# Patient Record
Sex: FEMALE | Race: WHITE | Employment: PART TIME | URBAN - NONMETROPOLITAN AREA
[De-identification: names, ages, dates, MRNs, and addresses within clinical notes are randomized per-mention and may not be internally consistent; named-entity substitution may affect disease eponyms.]

---

## 2017-04-18 ENCOUNTER — HOSPITAL ENCOUNTER (EMERGENCY)
Facility: HOSPITAL | Age: 47
Discharge: HOME | End: 2017-04-18
Attending: NURSE PRACTITIONER | Admitting: NURSE PRACTITIONER
Payer: MEDICARE

## 2017-04-18 VITALS
TEMPERATURE: 97 F | DIASTOLIC BLOOD PRESSURE: 100 MMHG | SYSTOLIC BLOOD PRESSURE: 149 MMHG | RESPIRATION RATE: 16 BRPM | OXYGEN SATURATION: 98 %

## 2017-04-18 DIAGNOSIS — H92.03 OTALGIA OF BOTH EARS: ICD-10-CM

## 2017-04-18 DIAGNOSIS — J06.9 VIRAL URI: ICD-10-CM

## 2017-04-18 PROCEDURE — 99213 OFFICE O/P EST LOW 20 MIN: CPT | Performed by: NURSE PRACTITIONER

## 2017-04-18 PROCEDURE — 99212 OFFICE O/P EST SF 10 MIN: CPT

## 2017-04-18 ASSESSMENT — ENCOUNTER SYMPTOMS
TROUBLE SWALLOWING: 0
NAUSEA: 0
RHINORRHEA: 1
DIARRHEA: 0
SHORTNESS OF BREATH: 0
APPETITE CHANGE: 0
COUGH: 0
ACTIVITY CHANGE: 0
CARDIOVASCULAR NEGATIVE: 1
FEVER: 0
ADENOPATHY: 0
EYES NEGATIVE: 1
CHILLS: 0
MYALGIAS: 0
HEADACHES: 0
VOMITING: 0

## 2017-04-18 NOTE — ED PROVIDER NOTES
History     Chief Complaint   Patient presents with     Otalgia     c/o bilateral ear pain x 2 weeks     The history is provided by the patient. No  was used.     Constantino Warren is a 46 year old female who presents with otalgia, rhinorrhea, nasal congestion and low grade fever initially 14 days ago.     I have reviewed the Medications, Allergies, Past Medical and Surgical History, and Social History in the Epic system.    Review of Systems   Constitutional: Negative for activity change, appetite change, chills and fever.   HENT: Positive for congestion, ear pain, postnasal drip and rhinorrhea. Negative for trouble swallowing.    Eyes: Negative.    Respiratory: Negative for cough and shortness of breath.    Cardiovascular: Negative.    Gastrointestinal: Negative for diarrhea, nausea and vomiting.   Genitourinary: Negative.    Musculoskeletal: Negative for myalgias.   Neurological: Negative for headaches.   Hematological: Negative for adenopathy.       Physical Exam   BP: 149/100  Heart Rate: 85  Temp: 97  F (36.1  C)  Resp: 16  SpO2: 98 %  Physical Exam   Constitutional: She is oriented to person, place, and time. She appears well-developed and well-nourished. No distress.   HENT:   Head: Normocephalic and atraumatic.   Eyes: Conjunctivae and EOM are normal. Pupils are equal, round, and reactive to light. Right eye exhibits no discharge. Left eye exhibits no discharge. No scleral icterus.   Neck: Normal range of motion. Neck supple.   Cardiovascular: Normal rate, regular rhythm and normal heart sounds.  Exam reveals no gallop and no friction rub.    No murmur heard.  Pulmonary/Chest: Effort normal and breath sounds normal. She has no wheezes. She has no rales.   Abdominal: Soft. She exhibits no mass. There is no tenderness. There is no rebound and no guarding.   Musculoskeletal: Normal range of motion.   Lymphadenopathy:     She has no cervical adenopathy.   Neurological: She is alert and  oriented to person, place, and time.   Skin: Skin is warm and dry. No rash noted. She is not diaphoretic.   Nursing note and vitals reviewed.      ED Course     ED Course     Procedures             Labs Ordered and Resulted from Time of ED Arrival Up to the Time of Departure from the ED - No data to display    Assessments & Plan (with Medical Decision Making)     I have reviewed the nursing notes.    I have reviewed the findings, diagnosis, plan and need for follow up with the patient.  Afrin nasal spray 3 days on and then 3 days off  Nasal saline spray as desired    Pathophysiology, possible etiology and treatment with potential outcomes, risks, benefits, and alternatives discussed to the best of my ability      Pt verbalizes understanding and agreement with plan.  Follow up for worsening symptoms      New Prescriptions    No medications on file       Final diagnoses:   Viral URI   Otalgia of both ears       4/18/2017   [facility]     Marie Flores, MAGNO  04/18/17 1114       Marie Flores, MAGNO  04/18/17 1111

## 2017-04-18 NOTE — DISCHARGE INSTRUCTIONS

## 2017-04-18 NOTE — ED NOTES
"Patient here alone. She presents with R ear pain that radiates down neck. States that she has also had drainage from left ear draining down her throat. Scratchy throat. Equilibrium is \"off\". States she has had ear pain for 14 days. Pt rates pain at 9/10 in R ear. L ear pain 3/10.  "

## 2017-04-18 NOTE — ED AVS SNAPSHOT
[facility]    [address] [address]    Phone:  [phone]                                       Constantino Warren   MRN: [id number]    Department:  [facility]   Date of Visit:  4/18/2017           Patient Information     Date Of Birth          1970        Your diagnoses for this visit were:     Viral URI     Otalgia of both ears        You were seen by Marie Flores NP.      Follow-up Information     Follow up with Ayden Alba MD.    Specialty:  Family Practice    Why:  As needed, If symptoms worsen    Contact information:    [facility]  [address] [facility] [address] [phone]          Follow up with [facility].    Specialty:  EMERGENCY MEDICINE    Why:  As needed, If symptoms worsen    Contact information:    [address] [address] [phone]    Additional information:    From [facility]: Take [address]. Turn left at [address]. Turn left at the first stoplight on [address]. At the first stop sign, take a right onto [address]. Take a left into the parking lot and continue through until you reach the North enterance of the building.       From [place]: Take [address]. Take the [address] ramp towards [place]. Turn left onto [address]. Take a slight right onto [address]. Turn left at the first stoplight on [address]. At the first stop sign, take a right onto [address]. Take a left into the parking lot and continue through until you reach the North enterance of the building.       From [place]: Take [address]. Take a right at [address]. At the first stop sign, take a right onto [address]. Take a left into the parking lot and continue through until you reach the North enterance of the building.         Discharge Instructions         Viral Upper Respiratory Illness (Adult)  You have a viral upper respiratory  illness (URI), which is another term for the common cold. This illness is contagious during the first few days. It is spread through the air by coughing and sneezing. It may also be spread by direct contact (touching the sick person and then touching your own eyes, nose, or mouth). Frequent handwashing will decrease risk of spread. Most viral illnesses go away within 7 to 10 days with rest and simple home remedies. Sometimes the illness may last for several weeks. Antibiotics will not kill a virus, and they are generally not prescribed for this condition.    Home care    If symptoms are severe, rest at home for the first 2 to 3 days. When you resume activity, don't let yourself get too tired.    Avoid being exposed to cigarette smoke (yours or others ).    You may use acetaminophen or ibuprofen to control pain and fever, unless another medicine was prescribed. (Note: If you have chronic liver or kidney disease, have ever had a stomach ulcer or gastrointestinal bleeding, or are taking blood-thinning medicines, talk with your healthcare provider before using these medicines.) Aspirin should never be given to anyone under 18 years of age who is ill with a viral infection or fever. It may cause severe liver or brain damage.    Your appetite may be poor, so a light diet is fine. Avoid dehydration by drinking 6 to 8 glasses of fluids per day (water, soft drinks, juices, tea, or soup). Extra fluids will help loosen secretions in the nose and lungs.    Over-the-counter cold medicines will not shorten the length of time you re sick, but they may be helpful for the following symptoms: cough, sore throat, and nasal and sinus congestion. (Note: Do not use decongestants if you have high blood pressure.)  Follow-up care  Follow up with your healthcare provider, or as advised.  When to seek medical advice  Call your healthcare provider right away if any of these occur:    Cough with lots of colored sputum (mucus)    Severe  headache; face, neck, or ear pain    Difficulty swallowing due to throat pain    Fever of 100.4 F (38 C)  Call 911, or get immediate medical care  Call emergency services right away if any of these occur:    Chest pain, shortness of breath, wheezing, or difficulty breathing    Coughing up blood    Inability to swallow due to throat pain    5299-5355 [employer]. [address] [place] [address]. All rights reserved. This information is not intended as a substitute for professional medical care. Always follow your healthcare professional's instructions.             Review of your medicines      Our records show that you are taking the medicines listed below. If these are incorrect, please call your family doctor or clinic.        Dose / Directions Last dose taken    cyanocobalamin 1000 MCG/ML injection   Commonly known as:  VITAMIN B12   Dose:  1 mL        Inject 1 mL into the muscle every 30 days   Refills:  0        ELAVIL PO   Dose:  50 mg   Indication:  Migraine Headache        Take 50 mg by mouth At Bedtime   Refills:  0        famotidine 20 MG tablet   Commonly known as:  PEPCID   Dose:  20 mg   Quantity:  30 tablet        Take 1 tablet (20 mg) by mouth 2 times daily   Refills:  0        ferrous sulfate 325 (65 FE) MG tablet   Commonly known as:  IRON   Dose:  325 mg   Quantity:  90 tablet        Take 1 tablet (325 mg) by mouth 3 times daily (with meals)   Refills:  2        HYDROcodone-acetaminophen 5-325 MG per tablet   Commonly known as:  NORCO   Dose:  1 tablet        Take 1 tablet by mouth every 6 hours as needed for moderate to severe pain   Refills:  0        MELATONIN PO   Dose:  7.5 mg        Take 7.5 mg by mouth nightly as needed   Refills:  0        Multi-vitamin Tabs tablet   Dose:  1 tablet        Take 1 tablet by mouth daily   Refills:  0        SYNTHROID PO   Dose:  250 mcg        Take 250 mcg by mouth daily.   Refills:  0        TRAZODONE HCL PO   Dose:  200 mg        Take  "200 mg by mouth nightly as needed for sleep   Refills:  0                Orders Needing Specimen Collection     None      Pending Results     No orders found from 2017 to 2017.            Pending Culture Results     No orders found from 2017 to 2017.            Thank you for choosing [facility]       Thank you for choosing [facility] for your care. Our goal is always to provide you with excellent care. Hearing back from our patients is one way we can continue to improve our services. Please take a few minutes to complete the written survey that you may receive in the mail after you visit with us. Thank you!        [email or web address] Information     [employer] lets you send messages to your doctor, view your test results, renew your prescriptions, schedule appointments and more. To sign up, go to [email or web address] . Click on \"Log in\" on the left side of the screen, which will take you to the Welcome page. Then click on \"Sign up Now\" on the right side of the page.     You will be asked to enter the access code listed below, as well as some personal information. Please follow the directions to create your username and password.     Your access code is: [email or web address]  Expires: 2017 11:15 AM     Your access code will  in 90 days. If you need help or a new code, please call your [facility] clinic or [phone].        Care EveryWhere ID     This is your Care EveryWhere ID. This could be used by other organizations to access your [facility] medical records  [id number]        After Visit Summary       This is your record. Keep this with you and show to your community pharmacist(s) and doctor(s) at your next visit.                  "

## 2017-04-18 NOTE — ED AVS SNAPSHOT
[facility]    [address] [place] [address]    Phone:  [phone]                                       Constantino Warren   MRN: [id number]    Department:  [facility]   Date of Visit:  4/18/2017           After Visit Summary Signature Page     I have received my discharge instructions, and my questions have been answered. I have discussed any challenges I see with this plan with the nurse or doctor.    ..........................................................................................................................................  Patient/Patient Representative Signature      ..........................................................................................................................................  Patient Representative Print Name and Relationship to Patient    ..................................................               ................................................  Date                                            Time    ..........................................................................................................................................  Reviewed by Signature/Title    ...................................................              ..............................................  Date                                                            Time

## 2017-07-17 ENCOUNTER — HOSPITAL ENCOUNTER (OUTPATIENT)
Dept: PHYSICAL THERAPY | Facility: HOSPITAL | Age: 47
Setting detail: THERAPIES SERIES
End: 2017-07-17
Attending: PHYSICAL MEDICINE & REHABILITATION
Payer: OTHER MISCELLANEOUS

## 2017-07-17 PROCEDURE — 97163 PT EVAL HIGH COMPLEX 45 MIN: CPT | Mod: GP

## 2017-07-17 PROCEDURE — 97112 NEUROMUSCULAR REEDUCATION: CPT | Mod: GP

## 2017-07-17 PROCEDURE — 40000719 ZZHC STATISTIC PT DEPARTMENT NEURO VISIT

## 2017-07-17 NOTE — PROGRESS NOTES
07/17/17 1400   General Information   Type of Visit Initial OP Ortho PT Evaluation   Start of Care Date 07/17/17   Referring Physician KASANDRA Nieves   Patient/Family Goals Statement Wants to work on balance and stairs, less headaches, less neck pain, improved LE strength   Orders Evaluate and Treat   Orders Comment Mild TBI   Date of Order 06/14/17   Insurance Type (Workers compensation)   Medical Diagnosis TBI, concussion   Surgical/Medical history reviewed Yes   Precautions/Limitations no known precautions/limitations   Special Instructions PMH includes lumbar discectomy, failed back syndrome, s/p lumbar fusion, ganglion block, opioid dependence c treatment agreement, hypothyroidism, diabetic neuropathy, gastric bypass, post-concussion syndrome   Body Part(s)   Body Part(s) Cervical Spine;Knee   Presentation and Etiology   Pertinent history of current problem (include personal factors and/or comorbidities that impact the POC) Reports this is her second work related concussion. Her first one was about a year and a half ago. She had chronic headaches since that concussion and has been seeing a neurologist.  Reports since this more recent concussion she has been having issues with memory, buzzing in ears and sensitivity to light and sound. Reports history of spinal fusion and new onset of increased neck pain and L shoulder pain. She has gotten lost when trying to get home in [facility]. Reports chewing is very difficult. 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  Sees Dr. Arreguin in [place] (neurologist). She sees Dr. Mckeon on the 31st and Rodríguez on August 10th. Also reports new onset on LLe weakness. Reports feeling unsteady on stairs. Reports one fall on stairs since concussion due to loss of balance. Also reports history of chronic back pain   Impairments F. Decreased strength and endurance;G. Impaired balance;N. Headaches;R. Other  (Vision  when going upstairs)   Functional Limitations perform activities of daily living;perform required work activities   Symptom Location Headaches   Onset date of current episode/exacerbation 06/05/17   Chronicity New   Pain rating (0-10 point scale) Best (/10);Worst (/10)   Best (/10) 6   Worst (/10) 10   Pain quality A. Sharp  (Pressure)   Frequency of pain/symptoms A. Constant   Pain/symptoms are: The same all the time   Pain/symptoms exacerbated by B. Walking;C. Lifting;D. Carrying;I. Bending;K. Home tasks;L. Work tasks   Pain/symptoms eased by C. Rest   Progression of symptoms since onset: Improved   Current Level of Function   Patient role/employment history A. Employed   Employment Comments [employer]   [facility]   Current equipment-Gait/Locomotion None   Current equipment-ADL None   Fall Risk Screen   Fall screen completed by PT   Functional Scales   Functional Scales Other   Other Scales  Gusman balance: 43/56, DGI: 18/24   Knee Objective Findings   Side (if bilateral, select both right and left) Right;Left   Balance/Proprioception (Single Leg Stance) Unable  bilaterally, loses balance   Knee/Hip Strength Comments Dorsiflexion 4-/5 on L   Right Knee Extension AROM WFL   Right Knee Extension PROM WFL   Right Knee Flexion AROM WFL   Right Knee Flexion PROM WFL   Right Knee Flexion Strength 5/5   Right Knee Extension Strength 5/5   Right Hip Abduction Strength 5/5   Left Knee Extension AROM WFL   Left Knee Extension PROM WFL   Left Knee Flexion AROM WFL   Left Knee Flexion PROM WFL   Left Knee Flexion Strength 4/5   Left Knee Extension Strength 4-/5   Left Hip Abduction Strength 4-/5   Cervical Spine   Observation No issues   Posture Forward head, slumped posture   Cervical Extension ROM Fuoll   Cervical Right Side Bending ROM Full   Cervical Left Side Bending ROM Full   Cervical Right Rotation ROM Full   Cervical Left Rotation ROM Full   Thoracic Flexion ROM Full   Thoracic Extension  ROM Full   Thoracic Right Side Bending ROM Full   Thoracic Left Sidebending ROM  Full   Thoracic Right Rotation Full   Thoracic Left Rotation Full   Shoulder Shrug (C2-C4) Strength No issues   Shoulder/Wrist/Hand Strength Comments  strength: 55lbs on R, 40lbs on L   Upper Trapezius Flexibility Normal   Levator Scapula Flexibility Normal   Scalene Flexibility Normal   Pectoralis Minor Flexibility Tight bilaterally   Cervical Distraction Test Improved c/o neck pain c manual traction   Hendrix Impingement Test Positive for pain on L   Clinical Impression   Criteria for Skilled Therapeutic Interventions Met yes, treatment indicated   PT Diagnosis Weakness, impaired balance, pain limiting function   Influenced by the following impairments Weakness, impaired balance, pain limiting function   Functional limitations due to impairments Decreased safety c functional mobility, pain limiting function   Clinical Presentation Evolving/Changing   Clinical Presentation Rationale Concussion history, multiple body part issues, chronic headaches and pain   Clinical Decision Making (Complexity) High complexity   Therapy Frequency 2 times/Week   Predicted Duration of Therapy Intervention (days/wks) up to 12 wks   Risk & Benefits of therapy have been explained Yes   Patient, Family & other staff in agreement with plan of care Yes   ORTHO GOALS   PT Ortho Eval Goals 1;3;2;4   Ortho Goal 1   Goal Identifier STG 1   Goal Description Pt will be compliance c HEP and demonstrate knowledge/understanding of program   Target Date 07/31/17   Ortho Goal 2   Goal Identifier LTG 1   Goal Description Pt will report neck and headache pain less than 4/10 at worst    Target Date 10/09/17   Ortho Goal 3   Goal Identifier LTG 2   Goal Description Pt will demonstrate improved fall risk and decreased risk of falls as evidenced by improved on DGI and Gusman balance assessments   Target Date 10/09/17   Ortho Goal 4   Goal Identifier LTG 3   Goal Description  Pt will perform functional mobility tasks including ascending/descending stairs and ambulating without being limited by feeling unsteady or weakness   Target Date 10/09/17   Total Evaluation Time   Total Evaluation Time 30

## 2017-07-28 ENCOUNTER — HOSPITAL ENCOUNTER (OUTPATIENT)
Dept: PHYSICAL THERAPY | Facility: HOSPITAL | Age: 47
Setting detail: THERAPIES SERIES
End: 2017-07-28
Attending: PHYSICAL MEDICINE & REHABILITATION
Payer: OTHER MISCELLANEOUS

## 2017-07-28 PROCEDURE — 40000719 ZZHC STATISTIC PT DEPARTMENT NEURO VISIT

## 2017-07-28 PROCEDURE — 97530 THERAPEUTIC ACTIVITIES: CPT | Mod: GP

## 2017-07-28 PROCEDURE — 97112 NEUROMUSCULAR REEDUCATION: CPT | Mod: GP

## 2017-07-28 NOTE — PROGRESS NOTES
07/28/17 0959   Signing Clinician's Name / Credentials   Signing clinician's name / credentials Rosita Cruz-Marlin   Session Number   Session Number 2   Subjective Report   Subjective Report states she got lost in [place] the other day and is having multiple memory lapses. She needed to use her GPS to get home from [place] is a [place] town 5 miles from her residence. Also could not remeber her mothers name.    Objective Measure 1   Objective Measure BPPV testing   Details arias pik and roll negative   Objective Measure 2   Objective Measure DVA   Details 3line difference, line 8 to 5 from 15 feet   Therapeutic Activity   Minutes 30   Skilled Intervention ther act   Patient Response good    Treatment Detail Client seen for education regarding concussion and its affects with relation to HR increases and headaches as well as balance . Wwe retetsted for a crystal in ear to r/o that for dizziness and it is negative. Education for formulation HEP with walking and only increase HR 10 beats for now until we can get her general system stronger to tolerate activity without causing her concussive symptoms to increase.     Neuromuscular Re-education   Minutes 30   Skilled Intervention neuro alejandro   Patient Response good   Treatment Detail Client seen for pencil push ups with blurry vision at 12inches, robin string trial 2 beads with going back adn forth x pattern, nu step HR 83 rest to 93 level 3 moderate speed to 93 HR.   Assessments Completed   Assessments Completed Client is having memory and cognitive issues that we need to have addressed through SPeech and or neuropsycology in order to fully address her concussive rehab. I feel if this is left out she may not be able to cognitively remeber key parts to her job that in the long run will make her safer to limit another head injury. She is on board and requested I look into this.   Education   Learner Patient   Readiness Eager   Method Booklet/handout   Response  Verbalizes Understanding   Education Comments HEP   Communication with other professionals   Communication with other professionals fax this to Doctors Mike and Anjum.   Plan   Homework get robin string, start walking, pencil push ups, posture and neck stretches   Plan for next session I will take her over and do vestibular post concussive rehab   Comments   Comments 900-1000   Total Session Time   Total Session Time 60

## 2017-08-03 ENCOUNTER — HOSPITAL ENCOUNTER (OUTPATIENT)
Dept: PHYSICAL THERAPY | Facility: HOSPITAL | Age: 47
Setting detail: THERAPIES SERIES
End: 2017-08-03
Attending: FAMILY MEDICINE
Payer: OTHER MISCELLANEOUS

## 2017-08-03 PROCEDURE — 97112 NEUROMUSCULAR REEDUCATION: CPT | Mod: GP

## 2017-08-03 PROCEDURE — 40000718 ZZHC STATISTIC PT DEPARTMENT ORTHO VISIT

## 2017-08-07 ENCOUNTER — HOSPITAL ENCOUNTER (OUTPATIENT)
Dept: PHYSICAL THERAPY | Facility: HOSPITAL | Age: 47
Setting detail: THERAPIES SERIES
End: 2017-08-07
Attending: FAMILY MEDICINE
Payer: OTHER MISCELLANEOUS

## 2017-08-07 PROCEDURE — 40000719 ZZHC STATISTIC PT DEPARTMENT NEURO VISIT

## 2017-08-07 PROCEDURE — 97110 THERAPEUTIC EXERCISES: CPT | Mod: GP

## 2017-08-07 PROCEDURE — 97112 NEUROMUSCULAR REEDUCATION: CPT | Mod: GP

## 2017-08-09 ENCOUNTER — HOSPITAL ENCOUNTER (OUTPATIENT)
Dept: PHYSICAL THERAPY | Facility: HOSPITAL | Age: 47
Setting detail: THERAPIES SERIES
End: 2017-08-09
Attending: FAMILY MEDICINE
Payer: OTHER MISCELLANEOUS

## 2017-08-09 PROCEDURE — 97112 NEUROMUSCULAR REEDUCATION: CPT | Mod: GP

## 2017-08-09 PROCEDURE — 40000719 ZZHC STATISTIC PT DEPARTMENT NEURO VISIT

## 2017-08-09 NOTE — PROGRESS NOTES
Outpatient Physical Therapy Progress Note     Patient: Constantino Warren  : 1970    Beginning/End Dates of Reporting Period:  2017 to 2017    Referring Provider: DR. Whitlock    Therapy Diagnosis: decreased balance and functional motion     Client Self Report:  States getting very frustrated with not sleeping as well as anxiety with family stressors. She has gone a day or two without sleeping. We have discussed trying to get her to sleep as well as the mechanism of injury and how symptoms can increase during day with her stress and HR etc.    Objective Measurements:           Balance is improving with at a moderate level challenge, but by end of day headaches and symptoms increase and balance is becoming off  Headaches and vision: working on robin string  And pencil push ups with dynamic visual acuity improving in last three sessions  Gait: trying to walk 20 minutes a day with symptoms controlled, issue is she gets ramped up with stressors and fatigue and it is not working in evening.      I HAVE RECOMMENDED AT THE LEAST SPEECH AND OR NEUROPSYCH GIVEN HER COMPLAINTS OF MEMORY LOSS, GETTING LOST WITH DRIVING, WORD FINDING, AND APRAXIA WITH  TYING SHOES. PLEASE ADVISE.  ADVISED AS WELL. WE ARE GETTING RESISTANCE FROM INSURANCE AND HAVE NOT HAD APPROVAL YET. I PUT IN MY NOTE AS OF MY FIRST VISIT AND SINCE I AM NOT QUALIFIED TO DO THIS AT LEAST AN EVAL WOULD TELL US WHERE TO GO FROM HERE. SHE ALSO EXPRESSES DEPRESSION AND FRUSTRATION MOUNTING WITH THIS THE ENTIRE SITUATION.                                Outcome Measures (most recent score):      Goals:  Goal Identifier     Goal Description     Target Date     Date Met      Progress:     Goal Identifier     Goal Description     Target Date     Date Met      Progress:     Goal Identifier     Goal Description     Target Date     Date Met      Progress:     Goal Identifier     Goal Description     Target Date     Date Met      Progress:     Goal  Identifier     Goal Description     Target Date     Date Met      Progress:     Goal Identifier     Goal Description     Target Date     Date Met      Progress:     Goal Identifier     Goal Description     Target Date     Date Met      Progress:     Goal Identifier     Goal Description     Target Date     Date Met      Progress:     Progress Toward Goals:   Progress this reporting period: Vision is better and so is balance but functionally she cannot sleep then other function is starting to get limited.       Plan:  Continue therapy per current plan of care.    Discharge:    Reason for Discharge:    Equipment Issued:     Discharge Plan: Patient to continue home program.

## 2017-08-14 ENCOUNTER — HOSPITAL ENCOUNTER (OUTPATIENT)
Dept: PHYSICAL THERAPY | Facility: HOSPITAL | Age: 47
Setting detail: THERAPIES SERIES
End: 2017-08-14
Attending: PHYSICAL MEDICINE & REHABILITATION
Payer: OTHER MISCELLANEOUS

## 2017-08-14 PROCEDURE — 97112 NEUROMUSCULAR REEDUCATION: CPT | Mod: GP,CI

## 2017-08-14 PROCEDURE — 40000719 ZZHC STATISTIC PT DEPARTMENT NEURO VISIT

## 2017-08-16 ENCOUNTER — HOSPITAL ENCOUNTER (OUTPATIENT)
Dept: PHYSICAL THERAPY | Facility: HOSPITAL | Age: 47
Setting detail: THERAPIES SERIES
End: 2017-08-16
Attending: FAMILY MEDICINE
Payer: OTHER MISCELLANEOUS

## 2017-08-16 PROCEDURE — 97112 NEUROMUSCULAR REEDUCATION: CPT | Mod: GP

## 2017-08-16 PROCEDURE — 40000719 ZZHC STATISTIC PT DEPARTMENT NEURO VISIT

## 2017-08-17 ENCOUNTER — HOSPITAL ENCOUNTER (EMERGENCY)
Facility: HOSPITAL | Age: 47
Discharge: HOME | End: 2017-08-17
Attending: PHYSICIAN ASSISTANT | Admitting: PHYSICIAN ASSISTANT
Payer: OTHER MISCELLANEOUS

## 2017-08-17 VITALS
SYSTOLIC BLOOD PRESSURE: 144 MMHG | OXYGEN SATURATION: 95 % | DIASTOLIC BLOOD PRESSURE: 98 MMHG | TEMPERATURE: 96.2 F | HEART RATE: 86 BPM | RESPIRATION RATE: 18 BRPM

## 2017-08-17 DIAGNOSIS — S43.422A SPRAIN OF LEFT ROTATOR CUFF CAPSULE, INITIAL ENCOUNTER: ICD-10-CM

## 2017-08-17 PROCEDURE — 73030 X-RAY EXAM OF SHOULDER: CPT | Mod: TC,LT

## 2017-08-17 PROCEDURE — 99213 OFFICE O/P EST LOW 20 MIN: CPT

## 2017-08-17 PROCEDURE — 99202 OFFICE O/P NEW SF 15 MIN: CPT | Performed by: PHYSICIAN ASSISTANT

## 2017-08-17 RX ORDER — TIZANIDINE HYDROCHLORIDE 2 MG/1
CAPSULE, GELATIN COATED ORAL
Status: ON HOLD | COMMUNITY
End: 2018-07-24

## 2017-08-17 RX ORDER — PREDNISONE 20 MG/1
TABLET ORAL
Qty: 10 TABLET | Refills: 0 | Status: SHIPPED | OUTPATIENT
Start: 2017-08-17 | End: 2018-07-20

## 2017-08-17 ASSESSMENT — ENCOUNTER SYMPTOMS
MYALGIAS: 1
NEUROLOGICAL NEGATIVE: 1
NECK PAIN: 0
NECK STIFFNESS: 0
CONSTITUTIONAL NEGATIVE: 1
CARDIOVASCULAR NEGATIVE: 1
PSYCHIATRIC NEGATIVE: 1

## 2017-08-17 NOTE — ED AVS SNAPSHOT
[facility]    [address] [place] [address]    Phone:  [phone]                                       Constantino Warren   MRN: [id number]    Department:  [facility]   Date of Visit:  8/17/2017           After Visit Summary Signature Page     I have received my discharge instructions, and my questions have been answered. I have discussed any challenges I see with this plan with the nurse or doctor.    ..........................................................................................................................................  Patient/Patient Representative Signature      ..........................................................................................................................................  Patient Representative Print Name and Relationship to Patient    ..................................................               ................................................  Date                                            Time    ..........................................................................................................................................  Reviewed by Signature/Title    ...................................................              ..............................................  Date                                                            Time

## 2017-08-17 NOTE — ED NOTES
Pt presents today alone for c/o left shoulder pain secondary to a fall she sustained a week and a half ago from the head injury she originally sustained to cause her to be on work comp.

## 2017-08-17 NOTE — ED PROVIDER NOTES
History     Chief Complaint   Patient presents with     Shoulder Pain     head injury in june, and left shoulder pain. b/c of head injury pt fell last week and now is having left shoulder pain.     The history is provided by the patient. No  was used.     Constantino Warren is a 46 year old female who has just over one week of developing left shoulder pain. Pt originally had a head injury while at work on 05 Jun 2017.  Due to cognitive and balance issues she was started on multiple therapies and is being followed by Neurology.  However, due to her change in balance, she fell just over one week ago, landing on and hurting her left shoulder. Now she has great difficulty with any movement which requires her to move her left elbow away from her trunk.     I have reviewed the Medications, Allergies, Past Medical and Surgical History, and Social History in the Epic system.    Allergies:   Allergies   Allergen Reactions     Bee Venom Anaphylaxis     Food Anaphylaxis     Peppers-anaphylaxis if eaten, if touched hands swell and reddened     Aspartame      Ibuprofen Sodium Nausea and Vomiting and Itching     Prozac [Fluoxetine]          No current facility-administered medications on file prior to encounter.   Current Outpatient Prescriptions on File Prior to Encounter:  MELATONIN PO Take 7.5 mg by mouth nightly as needed   HYDROcodone-acetaminophen (NORCO) 5-325 MG per tablet Take 1 tablet by mouth every 6 hours as needed for moderate to severe pain   multivitamin, therapeutic with minerals (MULTI-VITAMIN) TABS Take 1 tablet by mouth daily   Levothyroxine Sodium (SYNTHROID PO) Take 250 mcg by mouth daily.   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    Weight as of 9/26/15: 85.2 kg (187 lb 13.3 oz).      Review of Systems   Constitutional: Negative.    HENT: Negative.    Cardiovascular: Negative.    Musculoskeletal: Positive for myalgias. Negative for neck pain and neck stiffness.   Neurological: Negative.    Psychiatric/Behavioral: Negative.        Physical Exam   BP: 144/98  Pulse: 86  Temp: 96.2  F (35.7  C)  Resp: 18  SpO2: 95 %  Physical Exam   Constitutional: She is oriented to person, place, and time. She appears well-developed and well-nourished. No distress.   Cardiovascular: Normal rate.    Pulmonary/Chest: Effort normal.   Musculoskeletal:   Left shoulder: no e/e/e/e. Decrease in all abduction due to pain in the anterior side of the shoulder.  Moderate TTP along anterior. Unable to do any further RTC testing due to the pain. M/n/v grossly intact.      5/5 strength   Neurological: She is alert and oriented to person, place, and time.   Skin: She is not diaphoretic.   Psychiatric: She has a normal mood and affect.   Nursing note and vitals reviewed.      ED Course     ED Course     Procedures        Left shoulder xray:    No acute bony process noted    Sling applied. Pt tolerated well    Assessments & Plan (with Medical Decision " Making)     I have reviewed the nursing notes.    I have reviewed the findings, diagnosis, plan and need for follow up with the patient.      Discharge Medication List as of 8/17/2017  3:53 PM      START taking these medications    Details   predniSONE (DELTASONE) 20 MG tablet Take two tablets (= 40mg) each day for 5 (five) days, Disp-10 tablet, R-0, E-Prescribe             Final diagnoses:   Sprain of left rotator cuff capsule, initial encounter - Wear sling at all times until further medical evaluation has been completed         PT consult ordered- f/u with PT with first available appt  Continue current therapy with Rosita as already appointed.  Wear sling at all times until reevaluated by a medical provider    Patient verbally educated and given appropriate education sheets for the diagnoses and has no questions.  Take medications as directed.   Follow up with your Primary Care provider if symptoms increase or if concerns develop, return to the ER  Chikis Zamarripa Certified  Physician Assistant  8/17/2017  5:12 PM  URGENT CARE CLINIC      8/17/2017   [facility]     Chikis Zamarripa PA  08/17/17 3530

## 2017-08-17 NOTE — ED AVS SNAPSHOT
[facility]    [address] [address]    Phone:  [phone]                                       Constantino Warren   MRN: [id number]    Department:  [facility]   Date of Visit:  8/17/2017           Patient Information     Date Of Birth          1970        Your diagnoses for this visit were:     Sprain of left rotator cuff capsule, initial encounter Wear sling at all times until further medical evaluation has been completed       You were seen by Chikis Zamarripa PA.      Follow-up Information     Follow up with Ayden Alba MD.    Specialty:  Family Practice    Why:  If symptoms worsen, prior to your New Physical Therapy for your Left shoulder.    Contact information:    [facility]  [address] [id number]  [phone]          Follow up with [facility].    Specialty:  EMERGENCY MEDICINE    Why:  If further concerns develop    Contact information:    [address] [place] [id number] [phone]    Additional information:    From [place]: Take [address]. Turn left at [address] [facility]. Turn left at the first stoplight on [address]. At the first stop sign, take a right onto [address]. Take a left into the parking lot and continue through until you reach the North enterance of the building.       From [place]: Take [address]. Take the [address] ramp towards [place]. Turn left onto [address]. Take a slight right onto [address] [facility]. Turn left at the first stoplight on [address]. At the first stop sign, take a right onto [address]. Take a left into the parking lot and continue through until you reach the North enterance of the building.       From [place]: Take [address]. Take a right at [address]. At the first stop sign, take a right onto [address]. Take a left into the parking lot and continue through until you reach the North  "enterance of the building.         Please follow up.    Why:  With Rosita as already appointed      Discharge References/Attachments     ROTATOR CUFF TENDONITIS, UNDERSTANDING (ENGLISH)    SHOULDER SPRAIN  (ENGLISH)    SLING (ENGLISH)      Future Appointments        Provider Department Dept Phone Center    8/21/2017 8:00 AM Rosita Colvin, PT [facility] [phone] [facility]    8/23/2017 8:00 AM Rosita Colvin PT [facility] [phone] [facility]    8/23/2017 8:30 AM Radha Grayson, SLP Speech Therapy [phone] [facility]      ED Discharge Orders     PHYSICAL THERAPY REFERRAL       *This therapy referral will be filtered to a centralized scheduling office at [facility] and the patient will receive a call to schedule an appointment at a [facility] location most convenient for them. *     [facility] provides Physical Therapy evaluation and treatment and many specialty services across the [facility] system.  If requesting a specialty program, please choose from the list below.    If you have not heard from the scheduling office within 2 business days, please call [phone] for all locations, with the exception of [place], please call [phone].  Treatment: Evaluation & Treatment  Special Instructions/Modalities: Use all modalities/therapies as required for flexibility/strengthening/function of left RTC  Special Programs: None    Please be aware that coverage of these services is subject to the terms and limitations of your health insurance plan.  Call member services at your health plan with any benefit or coverage questions.      **Note to Provider:  If you are referring outside of [facility] for the therapy appointment, please list the name of the location in the \"special instructions\" above, print the referral and give to the patient to schedule the appointment.                     Review of your medicines      START taking        " Dose / Directions Last dose taken    predniSONE 20 MG tablet   Commonly known as:  DELTASONE   Quantity:  10 tablet        Take two tablets (= 40mg) each day for 5 (five) days   Refills:  0          Our records show that you are taking the medicines listed below. If these are incorrect, please call your family doctor or clinic.        Dose / Directions Last dose taken    cyanocobalamin 1000 MCG/ML injection   Commonly known as:  VITAMIN B12   Dose:  1 mL        Inject 1 mL into the muscle every 30 days   Refills:  0        * GABAPENTIN PO   Dose:  300 mg        Take 300 mg by mouth 2 times daily   Refills:  0        * GABAPENTIN PO   Dose:  600 mg        Take 600 mg by mouth At Bedtime   Refills:  0        HYDROcodone-acetaminophen 5-325 MG per tablet   Commonly known as:  NORCO   Dose:  1 tablet        Take 1 tablet by mouth every 6 hours as needed for moderate to severe pain   Refills:  0        MELATONIN PO   Dose:  7.5 mg        Take 7.5 mg by mouth nightly as needed   Refills:  0        Multi-vitamin Tabs tablet   Dose:  1 tablet        Take 1 tablet by mouth daily   Refills:  0        SYNTHROID PO   Dose:  250 mcg        Take 250 mcg by mouth daily.   Refills:  0        TOPAMAX PO   Dose:  25 mg        Take 25 mg by mouth   Refills:  0        ZANAFLEX 2 MG Caps   Generic drug:  TiZANidine HCl        Refills:  0        * Notice:  This list has 2 medication(s) that are the same as other medications prescribed for you. Read the directions carefully, and ask your doctor or other care provider to review them with you.            Prescriptions were sent or printed at these locations (1 Prescription)                   [id number] [address] [place] - [address] [facility] [phone] [facility] [address] [address]    Telephone:  [phone]   Fax:  [phone]   Hours:                  E-Prescribed (1 of 1)         predniSONE (DELTASONE) 20 MG tablet                Procedures and tests performed during your visit     Shoulder  "XR, G/E 3 views, left    Sling      Orders Needing Specimen Collection     None      Pending Results     Date and Time Order Name Status Description    2017 1511 Shoulder XR, G/E 3 views, left In process             Pending Culture Results     No orders found from 8/15/2017 to 2017.            Thank you for choosing [facility]       Thank you for choosing [facility] for your care. Our goal is always to provide you with excellent care. Hearing back from our patients is one way we can continue to improve our services. Please take a few minutes to complete the written survey that you may receive in the mail after you visit with us. Thank you!        [employer] Information     [employer] lets you send messages to your doctor, view your test results, renew your prescriptions, schedule appointments and more. To sign up, go to [email or web address] . Click on \"Log in\" on the left side of the screen, which will take you to the Welcome page. Then click on \"Sign up Now\" on the right side of the page.     You will be asked to enter the access code listed below, as well as some personal information. Please follow the directions to create your username and password.     Your access code is: [email or web address]  Expires: 11/15/2017  3:52 PM     Your access code will  in 90 days. If you need help or a new code, please call your [facility] clinic or [phone].        Care EveryWhere ID     This is your Care EveryWhere ID. This could be used by other organizations to access your [facility] medical records  [id number]        Equal Access to Services     NANY DUQUE : Hadii aad ku hadasho Soomaali, waaxda luqadaha, qaybta kaalmada adeegyada, casi camacho . So [facility] [phone].    ATENCIÓN: Si habla español, tiene a gordon disposición servicios gratuitos de asistencia lingüística. Llame al [phone].    We comply with applicable federal civil rights laws and [place] laws. We do not discriminate on the " basis of race, color, national origin, age, disability sex, sexual orientation or gender identity.            After Visit Summary       This is your record. Keep this with you and show to your community pharmacist(s) and doctor(s) at your next visit.

## 2017-08-21 ENCOUNTER — HOSPITAL ENCOUNTER (OUTPATIENT)
Dept: PHYSICAL THERAPY | Facility: HOSPITAL | Age: 47
Setting detail: THERAPIES SERIES
End: 2017-08-21
Attending: FAMILY MEDICINE
Payer: OTHER MISCELLANEOUS

## 2017-08-21 PROCEDURE — 40000719 ZZHC STATISTIC PT DEPARTMENT NEURO VISIT

## 2017-08-21 PROCEDURE — 97110 THERAPEUTIC EXERCISES: CPT | Mod: GP

## 2017-08-21 PROCEDURE — 97112 NEUROMUSCULAR REEDUCATION: CPT | Mod: GP

## 2017-08-23 ENCOUNTER — HOSPITAL ENCOUNTER (OUTPATIENT)
Dept: SPEECH THERAPY | Facility: HOSPITAL | Age: 47
Setting detail: THERAPIES SERIES
End: 2017-08-23
Attending: PSYCHIATRY & NEUROLOGY
Payer: OTHER MISCELLANEOUS

## 2017-08-23 ENCOUNTER — HOSPITAL ENCOUNTER (OUTPATIENT)
Dept: PHYSICAL THERAPY | Facility: HOSPITAL | Age: 47
Setting detail: THERAPIES SERIES
End: 2017-08-23
Attending: FAMILY MEDICINE
Payer: OTHER MISCELLANEOUS

## 2017-08-23 PROCEDURE — 40000719 ZZHC STATISTIC PT DEPARTMENT NEURO VISIT

## 2017-08-23 PROCEDURE — 40000211 ZZHC STATISTIC SLP  DEPARTMENT VISIT

## 2017-08-23 PROCEDURE — 92523 SPEECH SOUND LANG COMPREHEN: CPT | Mod: GN

## 2017-08-23 PROCEDURE — 97110 THERAPEUTIC EXERCISES: CPT | Mod: GP

## 2017-08-23 PROCEDURE — 97112 NEUROMUSCULAR REEDUCATION: CPT | Mod: GP

## 2017-08-24 NOTE — PROGRESS NOTES
08/23/17 0800   General Information   Type of Evaluation Cognitive-Linguistic   Type Of Visit Initial   Start Of Care Date 08/23/17   Referring Physician Dr. 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   FALL RISK SCREEN   Have you fallen 2 or more times in the last year? Yes   Have you fallen and had an injury in the past year? Yes   Is the patient a fall risk? Yes   Fall screen completed by SLP   Comments Patient is currently in PT for balance   Oral Motor Sensory Function   Comments No concerns identified   Cognitive Status Examination   Attention impaired   Behavioral Observations WFL   Orientation WNL   Visual Impairments Include visual scanning   Short Term Memory intact   Long Term Memory intact   Reasoning intact   Organization Patient has difficulty completing ADL's in a organized manner, and needs prolonged time to complete tasks   Executive Function Deficits Noted organization;planning;other (see comments)  (attention)   Additional cognitive-linguistic evaluation indicated  yes;recommend   Standardized cognitive-linguistic  assessment completed to be completed during future session   Cognitive Status Exam Comments Patient has completed school for Master's in counseling/psychology; and had planned on taking her final exam for licensure; however she had her accident and is unable to recall any information she has read; pt has re-read sections since her accident and is unable to recall information newly read. Patient had significant prolonged processing time when completing attention and organizational tasks. Further evaluation of cognitive skills in attention are recommended to determine specific treatment goals.   Education Assessment   Barriers to Learning Cognitive   Preferred Learning Style Other  (Usually it's reading; however not since the accident)   General Therapy Interventions   Planned Therapy Interventions Cognitive Treatment   Cognitive treatment External memory strategy training;Internal memory strategy training;Progressive attention training   Clinical Impression, SLP Eval   Criteria for Skilled Therapeutic Interventions Met yes;treatment indicated   SLP Diagnosis Cognitive Deficit   Influenced by the following factors/impairments Mild traumatic brain injury   Functional limitations due to impairments Patient is unable to perform her work duties and ADL's   Therapy Frequency 2 times;per week   Predicted Duration of Therapy Intervention (days/wks) 3 months   Risks and Benefits of Treatment have been explained. Yes   Patient, Family & other staff in agreement with plan of care Yes   Cognitive/Communication Goals   Cognitive/Communication Goals 1;2;3   Cognitive/Communication Goal 1   Goal Identifier LTG 1   Goal Description Patient will increase cognitive abilities comparable to before accident   Target Date 11/24/17   Cognitive/Communication Goal 2   Goal Identifier STG 1   Goal Description Patient will generate 5 new external memory strategies to help aid in everyday daily living activities and use these with 80% success    Target Date 10/24/17   Cognitive/Communication Goal 3   Goal Identifier STG 2   Goal Description Patient will complete standardized cognitive testing   Target Date 09/07/17   Total Session Time   Total Evaluation Time 60   Therapy Certification   Certification date from 08/23/17   Certification date to 11/24/17   Medical Diagnosis Cognitive Impairment   Certification I certify the need for these services furnished under this plan of treatment and while under my care.  (Physician co-signature of this document indicates review and certification of the therapy plan).           I certify the need for these services furnished under this plan of treatment and while under my care. (Physician co-signature of this document indicates review and certification of the therapy plan).      _____________________________     __________________________    ____________  Physician's Signature                 Date               Time

## 2017-08-31 ENCOUNTER — HOSPITAL ENCOUNTER (OUTPATIENT)
Dept: SPEECH THERAPY | Facility: HOSPITAL | Age: 47
Setting detail: THERAPIES SERIES
End: 2017-08-31
Attending: PSYCHIATRY & NEUROLOGY
Payer: OTHER MISCELLANEOUS

## 2017-08-31 PROCEDURE — 40000211 ZZHC STATISTIC SLP  DEPARTMENT VISIT

## 2017-08-31 PROCEDURE — 92507 TX SP LANG VOICE COMM INDIV: CPT | Mod: GN

## 2017-09-01 ENCOUNTER — HOSPITAL ENCOUNTER (OUTPATIENT)
Dept: OCCUPATIONAL THERAPY | Facility: HOSPITAL | Age: 47
Setting detail: THERAPIES SERIES
End: 2017-09-01
Attending: FAMILY MEDICINE
Payer: OTHER MISCELLANEOUS

## 2017-09-01 PROCEDURE — 97166 OT EVAL MOD COMPLEX 45 MIN: CPT | Mod: GO

## 2017-09-01 PROCEDURE — 40000118 ZZH STATISTIC OT DEPT VISIT

## 2017-09-05 ENCOUNTER — HOSPITAL ENCOUNTER (OUTPATIENT)
Dept: SPEECH THERAPY | Facility: HOSPITAL | Age: 47
Setting detail: THERAPIES SERIES
End: 2017-09-05
Attending: PSYCHIATRY & NEUROLOGY
Payer: OTHER MISCELLANEOUS

## 2017-09-05 ENCOUNTER — HOSPITAL ENCOUNTER (OUTPATIENT)
Dept: OCCUPATIONAL THERAPY | Facility: HOSPITAL | Age: 47
Setting detail: THERAPIES SERIES
End: 2017-09-05
Attending: PSYCHIATRY & NEUROLOGY
Payer: OTHER MISCELLANEOUS

## 2017-09-05 PROCEDURE — 97535 SELF CARE MNGMENT TRAINING: CPT | Mod: GO

## 2017-09-05 PROCEDURE — 97532 ZZHC OT COGNITIVE SKILLS EA 15 MIN: CPT | Mod: GO

## 2017-09-05 PROCEDURE — 96125 COGNITIVE TEST BY HC PRO: CPT | Mod: GN

## 2017-09-05 PROCEDURE — 40000211 ZZHC STATISTIC SLP  DEPARTMENT VISIT

## 2017-09-05 PROCEDURE — 40000118 ZZH STATISTIC OT DEPT VISIT

## 2017-09-06 NOTE — PROGRESS NOTES
Test of Everyday Attention (TEA)  Constantino was administered the Test of Everyday Attention on 9/5/2017. The Test of Everyday Attention (TEA) provides norm-referenced scores on tests that are sensitive to selective attention, sustained attention and attentional switching respectively. There is also a divided attention test in the battery. There are three parallel versions of this test, allowing testing on three successive occasions with parallel material. Each subtest raw score can be converted into a scaled score, with a mean performance of 10, a standard deviation of +/-3, and a range of 1-19. Each subtest can be interpreted broadly in the light of the four factors, visual selective attention/speed, attentional switching, sustained attention, and auditory-verbal working memory.  There are eight subtests of the TEA:  Subtests:  1. Map Search (selective attention) Deficit        First minute     Scaled Score Percentile   0 Less than .4         Two Minutes  Scaled Score Percentile   2 0.2-0.6     2. Elevator Counting (sustained attention) Possible Deficit                Correctly counted Severity   6 Possibly abnormal     3. Elevator Counting with Distraction (selective attention, auditory-verbal working memory) Deficit                Scaled Score Percentile   5 3.3-6.7     4. Visual Elevator (attentional switching and cognitive flexibility)      Raw score accuracy WNL  Scaled Score Percentile   14 87.8-93.3       Timing score         Low Average   Scaled Score Percentile   7 12.2-20.2   5. Elevator Counting with Reversal (auditory-verbal working memory) WNL                Scaled Score Percentile   13 79.8-87.8     6. Telephone Search (selective attention) WNL                Scaled Score Percentile   8 20.2-30.9     7. Telephone Search While Counting (divided attention, sustained attention) WNL    Scaled Score Percentile   10 43.4-56.6   8. Lottery (sustained attention) Deficits                Scaled Score Percentile   4  1.5-3.3        Results:  Patient completed the testing of TEA during two separate testing periods due to time constraints. When compared to same aged peers, patient had significant deficits in activities when selective attention and auditory-verbal working memory are needed, pt did well with selective attention when visual materials were presented in an organized fashion (a phone directory); however when needing to identify items organized in an abstract manner with visual distractions (map), patient had more difficulty identifying stimuli and needed prolonged time. Patient also demonstrated deficits in sustained attention, specifically for longer durations of time and presented with information again organized in an abstract order; however when patient completed sustained attention task when provided with information presented in an organized numerical order, patient did significantly better with sustained attention. Patient did poorly with sustained attention when tasks were prolonged.   Results of the TEA determine patient benefits when information is presented in an organized cohesive manner in short sessions of time. Patient needs extra time to process information and complete tasks. Patient demonstrated appropriate skills in attentional switching and cognitive flexibility, as well as divided attention. Patient also had difficulty with tasks containing auditory-verbal information. Hearing testing is recommended to rule out hearing loss. Speech-language therapy is recommended to address these areas.  Patient s deficits may impact her ability to do the following everyday tasks: filling out forms, looking up transport timetables/schedules, looking for a specific item against a variety of contrasting items (food on shelf); difficulty concentrating while reading, watching tv, or conversation;     Time spent in standardized testin  Reference:  (1) Tammy AWAN., Samuel ALMODOVAR, Td FERRER, Remberto SÁNCHEZ (1994) Test  of Everyday Attention. [place]. [facility]

## 2017-09-07 NOTE — PROGRESS NOTES
Cognitive Performance Test  (CPT)                                                CPT Subtest Results    MEDBOX: 6/6 SHOP/GLOVES: 6/6 PHONE: 6/6   WASH:  5/5 TRAVEL: 5/6 TOAST: 5/5   DRESS: 5/5   TOTAL CPT SCORE:  38/39     Average CPT Score  5,42  ___________________

## 2017-09-07 NOTE — PROGRESS NOTES
Outpatient Occupational Therapy Discharge Note     Patient: Constantino Warren  : 1970  Insurance:   Payor/Plan Subscriber Name Rel Member # Group #   WORK COMP - WC [employer]* [facility]  [id number]        [id number]       Beginning/End Dates of Reporting Period:  2017 to 2017    Referring Provider: KASANDRA Nieves    Therapy Diagnosis: mild TBI    Client Self Report: .  She actively participated in completion of evaluation.     Objective Measurements:          Average CPT Score 5.42/5.6            Outcome Measures (most recent score):      Goals:     Complete CPT: met  Plan:  Discharge from therapy.    Discharge:    Reason for Discharge: Patient has met all goals.    Equipment Issued: na    Discharge Plan: Pt to cont compensatory strategies and speech
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
## 2017-09-13 ENCOUNTER — HOSPITAL ENCOUNTER (OUTPATIENT)
Dept: SPEECH THERAPY | Facility: HOSPITAL | Age: 47
Setting detail: THERAPIES SERIES
End: 2017-09-13
Attending: PSYCHIATRY & NEUROLOGY
Payer: OTHER MISCELLANEOUS

## 2017-09-13 ENCOUNTER — HOSPITAL ENCOUNTER (OUTPATIENT)
Dept: PHYSICAL THERAPY | Facility: HOSPITAL | Age: 47
Setting detail: THERAPIES SERIES
End: 2017-09-13
Attending: PHYSICAL MEDICINE & REHABILITATION
Payer: OTHER MISCELLANEOUS

## 2017-09-13 PROCEDURE — 97014 ELECTRIC STIMULATION THERAPY: CPT | Mod: GP

## 2017-09-13 PROCEDURE — 40000211 ZZHC STATISTIC SLP  DEPARTMENT VISIT

## 2017-09-13 PROCEDURE — 97110 THERAPEUTIC EXERCISES: CPT | Mod: GP

## 2017-09-13 PROCEDURE — 97532 ZZHC SP COGNITIVE SKILLS EA 15 MIN: CPT | Mod: GN

## 2017-09-13 PROCEDURE — 40000718 ZZHC STATISTIC PT DEPARTMENT ORTHO VISIT

## 2017-09-15 ENCOUNTER — HOSPITAL ENCOUNTER (OUTPATIENT)
Dept: PHYSICAL THERAPY | Facility: HOSPITAL | Age: 47
Setting detail: THERAPIES SERIES
End: 2017-09-15
Attending: PHYSICAL MEDICINE & REHABILITATION
Payer: OTHER MISCELLANEOUS

## 2017-09-15 PROCEDURE — 97014 ELECTRIC STIMULATION THERAPY: CPT | Mod: GP

## 2017-09-15 PROCEDURE — 97112 NEUROMUSCULAR REEDUCATION: CPT | Mod: GP

## 2017-09-15 PROCEDURE — 40000719 ZZHC STATISTIC PT DEPARTMENT NEURO VISIT

## 2017-09-15 PROCEDURE — 97110 THERAPEUTIC EXERCISES: CPT | Mod: GP

## 2017-09-18 ENCOUNTER — HOSPITAL ENCOUNTER (OUTPATIENT)
Dept: SPEECH THERAPY | Facility: HOSPITAL | Age: 47
Setting detail: THERAPIES SERIES
End: 2017-09-18
Attending: PSYCHIATRY & NEUROLOGY
Payer: OTHER MISCELLANEOUS

## 2017-09-18 ENCOUNTER — HOSPITAL ENCOUNTER (OUTPATIENT)
Dept: PHYSICAL THERAPY | Facility: HOSPITAL | Age: 47
Setting detail: THERAPIES SERIES
End: 2017-09-18
Attending: PHYSICAL MEDICINE & REHABILITATION
Payer: OTHER MISCELLANEOUS

## 2017-09-18 PROCEDURE — 97110 THERAPEUTIC EXERCISES: CPT | Mod: GP

## 2017-09-18 PROCEDURE — 97532 ZZHC SP COGNITIVE SKILLS EA 15 MIN: CPT | Mod: GN

## 2017-09-18 PROCEDURE — 97014 ELECTRIC STIMULATION THERAPY: CPT | Mod: GP

## 2017-09-18 PROCEDURE — 97112 NEUROMUSCULAR REEDUCATION: CPT | Mod: GP

## 2017-09-18 PROCEDURE — 40000719 ZZHC STATISTIC PT DEPARTMENT NEURO VISIT

## 2017-09-18 PROCEDURE — 40000211 ZZHC STATISTIC SLP  DEPARTMENT VISIT
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
## 2017-09-21 ENCOUNTER — HOSPITAL ENCOUNTER (OUTPATIENT)
Dept: SPEECH THERAPY | Facility: HOSPITAL | Age: 47
Setting detail: THERAPIES SERIES
End: 2017-09-21
Attending: PSYCHIATRY & NEUROLOGY
Payer: OTHER MISCELLANEOUS

## 2017-09-21 PROCEDURE — 92507 TX SP LANG VOICE COMM INDIV: CPT | Mod: GN

## 2017-09-21 PROCEDURE — 40000211 ZZHC STATISTIC SLP  DEPARTMENT VISIT

## 2017-09-25 ENCOUNTER — HOSPITAL ENCOUNTER (OUTPATIENT)
Dept: SPEECH THERAPY | Facility: HOSPITAL | Age: 47
Setting detail: THERAPIES SERIES
End: 2017-09-25
Attending: PSYCHIATRY & NEUROLOGY
Payer: OTHER MISCELLANEOUS

## 2017-09-25 PROCEDURE — 97532 ZZHC SP COGNITIVE SKILLS EA 15 MIN: CPT | Mod: GN

## 2017-09-25 PROCEDURE — 40000211 ZZHC STATISTIC SLP  DEPARTMENT VISIT

## 2017-09-27 ENCOUNTER — HOSPITAL ENCOUNTER (OUTPATIENT)
Dept: PHYSICAL THERAPY | Facility: HOSPITAL | Age: 47
Setting detail: THERAPIES SERIES
End: 2017-09-27
Attending: PHYSICAL MEDICINE & REHABILITATION
Payer: OTHER MISCELLANEOUS

## 2017-09-27 ENCOUNTER — HOSPITAL ENCOUNTER (OUTPATIENT)
Dept: SPEECH THERAPY | Facility: HOSPITAL | Age: 47
Setting detail: THERAPIES SERIES
End: 2017-09-27
Attending: PSYCHIATRY & NEUROLOGY
Payer: OTHER MISCELLANEOUS

## 2017-09-27 PROCEDURE — 97014 ELECTRIC STIMULATION THERAPY: CPT | Mod: GP

## 2017-09-27 PROCEDURE — 40000718 ZZHC STATISTIC PT DEPARTMENT ORTHO VISIT

## 2017-09-27 PROCEDURE — 40000211 ZZHC STATISTIC SLP  DEPARTMENT VISIT

## 2017-09-27 PROCEDURE — 97532 ZZHC SP COGNITIVE SKILLS EA 15 MIN: CPT | Mod: GN

## 2017-09-27 PROCEDURE — 97110 THERAPEUTIC EXERCISES: CPT | Mod: GP

## 2017-10-03 ENCOUNTER — HOSPITAL ENCOUNTER (OUTPATIENT)
Dept: PHYSICAL THERAPY | Facility: HOSPITAL | Age: 47
Setting detail: THERAPIES SERIES
End: 2017-10-03
Attending: PHYSICAL MEDICINE & REHABILITATION
Payer: OTHER MISCELLANEOUS

## 2017-10-03 ENCOUNTER — HOSPITAL ENCOUNTER (OUTPATIENT)
Dept: SPEECH THERAPY | Facility: HOSPITAL | Age: 47
Setting detail: THERAPIES SERIES
End: 2017-10-03
Attending: PSYCHIATRY & NEUROLOGY
Payer: OTHER MISCELLANEOUS

## 2017-10-03 PROCEDURE — 97532 ZZHC SP COGNITIVE SKILLS EA 15 MIN: CPT | Mod: GN

## 2017-10-03 PROCEDURE — 40000211 ZZHC STATISTIC SLP  DEPARTMENT VISIT

## 2017-10-03 PROCEDURE — 97110 THERAPEUTIC EXERCISES: CPT | Mod: GP

## 2017-10-03 PROCEDURE — 40000718 ZZHC STATISTIC PT DEPARTMENT ORTHO VISIT

## 2017-10-10 ENCOUNTER — HOSPITAL ENCOUNTER (OUTPATIENT)
Dept: PHYSICAL THERAPY | Facility: HOSPITAL | Age: 47
Setting detail: THERAPIES SERIES
End: 2017-10-10
Attending: PHYSICAL MEDICINE & REHABILITATION
Payer: OTHER MISCELLANEOUS

## 2017-10-10 ENCOUNTER — HOSPITAL ENCOUNTER (OUTPATIENT)
Dept: SPEECH THERAPY | Facility: HOSPITAL | Age: 47
Setting detail: THERAPIES SERIES
End: 2017-10-10
Attending: PSYCHIATRY & NEUROLOGY
Payer: OTHER MISCELLANEOUS

## 2017-10-10 PROCEDURE — 97110 THERAPEUTIC EXERCISES: CPT | Mod: GP

## 2017-10-10 PROCEDURE — 40000718 ZZHC STATISTIC PT DEPARTMENT ORTHO VISIT

## 2017-10-10 PROCEDURE — 40000211 ZZHC STATISTIC SLP  DEPARTMENT VISIT

## 2017-10-10 PROCEDURE — 97532 ZZHC SP COGNITIVE SKILLS EA 15 MIN: CPT | Mod: GN

## 2017-10-12 ENCOUNTER — HOSPITAL ENCOUNTER (OUTPATIENT)
Dept: PHYSICAL THERAPY | Facility: HOSPITAL | Age: 47
Setting detail: THERAPIES SERIES
End: 2017-10-12
Attending: PHYSICAL MEDICINE & REHABILITATION
Payer: OTHER MISCELLANEOUS

## 2017-10-12 ENCOUNTER — HOSPITAL ENCOUNTER (OUTPATIENT)
Dept: SPEECH THERAPY | Facility: HOSPITAL | Age: 47
Setting detail: THERAPIES SERIES
End: 2017-10-12
Attending: PSYCHIATRY & NEUROLOGY
Payer: OTHER MISCELLANEOUS

## 2017-10-12 PROCEDURE — 40000211 ZZHC STATISTIC SLP  DEPARTMENT VISIT

## 2017-10-12 PROCEDURE — 97110 THERAPEUTIC EXERCISES: CPT | Mod: GP

## 2017-10-12 PROCEDURE — 40000718 ZZHC STATISTIC PT DEPARTMENT ORTHO VISIT

## 2017-10-12 PROCEDURE — 97532 ZZHC SP COGNITIVE SKILLS EA 15 MIN: CPT | Mod: GN

## 2017-10-17 ENCOUNTER — HOSPITAL ENCOUNTER (OUTPATIENT)
Dept: PHYSICAL THERAPY | Facility: HOSPITAL | Age: 47
Setting detail: THERAPIES SERIES
End: 2017-10-17
Attending: PHYSICAL MEDICINE & REHABILITATION
Payer: OTHER MISCELLANEOUS

## 2017-10-17 ENCOUNTER — HOSPITAL ENCOUNTER (OUTPATIENT)
Dept: SPEECH THERAPY | Facility: HOSPITAL | Age: 47
Setting detail: THERAPIES SERIES
End: 2017-10-17
Attending: PSYCHIATRY & NEUROLOGY
Payer: OTHER MISCELLANEOUS

## 2017-10-17 PROCEDURE — 40000211 ZZHC STATISTIC SLP  DEPARTMENT VISIT

## 2017-10-17 PROCEDURE — 97532 ZZHC SP COGNITIVE SKILLS EA 15 MIN: CPT | Mod: GN

## 2017-10-17 PROCEDURE — 97110 THERAPEUTIC EXERCISES: CPT | Mod: GP

## 2017-10-17 PROCEDURE — 40000718 ZZHC STATISTIC PT DEPARTMENT ORTHO VISIT

## 2017-10-19 ENCOUNTER — HOSPITAL ENCOUNTER (OUTPATIENT)
Dept: SPEECH THERAPY | Facility: HOSPITAL | Age: 47
Setting detail: THERAPIES SERIES
End: 2017-10-19
Attending: PSYCHIATRY & NEUROLOGY
Payer: OTHER MISCELLANEOUS

## 2017-10-19 ENCOUNTER — HOSPITAL ENCOUNTER (OUTPATIENT)
Dept: PHYSICAL THERAPY | Facility: HOSPITAL | Age: 47
Setting detail: THERAPIES SERIES
End: 2017-10-19
Attending: PHYSICAL MEDICINE & REHABILITATION
Payer: OTHER MISCELLANEOUS

## 2017-10-19 PROCEDURE — 97110 THERAPEUTIC EXERCISES: CPT | Mod: GP

## 2017-10-19 PROCEDURE — 40000211 ZZHC STATISTIC SLP  DEPARTMENT VISIT

## 2017-10-19 PROCEDURE — 40000718 ZZHC STATISTIC PT DEPARTMENT ORTHO VISIT

## 2017-10-19 PROCEDURE — 97532 ZZHC SP COGNITIVE SKILLS EA 15 MIN: CPT | Mod: GN

## 2017-10-26 ENCOUNTER — HOSPITAL ENCOUNTER (OUTPATIENT)
Dept: SPEECH THERAPY | Facility: HOSPITAL | Age: 47
Setting detail: THERAPIES SERIES
End: 2017-10-26
Attending: PHYSICAL MEDICINE & REHABILITATION
Payer: OTHER MISCELLANEOUS

## 2017-10-26 ENCOUNTER — HOSPITAL ENCOUNTER (OUTPATIENT)
Dept: PHYSICAL THERAPY | Facility: HOSPITAL | Age: 47
Setting detail: THERAPIES SERIES
End: 2017-10-26
Attending: PHYSICAL MEDICINE & REHABILITATION
Payer: OTHER MISCELLANEOUS

## 2017-10-26 PROCEDURE — 97110 THERAPEUTIC EXERCISES: CPT | Mod: GP

## 2017-10-26 PROCEDURE — 40000211 ZZHC STATISTIC SLP  DEPARTMENT VISIT

## 2017-10-26 PROCEDURE — 97532 ZZHC SP COGNITIVE SKILLS EA 15 MIN: CPT | Mod: GN

## 2017-10-26 PROCEDURE — 40000718 ZZHC STATISTIC PT DEPARTMENT ORTHO VISIT

## 2017-10-31 ENCOUNTER — HOSPITAL ENCOUNTER (OUTPATIENT)
Dept: PHYSICAL THERAPY | Facility: HOSPITAL | Age: 47
Setting detail: THERAPIES SERIES
End: 2017-10-31
Attending: PHYSICAL MEDICINE & REHABILITATION
Payer: OTHER MISCELLANEOUS

## 2017-10-31 ENCOUNTER — HOSPITAL ENCOUNTER (OUTPATIENT)
Dept: SPEECH THERAPY | Facility: HOSPITAL | Age: 47
Setting detail: THERAPIES SERIES
End: 2017-10-31
Attending: PHYSICAL MEDICINE & REHABILITATION
Payer: OTHER MISCELLANEOUS

## 2017-10-31 PROCEDURE — 40000211 ZZHC STATISTIC SLP  DEPARTMENT VISIT

## 2017-10-31 PROCEDURE — 40000718 ZZHC STATISTIC PT DEPARTMENT ORTHO VISIT

## 2017-10-31 PROCEDURE — 97110 THERAPEUTIC EXERCISES: CPT | Mod: GP

## 2017-10-31 PROCEDURE — 92507 TX SP LANG VOICE COMM INDIV: CPT | Mod: GN

## 2017-11-02 NOTE — PROGRESS NOTES
Outpatient Speech Language Pathology Discharge Note     Patient: Constantino Warren  : 1970    Beginning/End Dates of Reporting Period:  2017 to 2017    Referring Provider: Suki Michael D.O.    Therapy Diagnosis: Mild Concussion without loss of consciousness; cognitive linguistic impairments    Client Self Report: Patient was seen for skilled speech language pathology services today from [id number].  This session involved a discussion regarding her current goals, compensations, and recent neuropsychological testing. The patient reported that the testing determined normal intelligence but impaired retrieval and processing.  The patient chooses to discontinue services at this time and will continue with home programming to address cognitive-linguistic functioning.       Objective Measurements: Patient has been working on improving internal/external memory strategies and improving her attention skills.      Objective Measure: Comprehension of external compensations  Details: 100% successful in demonstrating comprehension for external memory cues and continued home programming involving internal memory strategies.                      Goals:  Goal Identifier LTG 1   Goal Description Patient will increase cognitive abilities comparable to before accident   Target Date 17   Date Met      Progress: Patient continues to demonstrate impaired processing and retrieval; however has developed strategies to help complete everyday activities; pt continues to avoid situations with increased stimuli and conversations with multiple people or with distractions due to impairments. Patient will continue to improve these skills at home.     Goal Identifier STG 1   Goal Description Patient will generate 5 new external memory strategies to help aid in everyday daily living activities and use these with 80% success   Target Date 10/24/17   Date Met  17   Progress: Patient independently identified  strategies to help with everyday activities with her family and self.     Goal Identifier STG 2   Goal Description Patient will complete writing activities with appropraite timing and accuracy from an average of 3.5 seconds to write 1 letter to a more efficient time   Target Date 11/27/17   Date Met  10/10/17   Progress: Patient continues to practice at home; with competing distractions has prolonged processing during writing; however has improved significantly.     Goal Identifier STG3   Goal Description Patient will complete progressive attention task with appropriate timing and 90% success independently using strategies.   Target Date 10/12/17   Date Met      Progress: Patient had been progressing and completed moderate tasks with competing distractions with high level of accuracy. Patient to continue to work on increasing attention and using strategies at home.     Goal Identifier STG 4   Goal Description Patient will use internal memory strategies independently with 90% success   Target Date 10/12/17   Date Met   10/12/17   Progress: Patient is using these skills at home per report independently.       Progress Toward Goals:   Progress this reporting period: Patient has met her goals at this time; pt will continue to complete home program to improve cognitive-linguistic skills.      Plan:  Discharge from therapy.    Discharge:    Reason for Discharge: Patient has met all goals.    Equipment Issued: none    Discharge Plan: Patient to continue home program.

## 2018-01-10 ENCOUNTER — TRANSFERRED RECORDS (OUTPATIENT)
Dept: HEALTH INFORMATION MANAGEMENT | Facility: HOSPITAL | Age: 48
End: 2018-01-10

## 2018-02-05 ENCOUNTER — HOSPITAL ENCOUNTER (EMERGENCY)
Facility: HOSPITAL | Age: 48
Discharge: HOME | End: 2018-02-05
Attending: NURSE PRACTITIONER | Admitting: NURSE PRACTITIONER
Payer: MEDICARE

## 2018-02-05 ENCOUNTER — APPOINTMENT (OUTPATIENT)
Dept: GENERAL RADIOLOGY | Facility: HOSPITAL | Age: 48
End: 2018-02-05
Attending: NURSE PRACTITIONER
Payer: MEDICARE

## 2018-02-05 VITALS
DIASTOLIC BLOOD PRESSURE: 110 MMHG | RESPIRATION RATE: 16 BRPM | SYSTOLIC BLOOD PRESSURE: 142 MMHG | HEART RATE: 91 BPM | TEMPERATURE: 97.3 F | OXYGEN SATURATION: 100 %

## 2018-02-05 DIAGNOSIS — S92.315A CLOSED NONDISPLACED FRACTURE OF FIRST METATARSAL BONE OF LEFT FOOT, INITIAL ENCOUNTER: ICD-10-CM

## 2018-02-05 PROCEDURE — 73630 X-RAY EXAM OF FOOT: CPT | Mod: TC,LT

## 2018-02-05 PROCEDURE — 99213 OFFICE O/P EST LOW 20 MIN: CPT | Performed by: NURSE PRACTITIONER

## 2018-02-05 PROCEDURE — G0463 HOSPITAL OUTPT CLINIC VISIT: HCPCS

## 2018-02-05 NOTE — ED AVS SNAPSHOT
[facility]    [address] [place] [id number]    Phone:  [phone]                                       Constantino Warren   MRN: [id number]    Department:  [facility]   Date of Visit:  2/5/2018           After Visit Summary Signature Page     I have received my discharge instructions, and my questions have been answered. I have discussed any challenges I see with this plan with the nurse or doctor.    ..........................................................................................................................................  Patient/Patient Representative Signature      ..........................................................................................................................................  Patient Representative Print Name and Relationship to Patient    ..................................................               ................................................  Date                                            Time    ..........................................................................................................................................  Reviewed by Signature/Title    ...................................................              ..............................................  Date                                                            Time

## 2018-02-05 NOTE — ED AVS SNAPSHOT
[facility]    [address] [address]    Phone:  [phone]                                       Constantino Warren   MRN: [id number]    Department:  [facility]   Date of Visit:  2/5/2018           Patient Information     Date Of Birth          1970        Your diagnoses for this visit were:     Closed nondisplaced fracture of first metatarsal bone of left foot, initial encounter        You were seen by Mae Delaney NP.      Follow-up Information     Follow up with [facility].    Specialty:  EMERGENCY MEDICINE    Why:  As needed, If symptoms worsen, or concerns develop    Contact information:    [address] [address] [phone]    Additional information:    From [facility]: Take [address]. Turn left at [address]. Turn left at the first stoplight on [address]. At the first stop sign, take a right onto [address]. Take a left into the parking lot and continue through until you reach the North enterance of the building.       From [place]: Take [address]. Take the [address] ramp towards [place]. Turn left onto [address]. Take a slight right onto [address]. Turn left at the first stoplight on [address]. At the first stop sign, take a right onto [address]. Take a left into the parking lot and continue through until you reach the North enterance of the building.       From [place]: Take [address]. Take a right at [address]. At the first stop sign, take a right onto [address]. Take a left into the parking lot and continue through until you reach the North enterance of the building.         Follow up with Ayden Alba MD. Call on 2/6/2018.    Specialty:  Family Practice    Why:  to schedule follow up appointment    Contact information:    [facility]  [address] [facility] [address]  [phone]          Discharge  Instructions         Foot Fracture  You have a broken bone (fracture) in your foot. This will cause pain, swelling, and often bruising. It will usually take about 4 to 8 weeks to heal. A foot fracture may be treated with a special shoe, splint, cast, or boot.  Home care  Follow these guidelines when caring for yourself at home:    You may be given a splint, cast, shoe, or boot to keep the injured area from moving. Unless you were told otherwise, use crutches or a walker. Don t put weight on the injured foot until your health care provider says you can do so. (You can rent crutches and a walker at many pharmacies and surgical or orthopedic supply stores.) Don t put weight on a splint, or it will break.    Keep your leg elevated to reduce pain and swelling. When sleeping, put a pillow under the injured leg. When sitting, support the injured leg so it is above your waist. This is very important during the first 2 days (48 hours).    Put an ice pack on the injured area. Do this for 20 minutes every 1 to 2 hours the first day for pain relief. You can make an ice pack by wrapping a plastic bag of ice cubes in a thin towel. As the ice melts, be careful that the splint, cast, boot, or shoe doesn t get wet. You can place the ice pack directly over the splint or cast. Unless told otherwise, you can open the boot or shoe to apply the ice pack. Continue using the ice pack 3 to 4 times a day for the next 2 days. Then use the ice pack as needed to ease pain and swelling.    Keep the splint, cast, boot, or shoe dry. When bathing, protect it with a large plastic bag, rubber-banded at the top end. If a fiberglass splint or cast or boot gets wet, you can dry it with a hair dryer. Unless told otherwise, you can take off the boot or shoe to bathe.    You may use acetaminophen or ibuprofen to control pain, unless another pain medicine was prescribed. If you have chronic liver or kidney disease, talk with your healthcare provider before  using these medicines. Also talk with your provider if you ve had a stomach ulcer or gastrointestinal bleeding.    Don t put creams or objects under the cast if you have itching.  Follow-up care  Follow up with your healthcare provider, or as advised. This is to make sure the bone is healing the way it should. If you were given a splint, it may be changed to a cast or boot at your follow-up visit.  X-rays may be taken. You will be told of any new findings that may affect your care.  When to seek medical advice  Call your healthcare provider right away if any of these occur:    The cast or splint cracks    The plaster cast or splint becomes wet or soft    The fiberglass cast or splint stays wet for more than 24 hours    Bad odor from the cast or wound fluid stains the cast    Tightness or pain under the cast or splint gets worse    Toes become swollen, cold, blue, numb, or tingly    You can t move your toes    Skin around cast or splint becomes red    Fever of 100.4 F (38 C) or higher, or as directed by your healthcare provider  Date Last Reviewed: 2/1/2017 2000-2017 [employer]. [address] [place] [address]. All rights reserved. This information is not intended as a substitute for professional medical care. Always follow your healthcare professional's instructions.             Review of your medicines      Our records show that you are taking the medicines listed below. If these are incorrect, please call your family doctor or clinic.        Dose / Directions Last dose taken    cyanocobalamin 1000 MCG/ML injection   Commonly known as:  VITAMIN B12   Dose:  1 mL        Inject 1 mL into the muscle every 30 days   Refills:  0        * GABAPENTIN PO   Dose:  300 mg        Take 300 mg by mouth 2 times daily   Refills:  0        * GABAPENTIN PO   Dose:  600 mg        Take 600 mg by mouth At Bedtime   Refills:  0        HYDROcodone-acetaminophen 5-325 MG per tablet   Commonly known as:  NORCO  "  Dose:  1 tablet        Take 1 tablet by mouth every 6 hours as needed for moderate to severe pain   Refills:  0        MELATONIN PO   Dose:  7.5 mg        Take 7.5 mg by mouth nightly as needed   Refills:  0        Multi-vitamin Tabs tablet   Dose:  1 tablet        Take 1 tablet by mouth daily   Refills:  0        predniSONE 20 MG tablet   Commonly known as:  DELTASONE   Quantity:  10 tablet        Take two tablets (= 40mg) each day for 5 (five) days   Refills:  0        SYNTHROID PO   Dose:  250 mcg        Take 250 mcg by mouth daily.   Refills:  0        TOPAMAX PO   Dose:  25 mg        Take 25 mg by mouth   Refills:  0        ZANAFLEX 2 MG Caps   Generic drug:  TiZANidine HCl        Refills:  0        * Notice:  This list has 2 medication(s) that are the same as other medications prescribed for you. Read the directions carefully, and ask your doctor or other care provider to review them with you.            Procedures and tests performed during your visit     Foot XR, G/E 3 views, left      Orders Needing Specimen Collection     None      Pending Results     Date and Time Order Name Status Description    2/5/2018 2045 Foot XR, G/E 3 views, left In process             Pending Culture Results     No orders found from 2/3/2018 to 2/6/2018.            Thank you for choosing [facility]       Thank you for choosing [facility] for your care. Our goal is always to provide you with excellent care. Hearing back from our patients is one way we can continue to improve our services. Please take a few minutes to complete the written survey that you may receive in the mail after you visit with us. Thank you!        [facility] Information     [facility] lets you send messages to your doctor, view your test results, renew your prescriptions, schedule appointments and more. To sign up, go to [email or web address] . Click on \"Log in\" on the left side of the screen, which will take you to the Welcome page. Then click on \"Sign up Now\" on " the right side of the page.     You will be asked to enter the access code listed below, as well as some personal information. Please follow the directions to create your username and password.     Your access code is: [email or web address]  Expires: 2018  9:15 PM     Your access code will  in 90 days. If you need help or a new code, please call your [facility] clinic or [phone].        Care EveryWhere ID     This is your Care EveryWhere ID. This could be used by other organizations to access your [facility] medical records  [id number]        Equal Access to Services     [facility] : Tae Alarcon, matthew ruth, catalino iglesias, casi camacho . So [facility] [phone].    ATENCIÓN: Si habla español, tiene a gordon disposición servicios gratuitos de asistencia lingüística. Llame al [phone].    We comply with applicable federal civil rights laws and [place] laws. We do not discriminate on the basis of race, color, national origin, age, disability, sex, sexual orientation, or gender identity.            After Visit Summary       This is your record. Keep this with you and show to your community pharmacist(s) and doctor(s) at your next visit.

## 2018-02-06 NOTE — DISCHARGE INSTRUCTIONS
Foot Fracture  You have a broken bone (fracture) in your foot. This will cause pain, swelling, and often bruising. It will usually take about 4 to 8 weeks to heal. A foot fracture may be treated with a special shoe, splint, cast, or boot.  Home care  Follow these guidelines when caring for yourself at home:    You may be given a splint, cast, shoe, or boot to keep the injured area from moving. Unless you were told otherwise, use crutches or a walker. Don t put weight on the injured foot until your health care provider says you can do so. (You can rent crutches and a walker at many pharmacies and surgical or orthopedic supply stores.) Don t put weight on a splint, or it will break.    Keep your leg elevated to reduce pain and swelling. When sleeping, put a pillow under the injured leg. When sitting, support the injured leg so it is above your waist. This is very important during the first 2 days (48 hours).    Put an ice pack on the injured area. Do this for 20 minutes every 1 to 2 hours the first day for pain relief. You can make an ice pack by wrapping a plastic bag of ice cubes in a thin towel. As the ice melts, be careful that the splint, cast, boot, or shoe doesn t get wet. You can place the ice pack directly over the splint or cast. Unless told otherwise, you can open the boot or shoe to apply the ice pack. Continue using the ice pack 3 to 4 times a day for the next 2 days. Then use the ice pack as needed to ease pain and swelling.    Keep the splint, cast, boot, or shoe dry. When bathing, protect it with a large plastic bag, rubber-banded at the top end. If a fiberglass splint or cast or boot gets wet, you can dry it with a hair dryer. Unless told otherwise, you can take off the boot or shoe to bathe.    You may use acetaminophen or ibuprofen to control pain, unless another pain medicine was prescribed. If you have chronic liver or kidney disease, talk with your healthcare provider before using these  medicines. Also talk with your provider if you ve had a stomach ulcer or gastrointestinal bleeding.    Don t put creams or objects under the cast if you have itching.  Follow-up care  Follow up with your healthcare provider, or as advised. This is to make sure the bone is healing the way it should. If you were given a splint, it may be changed to a cast or boot at your follow-up visit.  X-rays may be taken. You will be told of any new findings that may affect your care.  When to seek medical advice  Call your healthcare provider right away if any of these occur:    The cast or splint cracks    The plaster cast or splint becomes wet or soft    The fiberglass cast or splint stays wet for more than 24 hours    Bad odor from the cast or wound fluid stains the cast    Tightness or pain under the cast or splint gets worse    Toes become swollen, cold, blue, numb, or tingly    You can t move your toes    Skin around cast or splint becomes red    Fever of 100.4 F (38 C) or higher, or as directed by your healthcare provider  Date Last Reviewed: 2/1/2017 2000-2017 [employer]. [address] [place] [id number]. All rights reserved. This information is not intended as a substitute for professional medical care. Always follow your healthcare professional's instructions.

## 2018-02-06 NOTE — ED NOTES
Patient presents with Lt foot pain, patient dropped a frozen turkey on it at 0900 and then kicked it.

## 2018-02-07 ASSESSMENT — ENCOUNTER SYMPTOMS
ARTHRALGIAS: 1
CONSTITUTIONAL NEGATIVE: 1

## 2018-02-07 NOTE — ED PROVIDER NOTES
"  History     Chief Complaint   Patient presents with     Foot Pain     dropped frozen turkey on left foot and then \"kicked\" it, pain on top ad left great toe     The history is provided by the patient. No  was used.     Constantino Warren is a 47 year old female who presents today with a CC of left foot pain.  She notes that she dropped a turkey on her left foot and then was upset so she kicked it.  The injury happened this morning.  She has not taken anything for pain.  She denies numbness and tingling.  She denies history of foot injury or surgery.  She was driven here by her friend.      Problem List:    Patient Active Problem List    Diagnosis Date Noted     Syncope and collapse 09/26/2015     Priority: Medium     Hypertensive urgency 09/26/2015     Priority: Medium     Acute chest pain 09/26/2015     Priority: Medium     Concussion syndrome 09/26/2015     Priority: Medium        Past Medical History:    Past Medical History:   Diagnosis Date     Chronic pain      Thyroid disease        Past Surgical History:    Past Surgical History:   Procedure Laterality Date     BACK SURGERY      2008; 2011     HEAD & NECK SURGERY  2008    c4-c5 fusion     LAPAROSCOPIC BYPASS GASTRIC  12/2010       Family History:    No family history on file.    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    Musculoskeletal: Positive for " "arthralgias.       Physical Exam   BP: (!) 142/110  Pulse: 91  Temp: 97.3  F (36.3  C)  Resp: 16  SpO2: 100 %      Physical Exam   Constitutional: She is oriented to person, place, and time. She appears well-developed.   Cardiovascular: Normal rate.    Pulmonary/Chest: Effort normal.   Musculoskeletal:        Left foot: There is bony tenderness (1-3 toes, 1-3 MCP joints, 1-2 Metatarsals proximally).   Neurological: She is alert and oriented to person, place, and time.   Psychiatric: She has a normal mood and affect. Her behavior is normal.   Nursing note and vitals reviewed.      ED Course     ED Course     Procedures    I personally reviewed X-ray(s).  There appears to be a lucency on the first metatarsal proximally seen only on the AP view.  Radiology review  Pending    Patient fitted with walking boot and crutches for partial weight bearing left foot     Assessments & Plan (with Medical Decision Making)     I have reviewed the nursing notes.    I have reviewed the findings, diagnosis, plan and need for follow up with the patient.  ASSESSMENT / PLAN:  (S92.315A) Closed nondisplaced fracture of first metatarsal bone of left foot, initial encounter  Comment: slight lucency first metatarsal proximally with TTP   Plan:  Rest, ice 20 minutes at least 4 times daily   Elevate affected area as much as possible   OTC Motrin and or Tylenol as needed for pain relief   Follow up with PCP in 1 week for re-evaluation    Boot and crutches for partial weight bearing   Return to ED/UC if symptoms increase or concerns develop such as those discussed and listed on the \"When to go the Emergency Room\" portion of your discharge instructions.    Patient and parents verbally educated and given appropriate education sheets for their diagnoses and has all questions answered to the best of my ability.      Discharge Medication List as of 2/5/2018  9:15 PM          Final diagnoses:   Closed nondisplaced fracture of first metatarsal bone of " left foot, initial encounter       2/5/2018   [facility]     Mae Delaney NP  02/07/18 1022

## 2018-02-23 ENCOUNTER — DOCUMENTATION ONLY (OUTPATIENT)
Dept: FAMILY MEDICINE | Facility: OTHER | Age: 48
End: 2018-02-23

## 2018-02-23 RX ORDER — HYDROCODONE BITARTRATE AND ACETAMINOPHEN 5; 325 MG/1; MG/1
1 TABLET ORAL EVERY 4 HOURS PRN
COMMUNITY
End: 2018-07-20

## 2018-02-23 RX ORDER — DOCUSATE SODIUM 100 MG/1
100 CAPSULE, LIQUID FILLED ORAL 2 TIMES DAILY
COMMUNITY
End: 2019-02-18

## 2018-02-23 RX ORDER — TRAZODONE HYDROCHLORIDE 100 MG/1
100 TABLET ORAL
COMMUNITY
Start: 2016-02-29 | End: 2018-07-20

## 2018-02-23 RX ORDER — OMEPRAZOLE 40 MG/1
40 CAPSULE, DELAYED RELEASE ORAL
COMMUNITY
End: 2019-11-20

## 2018-02-23 RX ORDER — SIMVASTATIN 20 MG
20 TABLET ORAL
COMMUNITY
Start: 2015-08-17 | End: 2018-07-20

## 2018-02-23 RX ORDER — LEVOTHYROXINE SODIUM 75 UG/1
75 TABLET ORAL
COMMUNITY
End: 2019-11-20

## 2018-02-23 RX ORDER — LEVOTHYROXINE SODIUM 200 UG/1
200 TABLET ORAL
COMMUNITY
End: 2020-02-19

## 2018-02-23 RX ORDER — CYANOCOBALAMIN 1000 UG/ML
1000 INJECTION, SOLUTION INTRAMUSCULAR; SUBCUTANEOUS
Status: ON HOLD | COMMUNITY
End: 2019-11-21

## 2018-02-23 RX ORDER — ACETAMINOPHEN 500 MG
1000 TABLET ORAL EVERY 6 HOURS PRN
COMMUNITY

## 2018-02-23 RX ORDER — SUMATRIPTAN 100 MG/1
100 TABLET, FILM COATED ORAL
COMMUNITY
Start: 2016-01-05 | End: 2019-11-20

## 2018-02-23 RX ORDER — EPINEPHRINE 0.3 MG/.3ML
0.3 INJECTION SUBCUTANEOUS PRN
COMMUNITY
Start: 2014-11-13 | End: 2020-05-28

## 2018-02-23 RX ORDER — CYCLOBENZAPRINE HCL 10 MG
10 TABLET ORAL 3 TIMES DAILY PRN
COMMUNITY
End: 2018-07-20

## 2018-03-15 NOTE — PROGRESS NOTES
10/31/17 0926   Signing Clinician's Name / Credentials   Signing clinician's name / credentials Rosita Fran   Session Number   Session Number 20   Ortho Goal 1   Goal Identifier STG 1   Goal Description Pt will be compliance c HEP and demonstrate knowledge/understanding of program   Target Date 10/12/17   Ortho Goal 2   Goal Identifier LTG 1   Goal Description Pt will report neck and headache pain less than 4/10 at worst    Ortho Goal 3   Goal Identifier LTG 2   Goal Description Pt will demonstrate improved fall risk and decreased risk of falls as evidenced by improved on DGI and Gusman balance assessments   Target Date 09/18/17   Ortho Goal 4   Goal Identifier LTG 3   Goal Description Pt will perform functional mobility tasks including ascending/descending stairs and ambulating without being limited by feeling unsteady or weakness   Target Date (met intermittently depending headaches)   Subjective Report   Subjective Report states still getting headaches and neuropsych taled about chronic pain clinic. We discussed taking abbreak from PT as I think posturally as well as with the concussion program I have gone as far as I can go if the neuro psych is suggesting pain clinic.   Therapeutic Procedure/exercise   Minutes 25   Skilled Intervention Ther ex   Patient Response Good   Treatment Detail Client seen for ube x 5 minuyes, review HEP to include chin tucks, pectoralis stretches, prone strengthening with de emphaisis neck UT co contraction V,T,W. UT stretches for home as well.   Assessments Completed   Assessments Completed Goals met per above and we are at a standstill for now with shoulder and  her posture and that stability has been educated.   Plan   Plan for next session go on her own and see how chronic pain treatment goes,   Comments   Comments [phone]   Total Session Time   Timed Code Treatment Minutes 25   Total Treatment Time (sum of timed and untimed services) 25

## 2018-07-20 ENCOUNTER — HOSPITAL ENCOUNTER (INPATIENT)
Facility: HOSPITAL | Age: 48
LOS: 3 days | Discharge: HOME | DRG: 382 | End: 2018-07-24
Attending: PHYSICIAN ASSISTANT | Admitting: INTERNAL MEDICINE
Payer: MEDICARE

## 2018-07-20 ENCOUNTER — APPOINTMENT (OUTPATIENT)
Dept: CT IMAGING | Facility: HOSPITAL | Age: 48
DRG: 382 | End: 2018-07-20
Attending: PHYSICIAN ASSISTANT
Payer: MEDICARE

## 2018-07-20 DIAGNOSIS — R19.5 OCCULT BLOOD POSITIVE STOOL: ICD-10-CM

## 2018-07-20 DIAGNOSIS — R10.13 ABDOMINAL PAIN, EPIGASTRIC: ICD-10-CM

## 2018-07-20 DIAGNOSIS — K28.9 ANASTOMOTIC ULCER S/P GASTRIC BYPASS: ICD-10-CM

## 2018-07-20 DIAGNOSIS — F07.81 CONCUSSION SYNDROME: Primary | ICD-10-CM

## 2018-07-20 DIAGNOSIS — D64.9 ANEMIA, UNSPECIFIED TYPE: ICD-10-CM

## 2018-07-20 DIAGNOSIS — D50.9 HYPOCHROMIC MICROCYTIC ANEMIA: ICD-10-CM

## 2018-07-20 PROBLEM — K92.2 GI BLEED: Status: ACTIVE | Noted: 2018-07-20

## 2018-07-20 PROBLEM — R07.9 CHEST PAIN: Status: ACTIVE | Noted: 2018-07-20

## 2018-07-20 LAB
ABO + RH BLD: NORMAL
ABO + RH BLD: NORMAL
ALBUMIN SERPL-MCNC: 3.1 G/DL (ref 3.4–5)
ALBUMIN UR-MCNC: 10 MG/DL
ALP SERPL-CCNC: 84 U/L (ref 40–150)
ALT SERPL W P-5'-P-CCNC: 23 U/L (ref 0–50)
ANION GAP SERPL CALCULATED.3IONS-SCNC: 6 MMOL/L (ref 3–14)
APPEARANCE UR: CLEAR
AST SERPL W P-5'-P-CCNC: 23 U/L (ref 0–45)
BACTERIA #/AREA URNS HPF: ABNORMAL /HPF
BASOPHILS # BLD AUTO: 0 10E9/L (ref 0–0.2)
BASOPHILS NFR BLD AUTO: 0 %
BILIRUB SERPL-MCNC: 0.3 MG/DL (ref 0.2–1.3)
BILIRUB UR QL STRIP: NEGATIVE
BLD GP AB SCN SERPL QL: NORMAL
BLD PROD TYP BPU: NORMAL
BLD UNIT ID BPU: 0
BLD UNIT ID BPU: 0
BLOOD BANK CMNT PATIENT-IMP: NORMAL
BLOOD PRODUCT CODE: NORMAL
BLOOD PRODUCT CODE: NORMAL
BPU ID: NORMAL
BPU ID: NORMAL
BUN SERPL-MCNC: 19 MG/DL (ref 7–30)
CALCIUM SERPL-MCNC: 8.4 MG/DL (ref 8.5–10.1)
CHLORIDE SERPL-SCNC: 108 MMOL/L (ref 94–109)
CO2 SERPL-SCNC: 26 MMOL/L (ref 20–32)
COLOR UR AUTO: YELLOW
CREAT SERPL-MCNC: 0.68 MG/DL (ref 0.52–1.04)
CRP SERPL-MCNC: <2.9 MG/L (ref 0–8)
DIFFERENTIAL METHOD BLD: ABNORMAL
EOSINOPHIL # BLD AUTO: 0 10E9/L (ref 0–0.7)
EOSINOPHIL NFR BLD AUTO: 0 %
ERYTHROCYTE [DISTWIDTH] IN BLOOD BY AUTOMATED COUNT: 16.9 % (ref 10–15)
GFR SERPL CREATININE-BSD FRML MDRD: >90 ML/MIN/1.7M2
GLUCOSE SERPL-MCNC: 89 MG/DL (ref 70–99)
GLUCOSE UR STRIP-MCNC: NEGATIVE MG/DL
HCT VFR BLD AUTO: 24 % (ref 35–47)
HEMOCCULT STL QL IA: POSITIVE
HGB BLD-MCNC: 6.9 G/DL (ref 11.7–15.7)
HGB BLD-MCNC: 8.7 G/DL (ref 11.7–15.7)
HGB UR QL STRIP: NEGATIVE
HYPOCHROMIA BLD QL: PRESENT
IMM GRANULOCYTES # BLD: 0 10E9/L (ref 0–0.4)
IMM GRANULOCYTES NFR BLD: 0.3 %
KETONES UR STRIP-MCNC: NEGATIVE MG/DL
LEUKOCYTE ESTERASE UR QL STRIP: NEGATIVE
LIPASE SERPL-CCNC: 189 U/L (ref 73–393)
LYMPHOCYTES # BLD AUTO: 2.1 10E9/L (ref 0.8–5.3)
LYMPHOCYTES NFR BLD AUTO: 27.6 %
MCH RBC QN AUTO: 18.9 PG (ref 26.5–33)
MCHC RBC AUTO-ENTMCNC: 28.8 G/DL (ref 31.5–36.5)
MCV RBC AUTO: 66 FL (ref 78–100)
MONOCYTES # BLD AUTO: 0.6 10E9/L (ref 0–1.3)
MONOCYTES NFR BLD AUTO: 8.3 %
MUCOUS THREADS #/AREA URNS LPF: PRESENT /LPF
NEUTROPHILS # BLD AUTO: 4.8 10E9/L (ref 1.6–8.3)
NEUTROPHILS NFR BLD AUTO: 63.8 %
NITRATE UR QL: NEGATIVE
NRBC # BLD AUTO: 0 10*3/UL
NRBC BLD AUTO-RTO: 0 /100
NUM BPU REQUESTED: 2
PH UR STRIP: 5.5 PH (ref 4.7–8)
PLATELET # BLD AUTO: 278 10E9/L (ref 150–450)
POIKILOCYTOSIS BLD QL SMEAR: SLIGHT
POTASSIUM SERPL-SCNC: 3.9 MMOL/L (ref 3.4–5.3)
PROT SERPL-MCNC: 6.4 G/DL (ref 6.8–8.8)
RBC # BLD AUTO: 3.65 10E12/L (ref 3.8–5.2)
RBC #/AREA URNS AUTO: 0 /HPF (ref 0–2)
SODIUM SERPL-SCNC: 140 MMOL/L (ref 133–144)
SOURCE: ABNORMAL
SP GR UR STRIP: 1.02 (ref 1–1.03)
SPECIMEN EXP DATE BLD: NORMAL
SQUAMOUS #/AREA URNS AUTO: 1 /HPF (ref 0–1)
TRANSFUSION STATUS PATIENT QL: NORMAL
TROPONIN I SERPL-MCNC: <0.015 UG/L (ref 0–0.04)
TROPONIN I SERPL-MCNC: <0.015 UG/L (ref 0–0.04)
UROBILINOGEN UR STRIP-MCNC: 2 MG/DL (ref 0–2)
WBC # BLD AUTO: 7.5 10E9/L (ref 4–11)
WBC #/AREA URNS AUTO: 1 /HPF (ref 0–5)

## 2018-07-20 PROCEDURE — 86920 COMPATIBILITY TEST SPIN: CPT | Performed by: PHYSICIAN ASSISTANT

## 2018-07-20 PROCEDURE — 25000128 H RX IP 250 OP 636: Performed by: HOSPITALIST

## 2018-07-20 PROCEDURE — 85018 HEMOGLOBIN: CPT | Performed by: HOSPITALIST

## 2018-07-20 PROCEDURE — 99284 EMERGENCY DEPT VISIT MOD MDM: CPT | Mod: Z6 | Performed by: PHYSICIAN ASSISTANT

## 2018-07-20 PROCEDURE — A9270 NON-COVERED ITEM OR SERVICE: HCPCS | Mod: GY | Performed by: HOSPITALIST

## 2018-07-20 PROCEDURE — 25000125 ZZHC RX 250: Performed by: HOSPITALIST

## 2018-07-20 PROCEDURE — 96374 THER/PROPH/DIAG INJ IV PUSH: CPT

## 2018-07-20 PROCEDURE — 96376 TX/PRO/DX INJ SAME DRUG ADON: CPT

## 2018-07-20 PROCEDURE — 86850 RBC ANTIBODY SCREEN: CPT | Performed by: PHYSICIAN ASSISTANT

## 2018-07-20 PROCEDURE — 86900 BLOOD TYPING SEROLOGIC ABO: CPT | Performed by: PHYSICIAN ASSISTANT

## 2018-07-20 PROCEDURE — 74176 CT ABD & PELVIS W/O CONTRAST: CPT | Mod: TC

## 2018-07-20 PROCEDURE — 99223 1ST HOSP IP/OBS HIGH 75: CPT | Mod: AI | Performed by: HOSPITALIST

## 2018-07-20 PROCEDURE — 36415 COLL VENOUS BLD VENIPUNCTURE: CPT | Performed by: HOSPITALIST

## 2018-07-20 PROCEDURE — 93005 ELECTROCARDIOGRAM TRACING: CPT

## 2018-07-20 PROCEDURE — 81001 URINALYSIS AUTO W/SCOPE: CPT | Performed by: PHYSICIAN ASSISTANT

## 2018-07-20 PROCEDURE — 99285 EMERGENCY DEPT VISIT HI MDM: CPT | Mod: 25

## 2018-07-20 PROCEDURE — G0378 HOSPITAL OBSERVATION PER HR: HCPCS

## 2018-07-20 PROCEDURE — 25000132 ZZH RX MED GY IP 250 OP 250 PS 637: Mod: GY | Performed by: HOSPITALIST

## 2018-07-20 PROCEDURE — 85025 COMPLETE CBC W/AUTO DIFF WBC: CPT | Performed by: PHYSICIAN ASSISTANT

## 2018-07-20 PROCEDURE — 93010 ELECTROCARDIOGRAM REPORT: CPT | Performed by: INTERNAL MEDICINE

## 2018-07-20 PROCEDURE — 25000128 H RX IP 250 OP 636: Performed by: PHYSICIAN ASSISTANT

## 2018-07-20 PROCEDURE — 82274 ASSAY TEST FOR BLOOD FECAL: CPT | Performed by: PHYSICIAN ASSISTANT

## 2018-07-20 PROCEDURE — 96361 HYDRATE IV INFUSION ADD-ON: CPT

## 2018-07-20 PROCEDURE — 84484 ASSAY OF TROPONIN QUANT: CPT | Performed by: HOSPITALIST

## 2018-07-20 PROCEDURE — 86140 C-REACTIVE PROTEIN: CPT | Performed by: PHYSICIAN ASSISTANT

## 2018-07-20 PROCEDURE — 25000128 H RX IP 250 OP 636

## 2018-07-20 PROCEDURE — 86901 BLOOD TYPING SEROLOGIC RH(D): CPT | Performed by: PHYSICIAN ASSISTANT

## 2018-07-20 PROCEDURE — 83690 ASSAY OF LIPASE: CPT | Performed by: PHYSICIAN ASSISTANT

## 2018-07-20 PROCEDURE — 36430 TRANSFUSION BLD/BLD COMPNT: CPT

## 2018-07-20 PROCEDURE — 80053 COMPREHEN METABOLIC PANEL: CPT | Performed by: PHYSICIAN ASSISTANT

## 2018-07-20 PROCEDURE — P9016 RBC LEUKOCYTES REDUCED: HCPCS | Performed by: PHYSICIAN ASSISTANT

## 2018-07-20 RX ORDER — LEVOTHYROXINE SODIUM 100 UG/1
200 TABLET ORAL
Status: DISCONTINUED | OUTPATIENT
Start: 2018-07-21 | End: 2018-07-24 | Stop reason: HOSPADM

## 2018-07-20 RX ORDER — SODIUM CHLORIDE 9 MG/ML
INJECTION, SOLUTION INTRAVENOUS CONTINUOUS
Status: DISCONTINUED | OUTPATIENT
Start: 2018-07-20 | End: 2018-07-21

## 2018-07-20 RX ORDER — LEVOTHYROXINE SODIUM 75 UG/1
75 TABLET ORAL
Status: DISCONTINUED | OUTPATIENT
Start: 2018-07-21 | End: 2018-07-24 | Stop reason: HOSPADM

## 2018-07-20 RX ORDER — MORPHINE SULFATE 2 MG/ML
4 INJECTION, SOLUTION INTRAMUSCULAR; INTRAVENOUS ONCE
Status: COMPLETED | OUTPATIENT
Start: 2018-07-20 | End: 2018-07-20

## 2018-07-20 RX ORDER — ACETAMINOPHEN 325 MG/1
650 TABLET ORAL EVERY 4 HOURS PRN
Status: DISCONTINUED | OUTPATIENT
Start: 2018-07-20 | End: 2018-07-24 | Stop reason: HOSPADM

## 2018-07-20 RX ORDER — ONDANSETRON 4 MG/1
4 TABLET, ORALLY DISINTEGRATING ORAL EVERY 6 HOURS PRN
Status: DISCONTINUED | OUTPATIENT
Start: 2018-07-20 | End: 2018-07-24 | Stop reason: HOSPADM

## 2018-07-20 RX ORDER — MORPHINE SULFATE 4 MG/ML
INJECTION, SOLUTION INTRAMUSCULAR; INTRAVENOUS
Status: COMPLETED
Start: 2018-07-20 | End: 2018-07-20

## 2018-07-20 RX ORDER — TOPIRAMATE 25 MG/1
25 TABLET, FILM COATED ORAL DAILY
Status: DISCONTINUED | OUTPATIENT
Start: 2018-07-21 | End: 2018-07-24 | Stop reason: HOSPADM

## 2018-07-20 RX ORDER — MORPHINE SULFATE 2 MG/ML
2 INJECTION, SOLUTION INTRAMUSCULAR; INTRAVENOUS ONCE
Status: COMPLETED | OUTPATIENT
Start: 2018-07-20 | End: 2018-07-20

## 2018-07-20 RX ORDER — NALOXONE HYDROCHLORIDE 0.4 MG/ML
.1-.4 INJECTION, SOLUTION INTRAMUSCULAR; INTRAVENOUS; SUBCUTANEOUS
Status: DISCONTINUED | OUTPATIENT
Start: 2018-07-20 | End: 2018-07-22

## 2018-07-20 RX ORDER — ACETAMINOPHEN 650 MG/1
650 SUPPOSITORY RECTAL EVERY 4 HOURS PRN
Status: DISCONTINUED | OUTPATIENT
Start: 2018-07-20 | End: 2018-07-24 | Stop reason: HOSPADM

## 2018-07-20 RX ORDER — ONDANSETRON 2 MG/ML
4 INJECTION INTRAMUSCULAR; INTRAVENOUS ONCE
Status: COMPLETED | OUTPATIENT
Start: 2018-07-20 | End: 2018-07-20

## 2018-07-20 RX ORDER — LIDOCAINE 40 MG/G
CREAM TOPICAL
Status: DISCONTINUED | OUTPATIENT
Start: 2018-07-20 | End: 2018-07-21

## 2018-07-20 RX ORDER — MULTIPLE VITAMINS W/ MINERALS TAB 9MG-400MCG
1 TAB ORAL DAILY
Status: DISCONTINUED | OUTPATIENT
Start: 2018-07-21 | End: 2018-07-24 | Stop reason: HOSPADM

## 2018-07-20 RX ORDER — HYDROCODONE BITARTRATE AND ACETAMINOPHEN 5; 325 MG/1; MG/1
1 TABLET ORAL EVERY 6 HOURS PRN
Status: DISCONTINUED | OUTPATIENT
Start: 2018-07-20 | End: 2018-07-21

## 2018-07-20 RX ORDER — GABAPENTIN 300 MG/1
300 CAPSULE ORAL 2 TIMES DAILY
Status: DISCONTINUED | OUTPATIENT
Start: 2018-07-21 | End: 2018-07-24 | Stop reason: HOSPADM

## 2018-07-20 RX ORDER — HYDROCODONE BITARTRATE AND ACETAMINOPHEN 5; 325 MG/1; MG/1
1-2 TABLET ORAL EVERY 4 HOURS PRN
Status: DISCONTINUED | OUTPATIENT
Start: 2018-07-20 | End: 2018-07-20

## 2018-07-20 RX ORDER — MORPHINE SULFATE 2 MG/ML
2 INJECTION, SOLUTION INTRAMUSCULAR; INTRAVENOUS ONCE
Status: DISCONTINUED | OUTPATIENT
Start: 2018-07-20 | End: 2018-07-20

## 2018-07-20 RX ORDER — GABAPENTIN 600 MG/1
600 TABLET ORAL AT BEDTIME
Status: DISCONTINUED | OUTPATIENT
Start: 2018-07-20 | End: 2018-07-24 | Stop reason: HOSPADM

## 2018-07-20 RX ORDER — ONDANSETRON 2 MG/ML
4 INJECTION INTRAMUSCULAR; INTRAVENOUS EVERY 6 HOURS PRN
Status: DISCONTINUED | OUTPATIENT
Start: 2018-07-20 | End: 2018-07-24 | Stop reason: HOSPADM

## 2018-07-20 RX ORDER — NALOXONE HYDROCHLORIDE 0.4 MG/ML
.1-.4 INJECTION, SOLUTION INTRAMUSCULAR; INTRAVENOUS; SUBCUTANEOUS
Status: DISCONTINUED | OUTPATIENT
Start: 2018-07-20 | End: 2018-07-24 | Stop reason: HOSPADM

## 2018-07-20 RX ORDER — NITROGLYCERIN 0.4 MG/1
0.4 TABLET SUBLINGUAL EVERY 5 MIN PRN
Status: DISCONTINUED | OUTPATIENT
Start: 2018-07-20 | End: 2018-07-24 | Stop reason: HOSPADM

## 2018-07-20 RX ORDER — ALUMINA, MAGNESIA, AND SIMETHICONE 2400; 2400; 240 MG/30ML; MG/30ML; MG/30ML
30 SUSPENSION ORAL EVERY 4 HOURS PRN
Status: DISCONTINUED | OUTPATIENT
Start: 2018-07-20 | End: 2018-07-24 | Stop reason: HOSPADM

## 2018-07-20 RX ADMIN — SODIUM CHLORIDE: 9 INJECTION, SOLUTION INTRAVENOUS at 19:19

## 2018-07-20 RX ADMIN — SODIUM CHLORIDE 1000 ML: 9 INJECTION, SOLUTION INTRAVENOUS at 13:30

## 2018-07-20 RX ADMIN — MORPHINE SULFATE 4 MG: 2 INJECTION, SOLUTION INTRAMUSCULAR; INTRAVENOUS at 13:35

## 2018-07-20 RX ADMIN — MORPHINE SULFATE 2 MG: 4 INJECTION INTRAVENOUS at 18:43

## 2018-07-20 RX ADMIN — ONDANSETRON 4 MG: 2 INJECTION, SOLUTION INTRAMUSCULAR; INTRAVENOUS at 13:31

## 2018-07-20 RX ADMIN — PANTOPRAZOLE SODIUM 40 MG: 40 INJECTION, POWDER, FOR SOLUTION INTRAVENOUS at 19:33

## 2018-07-20 RX ADMIN — MORPHINE SULFATE 2 MG: 2 INJECTION, SOLUTION INTRAMUSCULAR; INTRAVENOUS at 18:04

## 2018-07-20 RX ADMIN — GABAPENTIN 600 MG: 600 TABLET, FILM COATED ORAL at 23:56

## 2018-07-20 ASSESSMENT — ENCOUNTER SYMPTOMS
DIFFICULTY URINATING: 0
FLANK PAIN: 0
FREQUENCY: 0
DIARRHEA: 1
RESPIRATORY NEGATIVE: 1
ABDOMINAL PAIN: 1
DYSURIA: 0
VOMITING: 1
HEMATURIA: 0
CONSTITUTIONAL NEGATIVE: 1
CARDIOVASCULAR NEGATIVE: 1
NAUSEA: 1
FEVER: 0
CHILLS: 0

## 2018-07-20 NOTE — ED PROVIDER NOTES
History     Chief Complaint   Patient presents with     Abdominal Pain     rt upper abd pain x 4 days, notes pain radiates around to her back, c/o nausea, vomiting and diarrhea     HPI  Constantino Warren is a 47 year old female who presents ambulatory to the ER c/o 4 days upper abdominal pain. Pain is achy, crampy and fairly constant. Pain is associated with N/V/D.  She adds that today vomiting is associated with meals.  She denies fever. No black/bloody stools. No Hx PUD. She does have Hx most significant for renal stones and SBO, prompting visit.     Problem List:    Patient Active Problem List    Diagnosis Date Noted     Syncope and collapse 09/26/2015     Priority: Medium     Hypertensive urgency 09/26/2015     Priority: Medium     Acute chest pain 09/26/2015     Priority: Medium     Concussion syndrome 09/26/2015     Priority: Medium     Hydronephrosis, right 04/20/2015     Priority: Medium     Pyelonephritis 04/20/2015     Priority: Medium     Urolithiasis 04/20/2015     Priority: Medium        Past Medical History:    Past Medical History:   Diagnosis Date     Chronic pain      Thyroid disease        Past Surgical History:    Past Surgical History:   Procedure Laterality Date     BACK SURGERY      2008; 2011     HEAD & NECK SURGERY  2008    c4-c5 fusion     LAPAROSCOPIC BYPASS GASTRIC  12/2010       Family History:    No family history on file.    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        Medications:      docusate sodium (STOOL SOFTENER) 100 MG capsule   GABAPENTIN PO   GABAPENTIN PO   HYDROcodone-acetaminophen (NORCO) 5-325 MG per tablet   levothyroxine (SYNTHROID/LEVOTHROID) 200 MCG tablet   levothyroxine (SYNTHROID/LEVOTHROID) 75 MCG tablet   MELATONIN PO   multivitamin, therapeutic with minerals (MULTI-VITAMIN) TABS   omeprazole  (PRILOSEC) 40 MG capsule   TiZANidine HCl (ZANAFLEX) 2 MG CAPS   Topiramate (TOPAMAX PO)   acetaminophen (TYLENOL) 500 MG tablet   cyanocobalamin (VITAMIN B12) 1000 MCG/ML injection   EPINEPHrine (EPIPEN/ADRENACLICK/OR ANY BX GENERIC EQUIV) 0.3 MG/0.3ML injection 2-pack   SUMAtriptan (IMITREX) 100 MG tablet   [DISCONTINUED] Levothyroxine Sodium (SYNTHROID PO)         Review of Systems   Constitutional: Negative.  Negative for chills and fever.   Respiratory: Negative.    Cardiovascular: Negative.    Gastrointestinal: Positive for abdominal pain, diarrhea, nausea and vomiting.   Genitourinary: Negative for difficulty urinating, dysuria, flank pain, frequency, hematuria and urgency.   Skin: Negative.  Negative for rash.       Physical Exam   BP: 111/73  Heart Rate: 84  Temp: 97.6  F (36.4  C)  Resp: 14  SpO2: 100 %      Physical Exam   Constitutional: She is oriented to person, place, and time. She appears well-developed. No distress.   HENT:   Head: Normocephalic and atraumatic.   Mouth/Throat: No oropharyngeal exudate.   Cardiovascular: Normal rate and normal heart sounds.    Pulmonary/Chest: Effort normal and breath sounds normal. No respiratory distress.   Abdominal: Soft. Bowel sounds are normal. There is tenderness. There is no rebound, no guarding and no CVA tenderness.       Genitourinary: Rectal exam shows guaiac positive stool. Rectal exam shows no external hemorrhoid, no internal hemorrhoid, no mass, no tenderness and anal tone normal.   Neurological: She is alert and oriented to person, place, and time.   Skin: Skin is warm and dry.   Psychiatric: She has a normal mood and affect.   Nursing note and vitals reviewed.      ED Course     ED Course     Procedures      Results for orders placed or performed during the hospital encounter of 07/20/18 (from the past 24 hour(s))   UA reflex to Microscopic   Result Value Ref Range    Color Urine Yellow     Appearance Urine Clear     Glucose Urine Negative  NEG^Negative mg/dL    Bilirubin Urine Negative NEG^Negative    Ketones Urine Negative NEG^Negative mg/dL    Specific Gravity Urine 1.023 1.003 - 1.035    Blood Urine Negative NEG^Negative    pH Urine 5.5 4.7 - 8.0 pH    Protein Albumin Urine 10 (A) NEG^Negative mg/dL    Urobilinogen mg/dL 2.0 0.0 - 2.0 mg/dL    Nitrite Urine Negative NEG^Negative    Leukocyte Esterase Urine Negative NEG^Negative    Source Midstream Urine     RBC Urine 0 0 - 2 /HPF    WBC Urine 1 0 - 5 /HPF    Bacteria Urine None (A) NEG^Negative /HPF    Squamous Epithelial /HPF Urine 1 0 - 1 /HPF    Mucous Urine Present (A) NEG^Negative /LPF   ABO/Rh type and screen   Result Value Ref Range    Units Ordered 2     ABO A     RH(D) Pos     Antibody Screen Neg     Test Valid Only At [facility]        Specimen Expires 07/23/2018     Crossmatch Red Cells    Blood component   Result Value Ref Range    Unit Number [id number]     Blood Component Type Red Blood Cells Leukocyte Reduced     Division Number 00     Status of Unit Released to care unit 07/20/2018 1557     Blood Product Code G2858T92     Unit Status ISS    Blood component   Result Value Ref Range    Unit Number [id number]     Blood Component Type Red Blood Cells Leukocyte Reduced     Division Number 00     Status of Unit Released to care unit 07/20/2018 1736     Blood Product Code N2249D02     Unit Status ISS    CBC with platelets differential   Result Value Ref Range    WBC 7.5 4.0 - 11.0 10e9/L    RBC Count 3.65 (L) 3.8 - 5.2 10e12/L    Hemoglobin 6.9 (LL) 11.7 - 15.7 g/dL    Hematocrit 24.0 (L) 35.0 - 47.0 %    MCV 66 (L) 78 - 100 fl    MCH 18.9 (L) 26.5 - 33.0 pg    MCHC 28.8 (L) 31.5 - 36.5 g/dL    RDW 16.9 (H) 10.0 - 15.0 %    Platelet Count 278 150 - 450 10e9/L    Diff Method Automated Method     % Neutrophils 63.8 %    % Lymphocytes 27.6 %    % Monocytes 8.3 %    % Eosinophils 0.0 %    % Basophils 0.0 %    % Immature Granulocytes 0.3 %    Nucleated RBCs 0 0 /100     Absolute Neutrophil 4.8 1.6 - 8.3 10e9/L    Absolute Lymphocytes 2.1 0.8 - 5.3 10e9/L    Absolute Monocytes 0.6 0.0 - 1.3 10e9/L    Absolute Eosinophils 0.0 0.0 - 0.7 10e9/L    Absolute Basophils 0.0 0.0 - 0.2 10e9/L    Abs Immature Granulocytes 0.0 0 - 0.4 10e9/L    Absolute Nucleated RBC 0.0     Poikilocytosis Slight     Hypochromasia Present    Comprehensive metabolic panel   Result Value Ref Range    Sodium 140 133 - 144 mmol/L    Potassium 3.9 3.4 - 5.3 mmol/L    Chloride 108 94 - 109 mmol/L    Carbon Dioxide 26 20 - 32 mmol/L    Anion Gap 6 3 - 14 mmol/L    Glucose 89 70 - 99 mg/dL    Urea Nitrogen 19 7 - 30 mg/dL    Creatinine 0.68 0.52 - 1.04 mg/dL    GFR Estimate >90 >60 mL/min/1.7m2    GFR Estimate If Black >90 >60 mL/min/1.7m2    Calcium 8.4 (L) 8.5 - 10.1 mg/dL    Bilirubin Total 0.3 0.2 - 1.3 mg/dL    Albumin 3.1 (L) 3.4 - 5.0 g/dL    Protein Total 6.4 (L) 6.8 - 8.8 g/dL    Alkaline Phosphatase 84 40 - 150 U/L    ALT 23 0 - 50 U/L    AST 23 0 - 45 U/L   Lipase   Result Value Ref Range    Lipase 189 73 - 393 U/L   CRP inflammation   Result Value Ref Range    CRP Inflammation <2.9 0.0 - 8.0 mg/L   Abd/pelvis CT - no contrast - Stone Protocol    Narrative    Exam:CT ABDOMEN PELVIS W/O CONTRAST    History:  47 years Female right flank pain and history of renal stones    Comparisons: 4/18/2015    Technique: Axial CT imaging of the abdomen and pelvis was performed  without contrast. Coronal and sagittal reconstructions were obtained      Findings:      Lung bases:The lung bases are clear.        Kidneys:No renal or ureteral calculi are present. There is no  hydronephrosis or hydroureter.       Abdomen: Evaluation is limited due to lack of intravenous contrast.  Unenhanced images of the liver spleen pancreas and adrenal glands are  unremarkable. Patient status post cholecystectomy.  There are surgical changes of gastric bypass. Findings are unchanged  from prior study without acute change.  No abnormally  distended or thickened bowel are seen. The visualized 3  changes are seen at the expected location of the appendix.    Anterior and posterior lumbar spine fusion with interbody grafting and  posterior pedicle screw and shukri fixation from L3 through S1.       Pelvis:There is no mass or lymphadenopathy. No abnormal fluid  collections are present. The bladder isdistended with urine.              Impression    Impression: No acute findings. No renal or ureteral calculi are  present. There is no evidence of a ureteral calculus. There is no  hydronephrosis.    The appendix is not visualized. No inflammatory changes are seen at  the expected location of the appendix.    Patient is status post cholecystectomy and gastric bypass surgery.    LULY KNOWLES MD   Occult blood fecal HGB immuno   Result Value Ref Range    Occult Blood HGB FIT Positive (A) NEG^Negative       Medications   morphine (PF) injection 2 mg (not administered)   0.9% sodium chloride BOLUS (0 mLs Intravenous Stopped 7/20/18 1449)   morphine (PF) injection 4 mg (4 mg Intravenous Given 7/20/18 1335)   ondansetron (ZOFRAN) injection 4 mg (4 mg Intravenous Given 7/20/18 1331)       Assessments & Plan (with Medical Decision Making)     I have reviewed the nursing notes.  I have reviewed the findings, diagnosis, plan and need for follow up with the patient.    New Prescriptions    No medications on file       Final diagnoses:   Anemia, unspecified type   Abdominal pain, epigastric   Occult blood positive stool   Constantino presents c/o epigastric pain. She does have Hx renal stones, chronic anemia, s/p gastric bypass with Hx PUD. Hgb 6.9 and hemoccult positive. Constantino initially received 1 liter NS, morphine with moderate pain control. CT stone negative. We started tranfusion, after 1 unit we discussed hospitalization and Constantino preferred to return home, however she does ultimately consent to admission. I spoke with Dr Jain who graciously accepts care on the floor.      7/20/2018   [facility]     Modesto Childress PA  07/21/18 0930

## 2018-07-20 NOTE — ED NOTES
Report called, pt's  and children visited briefly and updated. Pt transported via cart to 3rd floor. 2nd unit PRBC's still infusing well. Pt rates pain at 4/10. VSS.

## 2018-07-20 NOTE — PROVIDER NOTIFICATION
07/20/18 1835   Vital Signs   Temp 98.5  F (36.9  C)   Temp src Tympanic   Resp 22   Heart Rate 80   Pulse/Heart Rate Source Monitor   /94   Oxygen Therapy   SpO2 100 %   O2 Device None (Room air)   Pain/Comfort   Patient Currently in Pain yes   Preferred Pain Scale number (Numeric Rating Pain Scale)   0-10 Pain Scale 8   Pain Location Chest   Pain Orientation (chest and back)   Pain Descriptors Sharp;Stabbing   Pain Intervention(s) Medication (See eMAR)       C/O 8/10 chest pain radiating to back.  Dr. Jain called.  Troponin, EKG and 2mg Morphine given.  Dr. Jain did come to room to see patient.

## 2018-07-20 NOTE — ED NOTES
1st of 2 units PRBC's infusion complete, VSS, pt remains asymptomatic. Lungs remain clear, heart tones S1S2 with murmur.

## 2018-07-20 NOTE — ED NOTES
SYMONE JHA at bedside, pt gave verbal consent for MATT JHA to obtain health hx information from [facility].

## 2018-07-20 NOTE — ED NOTES
Lungs remain clear, VSS, pt remains asymptomatic, transfusion rate increased to 300 ml/hr as per direction of SYMONE JHA.

## 2018-07-20 NOTE — LETTER
July 26, 2018      Constantino Warren  [address]   [facility] [address]        Dear ,    This letter is sent to go over the results of your recent upper endoscopy done because of anemia, epigastric pain and vomiting. At the time of the scope I found a small ulcer at the junction between the stomach and small bowel. Biopsies confirmed an ulcer. There was also some prominent tissue in the stomach that I biopsied that turned out to be a Fundic Gland Polyp which are common in the stomach and nothing to be concerned about. There was no evidence of cancer or Helicobacter Pylori which is the bacteria that causes trouble.    I would recommend staying on your Carafate and following up with Dr. Alba for your anemia.     Resulted Orders   Surgical pathology exam   Result Value Ref Range    Copath Report       Patient Name: CONSTANTINO WARREN  MR#: [id number]  Specimen #: [id number]  Collected: 7/21/2018  Received: 7/23/2018  Reported: 7/24/2018 12:31  Ordering Phy(s): SIA ROACH  Additional Phy(s): BERNARD ALBA  Copy To: Cancer Registry    For improved result formatting, select 'View Enhanced Report Format' under   Linked Documents section.    SPECIMEN(S):  A: Stomach biopsy, anastomotic ulcer  B: Stomach biopsy, gastric remnant    FINAL DIAGNOSIS:  A: Bowel, anastomosis, ulcer, biopsy  - Small bowel with ulcer    B: Gastric remnant, biopsy  - Fundic gland polyp    I have personally reviewed all specimens and/or slides, including the   listed special stains, and used them  with my medical judgement to determine or confirm the final diagnosis.    Electronically signed out by:    Rory Martines M.D.    CLINICAL HISTORY:  Anemia, vomiting; upper endoscopy with biopsies.    GROSS:  A: There are five pieces of tan soft tissue which vary from 2-3 mm. (5 TE   in 1 block).    B: There are three  pieces of tan soft tissue which are each 3 mm. (3 TE in   1 block). (Dictated by: Rory Martines MD 7/23/2018 03:16  PM)    MICROSCOPIC:  A: There is small bowel with an ulcer.  The bowel has mild reactive   changes.    B: There is gastric mucosa with a cystically dilated fundic gland.    CPT Codes  A: 28536-JN0  B: 77869-JH3    TESTING LAB LOCATION:  [address] [address]  [phone]    COLLECTION SITE:  Client: [facility]  Location: [facility] (B)           I trust this letter finds you well. Please do not hesitate to contact the clinic if there are any questions or concerns.      Sincerely,        Roger Vazquez MD

## 2018-07-20 NOTE — IP AVS SNAPSHOT
[facility]    [address] [place] [address]    [place] [address]    Phone:  [phone]    Fax:  [phone]                                       After Visit Summary   7/20/2018    Constantino Warren    MRN: [id number]           After Visit Summary Signature Page     I have received my discharge instructions, and my questions have been answered. I have discussed any challenges I see with this plan with the nurse or doctor.    ..........................................................................................................................................  Patient/Patient Representative Signature      ..........................................................................................................................................  Patient Representative Print Name and Relationship to Patient    ..................................................               ................................................  Date                                            Time    ..........................................................................................................................................  Reviewed by Signature/Title    ...................................................              ..............................................  Date                                                            Time

## 2018-07-20 NOTE — LETTER
[facility]  [address]  [place] [address]  Phone: [phone]  Fax: [phone]    July 24, 2018        Constantino Warren  [address]  [facility] [address]          To whom it may concern:    RE: Constantino Warren    Patient was hospitalized under my care for acute illness. She is discharged today; 7/24/18 and may return to work without restriction on Monday, July 30 th.          Sincerely,                Emma Gonzalez CNP  Hospitalist Services  [facility]

## 2018-07-20 NOTE — ED NOTES
Lungs clear, heart tones S1S2 with murmur. Pt denies chest pain and SOB. 1st of 2 units PRBC's started at 100 ml/hr. Consent is signed.

## 2018-07-20 NOTE — PROVIDER NOTIFICATION
CRITICAL LAB VALUE    DATE:  7/20/2018     TIME OF RECEIPT FROM LAB:  1203    LAB TEST:  Hemoglobin    LAB VALUE:  6.9    RESULTS GIVEN WITH READ-BACK TO (PROVIDER):  ABHIJEET Payne    TIME LAB VALUE REPORTED TO PROVIDER:   7053

## 2018-07-20 NOTE — H&P
History and Physical     Constantino Warren MRN# [id number]   YOB: 1970 Age: 47 year old      Date of Admission:  7/20/2018    Primary care provider: Ayden Alba          Assessment and Plan:   Anemia: Most likely secondary to GI bleed does not appear to be acute blood loss.  Patient got 2 units of blood in the ER.  We will follow her hemoglobin.  GI bleed: We will get general surgery consulted.  Patient has been kept n.p.o. after midnight surgery has been informed.  Orders written to obtain records from [facility] about 2 years ago for her endoscopy.  Chest pain: We will trend cardiac biomarkers.  EKG was checked which shows normal sinus rhythm.  We will also get echo in the morning.  History of hypothyroidism we will continue her supplements and check TSH  Chronic pain we will continue with her [place].  PTSD she is being managed by her private psychologist Dr. Fransisco Gutiérrez.  DVT prophylaxis: Mechanical  CODE STATUS: Full  Disposition: Probably in 1-2 days.             Attestation:  Amount of time performed on this history and physical: 60 minutes.  Floor time:15 minutes  Face-to-face time: 10 minutes  Total time: 60 minutes              Chief Complaint:   Chest pain.  Abdominal pain.  Anemia most likely GI bleed as Hemoccult was positive.    Source of information: Patient herself and also from old medical records.           History of Present Illness:   This patient is a 47 year old female with a past medical history of gastric bypass in 2010 and revision done about 2 years ago.  She also has a history of diabetes mellitus which was resolved with weight loss, hypothyroidism, nephrolithiasis in 2015, chronic neck and back pain status post C4-C5 and L4 S1 fusion on hydrocodone as needed, migraine headaches, PTSD managed by psych, chronic anemia, chronic vasovagal symptoms with presyncope came to the emergency department with a complaint of abdominal pain.  Workup in the ER showed that she has a hemoglobin  of 6.9 with a Hemoccult being positive according to the ER staff.  Patient did not complain of any chest pains at that time or any shortness of breath or any dizziness.  However when she got admitted to the floor she started complaining of chest pain and the workup was initiated.  She denies any ghulam blood in the stools.  She had an endoscopy done about 2 years ago and according to her they found bleeding at the junction so they did the revision of her gastric banding.  It was done in [place].             Past Medical History:     Past Medical History:   Diagnosis Date     Chronic pain      Thyroid disease              Past Surgical History:   I have reviewed this patient's past surgical history                  Social History:   I have reviewed this patient's social history          Family History:   No family history on file.              Allergies:   Patient has allergies to aspartame, bee venom, bupropion.          Medications:     Current Facility-Administered Medications Ordered in Epic   Medication Dose Route Frequency Last Rate Last Dose     acetaminophen (TYLENOL) Suppository 650 mg  650 mg Rectal Q4H PRN         acetaminophen (TYLENOL) tablet 650 mg  650 mg Oral Q4H PRN         alum & mag hydroxide-simethicone (MYLANTA ES/MAALOX  ES) suspension 30 mL  30 mL Oral Q4H PRN         HYDROcodone-acetaminophen (NORCO) 5-325 MG per tablet 1-2 tablet  1-2 tablet Oral Q4H PRN         lidocaine (LMX4) kit   Topical Q1H PRN         lidocaine 1 % 1 mL  1 mL Other Q1H PRN         melatonin tablet 1 mg  1 mg Oral At Bedtime PRN         naloxone (NARCAN) injection 0.1-0.4 mg  0.1-0.4 mg Intravenous Q2 Min PRN         naloxone (NARCAN) injection 0.1-0.4 mg  0.1-0.4 mg Intravenous Q2 Min PRN         nitroGLYcerin (NITROSTAT) sublingual tablet 0.4 mg  0.4 mg Sublingual Q5 Min PRN         ondansetron (ZOFRAN-ODT) ODT tab 4 mg  4 mg Oral Q6H PRN        Or     ondansetron (ZOFRAN) injection 4 mg  4 mg Intravenous Q6H PRN          pantoprazole (PROTONIX) 40 mg IV push injection  40 mg Intravenous Q12H         sodium chloride (PF) 0.9% PF flush 3 mL  3 mL Intracatheter Q1H PRN         sodium chloride (PF) 0.9% PF flush 3 mL  3 mL Intracatheter Q8H         sodium chloride 0.9% infusion   Intravenous Continuous         Current Outpatient Prescriptions Ordered in Epic   Medication     [DISCONTINUED] Levothyroxine Sodium (SYNTHROID PO)             Review of Systems:   The 10 point Review of Systems is negative other than noted in the HPI              Physical Exam:     Vitals were reviewed  Patient Vitals for the past 8 hrs:   BP Temp Temp src Heart Rate Resp SpO2   07/20/18 1845 158/90 - - 78 - -   07/20/18 1835 154/94 98.5  F (36.9  C) Tympanic 80 22 100 %   07/20/18 1815 140/93 98.9  F (37.2  C) Oral 80 14 100 %   07/20/18 1800 134/82 98.8  F (37.1  C) Oral 70 16 100 %   07/20/18 1745 135/91 - - 74 - 100 %   07/20/18 1730 130/79 - - 75 - 99 %   07/20/18 1729 130/79 98.6  F (37  C) Oral 75 - 98 %   07/20/18 1715 125/92 - - 80 - 98 %   07/20/18 1700 130/82 - - 77 - 98 %   07/20/18 1645 128/82 98.7  F (37.1  C) Oral 74 - 97 %   07/20/18 1630 129/85 98  F (36.7  C) Oral 77 - 98 %   07/20/18 1615 126/85 98.5  F (36.9  C) Oral 76 14 98 %   07/20/18 1607 - 98  F (36.7  C) Oral - - 97 %   07/20/18 1600 130/92 - - 75 18 100 %   07/20/18 1545 125/69 - - 77 - 98 %   07/20/18 1530 (!) 133/115 - - 76 - 99 %   07/20/18 1515 136/91 - - 78 - 99 %   07/20/18 1445 136/94 - - 77 - 100 %   07/20/18 1430 128/89 - - 76 - 100 %   07/20/18 1415 135/96 - - 77 - 100 %   07/20/18 1400 131/87 - - 78 - 100 %   07/20/18 1345 130/87 - - 79 18 99 %   07/20/18 1238 111/73 97.6  F (36.4  C) Tympanic 84 14 100 %     Constitutional: Awake, alert, cooperative, no apparent distress and appears at stated age.  HEENT: Head normocephalic atraumatic with no obvious of modalities.  Respiratory no increased work of breathing good air exchange, clear to auscultation  bilaterally.  Cardiovascular heart S1-S2 audible no S3 or S4 no murmurs are noted.  GI obese, normal bowel sounds soft nondistended nontender no masses palpated no hepatosplenomegaly.  Skin no redness warm or swelling.  Musculoskeletal full range of motion.  Neurological awake alert oriented ×3.  Moving all 4 extremities appears grossly intact.  Neuro psychogenic: Anxious , good eye contact  Lab Results   Component Value Date    WBC 7.5 07/20/2018    HGB 6.9 (LL) 07/20/2018    HCT 24.0 (L) 07/20/2018     07/20/2018     07/20/2018    POTASSIUM 3.9 07/20/2018    CHLORIDE 108 07/20/2018    CO2 26 07/20/2018    BUN 19 07/20/2018    CR 0.68 07/20/2018    GLC 89 07/20/2018    SED 35 (H) 04/18/2015    DIMER <200 09/26/2015    NTBNPI 22 07/18/2015    TROPI  09/27/2015     <0.015  The 99th percentile for upper reference range is 0.045 ug/L.  Troponin values in   the range of 0.045 - 0.120 ug/L may be associated with risks of adverse   clinical events.      AST 23 07/20/2018    ALT 23 07/20/2018    ALKPHOS 84 07/20/2018    BILITOTAL 0.3 07/20/2018     EKG: Done showed normal sinus rhythm no ST elevation or depression noted.

## 2018-07-20 NOTE — IP AVS SNAPSHOT
MRN:[id number]                      After Visit Summary   7/20/2018    Constantino Warren    MRN: [id number]           Thank you!     Thank you for choosing [facility] for your care. Our goal is always to provide you with excellent care. Hearing back from our patients is one way we can continue to improve our services. Please take a few minutes to complete the written survey that you may receive in the mail after you visit with us. Thank you!        Patient Information     Date Of Birth          1970        Designated Caregiver       Most Recent Value    Caregiver    Will someone help with your care after discharge? yes    Name of designated caregiver ivanna    Phone number of caregiver [phone]    Caregiver address [place]      About your hospital stay     You were admitted on:  July 20, 2018 You last received care in the:  [facility]    You were discharged on:  July 24, 2018        Reason for your hospital stay       Stomach ulcer (anastatomatic ulcer)                  Who to Call     For medical emergencies, please call 911.  For non-urgent questions about your medical care, please call your primary care provider or clinic, [phone]  For questions related to your surgery, please call your surgery clinic        Attending Provider     Provider Specialty    Modesto Childress PA Physician Assistant - Medical    Luca Jain MD Internal Medicine    Emma Gonzalez NP Nurse Practitioner       Primary Care Provider Office Phone # Fax #    Ayden Alba [phone]       When to contact your care team       Call your primary doctor or return to the emergency department if you have any of the following: fever,abdominal pain, dizziness, any other new or worsening symptoms.                  After Care Instructions     Activity       Your activity upon discharge: activity as tolerated            Diet       Follow this diet upon discharge: Orders Placed This  "Encounter      Snacks/Supplements Adult: Boost Plus; With Meals      Regular Diet Adult            Discharge Instructions       Avoid foods with high acid content, caffeine, chocolate, alcohol.                  Follow-up Appointments     Follow-up and recommended labs and tests        Follow up with primary care provider, Ayden Alba, within 7 days for hospital follow- up.  The following labs/tests are recommended: cbc.                  Further instructions from your care team       A hospital follow up appointment has been set up for your with Dr. Alba on July 31st at 1:30. If you cannot make this appointment please call the clinic at [phone].    Pending Results     Date and Time Order Name Status Description    7/21/2018 1213 Surgical pathology exam In process             Statement of Approval     Ordered          07/24/18 0908  I have reviewed and agree with all the recommendations and orders detailed in this document.  EFFECTIVE NOW     Approved and electronically signed by:  Emma Gonzalez NP             Admission Information     Date & Time Department Dept. Phone    7/20/2018 [facility] [phone]      Your Vitals Were     Blood Pressure Temperature Respirations Height Weight Pulse Oximetry    129/71 98.2  F (36.8  C) (Temporal) 18 1.676 m (5' 5.98\") 75 kg (165 lb 5.5 oz) 98%    BMI (Body Mass Index)                   26.7 kg/m2           [facility] Information     [facility] lets you send messages to your doctor, view your test results, renew your prescriptions, schedule appointments and more. To sign up, go to [email or web address] . Click on \"Log in\" on the left side of the screen, which will take you to the Welcome page. Then click on \"Sign up Now\" on the right side of the page.     You will be asked to enter the access code listed below, as well as some personal information. Please follow the directions to create your username and password.     Your access code is: " [email or web address]  Expires: 10/22/2018  9:46 AM     Your access code will  in 90 days. If you need help or a new code, please call your [facility] clinic or [phone].        Care EveryWhere ID     This is your Care EveryWhere ID. This could be used by other organizations to access your [facility] medical records  [id number]        Equal Access to Services     [facility] : Hadii aad ku hadasho Soomaali, waaxda luqadaha, qaybta kaalmada adeegyada, waxay cyin haymarvn aderahul galvan laNelydick . So [facility] [phone].    ATENCIÓN: Si habla español, tiene a gordon disposición servicios gratuitos de asistencia lingüística. Camille al [phone].    We comply with applicable federal civil rights laws and [place] laws. We do not discriminate on the basis of race, color, national origin, age, disability, sex, sexual orientation, or gender identity.               Review of your medicines      START taking        Dose / Directions    ferrous sulfate 325 (65 Fe) MG tablet   Commonly known as:  IRON        Dose:  325 mg   Take 1 tablet (325 mg) by mouth 2 times daily   Quantity:  60 tablet   Refills:  2       sucralfate 1 GM tablet   Commonly known as:  CARAFATE        Dose:  1 g   Take 1 tablet (1 g) by mouth 4 times daily   Quantity:  120 tablet   Refills:  0         CONTINUE these medicines which may have CHANGED, or have new prescriptions. If we are uncertain of the size of tablets/capsules you have at home, strength may be listed as something that might have changed.        Dose / Directions    * STOOL SOFTENER 100 MG capsule   This may have changed:  Another medication with the same name was added. Make sure you understand how and when to take each.   Generic drug:  docusate sodium        Dose:  100 mg   Take 100 mg by mouth 2 times daily   Refills:  0       * docusate sodium 100 MG tablet   Commonly known as:  COLACE   This may have changed:  You were already taking a medication with the same name, and this prescription was  added. Make sure you understand how and when to take each.        Dose:  100 mg   Take 100 mg by mouth 2 times daily as needed for constipation (for constipatoni while on irone)   Quantity:  60 tablet   Refills:  1       TiZANidine HCl 2 MG Caps   Commonly known as:  ZANAFLEX   This may have changed:    - how much to take  - when to take this   Used for:  Concussion syndrome        Dose:  2 mg   Take 2 mg by mouth At Bedtime for 15 days   Quantity:  15 capsule   Refills:  0       * Notice:  This list has 2 medication(s) that are the same as other medications prescribed for you. Read the directions carefully, and ask your doctor or other care provider to review them with you.      CONTINUE these medicines which have NOT CHANGED        Dose / Directions    acetaminophen 500 MG tablet   Commonly known as:  TYLENOL        Dose:  1000 mg   Take 1,000 mg by mouth every 6 hours as needed Max acetaminophen dose: 4000 mg in 24 hours   Refills:  0       cyanocobalamin 1000 MCG/ML injection   Commonly known as:  VITAMIN B12        Dose:  1000 mcg   Inject 1,000 mcg into the muscle Every 4 weeks   Refills:  0       EPINEPHrine 0.3 MG/0.3ML injection 2-pack   Commonly known as:  EPIPEN/ADRENACLICK/or ANY BX GENERIC EQUIV        Refills:  0       * GABAPENTIN PO        Dose:  300 mg   Take 300 mg by mouth 2 times daily   Refills:  0       * GABAPENTIN PO        Dose:  600 mg   Take 600 mg by mouth At Bedtime   Refills:  0       HYDROcodone-acetaminophen 5-325 MG per tablet   Commonly known as:  NORCO        Dose:  1 tablet   Take 1 tablet by mouth every 6 hours as needed for moderate to severe pain   Refills:  0       * levothyroxine 200 MCG tablet   Commonly known as:  SYNTHROID/LEVOTHROID        Dose:  200 mcg   Take 200 mcg by mouth every morning (before breakfast) Take in addition to 75 mcg for a total of 275 mcg daily   Refills:  0       * levothyroxine 75 MCG tablet   Commonly known as:  SYNTHROID/LEVOTHROID        Dose:   75 mcg   Take 75 mcg by mouth every morning (before breakfast) Take in addition to 200 mcg for a total of 275 mcg per day   Refills:  0       MELATONIN PO        Dose:  7.5 mg   Take 7.5 mg by mouth nightly as needed   Refills:  0       Multi-vitamin Tabs tablet        Dose:  1 tablet   Take 1 tablet by mouth daily   Refills:  0       omeprazole 40 MG capsule   Commonly known as:  priLOSEC        Dose:  40 mg   Take 40 mg by mouth 2 times daily (before meals)   Refills:  0       sertraline 100 MG tablet   Commonly known as:  ZOLOFT        Dose:  100 mg   Take 100 mg by mouth daily   Refills:  0       SUMAtriptan 100 MG tablet   Commonly known as:  IMITREX        Dose:  100 mg   Take 100 mg by mouth   Refills:  0       TOPAMAX PO        Dose:  25 mg   Take 25 mg by mouth   Refills:  0       * Notice:  This list has 4 medication(s) that are the same as other medications prescribed for you. Read the directions carefully, and ask your doctor or other care provider to review them with you.         Where to get your medicines      These medications were sent to [facility] - [address]  [address] [place] [address]     Phone:  [phone]     docusate sodium 100 MG tablet    ferrous sulfate 325 (65 Fe) MG tablet    sucralfate 1 GM tablet    TiZANidine HCl 2 MG Caps                Protect others around you: Learn how to safely use, store and throw away your medicines at www.disposemymeds.org.             Medication List: This is a list of all your medications and when to take them. Check marks below indicate your daily home schedule. Keep this list as a reference.      Medications           Morning Afternoon Evening Bedtime As Needed    acetaminophen 500 MG tablet   Commonly known as:  TYLENOL   Take 1,000 mg by mouth every 6 hours as needed Max acetaminophen dose: 4000 mg in 24 hours                                cyanocobalamin 1000 MCG/ML injection   Commonly known as:  VITAMIN B12    Inject 1,000 mcg into the muscle Every 4 weeks   Last time this was given:  1,000 mcg on 7/22/2018  9:31 AM                                EPINEPHrine 0.3 MG/0.3ML injection 2-pack   Commonly known as:  EPIPEN/ADRENACLICK/or ANY BX GENERIC EQUIV                                ferrous sulfate 325 (65 Fe) MG tablet   Commonly known as:  IRON   Take 1 tablet (325 mg) by mouth 2 times daily                                * GABAPENTIN PO   Take 300 mg by mouth 2 times daily   Last time this was given:  300 mg on 7/24/2018  9:13 AM                                * GABAPENTIN PO   Take 600 mg by mouth At Bedtime   Last time this was given:  300 mg on 7/24/2018  9:13 AM                                HYDROcodone-acetaminophen 5-325 MG per tablet   Commonly known as:  NORCO   Take 1 tablet by mouth every 6 hours as needed for moderate to severe pain   Last time this was given:  1 tablet on 7/23/2018  4:01 AM                                * levothyroxine 200 MCG tablet   Commonly known as:  SYNTHROID/LEVOTHROID   Take 200 mcg by mouth every morning (before breakfast) Take in addition to 75 mcg for a total of 275 mcg daily   Last time this was given:  275 mcg on 7/24/2018  5:44 AM                                * levothyroxine 75 MCG tablet   Commonly known as:  SYNTHROID/LEVOTHROID   Take 75 mcg by mouth every morning (before breakfast) Take in addition to 200 mcg for a total of 275 mcg per day   Last time this was given:  275 mcg on 7/24/2018  5:44 AM                                MELATONIN PO   Take 7.5 mg by mouth nightly as needed   Last time this was given:  1 mg on 7/21/2018 12:49 AM                                Multi-vitamin Tabs tablet   Take 1 tablet by mouth daily   Last time this was given:  1 tablet on 7/24/2018  9:13 AM                                omeprazole 40 MG capsule   Commonly known as:  priLOSEC   Take 40 mg by mouth 2 times daily (before meals)                                sertraline 100 MG  tablet   Commonly known as:  ZOLOFT   Take 100 mg by mouth daily   Last time this was given:  100 mg on 7/24/2018  9:12 AM                                * STOOL SOFTENER 100 MG capsule   Take 100 mg by mouth 2 times daily   Last time this was given:  50 mg on 7/24/2018  9:13 AM   Generic drug:  docusate sodium                                * docusate sodium 100 MG tablet   Commonly known as:  COLACE   Take 100 mg by mouth 2 times daily as needed for constipation (for constipatoni while on irone)                                sucralfate 1 GM tablet   Commonly known as:  CARAFATE   Take 1 tablet (1 g) by mouth 4 times daily                                SUMAtriptan 100 MG tablet   Commonly known as:  IMITREX   Take 100 mg by mouth                                TiZANidine HCl 2 MG Caps   Commonly known as:  ZANAFLEX   Take 2 mg by mouth At Bedtime for 15 days                                TOPAMAX PO   Take 25 mg by mouth   Last time this was given:  25 mg on 7/24/2018  9:13 AM                                * Notice:  This list has 6 medication(s) that are the same as other medications prescribed for you. Read the directions carefully, and ask your doctor or other care provider to review them with you.              More Information        Iron-Deficiency Anemia (Adult)  Red blood cells carry oxygen to the tissues of your body. Anemia is a condition in which you have too few red blood cells. You need iron to make red cells. Anemia makes you feel tired and run down. When anemia becomes severe, your skin becomes pale. You may feel short of breath after physical activity. Other symptoms include:    Headaches    Dizziness    Leg cramps with physical activity    Drowsiness    Restless legs  Your anemia is caused by not having enough iron in your body. This may be because of:    Loss of blood. This can be caused by heavy menstrual periods. It can also be caused by bleeding from the stomach or intestines.    Poor diet.  You may not be eating enough foods that contain iron.    Inability to absorb iron from the foods you eat    Pregnancy  If your blood count is low enough, your healthcare provider may prescribe an iron supplement. It usually takes about 2 to 3 months of treatment with iron supplements to correct anemia. Severe cases of anemia need a blood transfusion to quickly ease symptoms and deliver more oxygen to the cells.  Home care  Follow these guidelines when caring for yourself at home:    Eat foods high in iron. This will boost the amount of iron stored in your body. It is a natural way to build up the number of blood cells. Good sources of iron include beef, liver, spinach and other dark green leafy vegetables, whole grains, beans, and nuts.    Do not overexert yourself.    Talk with your healthcare provider before traveling by air or traveling to high altitudes.  Follow-up care  Follow up with your healthcare provider in 2 months, or as advised. This is to have another red blood cell count to be sure your anemia has been fixed.  When to seek medical advice  Call your healthcare provider right away if any of these occur:    Shortness of breath or chest pain    Dizziness or fainting    Vomiting blood or passing red or black-colored stool   Date Last Reviewed: 2/25/2016 2000-2017 [employer]. [address] [place] [address]. All rights reserved. This information is not intended as a substitute for professional medical care. Always follow your healthcare professional's instructions.                Bleeding Peptic Ulcer: Treatment     A bleeding peptic ulcer is a serious problem and must be treated right away.   A peptic ulcer is a sore in the lining of your stomach or duodenum (the first part of the small intestine). Your ulcer is bleeding or at high risk of bleeding. This means that you need treatment right away. Treatment can include medicines. It may also include a procedure such as endoscopy,  angiography, or surgery. Your provider will work with you to decide which treatments are best for you. Read on to learn more about each type of treatment.  Treatment with medicines  Medicines will be prescribed as part of your treatment. Combinations of medicines are needed. These can include:    Antibiotics. These medicines kill the H. pylori (Helicobacter pylori) bacteria if it is present. An H. pylori infection is the most common cause of a peptic ulcer. You will have to take several antibiotics at the same time.    Proton pump inhibitors. These block your stomach from making any acid.    H2 blockers. These reduce the amount of acid your stomach makes.    Bismuth subsalicylate. This helps protect the lining of your stomach and duodenum from acid.  When taking medicines, be aware of the following:    Each medicine has risks and side effects. Your healthcare provider will tell you more about these, based on the medicines that are prescribed for you.    Treatment with antibiotics lasts about 7 to 14 days. You may need to take other types of medicines for a longer time. Be sure to take all of the medicines exactly as instructed. Don t stop taking the medicines even if you are feeling better.    During treatment, avoid nonsteroidal anti-inflammatory drugs (NSAIDs), such as aspirin and ibuprofen. Also avoid blood thinners and anticoagulants. Overuse of these drugs is another possible cause of ulcers. Using them can make your symptoms worse. If you take aspirin because of a heart condition, blood clot, or stroke, you must talk with your healthcare provider first before you stop taking aspirin.    Your provider may also advise you to avoid cigarettes, alcohol, and caffeine. These may make your symptoms worse. They may also affect how well your ulcer heals.  Treatment with endoscopy, interventional angiography, or surgery  Along with medicines, your treatment may include endoscopy, interventional angiography, or surgery.  Endoscopy is usually the first treatment used. Here s what to expect with each treatment:  Before the treatment    You may be told to not eat or drink anything for at least 6 hours before the treatment.    Tell your provider about any medicines you are taking. You will have to stop taking some or all of these medicines before treatment. This includes:  ? All prescription medicines  ? Over-the-counter medicines such as aspirin, ibuprofen, and other NSAIDs  ? Street drugs  ? Herbs, vitamins, and other supplements    Follow any other instructions from your provider.  During the treatment  A needle is placed in a vein in your hand or arm. It s attached to an IV (intravenous) line. The IV line gives you fluids. It also gives you medicine to prevent pain. This may include medicine to make you drowsy (sedative) or sleep (anesthesia) during treatment. The provider will then treat your ulcer with one of the following:    Endoscopy. This procedure uses a thin, flexible tube called an endoscope (scope). The scope has a tiny camera on the end. This lets the provider find the ulcer. First, your throat may be numbed with a spray or gargle. Then, the scope is put into your mouth and guided down into your stomach or duodenum. Air is used to expand the digestive tract. Once the ulcer is found, tiny tools are passed through the scope to stop any bleeding. These may include clips or devices that use heat or electricity. In some cases, medicine (epinephrine) is injected directly into the ulcer to help reduce bleeding. When the bleeding is stopped, the air is removed. The scope and any tools used are then also removed.    Interventional angiography. A long catheter is thread into the arteries near the ulcer. A clot is put into the ulcer to stop the bleeding.    Surgery. This can be done in 2 different ways:  ? Open surgery. A cut (incision) is made in your belly (abdomen) to reach the ulcer. This lets the surgeon see and treat the ulcer  directly.  ? Laparoscopy. A few small cuts are made in your belly. A scope with a tiny camera is then inserted through one of the cuts. Pictures of the inside of your belly are sent to a screen. This lets the provider find the ulcer. Tiny tools are then passed through the other cuts to treat the ulcer. Nearby nerves, blood vessels, and parts of the stomach may also be treated. Once the surgery is done, the cuts are closed. Any tools used are removed.  After the treatment  You ll be taken to a recovery room or ICU (intensive care unit). Nurses will watch your condition closely. You ll be moved to a hospital room when you re stable. You will be given medicines to help manage pain and to ease symptoms. Tests may be done to see if your ulcer is bleeding again. You ll stay in the hospital until your provider confirms there are no problems.    Before leaving the hospital, make sure you have all the prescriptions and home care instructions you ll need. Also make sure you have a contact number for your provider or the hospital. This is in case you have problems or questions after treatment.  New types of treatment are also being studied. Ask your provider about any new treatment options.  Risks and possible complications of treatment  For endoscopy:    Bleeding    A hole (perforation) in your upper digestive tract (this includes your esophagus, stomach, and duodenum)    Risks of sedative or anesthesia used    The ulcer coming back  For angiography:    Hole (perforation) in a blood vessel    Risks of sedative or anesthesia used    The ulcer coming back  For surgery:    Bleeding    Infection    Damage to nearby organs and blood vessels    Long-term digestive problems such as irregular bowel movements    Risks of anesthesia    The ulcer coming back    Death  Follow-up  Keep all follow-up appointments with your provider. These are needed to check your health and recovery progress.  When to call your healthcare provider  Call  your healthcare provider right away if you have any of the following:    Trouble breathing or chest pain (call 911)    Fever of 100.4 F (38 C) or higher, or as advised by your healthcare provider    Increased redness, pain, swelling, bleeding, or drainage from any incisions    Trouble swallowing or sore throat that doesn t go away within 2 days    Belly pain that won t go away    Black, tarry, or bloody stools    Upset stomach (nausea) and vomiting   Date Last Reviewed: 7/1/2016 2000-2017 [employer]. [address] [place] [address]. All rights reserved. This information is not intended as a substitute for professional medical care. Always follow your healthcare professional's instructions.                Peptic Ulcer     Peptic ulcers can occur in the stomach or duodenum.   A peptic ulcer is a sore in the lining of your stomach or in the first part of your small intestine (duodenum). It can cause belly (abdominal) pain and other symptoms. In some cases, a peptic ulcer may get worse. This can lead to serious problems such as bleeding or a hole (perforation) in the stomach or duodenum. Treatment is needed to prevent these complications. With treatment, most people fully recover.  Common causes of a peptic ulcer  A peptic ulcer can be caused by:    Infection from H. pylori (Helicobacter pylori) bacteria    Long-term use of pain medicines called non-steroidal anti-inflammatory drugs (NSAIDs), such as aspirin and ibuprofen    Physical stress such as burns, head injuries, needing a breathing tube, or being on blood- thinning medicine    Other rare causes  How a peptic ulcer forms  The lining of your stomach and duodenum is coated with a thick mucus layer. This helps protect the stomach and duodenum from the acids and enzymes they make to help break down the food you eat. When H. pylori is present, it can weaken the mucus layer and irritate the lining underneath. Acid may pass through the  weakened mucus layer and cause an ulcer to form. Overuse of NSAIDs can also damage the mucus layer, leading to ulcers.  Symptoms of a peptic ulcer  Peptic ulcers may or may not cause symptoms. If you do have symptoms, they may come and go. They may also range from mild to severe. Common symptoms include:    Burning, cramping, or hunger-like pain in the stomach area (often 1 to 3 hours after a meal, or in the middle of the night)    Pain that gets better or worse with eating    Loss of appetite    Weight loss    Nausea or vomiting (vomit may be bloody or look like coffee grounds)    Black, tarry, or bloody stools (this means the ulcer is bleeding)  Diagnosing a peptic ulcer  Your healthcare provider will ask about your symptoms and health history. You ll also have a physical exam. In addition, tests will be done to confirm the problem. These tests also help determine if an ulcer is caused by H. pylori. Tests can include:    Blood, stool, or breath tests. These are done to check for H. pylori and other problems. For blood and stool tests, small samples of your blood and stool are taken. The samples are sent to a lab for exam. For a breath test, you'll drink a liquid that contains a harmless compound. H pylori causes the compound to break down and release carbon dioxide gas. By testing the air you breathe out after you drink the liquid, the doctor can tell if the bacteria are present.    Upper endoscopy. This test is done to see inside the stomach and duodenum. This lets your provider check for ulcers. During the test, an endoscope (scope) is used. This is a thin, flexible tube with a tiny camera on the end. The scope is inserted through your mouth. It is then guided down into your stomach or duodenum. If needed, tiny tools may be passed through the scope to take tissue samples (biopsy). Treatment can also be done at the same time if bleeding or other problems are found.    Upper gastrointestinal (GI) series. This test  is done to take X-rays of your upper digestive tract from your mouth to the small intestine. This lets your provider check for ulcers. For this test, you ll drink a milky liquid that has a substance called barium. The barium coats your upper digestive tract so that it will show up clearly on X-rays.  Treating a peptic ulcer  Medicines are the most common treatment for peptic ulcers. They include:    Antibiotics. These kill H. pylori bacteria. In many cases, you ll need to take at least 2 types of antibiotics.    Proton pump inhibitors. These block your stomach from making any acid.    H2 blockers. These reduce the amount of acid your stomach makes.    Bismuth subsalicylate. This helps protect the lining of your stomach and duodenum from acid.  Be sure to take all of the medicines that you re prescribed exactly as instructed. Don t stop taking the medicines, even if you are feeling better. The medicines may cause side effects. Your healthcare provider will tell you more about these based on which medicines you are prescribed. During treatment, avoid taking aspirin and other NSAIDs. You may also be told to avoid cigarettes, alcohol, and caffeine. These may worsen your symptoms or affect how well your ulcer heals. If your stress level is high, reducing stress may help.  In severe cases, other treatments will likely be done. These may include procedures with an endoscope or surgery. Your provider will tell you more about these treatments, if needed.  When to call your healthcare provider  Call your provider right away if you have any of the following:    Fever of 100.4 F (38 C) or higher, or as advised by your provider    Frequent vomiting, blood in your vomit, or coffee ground-like substance in your vomit     Sudden or severe abdominal pain    Dark, tarry, or bloody stools    Weight loss    Pain that doesn't get better, even with treatment   Preventing a peptic ulcer  Take the following steps to help prevent a peptic  ulcer:    Reduce your risk of H. pylori infection. Most experts think that H. pylori can be passed through food or water that is contaminated by an infected person. For your safety, the CDC recommends following basic hygiene. For instance, always wash your hands with warm water and soap after using the bathroom. Also wash your hands before preparing and handling food. And only drink water from safe sources.      Limit the use of aspirin and NSAIDs. Work with your provider to change your medicine or to reduce the amount of NSAIDs you take. If you must take aspirin or NSAIDs, ask your provider about other pain medicines you can take to help protect your stomach and duodenum. Sometimes other pain medicines can be used instead. But if you take aspirin because of a heart condition, blood clot, or stroke, talk with your healthcare provider first before you stop taking aspirin.    Quit smoking. Quit other forms of tobacco too.  Date Last Reviewed: 7/1/2016 2000-2017 [employer]. [address] [place] [address]. All rights reserved. This information is not intended as a substitute for professional medical care. Always follow your healthcare professional's instructions.                Patient Education    Ascorbic Acid (Vitamin C), Ferrous Sulfate Oral tablet, extended-release    Carbonyl Iron Chewable tablet    Carbonyl Iron Oral suspension    Ferrous Fumarate Oral capsule, liquid filled    Ferrous Fumarate Oral tablet    Ferrous Fumarate Oral tablet, extended-release    Ferrous Fumarate, Polysaccharide-Iron Complex Oral capsule    Ferrous Gluconate Oral tablet    Ferrous Sulfate Elixir    Ferrous Sulfate Gastro-resistant tablet    Ferrous Sulfate Oral capsule, extended-release    Ferrous Sulfate Oral drops, solution    Ferrous Sulfate Oral solution    Ferrous Sulfate Oral tablet    Ferrous Sulfate Oral tablet, extended-release    Iron Oral drops, solution    Iron Oral tablet    Iron Oral tablet,  extended-release    Polysaccharide-Iron Complex, Heme Iron Polypeptide Oral tablet  Ferrous Sulfate Oral tablet  What is this medicine?  IRON (AHY ada) replaces iron that is essential to healthy red blood cells. Iron is used to treat iron deficiency anemia. Anemia may cause problems like tiredness, shortness of breath, or slowed growth in children. Only take iron if your doctor has told you to. Do not treat yourself with iron if you are feeling tired. Most healthy people get enough iron in their diets, particularly if they eat cereals, meat, poultry, and fish.  This medicine may be used for other purposes; ask your health care provider or pharmacist if you have questions.  What should I tell my health care provider before I take this medicine?  They need to know if you have any of these conditions:    frequently drink alcohol    bowel disease    hemolytic anemia    iron overload (hemochromatosis, hemosiderosis)    liver disease    problems with swallowing    stomach ulcer or other stomach problems    an unusual or allergic reaction to iron, other medicines, foods, dyes, or preservatives    pregnant or trying to get pregnant    breast-feeding  How should I use this medicine?  Take this medicine by mouth with a glass of water or fruit juice. Follow the directions on the prescription label. Swallow whole. Do not crush or chew. Take this medicine in an upright or sitting position. Try to take any bedtime doses at least 10 minutes before lying down. You may take this medicine with food. Take your medicine at regular intervals. Do not take your medicine more often than directed. Do not stop taking except on your doctor's advice.  Talk to your pediatrician regarding the use of this medicine in children. While this drug may be prescribed for selected conditions, precautions do apply.  Overdosage: If you think you have taken too much of this medicine contact a poison control center or emergency room at once.  NOTE: This  medicine is only for you. Do not share this medicine with others.  What if I miss a dose?  If you miss a dose, take it as soon as you can. If it is almost time for your next dose, take only that dose. Do not take double or extra doses.  What may interact with this medicine?  If you are taking this iron product, you should not take iron in any other medicine or dietary supplement.  This medicine may also interact with the following medications:    alendronate    antacids    cefdinir    chloramphenicol    cholestyramine    deferoxamine    dimercaprol    etidronate    medicines for stomach ulcers or other stomach problems    pancreatic enzymes    quinolone antibiotics (examples: Cipro, Floxin, Levaquin, Tequin and others)    risedronate    tetracycline antibiotics (examples: doxycycline, tetracycline, minocycline, and others)    thyroid hormones  This list may not describe all possible interactions. Give your health care provider a list of all the medicines, herbs, non-prescription drugs, or dietary supplements you use. Also tell them if you smoke, drink alcohol, or use illegal drugs. Some items may interact with your medicine.  What should I watch for while using this medicine?  Use iron supplements only as directed by your health care professional. You will need important blood work while you are taking this medicine. It may take 3 to 6 months of therapy to treat low iron levels. Pregnant women should follow the dose and length of iron treatment as directed by their doctors.  Do not use iron longer than prescribed, and do not take a higher dose than recommended. Long-term use may cause excess iron to build-up in the body.  Do not take iron with antacids. If you need to take an antacid, take it 2 hours after a dose of iron.  What side effects may I notice from receiving this medicine?  Side effects that you should report to your doctor or health care professional as soon as possible:    allergic reactions like skin  rash, itching or hives, swelling of the face, lips, or tongue    blue lips, nails, or palms    dark colored stools (this may be due to the iron, but can indicate a more serious condition)    drowsiness    pain with or difficulty swallowing    pale or clammy skin    seizures    stomach pain    unusually weak or tired    vomiting    weak, fast, or irregular heartbeat  Side effects that usually do not require medical attention (report to your doctor or health care professional if they continue or are bothersome):    constipation    indigestion    nausea or stomach upset  This list may not describe all possible side effects. Call your doctor for medical advice about side effects. You may report side effects to FDA at 3-023-NVL-2738.  Where should I keep my medicine?  Keep out of the reach of children. Even small amounts of iron can be harmful to a child.  Store at room temperature between 15 and 30 degrees C (59 and 86 degrees F). Keep container tightly closed. Throw away any unused medicine after the expiration date.  NOTE:This sheet is a summary. It may not cover all possible information. If you have questions about this medicine, talk to your doctor, pharmacist, or health care provider. Copyright  2016 [employer]                Patient Education    Sucralfate Oral suspension    Sucralfate Oral tablet  Sucralfate Oral tablet  What is this medicine?  SUCRALFATE (ROBBY martha fate) helps to treat ulcers of the intestine.  This medicine may be used for other purposes; ask your health care provider or pharmacist if you have questions.  What should I tell my health care provider before I take this medicine?  They need to know if you have any of these conditions:    kidney disease    an unusual or allergic reaction to sucralfate, other medicines, foods, dyes, or preservatives    pregnant or trying to get pregnant    breast-feeding  How should I use this medicine?  Take this medicine by mouth with a glass of water. Follow the  directions on the prescription label. This medicine works best if you take it on an empty stomach, 1 hour before meals. Take your doses at regular intervals. Do not take your medicine more often than directed. Do not stop taking except on your doctor's advice.  Talk to your pediatrician regarding the use of this medicine in children. Special care may be needed.  Overdosage: If you think you have taken too much of this medicine contact a poison control center or emergency room at once.  NOTE: This medicine is only for you. Do not share this medicine with others.  What if I miss a dose?  If you miss a dose, take it as soon as you can. If it is almost time for your next dose, take only that dose. Do not take double or extra doses.  What may interact with this medicine?    antacid    cimetidine    digoxin    ketoconazole    phenytoin    quinidine    ranitidine    some antibiotics like ciprofloxacin, norfloxacin, and ofloxacin    theophylline    thyroid hormones    warfarin  This list may not describe all possible interactions. Give your health care provider a list of all the medicines, herbs, non-prescription drugs, or dietary supplements you use. Also tell them if you smoke, drink alcohol, or use illegal drugs. Some items may interact with your medicine.  What should I watch for while using this medicine?  Visit your doctor or health care professional for regular check ups. Let your doctor know if your symptoms do not improve or if you feel worse.  Antacids should not be taken within one half hour before or after this medicine.  What side effects may I notice from receiving this medicine?  Side effects that you should report to your doctor or health care professional as soon as possible:    allergic reactions like skin rash, itching or hives, swelling of the face, lips, or tongue    difficulty breathing  Side effects that usually do not require medical attention (report to your doctor or health care professional if  they continue or are bothersome):    back pain    constipation    drowsy, dizzy    dry mouth    headache    stomach upset, gas    trouble sleeping  This list may not describe all possible side effects. Call your doctor for medical advice about side effects. You may report side effects to FDA at 9-646-HGX-5694.  Where should I keep my medicine?  Keep out of the reach of children.  Store at room temperature between 15 and 30 degrees C (59 and 86 degrees F). Keep container tightly closed. Throw away any unused medicine after the expiration date.  NOTE:This sheet is a summary. It may not cover all possible information. If you have questions about this medicine, talk to your doctor, pharmacist, or health care provider. Copyright  2016 [employer] Standard

## 2018-07-21 ENCOUNTER — ANESTHESIA EVENT (OUTPATIENT)
Dept: SURGERY | Facility: HOSPITAL | Age: 48
DRG: 382 | End: 2018-07-21
Payer: MEDICARE

## 2018-07-21 ENCOUNTER — ANESTHESIA (OUTPATIENT)
Dept: SURGERY | Facility: HOSPITAL | Age: 48
DRG: 382 | End: 2018-07-21
Payer: MEDICARE

## 2018-07-21 PROBLEM — D64.9 ANEMIA: Status: ACTIVE | Noted: 2018-07-21

## 2018-07-21 PROBLEM — K28.9 ANASTOMOTIC ULCER S/P GASTRIC BYPASS: Status: ACTIVE | Noted: 2018-07-21

## 2018-07-21 LAB
ALBUMIN SERPL-MCNC: 2.6 G/DL (ref 3.4–5)
ALP SERPL-CCNC: 110 U/L (ref 40–150)
ALT SERPL W P-5'-P-CCNC: 35 U/L (ref 0–50)
ANION GAP SERPL CALCULATED.3IONS-SCNC: 7 MMOL/L (ref 3–14)
AST SERPL W P-5'-P-CCNC: 40 U/L (ref 0–45)
BILIRUB DIRECT SERPL-MCNC: 0.2 MG/DL (ref 0–0.2)
BILIRUB SERPL-MCNC: 0.4 MG/DL (ref 0.2–1.3)
BUN SERPL-MCNC: 15 MG/DL (ref 7–30)
CALCIUM SERPL-MCNC: 8 MG/DL (ref 8.5–10.1)
CHLORIDE SERPL-SCNC: 111 MMOL/L (ref 94–109)
CO2 SERPL-SCNC: 25 MMOL/L (ref 20–32)
CREAT SERPL-MCNC: 0.67 MG/DL (ref 0.52–1.04)
GFR SERPL CREATININE-BSD FRML MDRD: >90 ML/MIN/1.7M2
GLUCOSE SERPL-MCNC: 83 MG/DL (ref 70–99)
HGB BLD-MCNC: 8.4 G/DL (ref 11.7–15.7)
HGB BLD-MCNC: 8.5 G/DL (ref 11.7–15.7)
HGB BLD-MCNC: 8.7 G/DL (ref 11.7–15.7)
POTASSIUM SERPL-SCNC: 4.1 MMOL/L (ref 3.4–5.3)
PROT SERPL-MCNC: 5.6 G/DL (ref 6.8–8.8)
SODIUM SERPL-SCNC: 143 MMOL/L (ref 133–144)

## 2018-07-21 PROCEDURE — 80076 HEPATIC FUNCTION PANEL: CPT | Performed by: HOSPITALIST

## 2018-07-21 PROCEDURE — 36000050 ZZH SURGERY LEVEL 2 1ST 30 MIN: Performed by: SURGERY

## 2018-07-21 PROCEDURE — 25000125 ZZHC RX 250: Performed by: NURSE ANESTHETIST, CERTIFIED REGISTERED

## 2018-07-21 PROCEDURE — 25000125 ZZHC RX 250: Performed by: SURGERY

## 2018-07-21 PROCEDURE — 36415 COLL VENOUS BLD VENIPUNCTURE: CPT | Performed by: HOSPITALIST

## 2018-07-21 PROCEDURE — 12000000 ZZH R&B MED SURG/OB

## 2018-07-21 PROCEDURE — 25000132 ZZH RX MED GY IP 250 OP 250 PS 637: Mod: GY | Performed by: NURSE PRACTITIONER

## 2018-07-21 PROCEDURE — 25000132 ZZH RX MED GY IP 250 OP 250 PS 637: Mod: GY | Performed by: HOSPITALIST

## 2018-07-21 PROCEDURE — 25000128 H RX IP 250 OP 636: Performed by: NURSE ANESTHETIST, CERTIFIED REGISTERED

## 2018-07-21 PROCEDURE — 71000014 ZZH RECOVERY PHASE 1 LEVEL 2 FIRST HR: Performed by: SURGERY

## 2018-07-21 PROCEDURE — 25000128 H RX IP 250 OP 636: Performed by: HOSPITALIST

## 2018-07-21 PROCEDURE — 43239 EGD BIOPSY SINGLE/MULTIPLE: CPT | Performed by: NURSE ANESTHETIST, CERTIFIED REGISTERED

## 2018-07-21 PROCEDURE — G0378 HOSPITAL OBSERVATION PER HR: HCPCS

## 2018-07-21 PROCEDURE — 43239 EGD BIOPSY SINGLE/MULTIPLE: CPT | Performed by: SURGERY

## 2018-07-21 PROCEDURE — 36415 COLL VENOUS BLD VENIPUNCTURE: CPT | Performed by: NURSE PRACTITIONER

## 2018-07-21 PROCEDURE — 0DB68ZX EXCISION OF STOMACH, VIA NATURAL OR ARTIFICIAL OPENING ENDOSCOPIC, DIAGNOSTIC: ICD-10-PCS | Performed by: SURGERY

## 2018-07-21 PROCEDURE — 85018 HEMOGLOBIN: CPT | Performed by: NURSE PRACTITIONER

## 2018-07-21 PROCEDURE — 25000125 ZZHC RX 250: Performed by: HOSPITALIST

## 2018-07-21 PROCEDURE — 85018 HEMOGLOBIN: CPT | Performed by: HOSPITALIST

## 2018-07-21 PROCEDURE — 37000008 ZZH ANESTHESIA TECHNICAL FEE, 1ST 30 MIN: Performed by: SURGERY

## 2018-07-21 PROCEDURE — 25000128 H RX IP 250 OP 636: Performed by: NURSE PRACTITIONER

## 2018-07-21 PROCEDURE — 88305 TISSUE EXAM BY PATHOLOGIST: CPT | Mod: TC | Performed by: SURGERY

## 2018-07-21 PROCEDURE — A9270 NON-COVERED ITEM OR SERVICE: HCPCS | Mod: GY | Performed by: HOSPITALIST

## 2018-07-21 PROCEDURE — 99222 1ST HOSP IP/OBS MODERATE 55: CPT | Mod: 25 | Performed by: SURGERY

## 2018-07-21 PROCEDURE — 25000125 ZZHC RX 250: Performed by: NURSE PRACTITIONER

## 2018-07-21 PROCEDURE — 99232 SBSQ HOSP IP/OBS MODERATE 35: CPT | Performed by: NURSE PRACTITIONER

## 2018-07-21 PROCEDURE — 27210794 ZZH OR GENERAL SUPPLY STERILE: Performed by: SURGERY

## 2018-07-21 PROCEDURE — A9270 NON-COVERED ITEM OR SERVICE: HCPCS | Mod: GY | Performed by: NURSE PRACTITIONER

## 2018-07-21 PROCEDURE — 80048 BASIC METABOLIC PNL TOTAL CA: CPT | Performed by: HOSPITALIST

## 2018-07-21 RX ORDER — PROPOFOL 10 MG/ML
INJECTION, EMULSION INTRAVENOUS PRN
Status: DISCONTINUED | OUTPATIENT
Start: 2018-07-21 | End: 2018-07-21

## 2018-07-21 RX ORDER — HYDROMORPHONE HYDROCHLORIDE 1 MG/ML
0.5 INJECTION, SOLUTION INTRAMUSCULAR; INTRAVENOUS; SUBCUTANEOUS EVERY 4 HOURS PRN
Status: DISCONTINUED | OUTPATIENT
Start: 2018-07-21 | End: 2018-07-24 | Stop reason: HOSPADM

## 2018-07-21 RX ORDER — LIDOCAINE 40 MG/G
CREAM TOPICAL
Status: DISCONTINUED | OUTPATIENT
Start: 2018-07-21 | End: 2018-07-24 | Stop reason: HOSPADM

## 2018-07-21 RX ORDER — TIZANIDINE 2 MG/1
2 TABLET ORAL AT BEDTIME
Status: DISCONTINUED | OUTPATIENT
Start: 2018-07-21 | End: 2018-07-24 | Stop reason: HOSPADM

## 2018-07-21 RX ORDER — LIDOCAINE HYDROCHLORIDE 20 MG/ML
INJECTION, SOLUTION INFILTRATION; PERINEURAL PRN
Status: DISCONTINUED | OUTPATIENT
Start: 2018-07-21 | End: 2018-07-21

## 2018-07-21 RX ORDER — SODIUM CHLORIDE, SODIUM LACTATE, POTASSIUM CHLORIDE, CALCIUM CHLORIDE 600; 310; 30; 20 MG/100ML; MG/100ML; MG/100ML; MG/100ML
INJECTION, SOLUTION INTRAVENOUS CONTINUOUS
Status: DISCONTINUED | OUTPATIENT
Start: 2018-07-21 | End: 2018-07-24 | Stop reason: HOSPADM

## 2018-07-21 RX ORDER — HYDROCODONE BITARTRATE AND ACETAMINOPHEN 5; 325 MG/1; MG/1
1-2 TABLET ORAL EVERY 6 HOURS PRN
Status: DISCONTINUED | OUTPATIENT
Start: 2018-07-21 | End: 2018-07-24 | Stop reason: HOSPADM

## 2018-07-21 RX ORDER — LIDOCAINE 40 MG/G
CREAM TOPICAL
Status: DISCONTINUED | OUTPATIENT
Start: 2018-07-21 | End: 2018-07-21

## 2018-07-21 RX ADMIN — PANTOPRAZOLE SODIUM 40 MG: 40 INJECTION, POWDER, FOR SOLUTION INTRAVENOUS at 21:13

## 2018-07-21 RX ADMIN — GABAPENTIN 300 MG: 300 CAPSULE ORAL at 09:20

## 2018-07-21 RX ADMIN — PROPOFOL 80 MG: 10 INJECTION, EMULSION INTRAVENOUS at 12:02

## 2018-07-21 RX ADMIN — TIZANIDINE 2 MG: 2 TABLET ORAL at 21:13

## 2018-07-21 RX ADMIN — PROPOFOL 30 MG: 10 INJECTION, EMULSION INTRAVENOUS at 12:04

## 2018-07-21 RX ADMIN — LEVOTHYROXINE SODIUM 200 MCG: 100 TABLET ORAL at 14:15

## 2018-07-21 RX ADMIN — HYDROCODONE BITARTRATE AND ACETAMINOPHEN 1 TABLET: 5; 325 TABLET ORAL at 09:20

## 2018-07-21 RX ADMIN — SODIUM CHLORIDE, POTASSIUM CHLORIDE, SODIUM LACTATE AND CALCIUM CHLORIDE: 600; 310; 30; 20 INJECTION, SOLUTION INTRAVENOUS at 14:28

## 2018-07-21 RX ADMIN — Medication 0.5 MG: at 00:49

## 2018-07-21 RX ADMIN — HYDROCODONE BITARTRATE AND ACETAMINOPHEN 1 TABLET: 5; 325 TABLET ORAL at 00:02

## 2018-07-21 RX ADMIN — Medication 0.5 MG: at 14:18

## 2018-07-21 RX ADMIN — GABAPENTIN 300 MG: 300 CAPSULE ORAL at 14:15

## 2018-07-21 RX ADMIN — SODIUM CHLORIDE: 9 INJECTION, SOLUTION INTRAVENOUS at 11:34

## 2018-07-21 RX ADMIN — LIDOCAINE HYDROCHLORIDE 40 MG: 20 INJECTION, SOLUTION INFILTRATION; PERINEURAL at 12:02

## 2018-07-21 RX ADMIN — LEVOTHYROXINE SODIUM 75 MCG: 75 TABLET ORAL at 14:16

## 2018-07-21 RX ADMIN — HYDROCODONE BITARTRATE AND ACETAMINOPHEN 1 TABLET: 5; 325 TABLET ORAL at 23:56

## 2018-07-21 RX ADMIN — PROPOFOL 20 MG: 10 INJECTION, EMULSION INTRAVENOUS at 12:12

## 2018-07-21 RX ADMIN — Medication 0.5 MG: at 05:40

## 2018-07-21 RX ADMIN — PROPOFOL 30 MG: 10 INJECTION, EMULSION INTRAVENOUS at 12:07

## 2018-07-21 RX ADMIN — GABAPENTIN 600 MG: 600 TABLET, FILM COATED ORAL at 21:13

## 2018-07-21 RX ADMIN — PROPOFOL 20 MG: 10 INJECTION, EMULSION INTRAVENOUS at 12:16

## 2018-07-21 RX ADMIN — ONDANSETRON 4 MG: 4 TABLET, ORALLY DISINTEGRATING ORAL at 18:55

## 2018-07-21 RX ADMIN — Medication 1 MG: at 00:49

## 2018-07-21 RX ADMIN — SODIUM CHLORIDE, POTASSIUM CHLORIDE, SODIUM LACTATE AND CALCIUM CHLORIDE: 600; 310; 30; 20 INJECTION, SOLUTION INTRAVENOUS at 23:56

## 2018-07-21 RX ADMIN — PANTOPRAZOLE SODIUM 40 MG: 40 INJECTION, POWDER, FOR SOLUTION INTRAVENOUS at 05:37

## 2018-07-21 ASSESSMENT — ACTIVITIES OF DAILY LIVING (ADL)
ADLS_ACUITY_SCORE: 11
AMBULATION: 0 - INDEPENDENT
EATING: 0 - INDEPENDENT
BATHING: 0 - INDEPENDENT
DRESS: 0 - INDEPENDENT
TRANSFERRING: 0 - INDEPENDENT
ADLS_ACUITY_SCORE: 11
COMMUNICATION: 0 - UNDERSTANDS/COMMUNICATES WITHOUT DIFFICULTY
TOILETING: 0 - INDEPENDENT
SWALLOWING: 0 - SWALLOWS FOODS/LIQUIDS WITHOUT DIFFICULTY

## 2018-07-21 ASSESSMENT — PAIN DESCRIPTION - DESCRIPTORS: DESCRIPTORS: CRAMPING

## 2018-07-21 NOTE — PLAN OF CARE
/96  Temp 97.8  F (36.6  C) (Temporal)  Resp 19  SpO2 98%    Pt alert and oriented upon initial assessment. C/O 5/10 R rib pain. Norco and Dilaudid given this shift. Pt sleeping after administration. Lungs clear. BS hyperactive In RLQ, tender. No BM this shift. HR regular with murmur noted. NPO at midnight. Per ICU tele report SR 60s. Denies any dizziness, lightheadedness. IVF@50. No falls or injuries this shift. Will continue to monitor.     Face to face report given with opportunity to observe patient.    Report given to NICK Wood   7/21/2018  7:23 AM

## 2018-07-21 NOTE — PLAN OF CARE
Face to face report given with opportunity to observe patient.    Report given to Mari Drake RN  7/21/2018  3:30 PM

## 2018-07-21 NOTE — PROGRESS NOTES
Medical record and Dino Score reviewed. No apparent needs noted at this time. Care Transitions will remain available if needs arise.

## 2018-07-21 NOTE — PLAN OF CARE
Pt pleasant and makes needs known throughout evening. When up in room uses call light for SBA. Denies dizziness, gait steady. Tolerated full liquid diet with no N&V so order obtained from Dr. Jain to advance to soft diet. Pt initially tolerated well, then became nauseous and vomited meal. Medicated per MAR with zofran PO. Cool wash cloth to forehead. Pt laying in bed resting, and at this time reporting off to next shift nurse.     Face to face report given with opportunity to observe patient.    Report given to Jael Mahajan   7/21/2018  7:30 PM

## 2018-07-21 NOTE — PROGRESS NOTES
[facility]    Hospitalist Progress Note    Date of Service (when I saw the patient): 07/21/2018    Assessment & Plan   Constantino Warren is a 47 year old female who was admitted on 7/20/2018.       Anastomotic ulcer S/P gastric bypass: S/P EGD with Dr. Vazquez. She does have ulcer at anastomosis, however no active bleeding. Also some friable tissue noted so both areas were biopsied. Will continue every 6 hour hemoglobin this evening and then in AM. IV PPI. Per Dr. Vazquez she will be advanced to clear liquids to see how she tolerates.        Abdominal pain, epigastric: Worse after eating, feels similar to previous anastomotic ulcer 3 years ago. Exacerbated from vomiting certainly. Will continue with antiemetics, pain medications, heat/ice.        Hypochromic microcytic anemia: Chronic since at least 2015, however she has not been as low as 6.7 in the past. She stated she does take iron at home and is also on b12 injections monthly. Likely due to poor absorption s/p gastric bypass and then revision.       Chest pain: Resolved this AM, she states this was more a radiation from upper abdominal pain than true chest pain. Troponins negative x3, no associated symptoms despite anemia: no dyspnea, diaphoresis, jaw pain etc. DC tele.       # Pain Assessment:  Current Pain Score 7/21/2018   Patient currently in pain? -   Pain score (0-10) 5   Pain location -   Pain descriptors -   - Constantino is experiencing pain due to epigastric pain. Pain management was discussed and the plan was created in a collaborative fashion.  Constantino's response to the current recommendations: engaged  - Please see the plan for pain management as documented above    DVT Prophylaxis: Pneumatic Compression Devices  Code Status: Full Code    Disposition: Expected discharge in 1-2 days once establish hemoglobin stable, eating well without vomiting.    Emma Gonzalez, CNP    Interval History   Feels improved overnight. Still having epigastric pain  radiating to right side but less severe, able to take some ice chips overnight without nausea.     -Data reviewed today: I reviewed all new labs and imaging results over the last 24 hours.     Physical Exam   Temp: 97.6  F (36.4  C) Temp src: Temporal BP: 151/93   Heart Rate: 68 Resp: 16 SpO2: 99 % O2 Device: None (Room air) Oxygen Delivery: 2 LPM  Vitals:    07/21/18 1110   Weight: 75 kg (165 lb 5.5 oz)     Vital Signs with Ranges  Temp:  [97.2  F (36.2  C)-98.9  F (37.2  C)] 97.6  F (36.4  C)  Heart Rate:  [60-90] 68  Resp:  [12-22] 16  BP: (121-175)/() 151/93  SpO2:  [93 %-100 %] 99 %  I/O last 3 completed shifts:  In: 1635.33 [I.V.:911]  Out: -     Peripheral IV 07/20/18 Left Upper forearm (Active)   Site Assessment WDL 7/21/2018  2:02 PM   Line Status Infusing 7/21/2018  2:02 PM   Phlebitis Scale 0-->no symptoms 7/21/2018  2:02 PM   Infiltration Scale 0 7/21/2018  2:02 PM   Infiltration Site Treatment Method  None 7/21/2018  2:02 PM   Extravasation? No 7/21/2018  2:02 PM   Dressing Intervention New dressing  7/20/2018  1:30 PM   Number of days:1     Line/device assessment(s) completed for medical necessity    Constitutional: Awake,alert, no acute distress  Respiratory: Clear bilaterally, no wheezes,crackles, rhonchi.  Cardiovascular: HRR, no murmurs, rubs,thrills.   GI: Soft,nontender, bowel sounds positive.   Skin/Integumen: No open areas, rashes or unusual bruising.        Medications     lactated ringers 100 mL/hr at 07/21/18 1428       gabapentin (NEURONTIN) capsule 300 mg  300 mg Oral BID     gabapentin (NEURONTIN) tablet 600 mg  600 mg Oral At Bedtime     levothyroxine  200 mcg Oral QAM AC     levothyroxine  75 mcg Oral QAM AC     multivitamin, therapeutic with minerals  1 tablet Oral Daily     pantoprazole (PROTONIX) IV  40 mg Intravenous BID     sodium chloride (PF)  3 mL Intracatheter Q8H     tiZANidine  2 mg Oral At Bedtime     topiramate (TOPAMAX) tablet 25 mg  25 mg Oral Daily       Data      Recent Labs  Lab 07/21/18  1350 07/21/18  0601 07/20/18  2225 07/20/18  1849 07/20/18  1322   WBC  --   --   --   --  7.5   HGB 8.4* 8.7* 8.7*  --  6.9*   MCV  --   --   --   --  66*   PLT  --   --   --   --  278   NA  --  143  --   --  140   POTASSIUM  --  4.1  --   --  3.9   CHLORIDE  --  111*  --   --  108   CO2  --  25  --   --  26   BUN  --  15  --   --  19   CR  --  0.67  --   --  0.68   ANIONGAP  --  7  --   --  6   LOKESH  --  8.0*  --   --  8.4*   GLC  --  83  --   --  89   ALBUMIN  --  2.6*  --   --  3.1*   PROTTOTAL  --  5.6*  --   --  6.4*   BILITOTAL  --  0.4  --   --  0.3   ALKPHOS  --  110  --   --  84   ALT  --  35  --   --  23   AST  --  40  --   --  23   LIPASE  --   --   --   --  189   TROPI  --   --  <0.015 <0.015  --        Recent Results (from the past 24 hour(s))   Abd/pelvis CT - no contrast - Stone Protocol    Narrative    Exam:CT ABDOMEN PELVIS W/O CONTRAST    History:  47 years Female right flank pain and history of renal stones    Comparisons: 4/18/2015    Technique: Axial CT imaging of the abdomen and pelvis was performed  without contrast. Coronal and sagittal reconstructions were obtained      Findings:      Lung bases:The lung bases are clear.        Kidneys:No renal or ureteral calculi are present. There is no  hydronephrosis or hydroureter.       Abdomen: Evaluation is limited due to lack of intravenous contrast.  Unenhanced images of the liver spleen pancreas and adrenal glands are  unremarkable. Patient status post cholecystectomy.  There are surgical changes of gastric bypass. Findings are unchanged  from prior study without acute change.  No abnormally distended or thickened bowel are seen. The visualized 3  changes are seen at the expected location of the appendix.    Anterior and posterior lumbar spine fusion with interbody grafting and  posterior pedicle screw and shukri fixation from L3 through S1.       Pelvis:There is no mass or lymphadenopathy. No abnormal  fluid  collections are present. The bladder isdistended with urine.              Impression    Impression: No acute findings. No renal or ureteral calculi are  present. There is no evidence of a ureteral calculus. There is no  hydronephrosis.    The appendix is not visualized. No inflammatory changes are seen at  the expected location of the appendix.    Patient is status post cholecystectomy and gastric bypass surgery.    LULY KNOWLES MD

## 2018-07-21 NOTE — ANESTHESIA PREPROCEDURE EVALUATION
Anesthesia Evaluation     . Pt has had prior anesthetic.     No history of anesthetic complications          ROS/MED HX    ENT/Pulmonary:  - neg pulmonary ROS     Neurologic:     (+)migraines, other neuro two concussions in the last three years    Cardiovascular:     (+) --CAD, angina (acute c/o chest pain on med/surg; normal troponins and EKG)--. : . . fainting (syncope) (chronic vasovagal symptoms with presyncope). :. valvular problems/murmurs patient reports she has a murmur, denies valvular pathology:.       METS/Exercise Tolerance:  >4 METS   Hematologic:     (+) Anemia (chronic anemia, hgb 8.7 this morning s/p 2 units PRBC in ED), History of Transfusion (possible transfusion reaction) -      Musculoskeletal:   (+) , , other musculoskeletal- chronic neck and back pain s/p C4-C5 fusion, L4-S1 fusion      GI/Hepatic:     (+) GERD Asymptomatic on medication, Other GI/Hepatic hx gastric bypass and revision, acute abdominal pain, positive hemoccult      Renal/Genitourinary:  - ROS Renal section negative       Endo:     (+) type II DM (history of, resolved with weight loss) thyroid problem hypothyroidism, .      Psychiatric:     (+) psychiatric history other (comment), anxiety and depression (PTSD)      Infectious Disease:   (+) MRSA,       Malignancy:   (+) Malignancy History of Other  Other CA uterine Remission status post Surgery         Other: Comment: Norco 1-2 pills per day   (+) No chance of pregnancy C-spine cleared: N/A, H/O Chronic Pain,H/O chronic opiod use , no other significant disability                    Physical Exam  Normal systems: pulmonary    Airway   Mallampati: II  TM distance: >3 FB  Neck ROM: full    Dental   (+) chipped and missing    Cardiovascular   Rhythm and rate: regular and normal  (+) murmur       Pulmonary    breath sounds clear to auscultation                    Anesthesia Plan      History & Physical Review  History and physical reviewed and following examination; no interval  change.    ASA Status:  3 .    NPO Status:  > 8 hours    Plan for MAC Reason for MAC:  Extreme anxiety (QS), Deep or markedly invasive procedure (G8) and Procedure to face, neck, head or breast  PONV prophylaxis:  Ondansetron (or other 5HT-3)  Risks and benefits of MAC anesthetic discussed including dental damage, aspiration, loss of airway, conversion to general anesthetic, CV complications, MI, stroke, death. Pt wishes to proceed.         Postoperative Care  Postoperative pain management:  IV analgesics.      Consents  Anesthetic plan, risks, benefits and alternatives discussed with:  Patient.  Use of blood products discussed: Yes.   Use of blood products discussed with Patient.  Consented to blood products.  .                          .

## 2018-07-21 NOTE — PLAN OF CARE
[facility] Inpatient Admission Note:    Patient admitted to 3228/3228-1 at approximately 1835 via cart from emergency room . Report received from jimy in SBAR format at 1811 via telephone. Patient transferred to bed via self.. Patient is alert and oriented X 3, reports pain; rates at 4 on 0-10 scale.  Patient oriented to room, unit, hourly rounding, and plan of care. Explained admission packet and patient handbook with patient bill of rights brochure. Will continue to monitor and document as needed.     Inpatient Nursing criteria listed below was met:    Health care directives status obtained and documented: NA    Care Everywhere authorization obtained na    MRSA swab completed for patient 65 years and older: N/A    Patient identifies a surrogate decision maker: No If yes, who:na Contact Information:na    Core Measure diagnosis present:No. If yes, state diagnosis: na     If initial lactic acid >2.0, repeat lactic acid drawn within one hour of arrival to unit: NA. If no, state reason: na    Vaccination assessment and education completed: No   Vaccinations received prior to admission: Pneumovax na  Influenza(seasonal)  N/A   Vaccination(s) ordered: na    Clergy visit ordered if patient requests: N/A    Skin issues/needs documented: Yes    Isolation Patient: yes Education given, correct sign in place and documentation row added to PCS:  Yes    Fall Prevention Yes: Care plan updated, education given and documented, sticker and magnet in place: Yes    Care Plan initiated: Yes    Education Documented (including assessment): Yes    Patient has discharge needs : No If yes, please explain:na

## 2018-07-21 NOTE — PLAN OF CARE
Patient tolerating clear fluids, synthroid given per patient request, as it was held this morning due to NPO status.

## 2018-07-21 NOTE — PLAN OF CARE
Problem: Patient Care Overview  Goal: Plan of Care/Patient Progress Review  - Serial troponins and stress test complete.   - Seen and cleared by consultant if applicable   - Adequate pain control on oral analgesia   - Vital signs normal or at patient baseline   - Safe disposition plan has been identified   - Nurse to notify provider when observation goals have been met and patient is ready for discharge.  Outcome: No Change  Pt makes needs known throughout shift. Cooperative in cares. Troponins noted as well as hemoglobin after transfusion. 2 Units PRBC completed on this shift. Pt states she is feeling better after this - more energy. Mostly concerned with knowing why this is happening, where the bleeding is coming from. Understands she is NPO at 2300. Loves ice chips and has been eating them all shift, she states it calms her stomach since her triple bypass. Had a few saltine crackers and also a sandwhich from [employer] (didn't ask nurse, saw after she had eaten)  and 3 sons present for a short period. Up to bathroom with steady gait, denies dizziness. Voids, no stool. After episode of chest pain pt states it slowly did resolve until it was gone. Pt on tablet with no complaints, HOB elevated.     Face to face report given with opportunity to observe patient.    Report given to Marilee Mahajan   7/20/2018  11:53 PM

## 2018-07-21 NOTE — ANESTHESIA CARE TRANSFER NOTE
Patient: Constantino Warren    Procedure(s):  UPPER ENDOSCOPY WITH BIOPSIES - Wound Class: II-Clean Contaminated    Diagnosis: anemia, vomiting  Diagnosis Additional Information: No value filed.    Anesthesia Type:   MAC     Note:  Airway :Nasal Cannula  Patient transferred to:Medical/Surgical Unit  Comments: VSS. Report to NICK TerryHandoff Report: Identifed the Patient, Identified the Reponsible Provider, Reviewed the pertinent medical history, Discussed the surgical course, Reviewed Intra-OP anesthesia mangement and issues during anesthesia, Set expectations for post-procedure period and Allowed opportunity for questions and acknowledgement of understanding      Vitals: (Last set prior to Anesthesia Care Transfer)    CRNA VITALS  7/21/2018 1152 - 7/21/2018 1229      7/21/2018             Resp Rate (set): 8                Electronically Signed By: JUNIOR Kennedy CRNA  July 21, 2018  12:29 PM

## 2018-07-21 NOTE — OP NOTE
Procedure Date: 07/21/2018      DATE OF PROCEDURE:  07/21/2018      PREOPERATIVE DIAGNOSES:  Epigastric pain, vomiting and anemia.      POSTOPERATIVE DIAGNOSES:     1.  Anastomotic ulcer.   2.  Friable tissue gastric pouch.      PROCEDURE:  Upper endoscopy with biopsies of anastomotic ulcer plus biopsies of gastric pouch.      SURGEON:  Roger Vazquez MD      ANESTHESIA:  MAC.      BLOOD LOSS:  Minimal.      SPECIMENS:  Biopsies of anastomotic ulcer and gastric pouch.      COMPLICATIONS:  Nil.      INDICATIONS:  Mrs. Warren is a 47-year-old female who presented to the emergency room late yesterday afternoon with a 5-6 day history of vomiting.  She has basically been vomiting everything that she has been trying to eat.  She does state that she has had some nausea and epigastric pain for the last month.  She has a history of having a Prasanna-en-Y gastric bypass in 2010.  She had a revision in 2015 because of an anastomotic ulcer.  The patient was also noted to have anemia with a hemoglobin of 6.7, which appears to be a chronic though stools did test positive for occult blood.  It was felt that an upper endoscopy was in order.  She has been given 2 units of packed cells.      DESCRIPTION OF PROCEDURE:  The patient was prepped and brought to the operating room.  Under MAC anesthesia, she was placed in the left lateral position.  An appropriate timeout was carried out.  The flexible end viewing esophagoscope was then introduced through mouth gag in the oropharynx.  This advanced easily down the esophagus.  There did not appear to be any esophagitis.  Scope passed easily into the gastric pouch, which is quite small.  Her gastrojejunostomy is widely patent.  Does have some minimal irritation around it.  Sitting on the posterior aspect of it, she does appear to have a somewhat deep ulcer.  This would be approximately 1 cm in size.  Scope passed easily past this.  I was able to easily advance down the jejunostomy for  basically the full length of the scope and no strictures or further problems were identified.  The scope was slowly withdrawn.  The blind end of the jejunum also appeared normal.  The ulcer was not actively bleeding.  I did take some biopsies from the edge of the ulcer thought it was a bit hard to get good biopsies.  In the gastric pouch, she did have some fairly prominent friable tissue, which also was biopsied and there is some minimal bleeding from this.  The scope was then slowly withdrawn.  There appeared to be no evidence of Walton's changes at this time.  Scope was withdrawn up the esophagus and no further lesions were identified.      She did tolerate the procedure well.  Unfortunately, it does appear that she has again developed a stomal ulcer.  It may be that she is going to have to be reassessed by her bariatric surgeon.  In the meantime, we will continue to follow her hemoglobin and start her back and some clear liquids.  She is currently on a proton pump inhibitor.         SIA ROACH MD             D: 2018   T: 2018   MT: DESTIN      Name:     FELICITY RODRÍGUEZ   MRN:      [id number]        Account:        [id number]   :      1970           Procedure Date: 2018      Document: [id number]

## 2018-07-21 NOTE — CONSULTS
Consult Date:  07/21/2018      HISTORY OF PRESENT ILLNESS:  Dr. Luca Jain asked me to see Ms. Warren who is a pleasant 47-year-old female who presented to the emergency room yesterday complaining of epigastric pain and vomiting.  This woman underwent a Prasanna-en-Y gastric bypass surgery in 2010.  Did lose a significant amount of weight.  She did present approximately 3 years ago with vomiting and abdominal pain.  Noted to be anemic at that time.  She was found to have a stomal ulcer and underwent a revision of her bypass surgery.  She states she was well up until the last 3-4 weeks.  She then started to note some epigastric pain.  Also started to note some nausea.  For the last 5 days, she has been vomiting.  She has found that whenever she has eaten has come back up.  There has been no blood with her vomitus.  She has though been having diarrhea in association with this.  Her pain has worsened.  This tends to sit in the epigastrium.  It does radiate around the right side and into her back.  It does tend to be relieved by vomiting.  There has been no fever or chills.  Symptoms are similar to what she had prior to her last surgery.      When the patient presented to the emergency room yesterday, she was noted to be anemic with a hemoglobin of 6.9.  She is markedly hyperchromic microcytic.  The patient has been given 2 units of blood and her hemoglobin this morning is 8.7.  The patient has been noted to be anemic in the past.  Hemoglobin in 04/2015 was 9.0 and again she was markedly microcytic.  Hemoglobin in 09/2015 was 8.7.  The patient does take an iron tablet at home but has never been told that she has been iron deficient.  She also does take vitamin B12 shots.      She is otherwise healthy.  She denies any heart troubles, though has a family history of heart disease.  Never had a heart attack or irregular heartbeat.  She is not a cigarette smoker.  There is no shortness of breath.  She was diabetic prior to her  original bypass surgery.  Currently, she is not on any diabetic medications.  She has had a kidney stone previously, but no other kidney troubles.  She does not drink caffeine-containing liquids.  She does not take nonsteroidal antiinflammatories due to allergy.  She is not an alcohol drinker.      HOME MEDICATIONS:  Include acetaminophen, vitamin B12, docusate, gabapentin, Norco, levothyroxine, melatonin, multivitamins, Prilosec, Imitrex, Zanaflex and Topamax.      ALLERGIES:  ASPARTAME, BEE VENOM, BUPROPION, IBUPROFEN, FENTANYL, LIQUID ADHESIVE, OXCARBAZEPINE, PROZAC, VALPROIC ACID and ZIPRASIDONE.        In addition to her gastric bypass surgery, she has had an appendectomy, cholecystectomy and a hysterectomy as well as a lumbar and cervical spine fusions.      She does state that generally her weight has been stable, though he has lost a few pounds in weight recently.      PHYSICAL EXAMINATION:     GENERAL:  Today, she did not appear to be in any distress, has been kept n.p.o.   VITAL SIGNS:  Afebrile and her vitals have been stable.   HEAD AND NECK:  Unremarkable.  I could not feel any masses.  There is no thyroid enlargement.  There is no scleral icterus.   ABDOMEN:  She has the scars from the previous surgeries.  The abdomen was soft.  It was nontender.  There is slight tenderness in the epigastrium.  I could not feel any masses.  There is no hepatosplenomegaly.  There is no costovertebral angle tenderness.  I could not feel any groin hernias.  She has strong femoral pulses bilaterally.      LABORATORY DATA:  Her blood work as mentioned reveals a hemoglobin this morning of 8.7, which is up from 6.9.  Electrolytes are normal as are her liver function tests.  Stool for occult blood was positive.  Troponins have been negative.        ASSESSMENT AND PLAN:  Ms. Warren does have 2 problems, one of which is her anemia which appears to be an iron-deficiency anemia.  This does appear somewhat chronic but worse than  previously.  I suspect this may be due to inability to absorb iron with her previous bypass surgery.  Other possibility would be secondary to an ulcer in the stomach.  She has been having vomiting and the epigastric pain and this also would be a possibility.  I think she does warrant an upper GI endoscopy.  I went over the procedure with her this morning.  This included the risk of anesthetic, bleeding and perforation.  She is agreeable to having it done.  A consent was signed.  Arrangements are being made for it to be carried out in the near future.  With her age, it may also be reasonable to consider a colonoscopy in the future to rule out a colonic lesion.      Thank you for asking me to see Ms. Warren.         SIA ROACH MD             D: 2018   T: 2018   MT: AMIE      Name:     FELICITY WARREN   MRN:      [id number]        Account:       [id number]   :      1970           Consult Date:  2018      Document: [id number]       cc: Luca Jain MD

## 2018-07-21 NOTE — ANESTHESIA POSTPROCEDURE EVALUATION
Patient: Constantino Warren    Procedure(s):  UPPER ENDOSCOPY WITH BIOPSIES - Wound Class: II-Clean Contaminated    Diagnosis:anemia, vomiting  Diagnosis Additional Information: No value filed.    Anesthesia Type:  MAC    Note:  Anesthesia Post Evaluation    Patient location during evaluation: Floor  Patient participation: Able to fully participate in evaluation  Level of consciousness: awake and alert  Pain management: adequate  Airway patency: patent  Cardiovascular status: acceptable  Respiratory status: acceptable  Hydration status: acceptable  PONV: none     Anesthetic complications: None          Last vitals:  Vitals:    07/21/18 1245 07/21/18 1250 07/21/18 1255   BP: 125/81 134/55 132/81   Resp: 12 14 16   Temp:      SpO2: 98% 95% 96%         Electronically Signed By: JUNIOR Kennedy CRNA  July 21, 2018  12:59 PM

## 2018-07-21 NOTE — PLAN OF CARE
"Problem: Patient Care Overview  Goal: Plan of Care/Patient Progress Review  - Serial troponins and stress test complete.   - Seen and cleared by consultant if applicable   - Adequate pain control on oral analgesia   - Vital signs normal or at patient baseline   - Safe disposition plan has been identified   - Nurse to notify provider when observation goals have been met and patient is ready for discharge.   Outcome: No Change  Patient has denied bloody stools, but has reported recent bouts with diarrhea, patient stated the stools were \"normal brown in color\" at present patient was reporting sub sternal discomfort radiating to the right rib cage, norco was given with satisfactory pain control, patient has denied nausea, and had been NPO except with a sip of water given with morning medication, MD was aware. Vitals as charted.  Patient is now going down for her EGD, nurse to nurse report given to Moises ROMERO.       "

## 2018-07-22 LAB
ANION GAP SERPL CALCULATED.3IONS-SCNC: 7 MMOL/L (ref 3–14)
BASOPHILS # BLD AUTO: 0 10E9/L (ref 0–0.2)
BASOPHILS NFR BLD AUTO: 0 %
BUN SERPL-MCNC: 13 MG/DL (ref 7–30)
CALCIUM SERPL-MCNC: 8.2 MG/DL (ref 8.5–10.1)
CHLORIDE SERPL-SCNC: 108 MMOL/L (ref 94–109)
CO2 SERPL-SCNC: 28 MMOL/L (ref 20–32)
CREAT SERPL-MCNC: 0.64 MG/DL (ref 0.52–1.04)
DIFFERENTIAL METHOD BLD: ABNORMAL
EOSINOPHIL # BLD AUTO: 0 10E9/L (ref 0–0.7)
EOSINOPHIL NFR BLD AUTO: 0 %
ERYTHROCYTE [DISTWIDTH] IN BLOOD BY AUTOMATED COUNT: 19.9 % (ref 10–15)
GFR SERPL CREATININE-BSD FRML MDRD: >90 ML/MIN/1.7M2
GLUCOSE SERPL-MCNC: 78 MG/DL (ref 70–99)
HCT VFR BLD AUTO: 29.5 % (ref 35–47)
HGB BLD-MCNC: 8.9 G/DL (ref 11.7–15.7)
IMM GRANULOCYTES # BLD: 0 10E9/L (ref 0–0.4)
IMM GRANULOCYTES NFR BLD: 0.3 %
LYMPHOCYTES # BLD AUTO: 1.8 10E9/L (ref 0.8–5.3)
LYMPHOCYTES NFR BLD AUTO: 29.9 %
MCH RBC QN AUTO: 21 PG (ref 26.5–33)
MCHC RBC AUTO-ENTMCNC: 30.2 G/DL (ref 31.5–36.5)
MCV RBC AUTO: 70 FL (ref 78–100)
MONOCYTES # BLD AUTO: 0.5 10E9/L (ref 0–1.3)
MONOCYTES NFR BLD AUTO: 7.6 %
NEUTROPHILS # BLD AUTO: 3.7 10E9/L (ref 1.6–8.3)
NEUTROPHILS NFR BLD AUTO: 62.2 %
NRBC # BLD AUTO: 0 10*3/UL
NRBC BLD AUTO-RTO: 0 /100
PLATELET # BLD AUTO: 263 10E9/L (ref 150–450)
POTASSIUM SERPL-SCNC: 4.3 MMOL/L (ref 3.4–5.3)
RBC # BLD AUTO: 4.24 10E12/L (ref 3.8–5.2)
SODIUM SERPL-SCNC: 143 MMOL/L (ref 133–144)
WBC # BLD AUTO: 6 10E9/L (ref 4–11)

## 2018-07-22 PROCEDURE — 25000125 ZZHC RX 250: Performed by: NURSE PRACTITIONER

## 2018-07-22 PROCEDURE — 25000128 H RX IP 250 OP 636: Performed by: HOSPITALIST

## 2018-07-22 PROCEDURE — 99232 SBSQ HOSP IP/OBS MODERATE 35: CPT | Performed by: NURSE PRACTITIONER

## 2018-07-22 PROCEDURE — A9270 NON-COVERED ITEM OR SERVICE: HCPCS | Mod: GY | Performed by: HOSPITALIST

## 2018-07-22 PROCEDURE — 25000132 ZZH RX MED GY IP 250 OP 250 PS 637: Mod: GY | Performed by: HOSPITALIST

## 2018-07-22 PROCEDURE — 25000128 H RX IP 250 OP 636: Performed by: NURSE PRACTITIONER

## 2018-07-22 PROCEDURE — 25000132 ZZH RX MED GY IP 250 OP 250 PS 637: Mod: GY | Performed by: NURSE PRACTITIONER

## 2018-07-22 PROCEDURE — 85025 COMPLETE CBC W/AUTO DIFF WBC: CPT | Performed by: NURSE PRACTITIONER

## 2018-07-22 PROCEDURE — A9270 NON-COVERED ITEM OR SERVICE: HCPCS | Mod: GY | Performed by: NURSE PRACTITIONER

## 2018-07-22 PROCEDURE — 25000125 ZZHC RX 250: Performed by: HOSPITALIST

## 2018-07-22 PROCEDURE — 80048 BASIC METABOLIC PNL TOTAL CA: CPT | Performed by: NURSE PRACTITIONER

## 2018-07-22 PROCEDURE — 12000000 ZZH R&B MED SURG/OB

## 2018-07-22 PROCEDURE — 36415 COLL VENOUS BLD VENIPUNCTURE: CPT | Performed by: NURSE PRACTITIONER

## 2018-07-22 RX ORDER — SERTRALINE HYDROCHLORIDE 100 MG/1
100 TABLET, FILM COATED ORAL DAILY
Status: DISCONTINUED | OUTPATIENT
Start: 2018-07-22 | End: 2018-07-24 | Stop reason: HOSPADM

## 2018-07-22 RX ORDER — LACTOSE-REDUCED FOOD 0.04G-1/ML
1 LIQUID (ML) ORAL
Status: DISCONTINUED | OUTPATIENT
Start: 2018-07-22 | End: 2018-07-22

## 2018-07-22 RX ORDER — CYANOCOBALAMIN 1000 UG/ML
1000 INJECTION, SOLUTION INTRAMUSCULAR; SUBCUTANEOUS
Status: DISCONTINUED | OUTPATIENT
Start: 2018-07-22 | End: 2018-07-24 | Stop reason: HOSPADM

## 2018-07-22 RX ORDER — SERTRALINE HYDROCHLORIDE 100 MG/1
100 TABLET, FILM COATED ORAL DAILY
COMMUNITY
Start: 2018-01-15 | End: 2021-03-04

## 2018-07-22 RX ADMIN — TOPIRAMATE 25 MG: 25 TABLET, FILM COATED ORAL at 09:33

## 2018-07-22 RX ADMIN — GABAPENTIN 300 MG: 300 CAPSULE ORAL at 07:51

## 2018-07-22 RX ADMIN — LEVOTHYROXINE SODIUM 75 MCG: 75 TABLET ORAL at 05:32

## 2018-07-22 RX ADMIN — Medication 0.5 MG: at 07:49

## 2018-07-22 RX ADMIN — HYDROCODONE BITARTRATE AND ACETAMINOPHEN 1 TABLET: 5; 325 TABLET ORAL at 05:26

## 2018-07-22 RX ADMIN — LEVOTHYROXINE SODIUM 200 MCG: 100 TABLET ORAL at 05:32

## 2018-07-22 RX ADMIN — SODIUM CHLORIDE, POTASSIUM CHLORIDE, SODIUM LACTATE AND CALCIUM CHLORIDE: 600; 310; 30; 20 INJECTION, SOLUTION INTRAVENOUS at 10:38

## 2018-07-22 RX ADMIN — HYDROCODONE BITARTRATE AND ACETAMINOPHEN 1 TABLET: 5; 325 TABLET ORAL at 14:21

## 2018-07-22 RX ADMIN — SODIUM CHLORIDE, POTASSIUM CHLORIDE, SODIUM LACTATE AND CALCIUM CHLORIDE: 600; 310; 30; 20 INJECTION, SOLUTION INTRAVENOUS at 20:43

## 2018-07-22 RX ADMIN — MULTIPLE VITAMINS W/ MINERALS TAB 1 TABLET: TAB at 07:50

## 2018-07-22 RX ADMIN — ONDANSETRON 4 MG: 2 INJECTION INTRAMUSCULAR; INTRAVENOUS at 07:48

## 2018-07-22 RX ADMIN — HYDROCODONE BITARTRATE AND ACETAMINOPHEN 1 TABLET: 5; 325 TABLET ORAL at 20:43

## 2018-07-22 RX ADMIN — ONDANSETRON 4 MG: 4 TABLET, ORALLY DISINTEGRATING ORAL at 16:14

## 2018-07-22 RX ADMIN — GABAPENTIN 300 MG: 300 CAPSULE ORAL at 14:22

## 2018-07-22 RX ADMIN — PANTOPRAZOLE SODIUM 40 MG: 40 INJECTION, POWDER, FOR SOLUTION INTRAVENOUS at 20:45

## 2018-07-22 RX ADMIN — PANTOPRAZOLE SODIUM 40 MG: 40 INJECTION, POWDER, FOR SOLUTION INTRAVENOUS at 07:50

## 2018-07-22 RX ADMIN — GABAPENTIN 600 MG: 600 TABLET, FILM COATED ORAL at 22:06

## 2018-07-22 RX ADMIN — DOCUSATE SODIUM 50 MG: 50 CAPSULE, LIQUID FILLED ORAL at 20:46

## 2018-07-22 RX ADMIN — TIZANIDINE 2 MG: 2 TABLET ORAL at 22:06

## 2018-07-22 RX ADMIN — SERTRALINE HYDROCHLORIDE 100 MG: 100 TABLET, FILM COATED ORAL at 14:43

## 2018-07-22 RX ADMIN — CYANOCOBALAMIN 1000 MCG: 1000 INJECTION, SOLUTION INTRAMUSCULAR at 09:31

## 2018-07-22 RX ADMIN — IRON SUCROSE 200 MG: 20 INJECTION, SOLUTION INTRAVENOUS at 09:28

## 2018-07-22 ASSESSMENT — ACTIVITIES OF DAILY LIVING (ADL)
ADLS_ACUITY_SCORE: 11

## 2018-07-22 ASSESSMENT — PAIN DESCRIPTION - DESCRIPTORS: DESCRIPTORS: CRAMPING

## 2018-07-22 NOTE — PROGRESS NOTES
Assessment completed see flowsheet.      LOC: alert    Others present: Patient    Dx: Anemia     Lives with: Lives With: spouse, child(nish), dependent    Living at: Living Arrangements: house    Equipment used:  None    Support System: Description of Support System: Supportive, Involved    Primary Care Provider: Ayden Alba    POA/Guardian: No     Health Care Directive: NO     Pharmacy: [facility]     :  No    Homecare/County Services:   No    Adequate Resources for needs (housing, utilities, food/med): States difficulty affording prescription medication. States she was told by a county worker at one time that she would qualify for food stamps but she never filled out the application. She states she plans to do that to help with finances.     Meds and appointments management: YES    Work: YES- states she works at [employer]     Transportation: YES - drives, states she will drive herself home when discharged as she drove herself here and her car is here     Falls: No    Able to Return to Prior Living Arrangements: YES    : NO    Goals: To return home     Barriers: None identified     LANDEN: Average     Discharge Plan: Return home, transport by self    PCP is Dr. Alba of [facility], dentist is GAVIN Amado of [place], [place] [place] [facility] [place]. Patient denies sleep concerns and states she takes medications that help her sleep. Patient denies mood concerns, though she states she does have PTSD. Patient has been emotional on assessment, at times smiling and talking, other times states she's frustrated and appears angry, and at other times crying. She denies any suicidal thoughts or intent. She feels she has been stable but feels the stress of this health episode has been hard for her. Patient states she does take an antidepressant that helps her to feel stable mentally. She declines a list of local mental health providers and  states she feels she has good resources for this already. and she states she sees Rob Whitehead, Psych NP of [facility] in [place]. Patient states she also has seen a therapist in [place] who is available to her if she feels she needs to restart therapy at any time. Patient states she pays out of pocket for her medications at this time. She states she was told in the past she'd qualify for medical assistance and food stamps but never completed an application. Advised her that patient financial would plan to come see patient tomorrow, Monday, to discuss applying for secondary insurance for medication coverage, etc. She agreed to this. Patient denies any tobacco use. She denies alcohol use and states she quit drinking 16 years ago and states she was an alcoholic. Patient denies any other drug use.

## 2018-07-22 NOTE — PROGRESS NOTES
Patient feeling frustrated this morning. Pain and vomiting restarted after eating solid food last evening. Back on clear liquids, pain better  Afebrile, /68, HR 67  Abdomen - soft, minimally tender upper abdomen  Lab - Hgb 8.9, WBC 6.0, lytes OK  A - anastomotic ulcer with vomiting  P - try full fluids and Boost        Needs iron infusion for chronic iron deficiency anemia       Continue PPI

## 2018-07-22 NOTE — PLAN OF CARE
Face to face report given with opportunity to observe patient.    Report given to Jael Drake RN  7/22/2018  3:09 PM

## 2018-07-22 NOTE — PLAN OF CARE
Problem: Patient Care Overview  Goal: Plan of Care/Patient Progress Review  - Serial troponins and stress test complete.   - Seen and cleared by consultant if applicable   - Adequate pain control on oral analgesia   - Vital signs normal or at patient baseline   - Safe disposition plan has been identified   - Nurse to notify provider when observation goals have been met and patient is ready for discharge.   Outcome: Improving  Patient has tolerated oral intake with only minimal reports of very slight nausea. Patient is reporting abdominal discomfort and has been medicated accordingly and reports adequate pain control.

## 2018-07-22 NOTE — PLAN OF CARE
"Problem: Gastrointestinal Bleeding (Adult)  Goal: Signs and Symptoms of Listed Potential Problems Will be Absent, Minimized or Managed (Gastrointestinal Bleeding)  Signs and symptoms of listed potential problems will be absent, minimized or managed by discharge/transition of care (reference Gastrointestinal Bleeding (Adult) CPG).  Outcome: No Change  Vitals: VSS. /68  Temp 97.2  F (36.2  C) (Temporal)  Resp 16  Ht 1.676 m (5' 5.98\")  Wt 75 kg (165 lb 5.5 oz)  SpO2 97%  BMI 26.7 kg/m2    Pain: Pain rated 6/10 to ABD. Treated with 1 norco x2. Pt slept well after administration.     Orientation: A&O.    Cardiac: Reg    Lungs: Clear    ABD: Bowels hyperactive. Denied nausea this shift. However, ate low fiber diet on evenings and didn't tolerate well (vomited).    Urinating: WNL    Skin: Dry    IV fluids: LR at 100.    Antibiotics: None.    Mobility: Independent in room, steady on feet.    Safety: Call light in reach. Rounding done.    Comment: Slept well throughout night.     Face to face report given with opportunity to observe patient.    Report given to Nina Bejarano   7/22/2018  8:23 AM        .             "

## 2018-07-22 NOTE — PROGRESS NOTES
[facility]    Hospitalist Progress Note    Date of Service (when I saw the patient): 07/22/2018    Assessment & Plan   Constantino Warren is a 47 year old female who was admitted on 7/20/2018.       Anastomotic ulcer S/P gastric bypass: S/P EGD with Dr. Vazquez. She does have ulcer at anastomosis, however no active bleeding. Also some friable tissue noted so both areas were biopsied. Hemoglobin has been stable. Recheck in AM. Further plan per surgery.       Abdominal pain, epigastric: Worse after eating, feels similar to previous anastomotic ulcer 3 years ago. Exacerbated from vomiting certainly. Will continue with antiemetics, pain medications, heat/ice. Overall improving this morning.         Hypochromic microcytic anemia: Chronic since at least 2015, however she has not been as low as 6.7 in the past. She stated she does take iron at home and is also on b12 injections monthly. Suspect poor compliance. Venofer infusion today as well as b12 injection. May need some outpatient infusions to tank up before attempting oral replacement again.        Chest pain: Resolved early AM of arrival, she states this was more a radiation from upper abdominal pain than true chest pain. Troponins negative x3, no associated symptoms despite anemia: no dyspnea, diaphoresis, jaw pain etc. DC tele.       # Pain Assessment:  Current Pain Score 7/22/2018   Patient currently in pain? yes   Pain score (0-10) 5   Pain location -   Pain descriptors -   - Constantino is experiencing pain due to epigastric pain. Pain management was discussed and the plan was created in a collaborative fashion.  Constantino's response to the current recommendations: engaged  - Please see the plan for pain management as documented above    DVT Prophylaxis: Pneumatic Compression Devices  Code Status: Full Code    Disposition: Expected discharge in 1-2 days once establish hemoglobin stable, eating well without vomiting.    Emma Gonzalez, CNP    Interval History  "  Very upset this morning. Alternately crying and angry about current hospitalization and not being able to eat. She is quite insistent that she be allowed to eat solid foods even though she vomited directly after eating solids yesterday. Angry she had her blood drawn \"every 2 hours all night so didn't get any sleep\". (had blood drawn 8 pm and in AM). Angry that clear liquids \"don't have any taste\" and that full liquid diet also \"does not have any taste\".  Talked over plan of care regarding her diet and reasons why she is currently on clear liquid several times. She remains frustrated on angry but agrees to talk with Dr. Vazquez about plan regarding diet.     -Data reviewed today: I reviewed all new labs and imaging results over the last 24 hours.     Physical Exam   Temp: 98.4  F (36.9  C) Temp src: Tympanic BP: 175/89   Heart Rate: 86 Resp: 16 SpO2: 98 % O2 Device: None (Room air)    Vitals:    07/21/18 1110   Weight: 75 kg (165 lb 5.5 oz)     Vital Signs with Ranges  Temp:  [97.2  F (36.2  C)-98.4  F (36.9  C)] 98.4  F (36.9  C)  Heart Rate:  [60-86] 86  Resp:  [16] 16  BP: ()/(64-89) 175/89  SpO2:  [97 %-98 %] 98 %  I/O last 3 completed shifts:  In: 1789 [P.O.:1080; I.V.:709]  Out: -     Peripheral IV 07/20/18 Left Upper forearm (Active)   Site Assessment WDL 7/22/2018  9:52 AM   Line Status Infusing 7/22/2018  9:52 AM   Phlebitis Scale 0-->no symptoms 7/22/2018  9:52 AM   Infiltration Scale 0 7/22/2018  9:52 AM   Infiltration Site Treatment Method  None 7/22/2018  9:52 AM   Extravasation? No 7/22/2018  9:52 AM   Dressing Intervention New dressing  7/20/2018  1:30 PM   Number of days:2     Line/device assessment(s) completed for medical necessity    Constitutional: Awake,alert, acute emotional distress  Respiratory: Clear bilaterally, no wheezes,crackles, rhonchi.  Cardiovascular: HRR, no murmurs, rubs,thrills.   GI: Soft,nontender, bowel sounds positive.   Skin/Integumen: No open areas, rashes or unusual " bruising.        Medications     lactated ringers 100 mL/hr at 07/22/18 1038       cyanocobalamin  1,000 mcg Subcutaneous Q30 Days     gabapentin (NEURONTIN) capsule 300 mg  300 mg Oral BID     gabapentin (NEURONTIN) tablet 600 mg  600 mg Oral At Bedtime     levothyroxine  200 mcg Oral QAM AC     levothyroxine  75 mcg Oral QAM AC     multivitamin, therapeutic with minerals  1 tablet Oral Daily     pantoprazole (PROTONIX) IV  40 mg Intravenous BID     sertraline  100 mg Oral Daily     sodium chloride (PF)  3 mL Intracatheter Q8H     tiZANidine  2 mg Oral At Bedtime     topiramate (TOPAMAX) tablet 25 mg  25 mg Oral Daily       Data     Recent Labs  Lab 07/22/18  0558 07/21/18 2013 07/21/18  1350 07/21/18  0601 07/20/18  2225 07/20/18  1849 07/20/18  1322   WBC 6.0  --   --   --   --   --  7.5   HGB 8.9* 8.5* 8.4* 8.7* 8.7*  --  6.9*   MCV 70*  --   --   --   --   --  66*     --   --   --   --   --  278     --   --  143  --   --  140   POTASSIUM 4.3  --   --  4.1  --   --  3.9   CHLORIDE 108  --   --  111*  --   --  108   CO2 28  --   --  25  --   --  26   BUN 13  --   --  15  --   --  19   CR 0.64  --   --  0.67  --   --  0.68   ANIONGAP 7  --   --  7  --   --  6   LOKESH 8.2*  --   --  8.0*  --   --  8.4*   GLC 78  --   --  83  --   --  89   ALBUMIN  --   --   --  2.6*  --   --  3.1*   PROTTOTAL  --   --   --  5.6*  --   --  6.4*   BILITOTAL  --   --   --  0.4  --   --  0.3   ALKPHOS  --   --   --  110  --   --  84   ALT  --   --   --  35  --   --  23   AST  --   --   --  40  --   --  23   LIPASE  --   --   --   --   --   --  189   TROPI  --   --   --   --  <0.015 <0.015  --        No results found for this or any previous visit (from the past 24 hour(s)).

## 2018-07-22 NOTE — PLAN OF CARE
Patient has had no reports of nausea, vomiting or dizziness, patient has been up independently in her room, patint is voiding with no difficulty, and has made her needs known.

## 2018-07-22 NOTE — PLAN OF CARE
Pt reports that nausea has subsided somewhat, cool wash rag still in place and requested saltine crackers for stomach upset. IV infusing LR@ 100 ml/hr.   Face to face report given with opportunity to observe patient.    Report given to NICK Chang   7/21/2018  11:35 PM

## 2018-07-23 ENCOUNTER — APPOINTMENT (OUTPATIENT)
Dept: GENERAL RADIOLOGY | Facility: HOSPITAL | Age: 48
DRG: 382 | End: 2018-07-23
Attending: SURGERY
Payer: MEDICARE

## 2018-07-23 ENCOUNTER — APPOINTMENT (OUTPATIENT)
Dept: GENERAL RADIOLOGY | Facility: HOSPITAL | Age: 48
DRG: 382 | End: 2018-07-23
Attending: NURSE PRACTITIONER
Payer: MEDICARE

## 2018-07-23 LAB
ALBUMIN SERPL-MCNC: 2.6 G/DL (ref 3.4–5)
ALP SERPL-CCNC: 108 U/L (ref 40–150)
ALT SERPL W P-5'-P-CCNC: 37 U/L (ref 0–50)
ANION GAP SERPL CALCULATED.3IONS-SCNC: 9 MMOL/L (ref 3–14)
AST SERPL W P-5'-P-CCNC: 46 U/L (ref 0–45)
BASOPHILS # BLD AUTO: 0 10E9/L (ref 0–0.2)
BASOPHILS NFR BLD AUTO: 0 %
BILIRUB SERPL-MCNC: 0.2 MG/DL (ref 0.2–1.3)
BUN SERPL-MCNC: 13 MG/DL (ref 7–30)
CALCIUM SERPL-MCNC: 8.3 MG/DL (ref 8.5–10.1)
CHLORIDE SERPL-SCNC: 108 MMOL/L (ref 94–109)
CO2 SERPL-SCNC: 26 MMOL/L (ref 20–32)
CREAT SERPL-MCNC: 0.81 MG/DL (ref 0.52–1.04)
DIFFERENTIAL METHOD BLD: ABNORMAL
EOSINOPHIL # BLD AUTO: 0 10E9/L (ref 0–0.7)
EOSINOPHIL NFR BLD AUTO: 0 %
ERYTHROCYTE [DISTWIDTH] IN BLOOD BY AUTOMATED COUNT: 20.1 % (ref 10–15)
GFR SERPL CREATININE-BSD FRML MDRD: 75 ML/MIN/1.7M2
GLUCOSE SERPL-MCNC: 83 MG/DL (ref 70–99)
HCT VFR BLD AUTO: 29.9 % (ref 35–47)
HGB BLD-MCNC: 8.9 G/DL (ref 11.7–15.7)
IMM GRANULOCYTES # BLD: 0 10E9/L (ref 0–0.4)
IMM GRANULOCYTES NFR BLD: 0.4 %
LYMPHOCYTES # BLD AUTO: 1.5 10E9/L (ref 0.8–5.3)
LYMPHOCYTES NFR BLD AUTO: 28.2 %
MCH RBC QN AUTO: 20.6 PG (ref 26.5–33)
MCHC RBC AUTO-ENTMCNC: 29.8 G/DL (ref 31.5–36.5)
MCV RBC AUTO: 69 FL (ref 78–100)
MONOCYTES # BLD AUTO: 0.5 10E9/L (ref 0–1.3)
MONOCYTES NFR BLD AUTO: 9.7 %
NEUTROPHILS # BLD AUTO: 3.4 10E9/L (ref 1.6–8.3)
NEUTROPHILS NFR BLD AUTO: 61.7 %
NRBC # BLD AUTO: 0 10*3/UL
NRBC BLD AUTO-RTO: 0 /100
PLATELET # BLD AUTO: 271 10E9/L (ref 150–450)
POTASSIUM SERPL-SCNC: 4.4 MMOL/L (ref 3.4–5.3)
PROT SERPL-MCNC: 5.7 G/DL (ref 6.8–8.8)
RBC # BLD AUTO: 4.32 10E12/L (ref 3.8–5.2)
SODIUM SERPL-SCNC: 143 MMOL/L (ref 133–144)
WBC # BLD AUTO: 5.5 10E9/L (ref 4–11)

## 2018-07-23 PROCEDURE — A9270 NON-COVERED ITEM OR SERVICE: HCPCS | Mod: GY | Performed by: HOSPITALIST

## 2018-07-23 PROCEDURE — 25000132 ZZH RX MED GY IP 250 OP 250 PS 637: Mod: GY | Performed by: SURGERY

## 2018-07-23 PROCEDURE — 80053 COMPREHEN METABOLIC PANEL: CPT | Performed by: NURSE PRACTITIONER

## 2018-07-23 PROCEDURE — 25000125 ZZHC RX 250: Performed by: NURSE PRACTITIONER

## 2018-07-23 PROCEDURE — 25000132 ZZH RX MED GY IP 250 OP 250 PS 637: Mod: GY | Performed by: NURSE PRACTITIONER

## 2018-07-23 PROCEDURE — 12000000 ZZH R&B MED SURG/OB

## 2018-07-23 PROCEDURE — 25000132 ZZH RX MED GY IP 250 OP 250 PS 637: Mod: GY | Performed by: HOSPITALIST

## 2018-07-23 PROCEDURE — 36415 COLL VENOUS BLD VENIPUNCTURE: CPT | Performed by: NURSE PRACTITIONER

## 2018-07-23 PROCEDURE — A9270 NON-COVERED ITEM OR SERVICE: HCPCS | Mod: GY | Performed by: NURSE PRACTITIONER

## 2018-07-23 PROCEDURE — 25000125 ZZHC RX 250: Performed by: HOSPITALIST

## 2018-07-23 PROCEDURE — 99232 SBSQ HOSP IP/OBS MODERATE 35: CPT | Performed by: NURSE PRACTITIONER

## 2018-07-23 PROCEDURE — 85025 COMPLETE CBC W/AUTO DIFF WBC: CPT | Performed by: NURSE PRACTITIONER

## 2018-07-23 PROCEDURE — 74018 RADEX ABDOMEN 1 VIEW: CPT | Mod: TC

## 2018-07-23 PROCEDURE — 25000128 H RX IP 250 OP 636: Performed by: NURSE PRACTITIONER

## 2018-07-23 PROCEDURE — 74283 THER NMA RDCTJ INTUS/OBSTRCJ: CPT | Mod: TC

## 2018-07-23 PROCEDURE — 25500064 ZZH RX 255 OP 636: Performed by: RADIOLOGY

## 2018-07-23 RX ORDER — IOPAMIDOL 510 MG/ML
1350 INJECTION, SOLUTION INTRAVASCULAR ONCE
Status: COMPLETED | OUTPATIENT
Start: 2018-07-23 | End: 2018-07-23

## 2018-07-23 RX ORDER — SUCRALFATE ORAL 1 G/10ML
1 SUSPENSION ORAL
Status: DISCONTINUED | OUTPATIENT
Start: 2018-07-23 | End: 2018-07-24 | Stop reason: HOSPADM

## 2018-07-23 RX ADMIN — GABAPENTIN 300 MG: 300 CAPSULE ORAL at 10:07

## 2018-07-23 RX ADMIN — HYDROCODONE BITARTRATE AND ACETAMINOPHEN 1 TABLET: 5; 325 TABLET ORAL at 04:01

## 2018-07-23 RX ADMIN — SODIUM PHOSPHATE, DIBASIC AND SODIUM PHOSPHATE, MONOBASIC 1 ENEMA: 7; 19 ENEMA RECTAL at 10:59

## 2018-07-23 RX ADMIN — IOPAMIDOL 1350 ML: 510 INJECTION, SOLUTION INTRAVASCULAR at 14:14

## 2018-07-23 RX ADMIN — SODIUM CHLORIDE, POTASSIUM CHLORIDE, SODIUM LACTATE AND CALCIUM CHLORIDE: 600; 310; 30; 20 INJECTION, SOLUTION INTRAVENOUS at 05:49

## 2018-07-23 RX ADMIN — PANTOPRAZOLE SODIUM 40 MG: 40 INJECTION, POWDER, FOR SOLUTION INTRAVENOUS at 10:02

## 2018-07-23 RX ADMIN — SUCRALFATE 1 G: 1 SUSPENSION ORAL at 15:32

## 2018-07-23 RX ADMIN — DIATRIZOATE MEGLUMINE AND DIATRIZOATE SODIUM 240 ML: 660; 100 SOLUTION ORAL; RECTAL at 14:15

## 2018-07-23 RX ADMIN — MULTIPLE VITAMINS W/ MINERALS TAB 1 TABLET: TAB at 10:06

## 2018-07-23 RX ADMIN — LEVOTHYROXINE SODIUM 200 MCG: 100 TABLET ORAL at 05:47

## 2018-07-23 RX ADMIN — PANTOPRAZOLE SODIUM 40 MG: 40 INJECTION, POWDER, FOR SOLUTION INTRAVENOUS at 21:53

## 2018-07-23 RX ADMIN — ONDANSETRON 4 MG: 4 TABLET, ORALLY DISINTEGRATING ORAL at 10:01

## 2018-07-23 RX ADMIN — TOPIRAMATE 25 MG: 25 TABLET, FILM COATED ORAL at 10:06

## 2018-07-23 RX ADMIN — DOCUSATE SODIUM 50 MG: 50 CAPSULE, LIQUID FILLED ORAL at 10:06

## 2018-07-23 RX ADMIN — GABAPENTIN 300 MG: 300 CAPSULE ORAL at 15:08

## 2018-07-23 RX ADMIN — TIZANIDINE 2 MG: 2 TABLET ORAL at 21:56

## 2018-07-23 RX ADMIN — SODIUM CHLORIDE, POTASSIUM CHLORIDE, SODIUM LACTATE AND CALCIUM CHLORIDE: 600; 310; 30; 20 INJECTION, SOLUTION INTRAVENOUS at 16:15

## 2018-07-23 RX ADMIN — SUCRALFATE 1 G: 1 SUSPENSION ORAL at 08:52

## 2018-07-23 RX ADMIN — SUCRALFATE 1 G: 1 SUSPENSION ORAL at 21:56

## 2018-07-23 RX ADMIN — LEVOTHYROXINE SODIUM 75 MCG: 75 TABLET ORAL at 05:47

## 2018-07-23 RX ADMIN — SERTRALINE HYDROCHLORIDE 100 MG: 100 TABLET, FILM COATED ORAL at 10:07

## 2018-07-23 RX ADMIN — GABAPENTIN 600 MG: 600 TABLET, FILM COATED ORAL at 21:56

## 2018-07-23 ASSESSMENT — ACTIVITIES OF DAILY LIVING (ADL)
ADLS_ACUITY_SCORE: 11

## 2018-07-23 ASSESSMENT — PAIN DESCRIPTION - DESCRIPTORS
DESCRIPTORS: CRAMPING
DESCRIPTORS: CRAMPING

## 2018-07-23 NOTE — PLAN OF CARE
Problem: Patient Care Overview  Goal: Plan of Care/Patient Progress Review  - Serial troponins and stress test complete.   - Seen and cleared by consultant if applicable   - Adequate pain control on oral analgesia   - Vital signs normal or at patient baseline   - Safe disposition plan has been identified   - Nurse to notify provider when observation goals have been met and patient is ready for discharge.   Outcome: Improving  Starting to take a few liquids gingerly.  No c/o nausea, but not much appetite.  Given fleets enema prep prior to x-ray with good results.  Ordered some regular food when diet advanced after returning from XR.    Face to face report given with opportunity to observe patient.    Report given to Rodri WINSTON   7/23/2018  3:40 PM

## 2018-07-23 NOTE — PROGRESS NOTES
[facility]    Hospitalist Progress Note    Date of Service (when I saw the patient): 07/23/2018    Assessment & Plan   Constantino Warren is a 47 year old female who was admitted on 7/20/2018.       Anastomotic ulcer S/P gastric bypass: S/P EGD with Dr. Vazquez. She does have ulcer at anastomosis, however no active bleeding. Also some friable tissue noted so both areas were biopsied. Hemoglobin has been stable. Recheck in AM. Continue BID PPI, however will add carafate for symptomatic control before meals to see if that helps. Took clear liquids over the last 24 hors, full liquids caused increased nausea and pain again though no vomiting. Continue supplements, add clear ensure.       Abdominal pain, epigastric: Worse after eating, feels similar to previous anastomotic ulcer 3 years ago. Exacerbated from vomiting certainly. Will continue with antiemetics, pain medications, heat/ice. Overall improving.       Hypochromic microcytic anemia: Chronic since at least 2015, however she has not been as low as 6.7 in the past. She stated she does take iron at home and is also on b12 injections monthly. Suspect poor compliance. Venofer infusion as well as b12 injection given 7/22. May need some outpatient infusions to tank up before attempting oral replacement again.        Chest pain: Resolved early AM of arrival, she states this was more a radiation from upper abdominal pain than true chest pain. Troponins negative x3, no associated symptoms despite anemia: no dyspnea, diaphoresis, jaw pain etc. DC tele.       # Pain Assessment:  Current Pain Score 7/23/2018   Patient currently in pain? sleeping: patient not able to self report   Pain score (0-10) -   Pain location -   Pain descriptors -   - Constantino is experiencing pain due to epigastric pain. Pain management was discussed and the plan was created in a collaborative fashion.  Constantino's response to the current recommendations: engaged  - Please see the plan for pain  management as documented above    DVT Prophylaxis: Pneumatic Compression Devices  Code Status: Full Code    Disposition: Expected discharge in 1-2 days once establish hemoglobin stable, eating well without vomiting.    Emma Gonzalez, CNP    Interval History   Calm, cooperative. States slept better last night. Still having upper epigastric pan and intermittent nausea though overall both a bit improved.     -Data reviewed today: I reviewed all new labs and imaging results over the last 24 hours.     Physical Exam   Temp: 98.7  F (37.1  C) Temp src: Temporal BP: 118/60   Heart Rate: 64 Resp: 16 SpO2: 96 % O2 Device: None (Room air) Oxygen Delivery: 2 LPM  Vitals:    07/21/18 1110   Weight: 75 kg (165 lb 5.5 oz)     Vital Signs with Ranges  Temp:  [97.8  F (36.6  C)-98.7  F (37.1  C)] 98.7  F (37.1  C)  Heart Rate:  [60-86] 64  Resp:  [16] 16  BP: ()/(56-89) 118/60  SpO2:  [95 %-98 %] 96 %  I/O last 3 completed shifts:  In: 3736 [P.O.:1680; I.V.:2056]  Out: -     Peripheral IV 07/23/18 Right Lower forearm (Active)   Site Assessment WDL 7/23/2018  7:15 AM   Line Status Infusing;Checked every 1-2 hour 7/23/2018  7:15 AM   Phlebitis Scale 0-->no symptoms 7/23/2018  7:15 AM   Infiltration Scale 0 7/23/2018  7:15 AM   Number of days:0     Line/device assessment(s) completed for medical necessity    Constitutional: Awake,alert, acute emotional distress  Respiratory: Clear bilaterally, no wheezes,crackles, rhonchi.  Cardiovascular: HRR, no murmurs, rubs,thrills.   GI: Soft,nontender, bowel sounds positive.   Skin/Integumen: No open areas, rashes or unusual bruising.        Medications     lactated ringers 100 mL/hr at 07/23/18 0549       cyanocobalamin  1,000 mcg Subcutaneous Q30 Days     docusate sodium  50 mg Oral BID     gabapentin (NEURONTIN) capsule 300 mg  300 mg Oral BID     gabapentin (NEURONTIN) tablet 600 mg  600 mg Oral At Bedtime     levothyroxine  200 mcg Oral QAM AC     levothyroxine  75 mcg Oral QAM  AC     multivitamin, therapeutic with minerals  1 tablet Oral Daily     pantoprazole (PROTONIX) IV  40 mg Intravenous BID     sertraline  100 mg Oral Daily     sodium chloride (PF)  3 mL Intracatheter Q8H     sucralfate  1 g Oral 4x Daily AC & HS     tiZANidine  2 mg Oral At Bedtime     topiramate (TOPAMAX) tablet 25 mg  25 mg Oral Daily       Data     Recent Labs  Lab 07/23/18  0535 07/22/18  0558 07/21/18 2013 07/21/18  0601 07/20/18  2225 07/20/18  1849 07/20/18  1322   WBC 5.5 6.0  --   --   --   --   --  7.5   HGB 8.9* 8.9* 8.5*  < > 8.7* 8.7*  --  6.9*   MCV 69* 70*  --   --   --   --   --  66*    263  --   --   --   --   --  278    143  --   --  143  --   --  140   POTASSIUM 4.4 4.3  --   --  4.1  --   --  3.9   CHLORIDE 108 108  --   --  111*  --   --  108   CO2 26 28  --   --  25  --   --  26   BUN 13 13  --   --  15  --   --  19   CR 0.81 0.64  --   --  0.67  --   --  0.68   ANIONGAP 9 7  --   --  7  --   --  6   LOKESH 8.3* 8.2*  --   --  8.0*  --   --  8.4*   GLC 83 78  --   --  83  --   --  89   ALBUMIN 2.6*  --   --   --  2.6*  --   --  3.1*   PROTTOTAL 5.7*  --   --   --  5.6*  --   --  6.4*   BILITOTAL 0.2  --   --   --  0.4  --   --  0.3   ALKPHOS 108  --   --   --  110  --   --  84   ALT 37  --   --   --  35  --   --  23   AST 46*  --   --   --  40  --   --  23   LIPASE  --   --   --   --   --   --   --  189   TROPI  --   --   --   --   --  <0.015 <0.015  --    < > = values in this interval not displayed.    No results found for this or any previous visit (from the past 24 hour(s)).

## 2018-07-23 NOTE — PLAN OF CARE
"Problem: Patient Care Overview  Goal: Plan of Care/Patient Progress Review  - Serial troponins and stress test complete.   - Seen and cleared by consultant if applicable   - Adequate pain control on oral analgesia   - Vital signs normal or at patient baseline   - Safe disposition plan has been identified   - Nurse to notify provider when observation goals have been met and patient is ready for discharge.   Outcome: Improving  Vitals: VSS. BP 92/56  Temp 97.8  F (36.6  C) (Temporal)  Resp 16  Ht 1.676 m (5' 5.98\")  Wt 75 kg (165 lb 5.5 oz)  SpO2 95%  BMI 26.7 kg/m2     Pain: Pain rated 3 to 6/10 to ABD. Treated with 1 norco. Pt slept well after administration.     Orientation: A&O.     Cardiac: Reg     Lungs: Clear     ABD: Bowels active. Denied nausea this shift. On full liquid diet, but has been mainly eating clears. Tolerating ok.      Urinating: WNL     Skin: Dry     IV fluids: LR at 100.     Antibiotics: None.     Mobility: Independent in room, steady on feet.     Safety: Call light in reach. Rounding done.     Comment: Slept well throughout night.     Face to face report given with opportunity to observe patient.    Report given to Maria Esther Bejarano   7/23/2018  7:21 AM          "

## 2018-07-23 NOTE — PLAN OF CARE
Problem: Gastrointestinal Bleeding (Adult)  Goal: Signs and Symptoms of Listed Potential Problems Will be Absent, Minimized or Managed (Gastrointestinal Bleeding)  Signs and symptoms of listed potential problems will be absent, minimized or managed by discharge/transition of care (reference Gastrointestinal Bleeding (Adult) CPG).   VSS. Alert, oriented, and ambulating independently in room. States that nausea has resolved for the most part. One norco given for pain. Tolerated clear liq for dinner; bowl of broth and popsicle.   Face to face report given with opportunity to observe patient. Encouraged pt several times to ambulate in hallway with no avail.     Report given to NICK Chang   7/22/2018  11:06 PM

## 2018-07-23 NOTE — PROGRESS NOTES
[facility]    Spiritual Health Progress Note    Date of Service (when I saw the patient): 07/23/2018     Assessment & Plan   Constantino Warren is a 47 year old female who was admitted on 7/20/2018.  Introduced the patient as an initial visit to Spiritual Health Services and to see if I could connect the person into their identified clarence community.  Constantino said that she belongs to [employer] in [place].  She was happy to have prayer.  We had a nice visit and she was anxious to go home and be with her family as soon as she was doing better.    Rev. Ezio Tucker  Volunteer

## 2018-07-23 NOTE — PLAN OF CARE
Face to face report given with opportunity to observe patient.    Report given to Olya Louie   7/23/2018  10:19 AM

## 2018-07-23 NOTE — PROGRESS NOTES
Continues to complain of some pain with oral fluids, able to take clear fluids and some Ensure without vomiting, no BM  Afebrile, Vitals stable  Abdomen - soft, non-tender  Lab - Hgb 8.9, WBC 5.5, Lytes OK, Albumin 2.6, Total Protein 5.7  A - seems a bit better       No further bleeding       Nutrition a bit of a concern with low albumin  P - try Sulcrate        Continue with full fluids        Home when able to tolerate full liquids    1046 Hrs  Flat plate of abdomin is suggestive if floppy cecum, not seen on CT  Will do gastrografin enema to rule out.       1439 Hrs  Gastrografin Enema looks good, no obstruction, reflux into terminal ileum  Tolerating full liquids better, try solid food

## 2018-07-24 VITALS
SYSTOLIC BLOOD PRESSURE: 129 MMHG | WEIGHT: 165.34 LBS | DIASTOLIC BLOOD PRESSURE: 71 MMHG | BODY MASS INDEX: 26.57 KG/M2 | HEIGHT: 66 IN | TEMPERATURE: 98.2 F | OXYGEN SATURATION: 98 % | RESPIRATION RATE: 18 BRPM

## 2018-07-24 LAB
ANION GAP SERPL CALCULATED.3IONS-SCNC: 8 MMOL/L (ref 3–14)
BASOPHILS # BLD AUTO: 0 10E9/L (ref 0–0.2)
BASOPHILS NFR BLD AUTO: 0 %
BUN SERPL-MCNC: 16 MG/DL (ref 7–30)
CALCIUM SERPL-MCNC: 8.5 MG/DL (ref 8.5–10.1)
CHLORIDE SERPL-SCNC: 110 MMOL/L (ref 94–109)
CO2 SERPL-SCNC: 26 MMOL/L (ref 20–32)
COPATH REPORT: NORMAL
CREAT SERPL-MCNC: 0.71 MG/DL (ref 0.52–1.04)
DIFFERENTIAL METHOD BLD: ABNORMAL
EOSINOPHIL # BLD AUTO: 0 10E9/L (ref 0–0.7)
EOSINOPHIL NFR BLD AUTO: 0 %
ERYTHROCYTE [DISTWIDTH] IN BLOOD BY AUTOMATED COUNT: 21 % (ref 10–15)
GFR SERPL CREATININE-BSD FRML MDRD: 88 ML/MIN/1.7M2
GLUCOSE SERPL-MCNC: 80 MG/DL (ref 70–99)
HCT VFR BLD AUTO: 30.6 % (ref 35–47)
HGB BLD-MCNC: 9.1 G/DL (ref 11.7–15.7)
IMM GRANULOCYTES # BLD: 0 10E9/L (ref 0–0.4)
IMM GRANULOCYTES NFR BLD: 0.3 %
LYMPHOCYTES # BLD AUTO: 2.2 10E9/L (ref 0.8–5.3)
LYMPHOCYTES NFR BLD AUTO: 29.4 %
MCH RBC QN AUTO: 20.6 PG (ref 26.5–33)
MCHC RBC AUTO-ENTMCNC: 29.7 G/DL (ref 31.5–36.5)
MCV RBC AUTO: 69 FL (ref 78–100)
MONOCYTES # BLD AUTO: 0.7 10E9/L (ref 0–1.3)
MONOCYTES NFR BLD AUTO: 9.6 %
NEUTROPHILS # BLD AUTO: 4.6 10E9/L (ref 1.6–8.3)
NEUTROPHILS NFR BLD AUTO: 60.7 %
NRBC # BLD AUTO: 0 10*3/UL
NRBC BLD AUTO-RTO: 0 /100
PLATELET # BLD AUTO: 283 10E9/L (ref 150–450)
POTASSIUM SERPL-SCNC: 4.8 MMOL/L (ref 3.4–5.3)
RBC # BLD AUTO: 4.41 10E12/L (ref 3.8–5.2)
SODIUM SERPL-SCNC: 144 MMOL/L (ref 133–144)
WBC # BLD AUTO: 7.5 10E9/L (ref 4–11)

## 2018-07-24 PROCEDURE — 25000132 ZZH RX MED GY IP 250 OP 250 PS 637: Mod: GY | Performed by: HOSPITALIST

## 2018-07-24 PROCEDURE — 36415 COLL VENOUS BLD VENIPUNCTURE: CPT | Performed by: NURSE PRACTITIONER

## 2018-07-24 PROCEDURE — A9270 NON-COVERED ITEM OR SERVICE: HCPCS | Mod: GY | Performed by: NURSE PRACTITIONER

## 2018-07-24 PROCEDURE — 99239 HOSP IP/OBS DSCHRG MGMT >30: CPT | Performed by: NURSE PRACTITIONER

## 2018-07-24 PROCEDURE — 85025 COMPLETE CBC W/AUTO DIFF WBC: CPT | Performed by: NURSE PRACTITIONER

## 2018-07-24 PROCEDURE — 25000128 H RX IP 250 OP 636: Performed by: NURSE PRACTITIONER

## 2018-07-24 PROCEDURE — 25000125 ZZHC RX 250: Performed by: NURSE PRACTITIONER

## 2018-07-24 PROCEDURE — A9270 NON-COVERED ITEM OR SERVICE: HCPCS | Mod: GY | Performed by: HOSPITALIST

## 2018-07-24 PROCEDURE — 25000132 ZZH RX MED GY IP 250 OP 250 PS 637: Mod: GY | Performed by: NURSE PRACTITIONER

## 2018-07-24 PROCEDURE — 80048 BASIC METABOLIC PNL TOTAL CA: CPT | Performed by: NURSE PRACTITIONER

## 2018-07-24 RX ORDER — FERROUS SULFATE 325(65) MG
325 TABLET ORAL 2 TIMES DAILY
Qty: 60 TABLET | Refills: 2 | Status: SHIPPED | OUTPATIENT
Start: 2018-07-24 | End: 2020-05-29 | Stop reason: ALTCHOICE

## 2018-07-24 RX ORDER — ASPIRIN 81 MG
100 TABLET, DELAYED RELEASE (ENTERIC COATED) ORAL 2 TIMES DAILY PRN
Qty: 60 TABLET | Refills: 1 | Status: ON HOLD | OUTPATIENT
Start: 2018-07-24 | End: 2019-11-20

## 2018-07-24 RX ORDER — TIZANIDINE HYDROCHLORIDE 2 MG/1
2 CAPSULE, GELATIN COATED ORAL AT BEDTIME
Qty: 15 CAPSULE | Refills: 0 | Status: ON HOLD | OUTPATIENT
Start: 2018-07-24 | End: 2019-02-02

## 2018-07-24 RX ORDER — SUCRALFATE 1 G/1
1 TABLET ORAL 4 TIMES DAILY
Qty: 120 TABLET | Refills: 0 | Status: SHIPPED | OUTPATIENT
Start: 2018-07-24 | End: 2019-02-01

## 2018-07-24 RX ORDER — SUCRALFATE ORAL 1 G/10ML
1 SUSPENSION ORAL
Qty: 1200 ML | Refills: 0 | Status: SHIPPED | OUTPATIENT
Start: 2018-07-24 | End: 2018-07-24

## 2018-07-24 RX ADMIN — LEVOTHYROXINE SODIUM 200 MCG: 100 TABLET ORAL at 05:44

## 2018-07-24 RX ADMIN — MULTIPLE VITAMINS W/ MINERALS TAB 1 TABLET: TAB at 09:13

## 2018-07-24 RX ADMIN — SERTRALINE HYDROCHLORIDE 100 MG: 100 TABLET, FILM COATED ORAL at 09:12

## 2018-07-24 RX ADMIN — IRON SUCROSE 200 MG: 20 INJECTION, SOLUTION INTRAVENOUS at 09:29

## 2018-07-24 RX ADMIN — PANTOPRAZOLE SODIUM 40 MG: 40 INJECTION, POWDER, FOR SOLUTION INTRAVENOUS at 09:11

## 2018-07-24 RX ADMIN — SODIUM CHLORIDE, POTASSIUM CHLORIDE, SODIUM LACTATE AND CALCIUM CHLORIDE: 600; 310; 30; 20 INJECTION, SOLUTION INTRAVENOUS at 00:21

## 2018-07-24 RX ADMIN — LEVOTHYROXINE SODIUM 75 MCG: 75 TABLET ORAL at 05:44

## 2018-07-24 RX ADMIN — GABAPENTIN 300 MG: 300 CAPSULE ORAL at 09:13

## 2018-07-24 RX ADMIN — DOCUSATE SODIUM 50 MG: 50 CAPSULE, LIQUID FILLED ORAL at 09:13

## 2018-07-24 RX ADMIN — SUCRALFATE 1 G: 1 SUSPENSION ORAL at 09:12

## 2018-07-24 RX ADMIN — TOPIRAMATE 25 MG: 25 TABLET, FILM COATED ORAL at 09:13

## 2018-07-24 ASSESSMENT — ACTIVITIES OF DAILY LIVING (ADL)
ADLS_ACUITY_SCORE: 11

## 2018-07-24 NOTE — PLAN OF CARE
Problem: Patient Care Overview  Goal: Plan of Care/Patient Progress Review  - Serial troponins and stress test complete.   - Seen and cleared by consultant if applicable   - Adequate pain control on oral analgesia   - Vital signs normal or at patient baseline   - Safe disposition plan has been identified   - Nurse to notify provider when observation goals have been met and patient is ready for discharge.   Outcome: Improving  A&O. VSS/AF. Denies pain/nausea. Advanced to regular diet; tolerates with no c/o pain or nausea, no emesis. Continues to have stools r/t gastrografin enema. Up independently in room.     Problem: Gastrointestinal Bleeding (Adult)  Goal: Signs and Symptoms of Listed Potential Problems Will be Absent, Minimized or Managed (Gastrointestinal Bleeding)  Signs and symptoms of listed potential problems will be absent, minimized or managed by discharge/transition of care (reference Gastrointestinal Bleeding (Adult) CPG).   Outcome: Improving   07/23/18 2206   Gastrointestinal Bleeding   Problems Assessed (GI Bleeding) all       Problem: Anemia (Adult)  Goal: Identify Related Risk Factors and Signs and Symptoms  Related risk factors and signs and symptoms are identified upon initiation of Human Response Clinical Practice Guideline (CPG).   Outcome: Improving   07/23/18 2206   Anemia   Related Risk Factors (Anemia) bleeding;chronic illness     Goal: Symptom Improvement  Patient will demonstrate the desired outcomes by discharge/transition of care.   Outcome: Improving   07/23/18 2206   Anemia (Adult)   Symptom Improvement making progress toward outcome       Face to face report given with opportunity to observe patient.    Report given to Charlene Castellanos   7/23/2018  11:12 PM

## 2018-07-24 NOTE — PLAN OF CARE
Patient discharged at 11:20 AM via wheel chair accompanied by staff. Prescriptions sent to patients preferred pharmacy. All belongings sent with patient.     Discharge instructions reviewed with pt. Listed belongings gathered and returned to patient. yes    Patient discharged to home.   Report called to n/a    Core Measures and Vaccines  Core Measures applicable during stay: No. If yes, state diagnosis: n/a  Pneumonia and Influenza given prior to discharge, if indicated: N/A    Surgical Patient   Surgical Procedures during stay: yes EGD  Did patient receive discharge instruction on wound care and recognition of infection symptoms? Yes    MISC  Follow up appointment made:  Yes  Home and hospital aquired medications returned to patient: N/A  Patient reports pain was well managed at discharge: Yes

## 2018-07-24 NOTE — PROGRESS NOTES
Denies pain, able to eat solid food last evening, no nausea  Afebrile,  Abdomen - soft, non-tender  Lab - Hgb 9.1  A - improved  P - discharge home on Carafate        F/U Dr Alba to make sure Hgb comes up        May need long term IV iron        Pathology not back

## 2018-07-24 NOTE — DISCHARGE INSTRUCTIONS
A hospital follow up appointment has been set up for your with Dr. Alba on July 31st at 1:30. If you cannot make this appointment please call the clinic at [phone].

## 2018-07-24 NOTE — PLAN OF CARE
Problem: Patient Care Overview  Goal: Plan of Care/Patient Progress Review  - Serial troponins and stress test complete.   - Seen and cleared by consultant if applicable   - Adequate pain control on oral analgesia   - Vital signs normal or at patient baseline   - Safe disposition plan has been identified   - Nurse to notify provider when observation goals have been met and patient is ready for discharge.   Outcome: Completed Date Met: 07/24/18  VS, afebrile, denies pain. Bowels active x 4 quads-passing flatus. Tolerating regular diet--denies nausea.   Dose Venofer  before discharge.

## 2018-07-24 NOTE — PHARMACY-MEDICATION TEACHING
Pharmacist provided medication teaching for discharge with a focus on new medications/dose changes.  The discharge medication list was reviewed with the patient/family and the following points were discussed, as applicable: description, purpose, dose/strength, common side effects, food/medications to avoid and action to be taken if dose is missed.  (docusate, iron, and sucralfate)    Wendi Tucker, PharmD  July 24, 2018

## 2018-07-24 NOTE — DISCHARGE SUMMARY
[facility]    Discharge Summary  Hospitalist    Date of Admission:  7/20/2018  Date of Discharge:  7/24/2018  Discharging Provider: Emma Gonzalez CNP  Date of Service (when I saw the patient): 07/24/18    Discharge Diagnoses   Principal Problem:    Anastomotic ulcer S/P gastric bypass  Active Problems:    Abdominal pain, epigastric    Hypochromic microcytic anemia    Chest pain    Anemia        History of Present Illness   From admission:    Hospital Course   Constantino Warren was admitted on 7/20/2018.  The following problems were addressed during her hospitalization:    Anastomotic ulcer S/P gastric bypass: S/P EGD with Dr. Vazquez. She does have ulcer at anastomosis, however no active bleeding. Also some friable tissue noted so both areas were biopsied. Hemoglobin has been stable. Recheck in AM. Continue BID PPI, however added carafate for symptomatic control before meals and that has improved both her abdominal pain and resolved her nausea. Eating regular diet without nausea or pain. Follow-up with PCP with hemoglobin recheck.       Hypochromic microcytic anemia: Chronic since at least 2015, however she has not been as low as 6.7 in the past. She stated she does take iron at home and is also on b12 injections monthly. Suspect poor compliance as well as poor oral absorption. Received Venofer infusion as well as b12 injection given 7/22, repeated venofer on 7/24 prior to discharge. May need some outpatient infusions but will have her follow-up with Dr. Alba.          Chest pain: Resolved early AM of arrival, she states this was more a radiation from upper abdominal pain than true chest pain. Troponins negative x3, no associated symptoms despite anemia: no dyspnea, diaphoresis, jaw pain etc. DC tele.       Emma Gonzalez CNP        Pending Results     Unresulted Labs Ordered in the Past 30 Days of this Admission     Date and Time Order Name Status Description    7/21/2018 1213 Surgical  pathology exam In process           Code Status   Full Code       Primary Care Physician   Ayden Alba      # Discharge Pain Plan:   - Patient currently has NO PAIN and is not being prescribed pain medications on discharge.      Discharge Disposition   Discharged to home  Condition at discharge: Stable    Consultations This Hospital Stay   SURGERY GENERAL IP CONSULT    Time Spent on this Encounter   Emma JANE, personally saw the patient today and spent greater than 30 minutes discharging this patient.    Discharge Orders     Reason for your hospital stay   Stomach ulcer (anastatomatic ulcer)     Follow-up and recommended labs and tests    Follow up with primary care provider, Ayden Alba, within 7 days for hospital follow- up.  The following labs/tests are recommended: cbc.     Activity   Your activity upon discharge: activity as tolerated     When to contact your care team   Call your primary doctor or return to the emergency department if you have any of the following: fever,abdominal pain, dizziness, any other new or worsening symptoms.     Discharge Instructions   Avoid foods with high acid content, caffeine, chocolate, alcohol.     Full Code     Diet   Follow this diet upon discharge: Orders Placed This Encounter     Snacks/Supplements Adult: Boost Plus; With Meals     Regular Diet Adult       Discharge Medications   Current Discharge Medication List      START taking these medications    Details   docusate sodium (COLACE) 100 MG tablet Take 100 mg by mouth 2 times daily as needed for constipation (for constipatoni while on irone)  Qty: 60 tablet, Refills: 1    Associated Diagnoses: Anemia, unspecified type      ferrous sulfate (IRON) 325 (65 Fe) MG tablet Take 1 tablet (325 mg) by mouth 2 times daily  Qty: 60 tablet, Refills: 2    Associated Diagnoses: Anemia, unspecified type      sucralfate (CARAFATE) 1 GM/10ML suspension Take 10 mLs (1 g) by mouth 4 times daily (before meals and  nightly)  Qty: 1200 mL, Refills: 0    Associated Diagnoses: Anemia, unspecified type; Anastomotic ulcer S/P gastric bypass; Hypochromic microcytic anemia         CONTINUE these medications which have CHANGED    Details   TiZANidine HCl (ZANAFLEX) 2 MG CAPS Take 2 mg by mouth At Bedtime for 15 days  Qty: 15 capsule, Refills: 0    Associated Diagnoses: Concussion syndrome         CONTINUE these medications which have NOT CHANGED    Details   acetaminophen (TYLENOL) 500 MG tablet Take 1,000 mg by mouth every 6 hours as needed Max acetaminophen dose: 4000 mg in 24 hours      docusate sodium (STOOL SOFTENER) 100 MG capsule Take 100 mg by mouth 2 times daily      !! GABAPENTIN PO Take 300 mg by mouth 2 times daily      !! GABAPENTIN PO Take 600 mg by mouth At Bedtime      HYDROcodone-acetaminophen (NORCO) 5-325 MG per tablet Take 1 tablet by mouth every 6 hours as needed for moderate to severe pain      !! levothyroxine (SYNTHROID/LEVOTHROID) 200 MCG tablet Take 200 mcg by mouth every morning (before breakfast) Take in addition to 75 mcg for a total of 275 mcg daily      !! levothyroxine (SYNTHROID/LEVOTHROID) 75 MCG tablet Take 75 mcg by mouth every morning (before breakfast) Take in addition to 200 mcg for a total of 275 mcg per day      MELATONIN PO Take 7.5 mg by mouth nightly as needed      multivitamin, therapeutic with minerals (MULTI-VITAMIN) TABS Take 1 tablet by mouth daily      omeprazole (PRILOSEC) 40 MG capsule Take 40 mg by mouth 2 times daily (before meals)      sertraline (ZOLOFT) 100 MG tablet Take 100 mg by mouth daily      Topiramate (TOPAMAX PO) Take 25 mg by mouth      cyanocobalamin (VITAMIN B12) 1000 MCG/ML injection Inject 1,000 mcg into the muscle Every 4 weeks      EPINEPHrine (EPIPEN/ADRENACLICK/OR ANY BX GENERIC EQUIV) 0.3 MG/0.3ML injection 2-pack       SUMAtriptan (IMITREX) 100 MG tablet Take 100 mg by mouth       !! - Potential duplicate medications found. Please discuss with provider.         Allergies   Allergies   Allergen Reactions     Aspartame      Other reaction(s): Other - Describe In Comment Field, Seizures  convulsions     Bee Venom Anaphylaxis     Bupropion Anaphylaxis     Throat gets tight and hives. Denies allergic reaction     Food Anaphylaxis     Peppers-anaphylaxis if eaten, if touched hands swell and reddened     Ibuprofen Anaphylaxis     No Clinical Screening - See Comments Anaphylaxis     Peppers, patient reports green peppers close off her throat     Piper Anaphylaxis     Peppers--green, black, red, chili, etc. Derm symptoms, hives;  Strawberries--(cooked) Itching, breathing difficulties, GI symptoms. Mushrooms--GI symptoms, Hives.Spinach (cooked) throat irritation  (Listed on [facility] problem list.)     Fentanyl Swelling     Lips and tongue swelled.  Swelling of lips and tongue     Liquid Adhesive Dermatitis     blisters  Blisters  Silk tape and tegaderm is fine     Oxcarbazepine Other (See Comments)     Throat gets tight and hives. Denies allergic reaction  Throat gets tight and hives-       Prozac [Fluoxetine]      Other reaction(s): *Unknown  uncontrolled muscle spasms     Valproic Acid      Respiratory symptoms ([facility]). Denies allergic reaction     Ziprasidone      Throat gets tight and hives. Denies allergic reaction     Data   Most Recent 3 CBC's:  Recent Labs   Lab Test  07/24/18   0514  07/23/18   0535  07/22/18   0558   WBC  7.5  5.5  6.0   HGB  9.1*  8.9*  8.9*   MCV  69*  69*  70*   PLT  283  271  263      Most Recent 3 BMP's:  Recent Labs   Lab Test  07/24/18   0514  07/23/18   0535  07/22/18   0558   NA  144  143  143   POTASSIUM  4.8  4.4  4.3   CHLORIDE  110*  108  108   CO2  26  26  28   BUN  16  13  13   CR  0.71  0.81  0.64   ANIONGAP  8  9  7   LOKESH  8.5  8.3*  8.2*   GLC  80  83  78     Most Recent 2 LFT's:  Recent Labs   Lab Test  07/23/18   0535  07/21/18   0601   AST  46*  40   ALT  37  35   ALKPHOS  108  110   BILITOTAL   0.2  0.4     Most Recent INR's and Anticoagulation Dosing History:  Anticoagulation Dose History     There is no flowsheet data to display.        Most Recent 3 Troponin's:  Recent Labs   Lab Test  07/20/18   2225  07/20/18   1849  09/27/15   0704   TROPI  <0.015  <0.015  <0.015  The 99th percentile for upper reference range is 0.045 ug/L.  Troponin values in   the range of 0.045 - 0.120 ug/L may be associated with risks of adverse   clinical events.       Most Recent Cholesterol Panel:No lab results found.  Most Recent 6 Bacteria Isolates From Any Culture (See EPIC Reports for Culture Details):No lab results found.  Most Recent TSH, T4 and A1c Labs:  Recent Labs   Lab Test  09/26/15   1805   TSH  6.85*     Results for orders placed or performed during the hospital encounter of 07/20/18   Abd/pelvis CT - no contrast - Stone Protocol    Narrative    Exam:CT ABDOMEN PELVIS W/O CONTRAST    History:  47 years Female right flank pain and history of renal stones    Comparisons: 4/18/2015    Technique: Axial CT imaging of the abdomen and pelvis was performed  without contrast. Coronal and sagittal reconstructions were obtained      Findings:      Lung bases:The lung bases are clear.        Kidneys:No renal or ureteral calculi are present. There is no  hydronephrosis or hydroureter.       Abdomen: Evaluation is limited due to lack of intravenous contrast.  Unenhanced images of the liver spleen pancreas and adrenal glands are  unremarkable. Patient status post cholecystectomy.  There are surgical changes of gastric bypass. Findings are unchanged  from prior study without acute change.  No abnormally distended or thickened bowel are seen. The visualized 3  changes are seen at the expected location of the appendix.    Anterior and posterior lumbar spine fusion with interbody grafting and  posterior pedicle screw and shukri fixation from L3 through S1.       Pelvis:There is no mass or lymphadenopathy. No abnormal  fluid  collections are present. The bladder isdistended with urine.              Impression    Impression: No acute findings. No renal or ureteral calculi are  present. There is no evidence of a ureteral calculus. There is no  hydronephrosis.    The appendix is not visualized. No inflammatory changes are seen at  the expected location of the appendix.    Patient is status post cholecystectomy and gastric bypass surgery.    LULY KNOWLES MD   XR Abdomen 1 View    Narrative    PROCEDURE: XR ABDOMEN 1 VW 7/23/2018 9:12 AM    HISTORY: constipation,;     COMPARISONS: None.    TECHNIQUE: Abdomen    FINDINGS: Gas is seen to the rectum. No unusual amounts of feces are  seen in the colon. The cecum appears to be in the left upper quadrant.  This may be due to a floppy cecum on a mesentery. No bowel obstruction  to suggest cecal volvulus is seen.         Impression    IMPRESSION: Possible floppy cecum on a mesentery with its tip directed  to the left side of the abdomen. No bowel obstruction to suggest cecal  volvulus is seen    ZENA EDWARDS MD   XR Colon Water Soluble    Narrative    PROCEDURE: XR COLON WATER SOLUBLE 7/23/2018 2:07 PM    HISTORY: Rule out cecal vovulus;     COMPARISONS: None.    TECHNIQUE: Water-soluble contrast was refluxing entire colon to the  cecum and terminal ileum.    FINDINGS: The cecum is located in the right lower quadrant. There is  free reflux into the terminal ileum. No volvulus is seen. There is no  bowel obstruction.           Impression    IMPRESSION:   1. No cecal volvulus.  2. No colonic obstruction    ZENA EDWARDS MD

## 2018-07-24 NOTE — PLAN OF CARE
"Problem: Patient Care Overview  Goal: Plan of Care/Patient Progress Review  - Serial troponins and stress test complete.   - Seen and cleared by consultant if applicable   - Adequate pain control on oral analgesia   - Vital signs normal or at patient baseline   - Safe disposition plan has been identified   - Nurse to notify provider when observation goals have been met and patient is ready for discharge.   Outcome: No Change  Vitals: VSS. BP 92/56  Temp 97.8  F (36.6  C) (Temporal)  Resp 16  Ht 1.676 m (5' 5.98\")  Wt 75 kg (165 lb 5.5 oz)  SpO2 95%  BMI 26.7 kg/m2      Pain: Denies      Orientation: A&O.      Cardiac: Reg      Lungs: Clear      ABD: Bowels active. Denied nausea this shift. Eating reg diet, tolerating well.       Urinating: WNL      Skin: Dry      IV fluids: LR at 100.      Antibiotics: None.      Mobility: Independent in room, steady on feet.      Safety: Call light in reach. Rounding done.      Comment: Slept well throughout night.     Face to face report given with opportunity to observe patient.    Report given to Deisi Bejarano   7/24/2018  8:03 AM      "

## 2018-07-25 ENCOUNTER — TELEPHONE (OUTPATIENT)
Dept: CASE MANAGEMENT | Facility: HOSPITAL | Age: 48
End: 2018-07-25

## 2018-07-25 LAB
BACTERIA SPEC CULT: NORMAL
BACTERIA SPEC CULT: NORMAL
E COLI SXT1+2 STL IA: NORMAL
E COLI SXT1+2 STL IA: NORMAL
SPECIMEN SOURCE: NORMAL

## 2018-07-25 NOTE — TELEPHONE ENCOUNTER
Constantino Warren, was discharged to home on 7/24/18   from [facility]. I spoke today with her regarding her discharge.   She indicates she has receive(d) sufficient information upon discharge. Medications were reviewed in full on discharge, including: Medications to be started; medications to be continued from preadmission and any side effects. Prescriptions were received at discharge and were able to be filled. Medications are being taken as prescribed.   She indicates she has  an appointment scheduled for July 31  and has  transportation available. She was  able to confirm her appointment time and has  it written down.  She did not have any questions regarding discharge instructions or condition.  Per their request, the following employee (s) can be recognized for their outstanding services delivered:  Care was excellent.  Suggestions to improve service: Nothing indicated.  She was informed she  may receive a survey in the mail from the hospital. Asked if she  would kindly complete the survey in order for [facility] to know if services fully met patient needs.

## 2019-01-05 ENCOUNTER — APPOINTMENT (OUTPATIENT)
Dept: GENERAL RADIOLOGY | Facility: HOSPITAL | Age: 49
End: 2019-01-05
Payer: MEDICAID

## 2019-01-05 ENCOUNTER — HOSPITAL ENCOUNTER (EMERGENCY)
Facility: HOSPITAL | Age: 49
Discharge: HOME | End: 2019-01-05
Attending: PHYSICIAN ASSISTANT | Admitting: PHYSICIAN ASSISTANT
Payer: MEDICAID

## 2019-01-05 VITALS
WEIGHT: 160 LBS | BODY MASS INDEX: 25.84 KG/M2 | RESPIRATION RATE: 16 BRPM | SYSTOLIC BLOOD PRESSURE: 125 MMHG | DIASTOLIC BLOOD PRESSURE: 81 MMHG | OXYGEN SATURATION: 98 % | TEMPERATURE: 98.2 F

## 2019-01-05 DIAGNOSIS — S13.9XXA NECK SPRAIN, INITIAL ENCOUNTER: ICD-10-CM

## 2019-01-05 DIAGNOSIS — S63.501A WRIST SPRAIN, RIGHT, INITIAL ENCOUNTER: ICD-10-CM
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
## 2019-01-05 PROCEDURE — 99283 EMERGENCY DEPT VISIT LOW MDM: CPT

## 2019-01-05 PROCEDURE — 25000132 ZZH RX MED GY IP 250 OP 250 PS 637: Performed by: PHYSICIAN ASSISTANT

## 2019-01-05 PROCEDURE — 73110 X-RAY EXAM OF WRIST: CPT | Mod: TC,RT

## 2019-01-05 PROCEDURE — A9270 NON-COVERED ITEM OR SERVICE: HCPCS | Mod: GY | Performed by: PHYSICIAN ASSISTANT

## 2019-01-05 PROCEDURE — 99283 EMERGENCY DEPT VISIT LOW MDM: CPT | Mod: Z6 | Performed by: PHYSICIAN ASSISTANT

## 2019-01-05 PROCEDURE — 72040 X-RAY EXAM NECK SPINE 2-3 VW: CPT | Mod: TC

## 2019-01-05 RX ORDER — HYDROCODONE BITARTRATE AND ACETAMINOPHEN 5; 325 MG/1; MG/1
1 TABLET ORAL ONCE
Status: COMPLETED | OUTPATIENT
Start: 2019-01-05 | End: 2019-01-05

## 2019-01-05 RX ADMIN — HYDROCODONE BITARTRATE AND ACETAMINOPHEN 1 TABLET: 5; 325 TABLET ORAL at 20:12

## 2019-01-05 NOTE — ED AVS SNAPSHOT
[facility]  [address] [address]  Phone:  [phone]                                    Constantino Warren   MRN: [id number]    Department:  [facility]   Date of Visit:  1/5/2019           After Visit Summary Signature Page    I have received my discharge instructions, and my questions have been answered. I have discussed any challenges I see with this plan with the nurse or doctor.    ..........................................................................................................................................  Patient/Patient Representative Signature      ..........................................................................................................................................  Patient Representative Print Name and Relationship to Patient    ..................................................               ................................................  Date                                   Time    ..........................................................................................................................................  Reviewed by Signature/Title    ...................................................              ..............................................  Date                                               Time          22EPIC Rev 08/18

## 2019-01-06 NOTE — DISCHARGE INSTRUCTIONS
Take tylenol as directed for pain. Apply ice as needed. Wear the wrist splint for the next few days. Repeat X-ray needed in 1 week if wrist pain persists.
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
## 2019-01-08 ENCOUNTER — HOSPITAL ENCOUNTER (EMERGENCY)
Facility: HOSPITAL | Age: 49
Discharge: HOME | End: 2019-01-08
Attending: NURSE PRACTITIONER | Admitting: NURSE PRACTITIONER
Payer: MEDICAID

## 2019-01-08 ENCOUNTER — APPOINTMENT (OUTPATIENT)
Dept: GENERAL RADIOLOGY | Facility: HOSPITAL | Age: 49
End: 2019-01-08
Payer: MEDICAID

## 2019-01-08 VITALS
TEMPERATURE: 96.9 F | DIASTOLIC BLOOD PRESSURE: 75 MMHG | OXYGEN SATURATION: 100 % | RESPIRATION RATE: 14 BRPM | SYSTOLIC BLOOD PRESSURE: 127 MMHG

## 2019-01-08 DIAGNOSIS — S96.911A STRAIN OF RIGHT FOOT, INITIAL ENCOUNTER: ICD-10-CM

## 2019-01-08 DIAGNOSIS — M79.671 RIGHT FOOT PAIN: ICD-10-CM

## 2019-01-08 DIAGNOSIS — M19.071 OSTEOARTHRITIS OF RIGHT ANKLE OR FOOT: ICD-10-CM

## 2019-01-08 PROCEDURE — 99213 OFFICE O/P EST LOW 20 MIN: CPT | Mod: Z6 | Performed by: NURSE PRACTITIONER

## 2019-01-08 PROCEDURE — 73630 X-RAY EXAM OF FOOT: CPT | Mod: TC,RT

## 2019-01-08 PROCEDURE — G0463 HOSPITAL OUTPT CLINIC VISIT: HCPCS

## 2019-01-08 ASSESSMENT — ENCOUNTER SYMPTOMS
CHILLS: 0
FATIGUE: 0
APPETITE CHANGE: 0
NUMBNESS: 0
ARTHRALGIAS: 1
FEVER: 0

## 2019-01-08 NOTE — ED AVS SNAPSHOT
[facility]  [address] [address]  Phone:  [phone]                                    Constantino Warren   MRN: [id number]    Department:  [facility]   Date of Visit:  1/8/2019           After Visit Summary Signature Page    I have received my discharge instructions, and my questions have been answered. I have discussed any challenges I see with this plan with the nurse or doctor.    ..........................................................................................................................................  Patient/Patient Representative Signature      ..........................................................................................................................................  Patient Representative Print Name and Relationship to Patient    ..................................................               ................................................  Date                                   Time    ..........................................................................................................................................  Reviewed by Signature/Title    ...................................................              ..............................................  Date                                               Time          22EPIC Rev 08/18
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
## 2019-01-25 ENCOUNTER — APPOINTMENT (OUTPATIENT)
Dept: CT IMAGING | Facility: HOSPITAL | Age: 49
End: 2019-01-25
Payer: MEDICAID

## 2019-01-25 ENCOUNTER — HOSPITAL ENCOUNTER (EMERGENCY)
Facility: HOSPITAL | Age: 49
Discharge: HOME | End: 2019-01-25
Attending: PHYSICIAN ASSISTANT | Admitting: PHYSICIAN ASSISTANT
Payer: MEDICAID

## 2019-01-25 VITALS
DIASTOLIC BLOOD PRESSURE: 95 MMHG | SYSTOLIC BLOOD PRESSURE: 140 MMHG | RESPIRATION RATE: 16 BRPM | HEART RATE: 78 BPM | WEIGHT: 165 LBS | OXYGEN SATURATION: 100 % | TEMPERATURE: 98.1 F | BODY MASS INDEX: 26.64 KG/M2
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
## 2019-01-25 DIAGNOSIS — S00.83XA CONTUSION OF FACE, SCALP AND NECK, INITIAL ENCOUNTER: ICD-10-CM

## 2019-01-25 DIAGNOSIS — S06.0X0A CONCUSSION WITHOUT LOSS OF CONSCIOUSNESS, INITIAL ENCOUNTER: ICD-10-CM

## 2019-01-25 DIAGNOSIS — S10.93XA CONTUSION OF FACE, SCALP AND NECK, INITIAL ENCOUNTER: ICD-10-CM

## 2019-01-25 DIAGNOSIS — S00.03XA CONTUSION OF FACE, SCALP AND NECK, INITIAL ENCOUNTER: ICD-10-CM

## 2019-01-25 DIAGNOSIS — S16.1XXA STRAIN OF NECK MUSCLE, INITIAL ENCOUNTER: ICD-10-CM

## 2019-01-25 PROCEDURE — 99284 EMERGENCY DEPT VISIT MOD MDM: CPT | Mod: Z6 | Performed by: PHYSICIAN ASSISTANT

## 2019-01-25 PROCEDURE — 72125 CT NECK SPINE W/O DYE: CPT | Mod: TC

## 2019-01-25 PROCEDURE — A9270 NON-COVERED ITEM OR SERVICE: HCPCS | Performed by: PHYSICIAN ASSISTANT

## 2019-01-25 PROCEDURE — 70450 CT HEAD/BRAIN W/O DYE: CPT | Mod: TC

## 2019-01-25 PROCEDURE — 25000132 ZZH RX MED GY IP 250 OP 250 PS 637: Performed by: PHYSICIAN ASSISTANT

## 2019-01-25 PROCEDURE — 99284 EMERGENCY DEPT VISIT MOD MDM: CPT | Mod: 25

## 2019-01-25 RX ORDER — ACETAMINOPHEN 325 MG/1
975 TABLET ORAL ONCE
Status: COMPLETED | OUTPATIENT
Start: 2019-01-25 | End: 2019-01-25

## 2019-01-25 RX ADMIN — ACETAMINOPHEN 975 MG: 325 TABLET, FILM COATED ORAL at 15:29

## 2019-01-25 ASSESSMENT — ENCOUNTER SYMPTOMS
LIGHT-HEADEDNESS: 1
NECK PAIN: 1
HEADACHES: 1
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
## 2019-01-25 NOTE — ED AVS SNAPSHOT
[facility]  [address] [address]  Phone:  [phone]                                    Constantino Warren   MRN: [id number]    Department:  [facility]   Date of Visit:  1/25/2019           After Visit Summary Signature Page    I have received my discharge instructions, and my questions have been answered. I have discussed any challenges I see with this plan with the nurse or doctor.    ..........................................................................................................................................  Patient/Patient Representative Signature      ..........................................................................................................................................  Patient Representative Print Name and Relationship to Patient    ..................................................               ................................................  Date                                   Time    ..........................................................................................................................................  Reviewed by Signature/Title    ...................................................              ..............................................  Date                                               Time          22EPIC Rev 08/18
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
## 2019-02-01 ENCOUNTER — APPOINTMENT (OUTPATIENT)
Dept: GENERAL RADIOLOGY | Facility: HOSPITAL | Age: 49
End: 2019-02-01
Payer: MEDICAID

## 2019-02-01 ENCOUNTER — HOSPITAL ENCOUNTER (OUTPATIENT)
Facility: HOSPITAL | Age: 49
Setting detail: OBSERVATION
Discharge: HOME | End: 2019-02-02
Attending: FAMILY MEDICINE | Admitting: INTERNAL MEDICINE
Payer: MEDICAID

## 2019-02-01 DIAGNOSIS — D64.9 ANEMIA, UNSPECIFIED TYPE: ICD-10-CM

## 2019-02-01 DIAGNOSIS — D64.9 ANEMIA: ICD-10-CM

## 2019-02-01 DIAGNOSIS — R07.89 NON-CARDIAC CHEST PAIN: ICD-10-CM

## 2019-02-01 DIAGNOSIS — R55 SYNCOPE AND COLLAPSE: ICD-10-CM

## 2019-02-01 LAB
ANION GAP SERPL CALCULATED.3IONS-SCNC: 8 MMOL/L (ref 3–14)
BASOPHILS # BLD AUTO: 0 10E9/L (ref 0–0.2)
BASOPHILS NFR BLD AUTO: 0 %
BUN SERPL-MCNC: 22 MG/DL (ref 7–30)
CALCIUM SERPL-MCNC: 7.9 MG/DL (ref 8.5–10.1)
CHLORIDE SERPL-SCNC: 110 MMOL/L (ref 94–109)
CO2 SERPL-SCNC: 23 MMOL/L (ref 20–32)
CREAT SERPL-MCNC: 0.72 MG/DL (ref 0.52–1.04)
D DIMER PPP DDU-MCNC: <200 NG/ML D-DU (ref 0–300)
DIFFERENTIAL METHOD BLD: ABNORMAL
ELLIPTOCYTES BLD QL SMEAR: SLIGHT
EOSINOPHIL # BLD AUTO: 0 10E9/L (ref 0–0.7)
EOSINOPHIL NFR BLD AUTO: 0 %
ERYTHROCYTE [DISTWIDTH] IN BLOOD BY AUTOMATED COUNT: 17.6 % (ref 10–15)
GFR SERPL CREATININE-BSD FRML MDRD: >90 ML/MIN/{1.73_M2}
GLUCOSE SERPL-MCNC: 105 MG/DL (ref 70–99)
HCT VFR BLD AUTO: 26.9 % (ref 35–47)
HGB BLD-MCNC: 7.5 G/DL (ref 11.7–15.7)
HYPOCHROMIA BLD QL: PRESENT
IMM GRANULOCYTES # BLD: 0 10E9/L (ref 0–0.4)
IMM GRANULOCYTES NFR BLD: 0.5 %
LIPASE SERPL-CCNC: 176 U/L (ref 73–393)
LYMPHOCYTES # BLD AUTO: 1.5 10E9/L (ref 0.8–5.3)
LYMPHOCYTES NFR BLD AUTO: 21.8 %
MCH RBC QN AUTO: 19.3 PG (ref 26.5–33)
MCHC RBC AUTO-ENTMCNC: 27.9 G/DL (ref 31.5–36.5)
MCV RBC AUTO: 69 FL (ref 78–100)
MICROCYTES BLD QL SMEAR: PRESENT
MONOCYTES # BLD AUTO: 0.5 10E9/L (ref 0–1.3)
MONOCYTES NFR BLD AUTO: 7.1 %
NEUTROPHILS # BLD AUTO: 4.7 10E9/L (ref 1.6–8.3)
NEUTROPHILS NFR BLD AUTO: 70.6 %
NRBC # BLD AUTO: 0 10*3/UL
NRBC BLD AUTO-RTO: 0 /100
PLATELET # BLD AUTO: 263 10E9/L (ref 150–450)
POTASSIUM SERPL-SCNC: 4 MMOL/L (ref 3.4–5.3)
RBC # BLD AUTO: 3.88 10E12/L (ref 3.8–5.2)
SODIUM SERPL-SCNC: 141 MMOL/L (ref 133–144)
TROPONIN I SERPL-MCNC: <0.015 UG/L (ref 0–0.04)
WBC # BLD AUTO: 6.6 10E9/L (ref 4–11)

## 2019-02-01 PROCEDURE — 99285 EMERGENCY DEPT VISIT HI MDM: CPT | Mod: 25

## 2019-02-01 PROCEDURE — 84484 ASSAY OF TROPONIN QUANT: CPT | Performed by: FAMILY MEDICINE

## 2019-02-01 PROCEDURE — A9270 NON-COVERED ITEM OR SERVICE: HCPCS | Mod: GY | Performed by: INTERNAL MEDICINE

## 2019-02-01 PROCEDURE — 83690 ASSAY OF LIPASE: CPT | Performed by: FAMILY MEDICINE

## 2019-02-01 PROCEDURE — 93010 ELECTROCARDIOGRAM REPORT: CPT | Performed by: INTERNAL MEDICINE

## 2019-02-01 PROCEDURE — 96374 THER/PROPH/DIAG INJ IV PUSH: CPT

## 2019-02-01 PROCEDURE — 86901 BLOOD TYPING SEROLOGIC RH(D): CPT | Performed by: FAMILY MEDICINE

## 2019-02-01 PROCEDURE — G0378 HOSPITAL OBSERVATION PER HR: HCPCS

## 2019-02-01 PROCEDURE — 99219 ZZC INITIAL OBSERVATION CARE,LEVL II: CPT | Performed by: INTERNAL MEDICINE

## 2019-02-01 PROCEDURE — 25000128 H RX IP 250 OP 636: Performed by: FAMILY MEDICINE

## 2019-02-01 PROCEDURE — 96375 TX/PRO/DX INJ NEW DRUG ADDON: CPT

## 2019-02-01 PROCEDURE — 99285 EMERGENCY DEPT VISIT HI MDM: CPT | Mod: Z6 | Performed by: FAMILY MEDICINE

## 2019-02-01 PROCEDURE — 85025 COMPLETE CBC W/AUTO DIFF WBC: CPT | Performed by: FAMILY MEDICINE

## 2019-02-01 PROCEDURE — 36415 COLL VENOUS BLD VENIPUNCTURE: CPT | Performed by: INTERNAL MEDICINE

## 2019-02-01 PROCEDURE — 86900 BLOOD TYPING SEROLOGIC ABO: CPT | Performed by: FAMILY MEDICINE

## 2019-02-01 PROCEDURE — 86850 RBC ANTIBODY SCREEN: CPT | Performed by: FAMILY MEDICINE

## 2019-02-01 PROCEDURE — 80048 BASIC METABOLIC PNL TOTAL CA: CPT | Performed by: FAMILY MEDICINE

## 2019-02-01 PROCEDURE — 71046 X-RAY EXAM CHEST 2 VIEWS: CPT | Mod: TC

## 2019-02-01 PROCEDURE — 25000132 ZZH RX MED GY IP 250 OP 250 PS 637: Performed by: INTERNAL MEDICINE

## 2019-02-01 PROCEDURE — 93005 ELECTROCARDIOGRAM TRACING: CPT

## 2019-02-01 PROCEDURE — 86920 COMPATIBILITY TEST SPIN: CPT | Performed by: FAMILY MEDICINE

## 2019-02-01 PROCEDURE — 85379 FIBRIN DEGRADATION QUANT: CPT | Performed by: INTERNAL MEDICINE

## 2019-02-01 RX ORDER — ONDANSETRON 2 MG/ML
4 INJECTION INTRAMUSCULAR; INTRAVENOUS ONCE
Status: COMPLETED | OUTPATIENT
Start: 2019-02-01 | End: 2019-02-01

## 2019-02-01 RX ORDER — ACETAMINOPHEN 650 MG/1
650 SUPPOSITORY RECTAL EVERY 4 HOURS PRN
Status: DISCONTINUED | OUTPATIENT
Start: 2019-02-01 | End: 2019-02-02 | Stop reason: HOSPADM

## 2019-02-01 RX ORDER — MORPHINE SULFATE 4 MG/ML
2 INJECTION, SOLUTION INTRAMUSCULAR; INTRAVENOUS ONCE
Status: COMPLETED | OUTPATIENT
Start: 2019-02-01 | End: 2019-02-01

## 2019-02-01 RX ORDER — LEVOTHYROXINE SODIUM 75 UG/1
75 TABLET ORAL
Status: DISCONTINUED | OUTPATIENT
Start: 2019-02-02 | End: 2019-02-02 | Stop reason: HOSPADM

## 2019-02-01 RX ORDER — LANOLIN ALCOHOL/MO/W.PET/CERES
3 CREAM (GRAM) TOPICAL
Status: DISCONTINUED | OUTPATIENT
Start: 2019-02-01 | End: 2019-02-01 | Stop reason: DRUGHIGH

## 2019-02-01 RX ORDER — LEVOTHYROXINE SODIUM 100 UG/1
200 TABLET ORAL
Status: DISCONTINUED | OUTPATIENT
Start: 2019-02-02 | End: 2019-02-02 | Stop reason: HOSPADM

## 2019-02-01 RX ORDER — ONDANSETRON 2 MG/ML
4 INJECTION INTRAMUSCULAR; INTRAVENOUS EVERY 6 HOURS PRN
Status: DISCONTINUED | OUTPATIENT
Start: 2019-02-01 | End: 2019-02-02 | Stop reason: HOSPADM

## 2019-02-01 RX ORDER — ONDANSETRON 4 MG/1
4 TABLET, ORALLY DISINTEGRATING ORAL EVERY 6 HOURS PRN
Status: DISCONTINUED | OUTPATIENT
Start: 2019-02-01 | End: 2019-02-01

## 2019-02-01 RX ORDER — SERTRALINE HYDROCHLORIDE 100 MG/1
100 TABLET, FILM COATED ORAL DAILY
Status: DISCONTINUED | OUTPATIENT
Start: 2019-02-02 | End: 2019-02-02 | Stop reason: HOSPADM

## 2019-02-01 RX ORDER — NALOXONE HYDROCHLORIDE 0.4 MG/ML
.1-.4 INJECTION, SOLUTION INTRAMUSCULAR; INTRAVENOUS; SUBCUTANEOUS
Status: DISCONTINUED | OUTPATIENT
Start: 2019-02-01 | End: 2019-02-02 | Stop reason: HOSPADM

## 2019-02-01 RX ORDER — ACETAMINOPHEN 325 MG/1
650 TABLET ORAL EVERY 4 HOURS PRN
Status: DISCONTINUED | OUTPATIENT
Start: 2019-02-01 | End: 2019-02-02 | Stop reason: HOSPADM

## 2019-02-01 RX ADMIN — MORPHINE SULFATE 2 MG: 4 INJECTION INTRAVENOUS at 10:47

## 2019-02-01 RX ADMIN — OMEPRAZOLE 40 MG: 20 CAPSULE, DELAYED RELEASE ORAL at 17:18

## 2019-02-01 RX ADMIN — ONDANSETRON 4 MG: 2 INJECTION INTRAMUSCULAR; INTRAVENOUS at 10:43

## 2019-02-01 ASSESSMENT — MIFFLIN-ST. JEOR: SCORE: 1390.19

## 2019-02-01 NOTE — ED NOTES
Patient not wanting to having transfusion in the Infusion Center.  Patient wanting to go home.  Explained to patient that Dr. Ten Gonzalez trying to talk to the Hospitalist to try to get patient admitted for her transfusion.

## 2019-02-01 NOTE — H&P
[facility]    History and Physical  Hospitalist       Date of Admission:  2/1/2019  Date of Service (when I saw the patient): 02/01/19    Assessment & Plan   Constantino Warren is a 48 year old female who presents with syncope and anemia.    Syncope: with LOC.  At least three episodes with second episode accompanied by LOC.  EKG negative for arrhythmias, trop neg x 1.  CXR clear, no widened mediastinum.  - ddimer  - serial trops  - telemetry monitoring  - ECHO  - bp and HR wnl and stable    Anemia: patient has h/o CAIN and GI bleeding with anastomotic ulcer in 7/2018.  Patient denies h/o clinical bleeding.  Hgb 7.5, baseline about 9.  Unlikely to be cause of her syncope, but cannot r/o.  She is on iron replacement as outpatient.  - 2 units PRBCs ordered by ED  - will monitor for hgb stability    Hypothyroidism: patient is on 275 mcg synthroid  - TSH level    FEN: oral diet as tolerated.  Electrolytes wnl.    DVT Prophylaxis: Low Risk/Ambulatory with no VTE prophylaxis indicated    Code Status: Full Code    Disposition: Expected discharge in 1 day once testing complete.    Jovon Su MD    Primary Care Physician   Ayden Alba    Chief Complaint   syncope    History is obtained from the patient    History of Present Illness   Constantino Warren is a 48 year old female with a history of gastric bypass surgery in 2010 and subsequent anemia, GI bleeding back in July 2018 from an anastomotic ulcer, multiple orthopedic injuries and her history who presents with syncope and loss of consciousness.  Patient states that when she woke up this morning she had some chest pain and she got up and she felt weak.  She endorses some dizziness and falling over.  She progressed to sit up on the couch for a while hoping that things would settle down.  She then proceeded to go to the bathroom to prepare for the morning when suddenly she again fell over and lost consciousness for approximately 30 seconds.  When she came to she  again went back into the rest of her house and is at the kitchen to prepare a meal when she again passed out.  She states that this is never happened to her before.  She denies any concurrent diaphoresis.  Her chest pain was confined to the first episode and she describes it as mid lower chest with a stabbing sensation that went to the back.  She also describes mid back pain as well as both upper quadrant pain in her abdomen.  Currently she has no chest pain, no abdominal pain, only a slight amount of back pain.  She is awake and alert and is able to provide accurate history.  She states that there is history of heart problems on her mother side, her mother  of congestive heart failure in her late 40s.  She denies any lower extremity pain or swelling.  No history of smoking, she states that she is not a drinker.  She is admitted for workup of syncope.  In the emergency department she was found to have a hemoglobin of 7.9, and she was transfused 2 units by the emergency department.  She had a similar episode    Past Medical History    I have reviewed this patient's medical history and updated it with pertinent information if needed.   Past Medical History:   Diagnosis Date     Chronic pain      Thyroid disease        Past Surgical History   I have reviewed this patient's surgical history and updated it with pertinent information if needed.  Past Surgical History:   Procedure Laterality Date     BACK SURGERY      ;      ESOPHAGOSCOPY, GASTROSCOPY, DUODENOSCOPY (EGD), COMBINED N/A 2018    Procedure: COMBINED ESOPHAGOSCOPY, GASTROSCOPY, DUODENOSCOPY (EGD);  UPPER ENDOSCOPY WITH BIOPSIES;  Surgeon: Roger Vazquez MD;  Location: [facility]     HEAD & NECK SURGERY      c4-c5 fusion     LAPAROSCOPIC BYPASS GASTRIC  2010       Prior to Admission Medications   Prior to Admission Medications   Prescriptions Last Dose Informant Patient Reported? Taking?   EPINEPHrine (EPIPEN/ADRENACLICK/OR ANY BX GENERIC  EQUIV) 0.3 MG/0.3ML injection 2-pack Unknown at Unknown time Self Yes No   Sig: Inject 0.3 mg into the muscle as needed    MELATONIN PO 1/31/2019 at 2000 Self Yes Yes   Sig: Take 7.5 mg by mouth nightly as needed   SUMAtriptan (IMITREX) 100 MG tablet More than a month at Unknown time Self Yes No   Sig: Take 100 mg by mouth at onset of headache    TiZANidine HCl (ZANAFLEX) 2 MG CAPS   No No   Sig: Take 2 mg by mouth At Bedtime for 15 days   acetaminophen (TYLENOL) 500 MG tablet Unknown at prn Self Yes No   Sig: Take 1,000 mg by mouth every 6 hours as needed Max acetaminophen dose: 4000 mg in 24 hours   cyanocobalamin (VITAMIN B12) 1000 MCG/ML injection More than a month at Unknown time Self Yes No   Sig: Inject 1,000 mcg into the muscle Every 4 weeks   docusate sodium (COLACE) 100 MG tablet Past Week at prn Self No Yes   Sig: Take 100 mg by mouth 2 times daily as needed for constipation (for constipatoni while on irone)   Patient taking differently: Take 100 mg by mouth 2 times daily as needed for constipation (for constipation while on iron)    docusate sodium (STOOL SOFTENER) 100 MG capsule Past Month at Unknown time Self Yes Yes   Sig: Take 100 mg by mouth 2 times daily   ferrous sulfate (IRON) 325 (65 Fe) MG tablet 1/31/2019 at 0800 Self No Yes   Sig: Take 1 tablet (325 mg) by mouth 2 times daily   levothyroxine (SYNTHROID/LEVOTHROID) 200 MCG tablet 1/31/2019 at 0800 Self Yes Yes   Sig: Take 200 mcg by mouth every morning (before breakfast) Take in addition to 75 mcg for a total of 275 mcg daily   levothyroxine (SYNTHROID/LEVOTHROID) 75 MCG tablet 1/31/2019 at 0800 Self Yes Yes   Sig: Take 75 mcg by mouth every morning (before breakfast) Take in addition to 200 mcg for a total of 275 mcg per day   multivitamin, therapeutic with minerals (MULTI-VITAMIN) TABS 1/31/2019 at 0800 Self Yes Yes   Sig: Take 1 tablet by mouth daily   omeprazole (PRILOSEC) 40 MG capsule Past Week at Unknown time Self Yes Yes   Sig: Take 40  mg by mouth 2 times daily (before meals)   sertraline (ZOLOFT) 100 MG tablet 1/31/2019 at 0800 Self Yes Yes   Sig: Take 100 mg by mouth daily      Facility-Administered Medications: None     Allergies   Allergies   Allergen Reactions     Aspartame      Other reaction(s): Other - Describe In Comment Field, Seizures  convulsions     Bee Venom Anaphylaxis     Bupropion Anaphylaxis     Throat gets tight and hives. Denies allergic reaction     Food Anaphylaxis     Peppers-anaphylaxis if eaten, if touched hands swell and reddened     Ibuprofen Anaphylaxis     No Clinical Screening - See Comments Anaphylaxis     Peppers, patient reports green peppers close off her throat     Piper Anaphylaxis     Peppers--green, black, red, chili, etc. Derm symptoms, hives;  Strawberries--(cooked) Itching, breathing difficulties, GI symptoms. Mushrooms--GI symptoms, Hives.Spinach (cooked) throat irritation  (Listed on [facility] problem list.)     Fentanyl Swelling     Lips and tongue swelled.  Swelling of lips and tongue     Liquid Adhesive Dermatitis     blisters  Blisters  Silk tape and tegaderm is fine     Oxcarbazepine Other (See Comments)     Throat gets tight and hives. Denies allergic reaction  Throat gets tight and hives-       Prozac [Fluoxetine]      Other reaction(s): *Unknown  uncontrolled muscle spasms     Valproic Acid      Respiratory symptoms ([facility]). Denies allergic reaction     Ziprasidone      Throat gets tight and hives. Denies allergic reaction       Social History   I have reviewed this patient's social history and updated it with pertinent information if needed. Constantino Warren  reports that  has never smoked. she has never used smokeless tobacco. She reports that she does not drink alcohol or use drugs.    Family History   I have reviewed this patient's family history and updated it with pertinent information if needed.   No family history on file.    Review of Systems   The 10 point  Review of Systems is negative other than noted in the HPI or here.    Physical Exam   Temp: 97.5  F (36.4  C) Temp src: Oral BP: 125/79 Pulse: 70 Heart Rate: 72 Resp: 18 SpO2: 97 % O2 Device: None (Room air)    Vital Signs with Ranges  Temp:  [97.5  F (36.4  C)] 97.5  F (36.4  C)  Pulse:  [70] 70  Heart Rate:  [64-79] 72  Resp:  [12-18] 18  BP: ()/(66-92) 125/79  SpO2:  [94 %-99 %] 97 %  165 lbs 0 oz    Constitutional: AA, NAD  Eyes: PERRLA, no injection, no icterus  HEENT: atraumatic, normocephalic  Respiratory: CTA b/l  Cardiovascular: S1 S2 RRR, 1/6 systolic murmur  GI: soft, NT, ND, + bowel sounds  Lymph/Hematologic: no palpable lymphadenopathy  Skin: no rashes, no lesions  Musculoskeletal: No edema, good tone, no deformities  Neurologic: oriented x 3, no focal deficits  Psychiatric: appropriate affect      Data   Data reviewed today:  I personally reviewed EKG, CXR report.  Recent Labs   Lab 02/01/19  0840   WBC 6.6   HGB 7.5*   MCV 69*         POTASSIUM 4.0   CHLORIDE 110*   CO2 23   BUN 22   CR 0.72   ANIONGAP 8   LOKESH 7.9*   *   LIPASE 176   TROPI <0.015     Lactic Acid   Date Value Ref Range Status   07/18/2015 1.4 0.4 - 2.0 mmol/L Final       Recent Results (from the past 24 hour(s))   XR Chest 2 Views    Narrative    XR CHEST 2 VW    HISTORY: 48 years Female chest pain    COMPARISON: 7/18/2015    TECHNIQUE: 2 views of the chest were obtained.    FINDINGS: Two views of the chest were obtained. Heart size and  pulmonary vascularity are within normal limits, lungs are clear on  both views. No consolidating air space opacities are present.          Impression    IMPRESSION: Clear chest.    LULY KNOWLES MD

## 2019-02-01 NOTE — ED TRIAGE NOTES
Pt in for complaints of chest pain that awoke her from sleep a few mins before 0600. Reports since onset of pain she has had 4 syncopal episodes. Reports continued dizziness at this time.
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
## 2019-02-01 NOTE — PROGRESS NOTES
Discussed with patient and Marie from patient financial.  Pt has medicare part A and no other insurance at this time.  Her part A will not pay for blood transfusion.  She has applied for  care but apparently she has to pay for part D and cannot afford it.  Marie is looking into it but patient is going to require a blood transfusion.  I will ask Marie if patient can apply for efrain care or another hardship program.  I will mention this to Constantino.  Will follow    DIANA Ramirez   Care Transitions

## 2019-02-02 VITALS
DIASTOLIC BLOOD PRESSURE: 83 MMHG | BODY MASS INDEX: 26.52 KG/M2 | OXYGEN SATURATION: 98 % | RESPIRATION RATE: 16 BRPM | SYSTOLIC BLOOD PRESSURE: 141 MMHG | HEIGHT: 66 IN | WEIGHT: 165 LBS | HEART RATE: 82 BPM | TEMPERATURE: 98.1 F

## 2019-02-02 LAB
BLD PROD TYP BPU: NORMAL
BLD PROD TYP BPU: NORMAL
BLD UNIT ID BPU: 0
BLD UNIT ID BPU: 0
BLOOD PRODUCT CODE: NORMAL
BLOOD PRODUCT CODE: NORMAL
BPU ID: NORMAL
BPU ID: NORMAL
HGB BLD-MCNC: 10.1 G/DL (ref 11.7–15.7)
HGB BLD-MCNC: 7.2 G/DL (ref 11.7–15.7)
HGB BLD-MCNC: 9.7 G/DL (ref 11.7–15.7)
TRANSFUSION STATUS PATIENT QL: NORMAL
TSH SERPL DL<=0.005 MIU/L-ACNC: 5.49 MU/L (ref 0.4–4)

## 2019-02-02 PROCEDURE — 36415 COLL VENOUS BLD VENIPUNCTURE: CPT | Performed by: INTERNAL MEDICINE

## 2019-02-02 PROCEDURE — P9016 RBC LEUKOCYTES REDUCED: HCPCS | Performed by: FAMILY MEDICINE

## 2019-02-02 PROCEDURE — 25000132 ZZH RX MED GY IP 250 OP 250 PS 637: Mod: GY | Performed by: INTERNAL MEDICINE

## 2019-02-02 PROCEDURE — 85018 HEMOGLOBIN: CPT | Mod: 91 | Performed by: INTERNAL MEDICINE

## 2019-02-02 PROCEDURE — 84443 ASSAY THYROID STIM HORMONE: CPT | Performed by: INTERNAL MEDICINE

## 2019-02-02 PROCEDURE — G0378 HOSPITAL OBSERVATION PER HR: HCPCS

## 2019-02-02 PROCEDURE — A9270 NON-COVERED ITEM OR SERVICE: HCPCS | Mod: GY | Performed by: INTERNAL MEDICINE

## 2019-02-02 PROCEDURE — 93010 ELECTROCARDIOGRAM REPORT: CPT | Performed by: INTERNAL MEDICINE

## 2019-02-02 PROCEDURE — 93005 ELECTROCARDIOGRAM TRACING: CPT

## 2019-02-02 PROCEDURE — 99217 ZZC OBSERVATION CARE DISCHARGE: CPT | Performed by: INTERNAL MEDICINE

## 2019-02-02 PROCEDURE — 36430 TRANSFUSION BLD/BLD COMPNT: CPT

## 2019-02-02 PROCEDURE — A9270 NON-COVERED ITEM OR SERVICE: HCPCS

## 2019-02-02 PROCEDURE — 25000132 ZZH RX MED GY IP 250 OP 250 PS 637

## 2019-02-02 RX ORDER — TIZANIDINE 2 MG/1
2 TABLET ORAL ONCE
Status: COMPLETED | OUTPATIENT
Start: 2019-02-02 | End: 2019-02-02

## 2019-02-02 RX ADMIN — SERTRALINE HYDROCHLORIDE 100 MG: 100 TABLET, FILM COATED ORAL at 08:29

## 2019-02-02 RX ADMIN — OMEPRAZOLE 40 MG: 20 CAPSULE, DELAYED RELEASE ORAL at 06:50

## 2019-02-02 RX ADMIN — LEVOTHYROXINE SODIUM 75 MCG: 75 TABLET ORAL at 08:55

## 2019-02-02 RX ADMIN — ACETAMINOPHEN 650 MG: 325 TABLET, FILM COATED ORAL at 00:19

## 2019-02-02 RX ADMIN — TIZANIDINE 2 MG: 2 TABLET ORAL at 02:13

## 2019-02-02 RX ADMIN — TIZANIDINE 2 MG: 4 TABLET ORAL at 02:13

## 2019-02-02 RX ADMIN — LEVOTHYROXINE SODIUM 200 MCG: 100 TABLET ORAL at 06:52

## 2019-02-02 NOTE — PLAN OF CARE
Alert and oriented. Vitals stable. Lungs clear. Apical rate regular. Tele report shows sinus rhythm in 60s. Audible bowel sounds. No new skin issues noted. CMS intact to right hand. Denies shortness of breath or dizziness. No chest pain reported. No syncopal episodes this shift. Patient repositions self in bed but requires assist to ambulate. Patient was in bed entire shift [er request.Patient did not sleep much this shift. Fall precautions remain in place. IV saline locked.  Call light remains within reach and makes needs known. Received PRN tylenol for  back pain with little relief.     Face to face report given with opportunity to observe patient.    Report given to Nina Larose   2/2/2019  7:25 AM

## 2019-02-02 NOTE — PLAN OF CARE
First unit complete, patient reports chest pain. Vitals stable, will update MD, second unit of blood is started.

## 2019-02-02 NOTE — DISCHARGE INSTRUCTIONS
Appointments:    *Please call [facility] at [phone] on Monday February 4th to schedule a hospital follow- up with  Dr. Alba, within 7 days from discharge from the hospital. The following labs/tests are recommended: hgb.

## 2019-02-02 NOTE — PLAN OF CARE
Face to face report given with opportunity to observe patient.    Report given to Sherrie Smith   2/1/2019  11:30 PM

## 2019-02-02 NOTE — PLAN OF CARE
Patient discharged at 3:40 PM via wheel chair accompanied by spouse and staff. Prescriptions - None ordered for discharge. All belongings sent with patient.     Discharge instructions reviewed with patient. Listed belongings gathered and returned to patient. yes    Patient discharged to home.   Report called to N/A    Core Measures and Vaccines  Core Measures applicable during stay: N/A. If yes, state diagnosis: N/A  Pneumonia and Influenza given prior to discharge, if indicated: N/A    Surgical Patient   Surgical Procedures during stay: N/A  Did patient receive discharge instruction on wound care and recognition of infection symptoms? N/A    MISC  Follow up appointment made:  Patient to set up her own appointment,on Monday  Home and hospital aquired medications returned to patient: N/A  Patient reports pain was well managed at discharge: No

## 2019-02-02 NOTE — DISCHARGE SUMMARY
[facility]    Discharge Summary  Hospitalist    Date of Admission:  2/1/2019  Date of Discharge:  2/2/2019  Discharging Provider: Jovon Su MD  Date of Service (when I saw the patient): 02/02/19    Discharge Diagnoses   Active Problems:    Syncope and collapse (9/26/2015)    CAIN    Hypothyroidism    H/o gastric bypass    H/o GI bleeding from anastomotic ulcer      History of Present Illness   Constantino Warren is an 48 year old female who presented with syncope and collapse.  Please see admission H+P for additional details.    Hospital Course   Constantino Warren was admitted on 2/1/2019 for syncope and collapse.  She has a h/o gastric bypass and previous bleeding from an anastomotic ulcer in July 2018, and her baseline hgb is about ~9.  Her hgb on presentation was 7.5.  She does not give a h/o clinical bleeding, however her symptoms are likely caused by her anemia.  She was transfused 2 units PRBCs with resolution of her symptoms.  EKG and telemetry monitoring were negative.  A ECHO could not be obtained due to it being a weekend.  The patient is clinically stable for discharge home, discharge hgb is 10.1.  She will f/u with her PCP in 2-3 days for repeat hgb check and to arrange for f/u outpatient ECHO is still indicated.  If she continues to be anemic, she may need referral to bariatric surgeon or GI for repeat scope in the setting of complex anatomy in the setting of gastric bypass.    Jovon Su MD      Significant Results and Procedures   See below.    Pending Results   These results will be followed up by Ayden Alba    Unresulted Labs Ordered in the Past 30 Days of this Admission     No orders found for last 61 day(s).          Code Status   Full Code       Primary Care Physician   Ayden Alba    Physical Exam   Temp: 98.1  F (36.7  C) Temp src: Tympanic BP: 141/83 Pulse: 82 Heart Rate: 69 Resp: 16 SpO2: 98 % O2 Device: None (Room air)    Vitals:    02/01/19 0834   Weight: 74.8 kg  (165 lb)     Vital Signs with Ranges  Temp:  [97.3  F (36.3  C)-98.6  F (37  C)] 98.1  F (36.7  C)  Pulse:  [65-83] 82  Heart Rate:  [57-91] 69  Resp:  [16-18] 16  BP: ()/(59-83) 141/83  SpO2:  [95 %-99 %] 98 %  I/O last 3 completed shifts:  In: 420 [P.O.:420]  Out: -     Constitutional: AA, NAD  Eyes: PERRLA, no injection, no icterus  HEENT: atraumatic, normocephalic  Respiratory: CTA b/l  Cardiovascular: S1 S2 RRR  GI: soft, NT, ND, + bowel sounds  Lymph/Hematologic: no palpable lymphadenopathy  Skin: no rashes, no lesions  Musculoskeletal: No edema, good tone, no deformities  Neurologic: oriented x 3, no focal deficits  Psychiatric: appropriate affect      Discharge Disposition   Discharged to home  Condition at discharge: Stable    Consultations This Hospital Stay   None    Time Spent on this Encounter   IJovon MD, personally saw the patient today and spent greater than 30 minutes discharging this patient.    Discharge Orders      Reason for your hospital stay    Anemia, syncope     Follow-up and recommended labs and tests     Follow up with primary care provider, Ayden Alba, within 7 days for hospital follow- up.  The following labs/tests are recommended: hgb.     Activity    Your activity upon discharge: activity as tolerated     Full Code     Diet    Follow this diet upon discharge: Orders Placed This Encounter      Regular Diet Adult     Discharge Medications   Current Discharge Medication List      CONTINUE these medications which have NOT CHANGED    Details   docusate sodium (STOOL SOFTENER) 100 MG capsule Take 100 mg by mouth 2 times daily      ferrous sulfate (IRON) 325 (65 Fe) MG tablet Take 1 tablet (325 mg) by mouth 2 times daily  Qty: 60 tablet, Refills: 2    Associated Diagnoses: Anemia, unspecified type      !! levothyroxine (SYNTHROID/LEVOTHROID) 200 MCG tablet Take 200 mcg by mouth every morning (before breakfast) Take in addition to 75 mcg for a total of 275 mcg daily       !! levothyroxine (SYNTHROID/LEVOTHROID) 75 MCG tablet Take 75 mcg by mouth every morning (before breakfast) Take in addition to 200 mcg for a total of 275 mcg per day      MELATONIN PO Take 7.5 mg by mouth nightly as needed      multivitamin, therapeutic with minerals (MULTI-VITAMIN) TABS Take 1 tablet by mouth daily      omeprazole (PRILOSEC) 40 MG capsule Take 40 mg by mouth 2 times daily (before meals)      sertraline (ZOLOFT) 100 MG tablet Take 100 mg by mouth daily      acetaminophen (TYLENOL) 500 MG tablet Take 1,000 mg by mouth every 6 hours as needed Max acetaminophen dose: 4000 mg in 24 hours      cyanocobalamin (VITAMIN B12) 1000 MCG/ML injection Inject 1,000 mcg into the muscle Every 4 weeks      docusate sodium (COLACE) 100 MG tablet Take 100 mg by mouth 2 times daily as needed for constipation (for constipatoni while on irone)  Qty: 60 tablet, Refills: 1    Associated Diagnoses: Anemia, unspecified type      EPINEPHrine (EPIPEN/ADRENACLICK/OR ANY BX GENERIC EQUIV) 0.3 MG/0.3ML injection 2-pack Inject 0.3 mg into the muscle as needed       SUMAtriptan (IMITREX) 100 MG tablet Take 100 mg by mouth at onset of headache        !! - Potential duplicate medications found. Please discuss with provider.        Allergies   Allergies   Allergen Reactions     Aspartame      Other reaction(s): Other - Describe In Comment Field, Seizures  convulsions     Bee Venom Anaphylaxis     Bupropion Anaphylaxis     Throat gets tight and hives. Denies allergic reaction     Food Anaphylaxis     Peppers-anaphylaxis if eaten, if touched hands swell and reddened     Ibuprofen Anaphylaxis     No Clinical Screening - See Comments Anaphylaxis     Peppers, patient reports green peppers close off her throat     Piper Anaphylaxis     Peppers--green, black, red, chili, etc. Derm symptoms, hives;  Strawberries--(cooked) Itching, breathing difficulties, GI symptoms. Mushrooms--GI symptoms, Hives.Spinach (cooked) throat irritation  (Listed  on [facility] problem list.)     Fentanyl Swelling     Lips and tongue swelled.  Swelling of lips and tongue     Liquid Adhesive Dermatitis     blisters  Blisters  Silk tape and tegaderm is fine     Oxcarbazepine Other (See Comments)     Throat gets tight and hives. Denies allergic reaction  Throat gets tight and hives-       Prozac [Fluoxetine]      Other reaction(s): *Unknown  uncontrolled muscle spasms     Valproic Acid      Respiratory symptoms ([facility]). Denies allergic reaction     Ziprasidone      Throat gets tight and hives. Denies allergic reaction     Data   Most Recent 3 CBC's:  Recent Labs   Lab Test 02/02/19  1323 02/02/19  1210 02/02/19  0515 02/01/19  0840 07/24/18  0514 07/23/18  0535   WBC  --   --   --  6.6 7.5 5.5   HGB 10.1* 9.7* 7.2* 7.5* 9.1* 8.9*   MCV  --   --   --  69* 69* 69*   PLT  --   --   --  263 283 271      Most Recent 3 BMP's:  Recent Labs   Lab Test 02/01/19  0840 07/24/18  0514 07/23/18  0535    144 143   POTASSIUM 4.0 4.8 4.4   CHLORIDE 110* 110* 108   CO2 23 26 26   BUN 22 16 13   CR 0.72 0.71 0.81   ANIONGAP 8 8 9   LOKESH 7.9* 8.5 8.3*   * 80 83     Most Recent 2 LFT's:  Recent Labs   Lab Test 07/23/18  0535 07/21/18  0601   AST 46* 40   ALT 37 35   ALKPHOS 108 110   BILITOTAL 0.2 0.4     Most Recent INR's and Anticoagulation Dosing History:  Anticoagulation Dose History     There is no flowsheet data to display.        Most Recent 3 Troponin's:  Recent Labs   Lab Test 02/01/19  0840 07/20/18  2225 07/20/18  1849   TROPI <0.015 <0.015 <0.015     Most Recent Cholesterol Panel:No lab results found.  Most Recent 6 Bacteria Isolates From Any Culture (See EPIC Reports for Culture Details):  Recent Labs   Lab Test 07/20/18  0619   CULT Canceled, Test credited  Patient discharged - specimen not obtained     Most Recent TSH, T4 and A1c Labs:  Recent Labs   Lab Test 02/02/19  0515   TSH 5.49*     Results for orders placed or performed during the  hospital encounter of 02/01/19   XR Chest 2 Views    Narrative    XR CHEST 2 VW    HISTORY: 48 years Female chest pain    COMPARISON: 7/18/2015    TECHNIQUE: 2 views of the chest were obtained.    FINDINGS: Two views of the chest were obtained. Heart size and  pulmonary vascularity are within normal limits, lungs are clear on  both views. No consolidating air space opacities are present.          Impression    IMPRESSION: Clear chest.    LULY KNOWLES MD

## 2019-02-02 NOTE — PLAN OF CARE
Patient has concerns with treatment plan and would like to speak to MD. Attempted to answer as many questions that the patient has at this time. MD paged.     MD had discussion with patient and new orders placed.

## 2019-02-02 NOTE — PLAN OF CARE
Temp: 97.3  F (36.3  C) Temp src: Tympanic BP: 122/69 Pulse: 70 Heart Rate: 70 Resp: 17 SpO2: 99 % O2 Device: None (Room air)   stated that she has pain in her mid back area, but denied pain meds.  Lung sounds clear, bowel sounds active.  Pt is on tele, SR 80's this shift. Cast to R lower arm. Hbg was 7.5, did not have blood replacement ordered and asymptomatic..  SBA in bedroom, saline locked, has voided this shift. Call lt within reach, makes her needs known

## 2019-02-02 NOTE — PLAN OF CARE
Second unit of blood is infused, hemoglobin lab draw id scheduled for [id number] Mason will be updated.
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
## 2019-02-02 NOTE — PLAN OF CARE
[facility] Inpatient Admission Note:    Patient admitted to 3116 /3116-1 at approximately 1500 via cart accompanied by transport tech from emergency room . Report received from Thad ROMERO  in SBAR format at 1500 via telephone. Patient transferred to bed via self.. Patient is alert and oriented X 3, denies pain; rates at 0 on 0-10 scale.  Patient oriented to room, unit, hourly rounding, and plan of care. Explained admission packet and patient handbook with patient bill of rights brochure. Will continue to monitor and document as needed.     Inpatient Nursing criteria listed below was met:    Health care directives status obtained and documented: No    Care Everywhere authorization obtained No    MRSA swab completed for patient 65 years and older: No    Patient identifies a surrogate decision maker: Self    Core Measure diagnosis present:No.      If initial lactic acid >2.0, repeat lactic acid drawn within one hour of arrival to unit: NA.    Vaccination assessment and education completed: Yes   Vaccinations received prior to admission: Pneumovax yes  Influenza(seasonal)  YES   Vaccination(s) ordered: patient declines    Clergy visit ordered if patient requests: No    Skin issues/needs documented: No    Isolation Patient: no Education given, correct sign in place and documentation row added to PCS:  NA    Fall Prevention Yes: Care plan updated, education given and documented, sticker and magnet in place: Yes    Care Plan initiated: Yes    Education Documented (including assessment): Yes    Patient has discharge needs : No

## 2019-02-02 NOTE — PLAN OF CARE
75 mcg of synthroid was discussed with both pharmacy and MD, TSH was 5.49, patient believes she did not receive the 75 mcg of synthroid, and is confident she received the 200 mcg, therefore it was decided to give the 75 mcg of synthroid to equal the 275 mcg regular dose.

## 2019-02-03 ASSESSMENT — ENCOUNTER SYMPTOMS
DIAPHORESIS: 0
ABDOMINAL PAIN: 0
FREQUENCY: 0
NERVOUS/ANXIOUS: 1
PALPITATIONS: 0
FATIGUE: 0
SHORTNESS OF BREATH: 0
DIZZINESS: 1
LIGHT-HEADEDNESS: 1
DIARRHEA: 0
DYSURIA: 0
NAUSEA: 0
CONSTIPATION: 0
ACTIVITY CHANGE: 1
DYSPHORIC MOOD: 1
MUSCULOSKELETAL NEGATIVE: 1
FEVER: 0
VOMITING: 0

## 2019-02-09 ENCOUNTER — TELEPHONE (OUTPATIENT)
Dept: CASE MANAGEMENT | Facility: HOSPITAL | Age: 49
End: 2019-02-09

## 2019-02-09 NOTE — TELEPHONE ENCOUNTER
Constantino Warren, was discharged to home on 2/2/19 from [facility]. I spoke today with her regarding her  discharge.   She indicates she has receive(d) sufficient information upon discharge. Medications were reviewed in full on discharge, including: medications to be continued from preadmission and any side effects. Prescriptions - None received at discharge.  Medications are being taken as prescribed.   She indicates she has  an appointment scheduled for Feb 13 . She was  able to confirm her  appointment time and has  it written down.   She did not have any questions regarding discharge instructions or condition.  Per their request, the following employee (s) can be recognized for their outstanding services delivered:  Nurses and Dr Gillette were wonderful. She was not pleased with Dr Su . He did not explain very well to her what was wrong with her heart.   Suggestions to improve service: Nothing indicated.   She was informed she  may receive a survey in the mail from the hospital. Asked if she . would kindly complete the survey in order for [facility] to know if services fully met patient needs.

## 2019-02-18 ENCOUNTER — APPOINTMENT (OUTPATIENT)
Dept: CT IMAGING | Facility: HOSPITAL | Age: 49
End: 2019-02-18
Attending: FAMILY MEDICINE
Payer: MEDICAID

## 2019-02-18 ENCOUNTER — HOSPITAL ENCOUNTER (EMERGENCY)
Facility: HOSPITAL | Age: 49
Discharge: HOME | End: 2019-02-19
Attending: FAMILY MEDICINE | Admitting: FAMILY MEDICINE
Payer: MEDICAID

## 2019-02-18 DIAGNOSIS — S09.90XA HEAD INJURY, INITIAL ENCOUNTER: ICD-10-CM

## 2019-02-18 DIAGNOSIS — S00.83XA FOREHEAD CONTUSION, INITIAL ENCOUNTER: ICD-10-CM

## 2019-02-18 PROCEDURE — 99283 EMERGENCY DEPT VISIT LOW MDM: CPT | Mod: Z6 | Performed by: FAMILY MEDICINE

## 2019-02-18 PROCEDURE — 70450 CT HEAD/BRAIN W/O DYE: CPT | Mod: TC

## 2019-02-18 PROCEDURE — 99284 EMERGENCY DEPT VISIT MOD MDM: CPT | Mod: 25

## 2019-02-18 ASSESSMENT — ENCOUNTER SYMPTOMS
WEAKNESS: 0
NAUSEA: 0
SHORTNESS OF BREATH: 0
PSYCHIATRIC NEGATIVE: 1
DIZZINESS: 0

## 2019-02-18 NOTE — ED AVS SNAPSHOT
[facility]  [address] [address]  Phone:  [phone]                                    Constantino Warren   MRN: [id number]    Department:  [facility]   Date of Visit:  2/18/2019           After Visit Summary Signature Page    I have received my discharge instructions, and my questions have been answered. I have discussed any challenges I see with this plan with the nurse or doctor.    ..........................................................................................................................................  Patient/Patient Representative Signature      ..........................................................................................................................................  Patient Representative Print Name and Relationship to Patient    ..................................................               ................................................  Date                                   Time    ..........................................................................................................................................  Reviewed by Signature/Title    ...................................................              ..............................................  Date                                               Time          22EPIC Rev 08/18

## 2019-02-19 VITALS
HEART RATE: 87 BPM | TEMPERATURE: 97.4 F | SYSTOLIC BLOOD PRESSURE: 110 MMHG | OXYGEN SATURATION: 97 % | DIASTOLIC BLOOD PRESSURE: 78 MMHG | RESPIRATION RATE: 20 BRPM
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
## 2019-02-26 LAB
ABO + RH BLD: NORMAL
ABO + RH BLD: NORMAL
BLD GP AB SCN SERPL QL: NORMAL
BLD PROD TYP BPU: NORMAL
BLOOD BANK CMNT PATIENT-IMP: NORMAL
NUM BPU REQUESTED: 2
SPECIMEN EXP DATE BLD: NORMAL

## 2019-03-10 ENCOUNTER — APPOINTMENT (OUTPATIENT)
Dept: CT IMAGING | Facility: HOSPITAL | Age: 49
End: 2019-03-10
Attending: FAMILY MEDICINE
Payer: MEDICAID

## 2019-03-10 ENCOUNTER — HOSPITAL ENCOUNTER (EMERGENCY)
Facility: HOSPITAL | Age: 49
Discharge: HOME | End: 2019-03-10
Attending: FAMILY MEDICINE | Admitting: FAMILY MEDICINE
Payer: MEDICAID

## 2019-03-10 VITALS
DIASTOLIC BLOOD PRESSURE: 68 MMHG | OXYGEN SATURATION: 97 % | RESPIRATION RATE: 18 BRPM | TEMPERATURE: 98 F | SYSTOLIC BLOOD PRESSURE: 104 MMHG

## 2019-03-10 DIAGNOSIS — S00.03XA CONTUSION OF FACE, SCALP AND NECK, INITIAL ENCOUNTER: ICD-10-CM

## 2019-03-10 DIAGNOSIS — S10.93XA CONTUSION OF FACE, SCALP AND NECK, INITIAL ENCOUNTER: ICD-10-CM

## 2019-03-10 DIAGNOSIS — R20.0 BILATERAL LEG NUMBNESS: ICD-10-CM

## 2019-03-10 DIAGNOSIS — S00.83XA CONTUSION OF FACE, SCALP AND NECK, INITIAL ENCOUNTER: ICD-10-CM

## 2019-03-10 LAB
ALBUMIN SERPL-MCNC: 2.9 G/DL (ref 3.4–5)
ALP SERPL-CCNC: 106 U/L (ref 40–150)
ALT SERPL W P-5'-P-CCNC: 25 U/L (ref 0–50)
ANION GAP SERPL CALCULATED.3IONS-SCNC: 9 MMOL/L (ref 3–14)
APTT PPP: 28 SEC (ref 24–37)
AST SERPL W P-5'-P-CCNC: 23 U/L (ref 0–45)
BASOPHILS # BLD AUTO: 0 10E9/L (ref 0–0.2)
BASOPHILS NFR BLD AUTO: 0 %
BILIRUB SERPL-MCNC: 0.2 MG/DL (ref 0.2–1.3)
BUN SERPL-MCNC: 17 MG/DL (ref 7–30)
CALCIUM SERPL-MCNC: 8.2 MG/DL (ref 8.5–10.1)
CHLORIDE SERPL-SCNC: 114 MMOL/L (ref 94–109)
CO2 SERPL-SCNC: 19 MMOL/L (ref 20–32)
CREAT SERPL-MCNC: 0.83 MG/DL (ref 0.52–1.04)
DIFFERENTIAL METHOD BLD: ABNORMAL
EOSINOPHIL # BLD AUTO: 0 10E9/L (ref 0–0.7)
EOSINOPHIL NFR BLD AUTO: 0 %
ERYTHROCYTE [DISTWIDTH] IN BLOOD BY AUTOMATED COUNT: 23.7 % (ref 10–15)
GFR SERPL CREATININE-BSD FRML MDRD: 82 ML/MIN/{1.73_M2}
GLUCOSE BLDC GLUCOMTR-MCNC: 111 MG/DL (ref 70–99)
GLUCOSE SERPL-MCNC: 130 MG/DL (ref 70–99)
HCT VFR BLD AUTO: 36.2 % (ref 35–47)
HGB BLD-MCNC: 11.1 G/DL (ref 11.7–15.7)
IMM GRANULOCYTES # BLD: 0.1 10E9/L (ref 0–0.4)
IMM GRANULOCYTES NFR BLD: 0.6 %
INR PPP: 1.11 (ref 0.8–1.2)
LYMPHOCYTES # BLD AUTO: 1.1 10E9/L (ref 0.8–5.3)
LYMPHOCYTES NFR BLD AUTO: 12.2 %
MCH RBC QN AUTO: 24.9 PG (ref 26.5–33)
MCHC RBC AUTO-ENTMCNC: 30.7 G/DL (ref 31.5–36.5)
MCV RBC AUTO: 81 FL (ref 78–100)
MONOCYTES # BLD AUTO: 0.8 10E9/L (ref 0–1.3)
MONOCYTES NFR BLD AUTO: 8.6 %
NEUTROPHILS # BLD AUTO: 7 10E9/L (ref 1.6–8.3)
NEUTROPHILS NFR BLD AUTO: 78.6 %
NRBC # BLD AUTO: 0 10*3/UL
NRBC BLD AUTO-RTO: 0 /100
PLATELET # BLD AUTO: 279 10E9/L (ref 150–450)
POTASSIUM SERPL-SCNC: 3.9 MMOL/L (ref 3.4–5.3)
PROT SERPL-MCNC: 6.4 G/DL (ref 6.8–8.8)
RBC # BLD AUTO: 4.45 10E12/L (ref 3.8–5.2)
SODIUM SERPL-SCNC: 142 MMOL/L (ref 133–144)
WBC # BLD AUTO: 8.9 10E9/L (ref 4–11)

## 2019-03-10 PROCEDURE — 85730 THROMBOPLASTIN TIME PARTIAL: CPT | Performed by: FAMILY MEDICINE

## 2019-03-10 PROCEDURE — 85610 PROTHROMBIN TIME: CPT | Performed by: FAMILY MEDICINE

## 2019-03-10 PROCEDURE — 68300004 ZZH PARTIAL TRAUMA W/O CC LEVEL III

## 2019-03-10 PROCEDURE — 72131 CT LUMBAR SPINE W/O DYE: CPT | Mod: TC

## 2019-03-10 PROCEDURE — 00000146 ZZHCL STATISTIC GLUCOSE BY METER IP

## 2019-03-10 PROCEDURE — 96374 THER/PROPH/DIAG INJ IV PUSH: CPT

## 2019-03-10 PROCEDURE — 85025 COMPLETE CBC W/AUTO DIFF WBC: CPT | Performed by: FAMILY MEDICINE

## 2019-03-10 PROCEDURE — 72125 CT NECK SPINE W/O DYE: CPT | Mod: TC

## 2019-03-10 PROCEDURE — 99284 EMERGENCY DEPT VISIT MOD MDM: CPT | Mod: Z6 | Performed by: FAMILY MEDICINE

## 2019-03-10 PROCEDURE — 36415 COLL VENOUS BLD VENIPUNCTURE: CPT | Performed by: FAMILY MEDICINE

## 2019-03-10 PROCEDURE — 70486 CT MAXILLOFACIAL W/O DYE: CPT | Mod: TC

## 2019-03-10 PROCEDURE — 80053 COMPREHEN METABOLIC PANEL: CPT | Performed by: FAMILY MEDICINE

## 2019-03-10 PROCEDURE — 96375 TX/PRO/DX INJ NEW DRUG ADDON: CPT

## 2019-03-10 PROCEDURE — 99285 EMERGENCY DEPT VISIT HI MDM: CPT | Mod: 25

## 2019-03-10 PROCEDURE — 25000128 H RX IP 250 OP 636: Performed by: FAMILY MEDICINE

## 2019-03-10 PROCEDURE — 72128 CT CHEST SPINE W/O DYE: CPT | Mod: TC

## 2019-03-10 RX ORDER — SODIUM CHLORIDE 9 MG/ML
1000 INJECTION, SOLUTION INTRAVENOUS CONTINUOUS
Status: DISCONTINUED | OUTPATIENT
Start: 2019-03-10 | End: 2019-03-10 | Stop reason: HOSPADM

## 2019-03-10 RX ORDER — MORPHINE SULFATE 4 MG/ML
2 INJECTION, SOLUTION INTRAMUSCULAR; INTRAVENOUS ONCE
Status: COMPLETED | OUTPATIENT
Start: 2019-03-10 | End: 2019-03-10

## 2019-03-10 RX ORDER — ONDANSETRON 2 MG/ML
4 INJECTION INTRAMUSCULAR; INTRAVENOUS ONCE
Status: COMPLETED | OUTPATIENT
Start: 2019-03-10 | End: 2019-03-10

## 2019-03-10 RX ADMIN — MORPHINE SULFATE 2 MG: 4 INJECTION INTRAVENOUS at 15:11

## 2019-03-10 RX ADMIN — ONDANSETRON 4 MG: 2 INJECTION INTRAMUSCULAR; INTRAVENOUS at 15:11

## 2019-03-10 RX ADMIN — SODIUM CHLORIDE 1000 ML: 9 INJECTION, SOLUTION INTRAVENOUS at 15:12

## 2019-03-10 ASSESSMENT — ENCOUNTER SYMPTOMS
ABDOMINAL PAIN: 1
ACTIVITY CHANGE: 1
NECK PAIN: 1
ARTHRALGIAS: 1
WEAKNESS: 0
BACK PAIN: 1
HEMATURIA: 0
PSYCHIATRIC NEGATIVE: 1
NUMBNESS: 1
SHORTNESS OF BREATH: 0
PALPITATIONS: 0
ROS GI COMMENTS: LEFT UPPER QUADRANT
DIARRHEA: 0
NAUSEA: 0
CONSTIPATION: 0
VOMITING: 0
DYSURIA: 0
FATIGUE: 1
FEVER: 0

## 2019-03-10 NOTE — ED NOTES
"Pt able to move left foot more and states \"the feeling is coming back\"  Ptable to feel sharp on top of right foot but unable to move toes.  Pulses palpible throughout.  Pt rates pain 3/10  "

## 2019-03-10 NOTE — ED AVS SNAPSHOT
[facility]  [address] [address]  Phone:  [phone]                                    Constantino Warren   MRN: [id number]    Department:  [facility]   Date of Visit:  3/10/2019           After Visit Summary Signature Page    I have received my discharge instructions, and my questions have been answered. I have discussed any challenges I see with this plan with the nurse or doctor.    ..........................................................................................................................................  Patient/Patient Representative Signature      ..........................................................................................................................................  Patient Representative Print Name and Relationship to Patient    ..................................................               ................................................  Date                                   Time    ..........................................................................................................................................  Reviewed by Signature/Title    ...................................................              ..............................................  Date                                               Time          22EPIC Rev 08/18

## 2019-03-10 NOTE — ED NOTES
Back from ct.  Pt placed on cardiac and resp monitors.  Pt reports tingling feeling in back left calf.

## 2019-03-11 NOTE — ED NOTES
Pt up walking in the halls. md updated. All discharge instructions and avs discussed with patient.  Pt able to verbalize home care, follow up and medication management.  All belongings sent home.  All questions answered.
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
## 2019-03-17 ENCOUNTER — APPOINTMENT (OUTPATIENT)
Dept: CT IMAGING | Facility: HOSPITAL | Age: 49
End: 2019-03-17
Attending: FAMILY MEDICINE
Payer: MEDICAID

## 2019-03-17 ENCOUNTER — HOSPITAL ENCOUNTER (EMERGENCY)
Facility: HOSPITAL | Age: 49
Discharge: HOME | End: 2019-03-18
Attending: FAMILY MEDICINE | Admitting: FAMILY MEDICINE
Payer: MEDICAID

## 2019-03-17 DIAGNOSIS — S06.0X9A CONCUSSION WITH LOSS OF CONSCIOUSNESS, INITIAL ENCOUNTER: ICD-10-CM

## 2019-03-17 DIAGNOSIS — S00.81XA FOREHEAD ABRASION, INITIAL ENCOUNTER: ICD-10-CM

## 2019-03-17 DIAGNOSIS — S09.90XA HEAD INJURY, INITIAL ENCOUNTER: ICD-10-CM
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
## 2019-03-17 LAB
BASOPHILS # BLD AUTO: 0 10E9/L (ref 0–0.2)
BASOPHILS NFR BLD AUTO: 0 %
DIFFERENTIAL METHOD BLD: ABNORMAL
EOSINOPHIL # BLD AUTO: 0 10E9/L (ref 0–0.7)
EOSINOPHIL NFR BLD AUTO: 0 %
ERYTHROCYTE [DISTWIDTH] IN BLOOD BY AUTOMATED COUNT: 22.5 % (ref 10–15)
HCT VFR BLD AUTO: 34 % (ref 35–47)
HGB BLD-MCNC: 10.6 G/DL (ref 11.7–15.7)
IMM GRANULOCYTES # BLD: 0 10E9/L (ref 0–0.4)
IMM GRANULOCYTES NFR BLD: 0.2 %
LYMPHOCYTES # BLD AUTO: 1.7 10E9/L (ref 0.8–5.3)
LYMPHOCYTES NFR BLD AUTO: 18.3 %
MCH RBC QN AUTO: 25.5 PG (ref 26.5–33)
MCHC RBC AUTO-ENTMCNC: 31.2 G/DL (ref 31.5–36.5)
MCV RBC AUTO: 82 FL (ref 78–100)
MONOCYTES # BLD AUTO: 0.9 10E9/L (ref 0–1.3)
MONOCYTES NFR BLD AUTO: 10.1 %
NEUTROPHILS # BLD AUTO: 6.6 10E9/L (ref 1.6–8.3)
NEUTROPHILS NFR BLD AUTO: 71.4 %
NRBC # BLD AUTO: 0 10*3/UL
NRBC BLD AUTO-RTO: 0 /100
PLATELET # BLD AUTO: 231 10E9/L (ref 150–450)
RBC # BLD AUTO: 4.15 10E12/L (ref 3.8–5.2)
WBC # BLD AUTO: 9.3 10E9/L (ref 4–11)

## 2019-03-17 PROCEDURE — 72125 CT NECK SPINE W/O DYE: CPT | Mod: TC

## 2019-03-17 PROCEDURE — 80053 COMPREHEN METABOLIC PANEL: CPT | Performed by: FAMILY MEDICINE

## 2019-03-17 PROCEDURE — 80320 DRUG SCREEN QUANTALCOHOLS: CPT | Performed by: FAMILY MEDICINE

## 2019-03-17 PROCEDURE — 70450 CT HEAD/BRAIN W/O DYE: CPT | Mod: TC

## 2019-03-17 PROCEDURE — 99284 EMERGENCY DEPT VISIT MOD MDM: CPT | Mod: Z6 | Performed by: FAMILY MEDICINE

## 2019-03-17 PROCEDURE — 85025 COMPLETE CBC W/AUTO DIFF WBC: CPT | Performed by: FAMILY MEDICINE

## 2019-03-17 PROCEDURE — 99284 EMERGENCY DEPT VISIT MOD MDM: CPT | Mod: 25

## 2019-03-17 PROCEDURE — 36415 COLL VENOUS BLD VENIPUNCTURE: CPT | Performed by: FAMILY MEDICINE

## 2019-03-17 NOTE — ED AVS SNAPSHOT
[facility]  [address] [address]  Phone:  [phone]                                    Constantino Warren   MRN: [id number]    Department:  [facility]   Date of Visit:  3/17/2019           After Visit Summary Signature Page    I have received my discharge instructions, and my questions have been answered. I have discussed any challenges I see with this plan with the nurse or doctor.    ..........................................................................................................................................  Patient/Patient Representative Signature      ..........................................................................................................................................  Patient Representative Print Name and Relationship to Patient    ..................................................               ................................................  Date                                   Time    ..........................................................................................................................................  Reviewed by Signature/Title    ...................................................              ..............................................  Date                                               Time          22EPIC Rev 08/18

## 2019-03-18 VITALS
SYSTOLIC BLOOD PRESSURE: 114 MMHG | WEIGHT: 155 LBS | TEMPERATURE: 97.1 F | HEART RATE: 70 BPM | BODY MASS INDEX: 25.02 KG/M2 | OXYGEN SATURATION: 97 % | DIASTOLIC BLOOD PRESSURE: 82 MMHG | RESPIRATION RATE: 16 BRPM

## 2019-03-18 LAB
ALBUMIN SERPL-MCNC: 2.9 G/DL (ref 3.4–5)
ALBUMIN UR-MCNC: 10 MG/DL
ALP SERPL-CCNC: 87 U/L (ref 40–150)
ALT SERPL W P-5'-P-CCNC: 26 U/L (ref 0–50)
AMPHETAMINES UR QL SCN: NEGATIVE
ANION GAP SERPL CALCULATED.3IONS-SCNC: 6 MMOL/L (ref 3–14)
APPEARANCE UR: CLEAR
AST SERPL W P-5'-P-CCNC: 27 U/L (ref 0–45)
BACTERIA #/AREA URNS HPF: ABNORMAL /HPF
BARBITURATES UR QL: NEGATIVE
BENZODIAZ UR QL: NEGATIVE
BILIRUB SERPL-MCNC: 0.2 MG/DL (ref 0.2–1.3)
BILIRUB UR QL STRIP: NEGATIVE
BUN SERPL-MCNC: 15 MG/DL (ref 7–30)
CALCIUM SERPL-MCNC: 7.8 MG/DL (ref 8.5–10.1)
CANNABINOIDS UR QL SCN: NEGATIVE
CHLORIDE SERPL-SCNC: 111 MMOL/L (ref 94–109)
CO2 SERPL-SCNC: 23 MMOL/L (ref 20–32)
COCAINE UR QL: NEGATIVE
COLOR UR AUTO: YELLOW
CREAT SERPL-MCNC: 0.64 MG/DL (ref 0.52–1.04)
ETHANOL SERPL-MCNC: <0.01 G/DL
GFR SERPL CREATININE-BSD FRML MDRD: >90 ML/MIN/{1.73_M2}
GLUCOSE SERPL-MCNC: 94 MG/DL (ref 70–99)
GLUCOSE UR STRIP-MCNC: 30 MG/DL
HGB UR QL STRIP: NEGATIVE
HYALINE CASTS #/AREA URNS LPF: 7 /LPF
KETONES UR STRIP-MCNC: NEGATIVE MG/DL
LEUKOCYTE ESTERASE UR QL STRIP: NEGATIVE
METHADONE UR QL SCN: NEGATIVE
MUCOUS THREADS #/AREA URNS LPF: PRESENT /LPF
NITRATE UR QL: NEGATIVE
OPIATES UR QL SCN: NEGATIVE
PCP UR QL SCN: NEGATIVE
PH UR STRIP: 5.5 PH (ref 4.7–8)
POTASSIUM SERPL-SCNC: 4.2 MMOL/L (ref 3.4–5.3)
PROT SERPL-MCNC: 6.4 G/DL (ref 6.8–8.8)
RBC #/AREA URNS AUTO: <1 /HPF (ref 0–2)
SODIUM SERPL-SCNC: 140 MMOL/L (ref 133–144)
SOURCE: ABNORMAL
SP GR UR STRIP: 1.03 (ref 1–1.03)
UROBILINOGEN UR STRIP-MCNC: 2 MG/DL (ref 0–2)
WBC #/AREA URNS AUTO: 1 /HPF (ref 0–5)

## 2019-03-18 PROCEDURE — 81001 URINALYSIS AUTO W/SCOPE: CPT | Mod: 59 | Performed by: FAMILY MEDICINE

## 2019-03-18 PROCEDURE — 80307 DRUG TEST PRSMV CHEM ANLYZR: CPT

## 2019-03-18 ASSESSMENT — ENCOUNTER SYMPTOMS
RESPIRATORY NEGATIVE: 1
CONSTITUTIONAL NEGATIVE: 1
CARDIOVASCULAR NEGATIVE: 1
GASTROINTESTINAL NEGATIVE: 1
EYES NEGATIVE: 1
MUSCULOSKELETAL NEGATIVE: 1
ENDOCRINE NEGATIVE: 1
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
## 2019-03-18 NOTE — ED NOTES
Patient and friend given verbal and written discharge instructions and both verbalize understanding. Patient left department ambulatory.

## 2019-03-18 NOTE — RESULT ENCOUNTER NOTE
Cervical spine CT w/o contrast  IMPRESSION:   Head CT: No evidence of acute intracranial abnormality.    Cervical spine CT: No evidence of acute or subacute bony abnormality.    Postoperative and mild degenerative changes of the cervical spine are  similar in appearance compared to the previous study.    This report is in agreement with the preliminary report    Report discussed with patient at visit and reflective of care plan at time of ED discharge.

## 2019-05-13 ENCOUNTER — APPOINTMENT (OUTPATIENT)
Dept: GENERAL RADIOLOGY | Facility: HOSPITAL | Age: 49
End: 2019-05-13
Attending: FAMILY MEDICINE
Payer: MEDICAID

## 2019-05-13 ENCOUNTER — HOSPITAL ENCOUNTER (EMERGENCY)
Facility: HOSPITAL | Age: 49
Discharge: HOME | End: 2019-05-13
Attending: FAMILY MEDICINE | Admitting: FAMILY MEDICINE
Payer: MEDICAID

## 2019-05-13 VITALS
TEMPERATURE: 96.6 F | SYSTOLIC BLOOD PRESSURE: 148 MMHG | OXYGEN SATURATION: 100 % | DIASTOLIC BLOOD PRESSURE: 88 MMHG | RESPIRATION RATE: 16 BRPM

## 2019-05-13 DIAGNOSIS — S46.002A ROTATOR CUFF INJURY, LEFT, INITIAL ENCOUNTER: Primary | ICD-10-CM

## 2019-05-13 DIAGNOSIS — S40.012A CONTUSION OF LEFT SHOULDER, INITIAL ENCOUNTER: ICD-10-CM

## 2019-05-13 PROCEDURE — 99283 EMERGENCY DEPT VISIT LOW MDM: CPT

## 2019-05-13 PROCEDURE — 73030 X-RAY EXAM OF SHOULDER: CPT | Mod: TC,LT

## 2019-05-13 PROCEDURE — 99283 EMERGENCY DEPT VISIT LOW MDM: CPT | Mod: Z6 | Performed by: FAMILY MEDICINE

## 2019-05-13 ASSESSMENT — ENCOUNTER SYMPTOMS
HEADACHES: 0
ABDOMINAL PAIN: 0
CONFUSION: 0
NECK STIFFNESS: 0
ARTHRALGIAS: 0
DIFFICULTY URINATING: 0
FEVER: 0
COLOR CHANGE: 0
EYE REDNESS: 0
CHOKING: 0
APNEA: 0
PALPITATIONS: 0

## 2019-05-13 NOTE — ED AVS SNAPSHOT
[facility]  [address] [address]  Phone:  [phone]                                    Constantino Warren   MRN: [id number]    Department:  [facility]   Date of Visit:  5/13/2019           After Visit Summary Signature Page    I have received my discharge instructions, and my questions have been answered. I have discussed any challenges I see with this plan with the nurse or doctor.    ..........................................................................................................................................  Patient/Patient Representative Signature      ..........................................................................................................................................  Patient Representative Print Name and Relationship to Patient    ..................................................               ................................................  Date                                   Time    ..........................................................................................................................................  Reviewed by Signature/Title    ...................................................              ..............................................  Date                                               Time          22EPIC Rev 08/18

## 2019-05-14 NOTE — ED PROVIDER NOTES
History     Chief Complaint   Patient presents with     Shoulder Pain     c/o fell and injury to lt shoulder yesterday. notes lt hand goes numb and tingling since the injury.      HPI  Constantino Warren is a 48 year old woman who tripped over her son's skateboard and fell onto her left shoulder. She has been able to move her shoulder forward and backward (anterior forward flexion, posterior extension), but notes acute pain with shoulder abduction. No head injury or LOC during the fall. No chest pain/shortness of breath, asymmetrical weakness/vision changes/gait instability or dizziness/presyncopal sensation before/during/after the fall.    Allergies:  Allergies   Allergen Reactions     Aspartame      Other reaction(s): Other - Describe In Comment Field, Seizures  convulsions     Bee Venom Anaphylaxis     Bupropion Anaphylaxis     Throat gets tight and hives. Denies allergic reaction     Food Anaphylaxis     Peppers-anaphylaxis if eaten, if touched hands swell and reddened     Ibuprofen Anaphylaxis     No Clinical Screening - See Comments Anaphylaxis     Peppers, patient reports green peppers close off her throat     Piper Anaphylaxis     Peppers--green, black, red, chili, etc. Derm symptoms, hives;  Strawberries--(cooked) Itching, breathing difficulties, GI symptoms. Mushrooms--GI symptoms, Hives.Spinach (cooked) throat irritation  (Listed on [facility] problem list.)     Fentanyl Swelling     Lips and tongue swelled.  Swelling of lips and tongue     Liquid Adhesive Dermatitis     blisters  Blisters  Silk tape and tegaderm is fine     Oxcarbazepine Other (See Comments)     Throat gets tight and hives. Denies allergic reaction  Throat gets tight and hives-       Prozac [Fluoxetine]      Other reaction(s): *Unknown  uncontrolled muscle spasms     Valproic Acid      Respiratory symptoms ([facility]). Denies allergic reaction     Ziprasidone      Throat gets tight and hives. Denies allergic  reaction       Problem List:    Patient Active Problem List    Diagnosis Date Noted     Anastomotic ulcer S/P gastric bypass 07/21/2018     Priority: Medium     Anemia 07/21/2018     Priority: Medium     Chest pain 07/20/2018     Priority: Medium     GI bleed 07/20/2018     Priority: Medium     Abdominal pain, epigastric 07/20/2018     Priority: Medium     Hypochromic microcytic anemia 07/20/2018     Priority: Medium     Syncope and collapse 09/26/2015     Priority: Medium     Hypertensive urgency 09/26/2015     Priority: Medium     Acute chest pain 09/26/2015     Priority: Medium     Concussion syndrome 09/26/2015     Priority: Medium     Hydronephrosis, right 04/20/2015     Priority: Medium     Pyelonephritis 04/20/2015     Priority: Medium     Urolithiasis 04/20/2015     Priority: Medium        Past Medical History:    Past Medical History:   Diagnosis Date     Chronic pain      Thyroid disease        Past Surgical History:    Past Surgical History:   Procedure Laterality Date     BACK SURGERY      2008; 2011     ESOPHAGOSCOPY, GASTROSCOPY, DUODENOSCOPY (EGD), COMBINED N/A 7/21/2018    Procedure: COMBINED ESOPHAGOSCOPY, GASTROSCOPY, DUODENOSCOPY (EGD);  UPPER ENDOSCOPY WITH BIOPSIES;  Surgeon: Roger Vazquez MD;  Location: [facility]     HEAD & NECK SURGERY  2008    c4-c5 fusion     LAPAROSCOPIC BYPASS GASTRIC  12/2010       Family History:    No family history on file.    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     Drug use: No        Medications:      cyanocobalamin (VITAMIN B12) 1000 MCG/ML injection   ferrous sulfate (IRON) 325 (65 Fe) MG tablet   levothyroxine (SYNTHROID/LEVOTHROID) 200 MCG tablet   levothyroxine (SYNTHROID/LEVOTHROID) 75 MCG tablet   multivitamin, therapeutic with minerals (MULTI-VITAMIN) TABS   omeprazole  (PRILOSEC) 40 MG capsule   sertraline (ZOLOFT) 100 MG tablet   acetaminophen (TYLENOL) 500 MG tablet   docusate sodium (COLACE) 100 MG tablet   EPINEPHrine (EPIPEN/ADRENACLICK/OR ANY BX GENERIC EQUIV) 0.3 MG/0.3ML injection 2-pack   MELATONIN PO   SUMAtriptan (IMITREX) 100 MG tablet         Review of Systems   Constitutional: Negative for fever.   HENT: Negative for congestion.    Eyes: Negative for redness.   Respiratory: Negative for apnea and choking.    Cardiovascular: Negative for palpitations and leg swelling.   Gastrointestinal: Negative for abdominal pain.   Genitourinary: Negative for difficulty urinating.   Musculoskeletal: Negative for arthralgias and neck stiffness.   Skin: Negative for color change.   Neurological: Negative for headaches.   Psychiatric/Behavioral: Negative for confusion.       Physical Exam   BP: 148/88  Heart Rate: 67  Temp: 96.6  F (35.9  C)  Resp: 16  SpO2: 100 %      Physical Exam      General: awake, alert, sitting comfortably    Head: atraumatic.    Ears: no drainage, external ears normal.    Eyes: no injection or discharge, non-icteric.    Nose: no discharge or congestion.    Mouth/Throat: moist mucous membranes.    Neck: supple, full & painless ROM.    Lungs: no retractions or acute respiratory distress. No tachypnea.    Extremities: bilateral shoulders with normal inspection, 2+/4+ pulses bilaterally, right shoulder with normal & painless ROM, non-tender, joint normal. Normal strength with left external rotation. 4/5 strength with internal rotation and 4/5 strength with shoulder abduction.    Skin: warm, dry, no cyanosis or rash.    Psych: alert & oriented x 3, fluid, logical thought & speech.          ED Course      Procedures           No results found for this or any previous visit (from the past 24 hour(s)).    Medications - No data to display    Assessments & Plan (with Medical Decision Making)   The patient is a 48 year old woman who presents with a fall to her left  shoulder resulting in a left shoulder contusion and rotator cuff injury.    1) Left shoulder contusion and rotator cuff injury - Patient will be treated with rest, cold compresses/heat application, shoulder sling and APAP every 6 hours as needed for pain management.      Plan for PCP follow-up in 5 - 7 days regarding this ER visit. The patient verbalizes agreement with this plan as well as to immediately return to the ER with any new/worsening S/Sx.        I have reviewed the nursing notes.    I have reviewed the findings, diagnosis, plan and need for follow up with the patient.       Medication List      There are no discharge medications for this visit.         Final diagnoses:   Contusion of left shoulder, initial encounter   Rotator cuff injury, left, initial encounter       5/13/2019   [facility]     Cirilo Morales MD  05/13/19 6740       Cirilo Morales MD  05/13/19 6911

## 2019-05-14 NOTE — ED NOTES
Supported left arm/shoulder on a pillow, ice pack applied to left shoulder and warm blanket given. Call bell in place.

## 2019-05-14 NOTE — ED NOTES
Discharge instructions reviewed with patient, and patient verbalized understanding. Sling applied to left arm. Pt ambulated with a steady gait to the exit.

## 2019-11-20 ENCOUNTER — APPOINTMENT (OUTPATIENT)
Dept: GENERAL RADIOLOGY | Facility: HOSPITAL | Age: 49
End: 2019-11-20
Attending: NURSE PRACTITIONER
Payer: COMMERCIAL

## 2019-11-20 ENCOUNTER — HOSPITAL ENCOUNTER (OUTPATIENT)
Facility: HOSPITAL | Age: 49
Setting detail: OBSERVATION
Discharge: HOME | End: 2019-11-21
Attending: NURSE PRACTITIONER | Admitting: SURGERY
Payer: COMMERCIAL

## 2019-11-20 ENCOUNTER — APPOINTMENT (OUTPATIENT)
Dept: CT IMAGING | Facility: HOSPITAL | Age: 49
End: 2019-11-20
Attending: NURSE PRACTITIONER
Payer: COMMERCIAL

## 2019-11-20 DIAGNOSIS — D64.9 ANEMIA: ICD-10-CM

## 2019-11-20 DIAGNOSIS — D50.9 HYPOCHROMIC MICROCYTIC ANEMIA: ICD-10-CM

## 2019-11-20 DIAGNOSIS — R55 SYNCOPE: ICD-10-CM

## 2019-11-20 DIAGNOSIS — E53.8 VITAMIN B 12 DEFICIENCY: Primary | ICD-10-CM

## 2019-11-20 PROBLEM — G89.4 CHRONIC PAIN DISORDER: Status: ACTIVE | Noted: 2018-01-18

## 2019-11-20 PROBLEM — F43.23 ADJUSTMENT DISORDER WITH MIXED ANXIETY AND DEPRESSED MOOD: Status: ACTIVE | Noted: 2017-12-15

## 2019-11-20 PROBLEM — Z79.891 LONG TERM (CURRENT) USE OF OPIATE ANALGESIC: Status: ACTIVE | Noted: 2018-01-12

## 2019-11-20 PROBLEM — G47.09 OTHER INSOMNIA: Status: ACTIVE | Noted: 2017-12-15

## 2019-11-20 LAB
ABO + RH BLD: NORMAL
ABO + RH BLD: NORMAL
ALBUMIN SERPL-MCNC: 2.8 G/DL (ref 3.4–5)
ALP SERPL-CCNC: 125 U/L (ref 40–150)
ALT SERPL W P-5'-P-CCNC: 17 U/L (ref 0–50)
ANION GAP SERPL CALCULATED.3IONS-SCNC: 5 MMOL/L (ref 3–14)
AST SERPL W P-5'-P-CCNC: 20 U/L (ref 0–45)
BASOPHILS # BLD AUTO: 0 10E9/L (ref 0–0.2)
BASOPHILS NFR BLD AUTO: 0 %
BILIRUB SERPL-MCNC: 0.4 MG/DL (ref 0.2–1.3)
BLD GP AB SCN SERPL QL: NORMAL
BLD PROD TYP BPU: NORMAL
BLD UNIT ID BPU: 0
BLOOD BANK CMNT PATIENT-IMP: NORMAL
BLOOD PRODUCT CODE: NORMAL
BPU ID: NORMAL
BUN SERPL-MCNC: 18 MG/DL (ref 7–30)
CALCIUM SERPL-MCNC: 8.2 MG/DL (ref 8.5–10.1)
CHLORIDE SERPL-SCNC: 113 MMOL/L (ref 94–109)
CO2 SERPL-SCNC: 24 MMOL/L (ref 20–32)
CREAT SERPL-MCNC: 0.66 MG/DL (ref 0.52–1.04)
D DIMER PPP FEU-MCNC: 0.6 UG/ML FEU (ref 0–0.5)
DIFFERENTIAL METHOD BLD: ABNORMAL
EOSINOPHIL # BLD AUTO: 0 10E9/L (ref 0–0.7)
EOSINOPHIL NFR BLD AUTO: 0.3 %
ERYTHROCYTE [DISTWIDTH] IN BLOOD BY AUTOMATED COUNT: 16.9 % (ref 10–15)
GFR SERPL CREATININE-BSD FRML MDRD: >90 ML/MIN/{1.73_M2}
GLUCOSE SERPL-MCNC: 84 MG/DL (ref 70–99)
HCT VFR BLD AUTO: 20.6 % (ref 35–47)
HEMOCCULT SP1 STL QL: POSITIVE
HGB BLD-MCNC: 5.7 G/DL (ref 11.7–15.7)
IMM GRANULOCYTES # BLD: 0 10E9/L (ref 0–0.4)
IMM GRANULOCYTES NFR BLD: 0.3 %
LACTATE BLD-SCNC: 1 MMOL/L (ref 0.7–2)
LYMPHOCYTES # BLD AUTO: 1.3 10E9/L (ref 0.8–5.3)
LYMPHOCYTES NFR BLD AUTO: 18.7 %
MCH RBC QN AUTO: 18.3 PG (ref 26.5–33)
MCHC RBC AUTO-ENTMCNC: 27.7 G/DL (ref 31.5–36.5)
MCV RBC AUTO: 66 FL (ref 78–100)
MONOCYTES # BLD AUTO: 0.6 10E9/L (ref 0–1.3)
MONOCYTES NFR BLD AUTO: 8.7 %
NEUTROPHILS # BLD AUTO: 4.9 10E9/L (ref 1.6–8.3)
NEUTROPHILS NFR BLD AUTO: 72 %
NRBC # BLD AUTO: 0 10*3/UL
NRBC BLD AUTO-RTO: 0 /100
NUM BPU REQUESTED: 3
PLATELET # BLD AUTO: 284 10E9/L (ref 150–450)
POTASSIUM SERPL-SCNC: 4.3 MMOL/L (ref 3.4–5.3)
PROT SERPL-MCNC: 6.4 G/DL (ref 6.8–8.8)
RBC # BLD AUTO: 3.11 10E12/L (ref 3.8–5.2)
SODIUM SERPL-SCNC: 142 MMOL/L (ref 133–144)
SPECIMEN EXP DATE BLD: NORMAL
TRANSFUSION STATUS PATIENT QL: NORMAL
TROPONIN I SERPL-MCNC: <0.015 UG/L (ref 0–0.04)
WBC # BLD AUTO: 6.8 10E9/L (ref 4–11)

## 2019-11-20 PROCEDURE — 99284 EMERGENCY DEPT VISIT MOD MDM: CPT | Mod: Z6 | Performed by: NURSE PRACTITIONER

## 2019-11-20 PROCEDURE — 70450 CT HEAD/BRAIN W/O DYE: CPT | Mod: TC

## 2019-11-20 PROCEDURE — 36415 COLL VENOUS BLD VENIPUNCTURE: CPT | Performed by: NURSE PRACTITIONER

## 2019-11-20 PROCEDURE — 84484 ASSAY OF TROPONIN QUANT: CPT | Performed by: NURSE PRACTITIONER

## 2019-11-20 PROCEDURE — 80053 COMPREHEN METABOLIC PANEL: CPT | Performed by: NURSE PRACTITIONER

## 2019-11-20 PROCEDURE — P9016 RBC LEUKOCYTES REDUCED: HCPCS | Performed by: NURSE PRACTITIONER

## 2019-11-20 PROCEDURE — 73502 X-RAY EXAM HIP UNI 2-3 VIEWS: CPT | Mod: TC

## 2019-11-20 PROCEDURE — 36430 TRANSFUSION BLD/BLD COMPNT: CPT

## 2019-11-20 PROCEDURE — 93010 ELECTROCARDIOGRAM REPORT: CPT | Performed by: INTERNAL MEDICINE

## 2019-11-20 PROCEDURE — 86920 COMPATIBILITY TEST SPIN: CPT | Performed by: NURSE PRACTITIONER

## 2019-11-20 PROCEDURE — 99220 ZZC INITIAL OBSERVATION CARE,LEVL III: CPT | Performed by: NURSE PRACTITIONER

## 2019-11-20 PROCEDURE — 25000132 ZZH RX MED GY IP 250 OP 250 PS 637: Mod: GY | Performed by: NURSE PRACTITIONER

## 2019-11-20 PROCEDURE — 99285 EMERGENCY DEPT VISIT HI MDM: CPT | Mod: 25

## 2019-11-20 PROCEDURE — 86901 BLOOD TYPING SEROLOGIC RH(D): CPT | Performed by: NURSE PRACTITIONER

## 2019-11-20 PROCEDURE — 83605 ASSAY OF LACTIC ACID: CPT | Performed by: NURSE PRACTITIONER

## 2019-11-20 PROCEDURE — G0378 HOSPITAL OBSERVATION PER HR: HCPCS

## 2019-11-20 PROCEDURE — 86900 BLOOD TYPING SEROLOGIC ABO: CPT | Performed by: NURSE PRACTITIONER

## 2019-11-20 PROCEDURE — 93005 ELECTROCARDIOGRAM TRACING: CPT

## 2019-11-20 PROCEDURE — 25000128 H RX IP 250 OP 636: Performed by: NURSE PRACTITIONER

## 2019-11-20 PROCEDURE — 85379 FIBRIN DEGRADATION QUANT: CPT | Performed by: NURSE PRACTITIONER

## 2019-11-20 PROCEDURE — 25800030 ZZH RX IP 258 OP 636: Performed by: NURSE PRACTITIONER

## 2019-11-20 PROCEDURE — 82274 ASSAY TEST FOR BLOOD FECAL: CPT | Performed by: NURSE PRACTITIONER

## 2019-11-20 PROCEDURE — 86850 RBC ANTIBODY SCREEN: CPT | Performed by: NURSE PRACTITIONER

## 2019-11-20 PROCEDURE — 85025 COMPLETE CBC W/AUTO DIFF WBC: CPT | Performed by: NURSE PRACTITIONER

## 2019-11-20 RX ORDER — ACETAMINOPHEN 650 MG/1
650 SUPPOSITORY RECTAL EVERY 4 HOURS PRN
Status: DISCONTINUED | OUTPATIENT
Start: 2019-11-20 | End: 2019-11-21 | Stop reason: HOSPADM

## 2019-11-20 RX ORDER — ONDANSETRON 2 MG/ML
4 INJECTION INTRAMUSCULAR; INTRAVENOUS EVERY 6 HOURS PRN
Status: DISCONTINUED | OUTPATIENT
Start: 2019-11-20 | End: 2019-11-21 | Stop reason: HOSPADM

## 2019-11-20 RX ORDER — CYANOCOBALAMIN 1000 UG/ML
1000 INJECTION, SOLUTION INTRAMUSCULAR; SUBCUTANEOUS ONCE
Status: COMPLETED | OUTPATIENT
Start: 2019-11-20 | End: 2019-11-20

## 2019-11-20 RX ORDER — SODIUM CHLORIDE 9 MG/ML
1000 INJECTION, SOLUTION INTRAVENOUS CONTINUOUS
Status: DISCONTINUED | OUTPATIENT
Start: 2019-11-20 | End: 2019-11-20

## 2019-11-20 RX ORDER — NALOXONE HYDROCHLORIDE 0.4 MG/ML
.1-.4 INJECTION, SOLUTION INTRAMUSCULAR; INTRAVENOUS; SUBCUTANEOUS
Status: DISCONTINUED | OUTPATIENT
Start: 2019-11-20 | End: 2019-11-21 | Stop reason: HOSPADM

## 2019-11-20 RX ORDER — ACETAMINOPHEN 325 MG/1
650 TABLET ORAL EVERY 4 HOURS PRN
Status: DISCONTINUED | OUTPATIENT
Start: 2019-11-20 | End: 2019-11-21 | Stop reason: HOSPADM

## 2019-11-20 RX ORDER — HYDROCODONE BITARTRATE AND ACETAMINOPHEN 5; 325 MG/1; MG/1
1 TABLET ORAL ONCE
Status: COMPLETED | OUTPATIENT
Start: 2019-11-20 | End: 2019-11-20

## 2019-11-20 RX ORDER — ONDANSETRON 4 MG/1
4 TABLET, ORALLY DISINTEGRATING ORAL EVERY 6 HOURS PRN
Status: DISCONTINUED | OUTPATIENT
Start: 2019-11-20 | End: 2019-11-21 | Stop reason: HOSPADM

## 2019-11-20 RX ADMIN — SODIUM CHLORIDE 1000 ML: 9 INJECTION, SOLUTION INTRAVENOUS at 15:43

## 2019-11-20 RX ADMIN — ACETAMINOPHEN 650 MG: 325 TABLET, FILM COATED ORAL at 20:21

## 2019-11-20 RX ADMIN — HYDROCODONE BITARTRATE AND ACETAMINOPHEN 1 TABLET: 5; 325 TABLET ORAL at 14:07

## 2019-11-20 RX ADMIN — CYANOCOBALAMIN 1000 MCG: 1000 INJECTION, SOLUTION INTRAMUSCULAR at 19:22

## 2019-11-20 RX ADMIN — SODIUM CHLORIDE 1000 ML: 9 INJECTION, SOLUTION INTRAVENOUS at 14:07

## 2019-11-20 ASSESSMENT — ENCOUNTER SYMPTOMS
ARTHRALGIAS: 1
FATIGUE: 1
NECK PAIN: 0
RESPIRATORY NEGATIVE: 1
BACK PAIN: 0
GASTROINTESTINAL NEGATIVE: 1
PSYCHIATRIC NEGATIVE: 1
HEADACHES: 1

## 2019-11-20 ASSESSMENT — MIFFLIN-ST. JEOR: SCORE: 1400.84

## 2019-11-20 NOTE — H&P
[facility]    History and Physical  Hospitalist       Date of Admission:  11/20/2019  Date of Service (when I saw the patient): 11/20/19    Assessment & Plan       Severe anemia: Fatigue for the last week, syncope today. Hemoglobin 5.7 in ED. Prior history of chronic anemia as well as antral ulcer. She is supposed to get B12 shots monthly but has not done so in 3 months. Has been taking iron supplements. She has had some chest heaviness with exertion today, otherwise denies any associated symptoms. Vitals are very stable, not an acute bleeding event. However with her history of ulcer 1 year ago,consulted surgery, will get EGD done in AM. NPO at midnight.      Syncope: Due to anemia. She did hit her left temple and her left hip in the fall but denies any pain to either areas. CT scan head negative for acute bleed.        Chronic Hypochromic microcytic anemia: Due to gastric bypass and poor absorption. She is on supplementation but historically frequently misses her b12 injections and has not had one in 3 months.       DVT Prophylaxis: Pneumatic Compression Devices  Code Status: Full Code    Disposition: Expected discharge in 1-2 days once EGD clear for acute bleeding.    Emma Gonzalez, CNP    Primary Care Physician   Coco Schafer    Chief Complaint   Syncope    History is obtained from the patient    History of Present Illness   Constantino Warren is a 49 year old female who presents with syncope. She was at work and walking across the gym floor and next thing she knew she woke up on the floor. She has noted generalized fatigue over the last week. She has also noted some intermittent chest pressure with activities but mild. Prior today she denies any dizziness, palpitations. She has a prior history of antral ulcer with GI bleed 1 year ago. She denies any N/V/diarrhea.     Past Medical History    I have reviewed this patient's medical history and updated it with pertinent information if needed.    Past Medical History:   Diagnosis Date     Chronic pain      Thyroid disease        Past Surgical History   I have reviewed this patient's surgical history and updated it with pertinent information if needed.  Past Surgical History:   Procedure Laterality Date     BACK SURGERY      2008; 2011     ESOPHAGOSCOPY, GASTROSCOPY, DUODENOSCOPY (EGD), COMBINED N/A 7/21/2018    Procedure: COMBINED ESOPHAGOSCOPY, GASTROSCOPY, DUODENOSCOPY (EGD);  UPPER ENDOSCOPY WITH BIOPSIES;  Surgeon: Roger Vazquez MD;  Location: [facility] OR     HEAD & NECK SURGERY  2008    c4-c5 fusion     LAPAROSCOPIC BYPASS GASTRIC  12/2010       Prior to Admission Medications   Prior to Admission Medications   Prescriptions Last Dose Informant Patient Reported? Taking?   EPINEPHrine (EPIPEN/ADRENACLICK/OR ANY BX GENERIC EQUIV) 0.3 MG/0.3ML injection 2-pack Unknown at Unknown time Self Yes No   Sig: Inject 0.3 mg into the muscle as needed    MELATONIN PO Past Month at Unknown time Self Yes Yes   Sig: Take 7.5 mg by mouth nightly as needed   acetaminophen (TYLENOL) 500 MG tablet Unknown at Unknown time Self Yes No   Sig: Take 1,000 mg by mouth every 6 hours as needed Max acetaminophen dose: 4000 mg in 24 hours   cyanocobalamin (VITAMIN B12) 1000 MCG/ML injection More than a month at Unknown time Self Yes No   Sig: Inject 1,000 mcg into the muscle Every 4 weeks   ferrous sulfate (IRON) 325 (65 Fe) MG tablet 11/20/2019 at Unknown time Self No Yes   Sig: Take 1 tablet (325 mg) by mouth 2 times daily   levothyroxine (SYNTHROID/LEVOTHROID) 200 MCG tablet 11/20/2019 at Unknown time Self Yes Yes   Sig: Take 200 mcg by mouth every morning (before breakfast) Take in addition to 75 mcg for a total of 275 mcg daily   multivitamin, therapeutic with minerals (MULTI-VITAMIN) TABS Unknown at Unknown time Self Yes No   Sig: Take 1 tablet by mouth daily   sertraline (ZOLOFT) 100 MG tablet 11/20/2019 at Unknown time Self Yes Yes   Sig: Take 100 mg by mouth daily    tiZANidine (ZANAFLEX) 4 MG tablet 11/19/2019 at Unknown time  Yes Yes   Sig: Take 4 mg by mouth At Bedtime      Facility-Administered Medications: None     Allergies   Allergies   Allergen Reactions     Aspartame      Other reaction(s): Other - Describe In Comment Field, Seizures  convulsions     Bee Venom Anaphylaxis     Bupropion Anaphylaxis     Throat gets tight and hives. Denies allergic reaction     Food Anaphylaxis     Peppers-anaphylaxis if eaten, if touched hands swell and reddened     Ibuprofen Anaphylaxis     No Clinical Screening - See Comments Anaphylaxis     Peppers, patient reports green peppers close off her throat     Piper Anaphylaxis     Peppers--green, black, red, chili, etc. Derm symptoms, hives;  Strawberries--(cooked) Itching, breathing difficulties, GI symptoms. Mushrooms--GI symptoms, Hives.Spinach (cooked) throat irritation  (Listed on [facility] problem list.)     Fentanyl Swelling     Lips and tongue swelled.  Swelling of lips and tongue     Liquid Adhesive Dermatitis     blisters  Blisters  Silk tape and tegaderm is fine     Oxcarbazepine Other (See Comments)     Throat gets tight and hives. Denies allergic reaction  Throat gets tight and hives-       Prozac [Fluoxetine]      Other reaction(s): *Unknown  uncontrolled muscle spasms     Valproic Acid      Respiratory symptoms ([facility] Denies allergic reaction     Ziprasidone      Throat gets tight and hives. Denies allergic reaction       Social History   I have reviewed this patient's social history and updated it with pertinent information if needed. Constantino Warren  reports that she has never smoked. She has never used smokeless tobacco. She reports that she does not drink alcohol or use drugs.    Family History   I have reviewed this patient's family history and updated it with pertinent information if needed.   History reviewed. No pertinent family history.    Review of Systems   CONSTITUTIONAL:   positive for  fatigue  negative for  fevers, chills and sweats  HEENT:  negative for  tinnitus, earaches, nasal congestion and sore throat  RESPIRATORY:  positive for  chest pain  negative for  dry cough, cough with sputum, dyspnea and pleuritic pain  CARDIOVASCULAR:  positive for  exertional chest pressure/discomfort  negative for  dyspnea, palpitations, early saiety, edema  GASTROINTESTINAL:  negative for nausea, vomiting, constipation and abdominal pain  MUSCULOSKELETAL:  negative for  myalgias and arthralgias  NEUROLOGICAL:  positive for headaches and syncope  negative for dizziness, memory problems, speech problems, gait problems and dysphagia      Physical Exam       BP: 115/75 Pulse: 84 Heart Rate: 85 Resp: 14 SpO2: 99 % O2 Device: None (Room air)    Vital Signs with Ranges  Pulse:  [75-86] 84  Heart Rate:  [75-85] 85  Resp:  [14-16] 14  BP: (108-126)/(70-80) 115/75  SpO2:  [99 %] 99 %  0 lbs 0 oz    Constitutional: Awake,alert, no acute distress at rest  HEENT: Normocephalic, mucous membranes pale, moist.  Respiratory: Clear bilaterally, no wheezes,crackles, murmurs.  Cardiovascular: HRR, no murmurs, rubs,thrills  GI: Soft, nontender, bowel sounds positive.   Skin: Pale, no open areas, unusual bruising or rashes.   Musculoskeletal: Moves from cart to bed without problems. No painful joints.   Neurologic: AOX3, follows commands easily, no memory changes.       Data   Data reviewed today:  I personally reviewed .  Recent Labs   Lab 11/20/19  1349   WBC 6.8   HGB 5.7*   MCV 66*         POTASSIUM 4.3   CHLORIDE 113*   CO2 24   BUN 18   CR 0.66   ANIONGAP 5   LOKESH 8.2*   GLC 84   ALBUMIN 2.8*   PROTTOTAL 6.4*   BILITOTAL 0.4   ALKPHOS 125   ALT 17   AST 20   TROPI <0.015       Recent Results (from the past 24 hour(s))   XR Pelvis including Hip Left 2-3 Views    Narrative    XR PELVIS AND HIP LEFT 2 VIEWS, 11/20/2019 2:22 PM    History: Female, age 49 years; fall  pain to left hip    Comparison:  7/23/2018    Technique: Single view the pelvis and two views of the left hip were  obtained.    FINDINGS: The bones are normally mineralized. The bony pelvis and  visualized portions of the hip appear grossly intact. No evidence of  acute fracture or acute dislocation.      Impression    IMPRESSION:   1.  No distinct evidence of acute or subacute bony abnormality.     2.  Mild degenerative changes are again seen within the symphysis  pubis and both hips.    NELSON PERKINS MD   CT Head w/o Contrast    Narrative    PROCEDURE: CT HEAD W/O CONTRAST   11/20/2019 2:31 PM    HISTORY:Female, age,  49 years, , , Altered level of consciousness  (LOC), unexplained    COMPARISON: Head CT 3/18/2019    TECHNIQUE: CT of the brain without contrast.    FINDINGS: Ventricles and sulci are normal in size and shape. Gray and  white matter demonstrate scattered subtle areas of decreased density.    There is no evidence of mass, mass effect or midline shift. No  evidence of acute hemorrhage.    The bones are unremarkable. No fracture.       Impression    IMPRESSION:   No acute intracranial abnormality.  No acute fracture.     Nonspecific white matter changes suggest small vessel disease.    NELSON PERKINS MD

## 2019-11-20 NOTE — PLAN OF CARE
PTA med list reviewed with patient. Called [facility] to have them send over her med report and will double check everything in the morning.     Patient says she is only taking 200 mcg's of synthroid because it was lowered.     She is no longer taking colace.     She takes a higher dose of iron.     Adriana Paula on 11/20/2019 at 4:08 PM

## 2019-11-20 NOTE — ED PROVIDER NOTES
History     Chief Complaint   Patient presents with     Loss of Consciousness     syncopal episode while walking and hit head. denies neck pain. squeezing chest pain that intensifies with inspiration. no n/v/d. some dizziness. states she takes iron for anemia d/t gastric bypass in 2010     HPI  Constantino Warren is a 49 year old female who who presents today after having a syncopal episode at work.  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  Reports she had the left side of her head and she does have a headache, and her left hip and has pain to her hip.  Patient reports she has had a headache for approximately a month, has had intermittent lightheadedness, and intermittent light chest tightness.  Patient has had gastric bypass surgery and reports she does have a history of anemia, and has had to receive blood transfusions in the past.  She is unsure how long she was unconscious.  The kids ran and got help.  She denies fever or chills, denies abdominal pain, nausea, vomiting, diarrhea.  She denies dark stools.    Allergies:  Allergies   Allergen Reactions     Aspartame      Other reaction(s): Other - Describe In Comment Field, Seizures  convulsions     Bee Venom Anaphylaxis     Bupropion Anaphylaxis     Throat gets tight and hives. Denies allergic reaction     Food Anaphylaxis     Peppers-anaphylaxis if eaten, if touched hands swell and reddened     Ibuprofen Anaphylaxis     No Clinical Screening - See Comments Anaphylaxis     Peppers, patient reports green peppers close off her throat     Piper Anaphylaxis     Peppers--green, black, red, chili, etc. Derm symptoms, hives;  Strawberries--(cooked) Itching, breathing difficulties, GI symptoms. Mushrooms--GI symptoms, Hives.Spinach (cooked) throat irritation  (Listed on [facility] problem list.)     Fentanyl Swelling     Lips and tongue swelled.  Swelling of lips and  tongue     Liquid Adhesive Dermatitis     blisters  Blisters  Silk tape and tegaderm is fine     Oxcarbazepine Other (See Comments)     Throat gets tight and hives. Denies allergic reaction  Throat gets tight and hives-       Prozac [Fluoxetine]      Other reaction(s): *Unknown  uncontrolled muscle spasms     Valproic Acid      Respiratory symptoms ([facility]). Denies allergic reaction     Ziprasidone      Throat gets tight and hives. Denies allergic reaction       Problem List:    Patient Active Problem List    Diagnosis Date Noted     Anastomotic ulcer S/P gastric bypass 07/21/2018     Priority: Medium     Anemia 07/21/2018     Priority: Medium     Chest pain 07/20/2018     Priority: Medium     GI bleed 07/20/2018     Priority: Medium     Abdominal pain, epigastric 07/20/2018     Priority: Medium     Hypochromic microcytic anemia 07/20/2018     Priority: Medium     Syncope and collapse 09/26/2015     Priority: Medium     Hypertensive urgency 09/26/2015     Priority: Medium     Acute chest pain 09/26/2015     Priority: Medium     Concussion syndrome 09/26/2015     Priority: Medium     Hydronephrosis, right 04/20/2015     Priority: Medium     Pyelonephritis 04/20/2015     Priority: Medium     Urolithiasis 04/20/2015     Priority: Medium        Past Medical History:    Past Medical History:   Diagnosis Date     Chronic pain      Thyroid disease        Past Surgical History:    Past Surgical History:   Procedure Laterality Date     BACK SURGERY      2008; 2011     ESOPHAGOSCOPY, GASTROSCOPY, DUODENOSCOPY (EGD), COMBINED N/A 7/21/2018    Procedure: COMBINED ESOPHAGOSCOPY, GASTROSCOPY, DUODENOSCOPY (EGD);  UPPER ENDOSCOPY WITH BIOPSIES;  Surgeon: Roger Vazquez MD;  Location: [facility] OR     HEAD & NECK SURGERY  2008    c4-c5 fusion     LAPAROSCOPIC BYPASS GASTRIC  12/2010       Family History:    History reviewed. No pertinent family history.    Social History:  Marital Status:   [2]  Social History      Tobacco Use     Smoking status: Never Smoker     Smokeless tobacco: Never Used   Substance Use Topics     Alcohol use: No     Comment: sober for 18 years     Drug use: No        Medications:    ferrous sulfate (IRON) 325 (65 Fe) MG tablet  levothyroxine (SYNTHROID/LEVOTHROID) 200 MCG tablet  MELATONIN PO  sertraline (ZOLOFT) 100 MG tablet  tiZANidine (ZANAFLEX) 4 MG tablet  acetaminophen (TYLENOL) 500 MG tablet  cyanocobalamin (VITAMIN B12) 1000 MCG/ML injection  docusate sodium (COLACE) 100 MG tablet  EPINEPHrine (EPIPEN/ADRENACLICK/OR ANY BX GENERIC EQUIV) 0.3 MG/0.3ML injection 2-pack  multivitamin, therapeutic with minerals (MULTI-VITAMIN) TABS          Review of Systems   Constitutional: Positive for fatigue.   Respiratory: Negative.    Cardiovascular: Positive for chest pain (Intermittent chest tightness).   Gastrointestinal: Negative.    Genitourinary: Negative.    Musculoskeletal: Positive for arthralgias (Left hip pain). Negative for back pain and neck pain.   Neurological: Positive for syncope and headaches.   Psychiatric/Behavioral: Negative.        Physical Exam   BP: 125/79  Heart Rate: 75  Resp: 16  SpO2: 99 %      Physical Exam  Constitutional:       General: She is not in acute distress.     Appearance: She is normal weight.   HENT:      Head: Atraumatic.      Right Ear: Tympanic membrane normal.      Left Ear: Tympanic membrane normal.      Nose: Nose normal.      Mouth/Throat:      Mouth: Mucous membranes are moist.      Pharynx: Oropharynx is clear.   Eyes:      Extraocular Movements: Extraocular movements intact.      Pupils: Pupils are equal, round, and reactive to light.   Neck:      Musculoskeletal: Normal range of motion and neck supple. No neck rigidity or muscular tenderness.   Cardiovascular:      Rate and Rhythm: Normal rate and regular rhythm.      Pulses: Normal pulses.      Heart sounds: Normal heart sounds.   Pulmonary:      Effort: Pulmonary effort is normal.      Breath sounds:  Normal breath sounds.   Abdominal:      General: Abdomen is protuberant. Bowel sounds are normal.      Palpations: Abdomen is soft.      Tenderness: There is no abdominal tenderness. There is no right CVA tenderness or left CVA tenderness.   Musculoskeletal:         General: Tenderness (Left hip) present. No swelling or deformity.      Right lower leg: No edema.      Left lower leg: No edema.   Skin:     General: Skin is warm and dry.      Capillary Refill: Capillary refill takes less than 2 seconds.   Neurological:      General: No focal deficit present.      Mental Status: She is alert and oriented to person, place, and time.   Psychiatric:         Mood and Affect: Mood normal.         Behavior: Behavior normal.         ED Course     ED Course as of Nov 20 1805   Wed Nov 20, 2019   1438 Discussed pt with Dr Vu, she accepts admission      1445 Informed pt of all results and plan for admission for blood transfusion and evaluation of cause of anemia ie endoscopy. Pt agrees with plan.         Procedures               EKG Interpretation:      Interpreted by Nany Woodard NP  Time reviewed: 1330  Symptoms at time of EKG: none   Rhythm: normal sinus   Rate: normal  Axis: normal  Ectopy: none  Conduction: normal  ST Segments/ T Waves: No ST-T wave changes  Q Waves: none  Comparison to prior: Unchanged    Clinical Impression: normal EKG                   Results for orders placed or performed during the hospital encounter of 11/20/19 (from the past 24 hour(s))   Immunos occult blood   Result Value Ref Range    Occult Blood Slide 1 Positive (A) NEG^Negative   CBC with platelets differential   Result Value Ref Range    WBC 6.8 4.0 - 11.0 10e9/L    RBC Count 3.11 (L) 3.8 - 5.2 10e12/L    Hemoglobin 5.7 (LL) 11.7 - 15.7 g/dL    Hematocrit 20.6 (L) 35.0 - 47.0 %    MCV 66 (L) 78 - 100 fl    MCH 18.3 (L) 26.5 - 33.0 pg    MCHC 27.7 (L) 31.5 - 36.5 g/dL    RDW 16.9 (H) 10.0 - 15.0 %    Platelet Count 284 150 - 450 10e9/L     Diff Method Automated Method     % Neutrophils 72.0 %    % Lymphocytes 18.7 %    % Monocytes 8.7 %    % Eosinophils 0.3 %    % Basophils 0.0 %    % Immature Granulocytes 0.3 %    Nucleated RBCs 0 0 /100    Absolute Neutrophil 4.9 1.6 - 8.3 10e9/L    Absolute Lymphocytes 1.3 0.8 - 5.3 10e9/L    Absolute Monocytes 0.6 0.0 - 1.3 10e9/L    Absolute Eosinophils 0.0 0.0 - 0.7 10e9/L    Absolute Basophils 0.0 0.0 - 0.2 10e9/L    Abs Immature Granulocytes 0.0 0 - 0.4 10e9/L    Absolute Nucleated RBC 0.0    Comprehensive metabolic panel   Result Value Ref Range    Sodium 142 133 - 144 mmol/L    Potassium 4.3 3.4 - 5.3 mmol/L    Chloride 113 (H) 94 - 109 mmol/L    Carbon Dioxide 24 20 - 32 mmol/L    Anion Gap 5 3 - 14 mmol/L    Glucose 84 70 - 99 mg/dL    Urea Nitrogen 18 7 - 30 mg/dL    Creatinine 0.66 0.52 - 1.04 mg/dL    GFR Estimate >90 >60 mL/min/[1.73_m2]    GFR Estimate If Black >90 >60 mL/min/[1.73_m2]    Calcium 8.2 (L) 8.5 - 10.1 mg/dL    Bilirubin Total 0.4 0.2 - 1.3 mg/dL    Albumin 2.8 (L) 3.4 - 5.0 g/dL    Protein Total 6.4 (L) 6.8 - 8.8 g/dL    Alkaline Phosphatase 125 40 - 150 U/L    ALT 17 0 - 50 U/L    AST 20 0 - 45 U/L   D dimer quantitative   Result Value Ref Range    D Dimer 0.6 (H) 0.0 - 0.50 ug/ml FEU   Lactic acid whole blood   Result Value Ref Range    Lactic Acid 1.0 0.7 - 2.0 mmol/L   Troponin I   Result Value Ref Range    Troponin I ES <0.015 0.000 - 0.045 ug/L   ABO/Rh type and screen   Result Value Ref Range    Units Ordered 2     ABO PENDING     Antibody Screen PENDING     Test Valid Only At [facility]        Specimen Expires 11/23/2019     Crossmatch Red Blood Cells    XR Pelvis including Hip Left 2-3 Views    Narrative    XR PELVIS AND HIP LEFT 2 VIEWS, 11/20/2019 2:22 PM    History: Female, age 49 years; fall  pain to left hip    Comparison: 7/23/2018    Technique: Single view the pelvis and two views of the left hip were  obtained.    FINDINGS: The bones are normally  mineralized. The bony pelvis and  visualized portions of the hip appear grossly intact. No evidence of  acute fracture or acute dislocation.      Impression    IMPRESSION:   1.  No distinct evidence of acute or subacute bony abnormality.     2.  Mild degenerative changes are again seen within the symphysis  pubis and both hips.    NELSON PERKINS MD   CT Head w/o Contrast    Narrative    PROCEDURE: CT HEAD W/O CONTRAST   11/20/2019 2:31 PM    HISTORY:Female, age,  49 years, , , Altered level of consciousness  (LOC), unexplained    COMPARISON: Head CT 3/18/2019    TECHNIQUE: CT of the brain without contrast.    FINDINGS: Ventricles and sulci are normal in size and shape. Gray and  white matter demonstrate scattered subtle areas of decreased density.    There is no evidence of mass, mass effect or midline shift. No  evidence of acute hemorrhage.    The bones are unremarkable. No fracture.       Impression    IMPRESSION:   No acute intracranial abnormality.  No acute fracture.     Nonspecific white matter changes suggest small vessel disease.    NELSON PERKINS MD       Medications   0.9% sodium chloride BOLUS (1,000 mLs Intravenous New Bag 11/20/19 1407)     Followed by   sodium chloride 0.9% infusion (has no administration in time range)   HYDROcodone-acetaminophen (NORCO) 5-325 MG per tablet 1 tablet (1 tablet Oral Given 11/20/19 1407)       Assessments & Plan (with Medical Decision Making)     I have reviewed the nursing notes.          New Prescriptions    No medications on file       Final diagnoses:   Syncope   Anemia       11/20/2019   [facility]     Nany Woodard NP  11/20/19 9529

## 2019-11-20 NOTE — PLAN OF CARE
"Reason for hospital stay:  Anemia    Most recent vitals: /79   Pulse 74   Temp 98.3  F (36.8  C) (Tympanic)   Resp 16   Ht 1.689 m (5' 6.5\")   Wt 75.6 kg (166 lb 11.2 oz)   SpO2 98%   BMI 26.50 kg/m      Pain Management:  Reports pain from frequent migraines rated 3/10. Declines any pain mediation at this time. Did accept PRN Tylenol which \"took the edge off\"    Orientation:  A&O x4    Cardiac:  Apical pulse regular- no edema present    Respiratory:  Bilateral lung sounds clear, equal before and after each transfusion    GI:  Denies any N/V, appetite is good, bowel sounds all four quadrants    :  Denies any issues voiding    Nutrition:  Regular diet-mechanical     Skin Issues:  No noted bruising or open areas. UTV underneath leads    IVF:  NS 10 ml/hr, Increased to 125 cc/hr with blood transfusion then 175 ml/hr tolerated both rates well    ABX:  None    Mobility:  Stand by assist    Safety:  Call light within reach    Comments: Pt received Cyanocobalamin 1,000 mcg IM in L deltoid @ 1922    [facility] Observation Note:  Patient is registered to observation and is in 3226/3226-1 at approximately 1530 via bed accompanied by transport tech from emergency room . Report received from Ragini in SBAR format at 1515 via telephone. Patient ambulated to bed via self.. Patient is alert and oriented X 3, reports pain; rates at 3 on 0-10 scale.  Patient oriented to room, unit, hourly rounding, and plan of care. Explained the admission packet including \"What is Observation Status\" and patient handbook with patient bill of rights brochure. Will continue to monitor and document as needed.  Nursing Observation criteria listed below was met:  Health care directives status obtained and documented: No  Care Everywhere authorization completed No    MRSA swab completed for patient 65 years and older: N/A    If initial lactic acid >2.0, repeat lactic acid drawn within one hour of arrival to unit: NA. If no, state " reason: Pt has no s/s of infection    Patient identifies a surrogate decision maker: Yes If yes, who:Danilo- Contact Information: See Flowsheet/Facesheet  Vaccination assessment and education completed: Yes   Vaccinations received prior to hospitalization: Pneumovax no  Influenza(seasonal)  NO   Vaccination(s) ordered: influenza vaccine    Skin issues/needs documented:No  Isolation Patient: no Education given and documented, correct sign in place and documentation row added to PCS:  Yes    Fall Prevention: Observation fall risk completed:  Yes Education given and documented, sticker and magnet in place: Yes    General Care Plan initiated with observation goal(s): Yes  Education (including assessment) Documented: Yes  New medication information given to patient and documented: Yes  Patient elects to use own medications from home during hospitalization: N/A If yes, a MD order was obtained to use Medications from Home:  N/A and home medications were sent to Pharmacy for verification for use during hospitalization: N/A  Patient has discharge needs (If yes, please explain): No    Face to face report given with opportunity to observe patient.    Report given to Cinthya Wagoner   11/20/2019  11:23 PM

## 2019-11-21 ENCOUNTER — ANESTHESIA (OUTPATIENT)
Dept: SURGERY | Facility: HOSPITAL | Age: 49
End: 2019-11-21
Payer: COMMERCIAL

## 2019-11-21 ENCOUNTER — ANESTHESIA EVENT (OUTPATIENT)
Dept: SURGERY | Facility: HOSPITAL | Age: 49
End: 2019-11-21
Payer: COMMERCIAL

## 2019-11-21 VITALS
WEIGHT: 166 LBS | TEMPERATURE: 99.4 F | HEIGHT: 66 IN | SYSTOLIC BLOOD PRESSURE: 165 MMHG | RESPIRATION RATE: 16 BRPM | BODY MASS INDEX: 26.68 KG/M2 | DIASTOLIC BLOOD PRESSURE: 93 MMHG | OXYGEN SATURATION: 97 % | HEART RATE: 75 BPM

## 2019-11-21 LAB
ANION GAP SERPL CALCULATED.3IONS-SCNC: 5 MMOL/L (ref 3–14)
BASOPHILS # BLD AUTO: 0 10E9/L (ref 0–0.2)
BASOPHILS NFR BLD AUTO: 0.2 %
BUN SERPL-MCNC: 18 MG/DL (ref 7–30)
CALCIUM SERPL-MCNC: 8.1 MG/DL (ref 8.5–10.1)
CHLORIDE SERPL-SCNC: 113 MMOL/L (ref 94–109)
CO2 SERPL-SCNC: 23 MMOL/L (ref 20–32)
CREAT SERPL-MCNC: 0.65 MG/DL (ref 0.52–1.04)
DIFFERENTIAL METHOD BLD: ABNORMAL
EOSINOPHIL # BLD AUTO: 0 10E9/L (ref 0–0.7)
EOSINOPHIL NFR BLD AUTO: 0.5 %
ERYTHROCYTE [DISTWIDTH] IN BLOOD BY AUTOMATED COUNT: 20.9 % (ref 10–15)
GFR SERPL CREATININE-BSD FRML MDRD: >90 ML/MIN/{1.73_M2}
GLUCOSE SERPL-MCNC: 89 MG/DL (ref 70–99)
HCT VFR BLD AUTO: 29.2 % (ref 35–47)
HGB BLD-MCNC: 8.7 G/DL (ref 11.7–15.7)
IMM GRANULOCYTES # BLD: 0 10E9/L (ref 0–0.4)
IMM GRANULOCYTES NFR BLD: 0.5 %
LYMPHOCYTES # BLD AUTO: 1.7 10E9/L (ref 0.8–5.3)
LYMPHOCYTES NFR BLD AUTO: 26.5 %
MCH RBC QN AUTO: 21.2 PG (ref 26.5–33)
MCHC RBC AUTO-ENTMCNC: 29.8 G/DL (ref 31.5–36.5)
MCV RBC AUTO: 71 FL (ref 78–100)
MONOCYTES # BLD AUTO: 0.8 10E9/L (ref 0–1.3)
MONOCYTES NFR BLD AUTO: 12.1 %
NEUTROPHILS # BLD AUTO: 3.8 10E9/L (ref 1.6–8.3)
NEUTROPHILS NFR BLD AUTO: 60.2 %
NRBC # BLD AUTO: 0 10*3/UL
NRBC BLD AUTO-RTO: 0 /100
PLATELET # BLD AUTO: 241 10E9/L (ref 150–450)
POTASSIUM SERPL-SCNC: 4.3 MMOL/L (ref 3.4–5.3)
RBC # BLD AUTO: 4.1 10E12/L (ref 3.8–5.2)
SODIUM SERPL-SCNC: 141 MMOL/L (ref 133–144)
WBC # BLD AUTO: 6.3 10E9/L (ref 4–11)

## 2019-11-21 PROCEDURE — 36415 COLL VENOUS BLD VENIPUNCTURE: CPT | Performed by: NURSE PRACTITIONER

## 2019-11-21 PROCEDURE — G0378 HOSPITAL OBSERVATION PER HR: HCPCS

## 2019-11-21 PROCEDURE — 99225 ZZC SUBSEQUENT OBSERVATION CARE,LEVEL II: CPT | Mod: 25 | Performed by: SURGERY

## 2019-11-21 PROCEDURE — 71000014 ZZH RECOVERY PHASE 1 LEVEL 2 FIRST HR: Performed by: SURGERY

## 2019-11-21 PROCEDURE — 96375 TX/PRO/DX INJ NEW DRUG ADDON: CPT | Mod: 59

## 2019-11-21 PROCEDURE — 43239 EGD BIOPSY SINGLE/MULTIPLE: CPT | Performed by: NURSE ANESTHETIST, CERTIFIED REGISTERED

## 2019-11-21 PROCEDURE — 25000128 H RX IP 250 OP 636: Performed by: NURSE ANESTHETIST, CERTIFIED REGISTERED

## 2019-11-21 PROCEDURE — 25000125 ZZHC RX 250: Performed by: NURSE ANESTHETIST, CERTIFIED REGISTERED

## 2019-11-21 PROCEDURE — 85025 COMPLETE CBC W/AUTO DIFF WBC: CPT | Performed by: NURSE PRACTITIONER

## 2019-11-21 PROCEDURE — 88305 TISSUE EXAM BY PATHOLOGIST: CPT | Mod: TC | Performed by: SURGERY

## 2019-11-21 PROCEDURE — 90682 RIV4 VACC RECOMBINANT DNA IM: CPT | Performed by: NURSE PRACTITIONER

## 2019-11-21 PROCEDURE — 25800030 ZZH RX IP 258 OP 636: Performed by: NURSE ANESTHETIST, CERTIFIED REGISTERED

## 2019-11-21 PROCEDURE — 80048 BASIC METABOLIC PNL TOTAL CA: CPT | Performed by: NURSE PRACTITIONER

## 2019-11-21 PROCEDURE — 37000008 ZZH ANESTHESIA TECHNICAL FEE, 1ST 30 MIN: Performed by: SURGERY

## 2019-11-21 PROCEDURE — 36000050 ZZH SURGERY LEVEL 2 1ST 30 MIN: Performed by: SURGERY

## 2019-11-21 PROCEDURE — 40000305 ZZH STATISTIC PRE PROC ASSESS I: Performed by: SURGERY

## 2019-11-21 PROCEDURE — 25000132 ZZH RX MED GY IP 250 OP 250 PS 637: Performed by: NURSE PRACTITIONER

## 2019-11-21 PROCEDURE — 43239 EGD BIOPSY SINGLE/MULTIPLE: CPT | Performed by: SURGERY

## 2019-11-21 PROCEDURE — 27210794 ZZH OR GENERAL SUPPLY STERILE: Performed by: SURGERY

## 2019-11-21 PROCEDURE — 82607 VITAMIN B-12: CPT | Performed by: NURSE PRACTITIONER

## 2019-11-21 PROCEDURE — 25000128 H RX IP 250 OP 636: Performed by: SURGERY

## 2019-11-21 PROCEDURE — 99217 ZZC OBSERVATION CARE DISCHARGE: CPT | Mod: 25 | Performed by: NURSE PRACTITIONER

## 2019-11-21 PROCEDURE — 25000128 H RX IP 250 OP 636: Performed by: NURSE PRACTITIONER

## 2019-11-21 RX ORDER — SODIUM CHLORIDE, SODIUM LACTATE, POTASSIUM CHLORIDE, CALCIUM CHLORIDE 600; 310; 30; 20 MG/100ML; MG/100ML; MG/100ML; MG/100ML
INJECTION, SOLUTION INTRAVENOUS CONTINUOUS
Status: DISCONTINUED | OUTPATIENT
Start: 2019-11-21 | End: 2019-11-21 | Stop reason: HOSPADM

## 2019-11-21 RX ORDER — LIDOCAINE HYDROCHLORIDE 20 MG/ML
INJECTION, SOLUTION INFILTRATION; PERINEURAL PRN
Status: DISCONTINUED | OUTPATIENT
Start: 2019-11-21 | End: 2019-11-21

## 2019-11-21 RX ORDER — FOLIC ACID 1 MG/1
1 TABLET ORAL DAILY
Qty: 30 TABLET | Refills: 0 | Status: SHIPPED | OUTPATIENT
Start: 2019-11-21 | End: 2020-01-16

## 2019-11-21 RX ORDER — CYANOCOBALAMIN 1000 UG/ML
1000 INJECTION, SOLUTION INTRAMUSCULAR; SUBCUTANEOUS
Qty: 3 ML | Refills: 0 | Status: ON HOLD | OUTPATIENT
Start: 2019-11-27 | End: 2020-02-29

## 2019-11-21 RX ORDER — LIDOCAINE 40 MG/G
CREAM TOPICAL
Status: DISCONTINUED | OUTPATIENT
Start: 2019-11-21 | End: 2019-11-21 | Stop reason: HOSPADM

## 2019-11-21 RX ORDER — PROPOFOL 10 MG/ML
INJECTION, EMULSION INTRAVENOUS PRN
Status: DISCONTINUED | OUTPATIENT
Start: 2019-11-21 | End: 2019-11-21

## 2019-11-21 RX ADMIN — TIZANIDINE 4 MG: 4 TABLET ORAL at 00:15

## 2019-11-21 RX ADMIN — ONDANSETRON 4 MG: 2 INJECTION INTRAMUSCULAR; INTRAVENOUS at 13:54

## 2019-11-21 RX ADMIN — SODIUM CHLORIDE, POTASSIUM CHLORIDE, SODIUM LACTATE AND CALCIUM CHLORIDE: 600; 310; 30; 20 INJECTION, SOLUTION INTRAVENOUS at 09:06

## 2019-11-21 RX ADMIN — INFLUENZA A VIRUS A/BRISBANE/02/2018 (H1N1) RECOMBINANT HEMAGGLUTININ ANTIGEN, INFLUENZA A VIRUS A/KANSAS/14/2017 (H3N2) RECOMBINANT HEMAGGLUTININ ANTIGEN, INFLUENZA B VIRUS B/PHUKET/3073/2013 RECOMBINANT HEMAGGLUTININ ANTIGEN, AND INFLUENZA B VIRUS B/MARYLAND/15/2016 RECOMBINANT HEMAGGLUTININ ANTIGEN 0.5 ML: 45; 45; 45; 45 INJECTION INTRAMUSCULAR at 16:37

## 2019-11-21 RX ADMIN — PROPOFOL 100 MG: 10 INJECTION, EMULSION INTRAVENOUS at 09:26

## 2019-11-21 RX ADMIN — ACETAMINOPHEN 650 MG: 325 TABLET, FILM COATED ORAL at 00:15

## 2019-11-21 RX ADMIN — LIDOCAINE HYDROCHLORIDE 40 MG: 20 INJECTION, SOLUTION INFILTRATION; PERINEURAL at 09:26

## 2019-11-21 ASSESSMENT — MIFFLIN-ST. JEOR: SCORE: 1389.72

## 2019-11-21 NOTE — PLAN OF CARE
"Patient continues to c/o headache that she's had since arriving to the floor. She's rating headache 5/10. Tylenol PO and Zanaflex PO given at beginning of the shift per patient's request. VSS at the beginning of the shift with the start of 3rd unit of PRBC. Patient did fall asleep. When transfusion completed, blood pressure decreased to 80's/50's. Manual blood pressures were also 80's/50's. Patient was tired, but alert. Answered questions appropriately. Denied feeling dizzy or lightheaded. She was quite sweaty at the time but reports having night sweats \"4-5 times a week where I have to get up and change the bedding.\" No obvious signs of bleeding. Color is pale but much improved from admission. Lung sounds remains clear but right side is diminished. Encouraged patient to deep breathe and cough. VSS outside of BP. Telemetry SR 70's per ICU written report. MD was updated. Patient was monitored and BP slowly came up every hour while she slept. NPO at midnight. She ate lunchbox just before midnight. Remains assist of 1 for safety. No void as of witting this note. Alarms in place. Call light within reach. IV SL.    Face to face report given with opportunity to observe patient.    Report given to Gifty ROMERO.    Cinthya Holguin RN   11/21/2019  6:16 AM      "

## 2019-11-21 NOTE — PROGRESS NOTES
Assessment completed see flowsheet.    LOC: alert , oriented, pleasant and conversational.   Others present: Patient alone    Dx: Severe Anemia  Chronic Disease Management: Chronic anemia - B12 shots. Has not been compliant with these. Discussed importance of getting her shots. She verbalized understanding.   Hypothyroid - medication    Lives with:  Danilo and 12yo son Neto.   Living at:  Home. Indicates she feels safe in her home. Has smoke and CO2 monitors. Railings where needed.   Transportation: YES Her  will transport on discharge.     Primary PCP: Coco Cook  Dental Care : Plans to make appointment with the [facility].   Eye Care : [facility]  Insurance:  [facility]    Support System:   Danilo  Homecare/PCA: No  /[facility] Services:   No  : NO      How was the [facility] notification completed: na    Health Care Directive: NO Declined resources. Discussed importance of completing a Healthcare Directive but still not interested.   Guardian: No  POA: No    Pharmacy: [facility] [place]  Meds management: Indicates she has no med concerns. Takes them independently and gets refills in timely manner.      Adequate Resources for needs (housing, utilities, food/med): YES  Household chores: Shared  Work/community/social activity: Works fulltime as an early 5's teacher at [employer]  Likes to read and walk.     ADLs: Independent  Ambulation:Independent  Falls: Denies   Nutrition: Regular diet . She and her  share the shopping and cooking.   Sleep: Takes Zanaflex for sleep . States she only gets about 5 hrs of sleep.     Equipment used: None      Oxygen supplier: na      Does the supplier have valid oxygen orders: na    Mental health: States she takes Zoloft for some mild Depression. States she is stable on medication. No concerns. Indicated she was in therapy many years ago.   Substance abuse: Nonsmoker.  No alcohol use. No illicit drug use.   Exposure to violence/abuse: Not  addressed  Stressors: Denies    Able to Return to Prior Living Arrangements: YES    Choice of Vendor: na    Barriers: None assessed or indicated.     LANDEN: Yusuf    Plan: Return home when discharged.  to transport.

## 2019-11-21 NOTE — PROGRESS NOTES
Face to face report given with opportunity to observe patient.    Report given to Stevo Floyd RN   11/21/2019  6:56 AM

## 2019-11-21 NOTE — H&P
HISTORY AND PHYSICAL - ENDOSCOPY   11/21/2019    Patient : Constantino Warren    Referring Physician : Hospitalist    Planned Procedures : Endoscopy    This is a 49 year old female with a need for an upper and lower endoscopy.  Upper endoscopy is needed for Anemia.     Last EGD : 18 months ago  History of GERD : YES  History of PUD : YES    Past Medical History:  Past Medical History:   Diagnosis Date     Chronic pain      Thyroid disease        Past Surgical History:  Past Surgical History:   Procedure Laterality Date     BACK SURGERY      2008; 2011     ESOPHAGOSCOPY, GASTROSCOPY, DUODENOSCOPY (EGD), COMBINED N/A 7/21/2018    Procedure: COMBINED ESOPHAGOSCOPY, GASTROSCOPY, DUODENOSCOPY (EGD);  UPPER ENDOSCOPY WITH BIOPSIES;  Surgeon: Roger Vazquez MD;  Location: [facility]     HEAD & NECK SURGERY  2008    c4-c5 fusion     LAPAROSCOPIC BYPASS GASTRIC  12/2010       Family History History:  History reviewed. No pertinent family history.    History of Tobacco Use:  History   Smoking Status     Never Smoker   Smokeless Tobacco     Never Used       Current Medications:  No current outpatient medications on file.       Allergies:  Allergies   Allergen Reactions     Aspartame      Other reaction(s): Other - Describe In Comment Field, Seizures  convulsions     Bee Venom Anaphylaxis     Bupropion Anaphylaxis     Throat gets tight and hives. Denies allergic reaction     Food Anaphylaxis     Peppers-anaphylaxis if eaten, if touched hands swell and reddened     Ibuprofen Anaphylaxis     No Clinical Screening - See Comments Anaphylaxis     Peppers, patient reports green peppers close off her throat     Piper Anaphylaxis     Peppers--green, black, red, chili, etc. Derm symptoms, hives;  Strawberries--(cooked) Itching, breathing difficulties, GI symptoms. Mushrooms--GI symptoms, Hives.Spinach (cooked) throat irritation  (Listed on [facility] problem list.)     Fentanyl Swelling     Lips and tongue swelled.  Swelling of  "lips and tongue     Liquid Adhesive Dermatitis     blisters  Blisters  Silk tape and tegaderm is fine     Oxcarbazepine Other (See Comments)     Throat gets tight and hives. Denies allergic reaction  Throat gets tight and hives-       Prozac [Fluoxetine]      Other reaction(s): *Unknown  uncontrolled muscle spasms     Valproic Acid      Respiratory symptoms ([facility]). Denies allergic reaction     Ziprasidone      Throat gets tight and hives. Denies allergic reaction       ROS:  Pertinent items are noted in HPI.  All other systems are negative.    PHYSICAL EXAM:     Vital signs: /91   Pulse 67   Temp 97.5  F (36.4  C) (Oral)   Resp 16   Ht 1.689 m (5' 6.5\")   Wt 75.6 kg (166 lb 11.2 oz)   SpO2 98%   BMI 26.50 kg/m     Weight: [unfilled]   BMI: Body mass index is 26.5 kg/m .   General: Normal, healthy, cooperative, in no acute distress   Skin: no jaundice   HEENT: PERRLA and EOMI   Neck: supple   Lungs: clear to auscultation   CV: Regular rate and rhythm without murmer   Abdominal: abdomen is soft without significant tenderness, masses, organomegaly or guarding   Extremities: No cyanosis, clubbing or edema noted bilaterally in Upper and Lower Extremities   Neurological: without deficit    Assessment:   49 year old female with need for upper endoscopy for Anemia:    Plan:   Will schedule an esophagogastroduodenoscopy.  The procedures with their risks, benefits and alternatives were explained.  Risks include but are not limited to bleeding, perforation, missing lesions, need for additional procedures, reaction to anesthesia.  All the patients questions were answered.  The patient consents to proceed.  The procedures will be scheduled.      "

## 2019-11-21 NOTE — PLAN OF CARE
Patient discharged at 5:20 PM via wheel chair accompanied by son and staff. Prescriptions sent to patients preferred pharmacy. All belongings sent with patient.     Discharge instructions reviewed with patient and son.   Patient discharged to home.   Report called to N/A    Core Measures and Vaccines  Core Measures applicable during stay: No. If yes, state diagnosis: N/A  Pneumonia and Influenza given prior to discharge, if indicated: Yes-Influenza    Surgical Patient   Surgical Procedures during stay: N/A  Did patient receive discharge instruction on wound care and recognition of infection symptoms? N/A    MISC  Follow up appointment made:  Yes  Home and hospital aquired medications returned to patient: N/A  Patient reports pain was well managed at discharge: Yes

## 2019-11-21 NOTE — PROGRESS NOTES
FELICITY RODRÍGUEZ  Patient visit during  rounds. Patient had no spiritual care requests at this time.

## 2019-11-21 NOTE — PROVIDER NOTIFICATION
Patient's BP 80's/50's, even manually. 3rd unit of PRBC just completed. Reviewed vital signs during transfusion. Lung sounds are clear. Patient is alert and oriented x 3, appropriate. She denies feeling dizzy or lightheaded. Denies pain other than the headache that she's had. Patient did have Zanaflex at bedtime, as she normally takes at home. No new orders, will continue to monitor hourly blood pressures and patient condition.

## 2019-11-21 NOTE — ANESTHESIA POSTPROCEDURE EVALUATION
Patient: Constantino Warren    Procedure(s):  UPPER ENDOSCOPY with biopsy    Diagnosis:Anemia [D64.9]  Diagnosis Additional Information: No value filed.    Anesthesia Type:  MAC    Note:  Anesthesia Post Evaluation    Patient location during evaluation: PACU  Patient participation: Able to fully participate in evaluation  Level of consciousness: awake and alert  Pain management: adequate  Airway patency: patent  Cardiovascular status: acceptable  Respiratory status: acceptable  Hydration status: acceptable  PONV: none     Anesthetic complications: None          Last vitals:  Vitals:    11/21/19 0759 11/21/19 0845 11/21/19 0935   BP: 110/69 117/91 113/76   Pulse:  67    Resp: 16 16 16   Temp: 97.4  F (36.3  C) 97.5  F (36.4  C) 98.1  F (36.7  C)   SpO2: 97% 98% 99%         Electronically Signed By: JUNIOR Kennedy CRNA  November 21, 2019  9:42 AM

## 2019-11-21 NOTE — DISCHARGE SUMMARY
[facility]    Discharge Summary  Hospitalist    Date of Admission:  11/20/2019  Date of Discharge:  11/21/2019  5:20 PM  Discharging Provider: Emma Gonzalez CNP  Date of Service (when I saw the patient): 11/21/19    Discharge Diagnoses   Principal Problem:    Anemia  Active Problems:    Hypochromic microcytic anemia    Syncope and collapse    History of Present Illness   From admission: Constantino Warren is a 49 year old female who presents with syncope. She was at work and walking across the gym floor and next thing she knew she woke up on the floor. She has noted generalized fatigue over the last week. She has also noted some intermittent chest pressure with activities but mild. Prior today she denies any dizziness, palpitations. She has a prior history of antral ulcer with GI bleed 1 year ago. She denies any N/V/diarrhea.      Hospital Course   Constantino Warren was admitted on 11/20/2019.  The following problems were addressed during her hospitalization:    Severe anemia: Fatigue for the last week, syncope day of admission. Hemoglobin 5.7 in ED. Prior history of chronic anemia as well as antral ulcer. She is supposed to get B12 shots monthly but has not done so in 3 months. Has been taking iron supplements. She has had some chest heaviness with exertion , otherwise denies any associated symptoms. Vitals are very stable, not an acute bleeding event. However with her history of ulcer 1 year ago,consulted surgery, EGD completed today. No acute bleeding or ulcerations were present. Biopsies were taken. She was given b12 injection yesterday, will c ontinue with qweekly b12 x4 then monthly. Also started folic acid in addition to continuing iron. She will follow up with her new PCP already planned on December 12 with hemoglobin recheck as well as b12 and iron studies. She is advised on importance of compliance.        Syncope: Due to anemia. She did hit her left temple and her left hip in the fall but  denies any pain to either areas. CT scan head negative for acute bleed.         Chronic Hypochromic microcytic anemia: Due to gastric bypass and poor absorption. She is on supplementation but historically frequently misses her b12 injections and has not had one in 3 months.        Emma Gonzalez CNP        Pending Results     Unresulted Labs Ordered in the Past 30 Days of this Admission     Date and Time Order Name Status Description    11/21/2019 0928 Surgical pathology exam In process           Code Status   Full Code       Primary Care Physician   Coco Schafer    Discharge Disposition   Discharged to home  Condition at discharge: Stable    Consultations This Hospital Stay   SURGERY GENERAL IP CONSULT    Time Spent on this Encounter   I, Emma Gonzalez NP, personally saw the patient today and spent greater than 30 minutes discharging this patient.    Discharge Orders      Vitamin B12     CBC with platelets    Last Lab Result: Hemoglobin (g/dL)       Date                     Value                 11/21/2019               8.7 (L)          ----------     Reason for your hospital stay    Severe anemia     Follow-up and recommended labs and tests     Follow up with primary care provider, Coco Schafer, as scheduled on Dec 12th. With the following labs: B12, CBC.     Activity    Your activity upon discharge: activity as tolerated     When to contact your care team    Call your primary doctor if you have any of the following: any symptoms of presentation.     Full Code     Diet    Follow this diet upon discharge: Orders Placed This Encounter      Regular Diet Adult     Discharge Medications   Current Discharge Medication List      START taking these medications    Details   folic acid (FOLVITE) 1 MG tablet Take 1 tablet (1 mg) by mouth daily  Qty: 30 tablet, Refills: 0    Associated Diagnoses: Vitamin B 12 deficiency         CONTINUE these medications which have CHANGED    Details   cyanocobalamin  (CYANOCOBALAMIN) 1000 MCG/ML injection Inject 1 mL (1,000 mcg) into the muscle every 7 days for 3 doses  Qty: 3 mL, Refills: 0    Associated Diagnoses: Vitamin B 12 deficiency         CONTINUE these medications which have NOT CHANGED    Details   ferrous sulfate (IRON) 325 (65 Fe) MG tablet Take 1 tablet (325 mg) by mouth 2 times daily  Qty: 60 tablet, Refills: 2    Associated Diagnoses: Anemia, unspecified type      levothyroxine (SYNTHROID/LEVOTHROID) 200 MCG tablet Take 200 mcg by mouth every morning (before breakfast) Take in addition to 75 mcg for a total of 275 mcg daily      MELATONIN PO Take 7.5 mg by mouth nightly as needed      sertraline (ZOLOFT) 100 MG tablet Take 100 mg by mouth daily      tiZANidine (ZANAFLEX) 4 MG tablet Take 4 mg by mouth At Bedtime      acetaminophen (TYLENOL) 500 MG tablet Take 1,000 mg by mouth every 6 hours as needed Max acetaminophen dose: 4000 mg in 24 hours      EPINEPHrine (EPIPEN/ADRENACLICK/OR ANY BX GENERIC EQUIV) 0.3 MG/0.3ML injection 2-pack Inject 0.3 mg into the muscle as needed       multivitamin, therapeutic with minerals (MULTI-VITAMIN) TABS Take 1 tablet by mouth daily           Allergies   Allergies   Allergen Reactions     Aspartame      Other reaction(s): Other - Describe In Comment Field, Seizures  convulsions     Bee Venom Anaphylaxis     Bupropion Anaphylaxis     Throat gets tight and hives. Denies allergic reaction     Food Anaphylaxis     Peppers-anaphylaxis if eaten, if touched hands swell and reddened     Ibuprofen Anaphylaxis     No Clinical Screening - See Comments Anaphylaxis     Peppers, patient reports green peppers close off her throat     Piper Anaphylaxis     Peppers--green, black, red, chili, etc. Derm symptoms, hives;  Strawberries--(cooked) Itching, breathing difficulties, GI symptoms. Mushrooms--GI symptoms, Hives.Spinach (cooked) throat irritation  (Listed on [facility] problem list.)     Fentanyl Swelling     Lips and tongue  swelled.  Swelling of lips and tongue     Liquid Adhesive Dermatitis     blisters  Blisters  Silk tape and tegaderm is fine     Oxcarbazepine Other (See Comments)     Throat gets tight and hives. Denies allergic reaction  Throat gets tight and hives-       Prozac [Fluoxetine]      Other reaction(s): *Unknown  uncontrolled muscle spasms     Valproic Acid      Respiratory symptoms ([facility] Denies allergic reaction     Ziprasidone      Throat gets tight and hives. Denies allergic reaction     Data   Most Recent 3 CBC's:  Recent Labs   Lab Test 11/21/19  0526 11/20/19  1349 03/17/19  2351   WBC 6.3 6.8 9.3   HGB 8.7* 5.7* 10.6*   MCV 71* 66* 82    284 231      Most Recent 3 BMP's:  Recent Labs   Lab Test 11/21/19  0526 11/20/19  1349 03/17/19  2351    142 140   POTASSIUM 4.3 4.3 4.2   CHLORIDE 113* 113* 111*   CO2 23 24 23   BUN 18 18 15   CR 0.65 0.66 0.64   ANIONGAP 5 5 6   LOKESH 8.1* 8.2* 7.8*   GLC 89 84 94     Most Recent 2 LFT's:  Recent Labs   Lab Test 11/20/19  1349 03/17/19  2351   AST 20 27   ALT 17 26   ALKPHOS 125 87   BILITOTAL 0.4 0.2     Most Recent INR's and Anticoagulation Dosing History:  Anticoagulation Dose History     Recent Dosing and Labs Latest Ref Rng & Units 3/10/2019    INR 0.80 - 1.20 1.11        Most Recent 3 Troponin's:  Recent Labs   Lab Test 11/20/19  1349 02/01/19  0840 07/20/18  2225   TROPI <0.015 <0.015 <0.015     Most Recent Cholesterol Panel:No lab results found.  Most Recent 6 Bacteria Isolates From Any Culture (See EPIC Reports for Culture Details):  Recent Labs   Lab Test 07/20/18  0619   CULT Canceled, Test credited  Patient discharged - specimen not obtained     Most Recent TSH, T4 and A1c Labs:  Recent Labs   Lab Test 02/02/19  0515   TSH 5.49*     Results for orders placed or performed during the hospital encounter of 11/20/19   CT Head w/o Contrast    Narrative    PROCEDURE: CT HEAD W/O CONTRAST   11/20/2019 2:31 PM    HISTORY:Female, age,  49  years, , , Altered level of consciousness  (LOC), unexplained    COMPARISON: Head CT 3/18/2019    TECHNIQUE: CT of the brain without contrast.    FINDINGS: Ventricles and sulci are normal in size and shape. Gray and  white matter demonstrate scattered subtle areas of decreased density.    There is no evidence of mass, mass effect or midline shift. No  evidence of acute hemorrhage.    The bones are unremarkable. No fracture.       Impression    IMPRESSION:   No acute intracranial abnormality.  No acute fracture.     Nonspecific white matter changes suggest small vessel disease.    NELSON PERKINS MD   XR Pelvis including Hip Left 2-3 Views    Narrative    XR PELVIS AND HIP LEFT 2 VIEWS, 11/20/2019 2:22 PM    History: Female, age 49 years; fall  pain to left hip    Comparison: 7/23/2018    Technique: Single view the pelvis and two views of the left hip were  obtained.    FINDINGS: The bones are normally mineralized. The bony pelvis and  visualized portions of the hip appear grossly intact. No evidence of  acute fracture or acute dislocation.      Impression    IMPRESSION:   1.  No distinct evidence of acute or subacute bony abnormality.     2.  Mild degenerative changes are again seen within the symphysis  pubis and both hips.    NELSON PERKINS MD

## 2019-11-21 NOTE — ANESTHESIA PREPROCEDURE EVALUATION
Anesthesia Evaluation     . Pt has had prior anesthetic.     History of anesthetic complications (fentanyl allergy)          ROS/MED HX    ENT/Pulmonary:  - neg pulmonary ROS     Neurologic:     (+)migraines, other neuro hx concussions x2, insomnia    Cardiovascular:     (+) hypertension--CAD, angina (hx)--. : . . fainting (syncope) (chronic vasovagal symptoms with presyncope). :. valvular problems/murmurs patient reports she has a murmur, denies valvular pathology:. Previous cardiac testing date:results:date: results:ECG reviewed date:2/2/2019 results:NSR @76 date: results:          METS/Exercise Tolerance:  >4 METS   Hematologic: Comments: PRBCs administered on this admission    (+) Anemia (hgb 8.7 today), History of Transfusion (hx possible transfusion reaction) no previous transfusion reaction -      Musculoskeletal:   (+) arthritis,  other musculoskeletal- chronic neck and back pain s/p C4-C5 fusion, L4-S1 fusion      GI/Hepatic:     (+) GERD Asymptomatic on medication, Other GI/Hepatic hx gastric bypass and revision, hx GIB      Renal/Genitourinary:  - ROS Renal section negative   (+) chronic renal disease (hx hydronephrosis and pyelonephritis), Nephrolithiasis ,       Endo: Comment: History of type 2 diabetes, hyperglycemia resolved with weight loss    (+) thyroid problem hypothyroidism, .      Psychiatric:     (+) psychiatric history other (comment), anxiety and depression (PTSD)      Infectious Disease: Comment: Hx MRSA, resolved        Malignancy:   (+) Malignancy History of Other  Other CA uterine Remission status post Surgery         Other: Comment: Hx hysterectomy  Hx Lortab rx, no longer taking   (+) No chance of pregnancy H/O Chronic Pain,                   Physical Exam  Normal systems: cardiovascular and pulmonary    Airway   Mallampati: II  TM distance: >3 FB  Neck ROM: full    Dental   (+) chipped    Cardiovascular   Rhythm and rate: regular and normal      Pulmonary    breath sounds clear to  auscultation                        Anesthesia Plan      History & Physical Review  History and physical reviewed and following examination; no interval change.    ASA Status:  3 .    NPO Status:  > 8 hours    Plan for MAC with Intravenous and Propofol induction. Maintenance will be TIVA.  Reason for MAC:  Deep or markedly invasive procedure (G8), Procedure to face, neck, head or breast and Extreme anxiety (QS)  PONV prophylaxis:  Ondansetron (or other 5HT-3)  Hospitalist H&P 11/20    Risks and benefits of MAC anesthetic discussed including dental damage, aspiration, loss of airway, conversion to general anesthetic, CV complications, MI, stroke, death. Pt wishes to proceed.       Postoperative Care  Postoperative pain management:  IV analgesics.      Consents  Anesthetic plan, risks, benefits and alternatives discussed with:  Patient.  Use of blood products discussed: Yes.   Use of blood products discussed with Patient.  Consented to blood products.  .                          .

## 2019-11-21 NOTE — PLAN OF CARE
VSS, afebrile, HRR, lungs clear, bowels active. Pt able to eat late breakfast after EGD with no issues, however became slightly nauseas after lunch. Pt given IV Zofran with relief. Pt plans to discharge on afternoons. Pt is alert and oriented x 4, makes her needs known and calls appropriately.

## 2019-11-21 NOTE — OP NOTE
REPORT OF OPERATION  DATE OF PROCEDURE: 11/21/2019    PATIENT: Constantino Warren    SURGERY PREFORMED: Esophagogastroduodenoscopy with biopsies     PREOPERATIVE DIAGNOSIS: Anemia    POSTOPERATIVE DIAGNOSIS:    normal, status post gastric bypass   Squamous columnar junction at 40    SURGEON: Kang Cleveland MD    ASSISTANTS: None    ANESTHESIA: Monitored Anesthesia Care    COMPLICATIONS: None apparent    TRANSFUSIONS: None    TISSUE TO PATHOLOGY: gastric pouch    FINDINGS: Normal Esophagogastroduodenoscopy and status post gastric bypass    INDICATIONS: This is a 49 year old female in need of an Esophagogastroduodenoscopy for Anemia.  The patient will be taken to the endoscopy suite for that procedure.    DESCRIPTIONS OF PROCEDURE IN DETAIL: After consent was obtained the patient was taken to the operative suite and rosana in the left lateral decubitus position.  The patient was identified and the correct patient was confirmed. Monitored Anesthesia Care was given by anesthesia. A time out was preformed verifying the correct patient and the correct procedure.  The entire operative team was in agreement.  All necessary equipment and supplies were in the room.    The endoscope was inserted into the mouth and passed without difficulty to the gastric pouch.  At this point no evidence of bleeding was noted.  Scope was inserted down the limb of the duodenum and again no evidence of bleeding was noted.  Scope was brought back into the gastric pouch and a biopsy was taken.  The endoscope was brought into the distal esophagus and a well-defined squamocolumnar junction was identified at 40 cm. Biopsies of the distal esophagus were not taken.  The endoscope was slowly withdrawn through the remaining esophagus no other abnormalities are seen,  The endoscope was withdrawn from the patient the patient was then taken to the recovery room in stable condition tolerated the procedure well.

## 2019-11-21 NOTE — ANESTHESIA CARE TRANSFER NOTE
Patient: Constantino Warren    Procedure(s):  UPPER ENDOSCOPY with biopsy    Diagnosis: Anemia [D64.9]  Diagnosis Additional Information: No value filed.    Anesthesia Type:   MAC     Note:  Airway :Nasal Cannula  Patient transferred to:PACU  Handoff Report: Identifed the Patient, Identified the Reponsible Provider, Reviewed the pertinent medical history, Discussed the surgical course, Reviewed Intra-OP anesthesia mangement and issues during anesthesia, Set expectations for post-procedure period and Allowed opportunity for questions and acknowledgement of understanding      Vitals: (Last set prior to Anesthesia Care Transfer)    CRNA VITALS  11/21/2019 0902 - 11/21/2019 0942      11/21/2019             Resp Rate (observed):  (!) 1    Resp Rate (set):  8                Electronically Signed By: JUNIOR Kennedy CRNA  November 21, 2019  9:42 AM

## 2019-11-22 ENCOUNTER — TELEPHONE (OUTPATIENT)
Dept: CASE MANAGEMENT | Facility: HOSPITAL | Age: 49
End: 2019-11-22

## 2019-11-22 LAB
COPATH REPORT: NORMAL
VIT B12 SERPL-MCNC: 3978 PG/ML (ref 193–986)

## 2019-11-22 NOTE — PROGRESS NOTES
Name: Cnostantino Warren    MRN#: [id number]    Reason for Hospitalization: Syncope [R55]  Anemia [D64.9]    Discharge Date: 11/21/2019    Patient / Family response to discharge plan: agree    Follow-Up Appt:   Future Appointments   Date Time Provider Department Center   12/12/2019  3:15 PM Coco Cook MD [facility]       Other Providers (Care Coordinator, County Services, PCA services etc): No    Discharge Disposition: home    Melany Mendez CM

## 2019-11-22 NOTE — TELEPHONE ENCOUNTER
Constantino Warren, was discharged to home on 11/21/19  from [facility]. I spoke today with her regarding her discharge.   She indicates she has receive(d) sufficient information upon discharge. Medications were reviewed in full on discharge, including: Medications to be started;  medications to be continued from preadmission and any side effects. Prescriptions were received at discharge and were able to be filled. Medications are being taken as prescribed.   She indicates she has  an appointment scheduled for 12/12/19  and has  transportation available. She was  able to confirm her  appointment time and has  it written down.   She did not have any questions regarding discharge instructions or condition.  Per their request, the following employee (s) can be recognized for their outstanding services delivered:  Everybody was excellent with my care.   Suggestions to improve service: Nothing indicated.   She was informed she  may receive a survey in the mail from the hospital. Asked if she  would kindly complete the survey in order for [facility] to know if services fully met patient needs.

## 2019-11-26 DIAGNOSIS — E53.8 VITAMIN B 12 DEFICIENCY: Primary | ICD-10-CM

## 2019-11-26 RX ORDER — CYANOCOBALAMIN 1000 UG/ML
1000 INJECTION, SOLUTION INTRAMUSCULAR; SUBCUTANEOUS
Status: DISCONTINUED | OUTPATIENT
Start: 2019-11-27 | End: 2019-12-12

## 2019-11-27 ENCOUNTER — ALLIED HEALTH/NURSE VISIT (OUTPATIENT)
Dept: ALLERGY | Facility: OTHER | Age: 49
End: 2019-11-27
Attending: FAMILY MEDICINE
Payer: COMMERCIAL

## 2019-11-27 DIAGNOSIS — E53.8 VITAMIN B 12 DEFICIENCY: Primary | ICD-10-CM

## 2019-11-27 PROCEDURE — 96372 THER/PROPH/DIAG INJ SC/IM: CPT

## 2019-11-27 RX ADMIN — CYANOCOBALAMIN 1000 MCG: 1000 INJECTION, SOLUTION INTRAMUSCULAR; SUBCUTANEOUS at 11:46

## 2019-11-27 NOTE — PROGRESS NOTES
Clinic Administered Medication Documentation    MEDICATION LIST:   Injectable Medication Documentation    Patient was given Cyanocobalamin (B-12). Prior to medication administration, verified patients identity using patient s name and date of birth. Please see MAR and medication order for additional information. Patient instructed to remain in clinic for 15 minutes.      Was entire vial of medication used? Yes  Vial/Syringe: Single dose vial  Expiration Date:  04/2021  Was this medication supplied by the patient? No     Mayuri Linder LPN

## 2019-12-06 ENCOUNTER — ALLIED HEALTH/NURSE VISIT (OUTPATIENT)
Dept: ALLERGY | Facility: OTHER | Age: 49
End: 2019-12-06
Attending: FAMILY MEDICINE
Payer: COMMERCIAL

## 2019-12-06 DIAGNOSIS — E53.8 VITAMIN B 12 DEFICIENCY: Primary | ICD-10-CM

## 2019-12-06 PROCEDURE — 96372 THER/PROPH/DIAG INJ SC/IM: CPT

## 2019-12-06 RX ADMIN — CYANOCOBALAMIN 1000 MCG: 1000 INJECTION, SOLUTION INTRAMUSCULAR; SUBCUTANEOUS at 10:44

## 2019-12-06 NOTE — PROGRESS NOTES
Clinic Administered Medication Documentation    MEDICATION LIST:   Injectable Medication Documentation    Patient was given Cyanocobalamin (B-12). Prior to medication administration, verified patients identity using patient s name and date of birth. Please see MAR and medication order for additional information. Patient instructed to report any adverse reaction to staff immediately .      Was entire vial of medication used? Yes  Vial/Syringe: Single dose vial  Expiration Date:  04/2021  Was this medication supplied by the patient? No     Lisa Shaw LPN

## 2019-12-06 NOTE — PROGRESS NOTES
Subjective     Constantino Warren is a 49 year old female who presents to clinic today for the following health issues:    HPI   New Patient/Transfer of Care - Dr. Alba.    Gastric bypass 10 years ago; no diabetes since.    Due for colonoscopy.    Hyperlipidemia Follow-Up    Are you regularly taking any medication or supplement to lower your cholesterol?   No    Are you having muscle aches or other side effects that you think could be caused by your cholesterol lowering medication?  No     remote    Hypertension Follow-up    Do you check your blood pressure regularly outside of the clinic? No     Are you following a low salt diet? Yes    Are your blood pressures ever more than 140 on the top number (systolic) OR more   than 90 on the bottom number (diastolic), for example 140/90? Yes   Historical - not since gastric bypass    Hypothyroidism Follow-up      Since last visit, patient describes the following symptoms: Weight stable, no hair loss, no skin changes, no constipation, no loose stools    Prior max 275 mcg; currently 200 mcg; due for recheck    Depression and Anxiety Follow-Up    How are you doing with your depression since your last visit? stable    How are you doing with your anxiety since your last visit?  stable    Are you having other symptoms that might be associated with depression or anxiety? Yes:  loss of ; hospitalization    Have you had a significant life event? See above     Do you have any concerns with your use of alcohol or other drugs? 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  No flowsheet data  found.        Suicide Assessment Five-step Evaluation and Treatment (SAFE-T)         Needs something for sleep.    Hospital Follow-up Visit:    Hospital/Nursing Home/IP Rehab Facility: [facility]  Date of Admission: 11/20/19  Date of Discharge: 11/21/19  Reason(s) for Admission: anemia; hgb 5.7.  Hx gastric bypass; had been off B12 injections for months.  EGD negative.  Biopsies done.  Given B12 injection 11/20/19, with plan for weekly injections x 4 weeks, then monthly.  Third time for blood transfusions - 3 units this time.  No prior outpatient venofer.    Comes in for injections.  Needs one today.            Problems taking medications regularly:  None       Medication changes since discharge: None       Problems adhering to non-medication therapy:  None    Summary of hospitalization:  [facility] discharge summary reviewed  Diagnostic Tests/Treatments reviewed.  Follow up needed: labs  Other Healthcare Providers Involved in Patient s Care:         general surgery  Update since discharge: improved. Feeling better.  More energy.  No chest pain or dyspnea.  Eating fine.    Post Discharge Medication Reconciliation: discharge medications reconciled, continue medications without change.  Plan of care communicated with patient     Coding guidelines for this visit:  Type of Medical   Decision Making Face-to-Face Visit       within 7 Days of discharge Face-to-Face Visit        within 14 days of discharge   Moderate Complexity [id number]   High Complexity [id number]               Current Outpatient Medications   Medication     acetaminophen (TYLENOL) 500 MG tablet     cyanocobalamin (CYANOCOBALAMIN) 1000 MCG/ML injection     EPINEPHrine (EPIPEN/ADRENACLICK/OR ANY BX GENERIC EQUIV) 0.3 MG/0.3ML injection 2-pack     ferrous sulfate (IRON) 325 (65 Fe) MG tablet     folic acid (FOLVITE) 1 MG tablet     levothyroxine (SYNTHROID/LEVOTHROID) 200 MCG tablet     MELATONIN PO     multivitamin, therapeutic with  minerals (MULTI-VITAMIN) TABS     sertraline (ZOLOFT) 100 MG tablet     tiZANidine (ZANAFLEX) 4 MG tablet     traZODone (DESYREL) 50 MG tablet     No current facility-administered medications for this visit.        Patient Active Problem List   Diagnosis     Syncope and collapse     Hypertensive urgency     Concussion syndrome     Hydronephrosis, right     Pyelonephritis     Urolithiasis     Chest pain     GI bleed     Abdominal pain, epigastric     Hypochromic microcytic anemia     Anastomotic ulcer S/P gastric bypass     Anemia     Adjustment disorder with mixed anxiety and depressed mood     Annular tear of lumbar disc     Chronic migraine     Chronic pain disorder     Failed back syndrome of lumbar spine     Gastric bypass status for obesity     Hx of lumbar discectomy     Hyperlipidemia     Hypothyroidism     Long term (current) use of opiate analgesic     Other insomnia     Vitamin B 12 deficiency     Past Surgical History:   Procedure Laterality Date     BACK SURGERY      2008; 2011     ESOPHAGOSCOPY, GASTROSCOPY, DUODENOSCOPY (EGD), COMBINED N/A 7/21/2018    Procedure: COMBINED ESOPHAGOSCOPY, GASTROSCOPY, DUODENOSCOPY (EGD);  UPPER ENDOSCOPY WITH BIOPSIES;  Surgeon: Roger Vazquez MD;  Location: [facility] OR     ESOPHAGOSCOPY, GASTROSCOPY, DUODENOSCOPY (EGD), COMBINED N/A 11/21/2019    Procedure: UPPER ENDOSCOPY with biopsy;  Surgeon: Kang Cleveland MD;  Location: [facility] OR     HEAD & NECK SURGERY  2008    c4-c5 fusion     LAPAROSCOPIC BYPASS GASTRIC  12/2010       Social History     Tobacco Use     Smoking status: Never Smoker     Smokeless tobacco: Never Used   Substance Use Topics     Alcohol use: No     Comment: sober for 18 years     History reviewed. No pertinent family history.          Reviewed and updated as needed this visit by Provider  Tobacco  Allergies  Meds  Problems  Med Hx  Surg Hx  Fam Hx  Soc Hx          Review of Systems   ROS COMP: Constitutional, HEENT, cardiovascular, pulmonary,  "gi and gu systems are negative, except as otherwise noted.      Objective    /78 (BP Location: Right arm, Patient Position: Sitting, Cuff Size: Adult Regular)   Pulse 65   Temp 98  F (36.7  C) (Tympanic)   Ht 1.676 m (5' 6\")   Wt 74.8 kg (165 lb)   SpO2 99%   BMI 26.63 kg/m    Body mass index is 26.63 kg/m .  Physical Exam   GENERAL: alert, no distress and appears older than stated age  NECK: no adenopathy, no asymmetry, masses, or scars and thyroid normal to palpation  RESP: lungs clear to auscultation - no rales, rhonchi or wheezes  CV: regular rate and rhythm, normal S1 S2, no S3 or S4, no murmur, click or rub, no peripheral edema and peripheral pulses strong  ABDOMEN: soft, nontender, no hepatosplenomegaly, no masses and bowel sounds normal  MS: no gross musculoskeletal defects noted, no edema  SKIN: no suspicious lesions or rashes  PSYCH: mentation appears normal, affect normal/bright    Diagnostic Test Results:  Labs [facility]  Labs pending    Results for orders placed or performed in visit on 12/12/19 (from the past 24 hour(s))   CBC with platelets and differential   Result Value Ref Range    WBC 7.4 4.0 - 11.0 10e9/L    RBC Count 4.64 3.8 - 5.2 10e12/L    Hemoglobin 10.6 (L) 11.7 - 15.7 g/dL    Hematocrit 35.0 35.0 - 47.0 %    MCV 75 (L) 78 - 100 fl    MCH 22.8 (L) 26.5 - 33.0 pg    MCHC 30.3 (L) 31.5 - 36.5 g/dL    RDW 26.5 (H) 10.0 - 15.0 %    Platelet Count 324 150 - 450 10e9/L    Diff Method Automated Method     % Neutrophils 62.2 %    % Lymphocytes 27.2 %    % Monocytes 8.7 %    % Eosinophils 1.5 %    % Basophils 0.0 %    % Immature Granulocytes 0.4 %    Nucleated RBCs 0 0 /100    Absolute Neutrophil 4.6 1.6 - 8.3 10e9/L    Absolute Lymphocytes 2.0 0.8 - 5.3 10e9/L    Absolute Monocytes 0.7 0.0 - 1.3 10e9/L    Absolute Eosinophils 0.1 0.0 - 0.7 10e9/L    Absolute Basophils 0.0 0.0 - 0.2 10e9/L    Abs Immature Granulocytes 0.0 0 - 0.4 10e9/L    Absolute Nucleated " "RBC 0.0              Assessment & Plan       ICD-10-CM    1. Insomnia, unspecified type G47.00 traZODone (DESYREL) 50 MG tablet   2. Anemia, unspecified type D64.9 CBC with platelets and differential     Iron and iron binding capacity     Ferritin     Vitamin B12   3. Hypothyroidism, unspecified type E03.9 TSH with free T4 reflex   4. Vitamin B 12 deficiency E53.8    5. Lipid screening Z13.220 Lipid Profile (Chol, Trig, HDL, LDL calc)   6. History of diabetes mellitus Z86.39 Glucose     Hemoglobin A1c   7. Encounter for screening mammogram for breast cancer Z12.31 MA Screening Digital Bilateral   8. Special screening for malignant neoplasms, colon Z12.11 GENERAL SURG ADULT REFERRAL        BMI:   Estimated body mass index is 26.63 kg/m  as calculated from the following:    Height as of this encounter: 1.676 m (5' 6\").    Weight as of this encounter: 74.8 kg (165 lb).   Weight management plan: Discussed healthy diet and exercise guidelines        Patient Instructions     Hemoglobin improved at 10.6.  Iron, b12, and thyroid labs pending.  Return for preventative physical.  Come fasting for lipids and diabetes screen.  Mammogram ordered.  Referral for screening colonoscopy performed.  Call numbers above for counseling.  Trial of Trazodone for insomnia.  B12 injection given today.     Psychologists/ Counselors      Naina Spangler   [phone]  [facility]   [phone]  [facility] [phone]  [facility] (kids) [phone]  [facility](teens) [phone]  [facility]  [phone]  [facility]  [phone]  [facility]  [phone]  [facility]  [phone]  [facility]  [phone]  [facility] counseling [phone] (Opening new location)  [facility]   [phone] (Opening new location)  [facility]    [phone]   [facility]   [phone]   [facility]  [phone]  [facility] [place] [phone]  [facility]  [phone]  [facility]  [phone]     [facility] [phone]      [facility] counseling [phone]  [facility]   [phone]  [facility] (Neri Drake, LMFT)                                                   [phone]  [facility]  [phone]  [facility] [phone]  [facility]      [phone]  [facility]       [phone]  [facility]  [phone]  [facility]      [phone]     [place]  [facility]   [phone]  [facility]      [phone]  [facility] [phone]  [facility]      [phone]  [facility]  [phone]    [facility]                      [phone]    *Facilities in bold italics indicate medication management  Services are offered.    Crisis support  Mobile crisis line- [phone]  [facility]: Free online resources including therapy for Mental Health and substance use problems: Http://[facility].org    Crisis Text Line  Text HOME to 846-893 - The Crisis Text Line serves anyone, in any type of crisis, providing access to free, 24/7 support and information via the medium people already use and trust  http://www.crisistextline.org   Here's how it works:  Text HOME to 094-023 from anywhere in the [place], anytime, about any type of crisis.  A live, trained Crisis Counselor receives the text and responds quickly.  The volunteer Crisis Counselor will help you move from a 'hot moment to a cool moment'                              No follow-ups on file.    Coco Schafer MD  [facility]

## 2019-12-12 ENCOUNTER — HOSPITAL ENCOUNTER (EMERGENCY)
Facility: HOSPITAL | Age: 49
Discharge: HOME | End: 2019-12-12
Attending: NURSE PRACTITIONER | Admitting: NURSE PRACTITIONER
Payer: COMMERCIAL

## 2019-12-12 ENCOUNTER — OFFICE VISIT (OUTPATIENT)
Dept: FAMILY MEDICINE | Facility: OTHER | Age: 49
End: 2019-12-12
Attending: FAMILY MEDICINE
Payer: COMMERCIAL

## 2019-12-12 VITALS
RESPIRATION RATE: 16 BRPM | TEMPERATURE: 97 F | HEART RATE: 74 BPM | SYSTOLIC BLOOD PRESSURE: 118 MMHG | DIASTOLIC BLOOD PRESSURE: 87 MMHG | OXYGEN SATURATION: 100 %

## 2019-12-12 VITALS
TEMPERATURE: 98 F | WEIGHT: 165 LBS | HEART RATE: 65 BPM | DIASTOLIC BLOOD PRESSURE: 78 MMHG | BODY MASS INDEX: 26.52 KG/M2 | OXYGEN SATURATION: 99 % | SYSTOLIC BLOOD PRESSURE: 124 MMHG | HEIGHT: 66 IN

## 2019-12-12 DIAGNOSIS — Z86.39 HISTORY OF DIABETES MELLITUS: ICD-10-CM

## 2019-12-12 DIAGNOSIS — Z13.220 LIPID SCREENING: ICD-10-CM

## 2019-12-12 DIAGNOSIS — Z12.31 ENCOUNTER FOR SCREENING MAMMOGRAM FOR BREAST CANCER: ICD-10-CM

## 2019-12-12 DIAGNOSIS — E53.8 VITAMIN B 12 DEFICIENCY: ICD-10-CM

## 2019-12-12 DIAGNOSIS — E03.9 HYPOTHYROIDISM, UNSPECIFIED TYPE: ICD-10-CM

## 2019-12-12 DIAGNOSIS — Z12.11 SPECIAL SCREENING FOR MALIGNANT NEOPLASMS, COLON: ICD-10-CM

## 2019-12-12 DIAGNOSIS — D64.9 ANEMIA, UNSPECIFIED TYPE: ICD-10-CM
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
## 2019-12-12 DIAGNOSIS — G47.00 INSOMNIA, UNSPECIFIED TYPE: Primary | ICD-10-CM

## 2019-12-12 DIAGNOSIS — S61.051A: ICD-10-CM

## 2019-12-12 LAB
BASOPHILS # BLD AUTO: 0 10E9/L (ref 0–0.2)
BASOPHILS NFR BLD AUTO: 0 %
DIFFERENTIAL METHOD BLD: ABNORMAL
EOSINOPHIL # BLD AUTO: 0.1 10E9/L (ref 0–0.7)
EOSINOPHIL NFR BLD AUTO: 1.5 %
ERYTHROCYTE [DISTWIDTH] IN BLOOD BY AUTOMATED COUNT: 26.5 % (ref 10–15)
FERRITIN SERPL-MCNC: 10 NG/ML (ref 8–252)
HCT VFR BLD AUTO: 35 % (ref 35–47)
HGB BLD-MCNC: 10.6 G/DL (ref 11.7–15.7)
IMM GRANULOCYTES # BLD: 0 10E9/L (ref 0–0.4)
IMM GRANULOCYTES NFR BLD: 0.4 %
IRON SATN MFR SERPL: 54 % (ref 15–46)
IRON SERPL-MCNC: 227 UG/DL (ref 35–180)
LYMPHOCYTES # BLD AUTO: 2 10E9/L (ref 0.8–5.3)
LYMPHOCYTES NFR BLD AUTO: 27.2 %
MCH RBC QN AUTO: 22.8 PG (ref 26.5–33)
MCHC RBC AUTO-ENTMCNC: 30.3 G/DL (ref 31.5–36.5)
MCV RBC AUTO: 75 FL (ref 78–100)
MONOCYTES # BLD AUTO: 0.7 10E9/L (ref 0–1.3)
MONOCYTES NFR BLD AUTO: 8.7 %
NEUTROPHILS # BLD AUTO: 4.6 10E9/L (ref 1.6–8.3)
NEUTROPHILS NFR BLD AUTO: 62.2 %
NRBC # BLD AUTO: 0 10*3/UL
NRBC BLD AUTO-RTO: 0 /100
PLATELET # BLD AUTO: 324 10E9/L (ref 150–450)
RBC # BLD AUTO: 4.64 10E12/L (ref 3.8–5.2)
TIBC SERPL-MCNC: 422 UG/DL (ref 240–430)
TSH SERPL DL<=0.005 MIU/L-ACNC: 3.23 MU/L (ref 0.4–4)
WBC # BLD AUTO: 7.4 10E9/L (ref 4–11)

## 2019-12-12 PROCEDURE — 84443 ASSAY THYROID STIM HORMONE: CPT | Mod: ZL | Performed by: FAMILY MEDICINE

## 2019-12-12 PROCEDURE — 96372 THER/PROPH/DIAG INJ SC/IM: CPT

## 2019-12-12 PROCEDURE — G0463 HOSPITAL OUTPT CLINIC VISIT: HCPCS

## 2019-12-12 PROCEDURE — 82728 ASSAY OF FERRITIN: CPT | Mod: ZL | Performed by: FAMILY MEDICINE

## 2019-12-12 PROCEDURE — G0463 HOSPITAL OUTPT CLINIC VISIT: HCPCS | Mod: 25,27

## 2019-12-12 PROCEDURE — 99204 OFFICE O/P NEW MOD 45 MIN: CPT | Performed by: FAMILY MEDICINE

## 2019-12-12 PROCEDURE — 85025 COMPLETE CBC W/AUTO DIFF WBC: CPT | Mod: ZL | Performed by: FAMILY MEDICINE

## 2019-12-12 PROCEDURE — 83540 ASSAY OF IRON: CPT | Mod: ZL | Performed by: FAMILY MEDICINE

## 2019-12-12 PROCEDURE — 99213 OFFICE O/P EST LOW 20 MIN: CPT | Mod: Z6 | Performed by: NURSE PRACTITIONER

## 2019-12-12 PROCEDURE — G0463 HOSPITAL OUTPT CLINIC VISIT: HCPCS | Mod: 25

## 2019-12-12 PROCEDURE — 36415 COLL VENOUS BLD VENIPUNCTURE: CPT | Mod: ZL | Performed by: FAMILY MEDICINE

## 2019-12-12 PROCEDURE — 83550 IRON BINDING TEST: CPT | Mod: ZL | Performed by: FAMILY MEDICINE

## 2019-12-12 PROCEDURE — 82607 VITAMIN B-12: CPT | Mod: ZL | Performed by: FAMILY MEDICINE

## 2019-12-12 RX ORDER — TRAZODONE HYDROCHLORIDE 50 MG/1
50 TABLET, FILM COATED ORAL AT BEDTIME
Qty: 30 TABLET | Refills: 0 | Status: SHIPPED | OUTPATIENT
Start: 2019-12-12 | End: 2020-01-16

## 2019-12-12 RX ADMIN — CYANOCOBALAMIN 1000 MCG: 1000 INJECTION, SOLUTION INTRAMUSCULAR; SUBCUTANEOUS at 16:17

## 2019-12-12 ASSESSMENT — ENCOUNTER SYMPTOMS
CARDIOVASCULAR NEGATIVE: 1
GASTROINTESTINAL NEGATIVE: 1
PSYCHIATRIC NEGATIVE: 1
RESPIRATORY NEGATIVE: 1
HEMATOLOGIC/LYMPHATIC NEGATIVE: 1
CONSTITUTIONAL NEGATIVE: 1
EYES NEGATIVE: 1
NEUROLOGICAL NEGATIVE: 1
ALLERGIC/IMMUNOLOGIC NEGATIVE: 1
ENDOCRINE NEGATIVE: 1

## 2019-12-12 ASSESSMENT — MIFFLIN-ST. JEOR: SCORE: 1385.19

## 2019-12-12 ASSESSMENT — PAIN SCALES - GENERAL: PAINLEVEL: MILD PAIN (2)

## 2019-12-12 NOTE — ED TRIAGE NOTES
Pt presents with a dog bite to her right hand thumb. 2 puncture marks noted. States dog immunizations are up to date.

## 2019-12-12 NOTE — NURSING NOTE
"Chief Complaint   Patient presents with     Establish Care       Initial /78 (BP Location: Right arm, Patient Position: Sitting, Cuff Size: Adult Regular)   Pulse 65   Temp 98  F (36.7  C) (Tympanic)   Ht 1.676 m (5' 6\")   Wt 74.8 kg (165 lb)   SpO2 99%   BMI 26.63 kg/m   Estimated body mass index is 26.63 kg/m  as calculated from the following:    Height as of this encounter: 1.676 m (5' 6\").    Weight as of this encounter: 74.8 kg (165 lb).  Medication Reconciliation: complete  Cyndee Haji LPN  "

## 2019-12-12 NOTE — ED AVS SNAPSHOT
[facility]  [address] [address]  Phone:  [phone]                                    Constantino Warren   MRN: [id number]    Department:  [facility]   Date of Visit:  12/12/2019           After Visit Summary Signature Page    I have received my discharge instructions, and my questions have been answered. I have discussed any challenges I see with this plan with the nurse or doctor.    ..........................................................................................................................................  Patient/Patient Representative Signature      ..........................................................................................................................................  Patient Representative Print Name and Relationship to Patient    ..................................................               ................................................  Date                                   Time    ..........................................................................................................................................  Reviewed by Signature/Title    ...................................................              ..............................................  Date                                               Time          22EPIC Rev 08/18

## 2019-12-12 NOTE — PROGRESS NOTES
Clinic Administered Medication Documentation    MEDICATION LIST:   Injectable Medication Documentation    Patient was given Cyanocobalamin (B-12). Prior to medication administration, verified patients identity using patient s name and date of birth. Please see MAR and medication order for additional information.     Was entire vial of medication used? Yes  Vial/Syringe: Single dose vial  Expiration Date:  7/31/21  Was this medication supplied by the patient? No    Left Deltoid    Jacy Wall LPN

## 2019-12-12 NOTE — PATIENT INSTRUCTIONS
Hemoglobin improved at 10.6.  Iron, b12, and thyroid labs pending.  Return for preventative physical.  Come fasting for lipids and diabetes screen.  Mammogram ordered.  Referral for screening colonoscopy performed.  Call numbers above for counseling.  Trial of Trazodone for insomnia.  B12 injection given today.     Psychologists/ Counselors      Naina  Tina   [phone]  [facility]   [phone]  [facility] [phone]  [employer] (kids) [phone]  [facility](teens) [phone]  [facility]  [phone]  [facility]  [phone]  [facility]  [phone]  [facility]  [phone]  [facility]  [phone]  [facility] counseling [phone] (Opening new location)  [facility]   [phone] (Opening new location)  [facility]    [phone]   [facility]   [phone]   [facility]  [phone]  [facility] [phone]  [facility]  [phone]  [facility]  [phone]     [facility] [phone]      [facility] counseling [phone]  [facility]   [phone]  [facility] (Neri Drake LMFT)                                                   [phone]  [facility]  [phone]  [facility] [phone]  [facility]      [phone]  [facility]       [phone]  [employer] [employer] [facility]  [phone]  [facility]      [phone]     [place]   [phone]  [facility]      [phone]  [facility] TODD Coronel [phone]  [facility]      [phone]  [facility]  [phone]    [facility]                      [phone]    *Facilities in bold italics indicate medication management  Services are offered.    Crisis support  Mobile crisis line-  [phone]  [facility]: Free online resources including therapy for Mental Health and substance use problems: Http://[facility]riverange.org    Crisis Text Line  Text HOME to 844-308 - The Crisis Text Line serves anyone, in any type of crisis, providing access to free, 24/7 support and information via the medium people already use and trust  http://www.crisistextline.org   Here's how it works:  Text HOME to 560-367 from anywhere in the [place], anytime, about any type of crisis.  A live, trained Crisis Counselor receives the text and responds quickly.  The volunteer Crisis Counselor will help you move from a 'hot moment to a cool moment'

## 2019-12-13 ENCOUNTER — DOCUMENTATION ONLY (OUTPATIENT)
Dept: OTHER | Facility: CLINIC | Age: 49
End: 2019-12-13

## 2019-12-13 LAB — VIT B12 SERPL-MCNC: 635 PG/ML (ref 193–986)

## 2019-12-13 NOTE — DISCHARGE INSTRUCTIONS
Keep area clean  You can use bacitracin or triple antibiotic ointment as needed  Cover area if looks like it might get dirty

## 2019-12-13 NOTE — ED PROVIDER NOTES
History     Chief Complaint   Patient presents with     Dog Bite     bit by own dog. current on vaccines. unsure of last tetanus. puncture wound to right thumb     The history is provided by the patient.     Constantino Warren is a 49 year old female who presents to the  for a dog bite about  PM tonight. Her dog bite her when it when after the other dog.  He  passed away this week and the dog was his and is missing her . She did rinse it under water right away.     Allergies:  Allergies   Allergen Reactions     Aspartame      Other reaction(s): Other - Describe In Comment Field, Seizures  convulsions     Bee Venom Anaphylaxis     Bupropion Anaphylaxis     Throat gets tight and hives. Denies allergic reaction     Food Anaphylaxis     Peppers-anaphylaxis if eaten, if touched hands swell and reddened     Ibuprofen Anaphylaxis     No Clinical Screening - See Comments Anaphylaxis     Peppers, patient reports green peppers close off her throat     Piper Anaphylaxis     Peppers--green, black, red, chili, etc. Derm symptoms, hives;  Strawberries--(cooked) Itching, breathing difficulties, GI symptoms. Mushrooms--GI symptoms, Hives.Spinach (cooked) throat irritation  (Listed on [facility] problem list.)     Fentanyl Swelling     Lips and tongue swelled.  Swelling of lips and tongue     Liquid Adhesive Dermatitis     blisters  Blisters  Silk tape and tegaderm is fine     Oxcarbazepine Other (See Comments)     Throat gets tight and hives. Denies allergic reaction  Throat gets tight and hives-       Prozac [Fluoxetine]      Other reaction(s): *Unknown  uncontrolled muscle spasms     Valproic Acid      Respiratory symptoms ([facility]). Denies allergic reaction     Ziprasidone      Throat gets tight and hives. Denies allergic reaction       Problem List:    Patient Active Problem List    Diagnosis Date Noted     Anastomotic ulcer S/P gastric bypass 07/21/2018     Priority: Medium      Anemia 07/21/2018     Priority: Medium     Chest pain 07/20/2018     Priority: Medium     GI bleed 07/20/2018     Priority: Medium     Abdominal pain, epigastric 07/20/2018     Priority: Medium     Hypochromic microcytic anemia 07/20/2018     Priority: Medium     Chronic migraine 01/18/2018     Priority: Medium     Chronic pain disorder 01/18/2018     Priority: Medium     Long term (current) use of opiate analgesic 01/12/2018     Priority: Medium     Overview:   Pain Diagnosis failed back syndrome, annular tear L4-5, history lumbar discectomy, s/p lumbar fusion.    Should be seen in the clinic every twelve weeks. Currently on a taper: yes, trying to wean away from the daytime hydrocodone-acetaminophen..    Plan for flares of chronic pain stretches, occasional use of more hydrocodone-acetaminophen for flare.    ED and UC: Opioid medication will not be given in the ER or Urgent Care unless there is a medical emergency unrelated to chronic pain.  Limit to 3 day supply    Refills: Refills will only be given at a scheduled visit.    [facility] Agreement for Opioid Treatment.   Opioid Agreement Date: 5/21/12  Last UDT (Urine Drug Test) on date:    Pain Dx: chronic low back pain  Last Pain Visit: 6-24-11  Preferred Pharmacy: 's  Comments:      7/23/14:  Progressing well.  Over the last month, has decreased hydrocodone-acetaminophen from 6/day to 3/day.  Awaiting appointment with [facility].       Adjustment disorder with mixed anxiety and depressed mood 12/15/2017     Priority: Medium     Other insomnia 12/15/2017     Priority: Medium     Syncope and collapse 09/26/2015     Priority: Medium     Hypertensive urgency 09/26/2015     Priority: Medium     Concussion syndrome 09/26/2015     Priority: Medium     Hydronephrosis, right 04/20/2015     Priority: Medium     Pyelonephritis 04/20/2015     Priority: Medium     Urolithiasis 04/20/2015     Priority: Medium     Annular tear of lumbar disc 12/16/2014     Priority:  Medium     Overview:   MRI 2/5/13:  Previous right L4-5 hemilaminectomy and partial facetectomy with mild to moderate right L4-5 facet arthropathy and broad-based bulge of disk and end plate osteophyte on the right with partial high signal intensity zone annular tear of the undersurface of the laterally protruding disk/annulus.       Failed back syndrome of lumbar spine 12/16/2014     Priority: Medium     Overview:   Three back surgeries, including an L3-sacrum AP fusion done at [facility] in 2013.  Has been doing very well in terms of reduction of pain, with minimal requirement of opiate pain medication.       Hx of lumbar discectomy 12/16/2014     Priority: Medium     Overview:   10/2006:   L4-5 Laminectomy and diskectomy L5-S1.   12/12/11:  L5S1 microdiscectomy ([place])       Vitamin B 12 deficiency 06/13/2011     Priority: Medium     Overview:   IMO Update 10/11       Gastric bypass status for obesity 01/04/2011     Priority: Medium     Overview:   12/14/10:  laparoscopic Prasanna-en-Y gastric bypass Dr. Hassan  IMO Update 10/11       Hyperlipidemia 09/23/2008     Priority: Medium     Overview:   IMO Update 10/11       Hypothyroidism 09/23/2008     Priority: Medium     Overview:   IMO Update 10/11  Overview:   IMO Update 10/11          Past Medical History:    Past Medical History:   Diagnosis Date     Chronic pain      Thyroid disease        Past Surgical History:    Past Surgical History:   Procedure Laterality Date     BACK SURGERY      2008; 2011     ESOPHAGOSCOPY, GASTROSCOPY, DUODENOSCOPY (EGD), COMBINED N/A 7/21/2018    Procedure: COMBINED ESOPHAGOSCOPY, GASTROSCOPY, DUODENOSCOPY (EGD);  UPPER ENDOSCOPY WITH BIOPSIES;  Surgeon: Roger Vazquez MD;  Location: [facility] OR     ESOPHAGOSCOPY, GASTROSCOPY, DUODENOSCOPY (EGD), COMBINED N/A 11/21/2019    Procedure: UPPER ENDOSCOPY with biopsy;  Surgeon: Kang Cleveland MD;  Location: [facility] OR     HEAD & NECK SURGERY  2008    c4-c5 fusion     LAPAROSCOPIC BYPASS GASTRIC   12/2010       Family History:    No family history on file.    Social History:  Marital Status:   [5]  Social History     Tobacco Use     Smoking status: Never Smoker     Smokeless tobacco: Never Used   Substance Use Topics     Alcohol use: No     Comment: sober for 18 years     Drug use: No        Medications:    acetaminophen (TYLENOL) 500 MG tablet  amoxicillin-clavulanate (AUGMENTIN) 875-125 MG tablet  ferrous sulfate (IRON) 325 (65 Fe) MG tablet  folic acid (FOLVITE) 1 MG tablet  levothyroxine (SYNTHROID/LEVOTHROID) 200 MCG tablet  MELATONIN PO  multivitamin, therapeutic with minerals (MULTI-VITAMIN) TABS  sertraline (ZOLOFT) 100 MG tablet  tiZANidine (ZANAFLEX) 4 MG tablet  EPINEPHrine (EPIPEN/ADRENACLICK/OR ANY BX GENERIC EQUIV) 0.3 MG/0.3ML injection 2-pack  traZODone (DESYREL) 50 MG tablet          Review of Systems   Constitutional: Negative.    HENT: Negative.    Eyes: Negative.    Respiratory: Negative.    Cardiovascular: Negative.    Gastrointestinal: Negative.    Endocrine: Negative.    Genitourinary: Negative.    Musculoskeletal:        Right thumb pain dip and pip joint   Skin:        Right thumb:  Open area puncture wound DIP   Laceration to PIP joint    Allergic/Immunologic: Negative.    Neurological: Negative.    Hematological: Negative.    Psychiatric/Behavioral: Negative.        Physical Exam   BP: 118/87  Pulse: 74  Temp: 97  F (36.1  C)  Resp: 16  SpO2: 100 %      Physical Exam  Vitals signs and nursing note reviewed.   Constitutional:       Appearance: Normal appearance.   Cardiovascular:      Rate and Rhythm: Normal rate.   Pulmonary:      Effort: Pulmonary effort is normal. No respiratory distress.   Musculoskeletal:      Comments: Puncture wound base of the thumb nail. And a laceration lateral proximal thumb 1.0 cm   Neurological:      Mental Status: She is alert.         ED Course   Soaked hand in hybicleans   No signs of infection at this time.      Procedures                Critical Care time:  none               Results for orders placed or performed in visit on 12/12/19 (from the past 24 hour(s))   TSH with free T4 reflex   Result Value Ref Range    TSH 3.23 0.40 - 4.00 mU/L   CBC with platelets and differential   Result Value Ref Range    WBC 7.4 4.0 - 11.0 10e9/L    RBC Count 4.64 3.8 - 5.2 10e12/L    Hemoglobin 10.6 (L) 11.7 - 15.7 g/dL    Hematocrit 35.0 35.0 - 47.0 %    MCV 75 (L) 78 - 100 fl    MCH 22.8 (L) 26.5 - 33.0 pg    MCHC 30.3 (L) 31.5 - 36.5 g/dL    RDW 26.5 (H) 10.0 - 15.0 %    Platelet Count 324 150 - 450 10e9/L    Diff Method Automated Method     % Neutrophils 62.2 %    % Lymphocytes 27.2 %    % Monocytes 8.7 %    % Eosinophils 1.5 %    % Basophils 0.0 %    % Immature Granulocytes 0.4 %    Nucleated RBCs 0 0 /100    Absolute Neutrophil 4.6 1.6 - 8.3 10e9/L    Absolute Lymphocytes 2.0 0.8 - 5.3 10e9/L    Absolute Monocytes 0.7 0.0 - 1.3 10e9/L    Absolute Eosinophils 0.1 0.0 - 0.7 10e9/L    Absolute Basophils 0.0 0.0 - 0.2 10e9/L    Abs Immature Granulocytes 0.0 0 - 0.4 10e9/L    Absolute Nucleated RBC 0.0    Iron and iron binding capacity   Result Value Ref Range    Iron 227 (H) 35 - 180 ug/dL    Iron Binding Cap 422 240 - 430 ug/dL    Iron Saturation Index 54 (H) 15 - 46 %   Ferritin   Result Value Ref Range    Ferritin 10 8 - 252 ng/mL       Medications - No data to display    Assessments & Plan (with Medical Decision Making)     I have reviewed the nursing notes.    I have reviewed the findings, diagnosis, plan and need for follow up with the patient.      Bleeding controlled Nursing to apply bacitracin and band-aid over wound sites. No signs of infection, but due to wound type plan to treat with antibiotic. Encouraged to watch for signs of infection. Follow up if pain, decreased ROM to fingers/hand or any concerns.     Patient verbally educated and given appropriate education sheets for the diagnoses and has no questions.  Take medications as directed.    Follow up with your Primary Care provider if symptoms increase or if further concerns develop, return to the ER      Discharge Medication List as of 12/12/2019  6:19 PM      START taking these medications    Details   amoxicillin-clavulanate (AUGMENTIN) 875-125 MG tablet Take 1 tablet by mouth 2 times daily, Disp-20 tablet, R-0, E-Prescribe             Final diagnoses:   Dog bite, initial encounter       12/12/2019   [facility]     Melany Valle APRN CNP  12/13/19 0810

## 2020-01-08 DIAGNOSIS — E53.8 VITAMIN B 12 DEFICIENCY: Primary | ICD-10-CM

## 2020-01-08 RX ORDER — CYANOCOBALAMIN 1000 UG/ML
1000 INJECTION, SOLUTION INTRAMUSCULAR; SUBCUTANEOUS
Status: DISCONTINUED | OUTPATIENT
Start: 2020-01-13 | End: 2020-03-30

## 2020-01-08 NOTE — PROGRESS NOTES
Patient is on the schedule for a B12 injection next week, but order has .  Last office visit was 19.  Medication is pended if you approve.  Thank you.    Estrella Espinosa RN

## 2020-01-13 ENCOUNTER — TELEPHONE (OUTPATIENT)
Dept: SURGERY | Facility: OTHER | Age: 50
End: 2020-01-13

## 2020-01-13 ENCOUNTER — PREP FOR PROCEDURE (OUTPATIENT)
Dept: SURGERY | Facility: OTHER | Age: 50
End: 2020-01-13

## 2020-01-13 DIAGNOSIS — Z12.11 SPECIAL SCREENING FOR MALIGNANT NEOPLASMS, COLON: Primary | ICD-10-CM

## 2020-01-13 NOTE — TELEPHONE ENCOUNTER
Referral received for colonoscopy.   This patient has physical already scheduled for 1/16/2020 with Dr. Cook.  Patient scheduled for colonoscopy on 2/13/20 with Dr. Cleveland at [facility] with Gatorade bowel prep.   Instructions given via phone and instructions mailed to patient with surgery handbook.     This patient saw Dr. Cleveland in hospital in November for anemia. I have her scheduled as a meet and greet colonoscopy. Please let me know if you need to see her back in clinic again first.

## 2020-01-13 NOTE — PATIENT INSTRUCTIONS
B12 given.  Shingrix vaccine given.  Follow up for 2nd one to complete series.  Keep scheduled colonoscopy with Dr. Cleveland.  Mammogram reordered -to be rescheduled - missed in 12/2019.  Return for fasting labs - glucose and lipids - at your convenience. Lab opens 7:30.  Sign release to get prior GYN surgery records.  Letter for TOPS.  Medications refilled.  Referral to podiatry.      Preventive Health Recommendations  Female Ages 50 - 64    Yearly exam: See your health care provider every year in order to  o Review health changes.   o Discuss preventive care.    o Review your medicines if your doctor has prescribed any.      Get a Pap test every three years (unless you have an abnormal result and your provider advises testing more often).    If you get Pap tests with HPV test, you only need to test every 5 years, unless you have an abnormal result.     You do not need a Pap test if your uterus was removed (hysterectomy) and you have not had cancer.    You should be tested each year for STDs (sexually transmitted diseases) if you're at risk.     Have a mammogram every 1 to 2 years.    Have a colonoscopy at age 50, or have a yearly FIT test (stool test). These exams screen for colon cancer.      Have a cholesterol test every 5 years, or more often if advised.    Have a diabetes test (fasting glucose) every three years. If you are at risk for diabetes, you should have this test more often.     If you are at risk for osteoporosis (brittle bone disease), think about having a bone density scan (DEXA).    Shots: Get a flu shot each year. Get a tetanus shot every 10 years.    Nutrition:     Eat at least 5 servings of fruits and vegetables each day.    Eat whole-grain bread, whole-wheat pasta and brown rice instead of white grains and rice.    Get adequate Calcium and Vitamin D.     Lifestyle    Exercise at least 150 minutes a week (30 minutes a day, 5 days a week). This will help you control your weight and prevent  disease.    Limit alcohol to one drink per day.    No smoking.     Wear sunscreen to prevent skin cancer.     See your dentist every six months for an exam and cleaning.    See your eye doctor every 1 to 2 years.

## 2020-01-13 NOTE — LETTER
"January 13, 2020        Constantino Warren  [address] [place] [address]   [place] [address]      Dear Constantino,       We want to your Colonoscopy to be as pleasant as possible. Please review the instructions below. If you have questions, you may contact us at the any of the following numbers:     [facility] Health Unit Coordinator: [phone]  Clinic Nurse (Dena): [phone]  Surgery Education Nurse: [phone]      Date of procedure: 2/13/2020 with Dr. Cleveland  Admit Time: Hospital Surgery will call you the day before your procedure by 5pm with your arrival time. If your surgery is on Monday, expect a call on Friday.  If you are not contacted before 5PM, please call admitting at [phone].   After hours or on weekends, please call [phone] to postpone.   Call the clinic nurse if you become ill within 1 week of your procedure to reschedule.   Preop appointment needed within the 30 days prior to your procedure--already scheduled for physical 1/16/2020 with Dr. Cook.    7 DAYS BEFORE THE EXAM:  Call the Surgery Education Nurse at [phone] and have a medication list ready.   Stop Aspirin or NSAIDS (Ibuprofen, Celebrex, Naproxen, etc) 7 days before surgery.  Stop fiber supplements, herbals, vitamins, and iron. Stop eating corn, nuts and seeds.  If you are prescribed a daily 81mg Aspirin, you may continue this.  Please  the following over the counter items for your bowel prep:    Two 5 mg Dulcolax (bisacodyl) tablets   One 8.3 ounce (238 g) bottle of miralax   One 10 ounce bottle of magnesium citrate   One 64 ounce bottle of gatorade-Not red, purple, or powdered.          2 DAYS BEFORE THE EXAM:   Low fiber diet.   See list of low fiber foods on page 3 of the \"Miralax, Dulcolax and Magnesium Citrate\" packet.   Drink at least 4-6 large glasses of sports drink today and tomorrow. Avoid red and purple.    1 DAY BEFORE THE EXAM:  No solid food/milk products after 12:01 AM. Drink only clear liquids all day, " "at least 8-10 glasses.   Please see list of clear liquids on page 2 of \"Miralax, Dulcolax and Magnesium Citrate\" packet.    Avoid anything red or purple. No alcohol.            AT 12:00 PM NOON THE DAY BEFORE EXAM:  Take 2 Dulcolax tablets by mouth with clear liquids.            AT 6:00 PM THE DAY BEFORE EXAM:  Mix the bottle of Miralax and 64 oz. of Gatorade in a pitcher.   Drink one 8 oz. glass every 10-15 minutes until gone. Stay near a toilet.     DAY OF COLONOSCOPY:             6 HOURS PRIOR TO EXAM ON DAY OF PROCEDURE:  Drink the bottle of magnesium citrate followed by a full glass of water.   You may have clear liquids up until 2 hours before arrival.  Shower before arrival and wear clean, comfortable clothes.   No jewelry, make-up, nail polish, hair spray, lotions, or perfumes.   [place] in Admitting through the [facility].   You must have a responsible adult to drive and to stay with you for 4 hours at home.     TIPS FOR COLON CLEANSING BEFORE YOUR COLONOSCOPY  To get accurate results from your exam, your colon must be completely empty or you may need to repeat the colon prep and exam. If you followed instructions and your stool is clear or yellow liquid, you are ready. If you are not sure if your colon is clean, please call the clinic nurse.    You may use Tucks wipes, hemorrhoid treatments, hydrocortisone cream or alcohol-free baby wipes to ease anal irritation. You may also use Vaseline to help protect the skin.     Quickly drink each glass. Even when you are sitting on the toilet, keep drinking every 15 minutes. If you have nausea or vomiting, take a break for 30 minutes and then resume drinking.    You will have loose watery stools and may also have chills. Dress for comfort. Expect to feel bloating, nausea and other discomfort until the stool clears from your colon.       Sincerely,      Dr. Kang Cleveland/Pattie AWAN LPN        "

## 2020-01-13 NOTE — PROGRESS NOTES
SUBJECTIVE:   CC: Constantino Warren is an 49 year old woman who presents for preventive health visit.     Healthy Habits:    Do you get at least three servings of calcium containing foods daily (dairy, green leafy vegetables, etc.)? yes    Amount of exercise or daily activities, outside of work: 3 day(s) per week    Problems taking medications regularly No    Medication side effects: No    Have you had an eye exam in the past two years? no    Do you see a dentist twice per year? no    Do you have sleep apnea, excessive snoring or daytime drowsiness?no    Unknown last pap - patient had uterine cancer  - total hysterectomy 2004; no chemo or radiation; not metastatic; completed follow up for several years    Due for mammogram - no showed 12/23/19 - patient unsure    Due for colon screening - Dr. Cleveland - 2/13/20 scheduled    Due for Shingrix    Trazodone added for insomnia last visit - working well - 4-5 hours straight, then up once, back for an hour    b12 deficient - on injections-PT REQUESTED    Due for fasting labs    In [facility] - goal weight 150 (is 161 currently with BMI of 25.99) - needs letter    Will be adopting 1/2 sibling of other adopted child; baby is due 7/2020     passed away 12/2019 -septic, ruptured gallbladder    Sone ages 13 and 18, adopted    Thickened toenails - painful      Hyperlipidemia Follow-Up    Are you regularly taking any medication or supplement to lower your cholesterol?   No    Are you having muscle aches or other side effects that you think could be caused by your cholesterol lowering medication?  No    Hypothyroidism Follow-up    Since last visit, patient describes the following symptoms: Weight stable, no hair loss, no skin changes, no constipation, no loose stools    Chronic Pain Follow-Up       Type / Location of Pain: back/neck  Analgesia/pain control:       Recent changes:  improved      Overall control: Tolerable with discomfort  Activity level/function:      Daily  activities:  Can do most things most days, with some rest    Work:  Pain does not limit any  work  Adverse effects:  No  Adherance    Taking medication as directed?  Yes    Participating in other treatments: yes-fusions  Risk Factors:    Sleep:  Poor    Mood/anxiety:  controlled    Recent family or social stressors:  death in family:  Spouse 1 month ago    Other aggravating factors: prolonged sitting, prolonged standing, poor posture and repetitive activities - etc  PHQ-9 SCORE 1/16/2020   PHQ-9 Total Score 9     LETTY-7 SCORE 1/16/2020   Total Score 3     Encounter-Level CSA:    There are no encounter-level csa.     Patient-Level CSA:    There are no patient-level csa.         How many servings of fruits and vegetables do you eat daily?  2-3    On average, how many sweetened beverages do you drink each day (Examples: soda, juice, sweet tea, etc.  Do NOT count diet or artificially sweetened beverages)?   0    How many days per week do you exercise enough to make your heart beat faster? 4    How many minutes a day do you exercise enough to make your heart beat faster? 30 - 60    How many days per week do you miss taking your medication? 0     Depression and Anxiety Follow-Up    How are you doing with your depression since your last visit? No change    How are you doing with your anxiety since your last visit?  No change    Are you having other symptoms that might be associated with depression or anxiety? No    Have you had a significant life event? OTHER: lost spouse     Do you have any concerns with your use of alcohol or other drugs? No    Social History     Tobacco Use     Smoking status: Never Smoker     Smokeless tobacco: Never Used   Substance Use Topics     Alcohol use: No     Comment: sober for 18 years     Drug use: No     PHQ 1/16/2020   PHQ-9 Total Score 9   Q9: Thoughts of better off dead/self-harm past 2 weeks Not at all     LETTY-7 SCORE 1/16/2020   Total Score 3     Last PHQ-9 1/16/2020   1.  Little  interest or pleasure in doing things 3   2.  Feeling down, depressed, or hopeless 3   3.  Trouble falling or staying asleep, or sleeping too much 2   4.  Feeling tired or having little energy 1   5.  Poor appetite or overeating 0   6.  Feeling bad about yourself 0   7.  Trouble concentrating 0   8.  Moving slowly or restless 0   Q9: Thoughts of better off dead/self-harm past 2 weeks 0   PHQ-9 Total Score 9   Difficulty at work, home, or with people Somewhat difficult     LETTY-7  1/16/2020   1. Feeling nervous, anxious, or on edge 0   2. Not being able to stop or control worrying 1   3. Worrying too much about different things 1   4. Trouble relaxing 1   5. Being so restless that it is hard to sit still 0   6. Becoming easily annoyed or irritable 0   7. Feeling afraid, as if something awful might happen 0   LETTY-7 Total Score 3   If you checked any problems, how difficult have they made it for you to do your work, take care of things at home, or get along with other people? Somewhat difficult         Suicide Assessment Five-step Evaluation and Treatment (SAFE-T)      Today's PHQ-2 Score:   PHQ-2 ( 1999 [employer]) 1/16/2020   Q1: Little interest or pleasure in doing things 3   Q2: Feeling down, depressed or hopeless 3   PHQ-2 Score 6         Social History     Tobacco Use     Smoking status: Never Smoker     Smokeless tobacco: Never Used   Substance Use Topics     Alcohol use: No     Comment: sober for 18 years     If you drink alcohol do you typically have >3 drinks per day or >7 drinks per week? No                     Reviewed orders with patient.  Reviewed health maintenance and updated orders accordingly - Yes  Current Outpatient Medications   Medication     acetaminophen (TYLENOL) 500 MG tablet     EPINEPHrine (EPIPEN/ADRENACLICK/OR ANY BX GENERIC EQUIV) 0.3 MG/0.3ML injection 2-pack     ferrous sulfate (IRON) 325 (65 Fe) MG tablet     folic acid (FOLVITE) 1 MG tablet     levothyroxine (SYNTHROID/LEVOTHROID) 200 MCG  tablet     MELATONIN PO     multivitamin, therapeutic with minerals (MULTI-VITAMIN) TABS     sertraline (ZOLOFT) 100 MG tablet     tiZANidine (ZANAFLEX) 4 MG tablet     traZODone (DESYREL) 50 MG tablet     levothyroxine (SYNTHROID/LEVOTHROID) 50 MCG tablet     Current Facility-Administered Medications   Medication     cyanocobalamin injection 1,000 mcg       Lab work future orders placed.    Mammogram Screening: Patient over age 50, mutual decision to screen reflected in health maintenance.    Pertinent mammograms are reviewed under the imaging tab.  History of abnormal Pap smear: Status post hysterectomy. Pap no longer indicated.     Reviewed and updated as needed this visit by clinical staff  Tobacco  Allergies  Meds  Problems  Med Hx  Surg Hx  Fam Hx  Soc Hx          Reviewed and updated as needed this visit by Provider  Tobacco  Allergies  Meds  Med Hx  Surg Hx  Fam Hx  Soc Hx       Past Medical History:   Diagnosis Date     Chronic pain      Thyroid disease       Past Surgical History:   Procedure Laterality Date     BACK SURGERY      2008; 2011     ESOPHAGOSCOPY, GASTROSCOPY, DUODENOSCOPY (EGD), COMBINED N/A 7/21/2018    Procedure: COMBINED ESOPHAGOSCOPY, GASTROSCOPY, DUODENOSCOPY (EGD);  UPPER ENDOSCOPY WITH BIOPSIES;  Surgeon: Roger Vazquez MD;  Location: [facility]     ESOPHAGOSCOPY, GASTROSCOPY, DUODENOSCOPY (EGD), COMBINED N/A 11/21/2019    Procedure: UPPER ENDOSCOPY with biopsy;  Surgeon: Kang Cleveland MD;  Location: [facility]     HEAD & NECK SURGERY  2008    c4-c5 fusion     HYSTERECTOMY, [facility]  04/2004    uterine cancer; [facility]     LAPAROSCOPIC BYPASS GASTRIC  12/2010       ROS:  CONSTITUTIONAL: NEGATIVE for fever, chills, change in weight  INTEGUMENTARY/SKIN: NEGATIVE for worrisome rashes, moles or lesions  EYES: NEGATIVE for vision changes or irritation  ENT: NEGATIVE for ear, mouth and throat problems  RESP: NEGATIVE for significant cough or SOB  BREAST: NEGATIVE for masses,  "tenderness or discharge  CV: NEGATIVE for chest pain, palpitations or peripheral edema  GI: NEGATIVE for nausea, abdominal pain, heartburn, or change in bowel habits  : NEGATIVE for unusual urinary or vaginal symptoms. No vaginal bleeding.  MUSCULOSKELETAL: NEGATIVE for significant arthralgias or myalgia  NEURO: NEGATIVE for weakness, dizziness or paresthesias  PSYCHIATRIC: NEGATIVE for changes in mood or affect     OBJECTIVE:   /82 (BP Location: Right arm, Patient Position: Sitting, Cuff Size: Adult Regular)   Pulse 73   Temp 97.9  F (36.6  C) (Tympanic)   Ht 1.676 m (5' 6\")   Wt 73 kg (161 lb)   SpO2 98%   BMI 25.99 kg/m    EXAM:  GENERAL APPEARANCE: healthy, alert and no distress  EYES: Eyes grossly normal to inspection, PERRL and conjunctivae and sclerae normal  HENT: ear canals and TM's normal, nose and mouth without ulcers or lesions, oropharynx clear and oral mucous membranes moist  NECK: no adenopathy, no asymmetry, masses, or scars and thyroid normal to palpation  RESP: lungs clear to auscultation - no rales, rhonchi or wheezes  BREAST: normal without masses, tenderness or nipple discharge and no palpable axillary masses or adenopathy  CV: regular rate and rhythm, normal S1 S2, no S3 or S4, no murmur, click or rub, no peripheral edema and peripheral pulses strong  ABDOMEN: soft, nontender, no hepatosplenomegaly, no masses and bowel sounds normal   (female): normal female external genitalia, normal urethral meatus, vaginal mucosal atrophy noted and normal post-hysterectomy exam without masses.   MS: no musculoskeletal defects are noted and gait is age appropriate without ataxia  SKIN: no suspicious lesions or rashes; all toenails very thickened, long, yellow  NEURO: Normal strength and tone, sensory exam grossly normal, mentation intact and speech normal  PSYCH: mentation appears normal and affect normal/bright    Diagnostic Test Results:  Labs reviewed in Patterns  Future labs " "ordered    ASSESSMENT/PLAN:       ICD-10-CM    1. Routine general medical examination at a health care facility Z00.00 MA Screening Digital Bilateral   2. Encounter for screening mammogram for breast cancer Z12.31 MA Screening Digital Bilateral   3. Special screening for malignant neoplasms, colon Z12.11    4. Vitamin B 12 deficiency E53.8 folic acid (FOLVITE) 1 MG tablet   5. Insomnia, unspecified type G47.00 traZODone (DESYREL) 50 MG tablet   6. Onychomycosis B35.1 PODIATRY/FOOT & ANKLE SURGERY REFERRAL     B12 given.  Shingrix vaccine given.  Follow up for 2nd one to complete series.  Keep scheduled colonoscopy with Dr. Cleveland.  Mammogram reordered -to be rescheduled - missed in 12/2019.  Return for fasting labs - glucose and lipids - at your convenience. Lab opens 7:30.  Sign release to get prior GYN surgery records.  Letter for TOPS.  Medications refilled.    COUNSELING:   Reviewed preventive health counseling, as reflected in patient instructions       Regular exercise       Healthy diet/nutrition       Vision screening       Hearing screening       Immunizations    Vaccinated for: Zoster             Osteoporosis Prevention/Bone Health       Colon cancer screening    Estimated body mass index is 25.99 kg/m  as calculated from the following:    Height as of this encounter: 1.676 m (5' 6\").    Weight as of this encounter: 73 kg (161 lb).    Weight management plan: Discussed healthy diet and exercise guidelines letter for MINDA     reports that she has never smoked. She has never used smokeless tobacco.      Counseling Resources:  ATP IV Guidelines  Pooled Cohorts Equation Calculator  Breast Cancer Risk Calculator  FRAX Risk Assessment  ICSI Preventive Guidelines  Dietary Guidelines for Americans, 2010  USDA's MyPlate  ASA Prophylaxis  Lung CA Screening    Coco Schafer MD  [facility] - [place]  "

## 2020-01-14 PROBLEM — Z12.11 SPECIAL SCREENING FOR MALIGNANT NEOPLASMS, COLON: Status: ACTIVE | Noted: 2020-01-14

## 2020-01-16 ENCOUNTER — OFFICE VISIT (OUTPATIENT)
Dept: FAMILY MEDICINE | Facility: OTHER | Age: 50
End: 2020-01-16
Attending: FAMILY MEDICINE
Payer: COMMERCIAL

## 2020-01-16 VITALS
TEMPERATURE: 97.9 F | OXYGEN SATURATION: 98 % | HEART RATE: 73 BPM | DIASTOLIC BLOOD PRESSURE: 82 MMHG | HEIGHT: 66 IN | SYSTOLIC BLOOD PRESSURE: 118 MMHG | BODY MASS INDEX: 25.88 KG/M2 | WEIGHT: 161 LBS

## 2020-01-16 DIAGNOSIS — Z12.11 SPECIAL SCREENING FOR MALIGNANT NEOPLASMS, COLON: ICD-10-CM

## 2020-01-16 DIAGNOSIS — Z12.31 ENCOUNTER FOR SCREENING MAMMOGRAM FOR BREAST CANCER: ICD-10-CM

## 2020-01-16 DIAGNOSIS — Z01.84 IMMUNITY STATUS TESTING: ICD-10-CM

## 2020-01-16 DIAGNOSIS — E53.8 VITAMIN B 12 DEFICIENCY: ICD-10-CM

## 2020-01-16 DIAGNOSIS — G47.00 INSOMNIA, UNSPECIFIED TYPE: ICD-10-CM

## 2020-01-16 DIAGNOSIS — B35.1 ONYCHOMYCOSIS: ICD-10-CM

## 2020-01-16 DIAGNOSIS — Z00.00 ROUTINE GENERAL MEDICAL EXAMINATION AT A HEALTH CARE FACILITY: Primary | ICD-10-CM

## 2020-01-16 DIAGNOSIS — Z23 NEED FOR VACCINATION: ICD-10-CM

## 2020-01-16 PROCEDURE — 90471 IMMUNIZATION ADMIN: CPT

## 2020-01-16 PROCEDURE — 90750 HZV VACC RECOMBINANT IM: CPT

## 2020-01-16 PROCEDURE — 99396 PREV VISIT EST AGE 40-64: CPT | Performed by: FAMILY MEDICINE

## 2020-01-16 PROCEDURE — 96372 THER/PROPH/DIAG INJ SC/IM: CPT

## 2020-01-16 RX ORDER — TRAZODONE HYDROCHLORIDE 50 MG/1
50 TABLET, FILM COATED ORAL AT BEDTIME
Qty: 30 TABLET | Refills: 0 | Status: SHIPPED | OUTPATIENT
Start: 2020-01-16 | End: 2020-03-09

## 2020-01-16 RX ORDER — LEVOTHYROXINE SODIUM 50 UG/1
TABLET ORAL
Refills: 1 | COMMUNITY
Start: 2019-11-22 | End: 2020-02-19

## 2020-01-16 RX ORDER — FOLIC ACID 1 MG/1
1 TABLET ORAL DAILY
Qty: 30 TABLET | Refills: 0 | Status: SHIPPED | OUTPATIENT
Start: 2020-01-16 | End: 2020-02-13

## 2020-01-16 RX ADMIN — CYANOCOBALAMIN 1000 MCG: 1000 INJECTION, SOLUTION INTRAMUSCULAR; SUBCUTANEOUS at 16:20

## 2020-01-16 ASSESSMENT — ANXIETY QUESTIONNAIRES
7. FEELING AFRAID AS IF SOMETHING AWFUL MIGHT HAPPEN: NOT AT ALL
3. WORRYING TOO MUCH ABOUT DIFFERENT THINGS: SEVERAL DAYS
5. BEING SO RESTLESS THAT IT IS HARD TO SIT STILL: NOT AT ALL
IF YOU CHECKED OFF ANY PROBLEMS ON THIS QUESTIONNAIRE, HOW DIFFICULT HAVE THESE PROBLEMS MADE IT FOR YOU TO DO YOUR WORK, TAKE CARE OF THINGS AT HOME, OR GET ALONG WITH OTHER PEOPLE: SOMEWHAT DIFFICULT
2. NOT BEING ABLE TO STOP OR CONTROL WORRYING: SEVERAL DAYS
1. FEELING NERVOUS, ANXIOUS, OR ON EDGE: NOT AT ALL
GAD7 TOTAL SCORE: 3
6. BECOMING EASILY ANNOYED OR IRRITABLE: NOT AT ALL
4. TROUBLE RELAXING: SEVERAL DAYS

## 2020-01-16 ASSESSMENT — PAIN SCALES - GENERAL: PAINLEVEL: MILD PAIN (2)

## 2020-01-16 ASSESSMENT — PATIENT HEALTH QUESTIONNAIRE - PHQ9: SUM OF ALL RESPONSES TO PHQ QUESTIONS 1-9: 9

## 2020-01-16 ASSESSMENT — MIFFLIN-ST. JEOR: SCORE: 1367.04

## 2020-01-16 NOTE — LETTER
[facility] [place]  [address]  [place] [address]  Phone: [phone]    January 16, 2020    Re:  Constantino Warren  [address]  [address]   [facility] [address]          To whom it may concern:    RE: Constantino Warren    Patient was seen and treated today at our clinic for her annual physical.  150 pounds would be a reasonable goal weight for her, bringing her BMI under 25, into the normal range.     Please contact me for questions or concerns.      Sincerely,        Coco Schafer MD

## 2020-01-16 NOTE — NURSING NOTE
Clinic Administered Medication Documentation    MEDICATION LIST:   Injectable Medication Documentation    Patient was given Cyanocobalamin (B-12). Prior to medication administration, verified patients identity using patient s name and date of birth. Please see MAR and medication order for additional information. Patient instructed to report any adverse reaction to staff immediately .      Was entire vial of medication used? Yes  Vial/Syringe: Single dose vial  Expiration Date:  07/21  Was this medication supplied by the patient? No   Pt tolerated well  Cyndee Haji LPN

## 2020-01-16 NOTE — NURSING NOTE
"Chief Complaint   Patient presents with     Physical       Initial /82 (BP Location: Right arm, Patient Position: Sitting, Cuff Size: Adult Regular)   Pulse 73   Temp 97.9  F (36.6  C) (Tympanic)   Ht 1.676 m (5' 6\")   Wt 73 kg (161 lb)   SpO2 98%   BMI 25.99 kg/m   Estimated body mass index is 25.99 kg/m  as calculated from the following:    Height as of this encounter: 1.676 m (5' 6\").    Weight as of this encounter: 73 kg (161 lb).  Medication Reconciliation: complete  Cyndee Haji LPN  "

## 2020-01-17 ASSESSMENT — ANXIETY QUESTIONNAIRES: GAD7 TOTAL SCORE: 3

## 2020-02-11 ENCOUNTER — ANESTHESIA EVENT (OUTPATIENT)
Dept: SURGERY | Facility: HOSPITAL | Age: 50
End: 2020-02-11
Payer: COMMERCIAL

## 2020-02-11 ENCOUNTER — TELEPHONE (OUTPATIENT)
Dept: SURGERY | Facility: OTHER | Age: 50
End: 2020-02-11

## 2020-02-11 NOTE — ANESTHESIA PREPROCEDURE EVALUATION
Anesthesia Pre-Procedure Evaluation    Patient: Constantino Warren   MRN: [id number] : 1970          Preoperative Diagnosis: Special screening for malignant neoplasms, colon [Z12.11]    Procedure(s):  COLONOSCOPY    Past Medical History:   Diagnosis Date     Chronic pain      Thyroid disease      Past Surgical History:   Procedure Laterality Date     BACK SURGERY      ;      ESOPHAGOSCOPY, GASTROSCOPY, DUODENOSCOPY (EGD), COMBINED N/A 2018    Procedure: COMBINED ESOPHAGOSCOPY, GASTROSCOPY, DUODENOSCOPY (EGD);  UPPER ENDOSCOPY WITH BIOPSIES;  Surgeon: Roger Vazquez MD;  Location: [facility]     ESOPHAGOSCOPY, GASTROSCOPY, DUODENOSCOPY (EGD), COMBINED N/A 2019    Procedure: UPPER ENDOSCOPY with biopsy;  Surgeon: Kang Cleveland MD;  Location: [facility]     HEAD & NECK SURGERY      c4-c5 fusion     HYSTERECTOMY, CRISTIANE  2004    uterine cancer; [facility]     LAPAROSCOPIC BYPASS GASTRIC  2010       Anesthesia Evaluation     . Pt has had prior anesthetic. Type: General and MAC    No history of anesthetic complications (fentanyl allergy)          ROS/MED HX    ENT/Pulmonary:  - neg pulmonary ROS     Neurologic:     (+)migraines, other neuro hx concussions x2, insomnia    Cardiovascular:     (+) hypertension--CAD, angina (hx)--. : . . fainting (syncope) (chronic vasovagal symptoms with presyncope). :. valvular problems/murmurs patient reports she has a murmur, denies valvular pathology:. Previous cardiac testing date:results:date: results:ECG reviewed date:2019 results:NSR @76 date: results:          METS/Exercise Tolerance:  >4 METS   Hematologic: Comments: PRBCs administered on this admission    (+) Anemia (hgb 8.7 today), History of Transfusion (hx possible transfusion reaction) no previous transfusion reaction -      Musculoskeletal:   (+) arthritis,  other musculoskeletal- chronic neck and back pain s/p C4-C5 fusion, L4-S1 fusion      GI/Hepatic:     (+) GERD Asymptomatic on  medication, bowel prep, Other GI/Hepatic hx gastric bypass and revision, hx GIB      Renal/Genitourinary:  - ROS Renal section negative   (+) chronic renal disease (hx hydronephrosis and pyelonephritis), Nephrolithiasis ,       Endo: Comment: History of type 2 diabetes, hyperglycemia resolved with weight loss    (+) thyroid problem hypothyroidism, .      Psychiatric:     (+) psychiatric history other (comment), anxiety and depression (PTSD)      Infectious Disease: Comment: Hx MRSA, resolved        Malignancy:   (+) Malignancy History of Other  Other CA uterine 2004 Remission status post Surgery         Other: Comment: Hx hysterectomy  Hx Lortab rx, no longer taking   (+) No chance of pregnancy C-spine cleared: N/A, H/O Chronic Pain,                        Physical Exam  Normal systems: cardiovascular and pulmonary    Airway   Mallampati: II  TM distance: >3 FB  Neck ROM: full    Dental   (+) missing    Cardiovascular   Rhythm and rate: normal  (+) murmur       Pulmonary    breath sounds clear to auscultation            Lab Results   Component Value Date    WBC 7.4 12/12/2019    HGB 10.6 (L) 12/12/2019    HCT 35.0 12/12/2019     12/12/2019    CRP <2.9 07/20/2018    SED 35 (H) 04/18/2015     11/21/2019    POTASSIUM 4.3 11/21/2019    CHLORIDE 113 (H) 11/21/2019    CO2 23 11/21/2019    BUN 18 11/21/2019    CR 0.65 11/21/2019    GLC 89 11/21/2019    LOKESH 8.1 (L) 11/21/2019    MAG 2.0 09/26/2015    ALBUMIN 2.8 (L) 11/20/2019    PROTTOTAL 6.4 (L) 11/20/2019    ALT 17 11/20/2019    AST 20 11/20/2019    ALKPHOS 125 11/20/2019    BILITOTAL 0.4 11/20/2019    LIPASE 176 02/01/2019    AMYLASE 31 07/18/2015    PTT 28 03/10/2019    INR 1.11 03/10/2019    TSH 3.23 12/12/2019    HCG Negative 09/26/2015       Preop Vitals  BP Readings from Last 3 Encounters:   01/16/20 118/82   12/12/19 118/87   12/12/19 124/78    Pulse Readings from Last 3 Encounters:   01/16/20 73   12/12/19 74   12/12/19 65      Resp Readings from  "Last 3 Encounters:   12/12/19 16   11/21/19 16   05/13/19 16    SpO2 Readings from Last 3 Encounters:   01/16/20 98%   12/12/19 100%   12/12/19 99%      Temp Readings from Last 1 Encounters:   01/16/20 97.9  F (36.6  C) (Tympanic)    Ht Readings from Last 1 Encounters:   01/16/20 1.676 m (5' 6\")      Wt Readings from Last 1 Encounters:   01/16/20 73 kg (161 lb)    Estimated body mass index is 25.99 kg/m  as calculated from the following:    Height as of 1/16/20: 1.676 m (5' 6\").    Weight as of 1/16/20: 73 kg (161 lb).       Anesthesia Plan      History & Physical Review  History and physical reviewed and following examination; no interval change.    ASA Status:  3 .    NPO Status:  > 8 hours    Plan for MAC with Propofol and Intravenous induction. Maintenance will be TIVA.  Reason for MAC:  Deep or markedly invasive procedure (G8)    H and P date 1/16/20      Postoperative Care      Consents  Anesthetic plan, risks, benefits and alternatives discussed with:  Patient..                 JUNIOR Quevedo CRNA  "

## 2020-02-11 NOTE — TELEPHONE ENCOUNTER
I have attempted to contact this patient by phone to return their call, but there is no response.  Will try again later.  Left a message that it may be easier to stop by the [place] office and  a surgery packet there and they can re print her instructions for her.

## 2020-02-11 NOTE — TELEPHONE ENCOUNTER
Patient has a colonoscopy scheduled with Dr Cleveland on Thursday 2/13/2020 her son threw away all the paperwork she doesn't know what to  for the prep. Needs someone to call her for her to start her prep tomorrow and she needs to know a time to be there at.   Please advise.

## 2020-02-11 NOTE — TELEPHONE ENCOUNTER
Patient stopped by the [place] office to get her instructions.  Everything was printed off and handed to the patient.   LISETTE FONTANA LPN

## 2020-02-13 ENCOUNTER — HOSPITAL ENCOUNTER (OUTPATIENT)
Dept: CT IMAGING | Facility: HOSPITAL | Age: 50
End: 2020-02-13
Attending: SURGERY | Admitting: SURGERY
Payer: COMMERCIAL

## 2020-02-13 ENCOUNTER — ANESTHESIA (OUTPATIENT)
Dept: SURGERY | Facility: HOSPITAL | Age: 50
End: 2020-02-13
Payer: COMMERCIAL

## 2020-02-13 ENCOUNTER — HOSPITAL ENCOUNTER (EMERGENCY)
Facility: HOSPITAL | Age: 50
Discharge: HOME | End: 2020-02-14
Attending: EMERGENCY MEDICINE | Admitting: EMERGENCY MEDICINE
Payer: COMMERCIAL

## 2020-02-13 ENCOUNTER — HOSPITAL ENCOUNTER (OUTPATIENT)
Facility: HOSPITAL | Age: 50
Discharge: HOME | End: 2020-02-13
Attending: SURGERY | Admitting: SURGERY
Payer: COMMERCIAL

## 2020-02-13 VITALS
TEMPERATURE: 97.3 F | HEART RATE: 80 BPM | BODY MASS INDEX: 25.58 KG/M2 | SYSTOLIC BLOOD PRESSURE: 152 MMHG | HEIGHT: 67 IN | WEIGHT: 163 LBS | DIASTOLIC BLOOD PRESSURE: 90 MMHG | RESPIRATION RATE: 16 BRPM | OXYGEN SATURATION: 98 %

## 2020-02-13 DIAGNOSIS — R93.5 ABNORMAL CT OF THE ABDOMEN: ICD-10-CM

## 2020-02-13 DIAGNOSIS — Z12.11 SPECIAL SCREENING FOR MALIGNANT NEOPLASMS, COLON: ICD-10-CM

## 2020-02-13 DIAGNOSIS — K63.89 COLONIC MASS: Primary | ICD-10-CM

## 2020-02-13 DIAGNOSIS — R10.31 RLQ ABDOMINAL PAIN: ICD-10-CM

## 2020-02-13 DIAGNOSIS — K63.89 COLONIC MASS: ICD-10-CM

## 2020-02-13 LAB — CEA SERPL-MCNC: <0.5 UG/L (ref 0–2.5)

## 2020-02-13 PROCEDURE — 45381 COLONOSCOPY SUBMUCOUS NJX: CPT | Performed by: NURSE ANESTHETIST, CERTIFIED REGISTERED

## 2020-02-13 PROCEDURE — 25500064 ZZH RX 255 OP 636: Performed by: RADIOLOGY

## 2020-02-13 PROCEDURE — 81001 URINALYSIS AUTO W/SCOPE: CPT | Performed by: EMERGENCY MEDICINE

## 2020-02-13 PROCEDURE — 82378 CARCINOEMBRYONIC ANTIGEN: CPT | Performed by: SURGERY

## 2020-02-13 PROCEDURE — 99283 EMERGENCY DEPT VISIT LOW MDM: CPT | Mod: 25

## 2020-02-13 PROCEDURE — 25000128 H RX IP 250 OP 636: Performed by: NURSE ANESTHETIST, CERTIFIED REGISTERED

## 2020-02-13 PROCEDURE — 99283 EMERGENCY DEPT VISIT LOW MDM: CPT | Mod: Z6 | Performed by: EMERGENCY MEDICINE

## 2020-02-13 PROCEDURE — 74177 CT ABD & PELVIS W/CONTRAST: CPT | Mod: TC

## 2020-02-13 PROCEDURE — 71000027 ZZH RECOVERY PHASE 2 EACH 15 MINS: Performed by: SURGERY

## 2020-02-13 PROCEDURE — 27210794 ZZH OR GENERAL SUPPLY STERILE: Performed by: SURGERY

## 2020-02-13 PROCEDURE — 36000050 ZZH SURGERY LEVEL 2 1ST 30 MIN: Performed by: SURGERY

## 2020-02-13 PROCEDURE — 88341 IMHCHEM/IMCYTCHM EA ADD ANTB: CPT | Performed by: SURGERY

## 2020-02-13 PROCEDURE — 88342 IMHCHEM/IMCYTCHM 1ST ANTB: CPT | Mod: TC | Performed by: SURGERY

## 2020-02-13 PROCEDURE — 45380 COLONOSCOPY AND BIOPSY: CPT | Mod: PT | Performed by: SURGERY

## 2020-02-13 PROCEDURE — 25000125 ZZHC RX 250: Performed by: SURGERY

## 2020-02-13 PROCEDURE — 36415 COLL VENOUS BLD VENIPUNCTURE: CPT | Performed by: SURGERY

## 2020-02-13 PROCEDURE — 25800030 ZZH RX IP 258 OP 636: Performed by: NURSE ANESTHETIST, CERTIFIED REGISTERED

## 2020-02-13 PROCEDURE — 88305 TISSUE EXAM BY PATHOLOGIST: CPT | Mod: TC | Performed by: SURGERY

## 2020-02-13 PROCEDURE — 40000305 ZZH STATISTIC PRE PROC ASSESS I: Performed by: SURGERY

## 2020-02-13 PROCEDURE — 99283 EMERGENCY DEPT VISIT LOW MDM: CPT

## 2020-02-13 PROCEDURE — 37000008 ZZH ANESTHESIA TECHNICAL FEE, 1ST 30 MIN: Performed by: SURGERY

## 2020-02-13 RX ORDER — ONDANSETRON 2 MG/ML
4 INJECTION INTRAMUSCULAR; INTRAVENOUS EVERY 30 MIN PRN
Status: DISCONTINUED | OUTPATIENT
Start: 2020-02-13 | End: 2020-02-13 | Stop reason: HOSPADM

## 2020-02-13 RX ORDER — PROPOFOL 10 MG/ML
INJECTION, EMULSION INTRAVENOUS PRN
Status: DISCONTINUED | OUTPATIENT
Start: 2020-02-13 | End: 2020-02-13

## 2020-02-13 RX ORDER — ONDANSETRON 4 MG/1
4 TABLET, ORALLY DISINTEGRATING ORAL EVERY 30 MIN PRN
Status: DISCONTINUED | OUTPATIENT
Start: 2020-02-13 | End: 2020-02-13

## 2020-02-13 RX ORDER — TRAMADOL HYDROCHLORIDE 50 MG/1
50 TABLET ORAL ONCE
Status: COMPLETED | OUTPATIENT
Start: 2020-02-13 | End: 2020-02-14

## 2020-02-13 RX ORDER — IOPAMIDOL 612 MG/ML
100 INJECTION, SOLUTION INTRAVASCULAR ONCE
Status: COMPLETED | OUTPATIENT
Start: 2020-02-13 | End: 2020-02-13

## 2020-02-13 RX ORDER — SODIUM CHLORIDE, SODIUM LACTATE, POTASSIUM CHLORIDE, CALCIUM CHLORIDE 600; 310; 30; 20 MG/100ML; MG/100ML; MG/100ML; MG/100ML
INJECTION, SOLUTION INTRAVENOUS CONTINUOUS
Status: DISCONTINUED | OUTPATIENT
Start: 2020-02-13 | End: 2020-02-13 | Stop reason: HOSPADM

## 2020-02-13 RX ORDER — MEPERIDINE HYDROCHLORIDE 50 MG/ML
12.5 INJECTION INTRAMUSCULAR; INTRAVENOUS; SUBCUTANEOUS
Status: DISCONTINUED | OUTPATIENT
Start: 2020-02-13 | End: 2020-02-13

## 2020-02-13 RX ORDER — NALOXONE HYDROCHLORIDE 0.4 MG/ML
.1-.4 INJECTION, SOLUTION INTRAMUSCULAR; INTRAVENOUS; SUBCUTANEOUS
Status: DISCONTINUED | OUTPATIENT
Start: 2020-02-13 | End: 2020-02-13 | Stop reason: HOSPADM

## 2020-02-13 RX ORDER — LIDOCAINE 40 MG/G
CREAM TOPICAL
Status: DISCONTINUED | OUTPATIENT
Start: 2020-02-13 | End: 2020-02-13 | Stop reason: HOSPADM

## 2020-02-13 RX ADMIN — DIATRIZOATE MEGLUMINE AND DIATRIZOATE SODIUM 30 ML: 660; 100 SOLUTION ORAL; RECTAL at 11:41

## 2020-02-13 RX ADMIN — PROPOFOL 20 MG: 10 INJECTION, EMULSION INTRAVENOUS at 09:06

## 2020-02-13 RX ADMIN — PROPOFOL 20 MG: 10 INJECTION, EMULSION INTRAVENOUS at 09:00

## 2020-02-13 RX ADMIN — PROPOFOL 20 MG: 10 INJECTION, EMULSION INTRAVENOUS at 09:04

## 2020-02-13 RX ADMIN — SODIUM CHLORIDE, POTASSIUM CHLORIDE, SODIUM LACTATE AND CALCIUM CHLORIDE: 600; 310; 30; 20 INJECTION, SOLUTION INTRAVENOUS at 08:32

## 2020-02-13 RX ADMIN — IOPAMIDOL 100 ML: 612 INJECTION, SOLUTION INTRAVENOUS at 11:41

## 2020-02-13 RX ADMIN — PROPOFOL 50 MG: 10 INJECTION, EMULSION INTRAVENOUS at 08:56

## 2020-02-13 RX ADMIN — PROPOFOL 20 MG: 10 INJECTION, EMULSION INTRAVENOUS at 08:58

## 2020-02-13 RX ADMIN — PROPOFOL 50 MG: 10 INJECTION, EMULSION INTRAVENOUS at 08:54

## 2020-02-13 RX ADMIN — PROPOFOL 20 MG: 10 INJECTION, EMULSION INTRAVENOUS at 09:02

## 2020-02-13 ASSESSMENT — ENCOUNTER SYMPTOMS
DIARRHEA: 0
ABDOMINAL PAIN: 1
NAUSEA: 0
VOMITING: 0

## 2020-02-13 ASSESSMENT — MIFFLIN-ST. JEOR: SCORE: 1391.99

## 2020-02-13 NOTE — H&P
Surgery Consult Clinic Note      RE: Constantino Warren  : 1970        Chief Complaint:  Colon cancer screening    History of Present Illness:  I am seeing Constantino Warren at the request of Dr. Cook for evaluation regarding meet and greet screening colonoscopy.  He denies family history of colon or rectal cancer, blood in stool, changes in bowel habits, weight loss, abdominal pain.  Previous abdominal surgeries include laparoscopic gastric bypass and hysterectomy.   She has no questions regarding  bowel prep.  Reports passing clear yellow liquid stools today.     She specifically denies fevers, chills, nausea, vomiting, chest pain, shortness of breath, palpitations, sore throat, cough, or generalized feeling ill.      Medical history:  Past Medical History:   Diagnosis Date     Chronic pain      Thyroid disease        Surgical history:  Past Surgical History:   Procedure Laterality Date     BACK SURGERY      ;      ESOPHAGOSCOPY, GASTROSCOPY, DUODENOSCOPY (EGD), COMBINED N/A 2018    Procedure: COMBINED ESOPHAGOSCOPY, GASTROSCOPY, DUODENOSCOPY (EGD);  UPPER ENDOSCOPY WITH BIOPSIES;  Surgeon: Roger Vazquez MD;  Location: [facility]     ESOPHAGOSCOPY, GASTROSCOPY, DUODENOSCOPY (EGD), COMBINED N/A 2019    Procedure: UPPER ENDOSCOPY with biopsy;  Surgeon: Kang Cleveland MD;  Location: [facility] OR     HEAD & NECK SURGERY      c4-c5 fusion     HYSTERECTOMY, [facility]  2004    uterine cancer; [facility]     LAPAROSCOPIC BYPASS GASTRIC  2010       Family history:  History reviewed. No pertinent family history.    Medications:  Prior to Admission medications    Medication Sig Start Date End Date Taking? Authorizing Provider   acetaminophen (TYLENOL) 500 MG tablet Take 1,000 mg by mouth every 6 hours as needed Max acetaminophen dose: 4000 mg in 24 hours    Reported, Patient   cyanocobalamin (CYANOCOBALAMIN) 1000 MCG/ML injection Inject 1 mL (1,000 mcg) into the muscle every 7 days for 3  doses 11/27/19 12/12/19  Emma Gonzalez NP   EPINEPHrine (EPIPEN/ADRENACLICK/OR ANY BX GENERIC EQUIV) 0.3 MG/0.3ML injection 2-pack Inject 0.3 mg into the muscle as needed  11/13/14   Reported, Patient   ferrous sulfate (IRON) 325 (65 Fe) MG tablet Take 1 tablet (325 mg) by mouth 2 times daily 7/24/18   Emma Gonzalez NP   folic acid (FOLVITE) 1 MG tablet Take 1 tablet (1 mg) by mouth daily 1/16/20   Coco Cook MD   levothyroxine (SYNTHROID/LEVOTHROID) 200 MCG tablet Take 200 mcg by mouth every morning (before breakfast) Take in addition to 75 mcg for a total of 275 mcg daily    Reported, Patient   levothyroxine (SYNTHROID/LEVOTHROID) 50 MCG tablet TAKE 1 TABLET BY MOUTH ONCE DAILY WITH 200MG TABLET 11/22/19   Reported, Patient   MELATONIN PO Take 7.5 mg by mouth nightly as needed    Reported, Patient   multivitamin, therapeutic with minerals (MULTI-VITAMIN) TABS Take 1 tablet by mouth daily    Reported, Patient   sertraline (ZOLOFT) 100 MG tablet Take 100 mg by mouth daily 1/15/18   Reported, Patient   tiZANidine (ZANAFLEX) 4 MG tablet Take 4 mg by mouth At Bedtime    Reported, Patient   traZODone (DESYREL) 50 MG tablet Take 1 tablet (50 mg) by mouth At Bedtime 1/16/20   Coco Cook MD       Allergies:  The patient is allergic to aspartame; bee venom; bupropion; food; food allergy formula; ibuprofen; no clinical screening - see comments; piper; fentanyl; adhesive tape; amitriptyline; gabapentin; liquid adhesive; oxcarbazepine; prozac [fluoxetine]; valproic acid; and ziprasidone.  .  Social history:  Social History     Tobacco Use     Smoking status: Never Smoker     Smokeless tobacco: Never Used   Substance Use Topics     Alcohol use: No     Comment: sober for 18 years     Marital status: .    Review of Systems:    Constitutional: Negative for fever, chills.   HENT: Negative for ear pain, congestion, sore throat, and ear discharge.    Eyes: Negative for blurred vision, double  vision.   Respiratory: Negative for cough, hemoptysis, shortness of breath, wheezing and stridor.  Cardiovascular: Negative for chest pain, palpitations and orthopnea.   Gastrointestinal: Negative for heartburn, nausea, vomiting, abdominal pain and blood in stool.   Genitourinary: Negative for urgency, frequency   Musculoskeletal: Negative for myalgias, back pain and joint pain.   Neurological: Negative for tingling, speech change and headaches.   Endo/Heme/Allergies: Does not bruise/bleed easily.   Psychiatric/Behavioral: Negative for depression, suicidal ideas and hallucinations. The patient is not nervous/anxious.    Physical Examination:  /78   Pulse 76   Temp 97.3  F (36.3  C) (Oral)   Resp 20   SpO2 98%   General: Alert and orientedx4, no acute distress, well-developed/well-nourished, ambulating without assistance  HEENT: normocephalic atraumatic, extraocular movements intact, sclerae anicteric, Trachea mideline  Chest:   Clear to auscultation bilaterally.  Cardiac: S1S2 , regular rate and rhythm, murmur  Abdomen: Soft, non-tender, non-distended  Extremities: Cursory exam unremarkable.  No peripheral edema noted.  Skin: Warm, dry, < 2 sec cap refill  Neuro: CN 2-12 grossly intact, no focal deficit, GCS 15  Psych: Pleasant, calm, asks appropriate questions      Assessment/Plan:  #1 Colonoscopy  #2 HTN  #3 Anemia    Constantino Warren and I had a discussion about colonoscopies.  The indications, risks, benefits, althernatives and technical aspects of whole colon colonoscopy were outlined with risks including, but not limited to, perforation, bleeding and inability to visualize entire colon.  Management of each was reviewed including the risk for life saving surgery and possible admittance to the hospital.  Her questions were asked and answered.  We will proceed with colonoscopy with Dr. Cleveland as scheduled.  [facility]  [address] [place]    [address]    Referring Provider:  No referring provider defined for this encounter.     Primary Care Provider:  Coco Cook

## 2020-02-13 NOTE — ANESTHESIA POSTPROCEDURE EVALUATION
Patient: Constantino Warren    Procedure(s):  COLONOSCOPY WITH BIOPSY AND TATOOING OF ASCENDING COLON,INCOMEPLETE EXAM    Diagnosis:Special screening for malignant neoplasms, colon [Z12.11]  Diagnosis Additional Information: No value filed.    Anesthesia Type:  MAC    Note:  Anesthesia Post Evaluation    Patient participation: Able to fully participate in evaluation  Level of consciousness: awake  Pain management: adequate  Airway patency: patent  Cardiovascular status: acceptable  Respiratory status: acceptable  Hydration status: acceptable  PONV: none     Anesthetic complications: None          Last vitals:  Vitals:    02/13/20 0925 02/13/20 0940 02/13/20 0945   BP: 123/84 130/82 135/86   Pulse: 79 71 78   Resp: 20 16 16   Temp:      SpO2: 95% 97% 98%         Electronically Signed By: JUNIOR Patel CRNA  February 13, 2020  9:55 AM

## 2020-02-13 NOTE — OR NURSING
Dr herrera in to see patient, lab drawn per order.  CT ordered, scheduled for today. CT requested IV left in. Discharge instructions reviewed with patient and friend, verbalized understanding. Patient and responsible adult given discharge instructions with no questions regarding instructions. Kaylan score 20. Pain level 0/10.  Discharged from unit via ambulation.

## 2020-02-13 NOTE — ED AVS SNAPSHOT
[facility]  [address] [address]  Phone:  [phone]                                    Constantino Warren   MRN: [id number]    Department:  [facility]   Date of Visit:  2/13/2020           After Visit Summary Signature Page    I have received my discharge instructions, and my questions have been answered. I have discussed any challenges I see with this plan with the nurse or doctor.    ..........................................................................................................................................  Patient/Patient Representative Signature      ..........................................................................................................................................  Patient Representative Print Name and Relationship to Patient    ..................................................               ................................................  Date                                   Time    ..........................................................................................................................................  Reviewed by Signature/Title    ...................................................              ..............................................  Date                                               Time          22EPIC Rev 08/18

## 2020-02-13 NOTE — OP NOTE
REPORT OF OPERATION  DATE OF PROCEDURE: 2/13/2020    PATIENT: Constantino Warren    SURGERY PREFORMED:   Colonoscopy     with biopsy    without use of cauterized snare    with tattooing    PREOPERATIVE DIAGNOSIS: Screening colonoscopy    POSTOPERATIVE DIAGNOSIS:    Same   Colon mass, Ascending colon   Diverticulosis was identified.   Hemorrhoids  were  identified.    SURGEON: Kang Cleveland MD    ASSISTANTS: None    ANESTHESIA: Monitored Anesthesia Care    COMPLICATIONS: None apparent    TRANSFUSIONS: None    TISSUE TO PATHOLOGY:    Biopsies/polyps from Colon mass, Ascending colon were sent to pathology for pathological diagnosis.     FINDINGS: Colon mass, Ascending colon and (near obstructing).  Diverticulosis was identified.  Hemorrhoids  were  identified.    INDICATIONS: This is a 49 year old female in need of a colonoscopy for Screening colonoscopy.  The patient will be taken to the endoscopy suite for that procedure.    DESCRIPTIONS OF PROCEDURE IN DETAIL: After consent was obtained the patient was taken to the endoscopy suite and placed in the left lateral decubitus position.  The patient was identified and the correct patient was confirmed.  Monitored Anesthesia Care was given.  A time out was preformed verifying the correct patient and the correct procedure.  The entire operative team was in agreement.  All necessary equipment and supplies were in the room.    Rectal exam was preformed and lesions of the anal canal were not noted.  The colonoscope was inserted into the anus and I was not able to passed it to the cecum.  There was a near obstructing lesion in the ascending colon.  Biopsies were taken.  Tattooing of the location was accomplished.  Upon withdrawal all walls of the remaining colon were visualized.  Polyps were not identified additional colon masses were not noted.  Colitis was not noted.  Diverticulosis was seen.  Upon reaching the rectum the scope was retroflexed and internal hemorrhoids  were   seen.  The scope was straightened back out and removed from the patient.  The patient was then taken to the recovery room in stable condition tolerating the procedure well.      Withdrawal time was 5 minutes.

## 2020-02-13 NOTE — DISCHARGE INSTRUCTIONS

## 2020-02-13 NOTE — ANESTHESIA CARE TRANSFER NOTE
Patient: Constantino Warren    Procedure(s):  COLONOSCOPY WITH BIOPSY AND TATOOING OF ASCENDING COLON,INCOMEPLETE EXAM    Diagnosis: Special screening for malignant neoplasms, colon [Z12.11]  Diagnosis Additional Information: No value filed.    Anesthesia Type:   MAC     Note:  Airway :Nasal Cannula  Patient transferred to:Phase II  Handoff Report: Identifed the Patient, Identified the Reponsible Provider, Reviewed the pertinent medical history, Discussed the surgical course, Reviewed Intra-OP anesthesia mangement and issues during anesthesia, Set expectations for post-procedure period and Allowed opportunity for questions and acknowledgement of understanding      Vitals: (Last set prior to Anesthesia Care Transfer)    CRNA VITALS  2/13/2020 0839 - 2/13/2020 0917      2/13/2020             Pulse:  77    Ht Rate:  77    SpO2:  99 %    Resp Rate (set):  8                Electronically Signed By: JUNIOR Jones CRNA  February 13, 2020  9:17 AM

## 2020-02-14 VITALS
DIASTOLIC BLOOD PRESSURE: 101 MMHG | TEMPERATURE: 97.1 F | OXYGEN SATURATION: 100 % | SYSTOLIC BLOOD PRESSURE: 142 MMHG | RESPIRATION RATE: 20 BRPM

## 2020-02-14 LAB
ALBUMIN UR-MCNC: NEGATIVE MG/DL
APPEARANCE UR: CLEAR
BACTERIA #/AREA URNS HPF: ABNORMAL /HPF
BILIRUB UR QL STRIP: NEGATIVE
COLOR UR AUTO: ABNORMAL
GLUCOSE UR STRIP-MCNC: NEGATIVE MG/DL
HGB UR QL STRIP: NEGATIVE
KETONES UR STRIP-MCNC: NEGATIVE MG/DL
LEUKOCYTE ESTERASE UR QL STRIP: ABNORMAL
MUCOUS THREADS #/AREA URNS LPF: PRESENT /LPF
NITRATE UR QL: NEGATIVE
PH UR STRIP: 6 PH (ref 4.7–8)
RBC #/AREA URNS AUTO: 1 /HPF (ref 0–2)
SOURCE: ABNORMAL
SP GR UR STRIP: 1.02 (ref 1–1.03)
UROBILINOGEN UR STRIP-MCNC: 2 MG/DL (ref 0–2)
WBC #/AREA URNS AUTO: 1 /HPF (ref 0–5)

## 2020-02-14 PROCEDURE — 25000132 ZZH RX MED GY IP 250 OP 250 PS 637: Performed by: EMERGENCY MEDICINE

## 2020-02-14 RX ADMIN — TRAMADOL HYDROCHLORIDE 50 MG: 50 TABLET, FILM COATED ORAL at 00:00

## 2020-02-14 NOTE — DISCHARGE INSTRUCTIONS
Continue your current medications.  Use over-the-counter pain medication unless instructed otherwise by your clinic doctor.  Followup with your doctors as planned.

## 2020-02-14 NOTE — ED PROVIDER NOTES
"  History     Chief Complaint   Patient presents with     Groin Pain     HPI  Constantino Warren is a 49 year old female who presents to the ED with CC of acute on chronic R groin pain.  Pt states that she has been diagnosed with a \"tumor\" in the abdomen and just had a colonoscopy earlier today.  Is familiar with this R groin pain, but it is worse today.  Denies any urinary symptoms, N/V/D, or F/C. Pt states that she will be having surgery next week to remove the tumor.    Allergies:  Allergies   Allergen Reactions     Aspartame      Other reaction(s): Other - Describe In Comment Field, Seizures  convulsions     Bee Venom Anaphylaxis     Bupropion Anaphylaxis     Throat gets tight and hives. Denies allergic reaction     Food Anaphylaxis     Peppers-anaphylaxis if eaten, if touched hands swell and reddened     Food Allergy Formula Anaphylaxis     Peppers, patient reports green peppers close off her throat     Ibuprofen Anaphylaxis     No Clinical Screening - See Comments Anaphylaxis     Peppers, patient reports green peppers close off her throat     Piper Anaphylaxis     Peppers--green, black, red, chili, etc. Derm symptoms, hives;  Strawberries--(cooked) Itching, breathing difficulties, GI symptoms. Mushrooms--GI symptoms, Hives.Spinach (cooked) throat irritation  (Listed on [facility] problem list.)     Fentanyl Swelling     Lips and tongue swelled.  Swelling of lips and tongue     Adhesive Tape      blisters     Amitriptyline Other (See Comments)     Respiratory symptoms - denies allergic reaction     Gabapentin Other (See Comments)     irritability     Liquid Adhesive Dermatitis     blisters  Blisters  Silk tape and tegaderm is fine     Oxcarbazepine Other (See Comments)     Throat gets tight and hives. Denies allergic reaction  Throat gets tight and hives-       Prozac [Fluoxetine]      Other reaction(s): *Unknown  uncontrolled muscle spasms     Valproic Acid      Respiratory symptoms ([facility]). Denies allergic reaction     Ziprasidone      Throat gets tight and hives. Denies allergic reaction       Problem List:    Patient Active Problem List    Diagnosis Date Noted     Special screening for malignant neoplasms, colon 01/14/2020     Priority: Medium     Added automatically from request for surgery [id number]       Anastomotic ulcer S/P gastric bypass 07/21/2018     Priority: Medium     Anemia 07/21/2018     Priority: Medium     Chest pain 07/20/2018     Priority: Medium     GI bleed 07/20/2018     Priority: Medium     Abdominal pain, epigastric 07/20/2018     Priority: Medium     Hypochromic microcytic anemia 07/20/2018     Priority: Medium     Chronic migraine 01/18/2018     Priority: Medium     Chronic pain disorder 01/18/2018     Priority: Medium     Long term (current) use of opiate analgesic 01/12/2018     Priority: Medium     Overview:   Pain Diagnosis failed back syndrome, annular tear L4-5, history lumbar discectomy, s/p lumbar fusion.    Should be seen in the clinic every twelve weeks. Currently on a taper: yes, trying to wean away from the daytime hydrocodone-acetaminophen..    Plan for flares of chronic pain stretches, occasional use of more hydrocodone-acetaminophen for flare.    ED and UC: Opioid medication will not be given in the ER or Urgent Care unless there is a medical emergency unrelated to chronic pain.  Limit to 3 day supply    Refills: Refills will only be given at a scheduled visit.    [facility] Agreement for Opioid Treatment.   Opioid Agreement Date: 5/21/12  Last UDT (Urine Drug Test) on date:    Pain Dx: chronic low back pain  Last Pain Visit: 6-24-11  Preferred Pharmacy: 's  Comments:      7/23/14:  Progressing well.  Over the last month, has decreased hydrocodone-acetaminophen from 6/day to 3/day.  Awaiting appointment with [facility].       Adjustment disorder with mixed anxiety and depressed mood 12/15/2017     Priority: Medium     Other insomnia 12/15/2017      Priority: Medium     Syncope and collapse 09/26/2015     Priority: Medium     Hypertensive urgency 09/26/2015     Priority: Medium     Concussion syndrome 09/26/2015     Priority: Medium     Hydronephrosis, right 04/20/2015     Priority: Medium     Pyelonephritis 04/20/2015     Priority: Medium     Urolithiasis 04/20/2015     Priority: Medium     Annular tear of lumbar disc 12/16/2014     Priority: Medium     Overview:   MRI 2/5/13:  Previous right L4-5 hemilaminectomy and partial facetectomy with mild to moderate right L4-5 facet arthropathy and broad-based bulge of disk and end plate osteophyte on the right with partial high signal intensity zone annular tear of the undersurface of the laterally protruding disk/annulus.       Failed back syndrome of lumbar spine 12/16/2014     Priority: Medium     Overview:   Three back surgeries, including an L3-sacrum AP fusion done at [facility] in 2013.  Has been doing very well in terms of reduction of pain, with minimal requirement of opiate pain medication.       Hx of lumbar discectomy 12/16/2014     Priority: Medium     Overview:   10/2006:   L4-5 Laminectomy and diskectomy L5-S1.   12/12/11:  L5S1 microdiscectomy ([place])       Vitamin B 12 deficiency 06/13/2011     Priority: Medium     Overview:   IMO Update 10/11       Gastric bypass status for obesity 01/04/2011     Priority: Medium     Overview:   12/14/10:  laparoscopic Prasanna-en-Y gastric bypass Dr. Hassan  IMO Update 10/11       Hyperlipidemia 09/23/2008     Priority: Medium     Overview:   IMO Update 10/11       Hypothyroidism 09/23/2008     Priority: Medium     Overview:   IMO Update 10/11  Overview:   IMO Update 10/11          Past Medical History:    Past Medical History:   Diagnosis Date     Chronic pain      Thyroid disease        Past Surgical History:    Past Surgical History:   Procedure Laterality Date     BACK SURGERY      2008; 2011     ESOPHAGOSCOPY, GASTROSCOPY, DUODENOSCOPY (EGD), COMBINED N/A 7/21/2018     Procedure: COMBINED ESOPHAGOSCOPY, GASTROSCOPY, DUODENOSCOPY (EGD);  UPPER ENDOSCOPY WITH BIOPSIES;  Surgeon: Roger Vazquez MD;  Location: [facility]     ESOPHAGOSCOPY, GASTROSCOPY, DUODENOSCOPY (EGD), COMBINED N/A 11/21/2019    Procedure: UPPER ENDOSCOPY with biopsy;  Surgeon: Kang Cleveland MD;  Location: [facility]     HEAD & NECK SURGERY  2008    c4-c5 fusion     HYSTERECTOMY, [facility]  04/2004    uterine cancer; [facility]     LAPAROSCOPIC BYPASS GASTRIC  12/2010       Family History:    No family history on file.    Social History:  Marital Status:   [5]  Social History     Tobacco Use     Smoking status: Never Smoker     Smokeless tobacco: Never Used   Substance Use Topics     Alcohol use: No     Comment: sober for 18 years     Drug use: No        Medications:    acetaminophen (TYLENOL) 500 MG tablet  ferrous sulfate (IRON) 325 (65 Fe) MG tablet  levothyroxine (SYNTHROID/LEVOTHROID) 200 MCG tablet  levothyroxine (SYNTHROID/LEVOTHROID) 50 MCG tablet  MELATONIN PO  multivitamin, therapeutic with minerals (MULTI-VITAMIN) TABS  sertraline (ZOLOFT) 100 MG tablet  tiZANidine (ZANAFLEX) 4 MG tablet  traZODone (DESYREL) 50 MG tablet  EPINEPHrine (EPIPEN/ADRENACLICK/OR ANY BX GENERIC EQUIV) 0.3 MG/0.3ML injection 2-pack          Review of Systems   Gastrointestinal: Positive for abdominal pain (diffuse but maria alejandra R groin). Negative for diarrhea, nausea and vomiting.   Genitourinary: Negative.    All other systems reviewed and are negative.      Physical Exam   BP: (!) 142/101  Temp: 97.1  F (36.2  C)  Resp: 20  SpO2: 100 %      Physical Exam  Vitals signs and nursing note reviewed.   Constitutional:       Appearance: She is obese.   HENT:      Head: Normocephalic.   Neck:      Musculoskeletal: Normal range of motion and neck supple.   Cardiovascular:      Rate and Rhythm: Normal rate and regular rhythm.      Pulses: Normal pulses.      Heart sounds: Normal heart sounds.   Pulmonary:      Effort: Pulmonary  effort is normal.      Breath sounds: Normal breath sounds.   Abdominal:      Tenderness: There is abdominal tenderness (diffuse pain to palpation, maria alejandra over the R groin area). There is guarding.      Comments: Hyperactive BS that are not tympanitic   Musculoskeletal: Normal range of motion.   Skin:     General: Skin is warm and dry.      Capillary Refill: Capillary refill takes 2 to 3 seconds.   Neurological:      General: No focal deficit present.      Mental Status: She is alert and oriented to person, place, and time.   Psychiatric:         Mood and Affect: Mood normal.         Behavior: Behavior normal.         ED Course   Tramadol 50mg PO given in ED, and will check UA to make sure no UTI.  CT abd/pelvis done this am shows abnormal area cecum/ascending colon along with LAD.                 Critical Care time:  none               Results for orders placed or performed during the hospital encounter of 02/13/20 (from the past 24 hour(s))   UA with Microscopic   Result Value Ref Range    Color Urine Light Yellow     Appearance Urine Clear     Glucose Urine Negative NEG^Negative mg/dL    Bilirubin Urine Negative NEG^Negative    Ketones Urine Negative NEG^Negative mg/dL    Specific Gravity Urine 1.020 1.003 - 1.035    Blood Urine Negative NEG^Negative    pH Urine 6.0 4.7 - 8.0 pH    Protein Albumin Urine Negative NEG^Negative mg/dL    Urobilinogen mg/dL 2.0 0.0 - 2.0 mg/dL    Nitrite Urine Negative NEG^Negative    Leukocyte Esterase Urine Trace (A) NEG^Negative    Source Midstream Urine     WBC Urine 1 0 - 5 /HPF    RBC Urine 1 0 - 2 /HPF    Bacteria Urine None (A) NEG^Negative /HPF    Mucous Urine Present (A) NEG^Negative /LPF       Medications   traMADol (ULTRAM) tablet 50 mg (50 mg Oral Given 2/14/20 0000)       Assessments & Plan (with Medical Decision Making)     I have reviewed the nursing notes.    I have reviewed the findings, diagnosis, plan and need for follow up with the patient.  Continue current meds and  use OTC pain meds unless directed otherwise by PCP.  F/U with PCP and surgeon as planned.       New Prescriptions    No medications on file       Final diagnoses:   RLQ abdominal pain   Abnormal CT of the abdomen       2/13/2020   [facility]     Madai Cordova DO  02/14/20 0014

## 2020-02-14 NOTE — ED NOTES
States she has had right lower abdominal pain for some time, worse tonight. States she had colonoscopy done this morning and was told she had a cancerous tumor.

## 2020-02-14 NOTE — ED NOTES
Discharge instructions given. Verbalized understanding. States pain is better and rates 6/10. Denies any questions or concerns. Ambulated out of ED independently.

## 2020-02-17 NOTE — PATIENT INSTRUCTIONS
Thank you for allowing our surgical team to participate in your care. Please review the instructions below.   If you have questions, you may contact us at the any of the following numbers:     [facility] Coordinator: [phone]  Clinic Surgery Nurse ([place]): [phone]  Hospital Surgery Education Nurse: [phone]    You are scheduled for: Hand Assisted Laparoscopic Right Handed Colectomy with   Dr. Cleveland on Monday Feb 24, 2020  Admit Time: Hospital Surgery will call you the day before your procedure by 5pm with your arrival time.   If your surgery is on Monday, expect a call on Friday.   If you are not contacted before 5PM, please call admitting at [phone].   After hours or on weekends, please call [phone] to postpone.     Call the clinic surgery nurse if you become ill within 1-2 weeks of your procedure to reschedule.   This includes fever, chills, sore throat, cough, chest congestion, runny nose, or any other symptom of any other illness.     Preop appointment needed before surgery .     Sunday (1 day before surgery) Take Magnesium Citrate at 4:00pm then again at 7:00pm - per providers orders      Please call the Hospital Surgery Education Nurse at [phone] 1-2 weeks prior to your procedure and have a medication list ready.     Do not take Aspirin or other NSAIDS (Ibuprofen, Motrin, Aleve, Celebrex, Naproxen, etc), vitamins/supplements 7 days before your surgery unless you have been otherwise directed.   .   If you are prescribed blood thinners or insulin, please contact your primary care provider for instructions.    Shower the night before and the morning of your procedure with Hibiclens soap.  On The Night Before Your Surgery:  1.  Remove your jewelry and leave off until after surgery. Wash your hair and body with your regular soap/shampoo. Use a freshly washed washcloth. Include cleaning inside your belly button. Rinse off soap/shampoo.  2. Wash your body from neck to feet using 1/2  of the 1st Hibiclens (Chlorhexidine) bottle with your bare hands. Do not use the Hibiclens on your face, hair or genital area. Leave the soap on your body for one minute and rinse.  3. Repeat step 2 with the 2nd half of the bottle.  4. Rinse off all soap and dry with a freshly washed towel. Do not use lotions or hair products.  5. Sleep in freshly washed pajamas and freshly washed bedding.  On The Morning of Your Surgery:  1.Wash your hair and body with your regular soap/shampoo. Use another freshly washed washcloth. Rinse off.   2. Wash your body from neck to feet using 1/2 of the 2nd Hibiclens (Chlorhexidine) bottle with your bare hands. Leave the soap on your body for one minute and rinse.  3. Repeat step 2 with the 2nd half of the bottle.  4. Rinse off all the soap and dry with another freshly washed towel. Do not use lotions or hair products.  5. Wear freshly washed clothing to the hospital.    Clear Liquids on Sunday (per provider)  Do not have anything to eat or drink after midnight the night before your surgery (or 8 hours prior to surgery), except clear liquids (water, clear juice, clear broth, plain coffee or tea without cream or milk) up until 2 hours prior to arrival time.   Nothing by mouth after the 2 hours prior to arrival.  Do not chew gum, suck on hard candy, or smoke. You can brush your teeth.   If you are directed to take any medications, take them with a tiny sip of water.   If you use an inhaler, bring it with you.    Arrive in clean, comfortable clothing.   Do not wear any jewelry, make-up, nail polish, lotions, hair products, or perfumes.   [place] in hospital admitting through the [place] entrance.    A responsible adult must be available to drive you home and stay with you for 24 hours at home. If you need to take a taxi or the bus, you must have another responsible adult to ride with you. Your procedure will be cancelled if you do not have a responsible adult .     Return to clinic for  a postop appointment 1-2 week(s) from your surgery date.

## 2020-02-19 ENCOUNTER — PREP FOR PROCEDURE (OUTPATIENT)
Dept: SURGERY | Facility: OTHER | Age: 50
End: 2020-02-19

## 2020-02-19 ENCOUNTER — OFFICE VISIT (OUTPATIENT)
Dept: SURGERY | Facility: OTHER | Age: 50
End: 2020-02-19
Attending: SURGERY
Payer: COMMERCIAL

## 2020-02-19 VITALS
BODY MASS INDEX: 25.74 KG/M2 | OXYGEN SATURATION: 96 % | DIASTOLIC BLOOD PRESSURE: 74 MMHG | SYSTOLIC BLOOD PRESSURE: 110 MMHG | HEART RATE: 79 BPM | WEIGHT: 164 LBS | HEIGHT: 67 IN | TEMPERATURE: 97.6 F | RESPIRATION RATE: 16 BRPM

## 2020-02-19 DIAGNOSIS — C18.2 MALIGNANT NEOPLASM OF ASCENDING COLON (H): Primary | ICD-10-CM

## 2020-02-19 DIAGNOSIS — C18.9 COLON CANCER (H): Primary | ICD-10-CM

## 2020-02-19 PROCEDURE — 99214 OFFICE O/P EST MOD 30 MIN: CPT | Performed by: SURGERY

## 2020-02-19 PROCEDURE — G0463 HOSPITAL OUTPT CLINIC VISIT: HCPCS

## 2020-02-19 RX ORDER — LEVOTHYROXINE SODIUM 200 UG/1
200 TABLET ORAL DAILY
COMMUNITY
End: 2021-03-04

## 2020-02-19 ASSESSMENT — PAIN SCALES - GENERAL: PAINLEVEL: EXTREME PAIN (8)

## 2020-02-19 ASSESSMENT — MIFFLIN-ST. JEOR: SCORE: 1396.53

## 2020-02-19 NOTE — PROGRESS NOTES
[facility]  [address]  [address]  [place] [address]  [phone]  Dept: [phone]    PRE-OP EVALUATION:  Today's date: 2020    Constantino Warren (: 1970) presents for pre-operative evaluation assessment as requested by Dr. Cleveland.  She requires evaluation and anesthesia risk assessment prior to undergoing surgery/procedure for treatment of colon cancer .    Proposed Surgery/ Procedure: LAPAROSCOPIC HAND ASSISTED RIGHT COLECTOMY  Date of Surgery/ Procedure: 20  Time of Surgery/ Procedure: to be determined  Hospital/Surgical Facility: [facility]    Primary Physician: Coco Cook  Type of Anesthesia Anticipated: to be determined    Patient has a Health Care Directive or Living Will:  NO    1. NO - Do you have a history of heart attack, stroke, stent, bypass or surgery on an artery in the head, neck, heart or legs?  2. NO - Do you ever have any pain or discomfort in your chest?  3. NO - Do you have a history of  Heart Failure?  4. NO - Are you troubled by shortness of breath when: walking on the level, up a slight hill or at night?  5. NO - Do you currently have a cold, bronchitis or other respiratory infection?  6. NO - Do you have a cough, shortness of breath or wheezing?  7. NO - Do you sometimes get pains in the calves of your legs when you walk?  8. NO - Do you or anyone in your family have previous history of blood clots?  9. NO - Do you or does anyone in your family have a serious bleeding problem such as prolonged bleeding following surgeries or cuts?  10. YES - Have you ever had problems with anemia or been told to take iron pills? Receives monthly B12 injections.  11. NO - Have you had any abnormal blood loss such as black, tarry or bloody stools, or abnormal vaginal bleeding?  12. YES - Have you ever had a blood transfusion? Had 5 blood transfusions due to anemia  13. NO - Have you or any of your relatives ever had problems with  anesthesia?  14. NO - Do you have sleep apnea, excessive snoring or daytime drowsiness?  15. NO - Do you have any prosthetic heart valves?  16. NO - Do you have prosthetic joints?  17. NO - Is there any chance that you may be pregnant?: Has had hysterectomy.       HPI:     HPI related to upcoming procedure: About a year ago, she developed anemia for an unknown reason. She lost weight and started to have stomach pain  Had been recommended to have colonoscopy in November, 2019. Had this done 2/13/2020 and colon cancer was scheduled.     ANEMIA - Patient has a recent history of moderate-severe anemia, which has been symptomatic in the past. She is not currently experiencing symptoms. Work up to date has revealed HGB 11.1 g/dL on 2/20/2020. Treatment has been monthly B12 injections.      DEPRESSION - Patient has a long history of Depression of moderate severity requiring medication for control with recent symptoms being stable. Current symptoms of depression include insomnia.     DIABETES - Patient has a longstanding history of DiabetesType Type II which was treated with weight reduction surgery and she has not needed continued treatment with medications since then. Control has been good. A1C on 2/20/2020 at 5.7%    HYPOTHYROIDISM - Patient has a longstanding history of chronic Hypothyroidism. Patient has been doing well, noting no tremor, insomnia, hair loss or changes in skin texture. Continues to take medications as directed, without adverse reactions or side effects. Last TSH on 12/12/19 was 3.23 mU/L (normal).     Lab Results   Component Value Date    TSH 3.23 12/12/2019   .        MEDICAL HISTORY:     Patient Active Problem List    Diagnosis Date Noted     Prediabetes      Priority: Medium     Colon cancer (H) 02/19/2020     Priority: Medium     Added automatically from request for surgery [id number]       Special screening for malignant neoplasms, colon 01/14/2020     Priority: Medium     Added automatically from  request for surgery [id number]       Anastomotic ulcer S/P gastric bypass 07/21/2018     Priority: Medium     Anemia 07/21/2018     Priority: Medium     Chest pain 07/20/2018     Priority: Medium     GI bleed 07/20/2018     Priority: Medium     Abdominal pain, epigastric 07/20/2018     Priority: Medium     Hypochromic microcytic anemia 07/20/2018     Priority: Medium     Chronic migraine 01/18/2018     Priority: Medium     Chronic pain disorder 01/18/2018     Priority: Medium     Long term (current) use of opiate analgesic 01/12/2018     Priority: Medium     Overview:   Pain Diagnosis failed back syndrome, annular tear L4-5, history lumbar discectomy, s/p lumbar fusion.    Should be seen in the clinic every twelve weeks. Currently on a taper: yes, trying to wean away from the daytime hydrocodone-acetaminophen..    Plan for flares of chronic pain stretches, occasional use of more hydrocodone-acetaminophen for flare.    ED and UC: Opioid medication will not be given in the ER or Urgent Care unless there is a medical emergency unrelated to chronic pain.  Limit to 3 day supply    Refills: Refills will only be given at a scheduled visit.    [facility] Agreement for Opioid Treatment.   Opioid Agreement Date: 5/21/12  Last UDT (Urine Drug Test) on date:    Pain Dx: chronic low back pain  Last Pain Visit: 6-24-11  Preferred Pharmacy: 's  Comments:      7/23/14:  Progressing well.  Over the last month, has decreased hydrocodone-acetaminophen from 6/day to 3/day.  Awaiting appointment with [facility].       Adjustment disorder with mixed anxiety and depressed mood 12/15/2017     Priority: Medium     Other insomnia 12/15/2017     Priority: Medium     Syncope and collapse 09/26/2015     Priority: Medium     Hypertensive urgency 09/26/2015     Priority: Medium     Concussion syndrome 09/26/2015     Priority: Medium     Hydronephrosis, right 04/20/2015     Priority: Medium     Pyelonephritis 04/20/2015     Priority: Medium      Urolithiasis 04/20/2015     Priority: Medium     Annular tear of lumbar disc 12/16/2014     Priority: Medium     Overview:   MRI 2/5/13:  Previous right L4-5 hemilaminectomy and partial facetectomy with mild to moderate right L4-5 facet arthropathy and broad-based bulge of disk and end plate osteophyte on the right with partial high signal intensity zone annular tear of the undersurface of the laterally protruding disk/annulus.       Failed back syndrome of lumbar spine 12/16/2014     Priority: Medium     Overview:   Three back surgeries, including an L3-sacrum AP fusion done at [facility] in 2013.  Has been doing very well in terms of reduction of pain, with minimal requirement of opiate pain medication.       Hx of lumbar discectomy 12/16/2014     Priority: Medium     Overview:   10/2006:   L4-5 Laminectomy and diskectomy L5-S1.   12/12/11:  L5S1 microdiscectomy ([place])       Vitamin B 12 deficiency 06/13/2011     Priority: Medium     Overview:   IMO Update 10/11       Gastric bypass status for obesity 01/04/2011     Priority: Medium     Overview:   12/14/10:  laparoscopic Prasanna-en-Y gastric bypass Dr. Hassan  IMO Update 10/11       Hyperlipidemia 09/23/2008     Priority: Medium     Overview:   IMO Update 10/11       Hypothyroidism 09/23/2008     Priority: Medium     Overview:   IMO Update 10/11  Overview:   IMO Update 10/11        Past Medical History:   Diagnosis Date     Chronic pain      Prediabetes      Thyroid disease      Past Surgical History:   Procedure Laterality Date     BACK SURGERY      2008; 2011     COLONOSCOPY N/A 2/13/2020    Procedure: COLONOSCOPY WITH BIOPSY AND TATOOING OF ASCENDING COLON,INCOMEPLETE EXAM;  Surgeon: Kang Cleveland MD;  Location: [facility] OR     ESOPHAGOSCOPY, GASTROSCOPY, DUODENOSCOPY (EGD), COMBINED N/A 7/21/2018    Procedure: COMBINED ESOPHAGOSCOPY, GASTROSCOPY, DUODENOSCOPY (EGD);  UPPER ENDOSCOPY WITH BIOPSIES;  Surgeon: Roger Vazquez MD;  Location: [facility] OR      ESOPHAGOSCOPY, GASTROSCOPY, DUODENOSCOPY (EGD), COMBINED N/A 11/21/2019    Procedure: UPPER ENDOSCOPY with biopsy;  Surgeon: Kang Cleveland MD;  Location: [facility] OR     HEAD & NECK SURGERY  2008    c4-c5 fusion     HYSTERECTOMY, [facility]  04/2004    uterine cancer; [facility]     LAPAROSCOPIC BYPASS GASTRIC  12/2010     Current Outpatient Medications   Medication Sig Dispense Refill     acetaminophen (TYLENOL) 500 MG tablet Take 1,000 mg by mouth every 6 hours as needed Max acetaminophen dose: 4000 mg in 24 hours       EPINEPHrine (EPIPEN/ADRENACLICK/OR ANY BX GENERIC EQUIV) 0.3 MG/0.3ML injection 2-pack Inject 0.3 mg into the muscle as needed        ferrous sulfate (IRON) 325 (65 Fe) MG tablet Take 1 tablet (325 mg) by mouth 2 times daily 60 tablet 2     levothyroxine 200 MCG PO tablet Take 200 mcg by mouth daily       MELATONIN PO Take 7.5 mg by mouth nightly as needed       multivitamin, therapeutic with minerals (MULTI-VITAMIN) TABS Take 1 tablet by mouth daily       sertraline (ZOLOFT) 100 MG tablet Take 100 mg by mouth daily       tiZANidine (ZANAFLEX) 4 MG tablet Take 4 mg by mouth At Bedtime       traZODone (DESYREL) 50 MG tablet Take 1 tablet (50 mg) by mouth At Bedtime 30 tablet 0     OTC products: None, except as noted above    Allergies   Allergen Reactions     Aspartame      Other reaction(s): Other - Describe In Comment Field, Seizures  convulsions     Bee Venom Anaphylaxis     Bupropion Anaphylaxis     Throat gets tight and hives. Denies allergic reaction     Food Anaphylaxis     Peppers-anaphylaxis if eaten, if touched hands swell and reddened     Food Allergy Formula Anaphylaxis     Peppers, patient reports green peppers close off her throat     Ibuprofen Anaphylaxis     No Clinical Screening - See Comments Anaphylaxis     Peppers, patient reports green peppers close off her throat     Piper Anaphylaxis     Peppers--green, black, red, chili, etc. Derm symptoms, hives;  Strawberries--(cooked)  "Itching, breathing difficulties, GI symptoms. Mushrooms--GI symptoms, Hives.Spinach (cooked) throat irritation  (Listed on [facility] problem list.)     Fentanyl Swelling     Lips and tongue swelled.  Swelling of lips and tongue     Adhesive Tape      blisters     Amitriptyline Other (See Comments)     Respiratory symptoms - denies allergic reaction     Gabapentin Other (See Comments)     irritability     Liquid Adhesive Dermatitis     blisters  Blisters  Silk tape and tegaderm is fine     Oxcarbazepine Other (See Comments)     Throat gets tight and hives. Denies allergic reaction  Throat gets tight and hives-       Prozac [Fluoxetine]      Other reaction(s): *Unknown  uncontrolled muscle spasms     Valproic Acid      Respiratory symptoms ([facility]). Denies allergic reaction     Ziprasidone      Throat gets tight and hives. Denies allergic reaction      Latex Allergy: NO    Social History     Tobacco Use     Smoking status: Never Smoker     Smokeless tobacco: Never Used   Substance Use Topics     Alcohol use: No     Frequency: Never     Comment: sober for 18 years     History   Drug Use No       REVIEW OF SYSTEMS:   Constitutional, neuro, ENT, endocrine, pulmonary, cardiac, gastrointestinal, genitourinary, musculoskeletal, integument and psychiatric systems are negative, except as otherwise noted.    EXAM:   /76 (BP Location: Right arm, Patient Position: Sitting, Cuff Size: Adult Regular)   Pulse 80   Temp 97.1  F (36.2  C) (Tympanic)   Resp 20   Ht 1.702 m (5' 7\")   Wt 74.4 kg (164 lb)   SpO2 98%   BMI 25.69 kg/m      GENERAL APPEARANCE: healthy, alert and no distress     EYES: EOMI, PERRL     HENT: ear canals and TM's normal and nose and mouth without ulcers or lesions     NECK: no adenopathy, no asymmetry, masses, or scars and thyroid normal to palpation     RESP: lungs clear to auscultation - no rales, rhonchi or wheezes     CV: regular rates and rhythm, normal S1 S2, " no S3 or S4 and no murmur, click or rub     ABDOMEN:  Soft, rounded, tender LLQ, no HSM or masses and bowel sounds normal     MS: extremities normal- no gross deformities noted, no evidence of inflammation in joints, FROM in all extremities.     SKIN: no suspicious lesions or rashes     NEURO: Normal strength and tone, sensory exam grossly normal, mentation intact and speech normal     PSYCH: mentation appears normal. and affect normal/bright     LYMPHATICS: No cervical adenopathy    DIAGNOSTICS:   EKG: appears normal, NSR, normal axis, normal intervals, no acute ST/T changes c/w ischemia, no LVH by voltage criteria, unchanged from previous tracings    Recent Labs   Lab Test 12/12/19  1554 11/21/19  0526 11/20/19  1349  03/10/19  1425   HGB 10.6* 8.7* 5.7*   < > 11.1*    241 284   < > 279   INR  --   --   --   --  1.11   NA  --  141 142   < > 142   POTASSIUM  --  4.3 4.3   < > 3.9   CR  --  0.65 0.66   < > 0.83    < > = values in this interval not displayed.      Results for orders placed or performed in visit on 02/20/20   CBC with platelets and differential     Status: Abnormal   Result Value Ref Range    WBC 6.8 4.0 - 11.0 10e9/L    RBC Count 4.45 3.8 - 5.2 10e12/L    Hemoglobin 11.1 (L) 11.7 - 15.7 g/dL    Hematocrit 36.1 35.0 - 47.0 %    MCV 81 78 - 100 fl    MCH 24.9 (L) 26.5 - 33.0 pg    MCHC 30.7 (L) 31.5 - 36.5 g/dL    RDW 15.8 (H) 10.0 - 15.0 %    Platelet Count 341 150 - 450 10e9/L    % Neutrophils 62.8 %    % Lymphocytes 27.8 %    % Monocytes 9.3 %    % Eosinophils 0.0 %    % Basophils 0.1 %    Absolute Neutrophil 4.2 1.6 - 8.3 10e9/L    Absolute Lymphocytes 1.9 0.8 - 5.3 10e9/L    Absolute Monocytes 0.6 0.0 - 1.3 10e9/L    Absolute Eosinophils 0.0 0.0 - 0.7 10e9/L    Absolute Basophils 0.0 0.0 - 0.2 10e9/L    Diff Method Automated Method    Comprehensive metabolic panel (BMP + Alb, Alk Phos, ALT, AST, Total. Bili, TP)     Status: Abnormal   Result Value Ref Range    Sodium 140 133 - 144 mmol/L     Potassium 4.1 3.4 - 5.3 mmol/L    Chloride 110 (H) 94 - 109 mmol/L    Carbon Dioxide 25 20 - 32 mmol/L    Anion Gap 5 3 - 14 mmol/L    Glucose 89 70 - 99 mg/dL    Urea Nitrogen 22 7 - 30 mg/dL    Creatinine 0.74 0.52 - 1.04 mg/dL    GFR Estimate >90 >60 mL/min/[1.73_m2]    GFR Estimate If Black >90 >60 mL/min/[1.73_m2]    Calcium 8.5 8.5 - 10.1 mg/dL    Bilirubin Total 0.3 0.2 - 1.3 mg/dL    Albumin 3.1 (L) 3.4 - 5.0 g/dL    Protein Total 7.2 6.8 - 8.8 g/dL    Alkaline Phosphatase 128 40 - 150 U/L    ALT 21 0 - 50 U/L    AST 19 0 - 45 U/L   Hemoglobin A1c     Status: Abnormal   Result Value Ref Range    Hemoglobin A1C 5.7 (H) 0 - 5.6 %   Lipid Profile (Chol, Trig, HDL, LDL calc)     Status: Abnormal   Result Value Ref Range    Cholesterol 107 <200 mg/dL    Triglycerides 83 <150 mg/dL    HDL Cholesterol 44 (L) >49 mg/dL    LDL Cholesterol Calculated 46 <100 mg/dL    Non HDL Cholesterol 63 <130 mg/dL   Iron and iron binding capacity     Status: Abnormal   Result Value Ref Range    Iron 21 (L) 35 - 180 ug/dL    Iron Binding Cap 447 (H) 240 - 430 ug/dL    Iron Saturation Index 5 (L) 15 - 46 %   Vitamin B12     Status: None   Result Value Ref Range    Vitamin B12 306 193 - 986 pg/mL   Estimated Average Glucose     Status: None   Result Value Ref Range    Estimated Average Glucose 117 mg/dL     IMPRESSION:   Reason for surgery/procedure: Anemia. Colon cancer  Diagnosis/reason for consult: Colon cancer    The proposed surgical procedure is considered HIGH risk.    REVISED CARDIAC RISK INDEX  The patient has the following serious cardiovascular risks for perioperative complications such as (MI, PE, VFib and 3  AV Block):  High risk surgery (>5% cardiac complication risk)  INTERPRETATION: 1 risks: Class II (low risk - 0.9% complication rate)    The patient has the following additional risks for perioperative complications:  No identified additional risks    The ASCVD Risk score (Az PRAJAPATI Jr., et al., 2013) failed to calculate  for the following reasons:    The valid total cholesterol range is 130 to 320 mg/dL        ICD-10-CM    1. Preop general physical exam Z01.818 CBC with platelets and differential     Comprehensive metabolic panel (BMP + Alb, Alk Phos, ALT, AST, Total. Bili, TP)     EKG 12-lead complete w/read - Clinics     Estimated Average Glucose     Estimated Average Glucose   2. History of diabetes mellitus Z86.39 Hemoglobin A1c     CANCELED: Glucose   3. Lipid screening Z13.220 Lipid Profile (Chol, Trig, HDL, LDL calc)   4. Hypochromic microcytic anemia D50.9 Iron and iron binding capacity   5. Vitamin B 12 deficiency E53.8 Vitamin B12     CANCELED: CBC with platelets   6. Malignant neoplasm of colon, unspecified part of colon (H) C18.9    7. Hypothyroidism, unspecified type E03.9        RECOMMENDATIONS:     --Consult hospital rounder / IM to assist post-op medical management    Anemia  Anemia and does not require treatment prior to surgery.  Monitor Hemoglobin postoperatively.      MEDICATIONS:   Continue taking your prescribed medications, as normal, the night before surgery.  Hold AM medications until after surgery.   Stop all supplements (herbal, non-prescription vitamins and minerals) one week prior to surgery.   Stop all NSAIDS (naproxen, aspirin, ibuprofen) 1 week prior to surgery or as instructed by your surgical team.   Do not start any new medications prior to your surgery.    APPROVAL GIVEN to proceed with proposed procedure, without further diagnostic evaluation       Signed Electronically by: Maddie Mccauley CNP    Copy of this evaluation report is provided to requesting physician.    [facility] Preop Guidelines    Revised Cardiac Risk Index

## 2020-02-19 NOTE — PATIENT INSTRUCTIONS
Before Your Surgery      Call your surgeon if there is any change in your health. This includes signs of a cold or flu (such as a sore throat, runny nose, cough, rash or fever).    Do not smoke, drink alcohol or take over the counter medicine (unless your surgeon or primary care doctor tells you to) for the 24 hours before and after surgery.    If you take prescribed drugs: Follow your doctor s orders about which medicines to take and which to stop until after surgery.    Eating and drinking prior to surgery: follow the instructions from your surgeon    Take a shower or bath the night before surgery. Use the soap your surgeon gave you to gently clean your skin. If you do not have soap from your surgeon, use your regular soap. Do not shave or scrub the surgery site.  Wear clean pajamas and have clean sheets on your bed.       Current Outpatient Medications   Medication     acetaminophen (TYLENOL) 500 MG tablet     EPINEPHrine (EPIPEN/ADRENACLICK/OR ANY BX GENERIC EQUIV) 0.3 MG/0.3ML injection 2-pack     ferrous sulfate (IRON) 325 (65 Fe) MG tablet     levothyroxine 200 MCG PO tablet     MELATONIN PO     multivitamin, therapeutic with minerals (MULTI-VITAMIN) TABS     sertraline (ZOLOFT) 100 MG tablet     tiZANidine (ZANAFLEX) 4 MG tablet     traZODone (DESYREL) 50 MG tablet     Current Facility-Administered Medications   Medication     cyanocobalamin injection 1,000 mcg     MEDICATIONS:   Continue taking your prescribed medications, as normal, the night before surgery.  Hold AM medications until after surgery.   Stop all supplements (herbal, non-prescription vitamins and minerals) one week prior to surgery.   Stop all NSAIDS (naproxen, aspirin, ibuprofen) 1 week prior to surgery or as instructed by your surgical team.   Do not start any new medications prior to your surgery.

## 2020-02-19 NOTE — NURSING NOTE
"Chief Complaint   Patient presents with     Surgical Followup     Colonoscopy on 2/13       Initial /74 (Cuff Size: Adult Large)   Pulse 79   Temp 97.6  F (36.4  C) (Tympanic)   Resp 16   Ht 1.702 m (5' 7\")   Wt 74.4 kg (164 lb)   SpO2 96%   BMI 25.69 kg/m   Estimated body mass index is 25.69 kg/m  as calculated from the following:    Height as of this encounter: 1.702 m (5' 7\").    Weight as of this encounter: 74.4 kg (164 lb).  Medication Reconciliation: complete  Kisha Hansen LPN    "

## 2020-02-19 NOTE — PROGRESS NOTES
CLINIC NOTE - POST-OP ENDOSCOPY  2/19/2020    Patient:Constantino Warren    Procedure: Colonoscopy with biopsies    This is a 49 year old female who is 1 weeks s/p Colonoscopy with biopsies.  At the time of endoscopy a near correcting lesion was found in the a sending colon.    The patient does have some mild obstructive type symptoms.  She is still passing gas.  Still having bowel movements.    Current Medications:  Current Outpatient Medications   Medication Sig Dispense Refill     acetaminophen (TYLENOL) 500 MG tablet Take 1,000 mg by mouth every 6 hours as needed Max acetaminophen dose: 4000 mg in 24 hours       EPINEPHrine (EPIPEN/ADRENACLICK/OR ANY BX GENERIC EQUIV) 0.3 MG/0.3ML injection 2-pack Inject 0.3 mg into the muscle as needed        ferrous sulfate (IRON) 325 (65 Fe) MG tablet Take 1 tablet (325 mg) by mouth 2 times daily 60 tablet 2     levothyroxine 200 MCG PO tablet Take 200 mcg by mouth daily       MELATONIN PO Take 7.5 mg by mouth nightly as needed       multivitamin, therapeutic with minerals (MULTI-VITAMIN) TABS Take 1 tablet by mouth daily       sertraline (ZOLOFT) 100 MG tablet Take 100 mg by mouth daily       tiZANidine (ZANAFLEX) 4 MG tablet Take 4 mg by mouth At Bedtime       traZODone (DESYREL) 50 MG tablet Take 1 tablet (50 mg) by mouth At Bedtime 30 tablet 0       Allergies:  Allergies   Allergen Reactions     Aspartame      Other reaction(s): Other - Describe In Comment Field, Seizures  convulsions     Bee Venom Anaphylaxis     Bupropion Anaphylaxis     Throat gets tight and hives. Denies allergic reaction     Food Anaphylaxis     Peppers-anaphylaxis if eaten, if touched hands swell and reddened     Food Allergy Formula Anaphylaxis     Peppers, patient reports green peppers close off her throat     Ibuprofen Anaphylaxis     No Clinical Screening - See Comments Anaphylaxis     Peppers, patient reports green peppers close off her throat     Piper Anaphylaxis     Peppers--green, black, red,  "chili, etc. Derm symptoms, hives;  Strawberries--(cooked) Itching, breathing difficulties, GI symptoms. Mushrooms--GI symptoms, Hives.Spinach (cooked) throat irritation  (Listed on [facility] problem list.)     Fentanyl Swelling     Lips and tongue swelled.  Swelling of lips and tongue     Adhesive Tape      blisters     Amitriptyline Other (See Comments)     Respiratory symptoms - denies allergic reaction     Gabapentin Other (See Comments)     irritability     Liquid Adhesive Dermatitis     blisters  Blisters  Silk tape and tegaderm is fine     Oxcarbazepine Other (See Comments)     Throat gets tight and hives. Denies allergic reaction  Throat gets tight and hives-       Prozac [Fluoxetine]      Other reaction(s): *Unknown  uncontrolled muscle spasms     Valproic Acid      Respiratory symptoms ([facility]). Denies allergic reaction     Ziprasidone      Throat gets tight and hives. Denies allergic reaction       PHYSICAL EXAM:   Vital signs: /74 (Cuff Size: Adult Large)   Pulse 79   Temp 97.6  F (36.4  C) (Tympanic)   Resp 16   Ht 1.702 m (5' 7\")   Wt 74.4 kg (164 lb)   SpO2 96%   BMI 25.69 kg/m     Weight: [unfilled]   BMI: Body mass index is 25.69 kg/m .   General: Normal, healthy, cooperative, in no acute distress   Lungs: clear to auscultation   CV: Regular rate and rhythm without murmer   Abdominal: abdomen is soft without significant tenderness, masses, organomegaly or guarding     Results for orders placed during the hospital encounter of 02/13/20   CT Abdomen Pelvis w Contrast    Narrative EXAMINATION: CT ABDOMEN PELVIS W CONTRAST, 2/13/2020 11:57 AM    TECHNIQUE:  Helical CT images from the lung bases through the  symphysis pubis were obtained  with IV contrast. Contrast dose: Isovue  300 100ml Given    COMPARISON: none    HISTORY: Benign neuroendocrine tumors; Colon cancer, monitor, stage  II/III; colon mass on colonoscopy; Colonic mass    FINDINGS:    There is " dependent atelectasis at the lung bases.    The liver is free of masses or biliary ductal enlargement. Gallbladder  has been removed    The the spleen and pancreas appear normal.    The adrenal glands are normal.    The right and left kidneys are free of masses or hydronephrosis.    The periaortic lymph nodes are normal in caliber.    Postoperative changes are seen in the stomach. There is a focal area  of abnormal bowel wall thickening in the cecum and ascending colon  highly suspicious for malignancy. There are several enlarged lymph  nodes seen just medial to the ascending colon the largest measuring  2.1 cm    In the pelvis the bladder and rectum appear normal.    Postoperative changes are seen in the lumbar spine      Impression IMPRESSION: Mass in the junction of the cecum and ascending colon  highly suspicious for malignancy.     ZENA EDWARDS MD     CEA   Date Value Ref Range Status   02/13/2020 <0.5 0 - 2.5 ug/L Final     Comment:     Assay Method:  Chemiluminescence using Siemens Centaur XP       PATHOLOGY:  Copath Report   Date Value Ref Range Status   02/13/2020   Final    Patient Name: FELICITY RODRÍGUEZ  MR#: [id number]  Specimen #:   Collected: 2/13/2020  Received: 2/13/2020  Reported: 2/14/2020 15:00  Ordering Phy(s): SHREYA CLEVELAND  Additional Phy(s): CHRISTOPHER PETERS  Copy To: Cancer Registry    For improved result formatting, select 'View Enhanced Report Format' under   Linked Documents section.    SPECIMEN(S):  Colon biopsy, ascending    FINAL DIAGNOSIS:  Colon, right/ascending, polypectomy  - Invasive moderately differentiated adenocarcinoma (see comment)    COMMENT:  MMR by IHC is ordered and will be reported in an addendum.    Dr. Berny Fishman was contacted and given these results on 2/14/2020 at   1:53 PM as Dr. Shreya Cleveland is out  of the office today.    I have personally reviewed all specimens and/or slides, including the   listed special stains, and used them  with my  medical judgement to determine or confirm the final diagnosis.    Electronically signed out by:    Rory Martines M.D.    CLINICAL HISTORY:  Special screening for malignant neoplasms, colon [Z12.11]; colonoscopy   with biopsy and tatooing [sic] of  ascending colon, incomeplete [sic] exam    GROSS:  There are three pieces of tan soft tissue which are 6 x 6 x 1 mm in   aggregate. (3 TE in 1 block). (Dictated  by: Rory Martines MD 2/13/2020 05:01 PM)    MICROSCOPIC:  There is an invasive, moderately differentiated adenocarcinoma.  It   invades desmoplastic stroma.  It is  covered with normal colon mucosa.  The tumor is glands that vary in size   and shape with decreased mucin  production.  There is some stratification.  There is some necrosis.    CPT Codes  A: 15810-FJ8, 68265-OBF, 27379-QZZ, 58219-XWI, 46946-FSP    COLLECTION SITE:  Client: [facility]  Location: [facility] ()    The technical component of this testing was completed at the [facility], with the  professional component performed at the [facility], [address] [place] [address]  ([phone])           ASSESSMENT:    49 year old female who is 1 weeks s/p Esophagogastroduodenoscopy with biopsy.  Pathology is consistent with colon cancer.  CT does show a lesion in the ascending colon.  No evidence of metastatic disease.  The patient does have early obstructive type symptoms secondary to a near obstructing colon lesion.    PLAN:   Discussed with her the options and we will plan on taking her to the operating room for a right hemicolectomy.  At that point we will they were able to more formally stage her.  Seizure was explained with risk benefits and alternatives.  She consents to proceed as planned.    The risks, benefits, and alternatives to the planned procedure were fully discussed with the patient and/or the patient's representative(s). The risks of bleeding, infection, death, missing  pathology, the need for additional procedures intra-operatively, the possible need for intra-operative consults, the possible need for transfusion therapy, cardiopulmonary compromise, the possible need for additional surgery for a complication were discussed with the patient and/or the patient's representative(s). The patient's and/or patient's representative(s) questions were addressed and answered. Informed consent was obtained from the patient and/or the patient's representative(s). The patient and/or the patient's representative(s) consent to proceed.

## 2020-02-19 NOTE — LETTER
February 19, 2020      Constnatino Warren  [address] [facility]   [place] [address]        To Whom It May Concern:    Constantino Warren  was seen on February 19, 2020.  Please excuse her  due to a doctor's appointment       Sincerely,        Kang Cleveland MD

## 2020-02-19 NOTE — H&P (VIEW-ONLY)
[facility]  [address]  [address]  [place] [address]  [phone]  Dept: [phone]    PRE-OP EVALUATION:  Today's date: 2020    Constantino Warren (: 1970) presents for pre-operative evaluation assessment as requested by Dr. Cleveland.  She requires evaluation and anesthesia risk assessment prior to undergoing surgery/procedure for treatment of colon cancer .    Proposed Surgery/ Procedure: LAPAROSCOPIC HAND ASSISTED RIGHT COLECTOMY  Date of Surgery/ Procedure: 20  Time of Surgery/ Procedure: to be determined  Hospital/Surgical Facility: [facility]    Primary Physician: Coco Cook  Type of Anesthesia Anticipated: to be determined    Patient has a Health Care Directive or Living Will:  NO    1. NO - Do you have a history of heart attack, stroke, stent, bypass or surgery on an artery in the head, neck, heart or legs?  2. NO - Do you ever have any pain or discomfort in your chest?  3. NO - Do you have a history of  Heart Failure?  4. NO - Are you troubled by shortness of breath when: walking on the level, up a slight hill or at night?  5. NO - Do you currently have a cold, bronchitis or other respiratory infection?  6. NO - Do you have a cough, shortness of breath or wheezing?  7. NO - Do you sometimes get pains in the calves of your legs when you walk?  8. NO - Do you or anyone in your family have previous history of blood clots?  9. NO - Do you or does anyone in your family have a serious bleeding problem such as prolonged bleeding following surgeries or cuts?  10. YES - Have you ever had problems with anemia or been told to take iron pills? Receives monthly B12 injections.  11. NO - Have you had any abnormal blood loss such as black, tarry or bloody stools, or abnormal vaginal bleeding?  12. YES - Have you ever had a blood transfusion? Had 5 blood transfusions due to anemia  13. NO - Have you or any of your relatives ever had problems with  anesthesia?  14. NO - Do you have sleep apnea, excessive snoring or daytime drowsiness?  15. NO - Do you have any prosthetic heart valves?  16. NO - Do you have prosthetic joints?  17. NO - Is there any chance that you may be pregnant?: Has had hysterectomy.       HPI:     HPI related to upcoming procedure: About a year ago, she developed anemia for an unknown reason. She lost weight and started to have stomach pain  Had been recommended to have colonoscopy in November, 2019. Had this done 2/13/2020 and colon cancer was scheduled.     ANEMIA - Patient has a recent history of moderate-severe anemia, which has been symptomatic in the past. She is not currently experiencing symptoms. Work up to date has revealed HGB 11.1 g/dL on 2/20/2020. Treatment has been monthly B12 injections.      DEPRESSION - Patient has a long history of Depression of moderate severity requiring medication for control with recent symptoms being stable. Current symptoms of depression include insomnia.     DIABETES - Patient has a longstanding history of DiabetesType Type II which was treated with weight reduction surgery and she has not needed continued treatment with medications since then. Control has been good. A1C on 2/20/2020 at 5.7%    HYPOTHYROIDISM - Patient has a longstanding history of chronic Hypothyroidism. Patient has been doing well, noting no tremor, insomnia, hair loss or changes in skin texture. Continues to take medications as directed, without adverse reactions or side effects. Last TSH on 12/12/19 was 3.23 mU/L (normal).     Lab Results   Component Value Date    TSH 3.23 12/12/2019   .        MEDICAL HISTORY:     Patient Active Problem List    Diagnosis Date Noted     Prediabetes      Priority: Medium     Colon cancer (H) 02/19/2020     Priority: Medium     Added automatically from request for surgery [id number]       Special screening for malignant neoplasms, colon 01/14/2020     Priority: Medium     Added automatically from  request for surgery [id number]       Anastomotic ulcer S/P gastric bypass 07/21/2018     Priority: Medium     Anemia 07/21/2018     Priority: Medium     Chest pain 07/20/2018     Priority: Medium     GI bleed 07/20/2018     Priority: Medium     Abdominal pain, epigastric 07/20/2018     Priority: Medium     Hypochromic microcytic anemia 07/20/2018     Priority: Medium     Chronic migraine 01/18/2018     Priority: Medium     Chronic pain disorder 01/18/2018     Priority: Medium     Long term (current) use of opiate analgesic 01/12/2018     Priority: Medium     Overview:   Pain Diagnosis failed back syndrome, annular tear L4-5, history lumbar discectomy, s/p lumbar fusion.    Should be seen in the clinic every twelve weeks. Currently on a taper: yes, trying to wean away from the daytime hydrocodone-acetaminophen..    Plan for flares of chronic pain stretches, occasional use of more hydrocodone-acetaminophen for flare.    ED and UC: Opioid medication will not be given in the ER or Urgent Care unless there is a medical emergency unrelated to chronic pain.  Limit to 3 day supply    Refills: Refills will only be given at a scheduled visit.    [facility] Agreement for Opioid Treatment.   Opioid Agreement Date: 5/21/12  Last UDT (Urine Drug Test) on date:    Pain Dx: chronic low back pain  Last Pain Visit: 6-24-11  Preferred Pharmacy: [facility]  Comments:      7/23/14:  Progressing well.  Over the last month, has decreased hydrocodone-acetaminophen from 6/day to 3/day.  Awaiting appointment with [facility].       Adjustment disorder with mixed anxiety and depressed mood 12/15/2017     Priority: Medium     Other insomnia 12/15/2017     Priority: Medium     Syncope and collapse 09/26/2015     Priority: Medium     Hypertensive urgency 09/26/2015     Priority: Medium     Concussion syndrome 09/26/2015     Priority: Medium     Hydronephrosis, right 04/20/2015     Priority: Medium     Pyelonephritis 04/20/2015     Priority: Medium      Urolithiasis 04/20/2015     Priority: Medium     Annular tear of lumbar disc 12/16/2014     Priority: Medium     Overview:   MRI 2/5/13:  Previous right L4-5 hemilaminectomy and partial facetectomy with mild to moderate right L4-5 facet arthropathy and broad-based bulge of disk and end plate osteophyte on the right with partial high signal intensity zone annular tear of the undersurface of the laterally protruding disk/annulus.       Failed back syndrome of lumbar spine 12/16/2014     Priority: Medium     Overview:   Three back surgeries, including an L3-sacrum AP fusion done at [facility] in 2013.  Has been doing very well in terms of reduction of pain, with minimal requirement of opiate pain medication.       Hx of lumbar discectomy 12/16/2014     Priority: Medium     Overview:   10/2006:   L4-5 Laminectomy and diskectomy L5-S1.   12/12/11:  L5S1 microdiscectomy ([facility])       Vitamin B 12 deficiency 06/13/2011     Priority: Medium     Overview:   IMO Update 10/11       Gastric bypass status for obesity 01/04/2011     Priority: Medium     Overview:   12/14/10:  laparoscopic Prasanna-en-Y gastric bypass Dr. Hassan  IMO Update 10/11       Hyperlipidemia 09/23/2008     Priority: Medium     Overview:   IMO Update 10/11       Hypothyroidism 09/23/2008     Priority: Medium     Overview:   IMO Update 10/11  Overview:   IMO Update 10/11        Past Medical History:   Diagnosis Date     Chronic pain      Prediabetes      Thyroid disease      Past Surgical History:   Procedure Laterality Date     BACK SURGERY      2008; 2011     COLONOSCOPY N/A 2/13/2020    Procedure: COLONOSCOPY WITH BIOPSY AND TATOOING OF ASCENDING COLON,INCOMEPLETE EXAM;  Surgeon: Kang Cleveland MD;  Location: [facility]     ESOPHAGOSCOPY, GASTROSCOPY, DUODENOSCOPY (EGD), COMBINED N/A 7/21/2018    Procedure: COMBINED ESOPHAGOSCOPY, GASTROSCOPY, DUODENOSCOPY (EGD);  UPPER ENDOSCOPY WITH BIOPSIES;  Surgeon: Roger Vazquez MD;  Location: [facility] OR      ESOPHAGOSCOPY, GASTROSCOPY, DUODENOSCOPY (EGD), COMBINED N/A 11/21/2019    Procedure: UPPER ENDOSCOPY with biopsy;  Surgeon: Kang Cleveland MD;  Location: [facility] OR     HEAD & NECK SURGERY  2008    c4-c5 fusion     HYSTERECTOMY, [facility]  04/2004    uterine cancer; [facility]     LAPAROSCOPIC BYPASS GASTRIC  12/2010     Current Outpatient Medications   Medication Sig Dispense Refill     acetaminophen (TYLENOL) 500 MG tablet Take 1,000 mg by mouth every 6 hours as needed Max acetaminophen dose: 4000 mg in 24 hours       EPINEPHrine (EPIPEN/ADRENACLICK/OR ANY BX GENERIC EQUIV) 0.3 MG/0.3ML injection 2-pack Inject 0.3 mg into the muscle as needed        ferrous sulfate (IRON) 325 (65 Fe) MG tablet Take 1 tablet (325 mg) by mouth 2 times daily 60 tablet 2     levothyroxine 200 MCG PO tablet Take 200 mcg by mouth daily       MELATONIN PO Take 7.5 mg by mouth nightly as needed       multivitamin, therapeutic with minerals (MULTI-VITAMIN) TABS Take 1 tablet by mouth daily       sertraline (ZOLOFT) 100 MG tablet Take 100 mg by mouth daily       tiZANidine (ZANAFLEX) 4 MG tablet Take 4 mg by mouth At Bedtime       traZODone (DESYREL) 50 MG tablet Take 1 tablet (50 mg) by mouth At Bedtime 30 tablet 0     OTC products: None, except as noted above    Allergies   Allergen Reactions     Aspartame      Other reaction(s): Other - Describe In Comment Field, Seizures  convulsions     Bee Venom Anaphylaxis     Bupropion Anaphylaxis     Throat gets tight and hives. Denies allergic reaction     Food Anaphylaxis     Peppers-anaphylaxis if eaten, if touched hands swell and reddened     Food Allergy Formula Anaphylaxis     Peppers, patient reports green peppers close off her throat     Ibuprofen Anaphylaxis     No Clinical Screening - See Comments Anaphylaxis     Peppers, patient reports green peppers close off her throat     Piper Anaphylaxis     Peppers--green, black, red, chili, etc. Derm symptoms, hives;  Strawberries--(cooked)  "Itching, breathing difficulties, GI symptoms. Mushrooms--GI symptoms, Hives.Spinach (cooked) throat irritation  (Listed on [facility] problem list.)     Fentanyl Swelling     Lips and tongue swelled.  Swelling of lips and tongue     Adhesive Tape      blisters     Amitriptyline Other (See Comments)     Respiratory symptoms - denies allergic reaction     Gabapentin Other (See Comments)     irritability     Liquid Adhesive Dermatitis     blisters  Blisters  Silk tape and tegaderm is fine     Oxcarbazepine Other (See Comments)     Throat gets tight and hives. Denies allergic reaction  Throat gets tight and hives-       Prozac [Fluoxetine]      Other reaction(s): *Unknown  uncontrolled muscle spasms     Valproic Acid      Respiratory symptoms ([facility]). Denies allergic reaction     Ziprasidone      Throat gets tight and hives. Denies allergic reaction      Latex Allergy: NO    Social History     Tobacco Use     Smoking status: Never Smoker     Smokeless tobacco: Never Used   Substance Use Topics     Alcohol use: No     Frequency: Never     Comment: sober for 18 years     History   Drug Use No       REVIEW OF SYSTEMS:   Constitutional, neuro, ENT, endocrine, pulmonary, cardiac, gastrointestinal, genitourinary, musculoskeletal, integument and psychiatric systems are negative, except as otherwise noted.    EXAM:   /76 (BP Location: Right arm, Patient Position: Sitting, Cuff Size: Adult Regular)   Pulse 80   Temp 97.1  F (36.2  C) (Tympanic)   Resp 20   Ht 1.702 m (5' 7\")   Wt 74.4 kg (164 lb)   SpO2 98%   BMI 25.69 kg/m      GENERAL APPEARANCE: healthy, alert and no distress     EYES: EOMI, PERRL     HENT: ear canals and TM's normal and nose and mouth without ulcers or lesions     NECK: no adenopathy, no asymmetry, masses, or scars and thyroid normal to palpation     RESP: lungs clear to auscultation - no rales, rhonchi or wheezes     CV: regular rates and rhythm, normal S1 S2, " no S3 or S4 and no murmur, click or rub     ABDOMEN:  Soft, rounded, tender LLQ, no HSM or masses and bowel sounds normal     MS: extremities normal- no gross deformities noted, no evidence of inflammation in joints, FROM in all extremities.     SKIN: no suspicious lesions or rashes     NEURO: Normal strength and tone, sensory exam grossly normal, mentation intact and speech normal     PSYCH: mentation appears normal. and affect normal/bright     LYMPHATICS: No cervical adenopathy    DIAGNOSTICS:   EKG: appears normal, NSR, normal axis, normal intervals, no acute ST/T changes c/w ischemia, no LVH by voltage criteria, unchanged from previous tracings    Recent Labs   Lab Test 12/12/19  1554 11/21/19  0526 11/20/19  1349  03/10/19  1425   HGB 10.6* 8.7* 5.7*   < > 11.1*    241 284   < > 279   INR  --   --   --   --  1.11   NA  --  141 142   < > 142   POTASSIUM  --  4.3 4.3   < > 3.9   CR  --  0.65 0.66   < > 0.83    < > = values in this interval not displayed.      Results for orders placed or performed in visit on 02/20/20   CBC with platelets and differential     Status: Abnormal   Result Value Ref Range    WBC 6.8 4.0 - 11.0 10e9/L    RBC Count 4.45 3.8 - 5.2 10e12/L    Hemoglobin 11.1 (L) 11.7 - 15.7 g/dL    Hematocrit 36.1 35.0 - 47.0 %    MCV 81 78 - 100 fl    MCH 24.9 (L) 26.5 - 33.0 pg    MCHC 30.7 (L) 31.5 - 36.5 g/dL    RDW 15.8 (H) 10.0 - 15.0 %    Platelet Count 341 150 - 450 10e9/L    % Neutrophils 62.8 %    % Lymphocytes 27.8 %    % Monocytes 9.3 %    % Eosinophils 0.0 %    % Basophils 0.1 %    Absolute Neutrophil 4.2 1.6 - 8.3 10e9/L    Absolute Lymphocytes 1.9 0.8 - 5.3 10e9/L    Absolute Monocytes 0.6 0.0 - 1.3 10e9/L    Absolute Eosinophils 0.0 0.0 - 0.7 10e9/L    Absolute Basophils 0.0 0.0 - 0.2 10e9/L    Diff Method Automated Method    Comprehensive metabolic panel (BMP + Alb, Alk Phos, ALT, AST, Total. Bili, TP)     Status: Abnormal   Result Value Ref Range    Sodium 140 133 - 144 mmol/L     Potassium 4.1 3.4 - 5.3 mmol/L    Chloride 110 (H) 94 - 109 mmol/L    Carbon Dioxide 25 20 - 32 mmol/L    Anion Gap 5 3 - 14 mmol/L    Glucose 89 70 - 99 mg/dL    Urea Nitrogen 22 7 - 30 mg/dL    Creatinine 0.74 0.52 - 1.04 mg/dL    GFR Estimate >90 >60 mL/min/[1.73_m2]    GFR Estimate If Black >90 >60 mL/min/[1.73_m2]    Calcium 8.5 8.5 - 10.1 mg/dL    Bilirubin Total 0.3 0.2 - 1.3 mg/dL    Albumin 3.1 (L) 3.4 - 5.0 g/dL    Protein Total 7.2 6.8 - 8.8 g/dL    Alkaline Phosphatase 128 40 - 150 U/L    ALT 21 0 - 50 U/L    AST 19 0 - 45 U/L   Hemoglobin A1c     Status: Abnormal   Result Value Ref Range    Hemoglobin A1C 5.7 (H) 0 - 5.6 %   Lipid Profile (Chol, Trig, HDL, LDL calc)     Status: Abnormal   Result Value Ref Range    Cholesterol 107 <200 mg/dL    Triglycerides 83 <150 mg/dL    HDL Cholesterol 44 (L) >49 mg/dL    LDL Cholesterol Calculated 46 <100 mg/dL    Non HDL Cholesterol 63 <130 mg/dL   Iron and iron binding capacity     Status: Abnormal   Result Value Ref Range    Iron 21 (L) 35 - 180 ug/dL    Iron Binding Cap 447 (H) 240 - 430 ug/dL    Iron Saturation Index 5 (L) 15 - 46 %   Vitamin B12     Status: None   Result Value Ref Range    Vitamin B12 306 193 - 986 pg/mL   Estimated Average Glucose     Status: None   Result Value Ref Range    Estimated Average Glucose 117 mg/dL     IMPRESSION:   Reason for surgery/procedure: Anemia. Colon cancer  Diagnosis/reason for consult: Colon cancer    The proposed surgical procedure is considered HIGH risk.    REVISED CARDIAC RISK INDEX  The patient has the following serious cardiovascular risks for perioperative complications such as (MI, PE, VFib and 3  AV Block):  High risk surgery (>5% cardiac complication risk)  INTERPRETATION: 1 risks: Class II (low risk - 0.9% complication rate)    The patient has the following additional risks for perioperative complications:  No identified additional risks    The ASCVD Risk score (Az PRAJAPATI Jr., et al., 2013) failed to calculate  for the following reasons:    The valid total cholesterol range is 130 to 320 mg/dL        ICD-10-CM    1. Preop general physical exam Z01.818 CBC with platelets and differential     Comprehensive metabolic panel (BMP + Alb, Alk Phos, ALT, AST, Total. Bili, TP)     EKG 12-lead complete w/read - Clinics     Estimated Average Glucose     Estimated Average Glucose   2. History of diabetes mellitus Z86.39 Hemoglobin A1c     CANCELED: Glucose   3. Lipid screening Z13.220 Lipid Profile (Chol, Trig, HDL, LDL calc)   4. Hypochromic microcytic anemia D50.9 Iron and iron binding capacity   5. Vitamin B 12 deficiency E53.8 Vitamin B12     CANCELED: CBC with platelets   6. Malignant neoplasm of colon, unspecified part of colon (H) C18.9    7. Hypothyroidism, unspecified type E03.9        RECOMMENDATIONS:     --Consult hospital rounder / IM to assist post-op medical management    Anemia  Anemia and does not require treatment prior to surgery.  Monitor Hemoglobin postoperatively.      MEDICATIONS:   Continue taking your prescribed medications, as normal, the night before surgery.  Hold AM medications until after surgery.   Stop all supplements (herbal, non-prescription vitamins and minerals) one week prior to surgery.   Stop all NSAIDS (naproxen, aspirin, ibuprofen) 1 week prior to surgery or as instructed by your surgical team.   Do not start any new medications prior to your surgery.    APPROVAL GIVEN to proceed with proposed procedure, without further diagnostic evaluation       Signed Electronically by: Maddie Mccauley CNP    Copy of this evaluation report is provided to requesting physician.    [facility] Preop Guidelines    Revised Cardiac Risk Index

## 2020-02-20 ENCOUNTER — OFFICE VISIT (OUTPATIENT)
Dept: FAMILY MEDICINE | Facility: OTHER | Age: 50
End: 2020-02-20
Attending: NURSE PRACTITIONER
Payer: COMMERCIAL

## 2020-02-20 VITALS
WEIGHT: 164 LBS | TEMPERATURE: 97.1 F | RESPIRATION RATE: 20 BRPM | HEIGHT: 67 IN | BODY MASS INDEX: 25.74 KG/M2 | HEART RATE: 80 BPM | OXYGEN SATURATION: 98 % | SYSTOLIC BLOOD PRESSURE: 114 MMHG | DIASTOLIC BLOOD PRESSURE: 76 MMHG

## 2020-02-20 DIAGNOSIS — E03.9 HYPOTHYROIDISM, UNSPECIFIED TYPE: ICD-10-CM

## 2020-02-20 DIAGNOSIS — Z86.39 HISTORY OF DIABETES MELLITUS: ICD-10-CM

## 2020-02-20 DIAGNOSIS — E53.8 VITAMIN B 12 DEFICIENCY: ICD-10-CM

## 2020-02-20 DIAGNOSIS — Z01.818 PREOP GENERAL PHYSICAL EXAM: Primary | ICD-10-CM

## 2020-02-20 DIAGNOSIS — C18.9 MALIGNANT NEOPLASM OF COLON, UNSPECIFIED PART OF COLON (H): ICD-10-CM

## 2020-02-20 DIAGNOSIS — Z13.220 LIPID SCREENING: ICD-10-CM

## 2020-02-20 DIAGNOSIS — D50.9 HYPOCHROMIC MICROCYTIC ANEMIA: ICD-10-CM

## 2020-02-20 LAB
ALBUMIN SERPL-MCNC: 3.1 G/DL (ref 3.4–5)
ALP SERPL-CCNC: 128 U/L (ref 40–150)
ALT SERPL W P-5'-P-CCNC: 21 U/L (ref 0–50)
ANION GAP SERPL CALCULATED.3IONS-SCNC: 5 MMOL/L (ref 3–14)
AST SERPL W P-5'-P-CCNC: 19 U/L (ref 0–45)
BASOPHILS # BLD AUTO: 0 10E9/L (ref 0–0.2)
BASOPHILS NFR BLD AUTO: 0.1 %
BILIRUB SERPL-MCNC: 0.3 MG/DL (ref 0.2–1.3)
BUN SERPL-MCNC: 22 MG/DL (ref 7–30)
CALCIUM SERPL-MCNC: 8.5 MG/DL (ref 8.5–10.1)
CHLORIDE SERPL-SCNC: 110 MMOL/L (ref 94–109)
CHOLEST SERPL-MCNC: 107 MG/DL
CO2 SERPL-SCNC: 25 MMOL/L (ref 20–32)
COPATH REPORT: NORMAL
CREAT SERPL-MCNC: 0.74 MG/DL (ref 0.52–1.04)
DIFFERENTIAL METHOD BLD: ABNORMAL
EOSINOPHIL # BLD AUTO: 0 10E9/L (ref 0–0.7)
EOSINOPHIL NFR BLD AUTO: 0 %
ERYTHROCYTE [DISTWIDTH] IN BLOOD BY AUTOMATED COUNT: 15.8 % (ref 10–15)
GFR SERPL CREATININE-BSD FRML MDRD: >90 ML/MIN/{1.73_M2}
GLUCOSE SERPL-MCNC: 89 MG/DL (ref 70–99)
HCT VFR BLD AUTO: 36.1 % (ref 35–47)
HDLC SERPL-MCNC: 44 MG/DL
HGB BLD-MCNC: 11.1 G/DL (ref 11.7–15.7)
IRON SATN MFR SERPL: 5 % (ref 15–46)
IRON SERPL-MCNC: 21 UG/DL (ref 35–180)
LDLC SERPL CALC-MCNC: 46 MG/DL
LYMPHOCYTES # BLD AUTO: 1.9 10E9/L (ref 0.8–5.3)
LYMPHOCYTES NFR BLD AUTO: 27.8 %
MCH RBC QN AUTO: 24.9 PG (ref 26.5–33)
MCHC RBC AUTO-ENTMCNC: 30.7 G/DL (ref 31.5–36.5)
MCV RBC AUTO: 81 FL (ref 78–100)
MONOCYTES # BLD AUTO: 0.6 10E9/L (ref 0–1.3)
MONOCYTES NFR BLD AUTO: 9.3 %
NEUTROPHILS # BLD AUTO: 4.2 10E9/L (ref 1.6–8.3)
NEUTROPHILS NFR BLD AUTO: 62.8 %
NONHDLC SERPL-MCNC: 63 MG/DL
PLATELET # BLD AUTO: 341 10E9/L (ref 150–450)
POTASSIUM SERPL-SCNC: 4.1 MMOL/L (ref 3.4–5.3)
PROT SERPL-MCNC: 7.2 G/DL (ref 6.8–8.8)
RBC # BLD AUTO: 4.45 10E12/L (ref 3.8–5.2)
SODIUM SERPL-SCNC: 140 MMOL/L (ref 133–144)
TIBC SERPL-MCNC: 447 UG/DL (ref 240–430)
TRIGL SERPL-MCNC: 83 MG/DL
VIT B12 SERPL-MCNC: 306 PG/ML (ref 193–986)
WBC # BLD AUTO: 6.8 10E9/L (ref 4–11)

## 2020-02-20 PROCEDURE — 80061 LIPID PANEL: CPT | Mod: ZL | Performed by: NURSE PRACTITIONER

## 2020-02-20 PROCEDURE — 83036 HEMOGLOBIN GLYCOSYLATED A1C: CPT | Mod: ZL | Performed by: NURSE PRACTITIONER

## 2020-02-20 PROCEDURE — G0463 HOSPITAL OUTPT CLINIC VISIT: HCPCS

## 2020-02-20 PROCEDURE — 83540 ASSAY OF IRON: CPT | Mod: ZL | Performed by: NURSE PRACTITIONER

## 2020-02-20 PROCEDURE — 96372 THER/PROPH/DIAG INJ SC/IM: CPT

## 2020-02-20 PROCEDURE — 85025 COMPLETE CBC W/AUTO DIFF WBC: CPT | Mod: ZL | Performed by: NURSE PRACTITIONER

## 2020-02-20 PROCEDURE — 99214 OFFICE O/P EST MOD 30 MIN: CPT | Performed by: NURSE PRACTITIONER

## 2020-02-20 PROCEDURE — 80053 COMPREHEN METABOLIC PANEL: CPT | Mod: ZL | Performed by: NURSE PRACTITIONER

## 2020-02-20 PROCEDURE — G0463 HOSPITAL OUTPT CLINIC VISIT: HCPCS | Mod: 25

## 2020-02-20 PROCEDURE — 82607 VITAMIN B-12: CPT | Mod: ZL | Performed by: NURSE PRACTITIONER

## 2020-02-20 PROCEDURE — 93010 ELECTROCARDIOGRAM REPORT: CPT | Performed by: INTERNAL MEDICINE

## 2020-02-20 PROCEDURE — 93005 ELECTROCARDIOGRAM TRACING: CPT

## 2020-02-20 PROCEDURE — 40000788 ZZHCL STATISTIC ESTIMATED AVERAGE GLUCOSE: Mod: ZL | Performed by: NURSE PRACTITIONER

## 2020-02-20 PROCEDURE — 36415 COLL VENOUS BLD VENIPUNCTURE: CPT | Mod: ZL | Performed by: NURSE PRACTITIONER

## 2020-02-20 PROCEDURE — 83550 IRON BINDING TEST: CPT | Mod: ZL | Performed by: NURSE PRACTITIONER

## 2020-02-20 SDOH — HEALTH STABILITY: MENTAL HEALTH: HOW OFTEN DO YOU HAVE A DRINK CONTAINING ALCOHOL?: NEVER

## 2020-02-20 ASSESSMENT — MIFFLIN-ST. JEOR: SCORE: 1396.53

## 2020-02-20 NOTE — NURSING NOTE
"Chief Complaint   Patient presents with     Pre-Op Exam       Initial /76 (BP Location: Right arm, Patient Position: Sitting, Cuff Size: Adult Regular)   Pulse 138   Temp 97.1  F (36.2  C) (Tympanic)   Resp 20   Ht 1.702 m (5' 7\")   Wt 74.4 kg (164 lb)   SpO2 95%   BMI 25.69 kg/m   Estimated body mass index is 25.69 kg/m  as calculated from the following:    Height as of this encounter: 1.702 m (5' 7\").    Weight as of this encounter: 74.4 kg (164 lb).  Medication Reconciliation: complete  Emmy Hauser LPN    "

## 2020-02-20 NOTE — LETTER
February 20, 2020      Constantino Warren  [address]      [id number]      To Whom It May Concern:    Constantino Warren  was seen on 02/20/2020.  Due a recent diagnosis of colon cancer an need for surgery/treatment, she will she will not be able to travel for any non-medical reasons, other than in a car. Thus, her upcoming scheduled travels in March 2020 will not be occurring.       Sincerely,        Maddie Mccauley, CNP

## 2020-02-21 ENCOUNTER — ANESTHESIA EVENT (OUTPATIENT)
Dept: SURGERY | Facility: HOSPITAL | Age: 50
DRG: 330 | End: 2020-02-21
Payer: MEDICARE

## 2020-02-21 LAB
EST. AVERAGE GLUCOSE BLD GHB EST-MCNC: 117 MG/DL
HBA1C MFR BLD: 5.7 % (ref 0–5.6)

## 2020-02-21 RX ADMIN — CYANOCOBALAMIN 1000 MCG: 1000 INJECTION, SOLUTION INTRAMUSCULAR; SUBCUTANEOUS at 11:43

## 2020-02-21 NOTE — ANESTHESIA PREPROCEDURE EVALUATION
Anesthesia Pre-Procedure Evaluation    Patient: Constantino Warren   MRN: [id number] : 1970          Preoperative Diagnosis: Colon cancer (H) [C18.9]    Procedure(s):  LAPAROSCOPIC HAND ASSISTED RIGHT COLECTOM    Past Medical History:   Diagnosis Date     Chronic pain      Prediabetes      Thyroid disease      Past Surgical History:   Procedure Laterality Date     BACK SURGERY      ;      COLONOSCOPY N/A 2020    Procedure: COLONOSCOPY WITH BIOPSY AND TATOOING OF ASCENDING COLON,INCOMEPLETE EXAM;  Surgeon: Kang Cleveland MD;  Location: [facility]     ESOPHAGOSCOPY, GASTROSCOPY, DUODENOSCOPY (EGD), COMBINED N/A 2018    Procedure: COMBINED ESOPHAGOSCOPY, GASTROSCOPY, DUODENOSCOPY (EGD);  UPPER ENDOSCOPY WITH BIOPSIES;  Surgeon: Roger Vazquez MD;  Location: [facility]     ESOPHAGOSCOPY, GASTROSCOPY, DUODENOSCOPY (EGD), COMBINED N/A 2019    Procedure: UPPER ENDOSCOPY with biopsy;  Surgeon: Kang Cleveland MD;  Location: [facility]     HEAD & NECK SURGERY      c4-c5 fusion     HYSTERECTOMY, CRISTIANE  2004    uterine cancer; [facility]     LAPAROSCOPIC BYPASS GASTRIC  2010       Anesthesia Evaluation     . Pt has had prior anesthetic. Type: General and MAC    No history of anesthetic complications (fentanyl allergy)          ROS/MED HX    ENT/Pulmonary:     (+)MOE risk factors hypertension, , . .    Neurologic:     (+)migraines, other neuro hx concussions x2, insomnia    Cardiovascular:     (+) hypertension--CAD, angina (hx)--. : . . fainting (syncope) (chronic vasovagal symptoms with presyncope). :. valvular problems/murmurs patient reports she has a murmur, denies valvular pathology:. Previous cardiac testing date:results:date: results:ECG reviewed date:2019 results:NSR @76 date: results:          METS/Exercise Tolerance:  >4 METS   Hematologic: Comments: PRBCs administered on this admission    (+) Anemia (hgb 8.7 today), History of Transfusion (hx possible transfusion  reaction) no previous transfusion reaction -      Musculoskeletal:   (+) arthritis,  other musculoskeletal- chronic neck and back pain s/p C4-C5 fusion, L4-S1 fusion      GI/Hepatic:     (+) GERD Asymptomatic on medication, bowel prep, cholecystitis/cholelithiasis, Other GI/Hepatic hx gastric bypass and revision, hx GIB      Renal/Genitourinary:     (+) Nephrolithiasis , Other Renal/ Genitourinary, right hydronephrosis   Chronic renal disease: hx hydronephrosis and pyelonephritis.   Endo: Comment: History of type 2 diabetes, hyperglycemia resolved with weight loss    (+) type II DM Last HgA1c: 5.7 date: 2-20-20 Not using insulin - not using insulin pump not previously admitted for DM/DKA thyroid problem hypothyroidism, .      Psychiatric:     (+) psychiatric history other (comment), anxiety and depression (PTSD)      Infectious Disease: Comment: Hx MRSA, resolved        Malignancy:   (+) Malignancy History of Other and GI  Other CA uterine 2004 Remission status post Surgery         Other: Comment: Hx hysterectomy  Hx Lortab rx, no longer taking   (+) No chance of pregnancy C-spine cleared: N/A, H/O Chronic Pain,                        Physical Exam  Normal systems: cardiovascular, pulmonary and dental    Airway   Mallampati: I  TM distance: >3 FB  Neck ROM: full    Dental     Cardiovascular   Rhythm and rate: regular and normal      Pulmonary    breath sounds clear to auscultation            Lab Results   Component Value Date    WBC 6.8 02/20/2020    HGB 11.1 (L) 02/20/2020    HCT 36.1 02/20/2020     02/20/2020    CRP <2.9 07/20/2018    SED 35 (H) 04/18/2015     02/20/2020    POTASSIUM 4.1 02/20/2020    CHLORIDE 110 (H) 02/20/2020    CO2 25 02/20/2020    BUN 22 02/20/2020    CR 0.74 02/20/2020    GLC 89 02/20/2020    LOKESH 8.5 02/20/2020    MAG 2.0 09/26/2015    ALBUMIN 3.1 (L) 02/20/2020    PROTTOTAL 7.2 02/20/2020    ALT 21 02/20/2020    AST 19 02/20/2020    ALKPHOS 128 02/20/2020    BILITOTAL 0.3  "02/20/2020    LIPASE 176 02/01/2019    AMYLASE 31 07/18/2015    PTT 28 03/10/2019    INR 1.11 03/10/2019    TSH 3.23 12/12/2019    HCG Negative 09/26/2015       Preop Vitals  BP Readings from Last 3 Encounters:   02/20/20 114/76   02/19/20 110/74   02/13/20 (!) 142/101    Pulse Readings from Last 3 Encounters:   02/20/20 80   02/19/20 79   02/13/20 80      Resp Readings from Last 3 Encounters:   02/20/20 20   02/19/20 16   02/13/20 20    SpO2 Readings from Last 3 Encounters:   02/20/20 98%   02/19/20 96%   02/13/20 100%      Temp Readings from Last 1 Encounters:   02/20/20 97.1  F (36.2  C) (Tympanic)    Ht Readings from Last 1 Encounters:   02/20/20 1.702 m (5' 7\")      Wt Readings from Last 1 Encounters:   02/20/20 74.4 kg (164 lb)    Estimated body mass index is 25.69 kg/m  as calculated from the following:    Height as of 2/20/20: 1.702 m (5' 7\").    Weight as of 2/20/20: 74.4 kg (164 lb).       Anesthesia Plan      History & Physical Review  History and physical reviewed and following examination; no interval change.    ASA Status:  3 .    NPO Status:  > 8 hours    Plan for General, Peripheral Nerve Block, For Post-op pain in coordination with surgeon and ETT with Intravenous and Propofol induction. Maintenance will be Inhalation.    PONV prophylaxis:  Ondansetron (or other 5HT-3) and Promethazine or metoclopramide  Hp 2/20/20 but not completed yet. H and P appears complete am of 2-24-20       Postoperative Care  Postoperative pain management:  Peripheral nerve block (Single Shot) and IV analgesics.      Consents  Anesthetic plan, risks, benefits and alternatives discussed with:  Patient.  Use of blood products discussed: No .   .                 JUNIOR Quevedo CRNA  "

## 2020-02-24 ENCOUNTER — HOSPITAL ENCOUNTER (INPATIENT)
Facility: HOSPITAL | Age: 50
LOS: 5 days | Discharge: HOME | DRG: 330 | End: 2020-02-29
Attending: SURGERY | Admitting: SURGERY
Payer: MEDICARE

## 2020-02-24 ENCOUNTER — ANESTHESIA (OUTPATIENT)
Dept: SURGERY | Facility: HOSPITAL | Age: 50
DRG: 330 | End: 2020-02-24
Payer: MEDICARE

## 2020-02-24 DIAGNOSIS — E53.8 VITAMIN B 12 DEFICIENCY: ICD-10-CM

## 2020-02-24 DIAGNOSIS — C18.9 MALIGNANT NEOPLASM OF COLON, UNSPECIFIED PART OF COLON (H): ICD-10-CM

## 2020-02-24 DIAGNOSIS — C18.9 COLON CANCER (H): Primary | ICD-10-CM

## 2020-02-24 DIAGNOSIS — C18.9 COLON CANCER (H): ICD-10-CM

## 2020-02-24 PROBLEM — Z90.49 S/P PARTIAL RESECTION OF COLON: Status: ACTIVE | Noted: 2020-02-24

## 2020-02-24 PROCEDURE — 88307 TISSUE EXAM BY PATHOLOGIST: CPT | Mod: TC | Performed by: SURGERY

## 2020-02-24 PROCEDURE — 0DTF0ZZ RESECTION OF RIGHT LARGE INTESTINE, OPEN APPROACH: ICD-10-PCS | Performed by: SURGERY

## 2020-02-24 PROCEDURE — 27110028 ZZH OR GENERAL SUPPLY NON-STERILE: Performed by: SURGERY

## 2020-02-24 PROCEDURE — 3E0T3BZ INTRODUCTION OF ANESTHETIC AGENT INTO PERIPHERAL NERVES AND PLEXI, PERCUTANEOUS APPROACH: ICD-10-PCS | Performed by: NURSE ANESTHETIST, CERTIFIED REGISTERED

## 2020-02-24 PROCEDURE — 0DNB0ZZ RELEASE ILEUM, OPEN APPROACH: ICD-10-PCS | Performed by: SURGERY

## 2020-02-24 PROCEDURE — 71000014 ZZH RECOVERY PHASE 1 LEVEL 2 FIRST HR: Performed by: SURGERY

## 2020-02-24 PROCEDURE — 25000125 ZZHC RX 250: Performed by: SURGERY

## 2020-02-24 PROCEDURE — 64488 TAP BLOCK BI INJECTION: CPT | Mod: 59 | Performed by: NURSE ANESTHETIST, CERTIFIED REGISTERED

## 2020-02-24 PROCEDURE — 44140 PARTIAL REMOVAL OF COLON: CPT | Performed by: NURSE ANESTHETIST, CERTIFIED REGISTERED

## 2020-02-24 PROCEDURE — 25800030 ZZH RX IP 258 OP 636: Performed by: NURSE ANESTHETIST, CERTIFIED REGISTERED

## 2020-02-24 PROCEDURE — 25000125 ZZHC RX 250: Performed by: NURSE ANESTHETIST, CERTIFIED REGISTERED

## 2020-02-24 PROCEDURE — 25800030 ZZH RX IP 258 OP 636: Performed by: SURGERY

## 2020-02-24 PROCEDURE — 88309 TISSUE EXAM BY PATHOLOGIST: CPT | Mod: TC | Performed by: SURGERY

## 2020-02-24 PROCEDURE — 25000128 H RX IP 250 OP 636

## 2020-02-24 PROCEDURE — 27210794 ZZH OR GENERAL SUPPLY STERILE: Performed by: SURGERY

## 2020-02-24 PROCEDURE — 0DBU0ZZ EXCISION OF OMENTUM, OPEN APPROACH: ICD-10-PCS | Performed by: SURGERY

## 2020-02-24 PROCEDURE — 37000009 ZZH ANESTHESIA TECHNICAL FEE, EACH ADDTL 15 MIN: Performed by: SURGERY

## 2020-02-24 PROCEDURE — 36000058 ZZH SURGERY LEVEL 3 EA 15 ADDTL MIN: Performed by: SURGERY

## 2020-02-24 PROCEDURE — 25000128 H RX IP 250 OP 636: Performed by: NURSE ANESTHETIST, CERTIFIED REGISTERED

## 2020-02-24 PROCEDURE — 40000305 ZZH STATISTIC PRE PROC ASSESS I: Performed by: SURGERY

## 2020-02-24 PROCEDURE — 25000128 H RX IP 250 OP 636: Performed by: SURGERY

## 2020-02-24 PROCEDURE — 71000015 ZZH RECOVERY PHASE 1 LEVEL 2 EA ADDTL HR: Performed by: SURGERY

## 2020-02-24 PROCEDURE — 12000000 ZZH R&B MED SURG/OB

## 2020-02-24 PROCEDURE — 44140 PARTIAL REMOVAL OF COLON: CPT | Performed by: SURGERY

## 2020-02-24 PROCEDURE — 36000056 ZZH SURGERY LEVEL 3 1ST 30 MIN: Performed by: SURGERY

## 2020-02-24 PROCEDURE — 37000008 ZZH ANESTHESIA TECHNICAL FEE, 1ST 30 MIN: Performed by: SURGERY

## 2020-02-24 RX ORDER — DIAZEPAM 10 MG/2ML
5 INJECTION, SOLUTION INTRAMUSCULAR; INTRAVENOUS EVERY 6 HOURS PRN
Status: DISCONTINUED | OUTPATIENT
Start: 2020-02-24 | End: 2020-02-29 | Stop reason: HOSPADM

## 2020-02-24 RX ORDER — SODIUM CHLORIDE, SODIUM LACTATE, POTASSIUM CHLORIDE, CALCIUM CHLORIDE 600; 310; 30; 20 MG/100ML; MG/100ML; MG/100ML; MG/100ML
INJECTION, SOLUTION INTRAVENOUS CONTINUOUS
Status: DISCONTINUED | OUTPATIENT
Start: 2020-02-24 | End: 2020-02-24 | Stop reason: HOSPADM

## 2020-02-24 RX ORDER — LIDOCAINE 40 MG/G
CREAM TOPICAL
Status: DISCONTINUED | OUTPATIENT
Start: 2020-02-24 | End: 2020-02-29 | Stop reason: HOSPADM

## 2020-02-24 RX ORDER — SCOLOPAMINE TRANSDERMAL SYSTEM 1 MG/1
1 PATCH, EXTENDED RELEASE TRANSDERMAL ONCE
Status: COMPLETED | OUTPATIENT
Start: 2020-02-24 | End: 2020-02-27

## 2020-02-24 RX ORDER — METOCLOPRAMIDE 10 MG/1
10 TABLET ORAL EVERY 6 HOURS PRN
Status: DISCONTINUED | OUTPATIENT
Start: 2020-02-24 | End: 2020-02-29 | Stop reason: HOSPADM

## 2020-02-24 RX ORDER — DEXAMETHASONE SODIUM PHOSPHATE 10 MG/ML
INJECTION, SOLUTION INTRAMUSCULAR; INTRAVENOUS PRN
Status: DISCONTINUED | OUTPATIENT
Start: 2020-02-24 | End: 2020-02-24

## 2020-02-24 RX ORDER — NALOXONE HYDROCHLORIDE 0.4 MG/ML
.1-.4 INJECTION, SOLUTION INTRAMUSCULAR; INTRAVENOUS; SUBCUTANEOUS
Status: DISCONTINUED | OUTPATIENT
Start: 2020-02-24 | End: 2020-02-24 | Stop reason: HOSPADM

## 2020-02-24 RX ORDER — EPHEDRINE SULFATE 50 MG/ML
INJECTION, SOLUTION INTRAMUSCULAR; INTRAVENOUS; SUBCUTANEOUS PRN
Status: DISCONTINUED | OUTPATIENT
Start: 2020-02-24 | End: 2020-02-24

## 2020-02-24 RX ORDER — NALOXONE HYDROCHLORIDE 0.4 MG/ML
.1-.4 INJECTION, SOLUTION INTRAMUSCULAR; INTRAVENOUS; SUBCUTANEOUS
Status: DISCONTINUED | OUTPATIENT
Start: 2020-02-24 | End: 2020-02-29 | Stop reason: HOSPADM

## 2020-02-24 RX ORDER — SODIUM CHLORIDE, SODIUM LACTATE, POTASSIUM CHLORIDE, CALCIUM CHLORIDE 600; 310; 30; 20 MG/100ML; MG/100ML; MG/100ML; MG/100ML
INJECTION, SOLUTION INTRAVENOUS CONTINUOUS
Status: DISCONTINUED | OUTPATIENT
Start: 2020-02-24 | End: 2020-02-29 | Stop reason: HOSPADM

## 2020-02-24 RX ORDER — LEVOTHYROXINE SODIUM 100 UG/1
200 TABLET ORAL DAILY
Status: DISCONTINUED | OUTPATIENT
Start: 2020-02-25 | End: 2020-02-29 | Stop reason: HOSPADM

## 2020-02-24 RX ORDER — HYDROMORPHONE HYDROCHLORIDE 1 MG/ML
INJECTION, SOLUTION INTRAMUSCULAR; INTRAVENOUS; SUBCUTANEOUS
Status: COMPLETED
Start: 2020-02-24 | End: 2020-02-24

## 2020-02-24 RX ORDER — ROPIVACAINE HYDROCHLORIDE 5 MG/ML
INJECTION, SOLUTION EPIDURAL; INFILTRATION; PERINEURAL PRN
Status: DISCONTINUED | OUTPATIENT
Start: 2020-02-24 | End: 2020-02-24

## 2020-02-24 RX ORDER — HYDROMORPHONE HYDROCHLORIDE 1 MG/ML
.3-.5 INJECTION, SOLUTION INTRAMUSCULAR; INTRAVENOUS; SUBCUTANEOUS
Status: DISCONTINUED | OUTPATIENT
Start: 2020-02-24 | End: 2020-02-29 | Stop reason: HOSPADM

## 2020-02-24 RX ORDER — CEFOTETAN AND DEXTROSE 1 G/50ML
1 INJECTION, SOLUTION INTRAVENOUS
Status: DISCONTINUED | OUTPATIENT
Start: 2020-02-24 | End: 2020-02-24 | Stop reason: HOSPADM

## 2020-02-24 RX ORDER — NEOSTIGMINE METHYLSULFATE 1 MG/ML
VIAL (ML) INJECTION PRN
Status: DISCONTINUED | OUTPATIENT
Start: 2020-02-24 | End: 2020-02-24

## 2020-02-24 RX ORDER — FAMOTIDINE 10 MG
20 TABLET ORAL 2 TIMES DAILY
Status: DISCONTINUED | OUTPATIENT
Start: 2020-02-24 | End: 2020-02-29 | Stop reason: HOSPADM

## 2020-02-24 RX ORDER — LIDOCAINE 40 MG/G
CREAM TOPICAL
Status: DISCONTINUED | OUTPATIENT
Start: 2020-02-24 | End: 2020-02-24 | Stop reason: HOSPADM

## 2020-02-24 RX ORDER — ONDANSETRON 2 MG/ML
INJECTION INTRAMUSCULAR; INTRAVENOUS PRN
Status: DISCONTINUED | OUTPATIENT
Start: 2020-02-24 | End: 2020-02-24

## 2020-02-24 RX ORDER — PROCHLORPERAZINE MALEATE 10 MG
10 TABLET ORAL EVERY 6 HOURS PRN
Status: DISCONTINUED | OUTPATIENT
Start: 2020-02-24 | End: 2020-02-29 | Stop reason: HOSPADM

## 2020-02-24 RX ORDER — CEFOTETAN AND DEXTROSE 1 G/50ML
1 INJECTION, SOLUTION INTRAVENOUS SEE ADMIN INSTRUCTIONS
Status: DISCONTINUED | OUTPATIENT
Start: 2020-02-24 | End: 2020-02-24 | Stop reason: HOSPADM

## 2020-02-24 RX ORDER — PROPOFOL 10 MG/ML
INJECTION, EMULSION INTRAVENOUS PRN
Status: DISCONTINUED | OUTPATIENT
Start: 2020-02-24 | End: 2020-02-24

## 2020-02-24 RX ORDER — LIDOCAINE HYDROCHLORIDE 20 MG/ML
INJECTION, SOLUTION INFILTRATION; PERINEURAL PRN
Status: DISCONTINUED | OUTPATIENT
Start: 2020-02-24 | End: 2020-02-24

## 2020-02-24 RX ORDER — FENTANYL CITRATE 50 UG/ML
INJECTION, SOLUTION INTRAMUSCULAR; INTRAVENOUS PRN
Status: DISCONTINUED | OUTPATIENT
Start: 2020-02-24 | End: 2020-02-24

## 2020-02-24 RX ORDER — METOCLOPRAMIDE HYDROCHLORIDE 5 MG/ML
10 INJECTION INTRAMUSCULAR; INTRAVENOUS EVERY 6 HOURS PRN
Status: DISCONTINUED | OUTPATIENT
Start: 2020-02-24 | End: 2020-02-29 | Stop reason: HOSPADM

## 2020-02-24 RX ORDER — GLYCOPYRROLATE 0.2 MG/ML
INJECTION, SOLUTION INTRAMUSCULAR; INTRAVENOUS PRN
Status: DISCONTINUED | OUTPATIENT
Start: 2020-02-24 | End: 2020-02-24

## 2020-02-24 RX ORDER — HYDROMORPHONE HYDROCHLORIDE 1 MG/ML
.3-.5 INJECTION, SOLUTION INTRAMUSCULAR; INTRAVENOUS; SUBCUTANEOUS EVERY 10 MIN PRN
Status: DISCONTINUED | OUTPATIENT
Start: 2020-02-24 | End: 2020-02-24 | Stop reason: HOSPADM

## 2020-02-24 RX ADMIN — Medication 100 MG: at 09:45

## 2020-02-24 RX ADMIN — ROCURONIUM BROMIDE 10 MG: 10 INJECTION INTRAVENOUS at 10:24

## 2020-02-24 RX ADMIN — FENTANYL CITRATE 50 MCG: 50 INJECTION, SOLUTION INTRAMUSCULAR; INTRAVENOUS at 10:24

## 2020-02-24 RX ADMIN — HYDROMORPHONE HYDROCHLORIDE 0.3 MG: 1 INJECTION, SOLUTION INTRAMUSCULAR; INTRAVENOUS; SUBCUTANEOUS at 18:24

## 2020-02-24 RX ADMIN — Medication 5 MG: at 10:33

## 2020-02-24 RX ADMIN — SODIUM CHLORIDE, POTASSIUM CHLORIDE, SODIUM LACTATE AND CALCIUM CHLORIDE: 600; 310; 30; 20 INJECTION, SOLUTION INTRAVENOUS at 10:09

## 2020-02-24 RX ADMIN — MIDAZOLAM 1 MG: 1 INJECTION INTRAMUSCULAR; INTRAVENOUS at 09:10

## 2020-02-24 RX ADMIN — HYDROMORPHONE HYDROCHLORIDE 0.5 MG: 1 INJECTION, SOLUTION INTRAMUSCULAR; INTRAVENOUS; SUBCUTANEOUS at 21:42

## 2020-02-24 RX ADMIN — Medication 3 MG: at 11:32

## 2020-02-24 RX ADMIN — SODIUM CHLORIDE, POTASSIUM CHLORIDE, SODIUM LACTATE AND CALCIUM CHLORIDE: 600; 310; 30; 20 INJECTION, SOLUTION INTRAVENOUS at 08:47

## 2020-02-24 RX ADMIN — SCOPALAMINE 1 PATCH: 1 PATCH, EXTENDED RELEASE TRANSDERMAL at 08:53

## 2020-02-24 RX ADMIN — CEFOTETAN AND DEXTROSE 1 G: 1 INJECTION, SOLUTION INTRAVENOUS at 09:38

## 2020-02-24 RX ADMIN — MIDAZOLAM 1 MG: 1 INJECTION INTRAMUSCULAR; INTRAVENOUS at 09:38

## 2020-02-24 RX ADMIN — ROPIVACAINE HYDROCHLORIDE 17 ML: 5 INJECTION, SOLUTION EPIDURAL; INFILTRATION; PERINEURAL at 09:15

## 2020-02-24 RX ADMIN — Medication 5 MG: at 10:44

## 2020-02-24 RX ADMIN — Medication 10 MG: at 09:54

## 2020-02-24 RX ADMIN — DEXAMETHASONE SODIUM PHOSPHATE 5 MG: 10 INJECTION, SOLUTION INTRAMUSCULAR; INTRAVENOUS at 09:20

## 2020-02-24 RX ADMIN — ONDANSETRON 4 MG: 2 INJECTION INTRAMUSCULAR; INTRAVENOUS at 09:54

## 2020-02-24 RX ADMIN — HYDROMORPHONE HYDROCHLORIDE 0.5 MG: 1 INJECTION, SOLUTION INTRAMUSCULAR; INTRAVENOUS; SUBCUTANEOUS at 12:12

## 2020-02-24 RX ADMIN — Medication 5 MG: at 11:24

## 2020-02-24 RX ADMIN — HYDROMORPHONE HYDROCHLORIDE 0.5 MG: 1 INJECTION, SOLUTION INTRAMUSCULAR; INTRAVENOUS; SUBCUTANEOUS at 11:57

## 2020-02-24 RX ADMIN — FENTANYL CITRATE 50 MCG: 50 INJECTION, SOLUTION INTRAMUSCULAR; INTRAVENOUS at 09:10

## 2020-02-24 RX ADMIN — SODIUM CHLORIDE, POTASSIUM CHLORIDE, SODIUM LACTATE AND CALCIUM CHLORIDE: 600; 310; 30; 20 INJECTION, SOLUTION INTRAVENOUS at 08:44

## 2020-02-24 RX ADMIN — GLYCOPYRROLATE 0.4 MG: 0.2 INJECTION, SOLUTION INTRAMUSCULAR; INTRAVENOUS at 11:32

## 2020-02-24 RX ADMIN — PROPOFOL 170 MG: 10 INJECTION, EMULSION INTRAVENOUS at 09:45

## 2020-02-24 RX ADMIN — LIDOCAINE HYDROCHLORIDE 40 MG: 20 INJECTION, SOLUTION INFILTRATION; PERINEURAL at 09:45

## 2020-02-24 RX ADMIN — Medication 10 MG: at 10:03

## 2020-02-24 RX ADMIN — FENTANYL CITRATE 50 MCG: 50 INJECTION, SOLUTION INTRAMUSCULAR; INTRAVENOUS at 09:45

## 2020-02-24 RX ADMIN — SODIUM CHLORIDE, POTASSIUM CHLORIDE, SODIUM LACTATE AND CALCIUM CHLORIDE: 600; 310; 30; 20 INJECTION, SOLUTION INTRAVENOUS at 13:50

## 2020-02-24 RX ADMIN — PROCHLORPERAZINE EDISYLATE 10 MG: 5 INJECTION INTRAMUSCULAR; INTRAVENOUS at 12:54

## 2020-02-24 RX ADMIN — ROPIVACAINE HYDROCHLORIDE 17 ML: 5 INJECTION, SOLUTION EPIDURAL; INFILTRATION; PERINEURAL at 09:20

## 2020-02-24 RX ADMIN — DEXAMETHASONE SODIUM PHOSPHATE 6 MG: 10 INJECTION, SOLUTION INTRAMUSCULAR; INTRAVENOUS at 09:53

## 2020-02-24 RX ADMIN — HYDROMORPHONE HYDROCHLORIDE 1 MG: 1 INJECTION, SOLUTION INTRAMUSCULAR; INTRAVENOUS; SUBCUTANEOUS at 10:15

## 2020-02-24 RX ADMIN — FENTANYL CITRATE 50 MCG: 50 INJECTION, SOLUTION INTRAMUSCULAR; INTRAVENOUS at 10:09

## 2020-02-24 RX ADMIN — SODIUM CHLORIDE, POTASSIUM CHLORIDE, SODIUM LACTATE AND CALCIUM CHLORIDE: 600; 310; 30; 20 INJECTION, SOLUTION INTRAVENOUS at 18:18

## 2020-02-24 RX ADMIN — FAMOTIDINE 20 MG: 20 INJECTION, SOLUTION INTRAVENOUS at 20:24

## 2020-02-24 RX ADMIN — SODIUM CHLORIDE, POTASSIUM CHLORIDE, SODIUM LACTATE AND CALCIUM CHLORIDE 1000 ML: 600; 310; 30; 20 INJECTION, SOLUTION INTRAVENOUS at 15:09

## 2020-02-24 RX ADMIN — ROCURONIUM BROMIDE 5 MG: 10 INJECTION INTRAVENOUS at 09:45

## 2020-02-24 RX ADMIN — ROCURONIUM BROMIDE 20 MG: 10 INJECTION INTRAVENOUS at 09:56

## 2020-02-24 RX ADMIN — DEXAMETHASONE SODIUM PHOSPHATE 5 MG: 10 INJECTION, SOLUTION INTRAMUSCULAR; INTRAVENOUS at 09:15

## 2020-02-24 ASSESSMENT — ACTIVITIES OF DAILY LIVING (ADL)
ADLS_ACUITY_SCORE: 13
ADLS_ACUITY_SCORE: 13

## 2020-02-24 ASSESSMENT — MIFFLIN-ST. JEOR
SCORE: 1391.99
SCORE: 1388.63

## 2020-02-24 NOTE — PROVIDER NOTIFICATION
Dr. Cleveland aware of soft blood pressures and low urine output - will receive 1 L bolus of LR over 1 hour

## 2020-02-24 NOTE — OR NURSING
Patient resting. Received dilaudid IV x2 for pain with decrease in pain. Report given to Geronimo ROMERO.

## 2020-02-24 NOTE — ANESTHESIA POSTPROCEDURE EVALUATION
Patient: Constantino Warren    Procedure(s):  RIGHT KEVIN COLECTOMY, LYSIS OF ADHESIONS    Diagnosis:Colon cancer (H) [C18.9]  Diagnosis Additional Information: No value filed.    Anesthesia Type:  General, Peripheral Nerve Block, For Post-op pain in coordination with surgeon, ETT    Note:  Anesthesia Post Evaluation    Patient location during evaluation: PACU  Patient participation: Able to fully participate in evaluation  Level of consciousness: awake and alert  Pain management: adequate  Airway patency: patent  Cardiovascular status: acceptable  Respiratory status: acceptable  Hydration status: acceptable  PONV: none             Last vitals:  Vitals:    02/24/20 1334 02/24/20 1337 02/24/20 1345   BP: 97/56 93/53 96/50   Pulse:      Resp: 16  16   Temp: 97.8  F (36.6  C)  97.7  F (36.5  C)   SpO2: 96% 97% 97%         Electronically Signed By: JUNIOR Alcantara CRNA  February 24, 2020  1:51 PM

## 2020-02-24 NOTE — ANESTHESIA PROCEDURE NOTES
Peripheral nerve/Neuraxial procedure note : TAP  Pre-Procedure  Performed by  Melita Maradiaga APRN CRNA   Location: pre-op    Procedure Times:2/24/2020 9:10 AM and 2/24/2020 9:20 AM  Pre-Anesthestic Checklist: patient identified, IV checked, site marked, risks and benefits discussed, informed consent, monitors and equipment checked, pre-op evaluation, at physician/surgeon's request and post-op pain management    Timeout  Correct Patient: Yes   Correct Procedure: Yes   Correct Site: Yes   Correct Laterality: Yes   Correct Position: Yes   Site Marked: Yes   .   Procedure Documentation    .    Procedure: TAP, bilateral.   Patient Position:supine   Ultrasound used to identify targeted nerve, plexus, or vascular marker and placed a needle adjacent to it., Ultrasound was used to visualize the spread of the anesthetic in close proximity to the above stated nerve. A permanent image is entered into the patient's record.  Patient Prep/Sterile Barriers; chlorhexidine gluconate and isopropyl alcohol.  .  Needle: insulated, short bevel   Needle Gauge: 20.    Needle Length (Inches) 4   Insertion Method: Single Shot.        Assessment/Narrative  Paresthesias: No.  Injection made incrementally with aspirations every 5 mL..  The placement was negative for: blood aspirated, painful injection and site bleeding.  Bolus given via needle..   Secured via.   Complications: none. Comments:  Assisted by Kendell FRANK CRNA

## 2020-02-24 NOTE — OP NOTE
REPORT OF OPERATION  DATE OF PROCEDURE: 2/24/2020    PATIENT: Constantino Warren    SURGERY PREFORMED: Hand Assisted Laparoscopic Right Hemicolectomy converted to open right hemicolectomy, partial omentectomy and lysis of adhesions.    PREOPERATIVE DIAGNOSIS: Colon cancer    POSTOPERATIVE DIAGNOSIS: Same    SURGEON: Kang Cleveland MD    ASSISTANTS: None    ANESTHESIA: General Endotracheal Anesthesia    COMPLICATIONS: None apparent    TRANSFUSIONS: None    TISSUE TO PATHOLOGY: Right colon, margins, omentum to Pathology for pathological diagnoses    FINDINGS: Large a sending mass.  Mass was stuck to the right lateral sidewall.  Dense adhesions especially of the distal ileum to the posterior pelvic rim.    INDICATIONS: This is a 49 year old female with right-sided colon cancer.  By symptoms and by endoscopy this is nearly obstructing..  The patient will be taken to the operating room for a hand assisted laparoscopic right hemicolectomy possible open right hemicolectomy    DESCRIPTIONS OF PROCEDURE IN DETAIL: After consent was obtained the patient was taken to the operative suite and rosana in the supine position.  The patient was identified and the correct patient was confirmed.  General Endotracheal Anesthesia was administered by anesthesia.  The patient was sterilely prepped and draped in the usual fashion.  A time out was preformed verifying the correct patient and the correct procedure.  The entire operative team was in agreement.  All necessary equipment and supplies were in the room.     Through a periumbilical incision, the skin was sharply entered, dissection was taken down to isolation of the fascia.  The fascia was opened and entrance into the abdomen was accomplished.  The hand port was inserted into the abdomen and pneumoperitoneum was obtained.  Two 5 mm trocars were placed in the left and right upper quadrants respectively.  On initial investigation the mass was identified in the ascending colon.  The mass  was attached to the right lateral sidewall.  The mass was mobilized off the lateral sidewall using the LigaSure.  Did not appear to infiltrate into the underlying musculature.  The colon was then mobilized along the right white line of Toldt using the LigaSure.  The mobilization was continued from the cecum up the right colic gutter to the hepatic flexure.  The hepatic flexure was then taken down, along with the gastrocolic ligament to the transverse colon, allowing the colon to be brought down to the hand port.  On attempts to mobilize the ileum is found to be densely adhered to the posterior pelvic rim.  At this point it was decided to convert the patient to an open procedure.  All laparoscopic instrumentation was removed and the incision was enlarged to allow access into the pelvis.  Extensive adhesio lysis was then undertaken to free up the distal ileum allowing it to be brought up free into the incision.  Points of resection were then determined.  A window was made around the distal ileum and the ileum was divided using a JEY-75 stapler.  A window was made around the proximal transverse colon and the colon was divided using a JEY-100 stapler.  The intervening mesentery was then taken down using the LigaSure.  Hemostasis was assured.  The specimen was removed, passed off the field.  The specimen was then sent to Pathology for pathologic diagnosis.  Hemostasis was assured.  A side-to-side functional end-to-end anastomosis was created, bringing the distal ileum to the transverse colon using a JEY-75 stapler.  The colon was then closed with a TA-60 stapler.  The margins were sent to pathology for pathological diagnosis.  The staple line was imbricated with Lembert stitches of 3-0 silk sutures.  The mesenteric rent was closed with running 3-0 Vicryl suture.  Hemostasis was assured.  The area of the masses attachment to the right lateral sidewall was then marked using ligaclips.  A small area of omentum was then  removed using the LigaSure.  This was sent to pathology for pathological diagnosis.  The abdomen was irrigated and the irrigant was removed and again hemostasis was assured.  The bowel was placed back into the abdomen.  All instrumentation was removed from the abdomen.  All instrument, needle, and sponge counts were correct x2.  The midline incisions' fascia was closed with looped #1 PDS and all skin incisions were closed using stainless steel staples.  Sterile dressings were applied.  The patient was awakened in the operating room, extubated without difficulty, and taken to the recovery room in stable condition, tolerating the procedure well.

## 2020-02-24 NOTE — ANESTHESIA CARE TRANSFER NOTE
Patient: Constantino Warren    Procedure(s):  RIGHT KEVIN COLECTOMY, LYSIS OF ADHESIONS    Diagnosis: Colon cancer (H) [C18.9]  Diagnosis Additional Information: No value filed.    Anesthesia Type:   General, Peripheral Nerve Block, For Post-op pain in coordination with surgeon, ETT     Note:  Airway :Nasal Cannula  Patient transferred to:PACU  Handoff Report: Identifed the Patient, Identified the Reponsible Provider, Reviewed the pertinent medical history, Discussed the surgical course, Reviewed Intra-OP anesthesia mangement and issues during anesthesia, Set expectations for post-procedure period and Allowed opportunity for questions and acknowledgement of understanding      Vitals: (Last set prior to Anesthesia Care Transfer)    CRNA VITALS  2/24/2020 1117 - 2/24/2020 1152      2/24/2020             Resp Rate (set):  8                Electronically Signed By: JUNIOR Patel CRNA  February 24, 2020  11:52 AM

## 2020-02-24 NOTE — PLAN OF CARE
[facility] Inpatient Admission Note:    Patient admitted to 3116 /3116-1 at approximately 1330 via cart accompanied by other:PACU nurse from surgery . Report received from April RN in SBAR format at 1335 via face to face in room. Patient transferred to bed via slide board.. Patient is alert and oriented X 3, reports pain; rates at 3 on 0-10 scale.  Patient oriented to room, unit, hourly rounding, and plan of care. Explained admission packet and patient handbook with patient bill of rights brochure. Will continue to monitor and document as needed.     Inpatient Nursing criteria listed below was met:    Health care directives status obtained and documented: Yes    Care Everywhere authorization obtained Yes    MRSA swab completed for patient 65 years and older: N/A    Patient identifies a surrogate decision maker: No If yes, who:NA Contact Information:NA     If initial lactic acid >2.0, repeat lactic acid drawn within one hour of arrival to unit: NA. If no, state reason: NA    Vaccination assessment and education completed: Yes   Vaccinations received prior to admission: Pneumovax yes  Influenza(seasonal)  YES   Vaccination(s) ordered: not given today because up to date    Clergy visit ordered if patient requests: No    Skin issues/needs documented: Yes incisions from surgery    Isolation Patient: no Education given, correct sign in place and documentation row added to PCS:  NA    Fall Prevention Yes: Care plan updated, education given and documented, sticker and magnet in place: Yes    Care Plan initiated: Yes    Education Documented (including assessment): Yes    Patient has discharge needs : No If yes, please explain:NA

## 2020-02-25 LAB
ANION GAP SERPL CALCULATED.3IONS-SCNC: 4 MMOL/L (ref 3–14)
BUN SERPL-MCNC: 9 MG/DL (ref 7–30)
CALCIUM SERPL-MCNC: 8 MG/DL (ref 8.5–10.1)
CHLORIDE SERPL-SCNC: 107 MMOL/L (ref 94–109)
CO2 SERPL-SCNC: 29 MMOL/L (ref 20–32)
CREAT SERPL-MCNC: 0.69 MG/DL (ref 0.52–1.04)
ERYTHROCYTE [DISTWIDTH] IN BLOOD BY AUTOMATED COUNT: 15.5 % (ref 10–15)
GFR SERPL CREATININE-BSD FRML MDRD: >90 ML/MIN/{1.73_M2}
GLUCOSE SERPL-MCNC: 103 MG/DL (ref 70–99)
HCT VFR BLD AUTO: 28.4 % (ref 35–47)
HGB BLD-MCNC: 8.8 G/DL (ref 11.7–15.7)
MAGNESIUM SERPL-MCNC: 1.7 MG/DL (ref 1.6–2.3)
MCH RBC QN AUTO: 24.9 PG (ref 26.5–33)
MCHC RBC AUTO-ENTMCNC: 31 G/DL (ref 31.5–36.5)
MCV RBC AUTO: 81 FL (ref 78–100)
PLATELET # BLD AUTO: 252 10E9/L (ref 150–450)
POTASSIUM SERPL-SCNC: 4.2 MMOL/L (ref 3.4–5.3)
RBC # BLD AUTO: 3.53 10E12/L (ref 3.8–5.2)
SODIUM SERPL-SCNC: 140 MMOL/L (ref 133–144)
WBC # BLD AUTO: 9.7 10E9/L (ref 4–11)

## 2020-02-25 PROCEDURE — 25800030 ZZH RX IP 258 OP 636: Performed by: SURGERY

## 2020-02-25 PROCEDURE — 25000132 ZZH RX MED GY IP 250 OP 250 PS 637: Performed by: SURGERY

## 2020-02-25 PROCEDURE — 83735 ASSAY OF MAGNESIUM: CPT | Performed by: SURGERY

## 2020-02-25 PROCEDURE — 12000000 ZZH R&B MED SURG/OB

## 2020-02-25 PROCEDURE — 40000275 ZZH STATISTIC RCP TIME EA 10 MIN

## 2020-02-25 PROCEDURE — 85027 COMPLETE CBC AUTOMATED: CPT | Performed by: SURGERY

## 2020-02-25 PROCEDURE — 25000128 H RX IP 250 OP 636: Performed by: SURGERY

## 2020-02-25 PROCEDURE — 80048 BASIC METABOLIC PNL TOTAL CA: CPT | Performed by: SURGERY

## 2020-02-25 PROCEDURE — 36415 COLL VENOUS BLD VENIPUNCTURE: CPT | Performed by: SURGERY

## 2020-02-25 RX ADMIN — ENOXAPARIN SODIUM 40 MG: 40 INJECTION SUBCUTANEOUS at 11:21

## 2020-02-25 RX ADMIN — HYDROMORPHONE HYDROCHLORIDE 0.5 MG: 1 INJECTION, SOLUTION INTRAMUSCULAR; INTRAVENOUS; SUBCUTANEOUS at 17:03

## 2020-02-25 RX ADMIN — HYDROMORPHONE HYDROCHLORIDE 0.5 MG: 1 INJECTION, SOLUTION INTRAMUSCULAR; INTRAVENOUS; SUBCUTANEOUS at 01:54

## 2020-02-25 RX ADMIN — SODIUM CHLORIDE, POTASSIUM CHLORIDE, SODIUM LACTATE AND CALCIUM CHLORIDE: 600; 310; 30; 20 INJECTION, SOLUTION INTRAVENOUS at 01:54

## 2020-02-25 RX ADMIN — FAMOTIDINE 20 MG: 10 TABLET, FILM COATED ORAL at 21:48

## 2020-02-25 RX ADMIN — HYDROMORPHONE HYDROCHLORIDE 0.5 MG: 1 INJECTION, SOLUTION INTRAMUSCULAR; INTRAVENOUS; SUBCUTANEOUS at 14:35

## 2020-02-25 RX ADMIN — HYDROMORPHONE HYDROCHLORIDE 0.5 MG: 1 INJECTION, SOLUTION INTRAMUSCULAR; INTRAVENOUS; SUBCUTANEOUS at 08:47

## 2020-02-25 RX ADMIN — SODIUM CHLORIDE, POTASSIUM CHLORIDE, SODIUM LACTATE AND CALCIUM CHLORIDE: 600; 310; 30; 20 INJECTION, SOLUTION INTRAVENOUS at 10:07

## 2020-02-25 RX ADMIN — HYDROMORPHONE HYDROCHLORIDE 0.5 MG: 1 INJECTION, SOLUTION INTRAMUSCULAR; INTRAVENOUS; SUBCUTANEOUS at 05:45

## 2020-02-25 RX ADMIN — LEVOTHYROXINE SODIUM 200 MCG: 100 TABLET ORAL at 05:45

## 2020-02-25 RX ADMIN — HYDROMORPHONE HYDROCHLORIDE 0.5 MG: 1 INJECTION, SOLUTION INTRAMUSCULAR; INTRAVENOUS; SUBCUTANEOUS at 11:21

## 2020-02-25 RX ADMIN — HYDROMORPHONE HYDROCHLORIDE 0.5 MG: 1 INJECTION, SOLUTION INTRAMUSCULAR; INTRAVENOUS; SUBCUTANEOUS at 21:48

## 2020-02-25 RX ADMIN — FAMOTIDINE 20 MG: 10 TABLET, FILM COATED ORAL at 08:45

## 2020-02-25 RX ADMIN — HYDROMORPHONE HYDROCHLORIDE 0.5 MG: 1 INJECTION, SOLUTION INTRAMUSCULAR; INTRAVENOUS; SUBCUTANEOUS at 23:53

## 2020-02-25 ASSESSMENT — ACTIVITIES OF DAILY LIVING (ADL)
ADLS_ACUITY_SCORE: 13

## 2020-02-25 NOTE — PROGRESS NOTES
Medical record and Dino Score reviewed. Participated in interdisciplinary rounds.  No apparent needs noted at this time. Care Transitions will remain available if needs arise.

## 2020-02-25 NOTE — PLAN OF CARE
Face to face report given with opportunity to observe patient.    Report given to Stevo Gómez RN   2/24/2020  9:09 PM

## 2020-02-25 NOTE — PLAN OF CARE
Pt admitted to the floor at approx. 1330, admission completed.  She has been afebrile, blood pressures soft (gave a bolus of LR) pressures up to low 100's to one teens, she does have IV fluids running at 125 mL/hr.  She did eat small amount of clear liquids for dinner - tolerated well - no nausea.  Her bowel sounds are faint hypoactive RUQ, absent LUQ and hypoactive lower quadrants.  Dressing to abdomen is CDI.  Klein present poor urine output at first after bolus has had 300+ out.  Her O2 sats were in the upper 90's on 1 LPM via NC - weaned to RA with sats in the mid 90's, lung sounds are clear.  She did initially rate her pain/discomfort 3/10, but did increase to 7/10 - received IV pain medication x 1 with adequate pain control.  She has remained free of falls, call light within reach - does make her needs known, alarms on and frequent rounding done to assess needs.

## 2020-02-25 NOTE — PLAN OF CARE
Upon initial assessment patient pleasant and rating pain as a constant aching and cramping 5/10 and described as generalized across abdomen. Pain being managed with 0.5 mg Dilaudid IV push. Patient also reported pain in shoulders, especially on right side. It was explained to patient that this pain is likely from gas and positioning during procedure. Patient remains A&O x4. LS clear. Midline abdominal incision and two abd trochar sites remain covered, no shadowing present, and dressings are CDI. Patient still denies passing flatus and remains on clear liquid diet. Gallagher catheter remains in place until patient is able to ambulate 4x total around [place]. Patient ambulated 2x with gait belt and SBA during this shift. Patient reported tolerating walks well. Patient expressed some frustration with not being able to advance to regular diet and not having gallagher removed yet. Parameters were explained to patient and passed on to following RN.     Face to face given to: NICK Cordova RN

## 2020-02-25 NOTE — PLAN OF CARE
Resumed cares at 9 PM. Here for a lap assisted right cholectomy. Pt is A&O w/ VSS. Satting 97% on RA w/ complaints of muscle shoulder pain/right chest pain to bilateral shoulders. MD was notified w/ no new orders at this time. ABD pain rates at a 5-7/10 characteristic of a constant aching. 0.5 mg IV dilaudid administered x1. BSs not audible to RUQ & hypoactive to the LLQ. Aquacel dressings remains CDI. Remains on a clear liquid diet. Klein in place and emptied out 300 mLs yellow straw-colored urine. Bed in lowest position. Call light in reach. ID band in place. Makes needs known. Will continue to monitor.

## 2020-02-25 NOTE — PROGRESS NOTES
"INPATIENT ROUNDING NOTE  2/25/2020    Patient: Constantino Warren    Physician of Record: Kang Cleveland MD    Admitting diagnosis: Colon cancer (H) [C18.9]    Procedure(s):  RIGHT KEVIN COLECTOMY, LYSIS OF ADHESIONS     POD: 1 Day Post-Op    Current Diet: Clear liquids    CURRENT MEDICATIONS:  Continuous Medications:  Current Facility-Administered Medications   Medication Last Rate     lactated ringers 125 mL/hr at 02/25/20 1007       Scheduled Medications:  Current Facility-Administered Medications   Medication Dose Route Frequency     famotidine  20 mg Oral BID    Or     famotidine  20 mg Intravenous BID     levothyroxine  200 mcg Oral Daily     scopolamine  1 patch Transdermal Once     scopolamine   Transdermal Q8H     sodium chloride (PF)  3 mL Intracatheter Q8H       PRN Medications:  Current Facility-Administered Medications   Medication Dose Route Frequency     diazepam  5 mg Intravenous Q6H PRN     HYDROmorphone  0.3-0.5 mg Intravenous Q2H PRN     lidocaine 4%   Topical Q1H PRN     lidocaine (buffered or not buffered)  0.1-1 mL Other Q1H PRN     metoclopramide  10 mg Oral Q6H PRN    Or     metoclopramide  10 mg Intravenous Q6H PRN     naloxone  0.1-0.4 mg Intravenous Q2 Min PRN     prochlorperazine  10 mg Intravenous Q6H PRN    Or     prochlorperazine  10 mg Oral Q6H PRN     sodium chloride (PF)  3 mL Intracatheter q1 min prn       SUBJECTIVE:   Nausea: No. Vomiting: No. Fever: No. Chills: No. Excessive burping: No. Flatus: No. BM: No. Pain is 3/10. Pain control: good. Tolerating current diet: Yes.      PHYSICAL EXAM:   Vital signs: /65   Pulse 74   Temp 98.3  F (36.8  C) (Tympanic)   Resp 18   Ht 1.702 m (5' 7\")   Wt 73.6 kg (162 lb 4.1 oz)   SpO2 97%   BMI 25.41 kg/m     Weight: [unfilled]   BMI: Body mass index is 25.41 kg/m .   General: Normal, healthy, cooperative, in no acute distress   HEENT: PERRLA and EOMI   Neck: supple   Lungs: clear to auscultation   CV: Regular rate and rhythm without " murmer   Abdominal: Abdomen soft, non-tender. BS normal. No masses, organomegaly   Wound: Closed wound. Clean, dry and intact. Healing well.   Extremities: No cyanosis, clubbing or edema noted bilaterally in Upper and Lower Extremities   Neurological: without deficit    INPUT/OUTPUT:      Intake/Output Summary (Last 24 hours) at 2/25/2020 1038  Last data filed at 2/25/2020 0555  Gross per 24 hour   Intake 4475 ml   Output 2800 ml   Net 1675 ml       I/O last 3 completed shifts:  In: 5475 [P.O.:100; I.V.:5375]  Out: 2800 [Urine:2700; Blood:100]    LABS:    Last CBC Rrsults:   Recent Labs   Lab Test 02/25/20  0532 02/20/20  1133 12/12/19  1554   WBC 9.7 6.8 7.4   RBC 3.53* 4.45 4.64   HGB 8.8* 11.1* 10.6*   HCT 28.4* 36.1 35.0   MCV 81 81 75*   MCH 24.9* 24.9* 22.8*   MCHC 31.0* 30.7* 30.3*   RDW 15.5* 15.8* 26.5*    341 324       Last Comprehensive Metabolic panel:  Recent Labs   Lab Test 02/25/20  0532 02/20/20  1133 11/21/19  0526 11/20/19  1349 03/17/19  2351    140 141 142 140   POTASSIUM 4.2 4.1 4.3 4.3 4.2   CHLORIDE 107 110* 113* 113* 111*   CO2 29 25 23 24 23   ANIONGAP 4 5 5 5 6   * 89 89 84 94   BUN 9 22 18 18 15   CR 0.69 0.74 0.65 0.66 0.64   GFRESTIMATED >90 >90 >90 >90 >90   LOKESH 8.0* 8.5 8.1* 8.2* 7.8*   BILITOTAL  --  0.3  --  0.4 0.2   ALKPHOS  --  128  --  125 87   ALT  --  21  --  17 26   AST  --  19  --  20 27       Recent Labs   Lab Test 02/25/20  0532 02/20/20  1133 11/20/19  1349 03/17/19  2351  09/26/15  1400 07/18/15  2235   MAG 1.7  --   --   --   --  2.0 1.6   ALBUMIN  --  3.1* 2.8* 2.9*   < > 3.6 3.1*    < > = values in this interval not displayed.       ASSESSMENT:    1 Day Post-Op from Procedure(s):  RIGHT KEVIN COLECTOMY, LYSIS OF ADHESIONS.     Overall doing well    PLAN: We will continue clear liquids until flatus is passed.  Then will advance diet.  We will start Lovenox.  Will encourage ambulation and pulmonary toileting.

## 2020-02-25 NOTE — PROVIDER NOTIFICATION
Dr. Cleveland notified via phone call of pt complaining of shoulder/chest pain that she describes as soreness/tightness.    Per MD no new orders at this time.

## 2020-02-25 NOTE — PLAN OF CARE
No notable changes in pt status overnight. Slept well w/ IV dilaudid administered x2. Aquacel remains CDI. Remains on RA w/ no complaints of SOB. Shoulder/chest pain continues to persist, but pt states it has improved. LLQ remains hypoactive and not audible in RUQ. Gave pt crackers this AM w/ no reports of N&V. Dayshift nurse to advance diet. Klein remains in place w/ total output of 1300 mLs yellow, straw-colored urine.     Face to face report given with opportunity to observe patient.    Report given to Sue & Charlene RNs    Stevo Tucker   2/25/2020  7:00 AM

## 2020-02-26 LAB
ALBUMIN SERPL-MCNC: 2.1 G/DL (ref 3.4–5)
ALP SERPL-CCNC: 106 U/L (ref 40–150)
ALT SERPL W P-5'-P-CCNC: 30 U/L (ref 0–50)
ANION GAP SERPL CALCULATED.3IONS-SCNC: 5 MMOL/L (ref 3–14)
AST SERPL W P-5'-P-CCNC: 27 U/L (ref 0–45)
BILIRUB SERPL-MCNC: 0.3 MG/DL (ref 0.2–1.3)
BUN SERPL-MCNC: 7 MG/DL (ref 7–30)
CALCIUM SERPL-MCNC: 8.1 MG/DL (ref 8.5–10.1)
CHLORIDE SERPL-SCNC: 107 MMOL/L (ref 94–109)
CO2 SERPL-SCNC: 30 MMOL/L (ref 20–32)
COPATH REPORT: NORMAL
CREAT SERPL-MCNC: 0.74 MG/DL (ref 0.52–1.04)
ERYTHROCYTE [DISTWIDTH] IN BLOOD BY AUTOMATED COUNT: 15.3 % (ref 10–15)
GFR SERPL CREATININE-BSD FRML MDRD: >90 ML/MIN/{1.73_M2}
GLUCOSE SERPL-MCNC: 80 MG/DL (ref 70–99)
HCT VFR BLD AUTO: 26.7 % (ref 35–47)
HGB BLD-MCNC: 8.2 G/DL (ref 11.7–15.7)
MCH RBC QN AUTO: 25.2 PG (ref 26.5–33)
MCHC RBC AUTO-ENTMCNC: 30.7 G/DL (ref 31.5–36.5)
MCV RBC AUTO: 82 FL (ref 78–100)
PLATELET # BLD AUTO: 226 10E9/L (ref 150–450)
POTASSIUM SERPL-SCNC: 3.9 MMOL/L (ref 3.4–5.3)
PROT SERPL-MCNC: 5.5 G/DL (ref 6.8–8.8)
RBC # BLD AUTO: 3.25 10E12/L (ref 3.8–5.2)
SODIUM SERPL-SCNC: 142 MMOL/L (ref 133–144)
WBC # BLD AUTO: 6.2 10E9/L (ref 4–11)

## 2020-02-26 PROCEDURE — 25000132 ZZH RX MED GY IP 250 OP 250 PS 637: Performed by: SURGERY

## 2020-02-26 PROCEDURE — 40000275 ZZH STATISTIC RCP TIME EA 10 MIN

## 2020-02-26 PROCEDURE — 25000128 H RX IP 250 OP 636: Performed by: SURGERY

## 2020-02-26 PROCEDURE — 25800030 ZZH RX IP 258 OP 636: Performed by: SURGERY

## 2020-02-26 PROCEDURE — 36415 COLL VENOUS BLD VENIPUNCTURE: CPT | Performed by: SURGERY

## 2020-02-26 PROCEDURE — 85027 COMPLETE CBC AUTOMATED: CPT | Performed by: SURGERY

## 2020-02-26 PROCEDURE — 80053 COMPREHEN METABOLIC PANEL: CPT | Performed by: SURGERY

## 2020-02-26 PROCEDURE — 12000000 ZZH R&B MED SURG/OB

## 2020-02-26 RX ORDER — SERTRALINE HYDROCHLORIDE 100 MG/1
100 TABLET, FILM COATED ORAL DAILY
Status: DISCONTINUED | OUTPATIENT
Start: 2020-02-26 | End: 2020-02-29 | Stop reason: HOSPADM

## 2020-02-26 RX ORDER — TRAZODONE HYDROCHLORIDE 50 MG/1
50 TABLET, FILM COATED ORAL AT BEDTIME
Status: DISCONTINUED | OUTPATIENT
Start: 2020-02-26 | End: 2020-02-29 | Stop reason: HOSPADM

## 2020-02-26 RX ADMIN — TRAZODONE HYDROCHLORIDE 50 MG: 50 TABLET ORAL at 21:51

## 2020-02-26 RX ADMIN — LEVOTHYROXINE SODIUM 200 MCG: 100 TABLET ORAL at 05:53

## 2020-02-26 RX ADMIN — SODIUM CHLORIDE, POTASSIUM CHLORIDE, SODIUM LACTATE AND CALCIUM CHLORIDE: 600; 310; 30; 20 INJECTION, SOLUTION INTRAVENOUS at 08:44

## 2020-02-26 RX ADMIN — HYDROMORPHONE HYDROCHLORIDE 0.5 MG: 1 INJECTION, SOLUTION INTRAMUSCULAR; INTRAVENOUS; SUBCUTANEOUS at 20:09

## 2020-02-26 RX ADMIN — FAMOTIDINE 20 MG: 10 TABLET, FILM COATED ORAL at 20:09

## 2020-02-26 RX ADMIN — HYDROMORPHONE HYDROCHLORIDE 0.5 MG: 1 INJECTION, SOLUTION INTRAMUSCULAR; INTRAVENOUS; SUBCUTANEOUS at 11:39

## 2020-02-26 RX ADMIN — SODIUM CHLORIDE, POTASSIUM CHLORIDE, SODIUM LACTATE AND CALCIUM CHLORIDE: 600; 310; 30; 20 INJECTION, SOLUTION INTRAVENOUS at 01:32

## 2020-02-26 RX ADMIN — HYDROMORPHONE HYDROCHLORIDE 0.5 MG: 1 INJECTION, SOLUTION INTRAMUSCULAR; INTRAVENOUS; SUBCUTANEOUS at 05:53

## 2020-02-26 RX ADMIN — HYDROMORPHONE HYDROCHLORIDE 0.5 MG: 1 INJECTION, SOLUTION INTRAMUSCULAR; INTRAVENOUS; SUBCUTANEOUS at 03:06

## 2020-02-26 RX ADMIN — ENOXAPARIN SODIUM 40 MG: 40 INJECTION SUBCUTANEOUS at 10:35

## 2020-02-26 RX ADMIN — TIZANIDINE 4 MG: 4 TABLET ORAL at 21:51

## 2020-02-26 RX ADMIN — SERTRALINE HYDROCHLORIDE 100 MG: 100 TABLET, FILM COATED ORAL at 09:15

## 2020-02-26 RX ADMIN — FAMOTIDINE 20 MG: 10 TABLET, FILM COATED ORAL at 08:49

## 2020-02-26 ASSESSMENT — ACTIVITIES OF DAILY LIVING (ADL)
ADLS_ACUITY_SCORE: 13

## 2020-02-26 NOTE — PLAN OF CARE
Pt reports passing flatus. However, MD would like to keep pt on clears until tomorrow to ensure she truly is passing flatus.

## 2020-02-26 NOTE — PLAN OF CARE
"Reason for hospital stay:  R hemicolectomy   Living situation PTA: Comes from home   Most recent vitals: /79   Pulse 80   Temp 99.2  F (37.3  C) (Tympanic)   Resp 16   Ht 1.702 m (5' 7\")   Wt 73.6 kg (162 lb 4.1 oz)   SpO2 97%   BMI 25.41 kg/m    Pain Management:  Dilaudid given for incisional pain with decrease in pain each time   LOC:  A&O, makes needs known   Cardiac:  Regular, BP on soft side but stable   Respiratory:  LS clear, diminished in bases. Sats upper 90's on RA. Infrequent loose cough. No SOB. Using IS with encouragement.   GI:  2 Trochar and 1 midline incision, dressings CDI. BS active throughout. Denies nausea or vomiting. NOT passing flatus yet.   :  Removed gallagher approx 1700, voided well after removal.   Skin Issues:  See GI section. Trace edema to ankles/feet. Otherwise intact.  IVF:  LR @ 125  ABX:  None given  Nutrition: Clears, tolerating well   Ambulation: Ambulated x2 in the halls tonight, tolerated very well. Independently walking now.   Safety:  Free of falls, call light in reach, bed in lowest, frequent rounds     Face to face report given with opportunity to observe patient.    Report given to NICK Pierre   2/25/2020  10:51 PM        "

## 2020-02-26 NOTE — DOWNTIME EVENT NOTE
The EMR was down for 2 hours on 2/26/2020.    I was responsible for completing the paper charting during this time period.     The following information was re-entered into the system by Stevo Tucker: MAR    The following information will remain in the paper chart: N/A    Stevo Tucker  2/26/2020

## 2020-02-26 NOTE — PLAN OF CARE
Vitals: VSS.     Pain: Pain rated 5/10 to ABD. Administered 0.5 dilaudid once this shift.     Orientation: A&O.    Cardiac: Reg    Lungs: Clear    ABD: Bowels hypoactive. No gas.     Urinating: WNL    Skin: Midline incision to ABD with two trocar sites. Aquacel dressing c/d/I.     IV fluids: LR at 125.     Antibiotics: None.    Mobility: Independent in room and hallway, steady on feet.    Safety: Call light in reach. Rounding done.    Comment: Visitor in room during lunch. Up walking x3.     Face to face report given with opportunity to observe patient.    Report given to Stevo Bejarano RN   2/26/2020  3:31 PM

## 2020-02-26 NOTE — PLAN OF CARE
A&O w/ VSS. Satting 95% on RA w/ no complaints of SOB or chest pain. Rates pain at a 7/10 to abd characteristic of a constant aching/cramping. Pain managed w/ 0.5 mg IV dilaudid x2. BSs are hypoactive to RUQ and pt is not passing gas. Remains on a clear liquid diet. ABD dressings remain CDI. Some generalized weakness noted, but otherwise steady on feet. Ambulated to bathroom w/ total output of 600 mLs yellow, straw-colored urine. Slept well throughout the night. Bed in lowest position. Call light in reach. ID band in place. Makes needs known. Will continue to monitor.    Face to face report given with opportunity to observe patient.    Report given to Charlene Tucker   2/26/2020  7:15 AM

## 2020-02-26 NOTE — PROGRESS NOTES
"INPATIENT ROUNDING NOTE  2/26/2020    Patient: Constantino Warren    Physician of Record: Kang Cleveland MD    Admitting diagnosis: Colon cancer (H) [C18.9]    Procedure(s):  RIGHT KEVIN COLECTOMY, LYSIS OF ADHESIONS     POD: 2 Days Post-Op    Current Diet: Clear liquids    CURRENT MEDICATIONS:  Continuous Medications:  Current Facility-Administered Medications   Medication Last Rate     lactated ringers 125 mL/hr at 02/26/20 0844       Scheduled Medications:  Current Facility-Administered Medications   Medication Dose Route Frequency     enoxaparin ANTICOAGULANT  40 mg Subcutaneous Q24H     famotidine  20 mg Oral BID    Or     famotidine  20 mg Intravenous BID     levothyroxine  200 mcg Oral Daily     scopolamine  1 patch Transdermal Once     sodium chloride (PF)  3 mL Intracatheter Q8H       PRN Medications:  Current Facility-Administered Medications   Medication Dose Route Frequency     diazepam  5 mg Intravenous Q6H PRN     HYDROmorphone  0.3-0.5 mg Intravenous Q2H PRN     lidocaine 4%   Topical Q1H PRN     lidocaine (buffered or not buffered)  0.1-1 mL Other Q1H PRN     metoclopramide  10 mg Oral Q6H PRN    Or     metoclopramide  10 mg Intravenous Q6H PRN     naloxone  0.1-0.4 mg Intravenous Q2 Min PRN     prochlorperazine  10 mg Intravenous Q6H PRN    Or     prochlorperazine  10 mg Oral Q6H PRN     sodium chloride (PF)  3 mL Intracatheter q1 min prn       SUBJECTIVE:   Nausea: No. Vomiting: No. Fever: No. Chills: No. Excessive burping: No. Flatus: No. BM: No. Pain is 3/10. Pain control: good. Tolerating current diet: Yes.      PHYSICAL EXAM:   Vital signs: /92   Pulse 80   Temp 99.1  F (37.3  C) (Tympanic)   Resp 16   Ht 1.702 m (5' 7\")   Wt 73.6 kg (162 lb 4.1 oz)   SpO2 94%   BMI 25.41 kg/m     Weight: [unfilled]   BMI: Body mass index is 25.41 kg/m .   General: Normal, healthy, cooperative, in no acute distress   HEENT: PERRLA and EOMI   Neck: supple   Lungs: clear to auscultation   CV: Regular rate " and rhythm without murmer   Abdominal: Abdomen soft, non-tender. BS normal. No masses, organomegaly   Wound: Closed wound. Clean, dry and intact. Healing well.   Extremities: No cyanosis, clubbing or edema noted bilaterally in Upper and Lower Extremities   Neurological: without deficit    INPUT/OUTPUT:      Intake/Output Summary (Last 24 hours) at 2/26/2020 0902  Last data filed at 2/26/2020 0550  Gross per 24 hour   Intake 3337 ml   Output 2875 ml   Net 462 ml       I/O last 3 completed shifts:  In: 3337 [P.O.:500; I.V.:2837]  Out: 2875 [Urine:2875]    LABS:    Last CBC Rrsults:   Recent Labs   Lab Test 02/26/20  0529 02/25/20  0532 02/20/20  1133   WBC 6.2 9.7 6.8   RBC 3.25* 3.53* 4.45   HGB 8.2* 8.8* 11.1*   HCT 26.7* 28.4* 36.1   MCV 82 81 81   MCH 25.2* 24.9* 24.9*   MCHC 30.7* 31.0* 30.7*   RDW 15.3* 15.5* 15.8*    252 341       Last Comprehensive Metabolic panel:  Recent Labs   Lab Test 02/26/20 0529 02/25/20  0532 02/20/20  1133  11/20/19  1349    140 140   < > 142   POTASSIUM 3.9 4.2 4.1   < > 4.3   CHLORIDE 107 107 110*   < > 113*   CO2 30 29 25   < > 24   ANIONGAP 5 4 5   < > 5   GLC 80 103* 89   < > 84   BUN 7 9 22   < > 18   CR 0.74 0.69 0.74   < > 0.66   GFRESTIMATED >90 >90 >90   < > >90   LOKESH 8.1* 8.0* 8.5   < > 8.2*   BILITOTAL 0.3  --  0.3  --  0.4   ALKPHOS 106  --  128  --  125   ALT 30  --  21  --  17   AST 27  --  19  --  20    < > = values in this interval not displayed.       Recent Labs   Lab Test 02/26/20  0529 02/25/20  0532 02/20/20  1133 11/20/19  1349  09/26/15  1400 07/18/15  2235   MAG  --  1.7  --   --   --  2.0 1.6   ALBUMIN 2.1*  --  3.1* 2.8*   < > 3.6 3.1*    < > = values in this interval not displayed.       ASSESSMENT:    2 Days Post-Op from Procedure(s):  RIGHT KEVIN COLECTOMY, LYSIS OF ADHESIONS.     Doing well    PLAN: We will go ahead and restart outpatient medicines.  Awaiting bowel function return.  We will advance diet once flatus starts.

## 2020-02-27 PROBLEM — C18.9 MALIGNANT NEOPLASM OF COLON, UNSPECIFIED PART OF COLON (H): Status: ACTIVE | Noted: 2020-02-19

## 2020-02-27 PROCEDURE — 40000275 ZZH STATISTIC RCP TIME EA 10 MIN

## 2020-02-27 PROCEDURE — 25000128 H RX IP 250 OP 636: Performed by: SURGERY

## 2020-02-27 PROCEDURE — 12000000 ZZH R&B MED SURG/OB

## 2020-02-27 PROCEDURE — 25000132 ZZH RX MED GY IP 250 OP 250 PS 637: Performed by: SURGERY

## 2020-02-27 PROCEDURE — 25800030 ZZH RX IP 258 OP 636: Performed by: SURGERY

## 2020-02-27 RX ORDER — HYDROCODONE BITARTRATE AND ACETAMINOPHEN 5; 325 MG/1; MG/1
1 TABLET ORAL EVERY 6 HOURS PRN
Status: DISCONTINUED | OUTPATIENT
Start: 2020-02-27 | End: 2020-02-28

## 2020-02-27 RX ADMIN — HYDROMORPHONE HYDROCHLORIDE 0.5 MG: 1 INJECTION, SOLUTION INTRAMUSCULAR; INTRAVENOUS; SUBCUTANEOUS at 10:34

## 2020-02-27 RX ADMIN — SODIUM CHLORIDE, POTASSIUM CHLORIDE, SODIUM LACTATE AND CALCIUM CHLORIDE: 600; 310; 30; 20 INJECTION, SOLUTION INTRAVENOUS at 21:26

## 2020-02-27 RX ADMIN — ENOXAPARIN SODIUM 40 MG: 40 INJECTION SUBCUTANEOUS at 10:34

## 2020-02-27 RX ADMIN — HYDROCODONE BITARTRATE AND ACETAMINOPHEN 1 TABLET: 5; 325 TABLET ORAL at 17:10

## 2020-02-27 RX ADMIN — SERTRALINE HYDROCHLORIDE 100 MG: 100 TABLET, FILM COATED ORAL at 08:06

## 2020-02-27 RX ADMIN — HYDROMORPHONE HYDROCHLORIDE 0.5 MG: 1 INJECTION, SOLUTION INTRAMUSCULAR; INTRAVENOUS; SUBCUTANEOUS at 04:44

## 2020-02-27 RX ADMIN — TIZANIDINE 4 MG: 4 TABLET ORAL at 21:22

## 2020-02-27 RX ADMIN — FAMOTIDINE 20 MG: 10 TABLET, FILM COATED ORAL at 21:21

## 2020-02-27 RX ADMIN — PROCHLORPERAZINE EDISYLATE 10 MG: 5 INJECTION INTRAMUSCULAR; INTRAVENOUS at 20:11

## 2020-02-27 RX ADMIN — LEVOTHYROXINE SODIUM 200 MCG: 100 TABLET ORAL at 05:22

## 2020-02-27 RX ADMIN — TRAZODONE HYDROCHLORIDE 50 MG: 50 TABLET ORAL at 21:22

## 2020-02-27 RX ADMIN — HYDROMORPHONE HYDROCHLORIDE 0.5 MG: 1 INJECTION, SOLUTION INTRAMUSCULAR; INTRAVENOUS; SUBCUTANEOUS at 14:52

## 2020-02-27 RX ADMIN — HYDROMORPHONE HYDROCHLORIDE 0.5 MG: 1 INJECTION, SOLUTION INTRAMUSCULAR; INTRAVENOUS; SUBCUTANEOUS at 19:57

## 2020-02-27 RX ADMIN — FAMOTIDINE 20 MG: 10 TABLET, FILM COATED ORAL at 08:06

## 2020-02-27 ASSESSMENT — ACTIVITIES OF DAILY LIVING (ADL)
ADLS_ACUITY_SCORE: 13

## 2020-02-27 NOTE — PROVIDER NOTIFICATION
Dr. Cleveland notified via phone call of pt's BP of 85/55. MD believes it is from her trazodone.    No orders at this time

## 2020-02-27 NOTE — PLAN OF CARE
Vitals: VSS.      Pain: Pain to ABD. Administered 0.5 dilaudid once this shift.      Orientation: A&O.     Cardiac: Reg     Lungs: Clear     ABD: Bowels hypoactive. Passing gas this AM. BM reported last evenings. Advanced to Reg per MD orders. Pt tolerating well.      Urinating: WNL     Skin: Midline incision to ABD with two trocar sites. Aquacel dressing c/d/I.      IV fluids: LR at 75.      Antibiotics: None.     Mobility: Independent in room and hallway, steady on feet.     Safety: Call light in reach. Rounding done.     Comment: Visitor in room during lunch. Up walking x2. Will be NPO at midnight for port placement tomorrow.     Face to face report given with opportunity to observe patient.    Report given to Bozena Bejarano RN   2/27/2020  3:07 PM

## 2020-02-27 NOTE — PLAN OF CARE
Pt is A&O w/ elevated systolic BP in the 170s, but otherwise VSS. Satting 95% on RA w/ no complaints of SOB or chest pain. Rates pain at a 10/10 to abd characteristic of a constant aching. Pain managed with 0.5 mg IV dilaudid. BSs are audible & normoactive in all 4 quadrants and is passing gas. ABD dressing remains CDI. To remain on clears until tomorrow. Total output of 1000 mLs yellow straw-colored urine. LR running at 125 mLs/hr. Bedtime trazodone & tizanidine administered as ordered. Bed in lowest position. Call light in reach. ID band in place. Makes needs known. Will continue to monitor.

## 2020-02-27 NOTE — PLAN OF CARE
Remains A&O w/ low BPs during the night following administration of trazodone & tizanidine. BP was monitored throughout the night w/ AM BP of 117/73. Rates pain at a 7/10. IV dilaudid administered x1. Slept well tonight. Bed in lowest position. Call light in reach. ID band in place. Makes needs known. Will continue to monitor.    Face to face report given with opportunity to observe patient.    Report given to Charlene Tucker   2/27/2020  7:15 AM

## 2020-02-27 NOTE — PROGRESS NOTES
"INPATIENT ROUNDING NOTE  2/27/2020    Patient: Constantino Warren    Physician of Record: Kang Cleveland MD    Admitting diagnosis: Colon cancer (H) [C18.9]    Procedure(s):  RIGHT KEVIN COLECTOMY, LYSIS OF ADHESIONS     POD: 3 Days Post-Op    Current Diet: Clear liquids    CURRENT MEDICATIONS:  Continuous Medications:  Current Facility-Administered Medications   Medication Last Rate     lactated ringers 75 mL/hr at 02/27/20 0809       Scheduled Medications:  Current Facility-Administered Medications   Medication Dose Route Frequency     enoxaparin ANTICOAGULANT  40 mg Subcutaneous Q24H     famotidine  20 mg Oral BID    Or     famotidine  20 mg Intravenous BID     levothyroxine  200 mcg Oral Daily     sertraline  100 mg Oral Daily     sodium chloride (PF)  3 mL Intracatheter Q8H     tiZANidine  4 mg Oral At Bedtime     traZODone  50 mg Oral At Bedtime    Or     traZODone  75 mg Oral At Bedtime       PRN Medications:  Current Facility-Administered Medications   Medication Dose Route Frequency     diazepam  5 mg Intravenous Q6H PRN     HYDROmorphone  0.3-0.5 mg Intravenous Q2H PRN     lidocaine 4%   Topical Q1H PRN     lidocaine (buffered or not buffered)  0.1-1 mL Other Q1H PRN     metoclopramide  10 mg Oral Q6H PRN    Or     metoclopramide  10 mg Intravenous Q6H PRN     naloxone  0.1-0.4 mg Intravenous Q2 Min PRN     prochlorperazine  10 mg Intravenous Q6H PRN    Or     prochlorperazine  10 mg Oral Q6H PRN     sodium chloride (PF)  3 mL Intracatheter q1 min prn       SUBJECTIVE:   Nausea: No. Vomiting: No. Fever: No. Chills: No. Excessive burping: No. Flatus: Yes. BM: Yes. Pain is 4/10. Pain control: good. Tolerating current diet: Yes.     PHYSICAL EXAM:   Vital signs: /71   Pulse 69   Temp 97.9  F (36.6  C) (Tympanic)   Resp 18   Ht 1.702 m (5' 7\")   Wt 73.6 kg (162 lb 4.1 oz)   SpO2 96%   BMI 25.41 kg/m     Weight: [unfilled]   BMI: Body mass index is 25.41 kg/m .   General: Normal, healthy, cooperative, in " no acute distress, alert   HEENT: PERRLA and EOMI   Neck: supple   Lungs: clear to auscultation   CV: Regular rate and rhythm without murmer   Abdominal: Abdomen soft, non-tender. BS normal. No masses, organomegaly   Wound: Closed wound. Clean, dry and intact. Healing well.   Extremities: No cyanosis, clubbing or edema noted bilaterally in Upper and Lower Extremities   Neurological: without deficit    INPUT/OUTPUT:      Intake/Output Summary (Last 24 hours) at 2/27/2020 1337  Last data filed at 2/27/2020 1019  Gross per 24 hour   Intake 3089 ml   Output 1600 ml   Net 1489 ml       I/O last 3 completed shifts:  In: 2969 [P.O.:120; I.V.:2849]  Out: 1600 [Urine:1600]    LABS:    Last CBC Rrsults:   Recent Labs   Lab Test 02/26/20  0529 02/25/20  0532 02/20/20  1133   WBC 6.2 9.7 6.8   RBC 3.25* 3.53* 4.45   HGB 8.2* 8.8* 11.1*   HCT 26.7* 28.4* 36.1   MCV 82 81 81   MCH 25.2* 24.9* 24.9*   MCHC 30.7* 31.0* 30.7*   RDW 15.3* 15.5* 15.8*    252 341       Last Comprehensive Metabolic panel:  Recent Labs   Lab Test 02/26/20  0529 02/25/20  0532 02/20/20  1133  11/20/19  1349    140 140   < > 142   POTASSIUM 3.9 4.2 4.1   < > 4.3   CHLORIDE 107 107 110*   < > 113*   CO2 30 29 25   < > 24   ANIONGAP 5 4 5   < > 5   GLC 80 103* 89   < > 84   BUN 7 9 22   < > 18   CR 0.74 0.69 0.74   < > 0.66   GFRESTIMATED >90 >90 >90   < > >90   LOKESH 8.1* 8.0* 8.5   < > 8.2*   BILITOTAL 0.3  --  0.3  --  0.4   ALKPHOS 106  --  128  --  125   ALT 30  --  21  --  17   AST 27  --  19  --  20    < > = values in this interval not displayed.       Recent Labs   Lab Test 02/26/20  0529 02/25/20  0532 02/20/20  1133 11/20/19  1349  09/26/15  1400 07/18/15  2235   MAG  --  1.7  --   --   --  2.0 1.6   ALBUMIN 2.1*  --  3.1* 2.8*   < > 3.6 3.1*    < > = values in this interval not displayed.     Copath Report   Date Value Ref Range Status   02/24/2020   Final    Patient Name: FELICITY RODRÍGUEZ  MR#: [id number]  Specimen #:    Collected: 2/24/2020  Received: 2/25/2020  Reported: 2/26/2020 13:34  Ordering Phy(s): SHREYA LARKIN  Additional Phy(s): CHRISTOPHER PETERS  Copy To: Cancer Registry    For improved result formatting, select 'View Enhanced Report Format' under   Linked Documents section.    SPECIMEN(S):  A: Colon, right  B: Colon, right margins  C: Omentum resection    FINAL DIAGNOSIS:  A: Right colon, hemicolectomy  - Moderately differentiated adenocarcinoma  - One positive lymph node out of 32 lymph nodes  - Perineural invasion  - Tumor budding  - Acutely inflamed tumor and reactive lymph nodes with peritonitis    B: Bowel, margins  - Unremarkable small bowel    C: Omentum, resection  - Peritonitis, mild    Report Name: Colon and Rectum - Resection       Status: Submitted Checklist Inst: 1      Last Updated By: Rory Martines M.D., 02/26/2020 12:08:02  Part(s) Involved:  A: Colon, right    Synoptic Report:    SPECIMEN    Procedure:        - Right hemicolectomy    TUMOR    Tumor Site:        - Ileocecal valve    Histologic Type:        - Adenocarcinoma    Histologic Grade:        - G2: Moderately differentiated    Tumor Size: 5 cm    Tumor Deposits:        - Not identified    Tumor Extension:        - Tumor invades through the muscularis propria into pericolorectal   tissue    Macroscopic Tumor Perforation:        - Not identified    Lymphovascular Invasion:        - Not identified    Perineural Invasion:        - Present    Treatment Effect:        - No known presurgical therapy    MARGINS    Margins:        - All margins are uninvolved by invasive carcinoma, high-grade   dysplasia,        intramucosal adenocarcinoma, and adenoma      Margins Examined:          - Proximal          - Distal          - Radial or Mesenteric      Distance of Tumor from Radial Margin:          8 cm    LYMPH NODES    Number of Lymph Nodes Involved: 1    Number of Lymph Nodes Examined: 32    PATHOLOGIC STAGE CLASSIFICATION (PTNM, AJCC  8TH EDITION)    Primary Tumor (pT):        - pT3    Regional Lymph Nodes (pN):        - pN1a    COMMENTS   There is tumor budding    CAP eCC February 2019 Annual Release    Report Name: Colon and Rectum Biomarker       Status: Submitted Checklist Inst: 2      Last Updated By: Rory Martines M.D., 02/26/2020 12:11:39  Part(s) Involved:  A: Colon, right    Synoptic Report:    RESULTS    Mismatch Repair      Immunohistochemistry (IHC) Testing for Mismatch Repair (MMR) Proteins:   Background nonneoplastic tissue /  internal control with intact nuclear          expression      IHC Interpretation:          - No loss of nuclear expression of MMR proteins: low probability   of          MSI-H      MLH1 Result:          - Intact nuclear expression      MSH2 Result:          - Intact nuclear expression      MSH6 Result:          - Intact nuclear expression      PMS2 Result:          - Intact nuclear expression    COMMENTS   Performed on biopsy .    CAP eCC February 2019 Annual Release    I have personally reviewed all specimens and/or slides, including the   listed special stains, and used them  with my medical judgement to determine or confirm the final diagnosis.    Electronically signed out by:    Rory Martines M.D.    CLINICAL HISTORY:  Colon cancer (H) [C18.9]; right hemicolectomy, lysis of adhesions.    GROSS:  A: The specimen is a piece of bowel and attached mesentery which is 42 cm   long, 9 cm in greatest diameter and  with the attached mesentery approximately 17 cm wide.  The mesentery is   removed.  There is a circumferential  mass 5 cm long 15 cm from the nearest end of bowel and approximately 32 cm   from the furthest end which is the  proximal end.  The tumor appears to be at the ileocecal valve.  There is   no appendix.  The  radial/circumferential margin is approximately 8 cm.  Section through the   radial margin is placed in cassette  1.  The radial margin is mesentery.  Grossly the tumor appears to  extend   completely through the muscular wall  into the adhesed mesentery.  Sections through the tumor are taken   including an attempt to identify the deepest  extension of the tumor.  Grossly the margins are free and sections are   taken through each margin.  Both ends  are stapled.  The mucosa is otherwise unremarkable.  The mesentery is   removed and fixed overnight in Dissect  Aid.  It is then examined for lymph nodes.  Numerous possible lymph nodes   are found.  Summary of sections  Cassette 1: Radial margin  Cassettes 2- 4: Tumor  Cassette 5: Margins  Cassettes 6, 7, and 9: Multiple individual lymph nodes in each cassette  Cassettes 8, 10-19: One lymph node in each cassette  Cassettes 20- 21: One lymph node in two cassettes  Cassettes 22- 25: One lymph node in each cassette  (Some in 25 blocks).    B: There is a piece of bowel which is shaped like a donut consistent with   a resection ring.  It is 4 x 3.5 x 1  cm. (3 in 2 blocks).    C: There is a piece of yellow tissue consistent with omentum which is 5 x   8 x 2 cm.  There is a 2.4 cm  hemorrhagic nodule which has a deep maroon color.  A section through it is   taken.  The mesentery has a  stippled erythematous appearance but is soft.  Representative sections are   taken. (3 in 2 blocks). (Dictated  by: Rory Martines MD 2/25/2020 01:49 PM)    MICROSCOPIC:  A: A section through the radial margin is unremarkable mesentery.    Full-thickness sections through the tumor  show extension completely through the muscularis propria into desmoplastic   stroma.  There are areas of  calcification in the tumor.  There are areas of necrosis.  The tumor is   ulcerated.  Tumor is also markedly  inflamed with neutrophils.  The tumor is composed of moderately   differentiated adenocarcinoma.  There is  perineural invasion.  Tumor budding is present in the same area as   perineural invasion.  Sections of the  margin show unremarkable small and large bowel.  There are 32 lymph  nodes.    One has metastatic tumor which  extends beyond the capsule.  Most of the lymph nodes have significant   reactive changes.    B: There is unremarkable small bowel.    C: There is mesentery with mild acute peritonitis.    CPT Codes  A: 97467-JU0  B: 97401-UO4  C: 16819-MP8    COLLECTION SITE:  Client: [facility]  Location: [facility] (B)    The technical component of this testing was completed at the [facility], with the  professional component performed at the [facility], [address] [place] [address]  ([phone])           ASSESSMENT:    3 Days Post-Op from Procedure(s):  RIGHT KEVIN COLECTOMY, LYSIS OF ADHESIONS.     Stage T3, N1a, Mx = stage IIIB    PLAN: We will advance diet today.  I had a discussion with her about the need for a port for chemotherapy in the future.  She is amenable to that and will plan on doing that tomorrow.  If she is doing well tomorrow we will plan on sending her home after her port.

## 2020-02-28 ENCOUNTER — ANESTHESIA (OUTPATIENT)
Dept: SURGERY | Facility: HOSPITAL | Age: 50
DRG: 330 | End: 2020-02-28
Payer: MEDICARE

## 2020-02-28 ENCOUNTER — APPOINTMENT (OUTPATIENT)
Dept: GENERAL RADIOLOGY | Facility: HOSPITAL | Age: 50
DRG: 330 | End: 2020-02-28
Attending: SURGERY
Payer: MEDICARE

## 2020-02-28 ENCOUNTER — ANESTHESIA EVENT (OUTPATIENT)
Dept: SURGERY | Facility: HOSPITAL | Age: 50
DRG: 330 | End: 2020-02-28
Payer: MEDICARE

## 2020-02-28 PROCEDURE — 25000132 ZZH RX MED GY IP 250 OP 250 PS 637: Performed by: SURGERY

## 2020-02-28 PROCEDURE — 40000985 XR CHEST PORT 1 VW: Mod: TC

## 2020-02-28 PROCEDURE — 40000275 ZZH STATISTIC RCP TIME EA 10 MIN

## 2020-02-28 PROCEDURE — 36561 INSERT TUNNELED CV CATH: CPT | Performed by: NURSE ANESTHETIST, CERTIFIED REGISTERED

## 2020-02-28 PROCEDURE — 25000125 ZZHC RX 250: Performed by: SURGERY

## 2020-02-28 PROCEDURE — 25000125 ZZHC RX 250: Performed by: NURSE ANESTHETIST, CERTIFIED REGISTERED

## 2020-02-28 PROCEDURE — 25800030 ZZH RX IP 258 OP 636: Performed by: SURGERY

## 2020-02-28 PROCEDURE — 36000058 ZZH SURGERY LEVEL 3 EA 15 ADDTL MIN: Performed by: SURGERY

## 2020-02-28 PROCEDURE — 36000060 ZZH SURGERY LEVEL 3 W FLUORO 1ST 30 MIN: Performed by: SURGERY

## 2020-02-28 PROCEDURE — 25000128 H RX IP 250 OP 636: Performed by: SURGERY

## 2020-02-28 PROCEDURE — 25000128 H RX IP 250 OP 636: Performed by: NURSE ANESTHETIST, CERTIFIED REGISTERED

## 2020-02-28 PROCEDURE — 12000000 ZZH R&B MED SURG/OB

## 2020-02-28 PROCEDURE — 37000009 ZZH ANESTHESIA TECHNICAL FEE, EACH ADDTL 15 MIN: Performed by: SURGERY

## 2020-02-28 PROCEDURE — 27210794 ZZH OR GENERAL SUPPLY STERILE: Performed by: SURGERY

## 2020-02-28 PROCEDURE — 02HV33Z INSERTION OF INFUSION DEVICE INTO SUPERIOR VENA CAVA, PERCUTANEOUS APPROACH: ICD-10-PCS | Performed by: SURGERY

## 2020-02-28 PROCEDURE — B518YZA FLUOROSCOPY OF SUPERIOR VENA CAVA USING OTHER CONTRAST, GUIDANCE: ICD-10-PCS | Performed by: SURGERY

## 2020-02-28 PROCEDURE — 71000014 ZZH RECOVERY PHASE 1 LEVEL 2 FIRST HR: Performed by: SURGERY

## 2020-02-28 PROCEDURE — 36561 INSERT TUNNELED CV CATH: CPT | Mod: 79 | Performed by: SURGERY

## 2020-02-28 PROCEDURE — 37000008 ZZH ANESTHESIA TECHNICAL FEE, 1ST 30 MIN: Performed by: SURGERY

## 2020-02-28 PROCEDURE — 40000278 XR SURGERY CARM FLUORO LESS THAN 5 MIN: Mod: TC

## 2020-02-28 PROCEDURE — C1788 PORT, INDWELLING, IMP: HCPCS | Performed by: SURGERY

## 2020-02-28 PROCEDURE — 0JH60XZ INSERTION OF TUNNELED VASCULAR ACCESS DEVICE INTO CHEST SUBCUTANEOUS TISSUE AND FASCIA, OPEN APPROACH: ICD-10-PCS | Performed by: SURGERY

## 2020-02-28 PROCEDURE — 40000305 ZZH STATISTIC PRE PROC ASSESS I: Performed by: SURGERY

## 2020-02-28 DEVICE — PORT-IMPLANTABLE POWER PORT: Type: IMPLANTABLE DEVICE | Site: CHEST  WALL | Status: FUNCTIONAL

## 2020-02-28 RX ORDER — HEPARIN SODIUM (PORCINE) LOCK FLUSH IV SOLN 100 UNIT/ML 100 UNIT/ML
SOLUTION INTRAVENOUS PRN
Status: DISCONTINUED | OUTPATIENT
Start: 2020-02-28 | End: 2020-02-28 | Stop reason: HOSPADM

## 2020-02-28 RX ORDER — LIDOCAINE HYDROCHLORIDE 20 MG/ML
INJECTION, SOLUTION INFILTRATION; PERINEURAL PRN
Status: DISCONTINUED | OUTPATIENT
Start: 2020-02-28 | End: 2020-02-28

## 2020-02-28 RX ORDER — OXYCODONE HYDROCHLORIDE 5 MG/1
5-10 TABLET ORAL
Status: DISCONTINUED | OUTPATIENT
Start: 2020-02-28 | End: 2020-02-29 | Stop reason: HOSPADM

## 2020-02-28 RX ORDER — ONDANSETRON 2 MG/ML
INJECTION INTRAMUSCULAR; INTRAVENOUS PRN
Status: DISCONTINUED | OUTPATIENT
Start: 2020-02-28 | End: 2020-02-28

## 2020-02-28 RX ORDER — PHENYLEPHRINE HCL IN 0.9% NACL 1 MG/10 ML
SYRINGE (ML) INTRAVENOUS PRN
Status: DISCONTINUED | OUTPATIENT
Start: 2020-02-28 | End: 2020-02-28

## 2020-02-28 RX ORDER — PROPOFOL 10 MG/ML
INJECTION, EMULSION INTRAVENOUS PRN
Status: DISCONTINUED | OUTPATIENT
Start: 2020-02-28 | End: 2020-02-28

## 2020-02-28 RX ORDER — BUPIVACAINE HYDROCHLORIDE AND EPINEPHRINE 2.5; 5 MG/ML; UG/ML
INJECTION, SOLUTION INFILTRATION; PERINEURAL PRN
Status: DISCONTINUED | OUTPATIENT
Start: 2020-02-28 | End: 2020-02-28 | Stop reason: HOSPADM

## 2020-02-28 RX ORDER — ACETAMINOPHEN 325 MG/1
650 TABLET ORAL
Status: DISCONTINUED | OUTPATIENT
Start: 2020-02-28 | End: 2020-02-29 | Stop reason: CLARIF

## 2020-02-28 RX ORDER — CEFAZOLIN SODIUM 1 G/3ML
1 INJECTION, POWDER, FOR SOLUTION INTRAMUSCULAR; INTRAVENOUS SEE ADMIN INSTRUCTIONS
Status: DISCONTINUED | OUTPATIENT
Start: 2020-02-28 | End: 2020-02-29 | Stop reason: CLARIF

## 2020-02-28 RX ORDER — DEXAMETHASONE SODIUM PHOSPHATE 10 MG/ML
INJECTION, SOLUTION INTRAMUSCULAR; INTRAVENOUS PRN
Status: DISCONTINUED | OUTPATIENT
Start: 2020-02-28 | End: 2020-02-28

## 2020-02-28 RX ORDER — FENTANYL CITRATE 50 UG/ML
INJECTION, SOLUTION INTRAMUSCULAR; INTRAVENOUS PRN
Status: DISCONTINUED | OUTPATIENT
Start: 2020-02-28 | End: 2020-02-28

## 2020-02-28 RX ORDER — CEFAZOLIN SODIUM 2 G/100ML
2 INJECTION, SOLUTION INTRAVENOUS
Status: COMPLETED | OUTPATIENT
Start: 2020-02-28 | End: 2020-02-28

## 2020-02-28 RX ORDER — PROPOFOL 10 MG/ML
INJECTION, EMULSION INTRAVENOUS CONTINUOUS PRN
Status: DISCONTINUED | OUTPATIENT
Start: 2020-02-28 | End: 2020-02-28

## 2020-02-28 RX ADMIN — LIDOCAINE HYDROCHLORIDE 40 MG: 20 INJECTION, SOLUTION INFILTRATION; PERINEURAL at 08:04

## 2020-02-28 RX ADMIN — DEXAMETHASONE SODIUM PHOSPHATE 6 MG: 10 INJECTION, SOLUTION INTRAMUSCULAR; INTRAVENOUS at 08:07

## 2020-02-28 RX ADMIN — FAMOTIDINE 20 MG: 10 TABLET, FILM COATED ORAL at 21:02

## 2020-02-28 RX ADMIN — PROPOFOL 20 MG: 10 INJECTION, EMULSION INTRAVENOUS at 08:04

## 2020-02-28 RX ADMIN — HYDROCODONE BITARTRATE AND ACETAMINOPHEN 1 TABLET: 5; 325 TABLET ORAL at 14:28

## 2020-02-28 RX ADMIN — FENTANYL CITRATE 50 MCG: 50 INJECTION, SOLUTION INTRAMUSCULAR; INTRAVENOUS at 08:03

## 2020-02-28 RX ADMIN — LEVOTHYROXINE SODIUM 200 MCG: 100 TABLET ORAL at 06:01

## 2020-02-28 RX ADMIN — ENOXAPARIN SODIUM 40 MG: 40 INJECTION SUBCUTANEOUS at 21:02

## 2020-02-28 RX ADMIN — HYDROMORPHONE HYDROCHLORIDE 0.5 MG: 1 INJECTION, SOLUTION INTRAMUSCULAR; INTRAVENOUS; SUBCUTANEOUS at 10:29

## 2020-02-28 RX ADMIN — SODIUM CHLORIDE, POTASSIUM CHLORIDE, SODIUM LACTATE AND CALCIUM CHLORIDE: 600; 310; 30; 20 INJECTION, SOLUTION INTRAVENOUS at 08:00

## 2020-02-28 RX ADMIN — CEFAZOLIN SODIUM 2 G: 2 INJECTION, SOLUTION INTRAVENOUS at 08:01

## 2020-02-28 RX ADMIN — ONDANSETRON 4 MG: 2 INJECTION INTRAMUSCULAR; INTRAVENOUS at 08:07

## 2020-02-28 RX ADMIN — ACETAMINOPHEN 650 MG: 325 TABLET, FILM COATED ORAL at 21:04

## 2020-02-28 RX ADMIN — FAMOTIDINE 20 MG: 10 TABLET, FILM COATED ORAL at 13:12

## 2020-02-28 RX ADMIN — SERTRALINE HYDROCHLORIDE 100 MG: 100 TABLET, FILM COATED ORAL at 13:13

## 2020-02-28 RX ADMIN — HYDROCODONE BITARTRATE AND ACETAMINOPHEN 1 TABLET: 5; 325 TABLET ORAL at 06:01

## 2020-02-28 RX ADMIN — OXYCODONE HYDROCHLORIDE 5 MG: 5 TABLET ORAL at 21:02

## 2020-02-28 RX ADMIN — OXYCODONE HYDROCHLORIDE 5 MG: 5 TABLET ORAL at 22:27

## 2020-02-28 RX ADMIN — TIZANIDINE 4 MG: 4 TABLET ORAL at 21:02

## 2020-02-28 RX ADMIN — SODIUM CHLORIDE, POTASSIUM CHLORIDE, SODIUM LACTATE AND CALCIUM CHLORIDE: 600; 310; 30; 20 INJECTION, SOLUTION INTRAVENOUS at 16:04

## 2020-02-28 RX ADMIN — Medication 50 MCG: at 08:28

## 2020-02-28 RX ADMIN — PROPOFOL 70 MCG/KG/MIN: 10 INJECTION, EMULSION INTRAVENOUS at 08:04

## 2020-02-28 RX ADMIN — HYDROMORPHONE HYDROCHLORIDE 0.5 MG: 1 INJECTION, SOLUTION INTRAMUSCULAR; INTRAVENOUS; SUBCUTANEOUS at 17:34

## 2020-02-28 RX ADMIN — MIDAZOLAM 2 MG: 1 INJECTION INTRAMUSCULAR; INTRAVENOUS at 07:59

## 2020-02-28 RX ADMIN — FENTANYL CITRATE 50 MCG: 50 INJECTION, SOLUTION INTRAMUSCULAR; INTRAVENOUS at 08:15

## 2020-02-28 ASSESSMENT — ACTIVITIES OF DAILY LIVING (ADL)
ADLS_ACUITY_SCORE: 13

## 2020-02-28 NOTE — OR NURSING
Xray called and reported the radiology read of the bedside portable chest xray for port placement was normal.

## 2020-02-28 NOTE — OR NURSING
Pateint discharged to room 3116 for admission.  Kaylan score 10. Pain level 0/10 at the left chest surgical site.  Discharged from unit via cart.  Hand off report given to NICK Rogers and NICK Chang.

## 2020-02-28 NOTE — PROGRESS NOTES
Care Transitions focused note:      Met with Constantino. She feels she has everything set up for her return home, she already has friends set up who will help with things she may not be able to do. She is more concerned about services when she start chemo, advised that oncology will help her get those services in place, she does not plan on starting chem for approximately 5 weeks.    Constantino did agree to a complimentary home care visit after discharge. CM remains available.

## 2020-02-28 NOTE — PROGRESS NOTES
INPATIENT ROUNDING NOTE  2/28/2020    Patient: Constantino Warren    Physician of Record: Kang Cleveland MD    Admitting diagnosis: Colon cancer (H) [C18.9]    Procedure(s):  INSERTION, VASCULAR ACCESS PORT     POD: Day of Surgery    Current Diet: Regular    CURRENT MEDICATIONS:  Continuous Medications:  Current Facility-Administered Medications   Medication Last Rate     lactated ringers 75 mL/hr at 02/27/20 2126       Scheduled Medications:  Current Facility-Administered Medications   Medication Dose Route Frequency     ceFAZolin  1 g Intravenous See Admin Instructions     ceFAZolin  2 g Intravenous Pre-Op/Pre-procedure x 1 dose     [Auto Hold] enoxaparin ANTICOAGULANT  40 mg Subcutaneous Q24H     [Auto Hold] famotidine  20 mg Oral BID    Or     [Auto Hold] famotidine  20 mg Intravenous BID     [Auto Hold] levothyroxine  200 mcg Oral Daily     [Auto Hold] sertraline  100 mg Oral Daily     [Auto Hold] sodium chloride (PF)  3 mL Intracatheter Q8H     [Auto Hold] tiZANidine  4 mg Oral At Bedtime     [Auto Hold] traZODone  50 mg Oral At Bedtime    Or     [Auto Hold] traZODone  75 mg Oral At Bedtime       PRN Medications:  Current Facility-Administered Medications   Medication Dose Route Frequency     [Auto Hold] diazepam  5 mg Intravenous Q6H PRN     [Auto Hold] HYDROcodone-acetaminophen  1 tablet Oral Q6H PRN     [Auto Hold] HYDROmorphone  0.3-0.5 mg Intravenous Q2H PRN     [Auto Hold] lidocaine 4%   Topical Q1H PRN     [Auto Hold] lidocaine (buffered or not buffered)  0.1-1 mL Other Q1H PRN     [Auto Hold] metoclopramide  10 mg Oral Q6H PRN    Or     [Auto Hold] metoclopramide  10 mg Intravenous Q6H PRN     [Auto Hold] naloxone  0.1-0.4 mg Intravenous Q2 Min PRN     [Auto Hold] prochlorperazine  10 mg Intravenous Q6H PRN    Or     [Auto Hold] prochlorperazine  10 mg Oral Q6H PRN     [Auto Hold] sodium chloride (PF)  3 mL Intracatheter q1 min prn        SUBJECTIVE:   Nausea: No. Vomiting: No. Fever: No. Chills: No.  "Excessive burping: No. Flatus: Yes. BM: Yes. Pain is 4/10. Pain control: good. Tolerating current diet: Yes. Scheduled for port today     PHYSICAL EXAM:   Vital signs: /78   Pulse 64   Temp 98  F (36.7  C) (Temporal)   Resp 18   Ht 1.702 m (5' 7\")   Wt 73.6 kg (162 lb 4.1 oz)   SpO2 95%   BMI 25.41 kg/m     Weight: [unfilled]   BMI: Body mass index is 25.41 kg/m .   General: Normal, healthy, cooperative, in no acute distress   HEENT: PERRLA and EOMI   Neck: supple   Lungs: clear to auscultation   CV: Regular rate and rhythm without murmer   Abdominal: Abdomen soft, non-tender. BS normal. No masses, organomegaly   Wound: Closed wound. Clean, dry and intact. Healing well.   Extremities: No cyanosis, clubbing or edema noted bilaterally in Upper and Lower Extremities   Neurological: without deficit    INPUT/OUTPUT:      Intake/Output Summary (Last 24 hours) at 2/28/2020 0738  Last data filed at 2/28/2020 0700  Gross per 24 hour   Intake 1993 ml   Output 1850 ml   Net 143 ml       I/O last 3 completed shifts:  In: 1993 [P.O.:720; I.V.:1273]  Out: 1050 [Urine:1050]    LABS:    Last CBC Rrsults:   Recent Labs   Lab Test 02/26/20  0529 02/25/20  0532 02/20/20  1133   WBC 6.2 9.7 6.8   RBC 3.25* 3.53* 4.45   HGB 8.2* 8.8* 11.1*   HCT 26.7* 28.4* 36.1   MCV 82 81 81   MCH 25.2* 24.9* 24.9*   MCHC 30.7* 31.0* 30.7*   RDW 15.3* 15.5* 15.8*    252 341       Last Comprehensive Metabolic panel:  Recent Labs   Lab Test 02/26/20  0529 02/25/20  0532 02/20/20  1133  11/20/19  1349    140 140   < > 142   POTASSIUM 3.9 4.2 4.1   < > 4.3   CHLORIDE 107 107 110*   < > 113*   CO2 30 29 25   < > 24   ANIONGAP 5 4 5   < > 5   GLC 80 103* 89   < > 84   BUN 7 9 22   < > 18   CR 0.74 0.69 0.74   < > 0.66   GFRESTIMATED >90 >90 >90   < > >90   LOKESH 8.1* 8.0* 8.5   < > 8.2*   BILITOTAL 0.3  --  0.3  --  0.4   ALKPHOS 106  --  128  --  125   ALT 30  --  21  --  17   AST 27  --  19  --  20    < > = values in this interval not " displayed.       Recent Labs   Lab Test 02/26/20  0529 02/25/20  0532 02/20/20  1133 11/20/19  1349  09/26/15  1400 07/18/15  2235   MAG  --  1.7  --   --   --  2.0 1.6   ALBUMIN 2.1*  --  3.1* 2.8*   < > 3.6 3.1*    < > = values in this interval not displayed.       ASSESSMENT:    Day of Surgery from Procedure(s):  INSERTION, VASCULAR ACCESS PORT.     Doing well    PLAN: place port today

## 2020-02-28 NOTE — OP NOTE
REPORT OF OPERATION  DATE OF PROCEDURE: 2/28/2020    PATIENT: Constantino Warren    SURGERY PREFORMED: Placement of Tunneled Central Venous Catheter with Subcutaneous Port.   Ultrasound was not utilized.   Fluoroscopy was utilized    PREOPERATIVE DIAGNOSIS: colon Cancer,  Need for long term central venous access.    POSTOPERATIVE DIAGNOSIS: Same    SURGEON: Kang Cleveland MD    ASSISTANTS: None    ANESTHESIA: Monitored Anesthesia Care    COMPLICATIONS: None apparent    TRANSFUSIONS: None    TISSUE TO PATHOLOGY: None    FINDINGS: Tip of catheter in superior vena cava via the left subclavian vein    INDICATIONS: This is a 49 year old female with need of long tern central venous access for chemotherapy secondary to colon cancerer.  The patient will be taken to the operating room for placement of a tunneled cental venous catheter with subcutaneous port.    DESCRIPTIONS OF PROCEDURE IN DETAIL: After consent was obtained the patient was taken to the operative suite and rosana in the supine position.  The patient was identified and the correct patient was confirmed.  Monitored Anesthesia Care was administered by anesthesia.  The patient was sterilely prepped and draped in the usual fashion.  A time out was preformed verifying the correct patient and the correct procedure.  The entire operative team was in agreement.  All necessary equipment and supplies were in the room.    After identification of landmarks, the area was anesthetized with local anesthesia.   The left subclavian vein was then cannulize and the guide wire was inserted and positioned into the superior vena cava.  Ultrasound was not utilized.  Position was verified by fluoroscopy.  The wire was secured.  A location for the subcutaneous port was decided upon and the area was anesthetized with local anesthesia.  The skin was sharply entered and dissection was carried down to the chest wall.  A pocket was made, superficial to the fascia of the pectoralis major, to  accept the port.  With fluoroscopy guidance, the dilator and sheath was then passed over the wire and positioned in the superior vena cava.  The wire and dilator were removed.  The catheter was inserted through the sheath and positioned in the superior vena cave.  Position was verified with fluoroscopy.  The sheath was removed and again the position of the catheter was checked with fluoroscopy.  The catheter was cut to length and attached to the port and the port was positioned in the previously made subcutaneous pocket.  The path of the catheter was positioned as to be a elmer curve with no abrupt angles.  The port was secured to the chest wall with 2-0 Prolene sutures.  The port was flushed with NS and was found to flush easily.  Hemostasis was assured.  The deep tissues were closed with inturrupted 3-0 vicryl sutures and the skin was closed with subcuticular 4-0 Monocryl.  Sterile dressings were applied.  The patient was awaken and take to the recovery room in stable condition tolerating the procedure well.    Total fluoroscopy time was : 0.6 min

## 2020-02-28 NOTE — ANESTHESIA POSTPROCEDURE EVALUATION
Patient: Constantino Warren    Procedure(s):  INSERTION, VASCULAR ACCESS PORT LEFT    Diagnosis:Malignant neoplasm of colon, unspecified part of colon (H) [C18.9]  Diagnosis Additional Information: No value filed.    Anesthesia Type:  MAC    Note:  Anesthesia Post Evaluation    Patient location during evaluation: Bedside  Patient participation: Able to fully participate in evaluation  Level of consciousness: awake and alert  Pain management: adequate  Airway patency: patent  Cardiovascular status: acceptable and stable  Respiratory status: acceptable and nasal cannula  Hydration status: acceptable  PONV: none     Anesthetic complications: None          Last vitals:  Vitals:    02/28/20 1030 02/28/20 1059 02/28/20 1145   BP: 99/53  123/69   Pulse:   67   Resp:  16 16   Temp:   97.3  F (36.3  C)   SpO2:   96%         Electronically Signed By: JUNIOR Jones CRNA  February 28, 2020  12:12 PM

## 2020-02-28 NOTE — PLAN OF CARE
Up in chair awhile, and about in room and to bathroom.  Medicated for pain X2 and ice applied.  Nausea midway through shift, relieved with compazine.  No BM yet.  Informed that will be NPO at midnight for port placement tomorrow.  Face to face report given with opportunity to observe patient.    Report given to Leonora WINSTON RN   2/27/2020  11:26 PM

## 2020-02-28 NOTE — PLAN OF CARE
Pt A&Ox3. She reports pain highest 6/10, received norco with some relief. Bowel sounds are hypoactive. Reports passing gas. Denies any nausea. Dressing to abd is CDI. NPO after midnight except for small sips with medications. BP 89/54 at beginning of shift, recheck 104/68. Pt denies any dizziness with low BP. IVF continued. Call light within reach and pt calls appropriately.     Face to face report given with opportunity to observe patient.    Report given to NICK Rogers and NICK Chang RN   2/28/2020  7:14 AM

## 2020-02-28 NOTE — PLAN OF CARE
Patient returned from VAD insertion procedure at approximately 0925 with VSS, L/S clear, and A&Ox3. Patient changed to regular adult diet after consulting with Dr. Cleveland. VAD insertion site closed with adhesive glue. VAD is able to be palpated under skin. No s/sx of infection noted at site. Abdominal dressings remain CDI, no shadowing noted. Bowel sounds in RLQ and LUQ hypoactive, normoactive in RUQ, LLQ. Patient reported pain 7/10 and requested dilaudid IV. Patient was encouraged to utilize oral PRN Norco as she will not have access to IV dilaudid after discharge. Patient was agreeable to completing pain assessment after administration of PO Norco and consulting with Provider regarding a more effective oral PRN pain medication to decrease use of IV dilaudid. Patient remained in chair, reading, watching TV, and visiting with friend during this shift. Appetite is adequate and fluids encouraged. No bowel movement reported. Patient reports she continues to pass flatus.     Face to face given to: Tasha Bah RN

## 2020-02-28 NOTE — ANESTHESIA CARE TRANSFER NOTE
Patient: Constantino Warren    Procedure(s):  INSERTION, VASCULAR ACCESS PORT LEFT    Diagnosis: Malignant neoplasm of colon, unspecified part of colon (H) [C18.9]  Diagnosis Additional Information: No value filed.    Anesthesia Type:   MAC     Note:  Airway :Nasal Cannula  Patient transferred to:PACU  Handoff Report: Identifed the Patient, Identified the Reponsible Provider, Reviewed the pertinent medical history, Discussed the surgical course, Reviewed Intra-OP anesthesia mangement and issues during anesthesia, Set expectations for post-procedure period and Allowed opportunity for questions and acknowledgement of understanding      Vitals: (Last set prior to Anesthesia Care Transfer)    CRNA VITALS  2/28/2020 0805 - 2/28/2020 0841      2/28/2020             Resp Rate (observed):  (!) 1    Resp Rate (set):  8                Electronically Signed By: JUNIOR Patel CRNA  February 28, 2020  8:41 AM

## 2020-02-28 NOTE — ANESTHESIA PREPROCEDURE EVALUATION
Anesthesia Pre-Procedure Evaluation    Patient: Constantino Warren   MRN: [id number] : 1970          Preoperative Diagnosis: Malignant neoplasm of colon, unspecified part of colon (H) [C18.9]    Procedure(s):  INSERTION, VASCULAR ACCESS PORT    Past Medical History:   Diagnosis Date     Chronic pain      Prediabetes      Thyroid disease      Past Surgical History:   Procedure Laterality Date     BACK SURGERY      ;      COLONOSCOPY N/A 2020    Procedure: COLONOSCOPY WITH BIOPSY AND TATOOING OF ASCENDING COLON,INCOMEPLETE EXAM;  Surgeon: Kang Cleveland MD;  Location: [facility]     ESOPHAGOSCOPY, GASTROSCOPY, DUODENOSCOPY (EGD), COMBINED N/A 2018    Procedure: COMBINED ESOPHAGOSCOPY, GASTROSCOPY, DUODENOSCOPY (EGD);  UPPER ENDOSCOPY WITH BIOPSIES;  Surgeon: Roger Vazquez MD;  Location: [facility]     ESOPHAGOSCOPY, GASTROSCOPY, DUODENOSCOPY (EGD), COMBINED N/A 2019    Procedure: UPPER ENDOSCOPY with biopsy;  Surgeon: Kang Cleveland MD;  Location: [facility]     HEAD & NECK SURGERY      c4-c5 fusion     HYSTERECTOMY, CRISTIANE  2004    uterine cancer; [facility]     LAPAROSCOPIC ASSISTED COLECTOMY Right 2020    Procedure: RIGHT KEVIN COLECTOMY, LYSIS OF ADHESIONS;  Surgeon: Kang Cleveland MD;  Location: [facility]     LAPAROSCOPIC BYPASS GASTRIC  2010       Anesthesia Evaluation     . Pt has had prior anesthetic. Type: General and MAC    No history of anesthetic complications (fentanyl allergy)          ROS/MED HX    ENT/Pulmonary:     (+)MOE risk factors hypertension, , . .    Neurologic:     (+)migraines, other neuro hx concussions x2, insomnia    Cardiovascular:     (+) hypertension--CAD, angina (hx)--. : . . fainting (syncope) (chronic vasovagal symptoms with presyncope). :. valvular problems/murmurs patient reports she has a murmur, denies valvular pathology:. Previous cardiac testing date:results:date: results:ECG reviewed date:2019 results:NSR @76 date:  results:          METS/Exercise Tolerance:  >4 METS   Hematologic: Comments: PRBCs administered on this admission    (+) Anemia (hgb 8.7 today), History of Transfusion (hx possible transfusion reaction) no previous transfusion reaction -      Musculoskeletal:   (+) arthritis,  other musculoskeletal- chronic neck and back pain s/p C4-C5 fusion, L4-S1 fusion      GI/Hepatic:     (+) GERD Asymptomatic on medication, cholecystitis/cholelithiasis, Other GI/Hepatic hx gastric bypass and revision, hx GIB      Renal/Genitourinary:     (+) Nephrolithiasis , Other Renal/ Genitourinary, right hydronephrosis   Chronic renal disease: hx hydronephrosis and pyelonephritis.   Endo: Comment: History of type 2 diabetes, hyperglycemia resolved with weight loss    (+) type II DM Last HgA1c: 5.7 date: 2-20-20 Not using insulin - not using insulin pump not previously admitted for DM/DKA thyroid problem hypothyroidism, .      Psychiatric:     (+) psychiatric history other (comment), anxiety and depression (PTSD)      Infectious Disease: Comment: Hx MRSA, resolved        Malignancy:   (+) Malignancy History of Other and GI  Other CA uterine 2004 Remission status post Surgery         Other: Comment: Hx hysterectomy  Hx Lortab rx, no longer taking   (+) No chance of pregnancy C-spine cleared: N/A, H/O Chronic Pain,no other significant disability                         Physical Exam  Normal systems: cardiovascular and pulmonary    Airway   Mallampati: II  TM distance: >3 FB  Neck ROM: full    Dental   (+) missing  Comment: Poor dentition    Cardiovascular   Rhythm and rate: regular and normal  (+) murmur       Pulmonary             Lab Results   Component Value Date    WBC 6.2 02/26/2020    HGB 8.2 (L) 02/26/2020    HCT 26.7 (L) 02/26/2020     02/26/2020    CRP <2.9 07/20/2018    SED 35 (H) 04/18/2015     02/26/2020    POTASSIUM 3.9 02/26/2020    CHLORIDE 107 02/26/2020    CO2 30 02/26/2020    BUN 7 02/26/2020    CR 0.74  "02/26/2020    GLC 80 02/26/2020    LOKEHS 8.1 (L) 02/26/2020    MAG 1.7 02/25/2020    ALBUMIN 2.1 (L) 02/26/2020    PROTTOTAL 5.5 (L) 02/26/2020    ALT 30 02/26/2020    AST 27 02/26/2020    ALKPHOS 106 02/26/2020    BILITOTAL 0.3 02/26/2020    LIPASE 176 02/01/2019    AMYLASE 31 07/18/2015    PTT 28 03/10/2019    INR 1.11 03/10/2019    TSH 3.23 12/12/2019    HCG Negative 09/26/2015       Preop Vitals  BP Readings from Last 3 Encounters:   02/28/20 104/68   02/20/20 114/76   02/19/20 110/74    Pulse Readings from Last 3 Encounters:   02/28/20 64   02/20/20 80   02/19/20 79      Resp Readings from Last 3 Encounters:   02/28/20 18   02/20/20 20   02/19/20 16    SpO2 Readings from Last 3 Encounters:   02/28/20 97%   02/20/20 98%   02/19/20 96%      Temp Readings from Last 1 Encounters:   02/28/20 97.4  F (36.3  C) (Tympanic)    Ht Readings from Last 1 Encounters:   02/24/20 1.702 m (5' 7\")      Wt Readings from Last 1 Encounters:   02/24/20 73.6 kg (162 lb 4.1 oz)    Estimated body mass index is 25.41 kg/m  as calculated from the following:    Height as of this encounter: 1.702 m (5' 7\").    Weight as of this encounter: 73.6 kg (162 lb 4.1 oz).       Anesthesia Plan      History & Physical Review  History and physical reviewed and following examination; no interval change.    ASA Status:  3 .    NPO Status:  > 8 hours    Plan for MAC with Intravenous and Propofol induction. Maintenance will be TIVA.  Reason for MAC:  Chronic cardiopulmonary disease (G9), Deep or markedly invasive procedure (G8), Procedure to face, neck, head or breast and Extreme anxiety (QS)  PONV prophylaxis:  Ondansetron (or other 5HT-3) and Dexamethasone or Solumedrol       Postoperative Care  Postoperative pain management:  IV analgesics.      Consents  Anesthetic plan, risks, benefits and alternatives discussed with:  Patient..                 Melita Maradiaga APRN CRNA  "

## 2020-02-29 VITALS
TEMPERATURE: 98.1 F | HEART RATE: 67 BPM | DIASTOLIC BLOOD PRESSURE: 65 MMHG | HEIGHT: 67 IN | SYSTOLIC BLOOD PRESSURE: 100 MMHG | OXYGEN SATURATION: 97 % | RESPIRATION RATE: 18 BRPM | WEIGHT: 162.26 LBS | BODY MASS INDEX: 25.47 KG/M2

## 2020-02-29 PROCEDURE — 25800030 ZZH RX IP 258 OP 636: Performed by: SURGERY

## 2020-02-29 PROCEDURE — 25000132 ZZH RX MED GY IP 250 OP 250 PS 637: Performed by: SURGERY

## 2020-02-29 RX ORDER — ACETAMINOPHEN 325 MG/1
650 TABLET ORAL
Status: DISCONTINUED | OUTPATIENT
Start: 2020-02-29 | End: 2020-02-29 | Stop reason: HOSPADM

## 2020-02-29 RX ORDER — HYDROCODONE BITARTRATE AND ACETAMINOPHEN 5; 325 MG/1; MG/1
1 TABLET ORAL EVERY 4 HOURS PRN
Qty: 18 TABLET | Refills: 0 | Status: SHIPPED | OUTPATIENT
Start: 2020-02-29 | End: 2020-03-18

## 2020-02-29 RX ADMIN — SODIUM CHLORIDE, POTASSIUM CHLORIDE, SODIUM LACTATE AND CALCIUM CHLORIDE: 600; 310; 30; 20 INJECTION, SOLUTION INTRAVENOUS at 04:48

## 2020-02-29 RX ADMIN — SERTRALINE HYDROCHLORIDE 100 MG: 100 TABLET, FILM COATED ORAL at 07:54

## 2020-02-29 RX ADMIN — ACETAMINOPHEN 650 MG: 325 TABLET, FILM COATED ORAL at 06:55

## 2020-02-29 RX ADMIN — OXYCODONE HYDROCHLORIDE 10 MG: 5 TABLET ORAL at 01:37

## 2020-02-29 RX ADMIN — ACETAMINOPHEN 650 MG: 325 TABLET, FILM COATED ORAL at 11:51

## 2020-02-29 RX ADMIN — OXYCODONE HYDROCHLORIDE 10 MG: 5 TABLET ORAL at 04:48

## 2020-02-29 RX ADMIN — FAMOTIDINE 20 MG: 10 TABLET, FILM COATED ORAL at 07:54

## 2020-02-29 RX ADMIN — LEVOTHYROXINE SODIUM 200 MCG: 100 TABLET ORAL at 06:55

## 2020-02-29 RX ADMIN — OXYCODONE HYDROCHLORIDE 10 MG: 5 TABLET ORAL at 09:38

## 2020-02-29 ASSESSMENT — ACTIVITIES OF DAILY LIVING (ADL)
ADLS_ACUITY_SCORE: 13

## 2020-02-29 NOTE — PLAN OF CARE
Alert, oriented. VSS afebrile. Midline incision and trochar sites covered with dressings. Clean, dry, intact. Pain adequately controlled at this time.     Face to face report given with opportunity to observe patient.    Report given to Aliya Delatorre RN   2/29/2020  8:54 AM

## 2020-02-29 NOTE — PLAN OF CARE
Patient discharged at 1:20 PM via wheel chair accompanied by other:friend  and staff. Prescriptions filled and sent with patient upon discharge. All belongings sent with patient.     Discharge instructions reviewed with patient.    Patient discharged to home.   Report called to N/A    Core Measures and Vaccines  Core Measures applicable during stay: Yes. If yes, state diagnosis: [facility]  Pneumonia and Influenza given prior to discharge, if indicated: No    Surgical Patient   Surgical Procedures during stay: Yes  Did patient receive discharge instruction on wound care and recognition of infection symptoms? Yes    MISC  Follow up appointment made:  Yes  Home and hospital aquired medications returned to patient: Yes  Patient reports pain was well managed at discharge: Yes

## 2020-02-29 NOTE — PROVIDER NOTIFICATION
Discussed with Dr. Fishman on changing patient oral pain medication. Currently receiving Norco with very little relief and requesting Dilaudid for breakthrough pain frequently. Pain level mostly remains at a 7-8/10 with short periods of 4/10. New order for Oxycodone 5-10 mg q3hrs prn, and Tylenol q4hrs while awake.

## 2020-02-29 NOTE — PROGRESS NOTES
"INPATIENT ROUNDING NOTE  2/29/2020    Patient: Constantino Warren    Physician of Record: Kang Cleveland MD    Admitting diagnosis: Colon cancer (H) [C18.9]    Procedure(s):  INSERTION, VASCULAR ACCESS PORT LEFT     POD: 1 Day Post-Op    Current Diet: Regular    CURRENT MEDICATIONS:  Continuous Medications:  Current Facility-Administered Medications   Medication Last Rate     lactated ringers 75 mL/hr at 02/29/20 0448       Scheduled Medications:  Current Facility-Administered Medications   Medication Dose Route Frequency     acetaminophen  650 mg Oral Q4H While awake     enoxaparin ANTICOAGULANT  40 mg Subcutaneous Q24H     famotidine  20 mg Oral BID    Or     famotidine  20 mg Intravenous BID     levothyroxine  200 mcg Oral Daily     sertraline  100 mg Oral Daily     sodium chloride (PF)  3 mL Intracatheter Q8H     tiZANidine  4 mg Oral At Bedtime     traZODone  50 mg Oral At Bedtime    Or     traZODone  75 mg Oral At Bedtime       PRN Medications:  Current Facility-Administered Medications   Medication Dose Route Frequency     diazepam  5 mg Intravenous Q6H PRN     HYDROmorphone  0.3-0.5 mg Intravenous Q2H PRN     lidocaine 4%   Topical Q1H PRN     lidocaine (buffered or not buffered)  0.1-1 mL Other Q1H PRN     metoclopramide  10 mg Oral Q6H PRN    Or     metoclopramide  10 mg Intravenous Q6H PRN     naloxone  0.1-0.4 mg Intravenous Q2 Min PRN     oxyCODONE  5-10 mg Oral Q3H PRN     prochlorperazine  10 mg Intravenous Q6H PRN    Or     prochlorperazine  10 mg Oral Q6H PRN     sodium chloride (PF)  3 mL Intracatheter q1 min prn       SUBJECTIVE:   Nausea: No. Vomiting: No. Fever: No. Chills: No. Excessive burping: No. Flatus: Yes. BM: No. Pain is 3/10. Pain control: good. Tolerating current diet: Yes.      PHYSICAL EXAM:   Vital signs: /65   Pulse 67   Temp 98.1  F (36.7  C) (Tympanic)   Resp 18   Ht 1.702 m (5' 7\")   Wt 73.6 kg (162 lb 4.1 oz)   SpO2 97%   BMI 25.41 kg/m     Weight: [unfilled]   BMI: Body " mass index is 25.41 kg/m .   General: Normal, healthy, cooperative, in no acute distress   HEENT: PERRLA and EOMI   Neck: supple   Lungs: clear to auscultation   CV: Regular rate and rhythm without murmer   Abdominal: Abdomen soft, non-tender. BS normal. No masses, organomegaly   Wound: Closed wound. Clean, dry and intact. Healing well.   Extremities: No cyanosis, clubbing or edema noted bilaterally in Upper and Lower Extremities   Neurological: without deficit    INPUT/OUTPUT:      Intake/Output Summary (Last 24 hours) at 2/29/2020 1050  Last data filed at 2/29/2020 1000  Gross per 24 hour   Intake 3450 ml   Output 1800 ml   Net 1650 ml       I/O last 3 completed shifts:  In: 3670 [P.O.:1620; I.V.:2050]  Out: 2600 [Urine:2600]    LABS:    Last CBC Rrsults:   Recent Labs   Lab Test 02/26/20  0529 02/25/20  0532 02/20/20  1133   WBC 6.2 9.7 6.8   RBC 3.25* 3.53* 4.45   HGB 8.2* 8.8* 11.1*   HCT 26.7* 28.4* 36.1   MCV 82 81 81   MCH 25.2* 24.9* 24.9*   MCHC 30.7* 31.0* 30.7*   RDW 15.3* 15.5* 15.8*    252 341       Last Comprehensive Metabolic panel:  Recent Labs   Lab Test 02/26/20  0529 02/25/20  0532 02/20/20  1133  11/20/19  1349    140 140   < > 142   POTASSIUM 3.9 4.2 4.1   < > 4.3   CHLORIDE 107 107 110*   < > 113*   CO2 30 29 25   < > 24   ANIONGAP 5 4 5   < > 5   GLC 80 103* 89   < > 84   BUN 7 9 22   < > 18   CR 0.74 0.69 0.74   < > 0.66   GFRESTIMATED >90 >90 >90   < > >90   LOKESH 8.1* 8.0* 8.5   < > 8.2*   BILITOTAL 0.3  --  0.3  --  0.4   ALKPHOS 106  --  128  --  125   ALT 30  --  21  --  17   AST 27  --  19  --  20    < > = values in this interval not displayed.       Recent Labs   Lab Test 02/26/20  0529 02/25/20  0532 02/20/20  1133 11/20/19  1349  09/26/15  1400 07/18/15  2235   MAG  --  1.7  --   --   --  2.0 1.6   ALBUMIN 2.1*  --  3.1* 2.8*   < > 3.6 3.1*    < > = values in this interval not displayed.       ASSESSMENT:    1 Day Post-Op from Procedure(s):  INSERTION, VASCULAR ACCESS  PORT LEFT.     Doing well.    PLAN: We will go ahead and discharge her to home.  We will have her follow-up with me in clinic next week.  Diet is regular.  Activities no heavy lifting.  Medications include all medications along with Norco for pain.

## 2020-02-29 NOTE — PLAN OF CARE
Assumed care from NICK Kaur at 0845. UTV midline incision and trochar sites- Covered with dressing-clean, dry, intact. Reports pain rated at 8/10 in abdominal area- received Roxicodone 10 mg with adequate results.

## 2020-02-29 NOTE — PLAN OF CARE
"Reason for hospital stay:  Colon Cancer/Right Hemicolectomy    Most recent vitals: /71   Pulse 68   Temp 98  F (36.7  C) (Tympanic)   Resp 16   Ht 1.702 m (5' 7\")   Wt 73.6 kg (162 lb 4.1 oz)   SpO2 98%   BMI 25.41 kg/m    Pain Management:  C/o incisional abd pain, administered Brandi 10 mg x2 with good control and decrease per the Pt. Scheduled Tylenol  Orientation:  A&O, pleasant  Cardiac:  HR reg. Murmur reported per the Pt  Respiratory:  LS clear. Denies sob.  GI:  BS active. Denies nausea. Reports passing gas. No BM this shift. Pt discussed using a stool softener, suggested we discuss with the MD in the am  :  Voids without difficulty  Diet: Reg diet. Tolerating well.  Skin Issues:  Midline incision and troc sites x2 covered with CDI dressing  IVF:  LR at 75 mL/hr  ABX:  None  Mobility:  Ind in room  Safety:  Call light within reach    Comments:    2/29/2020  4:54 AM  Tran Uriostegui RN    Face to face report given with opportunity to observe patient.    Report given to NICK Kaur RN   2/29/2020  7:34 AM      "

## 2020-02-29 NOTE — PLAN OF CARE
"Reason for hospital stay:  R hemicolectomy, port placement   Living situation PTA: Comes from home   Most recent vitals: /90   Pulse 71   Temp 98.8  F (37.1  C) (Tympanic)   Resp 16   Ht 1.702 m (5' 7\")   Wt 73.6 kg (162 lb 4.1 oz)   SpO2 97%   BMI 25.41 kg/m    Pain Management:  Difficult to manage. See previous note. Gave Dilaudid x1, Norco x1, Oxycodone 5mg x2. Remains around a 7. Short periods of relief to a 4/10.   LOC:  A&O, makes needs known   Cardiac:  Regular. BP stable.   Respiratory:  LS clear, equal. Sats mid to upper 90's on RA. Using IS with encouragement. No cough witnessed tonight. No respiratory symptoms.   GI:  BS active this shift. No nausea or vomiting. No bowel movement. Passing gas.   :  Voids without issue   Skin Issues:  Incision to abdomen, 2 trochar/1 midline. No shadowing; CDI. Port placed to L chest wall; closed with glue. Site is WDL.   IVF:  LR @ 75  ABX:  None given  Nutrition: Regular diet. Tolerated well  Ambulation: Walking independently in room and halls   Safety:  Free of falls, call light in reach, bed in lowest, frequent rounds      Per patient request, held Trazadone tonight due to patient having low blood pressures in the mornings. Would like to see if holding Trazadone or switching doses would be beneficial.     Face to face report given with opportunity to observe patient.    Report given to NICK Mayfield   2/28/2020  11:24 PM        "

## 2020-03-01 NOTE — PROGRESS NOTES
Name: Constantino Warren    MRN#: [id number]    Reason for Hospitalization: Colon cancer (H) [C18.9]    Discharge Date: 3/1/2020    Patient / Family response to discharge plan: unknown, pt discharged on day when no CM present    Follow-Up Appt:   Future Appointments   Date Time Provider Department Center   3/9/2020 11:15 AM Coco Cook MD [facility]       Other Providers (Care Coordinator, County Services, PCA services etc): Yes: [facility] for complimentary vist.    Discharge Disposition: home    Kylee Witt CM

## 2020-03-02 NOTE — PATIENT INSTRUCTIONS
Thank you for allowing Dr. Cleveland and our surgical team to participate in your care. Please call our health unit coordinator at [phone] with scheduling questions or the nurse at [phone] with any other questions or concerns.

## 2020-03-04 ENCOUNTER — DOCUMENTATION ONLY (OUTPATIENT)
Dept: OTHER | Facility: CLINIC | Age: 50
End: 2020-03-04

## 2020-03-04 ENCOUNTER — OFFICE VISIT (OUTPATIENT)
Dept: SURGERY | Facility: OTHER | Age: 50
End: 2020-03-04
Attending: SURGERY
Payer: COMMERCIAL

## 2020-03-04 VITALS
WEIGHT: 162 LBS | DIASTOLIC BLOOD PRESSURE: 74 MMHG | SYSTOLIC BLOOD PRESSURE: 102 MMHG | TEMPERATURE: 98 F | HEART RATE: 75 BPM | BODY MASS INDEX: 25.43 KG/M2 | OXYGEN SATURATION: 100 % | HEIGHT: 67 IN

## 2020-03-04 DIAGNOSIS — C18.2 MALIGNANT NEOPLASM OF ASCENDING COLON (H): Primary | ICD-10-CM

## 2020-03-04 DIAGNOSIS — C18.9 MALIGNANT NEOPLASM OF COLON, UNSPECIFIED PART OF COLON (H): ICD-10-CM

## 2020-03-04 PROCEDURE — 99024 POSTOP FOLLOW-UP VISIT: CPT | Performed by: SURGERY

## 2020-03-04 PROCEDURE — G0463 HOSPITAL OUTPT CLINIC VISIT: HCPCS

## 2020-03-04 RX ORDER — HYDROCODONE BITARTRATE AND ACETAMINOPHEN 5; 325 MG/1; MG/1
1 TABLET ORAL EVERY 6 HOURS PRN
Qty: 18 TABLET | Refills: 0 | Status: SHIPPED | OUTPATIENT
Start: 2020-03-04 | End: 2020-03-26

## 2020-03-04 ASSESSMENT — PAIN SCALES - GENERAL: PAINLEVEL: EXTREME PAIN (8)

## 2020-03-04 ASSESSMENT — MIFFLIN-ST. JEOR: SCORE: 1387.46

## 2020-03-04 NOTE — PROGRESS NOTES
CLINIC NOTE - POST-OP SURGERY  3/4/2020    Patient:Constantino Warren    Procedure: Right hemicolectomy    This is a 49 year old female who is 1 weeks s/p Right hemicolectomy.  The patient has no complaints today.     Current Medications:  Current Outpatient Medications   Medication Sig Dispense Refill     acetaminophen (TYLENOL) 500 MG tablet Take 1,000 mg by mouth every 6 hours as needed Max acetaminophen dose: 4000 mg in 24 hours       ferrous sulfate (IRON) 325 (65 Fe) MG tablet Take 1 tablet (325 mg) by mouth 2 times daily 60 tablet 2     HYDROcodone-acetaminophen (NORCO) 5-325 MG tablet Take 1 tablet by mouth every 6 hours as needed for pain 18 tablet 0     levothyroxine 200 MCG PO tablet Take 200 mcg by mouth daily       MELATONIN PO Take 7.5 mg by mouth nightly as needed       multivitamin, therapeutic with minerals (MULTI-VITAMIN) TABS Take 1 tablet by mouth daily       sertraline (ZOLOFT) 100 MG tablet Take 100 mg by mouth daily       tiZANidine (ZANAFLEX) 4 MG tablet Take 4 mg by mouth At Bedtime       traZODone (DESYREL) 50 MG tablet Take 1 tablet (50 mg) by mouth At Bedtime 30 tablet 0     EPINEPHrine (EPIPEN/ADRENACLICK/OR ANY BX GENERIC EQUIV) 0.3 MG/0.3ML injection 2-pack Inject 0.3 mg into the muscle as needed          Allergies:  Allergies   Allergen Reactions     Aspartame      Other reaction(s): Other - Describe In Comment Field, Seizures  convulsions     Bee Venom Anaphylaxis     Bupropion Anaphylaxis     Throat gets tight and hives. Denies allergic reaction     Food Anaphylaxis     Peppers-anaphylaxis if eaten, if touched hands swell and reddened     Food Allergy Formula Anaphylaxis     Peppers, patient reports green peppers close off her throat     Ibuprofen Anaphylaxis     No Clinical Screening - See Comments Anaphylaxis     Peppers, patient reports green peppers close off her throat     Piper Anaphylaxis     Peppers--green, black, red, chili, etc. Derm symptoms, hives;  Strawberries--(cooked)  "Itching, breathing difficulties, GI symptoms. Mushrooms--GI symptoms, Hives.Spinach (cooked) throat irritation  (Listed on [facility] problem list.)     Fentanyl Swelling     Lips and tongue swelled.  Swelling of lips and tongue     Adhesive Tape      blisters     Amitriptyline Other (See Comments)     Respiratory symptoms - denies allergic reaction     Gabapentin Other (See Comments)     irritability     Liquid Adhesive Dermatitis     blisters  Blisters  Silk tape and tegaderm is fine     Oxcarbazepine Other (See Comments)     Throat gets tight and hives. Denies allergic reaction  Throat gets tight and hives-       Prozac [Fluoxetine]      Other reaction(s): *Unknown  uncontrolled muscle spasms     Valproic Acid      Respiratory symptoms ([facility]). Denies allergic reaction     Ziprasidone      Throat gets tight and hives. Denies allergic reaction       PHYSICAL EXAM:   Vital signs: /74 (BP Location: Right arm, Patient Position: Chair, Cuff Size: Adult Regular)   Pulse 75   Temp 98  F (36.7  C) (Tympanic)   Ht 1.702 m (5' 7\")   Wt 73.5 kg (162 lb)   SpO2 100%   BMI 25.37 kg/m     Weight: [unfilled]   BMI: Body mass index is 25.37 kg/m .   General: Normal, healthy, cooperative, in no acute distress, alert   Lungs: clear to auscultation   CV: Regular rate and rhythm without murmer   Abdominal: abdomen is soft without significant tenderness, masses, organomegaly or guarding   Wounds:  Well healed surgical scars consistent with her operation.       PATHOLOGY:  Copath Report   Date Value Ref Range Status   02/24/2020   Final    Patient Name: FELICITY RODRÍGUEZ  MR#: [id number]  Specimen #:   Collected: 2/24/2020  Received: 2/25/2020  Reported: 2/26/2020 13:34  Ordering Phy(s): SHREYA LARKIN  Additional Phy(s): CHRISTOPHER PETERS  Copy To: Cancer Registry    For improved result formatting, select 'View Enhanced Report Format' under   Linked Documents " section.    SPECIMEN(S):  A: Colon, right  B: Colon, right margins  C: Omentum resection    FINAL DIAGNOSIS:  A: Right colon, hemicolectomy  - Moderately differentiated adenocarcinoma  - One positive lymph node out of 32 lymph nodes  - Perineural invasion  - Tumor budding  - Acutely inflamed tumor and reactive lymph nodes with peritonitis    B: Bowel, margins  - Unremarkable small bowel    C: Omentum, resection  - Peritonitis, mild    Report Name: Colon and Rectum - Resection       Status: Submitted Checklist Inst: 1      Last Updated By: Rory Martines M.D., 02/26/2020 12:08:02  Part(s) Involved:  A: Colon, right    Synoptic Report:    SPECIMEN    Procedure:        - Right hemicolectomy    TUMOR    Tumor Site:        - Ileocecal valve    Histologic Type:        - Adenocarcinoma    Histologic Grade:        - G2: Moderately differentiated    Tumor Size: 5 cm    Tumor Deposits:        - Not identified    Tumor Extension:        - Tumor invades through the muscularis propria into pericolorectal   tissue    Macroscopic Tumor Perforation:        - Not identified    Lymphovascular Invasion:        - Not identified    Perineural Invasion:        - Present    Treatment Effect:        - No known presurgical therapy    MARGINS    Margins:        - All margins are uninvolved by invasive carcinoma, high-grade   dysplasia,        intramucosal adenocarcinoma, and adenoma      Margins Examined:          - Proximal          - Distal          - Radial or Mesenteric      Distance of Tumor from Radial Margin:          8 cm    LYMPH NODES    Number of Lymph Nodes Involved: 1    Number of Lymph Nodes Examined: 32    PATHOLOGIC STAGE CLASSIFICATION (PTNM, AJCC 8TH EDITION)    Primary Tumor (pT):        - pT3    Regional Lymph Nodes (pN):        - pN1a    COMMENTS   There is tumor budding    [facility] February 2019 Annual Release    Report Name: Colon and Rectum Biomarker       Status: Submitted Checklist Inst: 2      Last Updated By:  Rory Martines M.D., 02/26/2020 12:11:39  Part(s) Involved:  A: Colon, right    Synoptic Report:    RESULTS    Mismatch Repair      Immunohistochemistry (IHC) Testing for Mismatch Repair (MMR) Proteins:   Background nonneoplastic tissue /  internal control with intact nuclear          expression      IHC Interpretation:          - No loss of nuclear expression of MMR proteins: low probability   of          MSI-H      MLH1 Result:          - Intact nuclear expression      MSH2 Result:          - Intact nuclear expression      MSH6 Result:          - Intact nuclear expression      PMS2 Result:          - Intact nuclear expression    COMMENTS   Performed on biopsy .    [facility] February 2019 Annual Release    I have personally reviewed all specimens and/or slides, including the   listed special stains, and used them  with my medical judgement to determine or confirm the final diagnosis.    Electronically signed out by:    Rory Martines M.D.    CLINICAL HISTORY:  Colon cancer (H) [C18.9]; right hemicolectomy, lysis of adhesions.    GROSS:  A: The specimen is a piece of bowel and attached mesentery which is 42 cm   long, 9 cm in greatest diameter and  with the attached mesentery approximately 17 cm wide.  The mesentery is   removed.  There is a circumferential  mass 5 cm long 15 cm from the nearest end of bowel and approximately 32 cm   from the furthest end which is the  proximal end.  The tumor appears to be at the ileocecal valve.  There is   no appendix.  The  radial/circumferential margin is approximately 8 cm.  Section through the   radial margin is placed in cassette  1.  The radial margin is mesentery.  Grossly the tumor appears to extend   completely through the muscular wall  into the adhesed mesentery.  Sections through the tumor are taken   including an attempt to identify the deepest  extension of the tumor.  Grossly the margins are free and sections are   taken through each margin.  Both ends  are  stapled.  The mucosa is otherwise unremarkable.  The mesentery is   removed and fixed overnight in Dissect  Aid.  It is then examined for lymph nodes.  Numerous possible lymph nodes   are found.  Summary of sections  Cassette 1: Radial margin  Cassettes 2- 4: Tumor  Cassette 5: Margins  Cassettes 6, 7, and 9: Multiple individual lymph nodes in each cassette  Cassettes 8, 10-19: One lymph node in each cassette  Cassettes 20- 21: One lymph node in two cassettes  Cassettes 22- 25: One lymph node in each cassette  (Some in 25 blocks).    B: There is a piece of bowel which is shaped like a donut consistent with   a resection ring.  It is 4 x 3.5 x 1  cm. (3 in 2 blocks).    C: There is a piece of yellow tissue consistent with omentum which is 5 x   8 x 2 cm.  There is a 2.4 cm  hemorrhagic nodule which has a deep maroon color.  A section through it is   taken.  The mesentery has a  stippled erythematous appearance but is soft.  Representative sections are   taken. (3 in 2 blocks). (Dictated  by: Rory Martines MD 2/25/2020 01:49 PM)    MICROSCOPIC:  A: A section through the radial margin is unremarkable mesentery.    Full-thickness sections through the tumor  show extension completely through the muscularis propria into desmoplastic   stroma.  There are areas of  calcification in the tumor.  There are areas of necrosis.  The tumor is   ulcerated.  Tumor is also markedly  inflamed with neutrophils.  The tumor is composed of moderately   differentiated adenocarcinoma.  There is  perineural invasion.  Tumor budding is present in the same area as   perineural invasion.  Sections of the  margin show unremarkable small and large bowel.  There are 32 lymph nodes.    One has metastatic tumor which  extends beyond the capsule.  Most of the lymph nodes have significant   reactive changes.    B: There is unremarkable small bowel.    C: There is mesentery with mild acute peritonitis.    CPT Codes  A: 81144-WU8  B: 40226-CF2  C:  17224-AX7    COLLECTION SITE:  Client: [facility]  Location: [facility] (B)    The technical component of this testing was completed at the [facility], with the  professional component performed at the [facility], [address] [place] [id number]  ([phone])           ASSESSMENT:    49 year old female who is 1 weeks s/p Right hemicolectomy.  Doing well.  Final stage IV colon cancer is a T3 N1a, Mx, stage IIIb    PLAN:   We will go ahead and put her in an oncology referral.    Follow-up 1 week      Restrictions : Will continue lifting restrictions of no more than 10 pounds until 6 weeks after surgery

## 2020-03-04 NOTE — NURSING NOTE
"Chief Complaint   Patient presents with     Surgical Followup     Post op / Per Dr Cleveland discharge instructions- 2/28     Medication Request     HYDROcodone-acetaminophen (NORCO) 5-325 MG tablet- for pain        Initial /74 (BP Location: Right arm, Patient Position: Chair, Cuff Size: Adult Regular)   Pulse 75   Temp 98  F (36.7  C) (Tympanic)   Ht 1.702 m (5' 7\")   Wt 73.5 kg (162 lb)   SpO2 100%   BMI 25.37 kg/m   Estimated body mass index is 25.37 kg/m  as calculated from the following:    Height as of this encounter: 1.702 m (5' 7\").    Weight as of this encounter: 73.5 kg (162 lb).  Medication Reconciliation: complete  Dena Connor LPN  "

## 2020-03-05 ENCOUNTER — DOCUMENTATION ONLY (OUTPATIENT)
Dept: CARE COORDINATION | Facility: OTHER | Age: 50
End: 2020-03-05

## 2020-03-05 NOTE — PROGRESS NOTES
Subjective     Constantino Warren is a 49 year old female who presents to clinic today for the following health issues:    HPI     Pt wondering about B-12 shot while here    Hospital Follow-up Visit:    Hospital/Nursing Home/IP Rehab Facility: [facility]  Date of Admission: 2/13/20  Date of Discharge: 2/24/20  Reason(s) for Admission: colon cancer, adenocarcinoma; Her final pathology returned as a T3, N1A, MX stage IIIb colon cancer.   Is s/p resection.  Will be starting chemotherapy and radiation.  PET scheduled first.        No fevers or chills.  Eating ok.  No vomiting.  Some nausea.  Bowels moving.  Pain - managed with lortab - on average 2 per day at this point.  Wounds healing well.    Needs  pet letter.  Has new dog.  Had prior  pet - passed away.   Open to counseling.  Declines medication for mood at this time.    Needs referral for home care- nursing, social work.    Client would qualify for a Skilled nursing home care referral for bowel management, incisional monitoring, medication monitoring, and pain monitoring. She would also benefit from a  as her  recently passed away in December, she has a new dx of cancer, and could use some guidance in life planning.    Due for B12 3/13 - would like to get it together.           Problems taking medications regularly:  None       Medication changes since discharge: NORCO for PAIN       Problems adhering to non-medication therapy:  None    Summary of hospitalization:  [facility] discharge summary reviewed  Diagnostic Tests/Treatments reviewed.  Follow up needed: none  Other Healthcare Providers Involved in Patient s Care:         Surgical follow-up appointment - scheduled 3/12/2020  Update since discharge: improved.     Post Discharge Medication Reconciliation: discharge medications reconciled, continue medications without change.  Plan of care communicated with patient     Coding guidelines for this visit:  Type  of Medical   Decision Making Face-to-Face Visit       within 7 Days of discharge Face-to-Face Visit        within 14 days of discharge   Moderate Complexity [id number]   High Complexity 01082 [id number]                Current Outpatient Medications   Medication     acetaminophen (TYLENOL) 500 MG tablet     EPINEPHrine (EPIPEN/ADRENACLICK/OR ANY BX GENERIC EQUIV) 0.3 MG/0.3ML injection 2-pack     ferrous sulfate (IRON) 325 (65 Fe) MG tablet     levothyroxine 200 MCG PO tablet     MELATONIN PO     multivitamin, therapeutic with minerals (MULTI-VITAMIN) TABS     sertraline (ZOLOFT) 100 MG tablet     tiZANidine (ZANAFLEX) 4 MG tablet     traZODone (DESYREL) 50 MG tablet     Current Facility-Administered Medications   Medication     cyanocobalamin injection 1,000 mcg       Patient Active Problem List   Diagnosis     Syncope and collapse     Hypertensive urgency     Concussion syndrome     Hydronephrosis, right     Pyelonephritis     Urolithiasis     Chest pain     GI bleed     Abdominal pain, epigastric     Hypochromic microcytic anemia     Anastomotic ulcer S/P gastric bypass     Anemia     Adjustment disorder with mixed anxiety and depressed mood     Annular tear of lumbar disc     Chronic migraine     Chronic pain disorder     Failed back syndrome of lumbar spine     Gastric bypass status for obesity     Hx of lumbar discectomy     Hyperlipidemia     Hypothyroidism     Long term (current) use of opiate analgesic     Other insomnia     Vitamin B 12 deficiency     Special screening for malignant neoplasms, colon     Colon cancer (H)     Prediabetes     S/P partial resection of colon     Malignant neoplasm of colon, unspecified part of colon (H)     Past Surgical History:   Procedure Laterality Date     BACK SURGERY      2008; 2011     COLONOSCOPY N/A 2/13/2020    Procedure: COLONOSCOPY WITH BIOPSY AND TATOOING OF ASCENDING COLON,INCOMEPLETE EXAM;  Surgeon: Kang Cleveland MD;  Location: [facility] OR     ESOPHAGOSCOPY,  "GASTROSCOPY, DUODENOSCOPY (EGD), COMBINED N/A 7/21/2018    Procedure: COMBINED ESOPHAGOSCOPY, GASTROSCOPY, DUODENOSCOPY (EGD);  UPPER ENDOSCOPY WITH BIOPSIES;  Surgeon: Roger Vazquez MD;  Location: [facility] OR     ESOPHAGOSCOPY, GASTROSCOPY, DUODENOSCOPY (EGD), COMBINED N/A 11/21/2019    Procedure: UPPER ENDOSCOPY with biopsy;  Surgeon: Kang Cleveland MD;  Location: [facility] OR     HEAD & NECK SURGERY  2008    c4-c5 fusion     HYSTERECTOMY, CRISTIANE  04/2004    uterine cancer; [facility]     INSERT PORT VASCULAR ACCESS N/A 2/28/2020    Procedure: INSERTION, VASCULAR ACCESS PORT LEFT;  Surgeon: Kang Cleveland MD;  Location: [facility] OR     LAPAROSCOPIC ASSISTED COLECTOMY Right 2/24/2020    Procedure: RIGHT KEVIN COLECTOMY, LYSIS OF ADHESIONS;  Surgeon: Kang Cleveland MD;  Location: [facility] OR     LAPAROSCOPIC BYPASS GASTRIC  12/2010       Social History     Tobacco Use     Smoking status: Never Smoker     Smokeless tobacco: Never Used   Substance Use Topics     Alcohol use: No     Frequency: Never     Comment: sober for 18 years     History reviewed. No pertinent family history.          Reviewed and updated as needed this visit by Provider         Review of Systems   ROS COMP: Constitutional, HEENT, cardiovascular, pulmonary, gi and gu systems are negative, except as otherwise noted.      Objective    /68 (BP Location: Left arm, Patient Position: Sitting, Cuff Size: Adult Regular)   Pulse 77   Temp 97.6  F (36.4  C) (Tympanic)   Ht 1.702 m (5' 7\")   Wt 71.7 kg (158 lb)   SpO2 100%   BMI 24.75 kg/m    Body mass index is 24.75 kg/m .  Physical Exam   GENERAL: alert, no distress and appears older than stated age  NECK: no adenopathy, no asymmetry, masses, or scars and thyroid normal to palpation  RESP: lungs clear to auscultation - no rales, rhonchi or wheezes  CV: regular rate and rhythm, normal S1 S2, no S3 or S4, no murmur, click or rub, no peripheral edema and peripheral pulses strong  ABDOMEN: no " organomegaly or masses, bowel sounds normal and soft; mild generalized tenderness; incisions healing well - intact, no drainage, no surrounding erythema   MS: no gross musculoskeletal defects noted, no edema  SKIN: no suspicious lesions or rashes  PSYCH: mentation appears normal, affect normal/bright    Diagnostic Test Results:  Labs reviewed in Epic        Assessment & Plan       ICD-10-CM    1. Malignant neoplasm of colon, unspecified part of colon (H)  C18.9 HOME CARE NURSING REFERRAL     MENTAL HEALTH REFERRAL  - Adult; Outpatient Treatment; Individual/Couples/Family/Group Therapy/Health Psychology; Range: [facility] [phone]; We will contact you to schedule the appointment or please call ...   2. Adjustment disorder with mixed anxiety and depressed mood  F43.23 MENTAL HEALTH REFERRAL  - Adult; Outpatient Treatment; Individual/Couples/Family/Group Therapy/Health Psychology; Range: [facility] [phone]; We will contact you to schedule the appointment or please call ...   3. Insomnia, unspecified type  G47.00 traZODone (DESYREL) 50 MG tablet     tiZANidine (ZANAFLEX) 4 MG tablet     MENTAL HEALTH REFERRAL  - Adult; Outpatient Treatment; Individual/Couples/Family/Group Therapy/Health Psychology; Range: [facility] [phone]; We will contact you to schedule the appointment or please call ...   4. Vitamin B12 deficiency (non anemic)  E53.8           Patient Instructions   Letter for  pet.  Medications refilled.  B12 shot given.  Mental health referral.  Home care referral done.  Follow up with Dr. Cleveland 3/12/2020.     Psychologists/ Counselors      Naina Spangler   [phone]  [facility]   [phone]  [facility] [phone]  [facility] (kids) [phone]  [facility] [employer](teens) [phone]  [facility]  [phone]  [facility]  [phone]  [facility]  [phone]  [facility]  [phone]  [facility]  [phone]  [facility] counseling [phone] (Opening new location)  [facility]   [phone] (Opening new location)  [facility]    [phone]   [facility].   [phone]   (Stephany De La Cruzsameer)   [facility]  [phone]  [facility] [phone]  [facility]  [phone]  [facility]  [phone]     [facility] [phone]      [facility] [phone]  [facility]   [phone]  [facility] (Neri Drake LMFT)                                                   [phone]  [facility]  [phone]  [place] [facility] [phone]  [facility]      [phone]  [facility]       [phone]  [facility]  [phone]  [facility]      [phone]     [place]  [facility]   [phone]  [facility]      [phone]  [facility] Dr[facility] TODD Coronel [phone]  [facility]      [phone]  [facility]  [phone]    [facility]                      [phone]    *Facilities in bold italics indicate medication management  Services are offered.    Crisis support  Mobile crisis line- [phone]  [facility] Range: Free online resources including therapy for Mental Health and substance use problems: Http://thriverange.org    Crisis Text Line  Text HOME to 516-762 - The Crisis Text Line serves anyone, in any type of crisis, providing access to free, 24/7 support and information via the medium people already use and trust  http://www.crisistextline.org   Here's how it works:  Text HOME to 450-812 from anywhere in the [place], anytime, about any type of crisis.  A live, trained Crisis Counselor receives the text and responds quickly.  The volunteer  Crisis Counselor will help you move from a 'hot moment to a cool moment'                                No follow-ups on file.    Coco Schafer MD  [facility]

## 2020-03-06 ENCOUNTER — PATIENT OUTREACH (OUTPATIENT)
Dept: CARE COORDINATION | Facility: OTHER | Age: 50
End: 2020-03-06

## 2020-03-06 ENCOUNTER — TELEPHONE (OUTPATIENT)
Dept: CARE COORDINATION | Facility: OTHER | Age: 50
End: 2020-03-06

## 2020-03-06 DIAGNOSIS — C18.9 MALIGNANT NEOPLASM OF COLON, UNSPECIFIED PART OF COLON (H): Primary | ICD-10-CM

## 2020-03-06 NOTE — TELEPHONE ENCOUNTER
Client was seen for a complimentary home visit on 3/5/2020. At this time we believe client would qualify for a Skilled nursing home care referral for bowel management, incisional monitoring, medication monitoring, and pain monitoring. She would also benefit from a  as her  recently passed away in December, she has a new dx of cancer, and could use some guidance in life planning. Discussed all of this with client and she is in agreement to services. She has an appointment on 3/9/2020 with her PCP and would appreciate a referral for Homecare for SN and SW if PCP agrees it to be appropriate.

## 2020-03-06 NOTE — PROGRESS NOTES
Clinic Care Coordination Contact  Care Team Conversations    Received referral from Dr. Cleveland. Discussed with Dr. Palma, he would like a PET scan 4 weeks after surgery date and then he will see patient the following week. Patient called and informed of order for a PET scan and is in agreement for this.  Brooklyn Casanova RN Oncology Care Coordinator

## 2020-03-09 ENCOUNTER — OFFICE VISIT (OUTPATIENT)
Dept: FAMILY MEDICINE | Facility: OTHER | Age: 50
End: 2020-03-09
Attending: OTOLARYNGOLOGY
Payer: COMMERCIAL

## 2020-03-09 VITALS
HEART RATE: 77 BPM | BODY MASS INDEX: 24.8 KG/M2 | SYSTOLIC BLOOD PRESSURE: 110 MMHG | DIASTOLIC BLOOD PRESSURE: 68 MMHG | OXYGEN SATURATION: 100 % | HEIGHT: 67 IN | WEIGHT: 158 LBS | TEMPERATURE: 97.6 F

## 2020-03-09 DIAGNOSIS — G47.00 INSOMNIA, UNSPECIFIED TYPE: ICD-10-CM

## 2020-03-09 DIAGNOSIS — F43.23 ADJUSTMENT DISORDER WITH MIXED ANXIETY AND DEPRESSED MOOD: ICD-10-CM

## 2020-03-09 DIAGNOSIS — E53.8 VITAMIN B12 DEFICIENCY (NON ANEMIC): ICD-10-CM

## 2020-03-09 DIAGNOSIS — C18.9 MALIGNANT NEOPLASM OF COLON, UNSPECIFIED PART OF COLON (H): Primary | ICD-10-CM

## 2020-03-09 PROCEDURE — 99214 OFFICE O/P EST MOD 30 MIN: CPT | Performed by: FAMILY MEDICINE

## 2020-03-09 PROCEDURE — 96372 THER/PROPH/DIAG INJ SC/IM: CPT

## 2020-03-09 PROCEDURE — G0463 HOSPITAL OUTPT CLINIC VISIT: HCPCS

## 2020-03-09 PROCEDURE — 99495 TRANSJ CARE MGMT MOD F2F 14D: CPT | Performed by: FAMILY MEDICINE

## 2020-03-09 PROCEDURE — G0463 HOSPITAL OUTPT CLINIC VISIT: HCPCS | Mod: 25

## 2020-03-09 RX ORDER — TRAZODONE HYDROCHLORIDE 50 MG/1
50 TABLET, FILM COATED ORAL AT BEDTIME
Qty: 30 TABLET | Refills: 0 | Status: SHIPPED | OUTPATIENT
Start: 2020-03-09 | End: 2020-05-26

## 2020-03-09 RX ADMIN — CYANOCOBALAMIN 1000 MCG: 1000 INJECTION, SOLUTION INTRAMUSCULAR; SUBCUTANEOUS at 12:00

## 2020-03-09 ASSESSMENT — PAIN SCALES - GENERAL: PAINLEVEL: SEVERE PAIN (7)

## 2020-03-09 ASSESSMENT — MIFFLIN-ST. JEOR: SCORE: 1369.31

## 2020-03-09 NOTE — NURSING NOTE
Clinic Administered Medication Documentation      Injectable Medication Documentation    Patient was given Cyanocobalamin (B-12). Prior to medication administration, verified patients identity using patient s name and date of birth. Please see MAR and medication order for additional information. Patient instructed to report any adverse reaction to staff immediately .      Was entire vial of medication used? Yes  Vial/Syringe: Multi dose vial  Expiration Date:  12/21  Was this medication supplied by the patient? No   Pt tolerated well in left deltoid.   Cyndee Haji LPN

## 2020-03-09 NOTE — NURSING NOTE
"Chief Complaint   Patient presents with     ER F/U       Initial /68 (BP Location: Left arm, Patient Position: Sitting, Cuff Size: Adult Regular)   Pulse 77   Temp 97.6  F (36.4  C) (Tympanic)   Ht 1.702 m (5' 7\")   Wt 71.7 kg (158 lb)   SpO2 100%   BMI 24.75 kg/m   Estimated body mass index is 24.75 kg/m  as calculated from the following:    Height as of this encounter: 1.702 m (5' 7\").    Weight as of this encounter: 71.7 kg (158 lb).  Medication Reconciliation: complete  Cyndee Haji LPN  "

## 2020-03-09 NOTE — LETTER
[facility] [place]  [address]  [place] [address]  Phone: [phone]    March 9, 2020      Re:  Constantino Warren  [address]  [address]   [facility] [address]          To whom it may concern:    RE: Constantino Warren    My patient, Constantino Warren, would benefit from a  pet.  Her diagnoses include adjustment disorder, with mixed depression and anxiety, as well as new diagnosis of colon cancer.    Please contact me for questions or concerns.      Sincerely,        Coco Schafer MD

## 2020-03-09 NOTE — PATIENT INSTRUCTIONS
Letter for  pet.  Medications refilled.  B12 shot given.  Mental health referral.  Home care referral done.  Follow up with Dr. Cleveland 3/12/2020.     Psychologists/ Counselors      Naina Spangler   [phone]  [facility]   [phone]  [facility] [phone]  [facility] (kids) [phone]  [facility](teens) [phone]  [facility]  [phone]  [facility]  [phone]  [facility]  [phone]  [facility]  [phone]  [facility]  [phone]  [facility] [phone] (Opening new location)  [facility]   [phone] (Opening new location)  [facility]    [phone]   [facility]   [phone]   (Stephany Turner)   [facility]  [phone]  [facility] [phone]  [facility]  [phone]  [facility]  [phone]     [facility] [phone]      [facility] counseling [phone]  [facility]   [phone]  [facility] (Neri Drake LMMICHELLE)                                                   [phone]  [facility]  [phone]  [facility] [phone]  [facility]      [phone]  [facility]       [phone]  [facility]  [phone]  [facility]      [phone]     [facility]   [phone]  [facility]      [phone]  [facility] Coronel [phone]  [facility]      [phone]  [facility]  [phone]    [facility]                      [phone]    *Facilities in bold italics indicate medication management  Services are offered.    Crisis support  Mobile crisis line- [phone]  [facility]: Free online resources including therapy for Mental Health and substance use problems: Http://thriverange.org    Crisis Text  Line  Text HOME to 663-250 - The Crisis Text Line serves anyone, in any type of crisis, providing access to free, 24/7 support and information via the medium people already use and trust  http://www.crisistextline.org   Here's how it works:  Text HOME to 358-852 from anywhere in the [place], anytime, about any type of crisis.  A live, trained Crisis Counselor receives the text and responds quickly.  The volunteer Crisis Counselor will help you move from a 'hot moment to a cool moment'

## 2020-03-11 ENCOUNTER — MEDICAL CORRESPONDENCE (OUTPATIENT)
Dept: HEALTH INFORMATION MANAGEMENT | Facility: CLINIC | Age: 50
End: 2020-03-11

## 2020-03-12 ENCOUNTER — OFFICE VISIT (OUTPATIENT)
Dept: SURGERY | Facility: OTHER | Age: 50
End: 2020-03-12
Attending: SURGERY
Payer: COMMERCIAL

## 2020-03-12 VITALS
SYSTOLIC BLOOD PRESSURE: 120 MMHG | TEMPERATURE: 97.9 F | OXYGEN SATURATION: 99 % | BODY MASS INDEX: 24.8 KG/M2 | DIASTOLIC BLOOD PRESSURE: 78 MMHG | HEIGHT: 67 IN | HEART RATE: 78 BPM | WEIGHT: 158 LBS

## 2020-03-12 DIAGNOSIS — C18.2 MALIGNANT NEOPLASM OF ASCENDING COLON (H): Primary | ICD-10-CM

## 2020-03-12 PROCEDURE — G0463 HOSPITAL OUTPT CLINIC VISIT: HCPCS

## 2020-03-12 PROCEDURE — 99024 POSTOP FOLLOW-UP VISIT: CPT | Performed by: SURGERY

## 2020-03-12 RX ORDER — HYDROCODONE BITARTRATE AND ACETAMINOPHEN 5; 325 MG/1; MG/1
1 TABLET ORAL ONCE
Status: DISCONTINUED | OUTPATIENT
Start: 2020-03-12 | End: 2020-03-12

## 2020-03-12 RX ORDER — HYDROCODONE BITARTRATE AND ACETAMINOPHEN 5; 325 MG/1; MG/1
1 TABLET ORAL EVERY 4 HOURS PRN
Qty: 18 TABLET | Refills: 0 | Status: SHIPPED | OUTPATIENT
Start: 2020-03-12 | End: 2020-03-26

## 2020-03-12 ASSESSMENT — PAIN SCALES - GENERAL: PAINLEVEL: SEVERE PAIN (6)

## 2020-03-12 ASSESSMENT — MIFFLIN-ST. JEOR: SCORE: 1369.31

## 2020-03-12 NOTE — NURSING NOTE
"Chief Complaint   Patient presents with     RECHECK     Recheck wound / per Dr Cleveland- Staple removal     Medication Problem     requesting a refill on the norco- still in a lot of pain        Initial /78 (BP Location: Right arm, Patient Position: Chair, Cuff Size: Adult Regular)   Pulse 78   Temp 97.9  F (36.6  C) (Tympanic)   Ht 1.702 m (5' 7\")   Wt 71.7 kg (158 lb)   SpO2 99%   BMI 24.75 kg/m   Estimated body mass index is 24.75 kg/m  as calculated from the following:    Height as of this encounter: 1.702 m (5' 7\").    Weight as of this encounter: 71.7 kg (158 lb).  Medication Reconciliation: complete  Dena Connor LPN  "

## 2020-03-12 NOTE — PROGRESS NOTES
CLINIC NOTE - POST-OP SURGERY  3/12/2020    Patient:Constantino Warren    Procedure: Hand Assisted Laparoscopic Right Hemicolectomy    This is a 49 year old female who is 2 weeks s/p Hand Assisted Laparoscopic Right Hemicolectomy.  The patient has no complaints today.     Current Medications:  Current Outpatient Medications   Medication Sig Dispense Refill     acetaminophen (TYLENOL) 500 MG tablet Take 1,000 mg by mouth every 6 hours as needed Max acetaminophen dose: 4000 mg in 24 hours       ferrous sulfate (IRON) 325 (65 Fe) MG tablet Take 1 tablet (325 mg) by mouth 2 times daily 60 tablet 2     HYDROcodone-acetaminophen (NORCO) 5-325 MG tablet Take 1 tablet by mouth every 4 hours as needed for pain 18 tablet 0     levothyroxine 200 MCG PO tablet Take 200 mcg by mouth daily       MELATONIN PO Take 7.5 mg by mouth nightly as needed       multivitamin, therapeutic with minerals (MULTI-VITAMIN) TABS Take 1 tablet by mouth daily       sertraline (ZOLOFT) 100 MG tablet Take 100 mg by mouth daily       tiZANidine (ZANAFLEX) 4 MG tablet Take 1 tablet (4 mg) by mouth At Bedtime 30 tablet 3     traZODone (DESYREL) 50 MG tablet Take 1 tablet (50 mg) by mouth At Bedtime 30 tablet 0     EPINEPHrine (EPIPEN/ADRENACLICK/OR ANY BX GENERIC EQUIV) 0.3 MG/0.3ML injection 2-pack Inject 0.3 mg into the muscle as needed          Allergies:  Allergies   Allergen Reactions     Aspartame      Other reaction(s): Other - Describe In Comment Field, Seizures  convulsions     Bee Venom Anaphylaxis     Bupropion Anaphylaxis     Throat gets tight and hives. Denies allergic reaction     Food Anaphylaxis     Peppers-anaphylaxis if eaten, if touched hands swell and reddened     Food Allergy Formula Anaphylaxis     Peppers, patient reports green peppers close off her throat     Ibuprofen Anaphylaxis     No Clinical Screening - See Comments Anaphylaxis     Peppers, patient reports green peppers close off her throat     Piper Anaphylaxis      "Peppers--green, black, red, chili, etc. Derm symptoms, hives;  Strawberries--(cooked) Itching, breathing difficulties, GI symptoms. Mushrooms--GI symptoms, Hives.Spinach (cooked) throat irritation  (Listed on [facility] problem list.)     Fentanyl Swelling     Lips and tongue swelled.  Swelling of lips and tongue     Adhesive Tape      blisters     Amitriptyline Other (See Comments)     Respiratory symptoms - denies allergic reaction     Gabapentin Other (See Comments)     irritability     Liquid Adhesive Dermatitis     blisters  Blisters  Silk tape and tegaderm is fine     Oxcarbazepine Other (See Comments)     Throat gets tight and hives. Denies allergic reaction  Throat gets tight and hives-       Prozac [Fluoxetine]      Other reaction(s): *Unknown  uncontrolled muscle spasms     Valproic Acid      Respiratory symptoms ([facility]). Denies allergic reaction     Ziprasidone      Throat gets tight and hives. Denies allergic reaction       PHYSICAL EXAM:   Vital signs: /78 (BP Location: Right arm, Patient Position: Chair, Cuff Size: Adult Regular)   Pulse 78   Temp 97.9  F (36.6  C) (Tympanic)   Ht 1.702 m (5' 7\")   Wt 71.7 kg (158 lb)   SpO2 99%   BMI 24.75 kg/m     Weight: [unfilled]   BMI: Body mass index is 24.75 kg/m .   General: Normal, healthy, cooperative   Lungs: clear to auscultation   CV: Regular rate and rhythm without murmer   Abdominal: abdomen is soft without significant tenderness, masses, organomegaly or guarding   Wounds:  Well healed surgical scars consistent with her operation.       PATHOLOGY:  Copath Report   Date Value Ref Range Status   02/24/2020   Final    Patient Name: FELICITY RODRÍGUEZ  MR#: [id number]  Specimen #:   Collected: 2/24/2020  Received: 2/25/2020  Reported: 2/26/2020 13:34  Ordering Phy(s): SHREYA LARKIN  Additional Phy(s): CHRISTOPHER PETERS  Copy To: Cancer Registry    For improved result formatting, select 'View Enhanced " Report Format' under   Linked Documents section.    SPECIMEN(S):  A: Colon, right  B: Colon, right margins  C: Omentum resection    FINAL DIAGNOSIS:  A: Right colon, hemicolectomy  - Moderately differentiated adenocarcinoma  - One positive lymph node out of 32 lymph nodes  - Perineural invasion  - Tumor budding  - Acutely inflamed tumor and reactive lymph nodes with peritonitis    B: Bowel, margins  - Unremarkable small bowel    C: Omentum, resection  - Peritonitis, mild    Report Name: Colon and Rectum - Resection       Status: Submitted Checklist Inst: 1      Last Updated By: Rory Martines M.D., 02/26/2020 12:08:02  Part(s) Involved:  A: Colon, right    Synoptic Report:    SPECIMEN    Procedure:        - Right hemicolectomy    TUMOR    Tumor Site:        - Ileocecal valve    Histologic Type:        - Adenocarcinoma    Histologic Grade:        - G2: Moderately differentiated    Tumor Size: 5 cm    Tumor Deposits:        - Not identified    Tumor Extension:        - Tumor invades through the muscularis propria into pericolorectal   tissue    Macroscopic Tumor Perforation:        - Not identified    Lymphovascular Invasion:        - Not identified    Perineural Invasion:        - Present    Treatment Effect:        - No known presurgical therapy    MARGINS    Margins:        - All margins are uninvolved by invasive carcinoma, high-grade   dysplasia,        intramucosal adenocarcinoma, and adenoma      Margins Examined:          - Proximal          - Distal          - Radial or Mesenteric      Distance of Tumor from Radial Margin:          8 cm    LYMPH NODES    Number of Lymph Nodes Involved: 1    Number of Lymph Nodes Examined: 32    PATHOLOGIC STAGE CLASSIFICATION (PTNM, AJCC 8TH EDITION)    Primary Tumor (pT):        - pT3    Regional Lymph Nodes (pN):        - pN1a    COMMENTS   There is tumor budding    [facility] February 2019 Annual Release    Report Name: Colon and Rectum Biomarker       Status: Submitted  Checklist Inst: 2      Last Updated By: Rory Martines M.D., 02/26/2020 12:11:39  Part(s) Involved:  A: Colon, right    Synoptic Report:    RESULTS    Mismatch Repair      Immunohistochemistry (IHC) Testing for Mismatch Repair (MMR) Proteins:   Background nonneoplastic tissue /  internal control with intact nuclear          expression      IHC Interpretation:          - No loss of nuclear expression of MMR proteins: low probability   of          MSI-H      MLH1 Result:          - Intact nuclear expression      MSH2 Result:          - Intact nuclear expression      MSH6 Result:          - Intact nuclear expression      PMS2 Result:          - Intact nuclear expression    COMMENTS   Performed on biopsy .    [facility] February 2019 Annual Release    I have personally reviewed all specimens and/or slides, including the   listed special stains, and used them  with my medical judgement to determine or confirm the final diagnosis.    Electronically signed out by:    Rory Martines M.D.    CLINICAL HISTORY:  Colon cancer (H) [C18.9]; right hemicolectomy, lysis of adhesions.    GROSS:  A: The specimen is a piece of bowel and attached mesentery which is 42 cm   long, 9 cm in greatest diameter and  with the attached mesentery approximately 17 cm wide.  The mesentery is   removed.  There is a circumferential  mass 5 cm long 15 cm from the nearest end of bowel and approximately 32 cm   from the furthest end which is the  proximal end.  The tumor appears to be at the ileocecal valve.  There is   no appendix.  The  radial/circumferential margin is approximately 8 cm.  Section through the   radial margin is placed in cassette  1.  The radial margin is mesentery.  Grossly the tumor appears to extend   completely through the muscular wall  into the adhesed mesentery.  Sections through the tumor are taken   including an attempt to identify the deepest  extension of the tumor.  Grossly the margins are free and sections are    taken through each margin.  Both ends  are stapled.  The mucosa is otherwise unremarkable.  The mesentery is   removed and fixed overnight in Dissect  Aid.  It is then examined for lymph nodes.  Numerous possible lymph nodes   are found.  Summary of sections  Cassette 1: Radial margin  Cassettes 2- 4: Tumor  Cassette 5: Margins  Cassettes 6, 7, and 9: Multiple individual lymph nodes in each cassette  Cassettes 8, 10-19: One lymph node in each cassette  Cassettes 20- 21: One lymph node in two cassettes  Cassettes 22- 25: One lymph node in each cassette  (Some in 25 blocks).    B: There is a piece of bowel which is shaped like a donut consistent with   a resection ring.  It is 4 x 3.5 x 1  cm. (3 in 2 blocks).    C: There is a piece of yellow tissue consistent with omentum which is 5 x   8 x 2 cm.  There is a 2.4 cm  hemorrhagic nodule which has a deep maroon color.  A section through it is   taken.  The mesentery has a  stippled erythematous appearance but is soft.  Representative sections are   taken. (3 in 2 blocks). (Dictated  by: Rory Martines MD 2/25/2020 01:49 PM)    MICROSCOPIC:  A: A section through the radial margin is unremarkable mesentery.    Full-thickness sections through the tumor  show extension completely through the muscularis propria into desmoplastic   stroma.  There are areas of  calcification in the tumor.  There are areas of necrosis.  The tumor is   ulcerated.  Tumor is also markedly  inflamed with neutrophils.  The tumor is composed of moderately   differentiated adenocarcinoma.  There is  perineural invasion.  Tumor budding is present in the same area as   perineural invasion.  Sections of the  margin show unremarkable small and large bowel.  There are 32 lymph nodes.    One has metastatic tumor which  extends beyond the capsule.  Most of the lymph nodes have significant   reactive changes.    B: There is unremarkable small bowel.    C: There is mesentery with mild acute  peritonitis.    CPT Codes  A: 97588-UV3  B: 97513-RF2  C: 25685-SF2    COLLECTION SITE:  Client: [facility]  Location: [facility] (B)    The technical component of this testing was completed at the [facility], with the  professional component performed at the [facility], [address] [place] [address]  ([phone])           ASSESSMENT:    49 year old female who is 2 weeks s/p Hand Assisted Laparoscopic Right Hemicolectomy.  Doing well.  Stage IIIb colon cancer    PLAN:   Will remove the staples/sutures today.  This was done uneventfully. The wound was not steri-stripped.      Follow-up 2 weeks      Restrictions : Will continue lifting restrictions of no more than 10 pounds until 6 weeks after surgery

## 2020-03-12 NOTE — PATIENT INSTRUCTIONS
Thank you for allowing Dr. Cleveland and our surgical team to participate in your care. Please call our health unit coordinator at [phone] with scheduling questions or the nurse at [phone] with any other questions or concerns.

## 2020-03-16 ENCOUNTER — TELEPHONE (OUTPATIENT)
Dept: FAMILY MEDICINE | Facility: OTHER | Age: 50
End: 2020-03-16

## 2020-03-16 NOTE — TELEPHONE ENCOUNTER
Requesting VO for pt to begin homecare services, after being admitted on 3/11/2020,  weekly at this time. Please advise. Cyndee Haji LPN     [phone]

## 2020-03-18 DIAGNOSIS — C18.9 MALIGNANT NEOPLASM OF COLON, UNSPECIFIED PART OF COLON (H): ICD-10-CM

## 2020-03-18 RX ORDER — HYDROCODONE BITARTRATE AND ACETAMINOPHEN 5; 325 MG/1; MG/1
1 TABLET ORAL EVERY 4 HOURS PRN
Qty: 18 TABLET | Refills: 0 | Status: SHIPPED | OUTPATIENT
Start: 2020-03-18 | End: 2020-03-19

## 2020-03-19 ENCOUNTER — TELEPHONE (OUTPATIENT)
Dept: FAMILY MEDICINE | Facility: OTHER | Age: 50
End: 2020-03-19

## 2020-03-19 DIAGNOSIS — C18.9 MALIGNANT NEOPLASM OF COLON, UNSPECIFIED PART OF COLON (H): ICD-10-CM

## 2020-03-19 RX ORDER — HYDROCODONE BITARTRATE AND ACETAMINOPHEN 5; 325 MG/1; MG/1
1 TABLET ORAL EVERY 4 HOURS PRN
Qty: 90 TABLET | Refills: 0 | Status: SHIPPED | OUTPATIENT
Start: 2020-03-19 | End: 2020-04-30

## 2020-03-19 NOTE — TELEPHONE ENCOUNTER
Fax from Palliative care services.  Request for PT.  Approved.  Request for increase in Lortab use - needing refills.  Was only using it HS, but staff encouraging TID to stay on top of pain.  Refill Sent.  Reminder placed to use stool softener/bowel regimen to avoid constipation.

## 2020-03-24 ENCOUNTER — TELEPHONE (OUTPATIENT)
Dept: PET IMAGING | Facility: HOSPITAL | Age: 50
End: 2020-03-24

## 2020-03-24 DIAGNOSIS — C18.9 MALIGNANT NEOPLASM OF COLON, UNSPECIFIED PART OF COLON (H): Primary | ICD-10-CM

## 2020-03-24 RX ORDER — DIAZEPAM 5 MG
TABLET ORAL
Qty: 2 TABLET | Refills: 0 | Status: SHIPPED | OUTPATIENT
Start: 2020-03-24 | End: 2022-02-07

## 2020-03-24 NOTE — TELEPHONE ENCOUNTER
A reminder call was performed for the patients PET scan on 3/25. A brief voicemail was left stating the time, date and location of the exam. The nuclear medicine and scheduling department phone numbers were also given.

## 2020-03-25 ENCOUNTER — HOSPITAL ENCOUNTER (OUTPATIENT)
Dept: PET IMAGING | Facility: HOSPITAL | Age: 50
Discharge: HOME | End: 2020-03-25
Attending: INTERNAL MEDICINE | Admitting: INTERNAL MEDICINE
Payer: COMMERCIAL

## 2020-03-25 DIAGNOSIS — C18.9 MALIGNANT NEOPLASM OF COLON, UNSPECIFIED PART OF COLON (H): ICD-10-CM

## 2020-03-25 PROCEDURE — 78815 PET IMAGE W/CT SKULL-THIGH: CPT | Mod: TC,PS

## 2020-03-25 PROCEDURE — A9552 F18 FDG: HCPCS | Performed by: INTERNAL MEDICINE

## 2020-03-25 PROCEDURE — 34300033 ZZH RX 343: Performed by: INTERNAL MEDICINE

## 2020-03-25 RX ADMIN — FLUDEOXYGLUCOSE F-18 11.12 MCI.: 500 INJECTION, SOLUTION INTRAVENOUS at 09:50

## 2020-03-26 ENCOUNTER — HOSPITAL ENCOUNTER (EMERGENCY)
Facility: HOSPITAL | Age: 50
Discharge: HOME | End: 2020-03-26
Attending: NURSE PRACTITIONER | Admitting: NURSE PRACTITIONER
Payer: COMMERCIAL

## 2020-03-26 VITALS
BODY MASS INDEX: 24.91 KG/M2 | SYSTOLIC BLOOD PRESSURE: 126 MMHG | WEIGHT: 155 LBS | HEIGHT: 66 IN | DIASTOLIC BLOOD PRESSURE: 87 MMHG | RESPIRATION RATE: 16 BRPM | TEMPERATURE: 97.2 F | OXYGEN SATURATION: 98 %

## 2020-03-26 DIAGNOSIS — K04.7 DENTAL INFECTION: ICD-10-CM

## 2020-03-26 PROCEDURE — 99213 OFFICE O/P EST LOW 20 MIN: CPT | Mod: Z6 | Performed by: NURSE PRACTITIONER

## 2020-03-26 PROCEDURE — G0463 HOSPITAL OUTPT CLINIC VISIT: HCPCS

## 2020-03-26 ASSESSMENT — ENCOUNTER SYMPTOMS
CARDIOVASCULAR NEGATIVE: 1
HEMATOLOGIC/LYMPHATIC NEGATIVE: 1
ENDOCRINE NEGATIVE: 1
EYES NEGATIVE: 1
RESPIRATORY NEGATIVE: 1
PSYCHIATRIC NEGATIVE: 1
MUSCULOSKELETAL NEGATIVE: 1
NEUROLOGICAL NEGATIVE: 1
ALLERGIC/IMMUNOLOGIC NEGATIVE: 1
CONSTITUTIONAL NEGATIVE: 1

## 2020-03-26 ASSESSMENT — MIFFLIN-ST. JEOR: SCORE: 1339.83

## 2020-03-26 NOTE — DISCHARGE INSTRUCTIONS
Rinse mouth after eating and drinking  Take antibiotic as directed  Take antibiotic with food as it may upset your stomach  Return here if pain and swelling does not decrease    You can call and leave a message with your dentist to try to get you in when able

## 2020-03-26 NOTE — ED TRIAGE NOTES
Pt presents with having pain and swelling to her left lower gum and chin  for 2 days. Took Ibuprofen at 0900. Took Hydrocodone at 1100 today.

## 2020-03-26 NOTE — ED PROVIDER NOTES
History     Chief Complaint   Patient presents with     Dental Pain     The history is provided by the patient.     Constantino Warren is a 49 year old female who presents to the  for dental and swelling to the left lower jaw.  Pain with eating and drinking. She is taking hydrocodone and ibuprofen.  Ibuprofen did help with the pain and swelling.  She is trying salt water rinsing.     Allergies:  Allergies   Allergen Reactions     Aspartame      Other reaction(s): Other - Describe In Comment Field, Seizures  convulsions     Bee Venom Anaphylaxis     Bupropion Anaphylaxis     Throat gets tight and hives. Denies allergic reaction     Food Anaphylaxis     Peppers-anaphylaxis if eaten, if touched hands swell and reddened     Food Allergy Formula Anaphylaxis     Peppers, patient reports green peppers close off her throat     Ibuprofen Anaphylaxis     No Clinical Screening - See Comments Anaphylaxis     Peppers, patient reports green peppers close off her throat     Piper Anaphylaxis     Peppers--green, black, red, chili, etc. Derm symptoms, hives;  Strawberries--(cooked) Itching, breathing difficulties, GI symptoms. Mushrooms--GI symptoms, Hives.Spinach (cooked) throat irritation  (Listed on [facility] problem list.)     Fentanyl Swelling     Lips and tongue swelled.  Swelling of lips and tongue     Adhesive Tape      blisters     Amitriptyline Other (See Comments)     Respiratory symptoms - denies allergic reaction     Gabapentin Other (See Comments)     irritability     Liquid Adhesive Dermatitis     blisters  Blisters  Silk tape and tegaderm is fine     Oxcarbazepine Other (See Comments)     Throat gets tight and hives. Denies allergic reaction  Throat gets tight and hives-       Prozac [Fluoxetine]      Other reaction(s): *Unknown  uncontrolled muscle spasms     Valproic Acid      Respiratory symptoms ([facility]). Denies allergic reaction     Ziprasidone      Throat gets tight and hives.  Denies allergic reaction       Problem List:    Patient Active Problem List    Diagnosis Date Noted     S/P partial resection of colon 02/24/2020     Priority: Medium     Prediabetes      Priority: Medium     Colon cancer (H) 02/19/2020     Priority: Medium     Added automatically from request for surgery [id number]       Malignant neoplasm of colon, unspecified part of colon (H) 02/19/2020     Priority: Medium     Added automatically from request for surgery [id number]       Special screening for malignant neoplasms, colon 01/14/2020     Priority: Medium     Added automatically from request for surgery [id number]       Anastomotic ulcer S/P gastric bypass 07/21/2018     Priority: Medium     Anemia 07/21/2018     Priority: Medium     Chest pain 07/20/2018     Priority: Medium     GI bleed 07/20/2018     Priority: Medium     Abdominal pain, epigastric 07/20/2018     Priority: Medium     Hypochromic microcytic anemia 07/20/2018     Priority: Medium     Chronic migraine 01/18/2018     Priority: Medium     Chronic pain disorder 01/18/2018     Priority: Medium     Long term (current) use of opiate analgesic 01/12/2018     Priority: Medium     Overview:   Pain Diagnosis failed back syndrome, annular tear L4-5, history lumbar discectomy, s/p lumbar fusion.    Should be seen in the clinic every twelve weeks. Currently on a taper: yes, trying to wean away from the daytime hydrocodone-acetaminophen..    Plan for flares of chronic pain stretches, occasional use of more hydrocodone-acetaminophen for flare.    ED and UC: Opioid medication will not be given in the ER or Urgent Care unless there is a medical emergency unrelated to chronic pain.  Limit to 3 day supply    Refills: Refills will only be given at a scheduled visit.    [facility] Agreement for Opioid Treatment.   Opioid Agreement Date: 5/21/12  Last UDT (Urine Drug Test) on date:    Pain Dx: chronic low back pain  Last Pain Visit: 6-24-11  Preferred Pharmacy:  's  Comments:      7/23/14:  Progressing well.  Over the last month, has decreased hydrocodone-acetaminophen from 6/day to 3/day.  Awaiting appointment with [facility].       Adjustment disorder with mixed anxiety and depressed mood 12/15/2017     Priority: Medium     Other insomnia 12/15/2017     Priority: Medium     Syncope and collapse 09/26/2015     Priority: Medium     Hypertensive urgency 09/26/2015     Priority: Medium     Concussion syndrome 09/26/2015     Priority: Medium     Hydronephrosis, right 04/20/2015     Priority: Medium     Pyelonephritis 04/20/2015     Priority: Medium     Urolithiasis 04/20/2015     Priority: Medium     Annular tear of lumbar disc 12/16/2014     Priority: Medium     Overview:   MRI 2/5/13:  Previous right L4-5 hemilaminectomy and partial facetectomy with mild to moderate right L4-5 facet arthropathy and broad-based bulge of disk and end plate osteophyte on the right with partial high signal intensity zone annular tear of the undersurface of the laterally protruding disk/annulus.       Failed back syndrome of lumbar spine 12/16/2014     Priority: Medium     Overview:   Three back surgeries, including an L3-sacrum AP fusion done at [facility] in 2013.  Has been doing very well in terms of reduction of pain, with minimal requirement of opiate pain medication.       Hx of lumbar discectomy 12/16/2014     Priority: Medium     Overview:   10/2006:   L4-5 Laminectomy and diskectomy L5-S1.   12/12/11:  L5S1 microdiscectomy ([place])       Vitamin B 12 deficiency 06/13/2011     Priority: Medium     Overview:   IMO Update 10/11       Gastric bypass status for obesity 01/04/2011     Priority: Medium     Overview:   12/14/10:  laparoscopic Prasanna-en-Y gastric bypass Dr. Hassan  IMO Update 10/11       Hyperlipidemia 09/23/2008     Priority: Medium     Overview:   IMO Update 10/11       Hypothyroidism 09/23/2008     Priority: Medium     Overview:   IMO Update 10/11  Overview:   IMO Update 10/11           Past Medical History:    Past Medical History:   Diagnosis Date     Chronic pain      Prediabetes      Thyroid disease        Past Surgical History:    Past Surgical History:   Procedure Laterality Date     BACK SURGERY      2008; 2011     COLONOSCOPY N/A 2/13/2020    Procedure: COLONOSCOPY WITH BIOPSY AND TATOOING OF ASCENDING COLON,INCOMEPLETE EXAM;  Surgeon: Kang Cleveland MD;  Location: [facility]     ESOPHAGOSCOPY, GASTROSCOPY, DUODENOSCOPY (EGD), COMBINED N/A 7/21/2018    Procedure: COMBINED ESOPHAGOSCOPY, GASTROSCOPY, DUODENOSCOPY (EGD);  UPPER ENDOSCOPY WITH BIOPSIES;  Surgeon: Roger Vazquez MD;  Location: [facility]     ESOPHAGOSCOPY, GASTROSCOPY, DUODENOSCOPY (EGD), COMBINED N/A 11/21/2019    Procedure: UPPER ENDOSCOPY with biopsy;  Surgeon: Kang Cleveland MD;  Location: [facility] OR     HEAD & NECK SURGERY  2008    c4-c5 fusion     HYSTERECTOMY, CRISTIANE  04/2004    uterine cancer; [facility]     INSERT PORT VASCULAR ACCESS N/A 2/28/2020    Procedure: INSERTION, VASCULAR ACCESS PORT LEFT;  Surgeon: Knag Cleveland MD;  Location: [facility]     LAPAROSCOPIC ASSISTED COLECTOMY Right 2/24/2020    Procedure: RIGHT KVEIN COLECTOMY, LYSIS OF ADHESIONS;  Surgeon: Kang Cleveland MD;  Location: [facility]     LAPAROSCOPIC BYPASS GASTRIC  12/2010       Family History:    No family history on file.    Social History:  Marital Status:   [5]  Social History     Tobacco Use     Smoking status: Never Smoker     Smokeless tobacco: Never Used   Substance Use Topics     Alcohol use: No     Frequency: Never     Comment: sober for 18 years     Drug use: No        Medications:    acetaminophen (TYLENOL) 500 MG tablet  amoxicillin-clavulanate (AUGMENTIN) 875-125 MG tablet  diazepam (VALIUM) 5 MG tablet  ferrous sulfate (IRON) 325 (65 Fe) MG tablet  HYDROcodone-acetaminophen (NORCO) 5-325 MG tablet  levothyroxine 200 MCG PO tablet  multivitamin, therapeutic with minerals (MULTI-VITAMIN) TABS  sertraline  "(ZOLOFT) 100 MG tablet  tiZANidine (ZANAFLEX) 4 MG tablet  traZODone (DESYREL) 50 MG tablet  EPINEPHrine (EPIPEN/ADRENACLICK/OR ANY BX GENERIC EQUIV) 0.3 MG/0.3ML injection 2-pack          Review of Systems   Constitutional: Negative.    HENT: Positive for dental problem.    Eyes: Negative.    Respiratory: Negative.    Cardiovascular: Negative.    Endocrine: Negative.    Genitourinary: Negative.    Musculoskeletal: Negative.    Skin: Negative.    Allergic/Immunologic: Negative.    Neurological: Negative.    Hematological: Negative.    Psychiatric/Behavioral: Negative.        Physical Exam   BP: 126/87  Heart Rate: 89  Temp: 97.2  F (36.2  C)  Resp: 16  Height: 167.6 cm (5' 6\")  Weight: 70.3 kg (155 lb)  SpO2: 98 %      Physical Exam  Vitals signs and nursing note reviewed.   Constitutional:       Appearance: Normal appearance. She is not ill-appearing.   HENT:      Right Ear: Tympanic membrane normal.      Left Ear: Tympanic membrane normal.      Nose: Nose normal.      Mouth/Throat:      Dentition: Abnormal dentition. Dental tenderness present.      Comments: Left lower jaw,  Multiple broken teeth, swelling into lower jaw and gum line left lower side  Cardiovascular:      Rate and Rhythm: Normal rate.      Heart sounds: Normal heart sounds.   Pulmonary:      Effort: Pulmonary effort is normal. No respiratory distress.      Breath sounds: Normal breath sounds.   Abdominal:      General: Bowel sounds are normal.      Palpations: Abdomen is soft.      Tenderness: There is no abdominal tenderness.   Skin:     General: Skin is warm and dry.      Capillary Refill: Capillary refill takes less than 2 seconds.   Neurological:      Mental Status: She is alert and oriented to person, place, and time.   Psychiatric:         Mood and Affect: Mood normal.         Behavior: Behavior normal.         ED Course        Procedures               Critical Care time:  none               No results found for this or any previous visit " (from the past 24 hour(s)).    Medications - No data to display    Assessments & Plan (with Medical Decision Making)     I have reviewed the nursing notes.    I have reviewed the findings, diagnosis, plan and need for follow up with the patient.      Patient verbally educated and given appropriate education sheets for the diagnoses and has no questions.  Take medications as directed.   Follow up with your Primary Care provider if symptoms increase or if further concerns develop, return to the ER    Rinse mouth after eating and drinking  Take antibiotic as directed  Take antibiotic with food as it may upset your stomach  Return here if pain and swelling does not decrease    You can call and leave a message with your dentist to try to get you in when able       Discharge Medication List as of 3/26/2020  4:10 PM      START taking these medications    Details   amoxicillin-clavulanate (AUGMENTIN) 875-125 MG tablet Take 1 tablet by mouth 2 times daily, Disp-20 tablet,R-0, E-Prescribe             Final diagnoses:   Dental infection       3/26/2020   [facility]     Melany Valle APRN CNP  03/26/20 [id number]

## 2020-03-26 NOTE — ED AVS SNAPSHOT
[facility]  [address] [address]  Phone:  [phone]                                    Constantino Warren   MRN: [id number]    Department:  [facility]   Date of Visit:  3/26/2020           After Visit Summary Signature Page    I have received my discharge instructions, and my questions have been answered. I have discussed any challenges I see with this plan with the nurse or doctor.    ..........................................................................................................................................  Patient/Patient Representative Signature      ..........................................................................................................................................  Patient Representative Print Name and Relationship to Patient    ..................................................               ................................................  Date                                   Time    ..........................................................................................................................................  Reviewed by Signature/Title    ...................................................              ..............................................  Date                                               Time          22EPIC Rev 08/18

## 2020-03-27 ENCOUNTER — PATIENT OUTREACH (OUTPATIENT)
Dept: CARE COORDINATION | Facility: OTHER | Age: 50
End: 2020-03-27

## 2020-03-27 DIAGNOSIS — E53.8 VITAMIN B 12 DEFICIENCY: Primary | ICD-10-CM

## 2020-03-27 DIAGNOSIS — C18.9 MALIGNANT NEOPLASM OF COLON, UNSPECIFIED PART OF COLON (H): Primary | ICD-10-CM

## 2020-03-27 DIAGNOSIS — E53.8 VITAMIN B12 DEFICIENCY (NON ANEMIC): ICD-10-CM

## 2020-03-27 RX ORDER — CYANOCOBALAMIN 1000 UG/ML
1 INJECTION, SOLUTION INTRAMUSCULAR; SUBCUTANEOUS
Qty: 1 ML | Refills: 1 | Status: SHIPPED | OUTPATIENT
Start: 2020-03-27 | End: 2020-06-04

## 2020-03-27 ASSESSMENT — ACTIVITIES OF DAILY LIVING (ADL): DEPENDENT_IADLS:: INDEPENDENT

## 2020-03-27 NOTE — LETTER
[facility] [place]  [address]  [facility] [id number] [phone]      March 27, 2020      Constantino Warren  [address]  [address]   [facility] [address]      EMERGENCY CARE PLAN  Presenting Problem Treatment Plan   Questions or concerns during clinic hours    24 hour Nurse line available - Call main clinic line and follow prompts I will call the clinic directly: [phone]    24 Hour Nurse Line [phone]- Follow prompts and press option for nurse advisor  [facility] [phone]  [facility]  [address] [facility] [address]   Patient needs to schedule an appointment  I will call the scheduling team during business hours at [phone]   Same day treatment   I will call the clinic first, then urgent care if needed   Clinic Care Coordinators [facility]  RN Clinic Care Coordinator  [phone]  Oncology Care Coordinator [phone]    [phone]   Crisis Services:  Behavioral or Mental Health Behavioral Health [facility]  [phone]   Emergency treatment--Immediately CALL 911

## 2020-03-27 NOTE — PROGRESS NOTES
PRE VISIT MEDICAL ONCOLOGY     REASON FOR APPOINTMENT    Type of Cancer -   [id number]      SYMPTOMS   Symptom onset - Started year and a half ago, hospitalized for anemia with transfusions, pain in stomach. Anemia again then scope    Medical Provider Seen Initially - Went to ED felt sick    PROVIDERS  Referring MD - Dr. Cleveland  PCP - Dr. Iqbal  Surgeon - Dr. Cleveland  Other provider(s) seen for consultation or treatment -      TREATMENT TO-DATE FOR THIS CANCER  Biopsy -2-25-20    Moderately differentiated adenocarcinoma 1 positive node out of 32  Surgery - 2- hemicolectomy right sided            Oncologist - Dr. Palma    Other treatments for this Cancer   PRIOR HISTORY OF CANCER  Cancer - Uterine cancer 2004, hysterectomy total  Radiation - none  Oncologist - [facility]  Hormonal Therapy - none  RECENT IMAGING STUDIES    (list setting/date)  U/S-Mammogram -   PET -3-25-20   CT - (CAP)-2-  Bone Scan (Dexa) -   MRI - Head / Other  ECHO -  none  X-Ray - last CXR   /   EKG 2-  Other -     LABS PERTINENT TO DIAGNOSIS  Labs drawn - (list most recent type/setting/date)   BMP 2-26-20   CBC w/ Platelets 2-26-20   LDH    CMP 2-26-20   CEA 2-13-20   Creatinine 2-26-20   Other - (list)     CENTRAL LINE/PORT     PORT 2-      ADDITIONAL INFORMATION FOR BREAST AND GYN MALIGNANCIES  Age at first menses -   Age at menopause or LMP -   Age at first pregnancy -   Number of pregnancies -   Breast Fed - Yes - Duration          /   Never       HEALTH SCREENING (list most recent dates)  Mammogram - 2-7-15  Colonoscopy -2-  Dental Exam - years ago/ has some abscessed teeth  T-Dap - 10-25-13  Pneumonia - 10-1-11  Flu Shot - 11-21-19  Shingles - 1-16-20    FAMILY CANCER HISTORY (list relative/type of cancer)  Fathers fatherGrandfather-Does not know, only aware that it was rapid                  TOBACCO USE HISTORY    Never smoker     OTHER PERTINENT CANCER INFORMATION NOT IDENTIFIED ELSEWHERE    Patient  has a history of gastric bypass, she currently has a few abscessed teeth and is on antibiotic therapy. She reports approximately 25 lb wt. Loss since diagnosis. She is not able to eat solid food because of her teeth, and has been having 2-3 loose stools a day.

## 2020-03-29 DIAGNOSIS — C18.2 CANCER OF ASCENDING COLON (H): Primary | ICD-10-CM

## 2020-03-30 ENCOUNTER — TELEPHONE (OUTPATIENT)
Dept: ONCOLOGY | Facility: OTHER | Age: 50
End: 2020-03-30

## 2020-03-30 ENCOUNTER — PATIENT OUTREACH (OUTPATIENT)
Dept: CARE COORDINATION | Facility: OTHER | Age: 50
End: 2020-03-30

## 2020-03-30 ENCOUNTER — ONCOLOGY VISIT (OUTPATIENT)
Dept: ONCOLOGY | Facility: OTHER | Age: 50
End: 2020-03-30
Attending: SURGERY
Payer: COMMERCIAL

## 2020-03-30 ENCOUNTER — INFUSION THERAPY VISIT (OUTPATIENT)
Dept: INFUSION THERAPY | Facility: OTHER | Age: 50
End: 2020-03-30
Attending: INTERNAL MEDICINE
Payer: COMMERCIAL

## 2020-03-30 VITALS
OXYGEN SATURATION: 98 % | HEIGHT: 67 IN | SYSTOLIC BLOOD PRESSURE: 120 MMHG | DIASTOLIC BLOOD PRESSURE: 80 MMHG | TEMPERATURE: 97.1 F | BODY MASS INDEX: 24.26 KG/M2 | RESPIRATION RATE: 18 BRPM | HEART RATE: 90 BPM | WEIGHT: 154.54 LBS

## 2020-03-30 DIAGNOSIS — E04.1 THYROID NODULE: Primary | ICD-10-CM

## 2020-03-30 DIAGNOSIS — C18.9 MALIGNANT NEOPLASM OF COLON, UNSPECIFIED PART OF COLON (H): Primary | ICD-10-CM

## 2020-03-30 DIAGNOSIS — C18.9 MALIGNANT NEOPLASM OF COLON, UNSPECIFIED PART OF COLON (H): ICD-10-CM

## 2020-03-30 DIAGNOSIS — C18.2 MALIGNANT NEOPLASM OF ASCENDING COLON (H): ICD-10-CM

## 2020-03-30 LAB
ALBUMIN SERPL-MCNC: 3.2 G/DL (ref 3.4–5)
ALP SERPL-CCNC: 138 U/L (ref 40–150)
ALT SERPL W P-5'-P-CCNC: 37 U/L (ref 0–50)
ANION GAP SERPL CALCULATED.3IONS-SCNC: 5 MMOL/L (ref 3–14)
AST SERPL W P-5'-P-CCNC: 38 U/L (ref 0–45)
BASOPHILS # BLD AUTO: 0 10E9/L (ref 0–0.2)
BASOPHILS NFR BLD AUTO: 0 %
BILIRUB SERPL-MCNC: 0.3 MG/DL (ref 0.2–1.3)
BUN SERPL-MCNC: 16 MG/DL (ref 7–30)
CALCIUM SERPL-MCNC: 8.6 MG/DL (ref 8.5–10.1)
CHLORIDE SERPL-SCNC: 106 MMOL/L (ref 94–109)
CO2 SERPL-SCNC: 27 MMOL/L (ref 20–32)
CREAT SERPL-MCNC: 0.7 MG/DL (ref 0.52–1.04)
DIFFERENTIAL METHOD BLD: ABNORMAL
EOSINOPHIL # BLD AUTO: 0 10E9/L (ref 0–0.7)
EOSINOPHIL NFR BLD AUTO: 0 %
ERYTHROCYTE [DISTWIDTH] IN BLOOD BY AUTOMATED COUNT: 13.9 % (ref 10–15)
FERRITIN SERPL-MCNC: 12 NG/ML (ref 8–252)
GFR SERPL CREATININE-BSD FRML MDRD: >90 ML/MIN/{1.73_M2}
GLUCOSE SERPL-MCNC: 87 MG/DL (ref 70–99)
HCT VFR BLD AUTO: 32.4 % (ref 35–47)
HGB BLD-MCNC: 9.9 G/DL (ref 11.7–15.7)
IMM GRANULOCYTES # BLD: 0 10E9/L (ref 0–0.4)
IMM GRANULOCYTES NFR BLD: 0.2 %
IRON SATN MFR SERPL: 6 % (ref 15–46)
IRON SERPL-MCNC: 25 UG/DL (ref 35–180)
LDH SERPL L TO P-CCNC: 173 U/L (ref 81–234)
LYMPHOCYTES # BLD AUTO: 1.9 10E9/L (ref 0.8–5.3)
LYMPHOCYTES NFR BLD AUTO: 29.7 %
MCH RBC QN AUTO: 23.5 PG (ref 26.5–33)
MCHC RBC AUTO-ENTMCNC: 30.6 G/DL (ref 31.5–36.5)
MCV RBC AUTO: 77 FL (ref 78–100)
MONOCYTES # BLD AUTO: 0.5 10E9/L (ref 0–1.3)
MONOCYTES NFR BLD AUTO: 8.6 %
NEUTROPHILS # BLD AUTO: 3.9 10E9/L (ref 1.6–8.3)
NEUTROPHILS NFR BLD AUTO: 61.5 %
NRBC # BLD AUTO: 0 10*3/UL
NRBC BLD AUTO-RTO: 0 /100
PLATELET # BLD AUTO: 305 10E9/L (ref 150–450)
POTASSIUM SERPL-SCNC: 4.5 MMOL/L (ref 3.4–5.3)
PROT SERPL-MCNC: 7.6 G/DL (ref 6.8–8.8)
RBC # BLD AUTO: 4.22 10E12/L (ref 3.8–5.2)
SODIUM SERPL-SCNC: 138 MMOL/L (ref 133–144)
TIBC SERPL-MCNC: 446 UG/DL (ref 240–430)
WBC # BLD AUTO: 6.3 10E9/L (ref 4–11)

## 2020-03-30 PROCEDURE — 85025 COMPLETE CBC W/AUTO DIFF WBC: CPT | Mod: ZL | Performed by: INTERNAL MEDICINE

## 2020-03-30 PROCEDURE — G0463 HOSPITAL OUTPT CLINIC VISIT: HCPCS

## 2020-03-30 PROCEDURE — 80053 COMPREHEN METABOLIC PANEL: CPT | Mod: ZL | Performed by: INTERNAL MEDICINE

## 2020-03-30 PROCEDURE — 83540 ASSAY OF IRON: CPT | Mod: ZL | Performed by: INTERNAL MEDICINE

## 2020-03-30 PROCEDURE — 82378 CARCINOEMBRYONIC ANTIGEN: CPT | Mod: ZL | Performed by: INTERNAL MEDICINE

## 2020-03-30 PROCEDURE — 83615 LACTATE (LD) (LDH) ENZYME: CPT | Mod: ZL | Performed by: INTERNAL MEDICINE

## 2020-03-30 PROCEDURE — 99204 OFFICE O/P NEW MOD 45 MIN: CPT | Performed by: INTERNAL MEDICINE

## 2020-03-30 PROCEDURE — 25000128 H RX IP 250 OP 636: Performed by: INTERNAL MEDICINE

## 2020-03-30 PROCEDURE — 82728 ASSAY OF FERRITIN: CPT | Mod: ZL | Performed by: INTERNAL MEDICINE

## 2020-03-30 PROCEDURE — 83550 IRON BINDING TEST: CPT | Mod: ZL | Performed by: INTERNAL MEDICINE

## 2020-03-30 PROCEDURE — 36415 COLL VENOUS BLD VENIPUNCTURE: CPT | Mod: ZL | Performed by: INTERNAL MEDICINE

## 2020-03-30 PROCEDURE — 96523 IRRIG DRUG DELIVERY DEVICE: CPT

## 2020-03-30 RX ORDER — HEPARIN SODIUM (PORCINE) LOCK FLUSH IV SOLN 100 UNIT/ML 100 UNIT/ML
5 SOLUTION INTRAVENOUS ONCE
Status: CANCELLED | OUTPATIENT
Start: 2020-03-30

## 2020-03-30 RX ORDER — HEPARIN SODIUM (PORCINE) LOCK FLUSH IV SOLN 100 UNIT/ML 100 UNIT/ML
5 SOLUTION INTRAVENOUS ONCE
Status: COMPLETED | OUTPATIENT
Start: 2020-03-30 | End: 2020-03-30

## 2020-03-30 RX ORDER — CAPECITABINE 500 MG/1
1500 TABLET, FILM COATED ORAL 2 TIMES DAILY
Qty: 84 TABLET | Refills: 0 | Status: SHIPPED | OUTPATIENT
Start: 2020-03-30 | End: 2020-03-31

## 2020-03-30 RX ADMIN — HEPARIN 5 ML: 100 SYRINGE at 12:20

## 2020-03-30 ASSESSMENT — PAIN SCALES - GENERAL: PAINLEVEL: MODERATE PAIN (5)

## 2020-03-30 ASSESSMENT — PATIENT HEALTH QUESTIONNAIRE - PHQ9: SUM OF ALL RESPONSES TO PHQ QUESTIONS 1-9: 8

## 2020-03-30 ASSESSMENT — MIFFLIN-ST. JEOR: SCORE: 1345.69

## 2020-03-30 NOTE — TELEPHONE ENCOUNTER
Called pt to instruct iron intake to 3 x daily per Dr Palma.  She is currently taking 2 tabs twice daily; will increase to 2 tabs three times daily.  TIBC and ferritin also added to labs done today, hospital lab notified.  Informed pt that Dr Palma wants a thyroid ulltrasound.  She will be called with appointment.  Pt voiced understanding.   Isabel Angelo LPN

## 2020-03-30 NOTE — PROGRESS NOTES
Patients left  sided power port accessed using non-coring, 19 gauge, 3/4 inch needle.Mask donned by caregiver: yes Site prepped with CHG: yes Labs drawn: yes Dressing applied using aseptic technique: yes.     Port accessed per facility protocol. Port flushed easily, blood return noted.  No signs and symptoms of infection or infiltration.  Port flushed with 10mL normal saline, blood return noted, 10 mLs blood discarded. 3 tube(s) taken for ordered labs, port flushed with 20 mLs normal saline and Heparin 5mLs of 100 unit/mL . Patient discharged with no complaints.

## 2020-03-30 NOTE — PATIENT INSTRUCTIONS
Treatment plan in.  Referral to radiation therapy made.  Will begin once dental work is done.  When you are in need of a refill of your medications, please call your pharmacy and they will send us the request. If you have any questions please call [phone]

## 2020-03-30 NOTE — PROGRESS NOTES
Visit Date:   03/30/2020      HEMATOLOGY/ONCOLOGY CONSULTATION NOTE      REASON FOR CONSULTATION:  Pathologic T3 N1a M0 adenocarcinoma of the right  colon.      REQUESTING PHYSICIANS:  Kang Cleveland MD, as well as Coco Cook MD.      HISTORY OF PRESENT ILLNESS:  Ms. Warren is a 49-year-old white female with history of gastric bypass surgery, anemia.  We are asked to evaluate concerning new diagnosis of adenocarcinoma of the right colon.  Apparently, she had been monitored for anemia and was receiving B12 injections and then subsequently underwent colonoscopy performed by Dr. Cleveland, who found a near-obstructing lesion in the right colon.  Biopsy was performed via a colonoscopy on 02/13/2020.  Pathology revealed invasive moderately differentiated adenocarcinoma  dysplastic stroma.  The patient subsequently underwent laparoscopic-assisted right hemicolectomy, converted to open right hemicolectomy after Dr. Cleveland noted that the tumor was adherent to the pelvic brim.  Final pathology was read as mildly differentiated adenocarcinoma of the right colon with 1/32 lymph nodes positive.  There was peritoneal invasion with mild peritonitis noted on omental biopsy.  Margins were negative.  The patient was staged pathologic T3 N1a M0 or stage IIIB adenocarcinoma of the right colon.  The patient subsequently underwent a PET scan, which was done on 03/25, and the findings were there was no evidence of distant metastatic disease.  Limited evaluation suggested a calcified nodule in the right lobe of the thyroid gland.  Otherwise, there were postoperative changes from recent right hemicolectomy.  The patient is currently postop.  She had a port placed for anticipation of chemotherapy.  She does state that Dr. Cleveland thought she might need radiation therapy.  Otherwise, her major complaint is that she had a 30-pound weight loss over the past 3 months.  She did note that she was extremely fatigued before her diagnosis and also  has been anorexic.  Otherwise, she denies any fevers, night sweats, abdominal pain, change in bowel habits.      PAST MEDICAL HISTORY:  Significant for colon cancer, malignant neoplasm of the colon as above, prediabetes, stomach ulcer, anemia, chest pain, GI bleed, abdominal pain, hypochromic microcytic anemia, chronic migraine, chronic pain disorder, long-term current use of opiate analgesics, adjustment disorder with mixed anxiety and depressed mood, insomnia, syncope and collapse, hypertensive urgency, concussion syndrome, hydronephrosis, pyelonephritis, ureterolithiasis, annular tear of lumbar disc, failed back syndrome of lumbar spine, history of lumbar diskectomy, vitamin B12 deficiency, gastric bypass status for obesity, hyperlipidemia, hypothyroidism.      ALLERGIES:  SHE IS ALLERGIC TO ASPARTAME, BEE VENOM, BUPROPION, FOOD ALLERGY, IBUPROFEN, ,  ADHESIVE TAPE, PROZAC, GABAPENTIN, AMITRIPTYLINE, VALPROIC ACID, ZIPRASIDONE.      MEDICATIONS:  Include:   1.  Tylenol 1000 mg q. 6 hours p.r.n. pain.     2.  Augmentin 875 mg p.o. b.i.d.   3.  Vitamin B12 1000 mcg subcutaneously every 30 days.   4.  Valium 5 mg p.r.n. procedure.   5.  Ferrous sulfate 325 mg b.i.d.   6.  Norco 5/325, 1 tablet every 4 hours as needed for pain.   7.  Synthroid 200 mcg p.o. daily.   8.  Multivitamins 1 tablet daily.   9.  Zoloft 100 mg daily.   10.  Zanaflex 4 mg at bedtime.   11.  Trazodone 50 mg at bedtime.      SOCIAL HISTORY:  Tobacco is negative.  Alcohol is negative.  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      FAMILY HISTORY:  Significant for grandfather with liver cancer.      REVIEW OF SYSTEMS:   CENTRAL NERVOUS SYSTEM:  Negative for headache, change in mental status.   ENT:  Negative for hearing loss.   RESPIRATORY:  Negative for shortness of breath, cough, hemoptysis.   CARDIAC:  Negative chest pain, palpitations, orthopnea, PND.   GASTROINTESTINAL:  Negative for change in bowel habits, bright  red blood per rectum, hematemesis, abdominal pain.   MUSCULOSKELETAL:  She has teeth pain related to poor dentition.  Chronic back pain.   CONSTITUTIONAL:  Negative fevers, night sweats.  She has had a 30-pound weight loss.   HEMATOLOGIC:  Negative for easy bruisability, gingival bleeding, epistaxis.      PHYSICAL EXAMINATION:   GENERAL:  She is a thin, middle-aged white female in no acute distress.   VITAL SIGNS:  Blood pressure 120/80, pulse 90, temperature 97.1.   HEENT:  Atraumatic, normocephalic.  Oropharynx reveals poor dentition.   NECK:  Supple.   LUNGS:  Clear to auscultation and percussion.   HEART:  Regular rhythm, S1, S2 normal.   ABDOMEN:  Soft.  Normoactive bowel sounds.  No masses or tenderness.   LYMPHATICS:  No cervical, supraclavicular, axillary or inguinal nodes.   EXTREMITIES:  No edema.   NEUROLOGIC:  Nonfocal.      LABORATORY DATA:  CBC:  White count 6.3, H and H 9.9 and 32.4, platelet count 305.  BUN is 22  and creatinine 0.7.  LFTs are normal.  LDH is 173.      IMPRESSION:   1.  Pathologic T3 N1a M0 moderately differentiated adenocarcinoma of the right colon with negative margins.  Based on this, the patient would be a candidate for 4 cycles of XELOX, which is Xeloda or capecitabine given at a dose 850 mg/m2 p.o. b.i.d. on days 1 through 14 of a 21-day cycle, oxaliplatin 130 mg/m2 to be given on day 1.  The patient will receive 4 cycles based on the IDEA trial that 3 months of XELOX is equal to 6 months of FOLFOX.  We will start as soon as possible.  We discussed side effects including risk of diarrhea, hand-foot syndrome, neuropathy.  The patient agreed and wants to proceed.  We will consult Radiation Therapy as per Dr. Kang Cleveland.  I am not sure she will need radiation therapy, given the fact that she had negative margins.   2.  Anemia.  We will obtain iron studies.  The patient is currently on oral iron.  Given her history of gastric bypass, she may need IV iron.  We will check iron  studies and increase oral iron to 3 times daily.      Seventy minutes was spent with the patient, greater than half the time spent in counseling and coordinating care.         JOHNNY NAILS MD             D: 2020   T: 2020   MT: SHA      Name:     FELICITY RODRÍGUEZ   MRN:      [id number]        Account:      [id number]   :      1970           Visit Date:   2020      Document: [id number]       cc: Coco Cleveland MD

## 2020-03-30 NOTE — TELEPHONE ENCOUNTER
Clinic Care Coordination Contact  Care Team Conversations    Patient will receive xeloda 1500 mg BID on days 1 through 14 of a 21 day cycle along with oxaliplatin on day 1 of 21 day cycle.  Brooklyn Casanova RN Oncology Care Coordinator

## 2020-03-30 NOTE — NURSING NOTE
"Chief Complaint   Patient presents with     Consult     Consult malignant neoplasm of ascending colon referred by Dr Cleveland       Initial /80   Pulse 90   Temp 97.1  F (36.2  C) (Tympanic)   Resp 18   Ht 1.689 m (5' 6.5\")   Wt 70.1 kg (154 lb 8.7 oz)   SpO2 98%   BMI 24.57 kg/m   Estimated body mass index is 24.57 kg/m  as calculated from the following:    Height as of this encounter: 1.689 m (5' 6.5\").    Weight as of this encounter: 70.1 kg (154 lb 8.7 oz).  Medication Reconciliation: complete.  Immunizations and advance directives status reviewed. Pain scale =5 abdominal , PHQ-9 = 8.    Patient was assessed using the NCCN psychosocial distress thermometer. Patient rated the score as a 3. Patient rated current stressors as per scanned form. Stressors will be brought to the attention of provider or Oncology RN Care Coordinator for a score of 6 or greater or per nurses discretion.       Juana Mo LPN    "

## 2020-03-31 ENCOUNTER — TELEPHONE (OUTPATIENT)
Dept: ONCOLOGY | Facility: OTHER | Age: 50
End: 2020-03-31

## 2020-03-31 ENCOUNTER — TELEPHONE (OUTPATIENT)
Dept: FAMILY MEDICINE | Facility: OTHER | Age: 50
End: 2020-03-31

## 2020-03-31 LAB — CEA SERPL-MCNC: 0.7 UG/L (ref 0–2.5)

## 2020-03-31 RX ORDER — CAPECITABINE 500 MG/1
1500 TABLET, FILM COATED ORAL 2 TIMES DAILY
Qty: 84 TABLET | Refills: 0 | Status: SHIPPED | OUTPATIENT
Start: 2020-03-31 | End: 2020-05-29

## 2020-03-31 NOTE — TELEPHONE ENCOUNTER
Prior auth not needed on Capecitabine per Insurance. Insurance is required to notify primary provider. No follow up needed.

## 2020-03-31 NOTE — TELEPHONE ENCOUNTER
PA Initiation    Medication: Capecitabine PA-pending  Insurance Company: Merged with [facility] - Phone [phone] Fax [phone]  Pharmacy Filling the Rx: [facility] - [place], [place] - [facility] [address]  Filling Pharmacy Phone:    Filling Pharmacy Fax:    Start Date: 3/31/2020

## 2020-04-01 RX ORDER — LORAZEPAM 0.5 MG/1
0.5 TABLET ORAL EVERY 4 HOURS PRN
Qty: 30 TABLET | Refills: 2 | Status: CANCELLED | OUTPATIENT
Start: 2020-05-03

## 2020-04-01 RX ORDER — LOPERAMIDE HCL 2 MG
CAPSULE ORAL
Qty: 30 CAPSULE | Refills: 0 | Status: CANCELLED | OUTPATIENT
Start: 2020-05-03

## 2020-04-01 RX ORDER — PROCHLORPERAZINE MALEATE 10 MG
10 TABLET ORAL EVERY 6 HOURS PRN
Qty: 30 TABLET | Refills: 2 | Status: CANCELLED | OUTPATIENT
Start: 2020-05-03

## 2020-04-01 ASSESSMENT — ACTIVITIES OF DAILY LIVING (ADL): DEPENDENT_IADLS:: INDEPENDENT

## 2020-04-01 NOTE — PROGRESS NOTES
Clinic Care Coordination Contact    Clinic Care Coordination Contact  OUTREACH    Referral Information:  Referral Source: Other, specify(kathy)         Chief Complaint   Patient presents with     Clinic Care Coordination - Face To Face        Universal Utilization:      Utilization    Last refreshed: 4/1/2020  2:04 PM:  Hospital Admissions 3           Last refreshed: 4/1/2020  2:04 PM:  ED Visits 5           Last refreshed: 4/1/2020  2:04 PM:  No Show Count (past year) 5              Current as of: 4/1/2020  2:04 PM              Clinical Concerns:  Current Medical Concerns:  Wt loss    Current Behavioral Concerns: none    Education Provided to patient: Education provided about the plan of care, her increase risk for infection. She will need to start 2 weeks after teeth removal.      Health Maintenance Reviewed:    Clinical Pathway: None    Medication Management:  independent     Functional Status:  Dependent ADLs:: Ambulation-walker  Dependent IADLs:: Independent  Bed or wheelchair confined:: No  Mobility Status: Independent    Living Situation:  Current living arrangement:: I live in a private home with family  Type of residence:: Private home - stairs    Lifestyle & Psychosocial Needs:                 Buddhism or spiritual beliefs that impact treatment:: No  Mental health DX:: Yes  Mental health DX how managed:: Medication, Outpatient Counseling  Mental health management concern (GOAL):: No  Informal Support system:: Family, Friends   Socioeconomic History     Marital status:      Spouse name: Not on file     Number of children: Not on file     Years of education: Not on file     Highest education level: Not on file     Tobacco Use     Smoking status: Never Smoker     Smokeless tobacco: Never Used   Substance and Sexual Activity     Alcohol use: No     Frequency: Never     Comment: sober for 18 years     Drug use: No     Sexual activity: Never               Resources and Interventions:  Current Resources:    List of home care services:: Physicial Therapy, Skilled Nursing  Community Resources: None  Supplies used at home:: None  Equipment Currently Used at Home: walker, standard         Referrals Placed: Other internal (nutrition)     Goals:       Patient/Caregiver understanding: verbalizes understanding       Future Appointments              In 5 days HIUS2 [facility] ULTRASOUND, [facility]    In 1 week Dayday Bryant MD [facility] Radiation Oncology, [facility]          Plan: capecitabine and oxaliplatin on a 21 day cycle. Xeloda orally on days 1-14 and oxaliplatin on day 1.

## 2020-04-01 NOTE — PROGRESS NOTES
Face to Face Oncology Visit      The list of resources in the New Patient Folder, represent the items given to the patient to assist the patient during their cancer journey.  During the face to face visit, with the Oncology RN Care Coordination, the patient was given an explanation of the resources.        New Patient Folder -        o ACS card      o Nutrition Eating before, during and after chemotherapy (ACS)  o Aromatherapy   o Understanding Vaccinations and Cancer    o Life After Treatment - The Next Chapter in Your Survivorship Journey (ACS)    o Being a Caregiver (ACS)  o Understanding Clinical Trials  o [facility]      ONCOLOGY DEPARTMENT CONTACT INFORMATION  o [facility] Care Plan - Discussed and explained the purpose and use of this form          Spiritual needs none      Nutritional Concerns (describe)  Patient had approximately 30 lb weight loss.  Patient is having front teeth pulled on 3-31-20. She had reported at previous encounter that she had difficulty eating because of her teeth. Nutrition referral will be placed.      Pharmacy  o Pharmacy review of all medications prior to treatment discussed  o Chemo Education purpose discussed       Physical Health Concerns        Clinical Trials explained and discussed           Rev 4/9/19/[place]

## 2020-04-01 NOTE — TELEPHONE ENCOUNTER
PRIOR AUTHORIZATION DENIED    Medication: Capecitabine PA-denied    Denial Date: 3/31/2020    Denial Rational: Sending in free durg program    Appeal Information: N/A    Free Drug Application Initiated  Medication: Capecitabine  Sponsor: [employer]  Phone #[phone]  Fax #[phone]  Additional Information: both patient and HCP portions are sent in.

## 2020-04-05 ENCOUNTER — TELEPHONE (OUTPATIENT)
Dept: ONCOLOGY | Facility: OTHER | Age: 50
End: 2020-04-05

## 2020-04-06 ENCOUNTER — HOSPITAL ENCOUNTER (OUTPATIENT)
Dept: ULTRASOUND IMAGING | Facility: HOSPITAL | Age: 50
Discharge: HOME | End: 2020-04-06
Attending: INTERNAL MEDICINE | Admitting: INTERNAL MEDICINE
Payer: COMMERCIAL

## 2020-04-06 DIAGNOSIS — E04.1 THYROID NODULE: ICD-10-CM

## 2020-04-06 PROCEDURE — 76536 US EXAM OF HEAD AND NECK: CPT | Mod: TC

## 2020-04-09 ENCOUNTER — PATIENT OUTREACH (OUTPATIENT)
Dept: CARE COORDINATION | Facility: OTHER | Age: 50
End: 2020-04-09

## 2020-04-09 DIAGNOSIS — C18.9 MALIGNANT NEOPLASM OF COLON, UNSPECIFIED PART OF COLON (H): Primary | ICD-10-CM

## 2020-04-09 NOTE — PROGRESS NOTES
Clinic Care Coordination Contact  Care Team Conversations    TC to patient inquiring about free drug application. She has not received approval for the free drug yet. She states that she should know by tomorrow morning. Requested a call as soon as she finds out so that we can get her scheduled. She verbalizes understanding.  Brooklyn Casanova RN Oncology Care Coordinator

## 2020-04-10 ENCOUNTER — VIRTUAL VISIT (OUTPATIENT)
Dept: RADIATION ONCOLOGY | Facility: HOSPITAL | Age: 50
End: 2020-04-10
Attending: RADIOLOGY
Payer: COMMERCIAL

## 2020-04-10 ENCOUNTER — TELEPHONE (OUTPATIENT)
Dept: ONCOLOGY | Facility: OTHER | Age: 50
End: 2020-04-10

## 2020-04-10 DIAGNOSIS — C18.2 CANCER OF ASCENDING COLON (H): Primary | ICD-10-CM

## 2020-04-10 PROCEDURE — 99443 ZZC PHYSICIAN TELEPHONE EVALUATION 21-30 MIN: CPT | Performed by: RADIOLOGY

## 2020-04-10 RX ORDER — LOPERAMIDE HCL 2 MG
CAPSULE ORAL
Qty: 30 CAPSULE | Refills: 0 | Status: SHIPPED | OUTPATIENT
Start: 2020-04-10 | End: 2020-09-03

## 2020-04-10 RX ORDER — LORAZEPAM 0.5 MG/1
0.5 TABLET ORAL EVERY 4 HOURS PRN
Qty: 30 TABLET | Refills: 2 | Status: SHIPPED | OUTPATIENT
Start: 2020-04-10 | End: 2020-09-03

## 2020-04-10 RX ORDER — PROCHLORPERAZINE MALEATE 10 MG
10 TABLET ORAL EVERY 6 HOURS PRN
Qty: 30 TABLET | Refills: 2 | Status: SHIPPED | OUTPATIENT
Start: 2020-04-10 | End: 2020-09-03

## 2020-04-10 NOTE — PROGRESS NOTES
Clinic Care Coordination Contact  Care Team Conversations    Patient called and has been approved for free drug. Will alert our  to schedule patient for her chemotherapy.  Brooklyn Casanova RN Oncology Care Coordinator

## 2020-04-10 NOTE — TELEPHONE ENCOUNTER
Constantino was approved for the free drug program for her Xeloda through the [employer] patient asistance program. This is coming from [facility].     Free Drug Application Approved  Effective Dates:04/10/2020 until she is not taking this anymore.  Patient notified?Yes  Additional Information: Constantino was informed and I told her she should be receiving a call within the next couple days to set up delivery of medication.

## 2020-04-10 NOTE — PROGRESS NOTES
"  [facility]  DEPARTMENT OF RADIATION ONCOLOGY  CONSULTATION NOTE (Virtual Visit via Telephone)    Name: Constantino Warren MRN: [id number]   : 1970 (49 year old)  Date of Service: 04/10/2020 Referring: Dr. Palma       Reason for consultation: Radiation Oncology is seeing the patient for evaluation and management of cancer of ascending colon (curative intent).   Diagnosis and Cancer Staging  Cancer of ascending colon (H)  Staging form: Colon and Rectum, AJCC 8th Edition  - Clinical stage from 3/4/2020: Stage IIIB (cT3, cN1a, cM0) - Signed by Kang Cleveland MD on 3/4/2020      History of Present Illness   The patient preferred a telephone rather than video visit. She was therefore notified of following: \"This telephone visit will be conducted via a call between you and your physician/provider. We have found that certain health care needs can be provided without the need for a physical exam. This service lets us provide the care you need with a telephone conversation. If a prescription is necessary we can send it directly to your pharmacy. If lab work is needed we can place an order for that and you can then stop by our lab to have the test done at a later time. If during the course of the call the physician/provider feels a telephone visit is not appropriate, you will not be charged for this service.\" I reviewed and updated the patient's medical, surgical, social, and family histories and the medication list.    The patient is a very pleasant 49-year-old whom medical oncology and colo-rectal surgery asked me see in consultation about the role of postoperative radiation therapy for locally advanced cancer. The services graciously reviewed the case with me. Among her pertinent co-mordities are extensive intraabdominal adhesions after prior gastric bypass surgery and recent bowel surgery (increases the risks associated with abdominopelvic radiation therapy). In the setting of worsening " fatigue, unintentional weight loss (30-lb over 3 months), and anemia despite B12 injections, a colonoscopy on 2/13/2020 revealed a large mass in the right colon. A biopsy of the near-obstructing mass yielded a moderately differentiated adenocarcinoma.     On 2/24/2020, surgery performed a right hemicolectomy and partial omentectomy. The operative report described extensive adhesions that led to conversion from a laparoscopic procedure to an open procedure. The surgeon reviewed the procedure in detail with me including areas of suspicious adhesions to the right pelvic brim/sidewall, exploration of the gutter (peritoneum, muscle, and omental findings), and interventions (clip placement) to assist delivery of adjuvant radiation therapy if indicated after surgery. We also discussed inherent limitations of pathologic analysis of a large specimen such as that submitted. The pathology yielded a mildly differentiated adenocarcinoma (MMR preserved, MSI low probability) with regional metastatic disease in 1/32 lymph nodes and peritoneal invasion ([id number]). However, all margins were grossly and microscopically negative (8-cm negative radial margin). A PET-CT on 3/2/2020 was negative for residual locoregional or metastatic disease. Medical oncology plans to start adjuvant systemic therapy (Xeloda) this month. Surgery counseled the patient that radiation therapy might be indicated because of the intraoperative findings. The patient is pleased with her ongoing recovery from surgery. She remains functionally independent and easily cares for her growing family. She continues to ambulate with a wheeled walker. The patient denies relative or absolute contraindications to radiation therapy such as a history of Paget's disease of the bone, fibrous dysplasia, Crohn's disease, ulcerative colitis, and other chronic gastrointestinal complaints. She denies hip replacement, hip surgery, avascular necrosis, and significant  osteoarthritis. She denies a history of radiation exposure, chemotherapy, or collagen vascular diseases,.    Past Medical History:  Past Medical History:   Diagnosis Date     Cancer (H)     colon     Cancer (H) 2004    uterine     Chronic pain      Depressive disorder      Diabetes (H)      History of blood transfusion      Hypertension      Prediabetes      Thyroid disease           Past Surgical History:  Past Surgical History:   Procedure Laterality Date     BACK SURGERY      2008; 2011     COLONOSCOPY N/A 2/13/2020    Procedure: COLONOSCOPY WITH BIOPSY AND TATOOING OF ASCENDING COLON,INCOMEPLETE EXAM;  Surgeon: Kang Cleveland MD;  Location: [facility]     ESOPHAGOSCOPY, GASTROSCOPY, DUODENOSCOPY (EGD), COMBINED N/A 7/21/2018    Procedure: COMBINED ESOPHAGOSCOPY, GASTROSCOPY, DUODENOSCOPY (EGD);  UPPER ENDOSCOPY WITH BIOPSIES;  Surgeon: Roger Vazquez MD;  Location: [facility] OR     ESOPHAGOSCOPY, GASTROSCOPY, DUODENOSCOPY (EGD), COMBINED N/A 11/21/2019    Procedure: UPPER ENDOSCOPY with biopsy;  Surgeon: Kang Cleveland MD;  Location: [facility] OR     HEAD & NECK SURGERY  2008    c4-c5 fusion     HYSTERECTOMY, [facility]  04/2004    uterine cancer; [facility]     INSERT PORT VASCULAR ACCESS N/A 2/28/2020    Procedure: INSERTION, VASCULAR ACCESS PORT LEFT;  Surgeon: Kang Cleveland MD;  Location: [facility] OR     LAPAROSCOPIC ASSISTED COLECTOMY Right 2/24/2020    Procedure: RIGHT KEVIN COLECTOMY, LYSIS OF ADHESIONS;  Surgeon: Kang Cleveland MD;  Location: [facility] OR     LAPAROSCOPIC BYPASS GASTRIC  12/2010          Chemotherapy History: No.  Radiation History: No.  Implanted Cardiac Devices: No.    Medications:  Current Outpatient Medications   Medication Sig Dispense Refill     acetaminophen (TYLENOL) 500 MG tablet Take 1,000 mg by mouth every 6 hours as needed Max acetaminophen dose: 4000 mg in 24 hours       capecitabine (XELODA) 500 MG tablet Take 3 tablets (1,500 mg) by mouth 2 times daily (Patient not  taking: Reported on 4/9/2020) 84 tablet 0     cyanocobalamin (CYANOCOBALAMIN) 1000 MCG/ML injection Inject 1 mL (1,000 mcg) Subcutaneous every 30 days Please include syringe with medication. 1 mL 1     diazepam (VALIUM) 5 MG tablet Take 1-2 tablets one hour prior to exam (Patient not taking: Reported on 3/30/2020) 2 tablet 0     EPINEPHrine (EPIPEN/ADRENACLICK/OR ANY BX GENERIC EQUIV) 0.3 MG/0.3ML injection 2-pack Inject 0.3 mg into the muscle as needed        ferrous sulfate (IRON) 325 (65 Fe) MG tablet Take 1 tablet (325 mg) by mouth 2 times daily 60 tablet 2     HYDROcodone-acetaminophen (NORCO) 5-325 MG tablet Take 1 tablet by mouth every 4 hours as needed for pain 90 tablet 0     levothyroxine 200 MCG PO tablet Take 200 mcg by mouth daily       loperamide (IMODIUM) 2 MG capsule Start with 2 caps (4 mg), then take one cap (2 mg) after each diarrheal stool as needed. Do not use more than 8 caps (16 mg) per day. 30 capsule 0     LORazepam (ATIVAN) 0.5 MG tablet Take 1 tablet (0.5 mg) by mouth every 4 hours as needed (Anxiety, Nausea/Vomiting or Sleep) 30 tablet 2     multivitamin, therapeutic with minerals (MULTI-VITAMIN) TABS Take 1 tablet by mouth daily       penicillin V (VEETID) 500 MG tablet Take 500 mg by mouth daily       prochlorperazine (COMPAZINE) 10 MG tablet Take 1 tablet (10 mg) by mouth every 6 hours as needed (Nausea/Vomiting) 30 tablet 2     sertraline (ZOLOFT) 100 MG tablet Take 100 mg by mouth daily       tiZANidine (ZANAFLEX) 4 MG tablet Take 1 tablet (4 mg) by mouth At Bedtime 30 tablet 3     traZODone (DESYREL) 50 MG tablet Take 1 tablet (50 mg) by mouth At Bedtime 30 tablet 0       Allergies:  Allergies   Allergen Reactions     Aspartame      Other reaction(s): Other - Describe In Comment Field, Seizures  convulsions     Bee Venom Anaphylaxis     Bupropion Anaphylaxis     Throat gets tight and hives. Denies allergic reaction     Food Anaphylaxis     Peppers-anaphylaxis if eaten, if touched  hands swell and reddened     Food Allergy Formula Anaphylaxis     Peppers, patient reports green peppers close off her throat     Ibuprofen Anaphylaxis     No Clinical Screening - See Comments Anaphylaxis     Peppers, patient reports green peppers close off her throat     Piper Anaphylaxis     Peppers--green, black, red, chili, etc. Derm symptoms, hives;  Strawberries--(cooked) Itching, breathing difficulties, GI symptoms. Mushrooms--GI symptoms, Hives.Spinach (cooked) throat irritation  (Listed on [facility] problem list.)     Fentanyl Swelling     Lips and tongue swelled.  Swelling of lips and tongue     Adhesive Tape      blisters     Amitriptyline Other (See Comments)     Respiratory symptoms - denies allergic reaction     Gabapentin Other (See Comments)     irritability     Liquid Adhesive Dermatitis     blisters  Blisters  Silk tape and tegaderm is fine     Oxcarbazepine Other (See Comments)     Throat gets tight and hives. Denies allergic reaction  Throat gets tight and hives-       Prozac [Fluoxetine]      Other reaction(s): *Unknown  uncontrolled muscle spasms     Valproic Acid      Respiratory symptoms ([facility]). Denies allergic reaction     Ziprasidone      Throat gets tight and hives. Denies allergic reaction       Social History:  Social History     Tobacco Use     Smoking status: Never Smoker     Smokeless tobacco: Never Used   Substance Use Topics     Alcohol use: No     Frequency: Never     Comment: sober for 18 years     Drug use: No   . Lives with an adopted 13-year-old son. Also has adopted 18-year-old son. Will soon adopt another son (currently in utero).    Family History:  Family History   Problem Relation Age of Onset     Diabetes Mother      Hypertension Mother      Coronary Artery Disease Mother      Myocardial Infarction Mother      Hypertension Father      Diabetes Maternal Grandmother      Hypertension Maternal Grandfather      Coronary Artery Disease  Maternal Grandfather      Cancer Paternal Grandfather         unsure of type of cancer     Bladder Cancer Paternal Grandfather      Diabetes Maternal Aunt      Cerebrovascular Disease Maternal Aunt      Diabetes Maternal Aunt      Cerebrovascular Disease Maternal Aunt      Diabetes Maternal Aunt      Cerebrovascular Disease Maternal Aunt      Hyperlipidemia No family hx of      Breast Cancer No family hx of      Colon Cancer No family hx of      Prostate Cancer No family hx of      Thyroid Disease No family hx of      Anesthesia Reaction No family hx of      Asthma No family hx of      Genetic Disorder No family hx of    No other cancers in first or second-degree relatives.    Review of Systems   A 10-point review of systems was performed. Pertinent findings are noted in the HPI.  Pain: Improving incisional pain. Well tolerated.  Constitutional: Improving fatigue. Denies fevers.  CV: Denies chest discomfort.  Resp: Denies dyspnea. Denies cough.  GI: Denies nausea, vomiting, cramping, constipation, diarrhea.   : Denies changes in urinary habits.  Neuro: Denies headache, focal weakness, or focal sensory changes.  Psych: Aware that radiation therapy is not likely to be recommended.  Allergy/Immn: Denies new adverse reactions to current medications or foods.    Physical Exam   KPS: 80.  ECOG Status: 1 (Restricted in physically strenuous activity but ambulatory and able to carry out work of a light or sedentary nature)    Vitals:  There were no vitals taken for this visit.    Pain: 2/10 incision pain managed with hydrocodone/APAP as needed.    Physical Exam (derived from recent oncology and surgical notes):  General:  She is a thin, middle-aged white female in no acute distress.   HEENT:  Atraumatic, normocephalic. Oropharynx reveals poor dentition.   Neck:  Supple.   Lungs:  Clear to auscultation and percussion.   Heart:  Regular rhythm, S1, S2 normal.   Abdomen:  Soft. Normoactive bowel sounds. No masses or  tenderness.   Lymphatics:  No cervical, supraclavicular, axillary or inguinal nodes.   Extremities:  No edema.   Neurologic:  Nonfocal.     Imaging/Path/Labs   Imaging: See HPI  Path: See HPI  Labs: See HPI    Information Review   We counseled the patient about COVID-19 risks, precautions, and potential impact on his radiation therapy. The patient felt well. She denied known exposure to COVID-19, risky behavior, or symptoms such as fever, cough, dyspnea, chest discomfort, anosmia, malaise, confusion, blue-colored lips or face, or other recent systemic or upper respiratory complaints.    I reviewed the case with the patient, evaluated her, and discussed her treatment options and risks of therapy. I reviewed the medical records, radiographic information, and pathologic studies. I reviewed the imaging studies via PACS. I reviewed the nursing and patient intake sheets. I offered to speak with family members and friends today by speakerphone. The patient declined my offer but granted me permission to speak with them if they contact me or come to clinic. The patient is a good historian, began the consultation with good insights into the disease process, and acknowledged its potentially poor consequences. She educated herself in-depth through a variety of educational media including the Internet. She demonstrated good comprehension of our discussion and explored the treatment options with good understanding. The patient asked pertinent questions and insightful follow-up questions. She previously accepted referral to our social work staff for additional resources including potential counseling. The patient granted me permission to exchange medical information and records with other physicians. No therapeutic radiation protocols for the patient's disease are available at our Center. Visit time: 9:30AM-10:05AM.    Impression/Plan   In summary, I recommend against operative radiation therapy to the right pelvic brim/sidewall.  Of the portion of the right colon grossly involved with disease was adherent in this region, the final pathology indicated grossly and microscopically negative margins within the limits of evaluation of a large specimen. The pathologic staging is [id number] rather than [id number] no disease was grossly left at site during the operation, and no gross disease was visible on her recent postoperative PET-CT. Therefore no clear indication for postoperative radiation therapy to improve local control is present, either clinically or based on guidelines such as the NCCN. The treatment would also likely not provide a survival benefit. The clips at the site of greatest suspicion for microscopic residual disease will assist future radiographic evaluation of the region. The patient will therefore proceed with adjuvant chemotherapy to address her presumed subclinical/microsopic burden of disease and will not receive localized postoperative radiation therapy to the right pelvic sidewall/brim. The patient can see us as needed.     During our extended visit, the patient and I discussed her diagnosis of a right-sided colon cancer, differences in management from an anal cancer and rectal cancer, definition of the risk groups, the natural history of spread, patterns of failure after various treatments, and good prognosis with adjuvant therapy. We discussed the limitations of pathologic as well as radiographic studies such as MRI, CT, or radiopharmaceuticals in identifying microscopic residual disease. We reviewed the concept of multimodality care, its evolution in the treatment of colon cancer, and the relative contribution of each modality to the treatment paradigm. We discussed the beneficial role of radiation therapy in the context of the evolving standards of care and national guidelines such as the NCCN, ACR, and STEVE. We reviewed the limited indications for radiation therapy to the colon  region. We reviewed the nature of external  beam radiation therapy from a linac. We discussed in detail the relative risks, benefits, rationale, potential side effects, alternatives, and adjuncts to limited pelvic radiation therapy. I wound not anticipate significant toxicity from the treatment. The toxicities include but are not limited to compromises of the gastrointestinal tract, pelvic bones, surgical anastomosis, marrow, and other organs as well as systemic toxicities such as fatigue and long-term risks such as second malignancy. The patient wished to proceed as noted above without radiation therapy. We answered all questions to her satisfaction. Thank you for allowing me to participate in the care of this very pleasant patient.      Dayday Bryant MD, PhD

## 2020-04-15 ENCOUNTER — DOCUMENTATION ONLY (OUTPATIENT)
Dept: ONCOLOGY | Facility: OTHER | Age: 50
End: 2020-04-15

## 2020-04-15 ENCOUNTER — TELEPHONE (OUTPATIENT)
Dept: INFUSION THERAPY | Facility: OTHER | Age: 50
End: 2020-04-15

## 2020-04-15 ENCOUNTER — VIRTUAL VISIT (OUTPATIENT)
Dept: INFUSION THERAPY | Facility: OTHER | Age: 50
End: 2020-04-15
Attending: INTERNAL MEDICINE
Payer: COMMERCIAL

## 2020-04-15 DIAGNOSIS — C18.2 CANCER OF ASCENDING COLON (H): Primary | ICD-10-CM

## 2020-04-15 PROCEDURE — G0463 HOSPITAL OUTPT CLINIC VISIT: HCPCS | Mod: TEL

## 2020-04-15 RX ORDER — SODIUM CHLORIDE 9 MG/ML
1000 INJECTION, SOLUTION INTRAVENOUS CONTINUOUS PRN
Status: CANCELLED
Start: 2020-04-17

## 2020-04-15 RX ORDER — MEPERIDINE HYDROCHLORIDE 50 MG/ML
25 INJECTION INTRAMUSCULAR; INTRAVENOUS; SUBCUTANEOUS EVERY 30 MIN PRN
Status: CANCELLED | OUTPATIENT
Start: 2020-04-17

## 2020-04-15 RX ORDER — LORAZEPAM 2 MG/ML
0.5 INJECTION INTRAMUSCULAR EVERY 4 HOURS PRN
Status: CANCELLED
Start: 2020-04-17

## 2020-04-15 RX ORDER — ALBUTEROL SULFATE 0.83 MG/ML
2.5 SOLUTION RESPIRATORY (INHALATION)
Status: CANCELLED | OUTPATIENT
Start: 2020-04-17

## 2020-04-15 RX ORDER — METHYLPREDNISOLONE SODIUM SUCCINATE 125 MG/2ML
125 INJECTION, POWDER, LYOPHILIZED, FOR SOLUTION INTRAMUSCULAR; INTRAVENOUS
Status: CANCELLED
Start: 2020-04-17

## 2020-04-15 RX ORDER — EPINEPHRINE 0.3 MG/.3ML
0.3 INJECTION SUBCUTANEOUS EVERY 5 MIN PRN
Status: CANCELLED | OUTPATIENT
Start: 2020-04-17

## 2020-04-15 RX ORDER — LIDOCAINE/PRILOCAINE 2.5 %-2.5%
CREAM (GRAM) TOPICAL PRN
Qty: 30 G | Refills: 1 | Status: SHIPPED | OUTPATIENT
Start: 2020-04-15 | End: 2021-12-21

## 2020-04-15 RX ORDER — NALOXONE HYDROCHLORIDE 0.4 MG/ML
.1-.4 INJECTION, SOLUTION INTRAMUSCULAR; INTRAVENOUS; SUBCUTANEOUS
Status: CANCELLED | OUTPATIENT
Start: 2020-04-17

## 2020-04-15 RX ORDER — ALBUTEROL SULFATE 90 UG/1
1-2 AEROSOL, METERED RESPIRATORY (INHALATION)
Status: CANCELLED
Start: 2020-04-17

## 2020-04-15 RX ORDER — EPINEPHRINE 1 MG/ML
0.3 INJECTION, SOLUTION, CONCENTRATE INTRAVENOUS EVERY 5 MIN PRN
Status: CANCELLED | OUTPATIENT
Start: 2020-04-17

## 2020-04-15 RX ORDER — HEPARIN SODIUM (PORCINE) LOCK FLUSH IV SOLN 100 UNIT/ML 100 UNIT/ML
5 SOLUTION INTRAVENOUS
Status: CANCELLED | OUTPATIENT
Start: 2020-04-17

## 2020-04-15 RX ORDER — HEPARIN SODIUM,PORCINE 10 UNIT/ML
5 VIAL (ML) INTRAVENOUS
Status: CANCELLED | OUTPATIENT
Start: 2020-04-17

## 2020-04-15 RX ORDER — DIPHENHYDRAMINE HYDROCHLORIDE 50 MG/ML
50 INJECTION INTRAMUSCULAR; INTRAVENOUS
Status: CANCELLED
Start: 2020-04-17

## 2020-04-15 NOTE — TELEPHONE ENCOUNTER
When looking at the dose of oxaliplatin there is a discrepancy. in the treatment plan the orders state 130mg/m2 day one. The article also states oxliplatin 130mg/m2 day one. Your consultation note states 100mg/m2 day one. Please advise on correct dosing.

## 2020-04-15 NOTE — PROGRESS NOTES
[facility] Chemotherapy Consult    The inpatient pharmacist has been consulted to review the patient's chart for  the following: any significant drug interactions between home medications, new take home medications, pre-medications, PRN medications, chemotherapy agents, and chemotherapy regimen.     Current Outpatient Medications   Medication Sig Dispense Refill     acetaminophen (TYLENOL) 500 MG tablet Take 1,000 mg by mouth every 6 hours as needed Max acetaminophen dose: 4000 mg in 24 hours       capecitabine (XELODA) 500 MG tablet Take 3 tablets (1,500 mg) by mouth 2 times daily (Patient not taking: Reported on 4/9/2020) 84 tablet 0     cyanocobalamin (CYANOCOBALAMIN) 1000 MCG/ML injection Inject 1 mL (1,000 mcg) Subcutaneous every 30 days Please include syringe with medication. 1 mL 1     diazepam (VALIUM) 5 MG tablet Take 1-2 tablets one hour prior to exam (Patient not taking: Reported on 3/30/2020) 2 tablet 0     EPINEPHrine (EPIPEN/ADRENACLICK/OR ANY BX GENERIC EQUIV) 0.3 MG/0.3ML injection 2-pack Inject 0.3 mg into the muscle as needed        ferrous sulfate (IRON) 325 (65 Fe) MG tablet Take 1 tablet (325 mg) by mouth 2 times daily 60 tablet 2     HYDROcodone-acetaminophen (NORCO) 5-325 MG tablet Take 1 tablet by mouth every 4 hours as needed for pain 90 tablet 0     levothyroxine 200 MCG PO tablet Take 200 mcg by mouth daily       loperamide (IMODIUM) 2 MG capsule Start with 2 caps (4 mg), then take one cap (2 mg) after each diarrheal stool as needed. Do not use more than 8 caps (16 mg) per day. 30 capsule 0     LORazepam (ATIVAN) 0.5 MG tablet Take 1 tablet (0.5 mg) by mouth every 4 hours as needed (Anxiety, Nausea/Vomiting or Sleep) 30 tablet 2     multivitamin, therapeutic with minerals (MULTI-VITAMIN) TABS Take 1 tablet by mouth daily       penicillin V (VEETID) 500 MG tablet Take 500 mg by mouth daily       prochlorperazine (COMPAZINE) 10 MG tablet Take 1 tablet  (10 mg) by mouth every 6 hours as needed (Nausea/Vomiting) 30 tablet 2     sertraline (ZOLOFT) 100 MG tablet Take 100 mg by mouth daily       tiZANidine (ZANAFLEX) 4 MG tablet Take 1 tablet (4 mg) by mouth At Bedtime 30 tablet 3     traZODone (DESYREL) 50 MG tablet Take 1 tablet (50 mg) by mouth At Bedtime 30 tablet 0       Take home medications:       Pre-Treats:       PRN medications:       Chemotherapy agents:       Cancer Being Treated: colorectal cancer    Difference in Regimen Ordered vs. Standard Regimen: n/a     The following interactions were found and will require patient education:     Drug-Drug interactions:   1. Hydrocodone, lorazepam, prochlorperazine, trazodone, tizanidine, diazepam: increased risk of respiratory and CNS depression  2. Hydrocodone, ondansetron, trazodone: increased risk of serotonin syndrome  3. Prochloperazine, trazodone, tizanidine, sertraline: increased risk of QT-interval prolongation     Drug-Allergy interactions: n/a     Drug-Food interactions:   1. Hydrocodone & grapefruit juice: may result in increased hydrocodone exposure  2. Diazepam & grapefruit juice: may result in increased plasma concentrations of diazepam   3. Sertraline & grapefruit juice: may result in elevated serum concentrations and increased risk of adverse effects  4. Ferrous sulfate & dairy foods: may result in decreased iron bioavailability      Drug-Ethanol interactions:   1. Acetaminophen: may result in increased risk of hepatotoxicity  2. Hydrocodone/acetaminophen: may result in increased risk of hepatotoxicity   3. Hydrocodone: may result in increased hydrocodone exposure and risk of fatal overdose  4. Trazodone: increased risk of CNS depression  5. Diazepam & lorazepam: increased sedation  6. Prochlorperazine: increased CNS depression and increased risk of extrapyramidal reactions  7. Tizanidine: excessive sedation       If there are any additional questions or concerns, please contact the inpatient  pharmacy ([id number]) for further review.     Annabella Fang RPH  April 15, 2020

## 2020-04-15 NOTE — PROGRESS NOTES
"Chemotherapy Education    Patient is a 49 year old female today for telephone chemotherapy education. Pt has a cancer diagnosis of adenocarcinoa of right and their main concerns are the possible side effects of chemotherapy.  Their Oncologist is Dr Palma, and PCP is Dr Coco Cook.  Reviewed the following with the patient:  General chemotherapy information, including ways it is excreted from the body and cleaning and containment of vomitus or other bodily fluid, use of the bathroom, sexual health and intimacy, what to do if needing to miss a treatment, when to call a provider and the need for staff to wear protective equipment.  Importance of Central line care (port) or IV site care.  Treatment regimen; 4 cycles of XELOX and rationale for strict adherence, specific medication names including pre-treatment medications and at home scheduled or as needed medications, delivery methods, and side effects and management; including skin changes/hand-foot syndrome, anemia, neutropenia, thrombocytopenia, diarrhea/constipation, hair loss syndrome, memory changes/ \"chemobrain\", mouth sores, taste changes, neuropathy, fatigue, myelosuppression, and risk of extravasation or infiltration.  Infection prevention, and monitoring of lab values, what lab tests and what changes of these values meant, along with the possibility of hydration or blood product transfusion, or the need to defer or hold treatment.    General orientation to the Medical Oncology department, Infusion Services department, Huc/scheduling, bathrooms and usual flow of the treatment day provided as well as introduction to the Infusion nurses.  Patient received written and verbal information on specific drugs and how to care for self during chemotherapy  Patient was provided with opportunity to ask any further questions, verbalizes the provider has explained both benefits and risks of treatment, and is agreement with this plan.  Pt instructed to call with " further questions or concerns.

## 2020-04-17 ENCOUNTER — TELEPHONE (OUTPATIENT)
Dept: ONCOLOGY | Facility: OTHER | Age: 50
End: 2020-04-17

## 2020-04-17 ENCOUNTER — INFUSION THERAPY VISIT (OUTPATIENT)
Dept: INFUSION THERAPY | Facility: OTHER | Age: 50
End: 2020-04-17
Attending: INTERNAL MEDICINE
Payer: COMMERCIAL

## 2020-04-17 VITALS
RESPIRATION RATE: 16 BRPM | SYSTOLIC BLOOD PRESSURE: 153 MMHG | WEIGHT: 161.38 LBS | HEIGHT: 66 IN | DIASTOLIC BLOOD PRESSURE: 101 MMHG | OXYGEN SATURATION: 99 % | HEART RATE: 85 BPM | BODY MASS INDEX: 25.94 KG/M2 | TEMPERATURE: 96.4 F

## 2020-04-17 DIAGNOSIS — C18.2 CANCER OF ASCENDING COLON (H): Primary | ICD-10-CM

## 2020-04-17 LAB
ALBUMIN SERPL-MCNC: 3.1 G/DL (ref 3.4–5)
ALP SERPL-CCNC: 92 U/L (ref 40–150)
ALT SERPL W P-5'-P-CCNC: 35 U/L (ref 0–50)
ANION GAP SERPL CALCULATED.3IONS-SCNC: 7 MMOL/L (ref 3–14)
AST SERPL W P-5'-P-CCNC: 31 U/L (ref 0–45)
BASOPHILS # BLD AUTO: 0 10E9/L (ref 0–0.2)
BASOPHILS NFR BLD AUTO: 0 %
BILIRUB SERPL-MCNC: 0.3 MG/DL (ref 0.2–1.3)
BUN SERPL-MCNC: 19 MG/DL (ref 7–30)
CALCIUM SERPL-MCNC: 8.4 MG/DL (ref 8.5–10.1)
CHLORIDE SERPL-SCNC: 113 MMOL/L (ref 94–109)
CO2 SERPL-SCNC: 21 MMOL/L (ref 20–32)
CREAT SERPL-MCNC: 0.66 MG/DL (ref 0.52–1.04)
DIFFERENTIAL METHOD BLD: ABNORMAL
EOSINOPHIL # BLD AUTO: 0 10E9/L (ref 0–0.7)
EOSINOPHIL NFR BLD AUTO: 0 %
ERYTHROCYTE [DISTWIDTH] IN BLOOD BY AUTOMATED COUNT: 15.9 % (ref 10–15)
GFR SERPL CREATININE-BSD FRML MDRD: >90 ML/MIN/{1.73_M2}
GLUCOSE SERPL-MCNC: 126 MG/DL (ref 70–99)
HCT VFR BLD AUTO: 31.7 % (ref 35–47)
HGB BLD-MCNC: 9.6 G/DL (ref 11.7–15.7)
IMM GRANULOCYTES # BLD: 0 10E9/L (ref 0–0.4)
IMM GRANULOCYTES NFR BLD: 0.4 %
LYMPHOCYTES # BLD AUTO: 1.5 10E9/L (ref 0.8–5.3)
LYMPHOCYTES NFR BLD AUTO: 29.1 %
MCH RBC QN AUTO: 23.5 PG (ref 26.5–33)
MCHC RBC AUTO-ENTMCNC: 30.3 G/DL (ref 31.5–36.5)
MCV RBC AUTO: 78 FL (ref 78–100)
MONOCYTES # BLD AUTO: 0.4 10E9/L (ref 0–1.3)
MONOCYTES NFR BLD AUTO: 7.2 %
NEUTROPHILS # BLD AUTO: 3.3 10E9/L (ref 1.6–8.3)
NEUTROPHILS NFR BLD AUTO: 63.3 %
NRBC # BLD AUTO: 0 10*3/UL
NRBC BLD AUTO-RTO: 0 /100
PLATELET # BLD AUTO: 238 10E9/L (ref 150–450)
POTASSIUM SERPL-SCNC: 4 MMOL/L (ref 3.4–5.3)
PROT SERPL-MCNC: 7.1 G/DL (ref 6.8–8.8)
RBC # BLD AUTO: 4.09 10E12/L (ref 3.8–5.2)
SODIUM SERPL-SCNC: 141 MMOL/L (ref 133–144)
WBC # BLD AUTO: 5.2 10E9/L (ref 4–11)

## 2020-04-17 PROCEDURE — 36415 COLL VENOUS BLD VENIPUNCTURE: CPT | Mod: ZL | Performed by: INTERNAL MEDICINE

## 2020-04-17 PROCEDURE — 96413 CHEMO IV INFUSION 1 HR: CPT

## 2020-04-17 PROCEDURE — 85025 COMPLETE CBC W/AUTO DIFF WBC: CPT | Mod: ZL | Performed by: INTERNAL MEDICINE

## 2020-04-17 PROCEDURE — 96415 CHEMO IV INFUSION ADDL HR: CPT

## 2020-04-17 PROCEDURE — 25800030 ZZH RX IP 258 OP 636: Performed by: INTERNAL MEDICINE

## 2020-04-17 PROCEDURE — 25000128 H RX IP 250 OP 636: Performed by: INTERNAL MEDICINE

## 2020-04-17 PROCEDURE — 80053 COMPREHEN METABOLIC PANEL: CPT | Mod: ZL | Performed by: INTERNAL MEDICINE

## 2020-04-17 PROCEDURE — 96367 TX/PROPH/DG ADDL SEQ IV INF: CPT

## 2020-04-17 RX ORDER — HEPARIN SODIUM (PORCINE) LOCK FLUSH IV SOLN 100 UNIT/ML 100 UNIT/ML
5 SOLUTION INTRAVENOUS ONCE
Status: CANCELLED | OUTPATIENT
Start: 2020-04-17

## 2020-04-17 RX ORDER — CAPECITABINE 500 MG/1
850 TABLET, FILM COATED ORAL 2 TIMES DAILY
Qty: 84 TABLET | Refills: 0 | Status: SHIPPED | OUTPATIENT
Start: 2020-04-17 | End: 2020-05-08

## 2020-04-17 RX ORDER — HEPARIN SODIUM (PORCINE) LOCK FLUSH IV SOLN 100 UNIT/ML 100 UNIT/ML
5 SOLUTION INTRAVENOUS ONCE
Status: COMPLETED | OUTPATIENT
Start: 2020-04-17 | End: 2020-04-17

## 2020-04-17 RX ADMIN — HEPARIN 5 ML: 100 SYRINGE at 13:56

## 2020-04-17 RX ADMIN — ONDANSETRON: 2 INJECTION INTRAMUSCULAR; INTRAVENOUS at 10:57

## 2020-04-17 RX ADMIN — DEXTROSE MONOHYDRATE 250 ML: 50 INJECTION, SOLUTION INTRAVENOUS at 10:56

## 2020-04-17 RX ADMIN — OXALIPLATIN 235 MG: 100 INJECTION, SOLUTION, CONCENTRATE INTRAVENOUS at 11:45

## 2020-04-17 ASSESSMENT — MIFFLIN-ST. JEOR: SCORE: 1376.62

## 2020-04-17 NOTE — TELEPHONE ENCOUNTER
Received a PA from [facility] for Lorazepam 0.5 mg tablets. Submitted on CMM. Waiting for a response.

## 2020-04-17 NOTE — TELEPHONE ENCOUNTER
Received an APPROVAL from [facility] for Lorazepam 0.5 mg tablets. Effective 04/17/2020 to 12/31/2020. Forms scanned to [facility].

## 2020-04-17 NOTE — PATIENT INSTRUCTIONS
Patient Education     Oxaliplatin Solution for injection  What is this medicine?  OXALIPLATIN (ox AL i SCOT tin) is a chemotherapy drug. It targets fast dividing cells, like cancer cells, and causes these cells to die. This medicine is used to treat cancers of the colon and rectum, and many other cancers.  This medicine may be used for other purposes; ask your health care provider or pharmacist if you have questions.  What should I tell my health care provider before I take this medicine?  They need to know if you have any of these conditions:    kidney disease    an unusual or allergic reaction to oxaliplatin, other chemotherapy, other medicines, foods, dyes, or preservatives    pregnant or trying to get pregnant    breast-feeding  How should I use this medicine?  This drug is given as an infusion into a vein. It is administered in a hospital or clinic by a specially trained health care professional.  Talk to your pediatrician regarding the use of this medicine in children. Special care may be needed.  Overdosage: If you think you have taken too much of this medicine contact a poison control center or emergency room at once.  NOTE: This medicine is only for you. Do not share this medicine with others.  What if I miss a dose?  It is important not to miss a dose. Call your doctor or health care professional if you are unable to keep an appointment.  What may interact with this medicine?    medicines to increase blood counts like filgrastim, pegfilgrastim, sargramostim    probenecid    some antibiotics like amikacin, gentamicin, neomycin, polymyxin B, streptomycin, tobramycin    zalcitabine  Talk to your doctor or health care professional before taking any of these medicines:    acetaminophen    aspirin    ibuprofen    ketoprofen    naproxen  This list may not describe all possible interactions. Give your health care provider a list of all the medicines, herbs, non-prescription drugs, or dietary supplements you use.  Also tell them if you smoke, drink alcohol, or use illegal drugs. Some items may interact with your medicine.  What should I watch for while using this medicine?  Your condition will be monitored carefully while you are receiving this medicine. You will need important blood work done while you are taking this medicine.  This medicine can make you more sensitive to cold. Do not drink cold drinks or use ice. Cover exposed skin before coming in contact with cold temperatures or cold objects. When out in cold weather wear warm clothing and cover your mouth and nose to warm the air that goes into your lungs. Tell your doctor if you get sensitive to the cold.  This drug may make you feel generally unwell. This is not uncommon, as chemotherapy can affect healthy cells as well as cancer cells. Report any side effects. Continue your course of treatment even though you feel ill unless your doctor tells you to stop.  In some cases, you may be given additional medicines to help with side effects. Follow all directions for their use.  Call your doctor or health care professional for advice if you get a fever, chills or sore throat, or other symptoms of a cold or flu. Do not treat yourself. This drug decreases your body's ability to fight infections. Try to avoid being around people who are sick.  This medicine may increase your risk to bruise or bleed. Call your doctor or health care professional if you notice any unusual bleeding.  Be careful brushing and flossing your teeth or using a toothpick because you may get an infection or bleed more easily. If you have any dental work done, tell your dentist you are receiving this medicine.  Avoid taking products that contain aspirin, acetaminophen, ibuprofen, naproxen, or ketoprofen unless instructed by your doctor. These medicines may hide a fever.  Do not become pregnant while taking this medicine. Women should inform their doctor if they wish to become pregnant or think they might  be pregnant. There is a potential for serious side effects to an unborn child. Talk to your health care professional or pharmacist for more information. Do not breast-feed an infant while taking this medicine.  Call your doctor or health care professional if you get diarrhea. Do not treat yourself.  What side effects may I notice from receiving this medicine?  Side effects that you should report to your doctor or health care professional as soon as possible:    allergic reactions like skin rash, itching or hives, swelling of the face, lips, or tongue    low blood counts - This drug may decrease the number of white blood cells, red blood cells and platelets. You may be at increased risk for infections and bleeding.    signs of infection - fever or chills, cough, sore throat, pain or difficulty passing urine    signs of decreased platelets or bleeding - bruising, pinpoint red spots on the skin, black, tarry stools, nosebleeds    signs of decreased red blood cells - unusually weak or tired, fainting spells, lightheadedness    breathing problems    chest pain, pressure    cough    diarrhea    jaw tightness    mouth sores    nausea and vomiting    pain, swelling, redness or irritation at the injection site    pain, tingling, numbness in the hands or feet    problems with balance, talking, walking    redness, blistering, peeling or loosening of the skin, including inside the mouth    trouble passing urine or change in the amount of urine  Side effects that usually do not require medical attention (report to your doctor or health care professional if they continue or are bothersome):    changes in vision    constipation    hair loss    loss of appetite    metallic taste in the mouth or changes in taste    stomach pain  This list may not describe all possible side effects. Call your doctor for medical advice about side effects. You may report side effects to FDA at 5-096-FDA-4521.  Where should I keep my medicine?  This  drug is given in a hospital or clinic and will not be stored at home.  NOTE:This sheet is a summary. It may not cover all possible information. If you have questions about this medicine, talk to your doctor, pharmacist, or health care provider. Copyright  2016 Gold Standard

## 2020-04-17 NOTE — PROGRESS NOTES
Patient is a 49 year old female  here accompanied by self today for infusion of Oxaliplatin under the orders of Dr. Palma.  Left sided power port accessed with 19 gauge 3/4  inch 90 degree bent non coring needle.  Line flushed with 10 cc's normal saline.  Needle secured with sterile transparent dressing.  10 cc's blood discarded, and blood taken for 2  tubes of ordered labs.  Patient tolerated well.  Denies pain and discomfort at this time.  Port flushes easily without resistance.    Hand hygiene performed: yes   Mask donned by caregiver: yes Site prepped with CHG: yes Labs drawn: yes Dressing applied using aseptic technique: yes     Patient lab values: WBC 5.2, ANC 3.3, , HGB 9.6, AST 31, ALT 35, Alkaline Phosphatase 92, Creatinine 0.66.     Patient meets parameters for today's infusion.  Denies questions or concerns regarding today's infusion and/or medications being administered.      Independent dose check completed with Barry HERNANDEZ RN.  Patient identified with two identifiers, order verified, and verbal consent for today's infusion obtained from patient. Written consent for treatment is on file and valid.    1145  IV pump verified with Oxaliplatin dose, drug, and rate of administration. Infusion administered per protocol. Patient tolerated infusion well, no signs or symptoms of adverse reaction noted. Patient denies pain nor discomfort.     Needle removed, tip intact. Site clean, dry and intact. Covered with a sterile bandage, slight pressure applied for 30 seconds. Pt instructed to leave bandage intact for a minimum of one hour, and to call with questions or concerns. Copy of appointments, discharge instructions, and after visit summary (AVS) provided to patient. Patient states understanding, discharged.

## 2020-04-21 ENCOUNTER — PATIENT OUTREACH (OUTPATIENT)
Dept: CARE COORDINATION | Facility: OTHER | Age: 50
End: 2020-04-21

## 2020-04-21 NOTE — PROGRESS NOTES
Clinic Care Coordination Contact  Care Team Conversations    Received a TC from [facility] Palliative care . She reports that patient is having significant side effects from her chemotherapy. This writer called patient to inquire about side effects. Patient reports that she has been having some nausea, has not vomited, she is having back pain, her fingers and mouth are getting tingly when touching cool items and eating cool items. She reports that this is off and on. She also noticed a metallic taste in her mouth, and fatigue. She is able to carry on her ADLs, but needs to take rest periods after short activities. Discussed the side effects of the oxaliplatin. Educated her on wearing a pair of gloves when she enters her refrigerator or goes out side in the cold, also to cover face with a scarf so that she is warming the air she breaths when outside or in the refrigerator. She reports that the compazine is helping with her nausea and encouraged her to take it when needed. She is starting to have loose stools, educated on imodium and to use it if needed. Encouraged to eat more bland foods and use plastic silverware. Encouraged to contact oncology if symptoms worsen. Will update MD.  Brooklyn Casanova, RN Oncology Care Coordinator

## 2020-04-30 DIAGNOSIS — C18.9 MALIGNANT NEOPLASM OF COLON, UNSPECIFIED PART OF COLON (H): ICD-10-CM

## 2020-04-30 RX ORDER — HYDROCODONE BITARTRATE AND ACETAMINOPHEN 5; 325 MG/1; MG/1
1 TABLET ORAL EVERY 4 HOURS PRN
Qty: 90 TABLET | Refills: 0 | Status: SHIPPED | OUTPATIENT
Start: 2020-04-30 | End: 2020-05-29

## 2020-04-30 NOTE — TELEPHONE ENCOUNTER
[facility]  5-325    Last Written Prescription Date:  03/19/20  Last Fill Quantity: 90,   # refills: 0  Last Office Visit: 03/09/20  Future Office visit:    Next 5 appointments (look out 90 days)    May 08, 2020 11:15 AM CDT  (Arrive by 11:00 AM)  Return Visit with Izzy Moran NP  [facility] ([facility] ) [address] [address]  [phone]   May 29, 2020  9:45 AM CDT  (Arrive by 9:30 AM)  Return Visit with Izzy Moran NP  [facility] ([facility] ) [address] [address]  [phone]   Jun 19, 2020  9:45 AM CDT  (Arrive by 9:30 AM)  Return Visit with Izzy Moran NP  [facility] ([facility] ) [address] [address]  [phone]           Routing refill request to provider for review/approval because:  Drug not on the [facility], [facility] or [facility] refill protocol or controlled substance

## 2020-05-06 NOTE — PROGRESS NOTES
Oncology Follow-up Visit:  May 8, 2020    Reason for Visit:  Patient presents with:  RECHECK: Follow up Cancer of ascending colon      Nursing Note and documentation reviewed: yes    HPI: This is a 49-year-old female patient who presents to the oncology clinic today prior to receiving cycle 2 adjuvant chemotherapy for Stage IIIB adenocarcinoma of the ascending colon diagnosed February 2020.    She feels she tolerated the chemotherapy fairly well.  She admits she did have some minor side effects but states she is unsure that she would have known they were side effects if she had not read about them.  She did have headaches on occasion and also some very mild nausea for which she states she did not even need to take the nausea meds.  She does have some increased fatigue rating this at about a 4 on a 0-to-10 scale.  She continues to walk about 30 minutes a day if she can.  She did experience a cold neuropathy especially for the first week and states it did subside and has no issues with the cold neuropathy now.  She also complains of a salty taste to her lips and skin.  Bowel movements have essentially not changed they have been loose since surgery denies any blood in her stool.  Her normal pattern for bowel movements is about 3 to 4/day.    She continues on vitamin B12 injections through her PCP just having received an injection recently.    At this time she is currently taking 650 mg of iron 3 times a day.    Oncologic History:     11/21/19 to 11/21/2019 patient was hospitalized with a hemoglobin of 5.7 underwent EGD with no evidence of active bleeding.  12/12/2019 she was seen by Dr. Cook and established care with her as her new PCP.  Referral for screening colonoscopy was completed.  2/13/2020 patient underwent colonoscopy by Dr. Cleveland where a near obstructing lesion in the right colon was found.  Pathology showed an invasive moderately differentiated adenocarcinoma.  CT the abdomen and pelvis showed a mass in  the junction of the cecum and ascending colon highly suspicious for malignancy.  2/24/2020  She then underwent laparoscopic-assisted right hemicolectomy by Dr. Cleveland that was converted to open right hemicolectomy when it was noted that the tumor was adherent to the pelvic brim; final pathology showed mildly differentiated adenocarcinoma of the right colon with 1/32 lymph nodes positive; there was perineural invasion; tumor budding and acutely inflamed tumor and reactive lymph nodes with peritonitis; the omentum showed mild peritonitis; margins were negative; patient was staged [id number]; IIIb adenocarcinoma of the right colon  2/28/2020 Port-A-Cath placement  3/25/2020 PET scan which showed no evidence of distant metastatic disease  3/30/2020 patient was seen by Dr. Palma with Medical Oncology with recommendation for XELOX; capecitabine 850 mg/m2 p.o. b.i.d. on days 1 through 14 of a 21-day cycle, oxaliplatin 130 mg/m2 to be given on day 1 x4 cycles.  4/10/2020 she was seen by Dr. Bryant with Radiation Oncology with no recommendation for radiation therapy at this time related to negative margins on pathology  4/17/2020 initiation of chemotherapy    MMR proteins intact  CEA prior to surgery was <0.5    Current Chemo Regime/TX: Oxaliplatin 130 mg per metered squared day 1 with Capecitabine 1500 mg twice daily days 1 through 14 of a 21-day cycle  Current Cycle:  2  # of completed cycles:  1    Previous treatment:  n/a    Past Medical History:   Diagnosis Date     Cancer (H)     colon     Cancer (H) 2004    uterine     Chronic pain      Depressive disorder      Diabetes (H)      History of blood transfusion      Hypertension      Prediabetes      Thyroid disease        Past Surgical History:   Procedure Laterality Date     BACK SURGERY      2008; 2011     COLONOSCOPY N/A 2/13/2020    Procedure: COLONOSCOPY WITH BIOPSY AND TATOOING OF ASCENDING COLON,INCOMEPLETE EXAM;  Surgeon: Kang Cleveland MD;  Location: [facility]      ESOPHAGOSCOPY, GASTROSCOPY, DUODENOSCOPY (EGD), COMBINED N/A 7/21/2018    Procedure: COMBINED ESOPHAGOSCOPY, GASTROSCOPY, DUODENOSCOPY (EGD);  UPPER ENDOSCOPY WITH BIOPSIES;  Surgeon: Roger Vazquez MD;  Location: [facility]     ESOPHAGOSCOPY, GASTROSCOPY, DUODENOSCOPY (EGD), COMBINED N/A 11/21/2019    Procedure: UPPER ENDOSCOPY with biopsy;  Surgeon: Kang Cleveland MD;  Location: [facility]     HEAD & NECK SURGERY  2008    c4-c5 fusion     HYSTERECTOMY, CRISTIANE  04/2004    uterine cancer; [facility]     INSERT PORT VASCULAR ACCESS N/A 2/28/2020    Procedure: INSERTION, VASCULAR ACCESS PORT LEFT;  Surgeon: Kang Cleveland MD;  Location: [facility]     LAPAROSCOPIC ASSISTED COLECTOMY Right 2/24/2020    Procedure: RIGHT KEVIN COLECTOMY, LYSIS OF ADHESIONS;  Surgeon: Kang Cleveland MD;  Location: [facility]     LAPAROSCOPIC BYPASS GASTRIC  12/2010       Family History   Problem Relation Age of Onset     Diabetes Mother      Hypertension Mother      Coronary Artery Disease Mother      Myocardial Infarction Mother      Hypertension Father      Diabetes Maternal Grandmother      Hypertension Maternal Grandfather      Coronary Artery Disease Maternal Grandfather      Cancer Paternal Grandfather         unsure of type of cancer     Bladder Cancer Paternal Grandfather      Diabetes Maternal Aunt      Cerebrovascular Disease Maternal Aunt      Diabetes Maternal Aunt      Cerebrovascular Disease Maternal Aunt      Diabetes Maternal Aunt      Cerebrovascular Disease Maternal Aunt      Hyperlipidemia No family hx of      Breast Cancer No family hx of      Colon Cancer No family hx of      Prostate Cancer No family hx of      Thyroid Disease No family hx of      Anesthesia Reaction No family hx of      Asthma No family hx of      Genetic Disorder No family hx of        Social History     Socioeconomic History     Marital status:      Spouse name: Not on file     Number of children: 2     Years of education: Not  on file     Highest education level: Not on file   Occupational History     Occupation: PCA     Comment: currently not working, unemployment    Social Needs     Financial resource strain: Not hard at all     Food insecurity     Worry: Not on file     Inability: Not on file     Transportation needs     Medical: Patient refused     Non-medical: Patient refused   Tobacco Use     Smoking status: Never Smoker     Smokeless tobacco: Never Used   Substance and Sexual Activity     Alcohol use: No     Frequency: Never     Comment: sober for 18 years     Drug use: No     Sexual activity: Never   Lifestyle     Physical activity     Days per week: Not on file     Minutes per session: Not on file     Stress: Not on file   Relationships     Social connections     Talks on phone: Not on file     Gets together: Not on file     Attends Zoroastrianism service: Not on file     Active member of club or organization: Not on file     Attends meetings of clubs or organizations: Not on file     Relationship status: Not on file     Intimate partner violence     Fear of current or ex partner: Not on file     Emotionally abused: Not on file     Physically abused: Not on file     Forced sexual activity: Not on file   Other Topics Concern     Parent/sibling w/ CABG, MI or angioplasty before 65F 55M? Yes     Comment: Mother   Social History Narrative     Not on file       Current Outpatient Medications   Medication     acetaminophen (TYLENOL) 500 MG tablet     capecitabine (XELODA) 500 MG tablet     cyanocobalamin (CYANOCOBALAMIN) 1000 MCG/ML injection     ferrous sulfate (IRON) 325 (65 Fe) MG tablet     HYDROcodone-acetaminophen (NORCO) 5-325 MG tablet     levothyroxine 200 MCG PO tablet     lidocaine-prilocaine (EMLA) 2.5-2.5 % external cream     LORazepam (ATIVAN) 0.5 MG tablet     multivitamin, therapeutic with minerals (MULTI-VITAMIN) TABS     sertraline (ZOLOFT) 100 MG tablet     tiZANidine (ZANAFLEX) 4 MG tablet     traZODone (DESYREL) 50 MG  tablet     capecitabine (XELODA) 500 MG tablet     diazepam (VALIUM) 5 MG tablet     EPINEPHrine (EPIPEN/ADRENACLICK/OR ANY BX GENERIC EQUIV) 0.3 MG/0.3ML injection 2-pack     loperamide (IMODIUM) 2 MG capsule     NARCAN 4 MG/0.1ML nasal spray     penicillin V (VEETID) 500 MG tablet     prochlorperazine (COMPAZINE) 10 MG tablet     No current facility-administered medications for this visit.         Allergies   Allergen Reactions     Aspartame      Other reaction(s): Other - Describe In Comment Field, Seizures  convulsions     Bee Venom Anaphylaxis     Bupropion Anaphylaxis     Throat gets tight and hives. Denies allergic reaction     Food Anaphylaxis     Peppers-anaphylaxis if eaten, if touched hands swell and reddened     Food Allergy Formula Anaphylaxis     Peppers, patient reports green peppers close off her throat     Ibuprofen Anaphylaxis     No Clinical Screening - See Comments Anaphylaxis     Peppers, patient reports green peppers close off her throat     Piper Anaphylaxis     Peppers--green, black, red, chili, etc. Derm symptoms, hives;  Strawberries--(cooked) Itching, breathing difficulties, GI symptoms. Mushrooms--GI symptoms, Hives.Spinach (cooked) throat irritation  (Listed on [facility] problem list.)     Fentanyl Swelling     Lips and tongue swelled.  Swelling of lips and tongue     Adhesive Tape      blisters     Amitriptyline Other (See Comments)     Respiratory symptoms - denies allergic reaction     Gabapentin Other (See Comments)     irritability     Liquid Adhesive Dermatitis     blisters  Blisters  Silk tape and tegaderm is fine     Oxcarbazepine Other (See Comments)     Throat gets tight and hives. Denies allergic reaction  Throat gets tight and hives-       Prozac [Fluoxetine]      Other reaction(s): *Unknown  uncontrolled muscle spasms     Valproic Acid      Respiratory symptoms ([facility]). Denies allergic reaction     Ziprasidone      Throat gets tight and  "hives. Denies allergic reaction       Review Of Systems:  Constitutional:    denies fever, weight changes, chills, and night sweats.  Eyes:    denies blurred or double vision  Ears/Nose/Throat:   denies ear pain, nose problems, difficulty swallowing  Respiratory:   denies shortness of breath, cough   Skin:   denies rash, lesions  Cardiovascular:   denies chest pain, palpitations, mild-mod edema to ankles and feet  Gastrointestinal:   denies abdominal pain, bloating, early satiety; no change in bowel habits or blood in stool  Genitourinary:   denies difficulty with urination, blood in urine  Musculoskeletal:    denies new muscle pain, bone pain  Neurologic:   See HPI  Psychiatric:   denies anxiety, depression-feels doing well  Hematologic/Lymphatic/Immunologic:   denies easy bruising, easy bleeding, lumps or bumps noted  Endocrine:   Some increased thirst    Fatigue (0=no fatigue; 10=worst fatigue imaginable): 4    Pain  (0=no pain; 10=worst pain imaginable): low back pain \"3\"    ECOG Performance Status: 1    Physical Exam:  /72   Pulse 77   Temp 96.7  F (35.9  C) (Tympanic)   Resp 18   Ht 1.689 m (5' 6.5\")   Wt 74.8 kg (164 lb 14.5 oz)   SpO2 98%   BMI 26.22 kg/m      GENERAL APPEARANCE: Healthy, alert and in no acute distress.  HEENT: Normocephalic, Sclerae anicteric. Oropharynx without ulcers, lesions, or thrush.  NECK:   No asymmetry or masses, no thyromegaly.  LYMPHATICS: No palpable cervical, supraclavicular, axillary, or inguinal nodes   RESP: Lungs clear to auscultation bilaterally, respirations regular and easy  CARDIOVASCULAR: Regular rate and rhythm. Normal S1, S2; no murmur, gallop, or rub.  ABDOMEN: Soft, nontender. Bowel sounds auscultated all 4 quadrants. No palpable organomegaly or masses.  MUSCULOSKELETAL: Extremities without gross deformities noted. No edema of bilateral lower extremities.  NEURO: Alert and oriented x 3.  Gait steady.  PSYCHIATRIC: Mentation and affect appear normal.  " Mood appropriate.    Laboratory:  Results for orders placed or performed in visit on 05/08/20   CBC with platelets differential     Status: Abnormal   Result Value Ref Range    WBC 4.5 4.0 - 11.0 10e9/L    RBC Count 3.78 (L) 3.8 - 5.2 10e12/L    Hemoglobin 9.3 (L) 11.7 - 15.7 g/dL    Hematocrit 30.0 (L) 35.0 - 47.0 %    MCV 79 78 - 100 fl    MCH 24.6 (L) 26.5 - 33.0 pg    MCHC 31.0 (L) 31.5 - 36.5 g/dL    RDW 19.9 (H) 10.0 - 15.0 %    Platelet Count 185 150 - 450 10e9/L    Diff Method Automated Method     % Neutrophils 52.2 %    % Lymphocytes 28.6 %    % Monocytes 19.0 %    % Eosinophils 0.0 %    % Basophils 0.0 %    % Immature Granulocytes 0.2 %    Nucleated RBCs 0 0 /100    Absolute Neutrophil 2.3 1.6 - 8.3 10e9/L    Absolute Lymphocytes 1.3 0.8 - 5.3 10e9/L    Absolute Monocytes 0.9 0.0 - 1.3 10e9/L    Absolute Eosinophils 0.0 0.0 - 0.7 10e9/L    Absolute Basophils 0.0 0.0 - 0.2 10e9/L    Abs Immature Granulocytes 0.0 0 - 0.4 10e9/L    Absolute Nucleated RBC 0.0    Comprehensive metabolic panel     Status: Abnormal   Result Value Ref Range    Sodium 140 133 - 144 mmol/L    Potassium 4.0 3.4 - 5.3 mmol/L    Chloride 111 (H) 94 - 109 mmol/L    Carbon Dioxide 26 20 - 32 mmol/L    Anion Gap 3 3 - 14 mmol/L    Glucose 88 70 - 99 mg/dL    Urea Nitrogen 18 7 - 30 mg/dL    Creatinine 0.71 0.52 - 1.04 mg/dL    GFR Estimate >90 >60 mL/min/[1.73_m2]    GFR Estimate If Black >90 >60 mL/min/[1.73_m2]    Calcium 8.3 (L) 8.5 - 10.1 mg/dL    Bilirubin Total 0.4 0.2 - 1.3 mg/dL    Albumin 3.2 (L) 3.4 - 5.0 g/dL    Protein Total 6.8 6.8 - 8.8 g/dL    Alkaline Phosphatase 82 40 - 150 U/L    ALT 25 0 - 50 U/L    AST 23 0 - 45 U/L       Imaging Studies: None completed for today's visit      ASSESSMENT/PLAN:    #1 colon cancer:   Stage IIIB adenocarcinoma of the ascending colon diagnosed February 2020.  She is currently undergoing adjuvant chemotherapy with XELOX and will initiate cycle 2 today.  She will take the xeloda 500mg tablet  "3 tabs BID days 1-14 for cycle 2 per Dr. Palma's order.  This was removed from cycle 2 plan as no dosing instructions were available and placed under \"Additional orders\".  Will have Dr. Palma place dosing instructions in the plan for cycle 3 and 4.  Will see patient back for cycle 3 with labs per treatment plan.    #2 anemia: High-dose oral iron therapy now.  Fe/TIBC and ferritin drawn today.  Will plan IV Venofer 200mg x 5 doses to be given at least 48 hours apart.     I encouraged patient to call with any questions or concerns.     Approximately 30 minutes spent with the patient with greater than 75% of this time spent in discussion of her current cancer diagnosis along with the current treatment she is receiving and side effects and potential side effects.  We spent time in discussing her recent loss.  We also spent time in discussion of her previous history of gastric bypass surgery and lack of iron absorption and my recommendation for iron infusions.  Time was spent in reviewing and managing her treatment plan along with placing a plan for IV iron along with planning her follow-up.    Izzy Moran, NP  APRN, FNP-BC, AOCNP  "

## 2020-05-07 ENCOUNTER — TELEPHONE (OUTPATIENT)
Dept: FAMILY MEDICINE | Facility: OTHER | Age: 50
End: 2020-05-07

## 2020-05-08 ENCOUNTER — INFUSION THERAPY VISIT (OUTPATIENT)
Dept: INFUSION THERAPY | Facility: OTHER | Age: 50
End: 2020-05-08
Attending: INTERNAL MEDICINE
Payer: COMMERCIAL

## 2020-05-08 ENCOUNTER — ONCOLOGY VISIT (OUTPATIENT)
Dept: ONCOLOGY | Facility: OTHER | Age: 50
End: 2020-05-08
Attending: NURSE PRACTITIONER
Payer: COMMERCIAL

## 2020-05-08 ENCOUNTER — TELEPHONE (OUTPATIENT)
Dept: ONCOLOGY | Facility: OTHER | Age: 50
End: 2020-05-08

## 2020-05-08 VITALS
HEIGHT: 67 IN | DIASTOLIC BLOOD PRESSURE: 72 MMHG | TEMPERATURE: 96.7 F | RESPIRATION RATE: 18 BRPM | SYSTOLIC BLOOD PRESSURE: 126 MMHG | HEART RATE: 77 BPM | BODY MASS INDEX: 25.88 KG/M2 | OXYGEN SATURATION: 98 % | WEIGHT: 164.9 LBS

## 2020-05-08 VITALS
RESPIRATION RATE: 16 BRPM | TEMPERATURE: 96.7 F | WEIGHT: 164.9 LBS | BODY MASS INDEX: 26.22 KG/M2 | DIASTOLIC BLOOD PRESSURE: 72 MMHG | SYSTOLIC BLOOD PRESSURE: 126 MMHG | HEART RATE: 91 BPM | OXYGEN SATURATION: 97 %

## 2020-05-08 VITALS — DIASTOLIC BLOOD PRESSURE: 84 MMHG | HEART RATE: 99 BPM | SYSTOLIC BLOOD PRESSURE: 145 MMHG | OXYGEN SATURATION: 98 %

## 2020-05-08 DIAGNOSIS — C18.2 CANCER OF ASCENDING COLON (H): Primary | ICD-10-CM

## 2020-05-08 DIAGNOSIS — D50.9 IRON DEFICIENCY ANEMIA, UNSPECIFIED IRON DEFICIENCY ANEMIA TYPE: ICD-10-CM

## 2020-05-08 LAB
ALBUMIN SERPL-MCNC: 3.2 G/DL (ref 3.4–5)
ALP SERPL-CCNC: 82 U/L (ref 40–150)
ALT SERPL W P-5'-P-CCNC: 25 U/L (ref 0–50)
ANION GAP SERPL CALCULATED.3IONS-SCNC: 3 MMOL/L (ref 3–14)
AST SERPL W P-5'-P-CCNC: 23 U/L (ref 0–45)
BASOPHILS # BLD AUTO: 0 10E9/L (ref 0–0.2)
BASOPHILS NFR BLD AUTO: 0 %
BILIRUB SERPL-MCNC: 0.4 MG/DL (ref 0.2–1.3)
BUN SERPL-MCNC: 18 MG/DL (ref 7–30)
CALCIUM SERPL-MCNC: 8.3 MG/DL (ref 8.5–10.1)
CHLORIDE SERPL-SCNC: 111 MMOL/L (ref 94–109)
CO2 SERPL-SCNC: 26 MMOL/L (ref 20–32)
CREAT SERPL-MCNC: 0.71 MG/DL (ref 0.52–1.04)
DIFFERENTIAL METHOD BLD: ABNORMAL
EOSINOPHIL # BLD AUTO: 0 10E9/L (ref 0–0.7)
EOSINOPHIL NFR BLD AUTO: 0 %
ERYTHROCYTE [DISTWIDTH] IN BLOOD BY AUTOMATED COUNT: 19.9 % (ref 10–15)
FERRITIN SERPL-MCNC: 7 NG/ML (ref 8–252)
GFR SERPL CREATININE-BSD FRML MDRD: >90 ML/MIN/{1.73_M2}
GLUCOSE SERPL-MCNC: 88 MG/DL (ref 70–99)
HCT VFR BLD AUTO: 30 % (ref 35–47)
HGB BLD-MCNC: 9.3 G/DL (ref 11.7–15.7)
IMM GRANULOCYTES # BLD: 0 10E9/L (ref 0–0.4)
IMM GRANULOCYTES NFR BLD: 0.2 %
IRON SATN MFR SERPL: 5 % (ref 15–46)
IRON SERPL-MCNC: 22 UG/DL (ref 35–180)
LYMPHOCYTES # BLD AUTO: 1.3 10E9/L (ref 0.8–5.3)
LYMPHOCYTES NFR BLD AUTO: 28.6 %
MCH RBC QN AUTO: 24.6 PG (ref 26.5–33)
MCHC RBC AUTO-ENTMCNC: 31 G/DL (ref 31.5–36.5)
MCV RBC AUTO: 79 FL (ref 78–100)
MONOCYTES # BLD AUTO: 0.9 10E9/L (ref 0–1.3)
MONOCYTES NFR BLD AUTO: 19 %
NEUTROPHILS # BLD AUTO: 2.3 10E9/L (ref 1.6–8.3)
NEUTROPHILS NFR BLD AUTO: 52.2 %
NRBC # BLD AUTO: 0 10*3/UL
NRBC BLD AUTO-RTO: 0 /100
PLATELET # BLD AUTO: 185 10E9/L (ref 150–450)
POTASSIUM SERPL-SCNC: 4 MMOL/L (ref 3.4–5.3)
PROT SERPL-MCNC: 6.8 G/DL (ref 6.8–8.8)
RBC # BLD AUTO: 3.78 10E12/L (ref 3.8–5.2)
SODIUM SERPL-SCNC: 140 MMOL/L (ref 133–144)
TIBC SERPL-MCNC: 467 UG/DL (ref 240–430)
WBC # BLD AUTO: 4.5 10E9/L (ref 4–11)

## 2020-05-08 PROCEDURE — G0463 HOSPITAL OUTPT CLINIC VISIT: HCPCS

## 2020-05-08 PROCEDURE — 83540 ASSAY OF IRON: CPT | Mod: ZL | Performed by: NURSE PRACTITIONER

## 2020-05-08 PROCEDURE — 96415 CHEMO IV INFUSION ADDL HR: CPT

## 2020-05-08 PROCEDURE — 36415 COLL VENOUS BLD VENIPUNCTURE: CPT | Mod: ZL | Performed by: NURSE PRACTITIONER

## 2020-05-08 PROCEDURE — 99214 OFFICE O/P EST MOD 30 MIN: CPT | Performed by: NURSE PRACTITIONER

## 2020-05-08 PROCEDURE — 25000128 H RX IP 250 OP 636: Performed by: INTERNAL MEDICINE

## 2020-05-08 PROCEDURE — 82728 ASSAY OF FERRITIN: CPT | Mod: ZL | Performed by: NURSE PRACTITIONER

## 2020-05-08 PROCEDURE — 25800030 ZZH RX IP 258 OP 636: Performed by: INTERNAL MEDICINE

## 2020-05-08 PROCEDURE — 96367 TX/PROPH/DG ADDL SEQ IV INF: CPT

## 2020-05-08 PROCEDURE — G0463 HOSPITAL OUTPT CLINIC VISIT: HCPCS | Mod: 25

## 2020-05-08 PROCEDURE — 83550 IRON BINDING TEST: CPT | Mod: ZL | Performed by: NURSE PRACTITIONER

## 2020-05-08 PROCEDURE — 25800030 ZZH RX IP 258 OP 636: Performed by: NURSE PRACTITIONER

## 2020-05-08 PROCEDURE — 80053 COMPREHEN METABOLIC PANEL: CPT | Mod: ZL | Performed by: INTERNAL MEDICINE

## 2020-05-08 PROCEDURE — 96413 CHEMO IV INFUSION 1 HR: CPT

## 2020-05-08 PROCEDURE — 25000128 H RX IP 250 OP 636: Mod: JW | Performed by: NURSE PRACTITIONER

## 2020-05-08 PROCEDURE — 85025 COMPLETE CBC W/AUTO DIFF WBC: CPT | Mod: ZL | Performed by: INTERNAL MEDICINE

## 2020-05-08 PROCEDURE — 96523 IRRIG DRUG DELIVERY DEVICE: CPT

## 2020-05-08 RX ORDER — NALOXONE HYDROCHLORIDE 0.4 MG/ML
.1-.4 INJECTION, SOLUTION INTRAMUSCULAR; INTRAVENOUS; SUBCUTANEOUS
Status: CANCELLED | OUTPATIENT
Start: 2020-05-08

## 2020-05-08 RX ORDER — HEPARIN SODIUM,PORCINE 10 UNIT/ML
5 VIAL (ML) INTRAVENOUS
Status: CANCELLED | OUTPATIENT
Start: 2020-05-29

## 2020-05-08 RX ORDER — HEPARIN SODIUM (PORCINE) LOCK FLUSH IV SOLN 100 UNIT/ML 100 UNIT/ML
5 SOLUTION INTRAVENOUS ONCE
Status: COMPLETED | OUTPATIENT
Start: 2020-05-08 | End: 2020-05-08

## 2020-05-08 RX ORDER — METHYLPREDNISOLONE SODIUM SUCCINATE 125 MG/2ML
125 INJECTION, POWDER, LYOPHILIZED, FOR SOLUTION INTRAMUSCULAR; INTRAVENOUS
Status: CANCELLED
Start: 2020-05-29

## 2020-05-08 RX ORDER — METHYLPREDNISOLONE SODIUM SUCCINATE 125 MG/2ML
125 INJECTION, POWDER, LYOPHILIZED, FOR SOLUTION INTRAMUSCULAR; INTRAVENOUS
Status: CANCELLED
Start: 2020-06-19

## 2020-05-08 RX ORDER — NALOXONE HYDROCHLORIDE 0.4 MG/ML
.1-.4 INJECTION, SOLUTION INTRAMUSCULAR; INTRAVENOUS; SUBCUTANEOUS
Status: CANCELLED | OUTPATIENT
Start: 2020-05-29

## 2020-05-08 RX ORDER — DIPHENHYDRAMINE HYDROCHLORIDE 50 MG/ML
50 INJECTION INTRAMUSCULAR; INTRAVENOUS
Status: CANCELLED
Start: 2020-05-29

## 2020-05-08 RX ORDER — LORAZEPAM 2 MG/ML
0.5 INJECTION INTRAMUSCULAR EVERY 4 HOURS PRN
Status: CANCELLED
Start: 2020-05-29

## 2020-05-08 RX ORDER — ALBUTEROL SULFATE 90 UG/1
1-2 AEROSOL, METERED RESPIRATORY (INHALATION)
Status: CANCELLED
Start: 2020-06-19

## 2020-05-08 RX ORDER — EPINEPHRINE 0.3 MG/.3ML
0.3 INJECTION SUBCUTANEOUS EVERY 5 MIN PRN
Status: CANCELLED | OUTPATIENT
Start: 2020-06-19

## 2020-05-08 RX ORDER — ALBUTEROL SULFATE 90 UG/1
1-2 AEROSOL, METERED RESPIRATORY (INHALATION)
Status: CANCELLED
Start: 2020-05-29

## 2020-05-08 RX ORDER — EPINEPHRINE 1 MG/ML
0.3 INJECTION, SOLUTION, CONCENTRATE INTRAVENOUS EVERY 5 MIN PRN
Status: CANCELLED | OUTPATIENT
Start: 2020-05-08

## 2020-05-08 RX ORDER — HEPARIN SODIUM,PORCINE 10 UNIT/ML
5 VIAL (ML) INTRAVENOUS
Status: CANCELLED | OUTPATIENT
Start: 2020-05-08

## 2020-05-08 RX ORDER — EPINEPHRINE 0.3 MG/.3ML
0.3 INJECTION SUBCUTANEOUS EVERY 5 MIN PRN
Status: CANCELLED | OUTPATIENT
Start: 2020-05-29

## 2020-05-08 RX ORDER — HEPARIN SODIUM (PORCINE) LOCK FLUSH IV SOLN 100 UNIT/ML 100 UNIT/ML
5 SOLUTION INTRAVENOUS
Status: CANCELLED | OUTPATIENT
Start: 2020-05-08

## 2020-05-08 RX ORDER — SODIUM CHLORIDE 9 MG/ML
1000 INJECTION, SOLUTION INTRAVENOUS CONTINUOUS PRN
Status: CANCELLED
Start: 2020-05-08

## 2020-05-08 RX ORDER — ALBUTEROL SULFATE 90 UG/1
1-2 AEROSOL, METERED RESPIRATORY (INHALATION)
Status: CANCELLED
Start: 2020-05-08

## 2020-05-08 RX ORDER — SODIUM CHLORIDE 9 MG/ML
1000 INJECTION, SOLUTION INTRAVENOUS CONTINUOUS PRN
Status: CANCELLED
Start: 2020-05-29

## 2020-05-08 RX ORDER — SODIUM CHLORIDE 9 MG/ML
1000 INJECTION, SOLUTION INTRAVENOUS CONTINUOUS PRN
Status: CANCELLED
Start: 2020-06-19

## 2020-05-08 RX ORDER — CAPECITABINE 500 MG/1
850 TABLET, FILM COATED ORAL 2 TIMES DAILY
Qty: 84 TABLET | Refills: 1 | Status: SHIPPED | OUTPATIENT
Start: 2020-05-08 | End: 2020-05-29

## 2020-05-08 RX ORDER — EPINEPHRINE 1 MG/ML
0.3 INJECTION, SOLUTION, CONCENTRATE INTRAVENOUS EVERY 5 MIN PRN
Status: CANCELLED | OUTPATIENT
Start: 2020-06-19

## 2020-05-08 RX ORDER — EPINEPHRINE 1 MG/ML
0.3 INJECTION, SOLUTION, CONCENTRATE INTRAVENOUS EVERY 5 MIN PRN
Status: CANCELLED | OUTPATIENT
Start: 2020-05-29

## 2020-05-08 RX ORDER — ALBUTEROL SULFATE 0.83 MG/ML
2.5 SOLUTION RESPIRATORY (INHALATION)
Status: CANCELLED | OUTPATIENT
Start: 2020-06-19

## 2020-05-08 RX ORDER — LORAZEPAM 2 MG/ML
0.5 INJECTION INTRAMUSCULAR EVERY 4 HOURS PRN
Status: CANCELLED
Start: 2020-06-19

## 2020-05-08 RX ORDER — EPINEPHRINE 0.3 MG/.3ML
0.3 INJECTION SUBCUTANEOUS EVERY 5 MIN PRN
Status: CANCELLED | OUTPATIENT
Start: 2020-05-08

## 2020-05-08 RX ORDER — LORAZEPAM 2 MG/ML
0.5 INJECTION INTRAMUSCULAR EVERY 4 HOURS PRN
Status: CANCELLED
Start: 2020-05-08

## 2020-05-08 RX ORDER — MEPERIDINE HYDROCHLORIDE 25 MG/ML
25 INJECTION INTRAMUSCULAR; INTRAVENOUS; SUBCUTANEOUS EVERY 30 MIN PRN
Status: CANCELLED | OUTPATIENT
Start: 2020-05-29

## 2020-05-08 RX ORDER — NALOXONE HYDROCHLORIDE 0.4 MG/ML
.1-.4 INJECTION, SOLUTION INTRAMUSCULAR; INTRAVENOUS; SUBCUTANEOUS
Status: CANCELLED | OUTPATIENT
Start: 2020-06-19

## 2020-05-08 RX ORDER — HEPARIN SODIUM (PORCINE) LOCK FLUSH IV SOLN 100 UNIT/ML 100 UNIT/ML
5 SOLUTION INTRAVENOUS
Status: CANCELLED | OUTPATIENT
Start: 2020-06-19

## 2020-05-08 RX ORDER — HEPARIN SODIUM (PORCINE) LOCK FLUSH IV SOLN 100 UNIT/ML 100 UNIT/ML
5 SOLUTION INTRAVENOUS ONCE
Status: CANCELLED | OUTPATIENT
Start: 2020-05-08

## 2020-05-08 RX ORDER — ALBUTEROL SULFATE 0.83 MG/ML
2.5 SOLUTION RESPIRATORY (INHALATION)
Status: CANCELLED | OUTPATIENT
Start: 2020-05-29

## 2020-05-08 RX ORDER — METHYLPREDNISOLONE SODIUM SUCCINATE 125 MG/2ML
125 INJECTION, POWDER, LYOPHILIZED, FOR SOLUTION INTRAMUSCULAR; INTRAVENOUS
Status: CANCELLED
Start: 2020-05-08

## 2020-05-08 RX ORDER — HEPARIN SODIUM (PORCINE) LOCK FLUSH IV SOLN 100 UNIT/ML 100 UNIT/ML
5 SOLUTION INTRAVENOUS
Status: CANCELLED | OUTPATIENT
Start: 2020-05-29

## 2020-05-08 RX ORDER — HEPARIN SODIUM (PORCINE) LOCK FLUSH IV SOLN 100 UNIT/ML 100 UNIT/ML
5 SOLUTION INTRAVENOUS
Status: CANCELLED | OUTPATIENT
Start: 2020-05-11

## 2020-05-08 RX ORDER — ALBUTEROL SULFATE 0.83 MG/ML
2.5 SOLUTION RESPIRATORY (INHALATION)
Status: CANCELLED | OUTPATIENT
Start: 2020-05-08

## 2020-05-08 RX ORDER — HEPARIN SODIUM,PORCINE 10 UNIT/ML
5 VIAL (ML) INTRAVENOUS
Status: CANCELLED | OUTPATIENT
Start: 2020-06-19

## 2020-05-08 RX ORDER — DIPHENHYDRAMINE HYDROCHLORIDE 50 MG/ML
50 INJECTION INTRAMUSCULAR; INTRAVENOUS
Status: CANCELLED
Start: 2020-06-19

## 2020-05-08 RX ORDER — MEPERIDINE HYDROCHLORIDE 25 MG/ML
25 INJECTION INTRAMUSCULAR; INTRAVENOUS; SUBCUTANEOUS EVERY 30 MIN PRN
Status: CANCELLED | OUTPATIENT
Start: 2020-05-08

## 2020-05-08 RX ORDER — DIPHENHYDRAMINE HYDROCHLORIDE 50 MG/ML
50 INJECTION INTRAMUSCULAR; INTRAVENOUS
Status: CANCELLED
Start: 2020-05-08

## 2020-05-08 RX ORDER — MEPERIDINE HYDROCHLORIDE 25 MG/ML
25 INJECTION INTRAMUSCULAR; INTRAVENOUS; SUBCUTANEOUS EVERY 30 MIN PRN
Status: CANCELLED | OUTPATIENT
Start: 2020-06-19

## 2020-05-08 RX ORDER — NALOXONE HYDROCHLORIDE 4 MG/.1ML
SPRAY NASAL
COMMUNITY
Start: 2020-05-03 | End: 2020-10-05

## 2020-05-08 RX ADMIN — HEPARIN 5 ML: 100 SYRINGE at 16:06

## 2020-05-08 RX ADMIN — ONDANSETRON: 2 INJECTION INTRAMUSCULAR; INTRAVENOUS at 12:59

## 2020-05-08 RX ADMIN — OXALIPLATIN 235 MG: 100 INJECTION, SOLUTION, CONCENTRATE INTRAVENOUS at 13:43

## 2020-05-08 RX ADMIN — DEXTROSE MONOHYDRATE 250 ML: 50 INJECTION, SOLUTION INTRAVENOUS at 12:22

## 2020-05-08 ASSESSMENT — MIFFLIN-ST. JEOR: SCORE: 1392.69

## 2020-05-08 ASSESSMENT — PAIN SCALES - GENERAL: PAINLEVEL: MILD PAIN (3)

## 2020-05-08 NOTE — PATIENT INSTRUCTIONS
We will set you up for 5 iron infusions at least 48 hours apart.    We would like to see you back per your schedule.     If you have any questions please call [phone]    Other instructions:  none

## 2020-05-08 NOTE — NURSING NOTE
"Chief Complaint   Patient presents with     RECHECK     Follow up Cancer of ascending colon        Initial /72   Pulse 77   Temp 96.7  F (35.9  C) (Tympanic)   Resp 18   Ht 1.689 m (5' 6.5\")   Wt 74.8 kg (164 lb 14.5 oz)   SpO2 98%   BMI 26.22 kg/m   Estimated body mass index is 26.22 kg/m  as calculated from the following:    Height as of this encounter: 1.689 m (5' 6.5\").    Weight as of this encounter: 74.8 kg (164 lb 14.5 oz).  Medication Reconciliation: complete.  Immunizations and advance directives status reviewed. Pain scale =0 , PHQ-2=0.            Juana Mo LPN    "

## 2020-05-08 NOTE — PROGRESS NOTES
Patient is a 49 year old female  here accompanied by self  today for infusion of Oxaliplatin under the orders of Dr. Palma.  Left sided power port accessed with 19  gauge 3/4 inch 90 degree bent non coring needle.  Line flushed with 10 cc's normal saline.  Needle secured with sterile transparent dressing.  Patient tolerated well.  Denies pain and discomfort at this time.  Port flushes easily without resistance.    Hand hygiene performed: yes   Mask donned by caregiver: yes Site prepped with CHG: yes Labs drawn: no Dressing applied using aseptic technique: yes   Patient lab values: WBC 4.5, ANC 2.3 , HGB 9.3, AST 23, ALT 24  Alkaline Phosphatase 82, Creatinine 0.71   Patient meets parameters for today's infusion.  Denies questions or concerns regarding today's infusion and/or medications being administered.    Independent dose check completed with Noemi VALENCIA RN.  Patient identified with two identifiers, order verified, and verbal consent for today's infusion obtained from patient. Written consent for treatment is on file and valid.    1343  IV pump verified with Oxaliplatin  dose, drug, and rate of administration. Infusion administered per protocol. Patient tolerated infusion well, no signs or symptoms of adverse reaction noted. Patient denies pain nor discomfort.     Needle removed, tip intact. Site clean, dry and intact. Covered with a sterile bandage, slight pressure applied for 30 seconds. Pt instructed to leave bandage intact for a minimum of one hour, and to call with questions or concerns. Copy of appointments, discharge instructions, and after visit summary (AVS) provided to patient. Patient states understanding, discharged.

## 2020-05-08 NOTE — PROGRESS NOTES
Patients port accessed using non-coring, 19 gauge, 3/4 needle. Port accessed per facility protocol.  Hand hygiene performed: yes   Mask donned by caregiver: yes Site prepped with CHG: yes Labs drawn: yes Dressing applied using aseptic technique: yes Port flushed easily, without resistance. Flushed with 10 cc's normal saline.   Immediate blood return noted. 10 cc blood discarded.  2 vials blood draw and sent to lab for results.  Port flushed with 20 cc 0.9% normal saline.  Patient scheduled with Charleen Moran NP at 11 will receive treatment at noon if no changes.

## 2020-05-08 NOTE — PATIENT INSTRUCTIONS
We will see you back as planned. If you have any questions or concerns, we can be reached Monday through Friday 8am - 430pm at [phone] ([facility] [place]). If you have concerns related to a potential reaction/side effect after hours/weekends/holiday's, please seek emergent medical care.        Oxaliplatin Solution for injection  What is this medicine?  OXALIPLATIN (ox AL i SCOT tin) is a chemotherapy drug. It targets fast dividing cells, like cancer cells, and causes these cells to die. This medicine is used to treat cancers of the colon and rectum, and many other cancers.  This medicine may be used for other purposes; ask your health care provider or pharmacist if you have questions.  What should I tell my health care provider before I take this medicine?  They need to know if you have any of these conditions:    kidney disease    an unusual or allergic reaction to oxaliplatin, other chemotherapy, other medicines, foods, dyes, or preservatives    pregnant or trying to get pregnant    breast-feeding  How should I use this medicine?  This drug is given as an infusion into a vein. It is administered in a hospital or clinic by a specially trained health care professional.  Talk to your pediatrician regarding the use of this medicine in children. Special care may be needed.  Overdosage: If you think you have taken too much of this medicine contact a poison control center or emergency room at once.  NOTE: This medicine is only for you. Do not share this medicine with others.  What if I miss a dose?  It is important not to miss a dose. Call your doctor or health care professional if you are unable to keep an appointment.  What may interact with this medicine?    medicines to increase blood counts like filgrastim, pegfilgrastim, sargramostim    probenecid    some antibiotics like amikacin, gentamicin, neomycin, polymyxin B, streptomycin, tobramycin    zalcitabine  Talk to your doctor or health care professional before taking any  of these medicines:    acetaminophen    aspirin    ibuprofen    ketoprofen    naproxen  This list may not describe all possible interactions. Give your health care provider a list of all the medicines, herbs, non-prescription drugs, or dietary supplements you use. Also tell them if you smoke, drink alcohol, or use illegal drugs. Some items may interact with your medicine.  What should I watch for while using this medicine?  Your condition will be monitored carefully while you are receiving this medicine. You will need important blood work done while you are taking this medicine.  This medicine can make you more sensitive to cold. Do not drink cold drinks or use ice. Cover exposed skin before coming in contact with cold temperatures or cold objects. When out in cold weather wear warm clothing and cover your mouth and nose to warm the air that goes into your lungs. Tell your doctor if you get sensitive to the cold.  This drug may make you feel generally unwell. This is not uncommon, as chemotherapy can affect healthy cells as well as cancer cells. Report any side effects. Continue your course of treatment even though you feel ill unless your doctor tells you to stop.  In some cases, you may be given additional medicines to help with side effects. Follow all directions for their use.  Call your doctor or health care professional for advice if you get a fever, chills or sore throat, or other symptoms of a cold or flu. Do not treat yourself. This drug decreases your body's ability to fight infections. Try to avoid being around people who are sick.  This medicine may increase your risk to bruise or bleed. Call your doctor or health care professional if you notice any unusual bleeding.  Be careful brushing and flossing your teeth or using a toothpick because you may get an infection or bleed more easily. If you have any dental work done, tell your dentist you are receiving this medicine.  Avoid taking products that contain  aspirin, acetaminophen, ibuprofen, naproxen, or ketoprofen unless instructed by your doctor. These medicines may hide a fever.  Do not become pregnant while taking this medicine. Women should inform their doctor if they wish to become pregnant or think they might be pregnant. There is a potential for serious side effects to an unborn child. Talk to your health care professional or pharmacist for more information. Do not breast-feed an infant while taking this medicine.  Call your doctor or health care professional if you get diarrhea. Do not treat yourself.  What side effects may I notice from receiving this medicine?  Side effects that you should report to your doctor or health care professional as soon as possible:    allergic reactions like skin rash, itching or hives, swelling of the face, lips, or tongue    low blood counts - This drug may decrease the number of white blood cells, red blood cells and platelets. You may be at increased risk for infections and bleeding.    signs of infection - fever or chills, cough, sore throat, pain or difficulty passing urine    signs of decreased platelets or bleeding - bruising, pinpoint red spots on the skin, black, tarry stools, nosebleeds    signs of decreased red blood cells - unusually weak or tired, fainting spells, lightheadedness    breathing problems    chest pain, pressure    cough    diarrhea    jaw tightness    mouth sores    nausea and vomiting    pain, swelling, redness or irritation at the injection site    pain, tingling, numbness in the hands or feet    problems with balance, talking, walking    redness, blistering, peeling or loosening of the skin, including inside the mouth    trouble passing urine or change in the amount of urine  Side effects that usually do not require medical attention (report to your doctor or health care professional if they continue or are bothersome):    changes in vision    constipation    hair loss    loss of appetite    metallic  taste in the mouth or changes in taste    stomach pain  This list may not describe all possible side effects. Call your doctor for medical advice about side effects. You may report side effects to FDA at 5-786-AOK-2346.  Where should I keep my medicine?  This drug is given in a hospital or clinic and will not be stored at home.  NOTE:This sheet is a summary. It may not cover all possible information. If you have questions about this medicine, talk to your doctor, pharmacist, or health care provider. Copyright  2016 Gold Standard

## 2020-05-11 ENCOUNTER — PATIENT OUTREACH (OUTPATIENT)
Dept: ONCOLOGY | Facility: OTHER | Age: 50
End: 2020-05-11

## 2020-05-11 ENCOUNTER — INFUSION THERAPY VISIT (OUTPATIENT)
Dept: INFUSION THERAPY | Facility: OTHER | Age: 50
End: 2020-05-11
Attending: NURSE PRACTITIONER
Payer: COMMERCIAL

## 2020-05-11 ENCOUNTER — TELEPHONE (OUTPATIENT)
Dept: ONCOLOGY | Facility: OTHER | Age: 50
End: 2020-05-11

## 2020-05-11 VITALS
HEIGHT: 66 IN | SYSTOLIC BLOOD PRESSURE: 128 MMHG | HEART RATE: 67 BPM | TEMPERATURE: 95.6 F | WEIGHT: 165.34 LBS | DIASTOLIC BLOOD PRESSURE: 84 MMHG | BODY MASS INDEX: 26.57 KG/M2 | OXYGEN SATURATION: 100 %

## 2020-05-11 DIAGNOSIS — D50.9 IRON DEFICIENCY ANEMIA, UNSPECIFIED IRON DEFICIENCY ANEMIA TYPE: Primary | ICD-10-CM

## 2020-05-11 DIAGNOSIS — C18.2 CANCER OF ASCENDING COLON (H): ICD-10-CM

## 2020-05-11 PROCEDURE — 25800030 ZZH RX IP 258 OP 636: Performed by: NURSE PRACTITIONER

## 2020-05-11 PROCEDURE — 25000128 H RX IP 250 OP 636: Performed by: NURSE PRACTITIONER

## 2020-05-11 PROCEDURE — 96365 THER/PROPH/DIAG IV INF INIT: CPT

## 2020-05-11 PROCEDURE — 25000128 H RX IP 250 OP 636: Performed by: INTERNAL MEDICINE

## 2020-05-11 RX ORDER — HEPARIN SODIUM (PORCINE) LOCK FLUSH IV SOLN 100 UNIT/ML 100 UNIT/ML
5 SOLUTION INTRAVENOUS
Status: CANCELLED | OUTPATIENT
Start: 2020-05-13

## 2020-05-11 RX ORDER — HEPARIN SODIUM (PORCINE) LOCK FLUSH IV SOLN 100 UNIT/ML 100 UNIT/ML
5 SOLUTION INTRAVENOUS ONCE
Status: CANCELLED | OUTPATIENT
Start: 2020-05-11

## 2020-05-11 RX ORDER — HEPARIN SODIUM (PORCINE) LOCK FLUSH IV SOLN 100 UNIT/ML 100 UNIT/ML
5 SOLUTION INTRAVENOUS ONCE
Status: COMPLETED | OUTPATIENT
Start: 2020-05-11 | End: 2020-05-11

## 2020-05-11 RX ADMIN — IRON SUCROSE 200 MG: 20 INJECTION, SOLUTION INTRAVENOUS at 09:11

## 2020-05-11 RX ADMIN — HEPARIN 5 ML: 100 SYRINGE at 09:37

## 2020-05-11 RX ADMIN — SODIUM CHLORIDE 250 ML: 9 INJECTION, SOLUTION INTRAVENOUS at 09:11

## 2020-05-11 ASSESSMENT — MIFFLIN-ST. JEOR: SCORE: 1394.62

## 2020-05-11 NOTE — PROGRESS NOTES
Clinic Care Coordination Contact  Care Team Conversations    TC to [employer] with [employer] to confirm ranjana tKim Luisa does work with Dr. Palma.  Brooklyn Casanova, RN Oncology Care Coordinator

## 2020-05-11 NOTE — TELEPHONE ENCOUNTER
GYNECOLOGIC ONCOLOGY  Dr. Donna Teran  Chief Complaint   Patient presents with        FOLLOW-UP NOTE   8/20/2019    PCP: Jazzy Bell MD  Referring Provider: Donna Teran [facility]*    Chief Complaint: Stage 1B1 cervical cancer, here for pre chemotherapy visit, Cisplatin    History of Present Illness:  Bell Dotson is a 41 year old female was found in a clinical stage IB 1 adenocarcinoma of the cervix. She chose to proceed with surgery. On 6/27/2019 patient underwent an exploratory laparotomy, radical abdominal hysterectomy, bilateral salpingectomy, bilateral pelvic sentinel lymph node dissection, bilateral total pelvic and pericolic lymph nodes dissection. Final pathology revealed:     ** FINAL DIAGNOSIS **         A: Lymph node, right pelvic, sentinel #1; excision:     - One benign lymph node (0/1).         B: Lymph node, right pelvic, sentinel #2; excision:     - One benign lymph node (0/1).         C: Lymph node, left pelvic, sentinel #1; excision:     - One benign lymph node (0/1).         D: Lymph node, right pelvic; excision:     - Two benign lymph nodes (0/2).         E: Lymph node, right periaortic; excision:     -  One benign lymph node (0/1).         F: Lymph node, left pelvic; excision:     - One benign lymph node (0/1).         G: Lymph node, left periaortic; excision:     - Benign fibroadipose tissue with prominent vasculature.     - No lymph node present.         H: Bilateral fallopian tubes; bilateral salpingectomy:     - Two fallopian tubes without histologic abnormality.         I: Uterus, cervix, bilateral parametria and upper vagina; radical     hysterectomy:     - Invasive well to focally moderately differentiated endocervical     adenocarcinoma, 3.5 cm (pT1b1); see synoptic summary.     - Tumor invades into the outer third of the cervical wall.     - Focal lymphovascular space invasion is identified.     - Invasive carcinoma is 3 mm from the 3 o'clock vaginal cuff margin.     -  Needs to run it's course with this cycle.  Will likely adjust the oxaliplatin dose at her next cycle.     Parametrial margins are free of tumor.          she was presented at her patient care conference on 7/10/2019 with discussion as follows:    Dx:  Stage 1B1 cervical cancer  Discussion: Invasive cancer 3 mm from the vaginal cuff margin. Positive LVSI. She falls into the high intermediate risk category. Discussed Chemo potentiated radiation thearpy     Recommendations to be presented to patient: Cisplatin based chemoradiation    She is currently undergoing chemoradiation and presents today for dose 3 of Cisplatin. She is tolerating treatment well, but does report some fatigue and nausea. She does report increase in appetite. She states that she is craving carbohydrate based foods. She denies any fever and/or chills. She denies any vaginal bleeding or discharge.    REVIEW OF SYSTEMS     A 12 point review of systems is performed and reviewed with pertinent findings as noted above.    PAST MEDICAL, SURGICAL, FAMILY AND SOCIAL HISTORY     Past Medical History:   Diagnosis Date   • Conjunctivitis 3/26/2012   • Headache(784.0) 12/28/2012   • Malignant neoplasm (CMS/HCC) 06/27/2019    cervical cancer   • RAD (reactive airway disease)    • Thyroid disease     distant hypothyroidism history per patient       Past Surgical History:   Procedure Laterality Date   • Cholecystectomy     • Gallbladder surgery  07/2012    cholecystectomy   • Hysterectomy  06/28/2019    EL radical LINNETTE BS nodes per [facility]   • Tonsillectomy and adenoidectomy      as teenager       Family History   Problem Relation Age of Onset   • Depression Mother    • Heart disease Father    • Cancer Maternal Grandfather         colon cancer   • Diabetes Maternal Grandfather    • Stroke Maternal Grandfather    • Heart Other         Maternal cousin w/ heart defect       Social History     Socioeconomic History   • Marital status: Single     Spouse name: Not on file   • Number of children: Not on file   • Years of education: Not on file   • Highest education level: Not  on file   Occupational History   • Not on file   Social Needs   • Financial resource strain: Not on file   • Food insecurity:     Worry: Not on file     Inability: Not on file   • Transportation needs:     Medical: Not on file     Non-medical: Not on file   Tobacco Use   • Smoking status: Former Smoker     Years: 20.00     Types: Cigarettes     Last attempt to quit: 2013     Years since quittin.2   • Smokeless tobacco: Never Used   Substance and Sexual Activity   • Alcohol use: Yes     Alcohol/week: 1.0 standard drinks     Types: 1 Standard drinks or equivalent per week     Frequency: Never     Drinks per session: 1 or 2     Binge frequency: Never     Comment: 3 x yr   • Drug use: No   • Sexual activity: Yes     Partners: Male   Lifestyle   • Physical activity:     Days per week: Not on file     Minutes per session: Not on file   • Stress: Not on file   Relationships   • Social connections:     Talks on phone: Not on file     Gets together: Not on file     Attends [employer] service: Not on file     Active member of club or organization: Not on file     Attends meetings of clubs or organizations: Not on file     Relationship status: Not on file   • Intimate partner violence:     Fear of current or ex partner: Not on file     Emotionally abused: Not on file     Physically abused: Not on file     Forced sexual activity: Not on file   Other Topics Concern   • Not on file   Social History Narrative   • Not on file       MEDICATIONS AND ALLERGIES     Current Medications    ACETAMINOPHEN (TYLENOL) 325 MG TABLET    Take 2 tablets by mouth every 4 hours.    DEXAMETHASONE (DECADRON) 4 MG TABLET    Take 2 tablets by mouth daily (with breakfast). Starting the day after treatment for 3 days.    FLUCONAZOLE (DIFLUCAN) 150 MG TABLET    Take 1 tablet. Repeat second tablet in 48 hours.    IBUPROFEN (MOTRIN) 600 MG TABLET    Take 1 tablet by mouth 4 times daily.    ONDANSETRON (ZOFRAN ODT) 8 MG DISINTEGRATING TABLET    Place  1 tablet onto the tongue every 8 hours as needed for Nausea.    PROCHLORPERAZINE (COMPAZINE) 10 MG TABLET    Take 1 tablet by mouth every 6 hours as needed for Nausea.       ALLERGIES:   Allergen Reactions   • Tape [Adhesive   (Environmental)]      Rash         ECOG PERFORMANCE STATUS     Patient's ECOG Performance Status is 0; fully active, able to carry on all pre-disease performance without restriction.    PHYSICAL EXAM     Vitals:    08/20/19 1428   BP: 119/71   Pulse: 75   Temp: 97.7 °F (36.5 °C)   TempSrc: Oral   Weight: 87.1 kg     Body mass index is 31.46 kg/m².    Constitutional: she appears well-developed and well-nourished, no apparent distress.   HEENT: normocephalic and atraumatic, no masses, lesions or abnormalities, conjunctivae, eye lids and EOMs are normal.   Neck: normal range of motion with no tracheal deviation or asymmetry, thyroid is not enlarged.  Abdomen: soft, non-tender, nondistended and no palpable masses or hepatosplenomegaly.  Incision: midline incision:  Clean, dry, and intact.  Musculoskeletal: coordination and gait normal, appropriate muscle strength and tone.  Skin: skin reveals uticarial type rash over her entire abdomen. Does not extend to flank areas.   Psychiatric: she is alert and oriented to person, place, and time, she has a normal mood and affect, her behavior is normal, judgment and thought content normal.    Nursing note and vitals reviewed.    ASSESSMENT AND PLAN   Bell Dotson is a 41 year old female with a stage 1B1 cervical cancer. She is generally doing well from a postoperative standpoint. She presents today to discuss final pathology as well as case conference recommendations and treatment plan.    She is currently undergoing chemoradiation and presents today for dose #3 of Cisplatin, on Thursday.    In regards to her increased appetite we did discuss that this is likely related to the steroids prior to her chemotherapy. We discussed alternatives to choosing  different options when she identifies hunger sensations.    In regards to her fatigue we did discuss the benefit of cancer rehabilitation. An order has been placed.    We will draw her labs today prior to her next cycle.     She'll return to the clinic prior to cycle #4 of chemotherapy. Sooner should she have any issues or concerns.     At the end of the visit the patient stated an understanding and agreement with the above stated plan of care. All of her questions were answered to her satisfaction. I've encouraged her to call the office with any further issues, questions, or concerns.    Jazzy Bell MD, thank you so much for allowing me the opportunity to assist you in the care of this ginger lady. Please contact me if you have any questions or concerns. I look forward to being of further service to you and will continue to keep you informed of any and all subsequent developments in her care.      Leatha Richardson NP   Patient seen and evaluated along with the advanced practice clinician (APC).  I have participated in the care of the patient.  I agree with the above assessment and plan.      Ms. Dotson presents today for pre-chemotherapy visit.  Having fatigue; also having difficulty with what she is eating.  Healthy habits discussed.    Physical exam:    Visit Vitals  /71   Pulse 75   Temp 97.7 °F (36.5 °C) (Oral)   Wt 87.1 kg   BMI 31.46 kg/m²       General Appearance:     alert, appears stated age, cooperative and no distress  Head:   Normocephalic, without obvious abnormality, atraumatic  Abdomen:   soft, non-tender; bowel sounds normal; no palpable masses,  no organomegaly  Extremities:   extremities normal, atraumatic, no cyanosis or edema  Pulses:   2+ and symmetric  Neurologic:   Alert and oriented x3  Gynecological Exam:  deferred    All questions answered.  Support given.  Continue with chemo-potentiated radiation.     Donna Teran MD.  [facility]     [address] [place] [address]  Phone: [phone]  Pager:  [phone]

## 2020-05-11 NOTE — PROGRESS NOTES
"Patient is a 49 year old female here accompanied by self today for infusion of Venofer per order of Charlene Moran NP.  Patient identified with two identifiers, order verified, and verbal consent for today's infusion obtained from patient.      Patients left sided port accessed using non-coring, 19 gauge, 3/4\" needle. Port accessed per facility protocol. Port flushed easily, blood return noted.  No signs and symptoms of infection or infiltration.      IV pump verified with dose, drug, and rate of administration.  Infusion administered per protocol.  Patient tolerated infusion well, no signs or symptoms of adverse reaction noted.  Patient denies pain nor discomfort.     Patient requested to stay accessed as she has 2 more infusions this week. Port heparin locked per facility protocol.  Pt instructed to call with questions or concerns.  Copy of appointments, discharge instructions, and after visit summary (AVS) provided to patient.  Patient states understanding, discharged.    "

## 2020-05-11 NOTE — TELEPHONE ENCOUNTER
Patient here today for first Venofer infusion. Patient was treated on Friday with oxaliplatin. Patient reporting that she is having increased cold sensitivity in her hands and feet. Unable to touch even room things, such as water or a spoon. Patient states it feels like her feet are asleep and feel like pins and needles when she walks across the floor in her house. Patient is using gloves and wearing socks, but still has the sensitivity, etc. Patient states in order to drink water she has to warm up the water more than room temp and sometimes that's even difficult. Has been taking warm, not hot showers. Informed patient this RN would route to providers for their guidance. Pt verbalized understanding and agreed with plan.

## 2020-05-13 ENCOUNTER — INFUSION THERAPY VISIT (OUTPATIENT)
Dept: INFUSION THERAPY | Facility: OTHER | Age: 50
End: 2020-05-13
Attending: NURSE PRACTITIONER
Payer: COMMERCIAL

## 2020-05-13 VITALS
SYSTOLIC BLOOD PRESSURE: 121 MMHG | BODY MASS INDEX: 26.29 KG/M2 | DIASTOLIC BLOOD PRESSURE: 73 MMHG | HEART RATE: 88 BPM | WEIGHT: 165.34 LBS | OXYGEN SATURATION: 98 % | TEMPERATURE: 96.2 F | RESPIRATION RATE: 18 BRPM

## 2020-05-13 DIAGNOSIS — D50.9 IRON DEFICIENCY ANEMIA, UNSPECIFIED IRON DEFICIENCY ANEMIA TYPE: Primary | ICD-10-CM

## 2020-05-13 DIAGNOSIS — C18.2 CANCER OF ASCENDING COLON (H): ICD-10-CM

## 2020-05-13 PROCEDURE — 96365 THER/PROPH/DIAG IV INF INIT: CPT

## 2020-05-13 PROCEDURE — 25800030 ZZH RX IP 258 OP 636: Performed by: NURSE PRACTITIONER

## 2020-05-13 PROCEDURE — 25000128 H RX IP 250 OP 636: Performed by: NURSE PRACTITIONER

## 2020-05-13 RX ORDER — HEPARIN SODIUM (PORCINE) LOCK FLUSH IV SOLN 100 UNIT/ML 100 UNIT/ML
5 SOLUTION INTRAVENOUS
Status: CANCELLED | OUTPATIENT
Start: 2020-05-15

## 2020-05-13 RX ORDER — HEPARIN SODIUM (PORCINE) LOCK FLUSH IV SOLN 100 UNIT/ML 100 UNIT/ML
5 SOLUTION INTRAVENOUS
Status: DISCONTINUED | OUTPATIENT
Start: 2020-05-13 | End: 2020-05-13 | Stop reason: HOSPADM

## 2020-05-13 RX ORDER — HEPARIN SODIUM (PORCINE) LOCK FLUSH IV SOLN 100 UNIT/ML 100 UNIT/ML
5 SOLUTION INTRAVENOUS ONCE
Status: CANCELLED | OUTPATIENT
Start: 2020-05-13

## 2020-05-13 RX ORDER — HEPARIN SODIUM (PORCINE) LOCK FLUSH IV SOLN 100 UNIT/ML 100 UNIT/ML
5 SOLUTION INTRAVENOUS ONCE
Status: DISCONTINUED | OUTPATIENT
Start: 2020-05-13 | End: 2020-05-13 | Stop reason: HOSPADM

## 2020-05-13 RX ADMIN — HEPARIN 5 ML: 100 SYRINGE at 10:11

## 2020-05-13 RX ADMIN — SODIUM CHLORIDE 250 ML: 9 INJECTION, SOLUTION INTRAVENOUS at 09:46

## 2020-05-13 RX ADMIN — IRON SUCROSE 200 MG: 20 INJECTION, SOLUTION INTRAVENOUS at 09:47

## 2020-05-13 NOTE — PATIENT INSTRUCTIONS
We will see you back as planned. If you have any questions or concerns, we can be reached Monday through Friday 8am - 430pm at [phone] ([facility] [place]). If you have concerns related to a potential reaction/side effect after hours/weekends/holiday's, please seek emergent medical care.

## 2020-05-13 NOTE — PROGRESS NOTES
Patient is a 49  here accompanied by self  today for infusion of Venofer  per order of Charlene Moran.  Patient  identified with two identifiers, order verified, and verbal consent for today's infusion obtained from patient.      Patient meets order parameters for today's treatment.  Patients left side  Port previously  accessed from last visit. Port flushed easily, blood return noted.  No signs and symptoms of infection or infiltration.      0946  IV pump verified with dose, drug, and rate of administration.  Infusion administered per protocol.  Patient tolerated infusion well, no signs or symptoms of adverse reaction noted.  Patient denies pain nor discomfort.     Patient left accessed per her request until Friday May 15th appointment for infusion.  Patient port flushed with 20 ml Normal saline and 5 ml heparin.  Dressing reinforced.

## 2020-05-13 NOTE — PROGRESS NOTES
Discussed Cancer rehab with patient.  She is not interested at this time, but will reassess  as she progresses through treatment.

## 2020-05-15 ENCOUNTER — INFUSION THERAPY VISIT (OUTPATIENT)
Dept: INFUSION THERAPY | Facility: OTHER | Age: 50
End: 2020-05-15
Attending: NURSE PRACTITIONER
Payer: COMMERCIAL

## 2020-05-15 VITALS
BODY MASS INDEX: 25.9 KG/M2 | OXYGEN SATURATION: 100 % | HEART RATE: 77 BPM | DIASTOLIC BLOOD PRESSURE: 81 MMHG | WEIGHT: 161.16 LBS | HEIGHT: 66 IN | RESPIRATION RATE: 16 BRPM | TEMPERATURE: 96.3 F | SYSTOLIC BLOOD PRESSURE: 136 MMHG

## 2020-05-15 DIAGNOSIS — C18.2 CANCER OF ASCENDING COLON (H): ICD-10-CM

## 2020-05-15 DIAGNOSIS — D50.9 IRON DEFICIENCY ANEMIA, UNSPECIFIED IRON DEFICIENCY ANEMIA TYPE: Primary | ICD-10-CM

## 2020-05-15 PROCEDURE — 25000128 H RX IP 250 OP 636: Performed by: NURSE PRACTITIONER

## 2020-05-15 PROCEDURE — 25000128 H RX IP 250 OP 636: Performed by: INTERNAL MEDICINE

## 2020-05-15 PROCEDURE — 96365 THER/PROPH/DIAG IV INF INIT: CPT

## 2020-05-15 PROCEDURE — 25800030 ZZH RX IP 258 OP 636: Performed by: NURSE PRACTITIONER

## 2020-05-15 RX ORDER — HEPARIN SODIUM (PORCINE) LOCK FLUSH IV SOLN 100 UNIT/ML 100 UNIT/ML
5 SOLUTION INTRAVENOUS ONCE
Status: COMPLETED | OUTPATIENT
Start: 2020-05-15 | End: 2020-05-15

## 2020-05-15 RX ORDER — HEPARIN SODIUM (PORCINE) LOCK FLUSH IV SOLN 100 UNIT/ML 100 UNIT/ML
5 SOLUTION INTRAVENOUS
Status: CANCELLED | OUTPATIENT
Start: 2020-05-17

## 2020-05-15 RX ORDER — HEPARIN SODIUM (PORCINE) LOCK FLUSH IV SOLN 100 UNIT/ML 100 UNIT/ML
5 SOLUTION INTRAVENOUS ONCE
Status: CANCELLED | OUTPATIENT
Start: 2020-05-15

## 2020-05-15 RX ADMIN — HEPARIN 5 ML: 100 SYRINGE at 09:57

## 2020-05-15 RX ADMIN — IRON SUCROSE 200 MG: 20 INJECTION, SOLUTION INTRAVENOUS at 09:39

## 2020-05-15 RX ADMIN — SODIUM CHLORIDE 250 ML: 9 INJECTION, SOLUTION INTRAVENOUS at 09:38

## 2020-05-15 ASSESSMENT — MIFFLIN-ST. JEOR: SCORE: 1375.62

## 2020-05-15 ASSESSMENT — PAIN SCALES - GENERAL: PAINLEVEL: NO PAIN (0)

## 2020-05-15 NOTE — PATIENT INSTRUCTIONS
We will see you back as planned. If you have any questions or concerns, we can be reached Monday through Friday 8am - 430pm at [phone] ([facility] [place]). If you have concerns related to a potential reaction/side effect after hours/weekends/holiday's, please seek emergent medical care.

## 2020-05-15 NOTE — PROGRESS NOTES
Patient is a 49 year old female here accompanied by self today for infusion of Venofer per order of Charlene Moran.  Patient identified with two identifiers, order verified, and verbal consent for today's infusion obtained from patient.      Patients port was left accessed as she has multiple treatments within a week. Dressing intact, no s/sx infection. Flushed with normal saline, blood return noted. Flushed clear with 10mL normal saline.       0939: IV pump verified with dose, drug, and rate of administration.  Infusion administered per protocol.  Patient tolerated infusion well, no signs or symptoms of adverse reaction noted.  Patient denies pain nor discomfort.     IV removed, catheter intact.  Site clean, dry and intact.  No signs or symptoms of infiltration or infection.  Covered with a sterile bandage, slight pressure applied for 30 seconds.  Pt instructed to leave bandage intact for a minimum of one hour, and to call with questions or concerns. Patient states understanding, discharged.

## 2020-05-18 ENCOUNTER — INFUSION THERAPY VISIT (OUTPATIENT)
Dept: INFUSION THERAPY | Facility: OTHER | Age: 50
End: 2020-05-18
Attending: NURSE PRACTITIONER
Payer: COMMERCIAL

## 2020-05-18 VITALS — DIASTOLIC BLOOD PRESSURE: 80 MMHG | SYSTOLIC BLOOD PRESSURE: 114 MMHG | HEART RATE: 81 BPM

## 2020-05-18 DIAGNOSIS — C18.2 CANCER OF ASCENDING COLON (H): ICD-10-CM

## 2020-05-18 DIAGNOSIS — D50.9 IRON DEFICIENCY ANEMIA, UNSPECIFIED IRON DEFICIENCY ANEMIA TYPE: Primary | ICD-10-CM

## 2020-05-18 PROCEDURE — 25800030 ZZH RX IP 258 OP 636: Performed by: NURSE PRACTITIONER

## 2020-05-18 PROCEDURE — 25000128 H RX IP 250 OP 636: Performed by: INTERNAL MEDICINE

## 2020-05-18 PROCEDURE — 25000128 H RX IP 250 OP 636: Performed by: NURSE PRACTITIONER

## 2020-05-18 PROCEDURE — 96365 THER/PROPH/DIAG IV INF INIT: CPT

## 2020-05-18 RX ORDER — HEPARIN SODIUM (PORCINE) LOCK FLUSH IV SOLN 100 UNIT/ML 100 UNIT/ML
5 SOLUTION INTRAVENOUS ONCE
Status: CANCELLED | OUTPATIENT
Start: 2020-05-18

## 2020-05-18 RX ORDER — HEPARIN SODIUM (PORCINE) LOCK FLUSH IV SOLN 100 UNIT/ML 100 UNIT/ML
5 SOLUTION INTRAVENOUS ONCE
Status: COMPLETED | OUTPATIENT
Start: 2020-05-18 | End: 2020-05-18

## 2020-05-18 RX ORDER — HEPARIN SODIUM (PORCINE) LOCK FLUSH IV SOLN 100 UNIT/ML 100 UNIT/ML
5 SOLUTION INTRAVENOUS
Status: CANCELLED | OUTPATIENT
Start: 2020-05-19

## 2020-05-18 RX ADMIN — HEPARIN 5 ML: 100 SYRINGE at 09:31

## 2020-05-18 RX ADMIN — IRON SUCROSE 200 MG: 20 INJECTION, SOLUTION INTRAVENOUS at 09:11

## 2020-05-18 RX ADMIN — SODIUM CHLORIDE 250 ML: 9 INJECTION, SOLUTION INTRAVENOUS at 09:11

## 2020-05-18 NOTE — PROGRESS NOTES
Patient tolerated infusion well, no signs or symptoms of adverse reaction noted. Patient denies pain nor discomfort.     IV flushed with normal saline and heparin and left accessed per orders, as she has further infusions this week. Taped clamp shut, secured tail of line to chest. Site clean, dry and dressing intact.  No signs or symptoms of infiltration or infection. Pt educated on s/sx infection, when to call/be seen.  Patient states understanding, discharged.

## 2020-05-18 NOTE — PROGRESS NOTES
"Patient is a 49 year old female  here accompanied by self today for infusion of Venofe per order of Charlene Moran NP.  Patient identified with two identifiers, order verified, and verbal consent for today's infusion obtained from patient.      Patients left sided port accessed using non-coring, 19 gauge, 3/4\" needle. Port accessed per facility protocol. Port flushed easily, blood return noted.  No signs and symptoms of infection or infiltration.      0911: IV pump verified with dose, drug, and rate of administration.  Infusion administered per protocol.    "

## 2020-05-20 ENCOUNTER — INFUSION THERAPY VISIT (OUTPATIENT)
Dept: INFUSION THERAPY | Facility: OTHER | Age: 50
End: 2020-05-20
Attending: NURSE PRACTITIONER
Payer: COMMERCIAL

## 2020-05-20 ENCOUNTER — DOCUMENTATION ONLY (OUTPATIENT)
Dept: ONCOLOGY | Facility: OTHER | Age: 50
End: 2020-05-20

## 2020-05-20 ENCOUNTER — ONCOLOGY VISIT (OUTPATIENT)
Dept: ONCOLOGY | Facility: OTHER | Age: 50
End: 2020-05-20
Attending: NURSE PRACTITIONER
Payer: COMMERCIAL

## 2020-05-20 VITALS
RESPIRATION RATE: 17 BRPM | TEMPERATURE: 97.5 F | BODY MASS INDEX: 26.54 KG/M2 | WEIGHT: 166.89 LBS | DIASTOLIC BLOOD PRESSURE: 99 MMHG | SYSTOLIC BLOOD PRESSURE: 158 MMHG | HEART RATE: 78 BPM | OXYGEN SATURATION: 98 %

## 2020-05-20 DIAGNOSIS — R23.8 BULLAE: Primary | ICD-10-CM

## 2020-05-20 DIAGNOSIS — C18.9 MALIGNANT NEOPLASM OF COLON, UNSPECIFIED PART OF COLON (H): Primary | ICD-10-CM

## 2020-05-20 DIAGNOSIS — D50.9 IRON DEFICIENCY ANEMIA, UNSPECIFIED IRON DEFICIENCY ANEMIA TYPE: Primary | ICD-10-CM

## 2020-05-20 PROCEDURE — 25000128 H RX IP 250 OP 636: Performed by: NURSE PRACTITIONER

## 2020-05-20 PROCEDURE — 25800030 ZZH RX IP 258 OP 636: Performed by: NURSE PRACTITIONER

## 2020-05-20 PROCEDURE — 96365 THER/PROPH/DIAG IV INF INIT: CPT

## 2020-05-20 RX ORDER — HEPARIN SODIUM (PORCINE) LOCK FLUSH IV SOLN 100 UNIT/ML 100 UNIT/ML
5 SOLUTION INTRAVENOUS
Status: DISCONTINUED | OUTPATIENT
Start: 2020-05-20 | End: 2020-05-20 | Stop reason: HOSPADM

## 2020-05-20 RX ORDER — HEPARIN SODIUM (PORCINE) LOCK FLUSH IV SOLN 100 UNIT/ML 100 UNIT/ML
5 SOLUTION INTRAVENOUS
Status: CANCELLED | OUTPATIENT
Start: 2020-05-20

## 2020-05-20 RX ADMIN — HEPARIN 5 ML: 100 SYRINGE at 11:03

## 2020-05-20 RX ADMIN — SODIUM CHLORIDE 250 ML: 9 INJECTION, SOLUTION INTRAVENOUS at 10:09

## 2020-05-20 RX ADMIN — IRON SUCROSE 200 MG: 20 INJECTION, SOLUTION INTRAVENOUS at 10:09

## 2020-05-20 ASSESSMENT — PAIN SCALES - GENERAL: PAINLEVEL: EXTREME PAIN (8)

## 2020-05-20 NOTE — PROGRESS NOTES
I was asked to see patient in infusion.  She presented for Venofer infusion today complaining of extreme discomfort to the bottoms of her feet along with blisters.  Patient states she did pop 1 of the large blisters on the bottom.  She states she has difficulty walking and has extreme pain especially at nighttime.  She states it developed basically yesterday.  She also complains of feeling as though they are burning.  She denies numbness and tingling.  She denies any issues with her palms of her hands or mouth sores.  She states she has not been doing increased walking.  She states she took her regular meds for sleep including trazodone and tinazadine along with an Ativan and 1 Lortab and this did not help the pain last night.  Anything touching her toes or bottom of her feet causes extreme discomfort.  She does have the rest of today's dose and tomorrow left of the Capecitabine and I instructed her to discontinue this.  I asked that she increase the Lortab to see if this helps with the discomfort.  We did discuss instituting a medication for neuropathy yet would like to hold off until I see her with her next follow-up related to the potential of masking worsening neuropathy on the Capecitabine.  I will also discuss her situation with Dr. Palma for his recommendations.

## 2020-05-20 NOTE — PROGRESS NOTES
Correspondence with Dr. Palma in regards to patient's issues as documented.  He recommends decreasing the xeloda to 1000mg BID with her next cycle of therapy.

## 2020-05-20 NOTE — PATIENT INSTRUCTIONS
We will see you back as planned. If you have any questions or concerns, we can be reached Monday through Friday 8am - 430pm at [phone] ([facility] [place]). If you have concerns related to a potential reaction/side effect after hours/weekends/holiday's, please seek emergent medical care.

## 2020-05-20 NOTE — PROGRESS NOTES
Patient reported to facility today with multiple blisters to her bilateral feet.  Patient seen by provider and advised to take Lortab 1 tab every four hours PRN for pain.  Patient advised to stop xeloda NOW.  Do not resume until advised by provider.  Patient feet cleaned and bandaged with Mepilex.  Patient advised to keep feet elevated, do not apply any pressure to feet unless necessary.  Patient educated that due to history or diabetes and neuropathy she is at risk for skin break down and was strongly advised to perform good foot care, and keep covered, elevated and pressure  off.  Patient is a patient of home care/patiative care and had a home care appointment Friday with Dr. Amaya per Charlene Moran, NP advised to have Monique look at  blisters as well. Patient advised to put a soft cotton between toes.  Patient discharged in a wheel chair to vehicle.

## 2020-05-20 NOTE — PROGRESS NOTES
Patient is a 49 year old female  here accompanied by self  today for infusion of Venofer  per order of Charlene Moran  under the supervision of Dr. Palma.  Patient identified with two identifiers, order verified, and verbal consent for today's infusion obtained from patient.      Patient meets order parameters for today's treatment.     Patients left sided previously accessed  port accessed.  Port flushed easily, blood return noted.  No signs and symptoms of infection or infiltration.      IV pump verified with dose, drug, and rate of administration.  Infusion administered per protocol.  Patient tolerated infusion well, no signs or symptoms of adverse reaction noted.  Patient denies pain nor discomfort.     IV removed, catheter intact.  Site clean, dry and intact.  No signs or symptoms of infiltration or infection.  Covered with a sterile bandage, slight pressure applied for 30 seconds.  Pt instructed to leave bandage intact for a minimum of one hour, and to call with questions or concerns.  Copy of appointments, discharge instructions, and after visit summary (AVS) provided to patient.  Patient states understanding, discharged.

## 2020-05-20 NOTE — PROGRESS NOTES
Patient is reported to have multiple large bullae on each foot, due to her chemotherapy, which is now held.  We will treat cautiously with xeroform gauze for it's non adherent properties and roll gauze to cover.  Home health will assist with dressing changes.  Naomy Rossi  CNP, CWOCN  Hospice and Palliative Care

## 2020-05-27 PROBLEM — Z85.42 HISTORY OF UTERINE CANCER: Status: ACTIVE | Noted: 2020-05-27

## 2020-05-27 PROBLEM — Z51.5 PALLIATIVE CARE PATIENT: Status: ACTIVE | Noted: 2020-05-27

## 2020-05-27 NOTE — PROGRESS NOTES
Oncology Follow-up Visit:  May 29, 2020    Reason for Visit:  Patient presents with:  RECHECK: Follow up Colon cancer      Nursing Note and documentation reviewed: yes    HPI:  This is a 49-year-old female patient who presents to the oncology clinic today prior to receiving cycle 3 adjuvant chemotherapy for Stage IIIB adenocarcinoma of the ascending colon diagnosed February 2020.    She presented for Venofer infusion last week with noted blisters on the bottoms of her feet.  I discussed with Dr. Palma and he recommended decreasing the capecitabine to 1000 mg twice daily.    She presents to the clinic today stating she is feeling good.  She does feel the iron infusions helped somewhat with her fatigue.  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  She does have some difficulty with swallowing cool or cold things for about 4 to 5 days after chemo.  The rest of the cold neuropathy lasts about a week and a half.  She has no long-lasting neuropathy.  She does have some pigment changes to her hands and the blisters to her feet are now healed but callused.  She denies any pain with ambulation but states she just feels pressure.   Her feet are somewhat red in color and dry and states they feel a little swollen.  She does get some slight nausea with the chemotherapy but has not needed to take any nausea medications.    Oncologic History:     11/21/19 to 11/21/2019 patient was hospitalized with a hemoglobin of 5.7 underwent EGD with no evidence of active bleeding.  12/12/2019 she was seen by Dr. Cook and established care with her as her new PCP.  Referral for screening colonoscopy was completed.  2/13/2020 patient underwent colonoscopy by Dr. Cleveland where a near obstructing lesion in the right colon was found.  Pathology showed an invasive moderately differentiated adenocarcinoma.  CT the abdomen and pelvis showed a mass in the junction of the cecum and ascending colon highly  suspicious for malignancy.  2/24/2020  She then underwent laparoscopic-assisted right hemicolectomy by Dr. Cleveland that was converted to open right hemicolectomy when it was noted that the tumor was adherent to the pelvic brim; final pathology showed mildly differentiated adenocarcinoma of the right colon with 1/32 lymph nodes positive; there was perineural invasion; tumor budding and acutely inflamed tumor and reactive lymph nodes with peritonitis; the omentum showed mild peritonitis; margins were negative; patient was staged [id number]; IIIb adenocarcinoma of the right colon  2/28/2020 Port-A-Cath placement  3/25/2020 PET scan which showed no evidence of distant metastatic disease  3/30/2020 patient was seen by Dr. Palma with Medical Oncology with recommendation for XELOX; capecitabine 850 mg/m2 p.o. b.i.d. on days 1 through 14 of a 21-day cycle, oxaliplatin 130 mg/m2 to be given on day 1 x4 cycles.  4/10/2020 she was seen by Dr. Bryant with Radiation Oncology with no recommendation for radiation therapy at this time related to negative margins on pathology  4/17/2020 initiation of chemotherapy     MMR proteins intact  CEA prior to surgery was <0.5     Current Chemo Regime/TX: Oxaliplatin 130 mg per metered squared day 1 with Capecitabine 1500 mg twice daily days 1 through 14 of a 21-day cycle  Current Cycle:  3  # of completed cycles:  2     Previous treatment:  n/a    Past Medical History:   Diagnosis Date     Cancer (H)     colon     Cancer (H) 2004    uterine     Chronic pain      Depressive disorder      Diabetes (H)      History of blood transfusion      Hypertension      Prediabetes      Thyroid disease        Past Surgical History:   Procedure Laterality Date     BACK SURGERY      2008; 2011     COLONOSCOPY N/A 2/13/2020    Procedure: COLONOSCOPY WITH BIOPSY AND TATOOING OF ASCENDING COLON,INCOMEPLETE EXAM;  Surgeon: Kang Cleveland MD;  Location: [facility]     ESOPHAGOSCOPY, GASTROSCOPY, DUODENOSCOPY  (EGD), COMBINED N/A 7/21/2018    Procedure: COMBINED ESOPHAGOSCOPY, GASTROSCOPY, DUODENOSCOPY (EGD);  UPPER ENDOSCOPY WITH BIOPSIES;  Surgeon: Roger Vazquez MD;  Location: [facility]     ESOPHAGOSCOPY, GASTROSCOPY, DUODENOSCOPY (EGD), COMBINED N/A 11/21/2019    Procedure: UPPER ENDOSCOPY with biopsy;  Surgeon: Kang Cleveland MD;  Location: [facility] OR     HEAD & NECK SURGERY  2008    c4-c5 fusion     HYSTERECTOMY, CRISTIANE  04/2004    uterine cancer; [facility]     INSERT PORT VASCULAR ACCESS N/A 2/28/2020    Procedure: INSERTION, VASCULAR ACCESS PORT LEFT;  Surgeon: Kang Cleveland MD;  Location: [facility]     LAPAROSCOPIC ASSISTED COLECTOMY Right 2/24/2020    Procedure: RIGHT KEVIN COLECTOMY, LYSIS OF ADHESIONS;  Surgeon: Kang Cleveland MD;  Location: [facility]     LAPAROSCOPIC BYPASS GASTRIC  12/2010       Family History   Problem Relation Age of Onset     Diabetes Mother      Hypertension Mother      Coronary Artery Disease Mother      Myocardial Infarction Mother      Hypertension Father      Diabetes Maternal Grandmother      Hypertension Maternal Grandfather      Coronary Artery Disease Maternal Grandfather      Cancer Paternal Grandfather         unsure of type of cancer     Bladder Cancer Paternal Grandfather      Diabetes Maternal Aunt      Cerebrovascular Disease Maternal Aunt      Diabetes Maternal Aunt      Cerebrovascular Disease Maternal Aunt      Diabetes Maternal Aunt      Cerebrovascular Disease Maternal Aunt      Hyperlipidemia No family hx of      Breast Cancer No family hx of      Colon Cancer No family hx of      Prostate Cancer No family hx of      Thyroid Disease No family hx of      Anesthesia Reaction No family hx of      Asthma No family hx of      Genetic Disorder No family hx of        Social History     Socioeconomic History     Marital status:      Spouse name: Not on file     Number of children: 2     Years of education: Not on file     Highest education level: Not on  file   Occupational History     Occupation: PCA     Comment: currently not working, unemployment    Social Needs     Financial resource strain: Not hard at all     Food insecurity     Worry: Not on file     Inability: Not on file     Transportation needs     Medical: Patient refused     Non-medical: Patient refused   Tobacco Use     Smoking status: Never Smoker     Smokeless tobacco: Never Used   Substance and Sexual Activity     Alcohol use: No     Frequency: Never     Comment: sober for 18 years     Drug use: No     Sexual activity: Never   Lifestyle     Physical activity     Days per week: Not on file     Minutes per session: Not on file     Stress: Not on file   Relationships     Social connections     Talks on phone: Not on file     Gets together: Not on file     Attends [employer] service: Not on file     Active member of club or organization: Not on file     Attends meetings of clubs or organizations: Not on file     Relationship status: Not on file     Intimate partner violence     Fear of current or ex partner: Not on file     Emotionally abused: Not on file     Physically abused: Not on file     Forced sexual activity: Not on file   Other Topics Concern     Parent/sibling w/ CABG, MI or angioplasty before 65F 55M? Yes     Comment: Mother   Social History Narrative     Not on file       Current Outpatient Medications   Medication     capecitabine (XELODA) 500 MG tablet     cyanocobalamin (CYANOCOBALAMIN) 1000 MCG/ML injection     levothyroxine 200 MCG PO tablet     LORazepam (ATIVAN) 0.5 MG tablet     multivitamin, therapeutic with minerals (MULTI-VITAMIN) TABS     sertraline (ZOLOFT) 100 MG tablet     tiZANidine (ZANAFLEX) 4 MG tablet     traZODone (DESYREL) 50 MG tablet     acetaminophen (TYLENOL) 500 MG tablet     diazepam (VALIUM) 5 MG tablet     EPINEPHrine (ANY BX GENERIC EQUIV) 0.3 MG/0.3ML injection 2-pack     ferrous sulfate (IRON) 325 (65 Fe) MG tablet     HYDROcodone-acetaminophen (NORCO) 5-325 MG  tablet     lidocaine-prilocaine (EMLA) 2.5-2.5 % external cream     loperamide (IMODIUM) 2 MG capsule     NARCAN 4 MG/0.1ML nasal spray     order for DME     order for DME     prochlorperazine (COMPAZINE) 10 MG tablet     No current facility-administered medications for this visit.         Allergies   Allergen Reactions     Aspartame      Other reaction(s): Other - Describe In Comment Field, Seizures  convulsions     Bee Venom Anaphylaxis     Bupropion Anaphylaxis     Throat gets tight and hives. Denies allergic reaction     Food Anaphylaxis     Peppers-anaphylaxis if eaten, if touched hands swell and reddened     Food Allergy Formula Anaphylaxis     Peppers, patient reports green peppers close off her throat     Ibuprofen Anaphylaxis     No Clinical Screening - See Comments Anaphylaxis     Peppers, patient reports green peppers close off her throat     Piper Anaphylaxis     Peppers--green, black, red, chili, etc. Derm symptoms, hives;  Strawberries--(cooked) Itching, breathing difficulties, GI symptoms. Mushrooms--GI symptoms, Hives.Spinach (cooked) throat irritation  (Listed on [facility] problem list.)     Fentanyl Swelling     Lips and tongue swelled.  Swelling of lips and tongue     Adhesive Tape      blisters     Amitriptyline Other (See Comments)     Respiratory symptoms - denies allergic reaction     Gabapentin Other (See Comments)     irritability     Liquid Adhesive Dermatitis     blisters  Blisters  Silk tape and tegaderm is fine     Oxcarbazepine Other (See Comments)     Throat gets tight and hives. Denies allergic reaction  Throat gets tight and hives-       Prozac [Fluoxetine]      Other reaction(s): *Unknown  uncontrolled muscle spasms     Valproic Acid      Respiratory symptoms ([facility]). Denies allergic reaction     Ziprasidone      Throat gets tight and hives. Denies allergic reaction       Review Of Systems:  Constitutional:    denies fever, weight changes, chills,  "and night sweats.  Eyes:    Has some fussy vision since starting chemotherapy  Ears/Nose/Throat:   denies ear pain, nose problems  Respiratory:   denies shortness of breath, cough   Skin:   denies rash, lesions  Cardiovascular:   denies chest pain, palpitations, edema  Gastrointestinal:   denies abdominal pain, bloating, early satiety; no change in bowel habits or blood in stool  Genitourinary:   denies difficulty with urination, blood in urine  Musculoskeletal:    denies new muscle pain, bone pain  Neurologic:   denies lightheadedness, gets a few headaches  Psychiatric:   denies anxiety, depression  Hematologic/Lymphatic/Immunologic:   denies easy bruising, easy bleeding, lumps or bumps noted  Endocrine:   Has some increased thirst    Pain  (0=no pain; 10=worst pain imaginable): low back \"3\"    ECOG Performance Status: 1    Physical Exam:  BP (!) 158/91   Pulse 70   Temp 97  F (36.1  C) (Tympanic)   Resp 18   Ht 1.689 m (5' 6.5\")   Wt 76.3 kg (168 lb 3.4 oz)   SpO2 98%   BMI 26.74 kg/m      GENERAL APPEARANCE: Healthy, alert and in no acute distress.  HEENT: Normocephalic, Sclerae anicteric. Oropharynx without ulcers, lesions, or thrush.  NECK:   No asymmetry or masses, no thyromegaly.  LYMPHATICS: No palpable cervical, supraclavicular, axillary, or inguinal nodes   RESP: Lungs clear to auscultation bilaterally, respirations regular and easy  CARDIOVASCULAR: Regular rate and rhythm. Normal S1, S2; no murmur, gallop, or rub.  ABDOMEN: Soft, nontender. Bowel sounds auscultated all 4 quadrants. No palpable organomegaly or masses.  MUSCULOSKELETAL: Extremities without gross deformities noted. No edema of bilateral lower extremities.  SKIN: No suspicious lesions or rashes.  Hands have some tan discoloration to the palms.  Feet are dry and have a red appearance; old blister areas are callused.  No open areas or skin peeling.  NEURO: Alert and oriented x 3.  Gait steady.  PSYCHIATRIC: Mentation and affect appear " normal.  Mood appropriate.    Laboratory:  Results for orders placed or performed in visit on 05/29/20   CBC with platelets differential     Status: Abnormal   Result Value Ref Range    WBC 5.0 4.0 - 11.0 10e9/L    RBC Count 3.89 3.8 - 5.2 10e12/L    Hemoglobin 10.2 (L) 11.7 - 15.7 g/dL    Hematocrit 32.2 (L) 35.0 - 47.0 %    MCV 83 78 - 100 fl    MCH 26.2 (L) 26.5 - 33.0 pg    MCHC 31.7 31.5 - 36.5 g/dL    RDW 26.7 (H) 10.0 - 15.0 %    Platelet Count 181 150 - 450 10e9/L    Diff Method Automated Method     % Neutrophils 58.1 %    % Lymphocytes 27.2 %    % Monocytes 14.5 %    % Eosinophils 0.0 %    % Basophils 0.0 %    % Immature Granulocytes 0.2 %    Nucleated RBCs 0 0 /100    Absolute Neutrophil 2.9 1.6 - 8.3 10e9/L    Absolute Lymphocytes 1.4 0.8 - 5.3 10e9/L    Absolute Monocytes 0.7 0.0 - 1.3 10e9/L    Absolute Eosinophils 0.0 0.0 - 0.7 10e9/L    Absolute Basophils 0.0 0.0 - 0.2 10e9/L    Abs Immature Granulocytes 0.0 0 - 0.4 10e9/L    Absolute Nucleated RBC 0.0    Comprehensive metabolic panel     Status: Abnormal   Result Value Ref Range    Sodium 140 133 - 144 mmol/L    Potassium 4.1 3.4 - 5.3 mmol/L    Chloride 109 94 - 109 mmol/L    Carbon Dioxide 24 20 - 32 mmol/L    Anion Gap 7 3 - 14 mmol/L    Glucose 88 70 - 99 mg/dL    Urea Nitrogen 15 7 - 30 mg/dL    Creatinine 0.65 0.52 - 1.04 mg/dL    GFR Estimate >90 >60 mL/min/[1.73_m2]    GFR Estimate If Black >90 >60 mL/min/[1.73_m2]    Calcium 8.4 (L) 8.5 - 10.1 mg/dL    Bilirubin Total 0.4 0.2 - 1.3 mg/dL    Albumin 3.4 3.4 - 5.0 g/dL    Protein Total 7.0 6.8 - 8.8 g/dL    Alkaline Phosphatase 86 40 - 150 U/L    ALT 27 0 - 50 U/L    AST 23 0 - 45 U/L       Imaging Studies:  None completed for today's visit      ASSESSMENT/PLAN:    #1 colon cancer:   Stage IIIB adenocarcinoma of the ascending colon diagnosed February 2020.  She is currently undergoing adjuvant chemotherapy with XELOX and will initiate cycle 3 today.    Dose of Capecitabine will be decreased to  1000 mg twice daily due to PPE per Dr. Palma per his order.  Patient was instructed.  I will see her back prior to cycle 4 with labs per treatment plan.    #2 anemia:  Hemoglobin is somewhat improved after Venofer infusions.  Will obtain another iron/TIBC and ferritin today.    #3 PPE: Occasion she done PPE was given to patient.  We discussed the need for thick lotion to her feet and also recommended lotion in her hands.  We also discussed the need to decrease friction as much as possible.  Xeloda dose will be decreased to 1000 mg twice daily.    #4  Elevated BP:    lisinopril 10 mg daily and I will let her PCP know that this was initiated.  I recommended she follow-up with her PCP for further evaluation of her BP.    #5  Hypocalcemia: She has decreased the amount of calcium in her diet so I did recommend instituting a low dose supplement daily.  We also will draw a Magnesium and vitamin D level today as she does have a history of vitamin D deficiency and gastric bypass surgery.    I encouraged patient to call with any questions or concerns.       Izzy Moran NP  APRN, FNP-BC, AOCNP

## 2020-05-28 DIAGNOSIS — Z91.030 BEE ALLERGY STATUS: Primary | ICD-10-CM

## 2020-05-28 RX ORDER — EPINEPHRINE 0.3 MG/.3ML
0.3 INJECTION SUBCUTANEOUS PRN
Qty: 2 EACH | Refills: 1 | Status: SHIPPED | OUTPATIENT
Start: 2020-05-28 | End: 2024-03-13

## 2020-05-28 NOTE — TELEPHONE ENCOUNTER
epipen      Last Written Prescription Date:  11/13/2014  Last Fill Quantity: ,   # refills:   Last Office Visit: 5/26/2020  Future Office visit:    Next 5 appointments (look out 90 days)    May 29, 2020  9:45 AM CDT  (Arrive by 9:30 AM)  Return Visit with Izzy Moran NP  [facility] ([facility] ) [address] [address]  [phone]   Jun 19, 2020  9:45 AM CDT  (Arrive by 9:30 AM)  Return Visit with Izzy Moran NP  [facility] ([facility] ) [address] [address]  [phone]   Jul 07, 2020  3:30 PM CDT  (Arrive by 3:15 PM)  Return Visit with Shona Palma MD  [facility] [address] [facility] [address]  [phone]           Routing refill request to provider for review/approval because:    Historical/reproted.

## 2020-05-29 ENCOUNTER — ONCOLOGY VISIT (OUTPATIENT)
Dept: ONCOLOGY | Facility: OTHER | Age: 50
End: 2020-05-29
Attending: NURSE PRACTITIONER
Payer: COMMERCIAL

## 2020-05-29 ENCOUNTER — INFUSION THERAPY VISIT (OUTPATIENT)
Dept: INFUSION THERAPY | Facility: OTHER | Age: 50
End: 2020-05-29
Attending: INTERNAL MEDICINE
Payer: COMMERCIAL

## 2020-05-29 ENCOUNTER — APPOINTMENT (OUTPATIENT)
Dept: CT IMAGING | Facility: HOSPITAL | Age: 50
End: 2020-05-29
Attending: FAMILY MEDICINE
Payer: COMMERCIAL

## 2020-05-29 ENCOUNTER — NURSE TRIAGE (OUTPATIENT)
Dept: NURSING | Facility: CLINIC | Age: 50
End: 2020-05-29

## 2020-05-29 ENCOUNTER — HOSPITAL ENCOUNTER (EMERGENCY)
Facility: HOSPITAL | Age: 50
Discharge: HOME | End: 2020-05-30
Attending: FAMILY MEDICINE | Admitting: FAMILY MEDICINE
Payer: COMMERCIAL

## 2020-05-29 VITALS
TEMPERATURE: 97 F | DIASTOLIC BLOOD PRESSURE: 91 MMHG | HEART RATE: 67 BPM | SYSTOLIC BLOOD PRESSURE: 158 MMHG | BODY MASS INDEX: 26.75 KG/M2 | WEIGHT: 168.21 LBS

## 2020-05-29 VITALS
HEART RATE: 70 BPM | BODY MASS INDEX: 26.4 KG/M2 | TEMPERATURE: 97 F | HEIGHT: 67 IN | WEIGHT: 168.21 LBS | RESPIRATION RATE: 18 BRPM | OXYGEN SATURATION: 98 % | SYSTOLIC BLOOD PRESSURE: 158 MMHG | DIASTOLIC BLOOD PRESSURE: 91 MMHG

## 2020-05-29 VITALS — SYSTOLIC BLOOD PRESSURE: 157 MMHG | HEART RATE: 71 BPM | DIASTOLIC BLOOD PRESSURE: 90 MMHG

## 2020-05-29 DIAGNOSIS — C18.2 CANCER OF ASCENDING COLON (H): Primary | ICD-10-CM

## 2020-05-29 DIAGNOSIS — R03.0 ELEVATED BP WITHOUT DIAGNOSIS OF HYPERTENSION: ICD-10-CM

## 2020-05-29 DIAGNOSIS — C18.9 MALIGNANT NEOPLASM OF COLON, UNSPECIFIED PART OF COLON (H): ICD-10-CM

## 2020-05-29 DIAGNOSIS — D50.9 IRON DEFICIENCY ANEMIA, UNSPECIFIED IRON DEFICIENCY ANEMIA TYPE: ICD-10-CM

## 2020-05-29 DIAGNOSIS — L27.1 PALMAR PLANTAR ERYTHRODYSAESTHESIA DUE TO CYTOTOXIC THERAPY: ICD-10-CM

## 2020-05-29 DIAGNOSIS — G43.909 MIGRAINE WITHOUT STATUS MIGRAINOSUS, NOT INTRACTABLE, UNSPECIFIED MIGRAINE TYPE: ICD-10-CM

## 2020-05-29 DIAGNOSIS — E83.51 HYPOCALCEMIA: ICD-10-CM

## 2020-05-29 LAB
ALBUMIN SERPL-MCNC: 3.4 G/DL (ref 3.4–5)
ALBUMIN SERPL-MCNC: 3.6 G/DL (ref 3.4–5)
ALP SERPL-CCNC: 86 U/L (ref 40–150)
ALP SERPL-CCNC: 87 U/L (ref 40–150)
ALT SERPL W P-5'-P-CCNC: 27 U/L (ref 0–50)
ALT SERPL W P-5'-P-CCNC: 27 U/L (ref 0–50)
ANION GAP SERPL CALCULATED.3IONS-SCNC: 7 MMOL/L (ref 3–14)
ANION GAP SERPL CALCULATED.3IONS-SCNC: 9 MMOL/L (ref 3–14)
APTT PPP: 28 SEC (ref 22–37)
AST SERPL W P-5'-P-CCNC: 23 U/L (ref 0–45)
AST SERPL W P-5'-P-CCNC: 23 U/L (ref 0–45)
BASOPHILS # BLD AUTO: 0 10E9/L (ref 0–0.2)
BASOPHILS # BLD AUTO: 0 10E9/L (ref 0–0.2)
BASOPHILS NFR BLD AUTO: 0 %
BASOPHILS NFR BLD AUTO: 0 %
BILIRUB SERPL-MCNC: 0.4 MG/DL (ref 0.2–1.3)
BILIRUB SERPL-MCNC: 0.5 MG/DL (ref 0.2–1.3)
BUN SERPL-MCNC: 15 MG/DL (ref 7–30)
BUN SERPL-MCNC: 23 MG/DL (ref 7–30)
CALCIUM SERPL-MCNC: 8.4 MG/DL (ref 8.5–10.1)
CALCIUM SERPL-MCNC: 8.7 MG/DL (ref 8.5–10.1)
CHLORIDE SERPL-SCNC: 109 MMOL/L (ref 94–109)
CHLORIDE SERPL-SCNC: 109 MMOL/L (ref 94–109)
CO2 SERPL-SCNC: 20 MMOL/L (ref 20–32)
CO2 SERPL-SCNC: 24 MMOL/L (ref 20–32)
CREAT SERPL-MCNC: 0.65 MG/DL (ref 0.52–1.04)
CREAT SERPL-MCNC: 0.78 MG/DL (ref 0.52–1.04)
CRP SERPL-MCNC: <2.9 MG/L (ref 0–8)
DIFFERENTIAL METHOD BLD: ABNORMAL
DIFFERENTIAL METHOD BLD: ABNORMAL
EOSINOPHIL # BLD AUTO: 0 10E9/L (ref 0–0.7)
EOSINOPHIL # BLD AUTO: 0 10E9/L (ref 0–0.7)
EOSINOPHIL NFR BLD AUTO: 0 %
EOSINOPHIL NFR BLD AUTO: 0 %
ERYTHROCYTE [DISTWIDTH] IN BLOOD BY AUTOMATED COUNT: 26.7 % (ref 10–15)
ERYTHROCYTE [DISTWIDTH] IN BLOOD BY AUTOMATED COUNT: ABNORMAL % (ref 10–15)
FERRITIN SERPL-MCNC: 183 NG/ML (ref 8–252)
GFR SERPL CREATININE-BSD FRML MDRD: 89 ML/MIN/{1.73_M2}
GFR SERPL CREATININE-BSD FRML MDRD: >90 ML/MIN/{1.73_M2}
GLUCOSE BLDC GLUCOMTR-MCNC: 266 MG/DL (ref 70–99)
GLUCOSE SERPL-MCNC: 263 MG/DL (ref 70–99)
GLUCOSE SERPL-MCNC: 88 MG/DL (ref 70–99)
HCT VFR BLD AUTO: 32.2 % (ref 35–47)
HCT VFR BLD AUTO: 33.4 % (ref 35–47)
HGB BLD-MCNC: 10.2 G/DL (ref 11.7–15.7)
HGB BLD-MCNC: 10.7 G/DL (ref 11.7–15.7)
IMM GRANULOCYTES # BLD: 0 10E9/L (ref 0–0.4)
IMM GRANULOCYTES # BLD: 0 10E9/L (ref 0–0.4)
IMM GRANULOCYTES NFR BLD: 0.2 %
IMM GRANULOCYTES NFR BLD: 0.2 %
INR PPP: 1.14 (ref 0.86–1.14)
IRON SATN MFR SERPL: 17 % (ref 15–46)
IRON SERPL-MCNC: 67 UG/DL (ref 35–180)
LYMPHOCYTES # BLD AUTO: 0.7 10E9/L (ref 0.8–5.3)
LYMPHOCYTES # BLD AUTO: 1.4 10E9/L (ref 0.8–5.3)
LYMPHOCYTES NFR BLD AUTO: 13 %
LYMPHOCYTES NFR BLD AUTO: 27.2 %
MAGNESIUM SERPL-MCNC: 1.9 MG/DL (ref 1.6–2.3)
MAGNESIUM SERPL-MCNC: 2 MG/DL (ref 1.6–2.3)
MCH RBC QN AUTO: 26.2 PG (ref 26.5–33)
MCH RBC QN AUTO: 26.3 PG (ref 26.5–33)
MCHC RBC AUTO-ENTMCNC: 31.7 G/DL (ref 31.5–36.5)
MCHC RBC AUTO-ENTMCNC: 32 G/DL (ref 31.5–36.5)
MCV RBC AUTO: 82 FL (ref 78–100)
MCV RBC AUTO: 83 FL (ref 78–100)
MONOCYTES # BLD AUTO: 0.4 10E9/L (ref 0–1.3)
MONOCYTES # BLD AUTO: 0.7 10E9/L (ref 0–1.3)
MONOCYTES NFR BLD AUTO: 14.5 %
MONOCYTES NFR BLD AUTO: 8.2 %
NEUTROPHILS # BLD AUTO: 2.9 10E9/L (ref 1.6–8.3)
NEUTROPHILS # BLD AUTO: 4.1 10E9/L (ref 1.6–8.3)
NEUTROPHILS NFR BLD AUTO: 58.1 %
NEUTROPHILS NFR BLD AUTO: 78.6 %
NRBC # BLD AUTO: 0 10*3/UL
NRBC # BLD AUTO: 0 10*3/UL
NRBC BLD AUTO-RTO: 0 /100
NRBC BLD AUTO-RTO: 0 /100
PLATELET # BLD AUTO: 172 10E9/L (ref 150–450)
PLATELET # BLD AUTO: 181 10E9/L (ref 150–450)
POTASSIUM SERPL-SCNC: 4.1 MMOL/L (ref 3.4–5.3)
POTASSIUM SERPL-SCNC: 4.3 MMOL/L (ref 3.4–5.3)
PROT SERPL-MCNC: 7 G/DL (ref 6.8–8.8)
PROT SERPL-MCNC: 7.4 G/DL (ref 6.8–8.8)
RBC # BLD AUTO: 3.89 10E12/L (ref 3.8–5.2)
RBC # BLD AUTO: 4.07 10E12/L (ref 3.8–5.2)
SODIUM SERPL-SCNC: 138 MMOL/L (ref 133–144)
SODIUM SERPL-SCNC: 140 MMOL/L (ref 133–144)
TIBC SERPL-MCNC: 395 UG/DL (ref 240–430)
TROPONIN I SERPL-MCNC: <0.015 UG/L (ref 0–0.04)
WBC # BLD AUTO: 5 10E9/L (ref 4–11)
WBC # BLD AUTO: 5.2 10E9/L (ref 4–11)

## 2020-05-29 PROCEDURE — 25800030 ZZH RX IP 258 OP 636

## 2020-05-29 PROCEDURE — 70450 CT HEAD/BRAIN W/O DYE: CPT | Mod: TC

## 2020-05-29 PROCEDURE — 80053 COMPREHEN METABOLIC PANEL: CPT | Performed by: FAMILY MEDICINE

## 2020-05-29 PROCEDURE — 25000128 H RX IP 250 OP 636

## 2020-05-29 PROCEDURE — 83550 IRON BINDING TEST: CPT | Mod: ZL | Performed by: NURSE PRACTITIONER

## 2020-05-29 PROCEDURE — 96415 CHEMO IV INFUSION ADDL HR: CPT

## 2020-05-29 PROCEDURE — 82728 ASSAY OF FERRITIN: CPT | Mod: ZL | Performed by: NURSE PRACTITIONER

## 2020-05-29 PROCEDURE — 83540 ASSAY OF IRON: CPT | Mod: ZL | Performed by: NURSE PRACTITIONER

## 2020-05-29 PROCEDURE — 99214 OFFICE O/P EST MOD 30 MIN: CPT | Performed by: NURSE PRACTITIONER

## 2020-05-29 PROCEDURE — 85025 COMPLETE CBC W/AUTO DIFF WBC: CPT | Performed by: FAMILY MEDICINE

## 2020-05-29 PROCEDURE — 96523 IRRIG DRUG DELIVERY DEVICE: CPT

## 2020-05-29 PROCEDURE — 85610 PROTHROMBIN TIME: CPT

## 2020-05-29 PROCEDURE — 85025 COMPLETE CBC W/AUTO DIFF WBC: CPT | Mod: ZL | Performed by: INTERNAL MEDICINE

## 2020-05-29 PROCEDURE — 36415 COLL VENOUS BLD VENIPUNCTURE: CPT | Mod: ZL | Performed by: NURSE PRACTITIONER

## 2020-05-29 PROCEDURE — 83735 ASSAY OF MAGNESIUM: CPT | Mod: ZL,91 | Performed by: NURSE PRACTITIONER

## 2020-05-29 PROCEDURE — 25000128 H RX IP 250 OP 636: Performed by: INTERNAL MEDICINE

## 2020-05-29 PROCEDURE — 86140 C-REACTIVE PROTEIN: CPT | Performed by: FAMILY MEDICINE

## 2020-05-29 PROCEDURE — 25000131 ZZH RX MED GY IP 250 OP 636 PS 637: Performed by: FAMILY MEDICINE

## 2020-05-29 PROCEDURE — 85730 THROMBOPLASTIN TIME PARTIAL: CPT | Performed by: FAMILY MEDICINE

## 2020-05-29 PROCEDURE — 00000146 ZZHCL STATISTIC GLUCOSE BY METER IP

## 2020-05-29 PROCEDURE — 25000128 H RX IP 250 OP 636: Performed by: FAMILY MEDICINE

## 2020-05-29 PROCEDURE — 93005 ELECTROCARDIOGRAM TRACING: CPT

## 2020-05-29 PROCEDURE — 99285 EMERGENCY DEPT VISIT HI MDM: CPT | Mod: Z6 | Performed by: FAMILY MEDICINE

## 2020-05-29 PROCEDURE — 96374 THER/PROPH/DIAG INJ IV PUSH: CPT | Mod: XU

## 2020-05-29 PROCEDURE — 70498 CT ANGIOGRAPHY NECK: CPT | Mod: TC

## 2020-05-29 PROCEDURE — 96413 CHEMO IV INFUSION 1 HR: CPT

## 2020-05-29 PROCEDURE — 83735 ASSAY OF MAGNESIUM: CPT | Performed by: FAMILY MEDICINE

## 2020-05-29 PROCEDURE — 96367 TX/PROPH/DG ADDL SEQ IV INF: CPT

## 2020-05-29 PROCEDURE — G0463 HOSPITAL OUTPT CLINIC VISIT: HCPCS | Mod: 25

## 2020-05-29 PROCEDURE — 25800030 ZZH RX IP 258 OP 636: Performed by: INTERNAL MEDICINE

## 2020-05-29 PROCEDURE — 99285 EMERGENCY DEPT VISIT HI MDM: CPT | Mod: 25

## 2020-05-29 PROCEDURE — 84484 ASSAY OF TROPONIN QUANT: CPT | Performed by: FAMILY MEDICINE

## 2020-05-29 PROCEDURE — G0463 HOSPITAL OUTPT CLINIC VISIT: HCPCS

## 2020-05-29 PROCEDURE — 80053 COMPREHEN METABOLIC PANEL: CPT | Mod: ZL | Performed by: INTERNAL MEDICINE

## 2020-05-29 PROCEDURE — 82306 VITAMIN D 25 HYDROXY: CPT | Mod: ZL | Performed by: NURSE PRACTITIONER

## 2020-05-29 PROCEDURE — 96361 HYDRATE IV INFUSION ADD-ON: CPT

## 2020-05-29 RX ORDER — CAPECITABINE 500 MG/1
1000 TABLET, FILM COATED ORAL 2 TIMES DAILY
Qty: 47 TABLET | Refills: 1 | COMMUNITY
Start: 2020-05-29 | End: 2020-06-10

## 2020-05-29 RX ORDER — PROMETHAZINE HYDROCHLORIDE 25 MG/ML
INJECTION, SOLUTION INTRAMUSCULAR; INTRAVENOUS
Status: COMPLETED
Start: 2020-05-29 | End: 2020-05-29

## 2020-05-29 RX ORDER — HYDROCODONE BITARTRATE AND ACETAMINOPHEN 5; 325 MG/1; MG/1
1 TABLET ORAL EVERY 4 HOURS PRN
Qty: 90 TABLET | Refills: 0 | Status: SHIPPED | OUTPATIENT
Start: 2020-05-29 | End: 2020-07-16

## 2020-05-29 RX ORDER — IOPAMIDOL 755 MG/ML
50 INJECTION, SOLUTION INTRAVASCULAR ONCE
Status: COMPLETED | OUTPATIENT
Start: 2020-05-29 | End: 2020-05-29

## 2020-05-29 RX ORDER — LISINOPRIL 10 MG/1
10 TABLET ORAL DAILY
Qty: 90 TABLET | Refills: 0 | Status: SHIPPED | OUTPATIENT
Start: 2020-05-29 | End: 2021-03-04

## 2020-05-29 RX ORDER — HEPARIN SODIUM (PORCINE) LOCK FLUSH IV SOLN 100 UNIT/ML 100 UNIT/ML
5 SOLUTION INTRAVENOUS ONCE
Status: CANCELLED | OUTPATIENT
Start: 2020-05-29

## 2020-05-29 RX ORDER — SUMATRIPTAN 6 MG/.5ML
6 INJECTION, SOLUTION SUBCUTANEOUS ONCE
Status: COMPLETED | OUTPATIENT
Start: 2020-05-29 | End: 2020-05-29

## 2020-05-29 RX ORDER — PROMETHAZINE HYDROCHLORIDE 25 MG/ML
INJECTION, SOLUTION INTRAMUSCULAR; INTRAVENOUS
Status: DISCONTINUED
Start: 2020-05-29 | End: 2020-05-30 | Stop reason: HOSPADM

## 2020-05-29 RX ORDER — SODIUM CHLORIDE 9 MG/ML
INJECTION, SOLUTION INTRAVENOUS
Status: COMPLETED
Start: 2020-05-29 | End: 2020-05-30

## 2020-05-29 RX ORDER — HEPARIN SODIUM (PORCINE) LOCK FLUSH IV SOLN 100 UNIT/ML 100 UNIT/ML
5 SOLUTION INTRAVENOUS ONCE
Status: COMPLETED | OUTPATIENT
Start: 2020-05-29 | End: 2020-05-29

## 2020-05-29 RX ADMIN — SODIUM CHLORIDE 50 ML: 9 INJECTION, SOLUTION INTRAVENOUS at 23:33

## 2020-05-29 RX ADMIN — HEPARIN 5 ML: 100 SYRINGE at 13:14

## 2020-05-29 RX ADMIN — IOPAMIDOL 50 ML: 755 INJECTION, SOLUTION INTRAVENOUS at 23:02

## 2020-05-29 RX ADMIN — PROMETHAZINE HYDROCHLORIDE 25 MG: 25 INJECTION INTRAMUSCULAR; INTRAVENOUS at 23:33

## 2020-05-29 RX ADMIN — OXALIPLATIN 235 MG: 100 INJECTION, SOLUTION, CONCENTRATE INTRAVENOUS at 11:08

## 2020-05-29 RX ADMIN — DEXTROSE MONOHYDRATE 250 ML: 50 INJECTION, SOLUTION INTRAVENOUS at 10:27

## 2020-05-29 RX ADMIN — SUMATRIPTAN SUCCINATE 6 MG: 6 INJECTION, SOLUTION SUBCUTANEOUS at 23:36

## 2020-05-29 RX ADMIN — ONDANSETRON: 2 INJECTION INTRAMUSCULAR; INTRAVENOUS at 10:32

## 2020-05-29 ASSESSMENT — PAIN SCALES - GENERAL
PAINLEVEL: NO PAIN (0)
PAINLEVEL: MILD PAIN (3)

## 2020-05-29 ASSESSMENT — MIFFLIN-ST. JEOR: SCORE: 1407.69

## 2020-05-29 NOTE — PATIENT INSTRUCTIONS
We will see you back as planned. If you have any questions or concerns, we can be reached Monday through Friday 8am - 430pm at [phone] ([facility] [place]). If you have concerns related to a potential reaction/side effect after hours/weekends/holiday's, please seek emergent medical care.

## 2020-05-29 NOTE — ED AVS SNAPSHOT
[facility]  [address] [address]  Phone:  [phone]                                    Constantino Warren   MRN: [id number]    Department:  [facility]   Date of Visit:  5/29/2020           After Visit Summary Signature Page    I have received my discharge instructions, and my questions have been answered. I have discussed any challenges I see with this plan with the nurse or doctor.    ..........................................................................................................................................  Patient/Patient Representative Signature      ..........................................................................................................................................  Patient Representative Print Name and Relationship to Patient    ..................................................               ................................................  Date                                   Time    ..........................................................................................................................................  Reviewed by Signature/Title    ...................................................              ..............................................  Date                                               Time          22EPIC Rev 08/18

## 2020-05-29 NOTE — PROGRESS NOTES
Patients port accessed using non-coring, 19 gauge, 3/4 needle. Port accessed per facility protocol.  Hand hygiene performed: yes   Mask donned by caregiver: yes Site prepped with CHG: yes Labs drawn: yes Dressing applied using aseptic technique: yes Port flushed easily, without resistance. Flushed with 10 cc's normal saline.   Immediate blood return noted. 10 cc blood discarded.  2 vials blood draw and sent to lab for results.

## 2020-05-29 NOTE — TELEPHONE ENCOUNTER
HYDROcodone-acetaminophen (NORCO) 5-325 MG tablet       Last Written Prescription Date:  4/30/20  Last Fill Quantity: 90,   # refills: 0  Last Office Visit: 5/26/20 with teja  Future Office visit:    Next 5 appointments (look out 90 days)    May 29, 2020  9:45 AM CDT  (Arrive by 9:30 AM)  Return Visit with Izzy Moran NP  [facility] ([facility] ) [address] [address]  [phone]   Jun 19, 2020  9:45 AM CDT  (Arrive by 9:30 AM)  Return Visit with Izzy Moran NP  [facility] ([facility] ) [address]  [facility] [address]  [phone]   Jul 07, 2020  3:30 PM CDT  (Arrive by 3:15 PM)  Return Visit with Shona Palma MD  [facility] ([facility] ) [address] [address]  [phone]           Routing refill request to provider for review/approval because:  Drug not on the [facility], [facility] or [facility] refill protocol or controlled substance

## 2020-05-29 NOTE — PATIENT INSTRUCTIONS
We will see you back as planned. If you have any questions or concerns, we can be reached Monday through Friday 8am - 430pm at [phone] ([facility] [place]). If you have concerns related to a potential reaction/side effect after hours/weekends/holiday's, please seek emergent medical care.

## 2020-05-29 NOTE — PROGRESS NOTES
Patient is a 49 year old female here  today for infusion of oxaliplatin under the orders of Dr. Palma.   Today's lab values: WBC 5.0, ANC 2.9, , HGB 10.2, AST 23, ALT 27, Alkaline Phosphatase 86, Creatinine 0.65.     Oxaliplatin dose verified with Jaye Louie RN prior to release of drug.    Patient meets parameters for today's infusion.  Denies questions or concerns regarding today's infusion and/or medications being administered.      Patient identified with two identifiers, order verified, and verbal consent for today's infusion obtained from patient.    1108 IV pump verified with oxaliplatin dose, drug, and rate of administration. Infusion administered per protocol. Patient tolerated infusion well, no signs or symptoms of adverse reaction noted. Patient denies pain nor discomfort.     Needle removed, tip intact. Site clean, dry and intact. Covered with a sterile bandage, slight pressure applied for 30 seconds. Pt instructed to leave bandage intact for a minimum of one hour, and to call with questions or concerns. Copy of appointments, discharge instructions, and after visit summary (AVS) provided to patient. Patient states understanding, discharged ambulatory.

## 2020-05-29 NOTE — NURSING NOTE
"Chief Complaint   Patient presents with     RECHECK     Follow up Colon cancer        Initial BP (!) 158/91   Pulse 70   Temp 97  F (36.1  C) (Tympanic)   Resp 18   Ht 1.689 m (5' 6.5\")   Wt 76.3 kg (168 lb 3.4 oz)   SpO2 98%   BMI 26.74 kg/m   Estimated body mass index is 26.74 kg/m  as calculated from the following:    Height as of this encounter: 1.689 m (5' 6.5\").    Weight as of this encounter: 76.3 kg (168 lb 3.4 oz).  Medication Reconciliation: complete.  Immunizations and advance directives status reviewed. Pain scale =0 , PHQ-2=0.            Juana Mo LPN    "

## 2020-05-30 VITALS
OXYGEN SATURATION: 98 % | RESPIRATION RATE: 18 BRPM | WEIGHT: 165 LBS | HEART RATE: 65 BPM | TEMPERATURE: 97.8 F | DIASTOLIC BLOOD PRESSURE: 102 MMHG | BODY MASS INDEX: 26.23 KG/M2 | SYSTOLIC BLOOD PRESSURE: 171 MMHG

## 2020-05-30 ASSESSMENT — ENCOUNTER SYMPTOMS
NUMBNESS: 0
DIFFICULTY URINATING: 0
FACIAL ASYMMETRY: 0
SHORTNESS OF BREATH: 0
LIGHT-HEADEDNESS: 0
ARTHRALGIAS: 0
EYE REDNESS: 0
SPEECH DIFFICULTY: 0
TREMORS: 0
NECK STIFFNESS: 0
DIZZINESS: 0
CONFUSION: 0
FEVER: 0
ABDOMINAL PAIN: 0
WEAKNESS: 0
SEIZURES: 0
COLOR CHANGE: 0

## 2020-05-30 NOTE — ED NOTES
"No drifting of extremities, no facial droop, \"equilibrium is a little off\", no slurred speech.  \"Worst headache of life started at 1600 and lortab did not help.\"   "

## 2020-05-30 NOTE — ED PROVIDER NOTES
"  History     Chief Complaint   Patient presents with     Headache     \"worst headache of my life, started at 1600\"     Eye Problem     \"started with the headache at 1600\"      HPI  Constantino Warren is a 49 year old woman with history of migraine headaches who awoke from sleep tonight with a throbbing, global headache pain. She describes this as, \"the worst headache of my life.\" She noted some photophobia and blurred vision. No fevers/chills, neck stiffness or recent illness or injury/head trauma. No asymmetrical weakness, gait instability, hearing changes or expressive/receptive aphasia.      Allergies:  Allergies   Allergen Reactions     Aspartame      Other reaction(s): Other - Describe In Comment Field, Seizures  convulsions     Bee Venom Anaphylaxis     Bupropion Anaphylaxis     Throat gets tight and hives. Denies allergic reaction     Food Anaphylaxis     Peppers-anaphylaxis if eaten, if touched hands swell and reddened     Food Allergy Formula Anaphylaxis     Peppers, patient reports green peppers close off her throat     Ibuprofen Anaphylaxis     No Clinical Screening - See Comments Anaphylaxis     Peppers, patient reports green peppers close off her throat     Piper Anaphylaxis     Peppers--green, black, red, chili, etc. Derm symptoms, hives;  Strawberries--(cooked) Itching, breathing difficulties, GI symptoms. Mushrooms--GI symptoms, Hives.Spinach (cooked) throat irritation  (Listed on [facility] problem list.)     Fentanyl Swelling     Lips and tongue swelled.  Swelling of lips and tongue     Adhesive Tape      blisters     Amitriptyline Other (See Comments)     Respiratory symptoms - denies allergic reaction     Gabapentin Other (See Comments)     irritability     Liquid Adhesive Dermatitis     blisters  Blisters  Silk tape and tegaderm is fine     Oxcarbazepine Other (See Comments)     Throat gets tight and hives. Denies allergic reaction  Throat gets tight and hives-       Prozac " [Fluoxetine]      Other reaction(s): *Unknown  uncontrolled muscle spasms     Valproic Acid      Respiratory symptoms ([facility]). Denies allergic reaction     Ziprasidone      Throat gets tight and hives. Denies allergic reaction       Problem List:    Patient Active Problem List    Diagnosis Date Noted     History of uterine cancer 05/27/2020     Priority: Medium     Palliative care patient 05/27/2020     Priority: Medium     Iron deficiency anemia, unspecified iron deficiency anemia type 05/08/2020     Priority: Medium     S/P partial resection of colon 02/24/2020     Priority: Medium     Prediabetes      Priority: Medium     Colon cancer (H) 02/19/2020     Priority: Medium     Added automatically from request for surgery [id number]       Cancer of ascending colon (H) 02/19/2020     Priority: Medium     Added automatically from request for surgery [id number]       Special screening for malignant neoplasms, colon 01/14/2020     Priority: Medium     Added automatically from request for surgery [id number]       Anastomotic ulcer S/P gastric bypass 07/21/2018     Priority: Medium     Anemia 07/21/2018     Priority: Medium     Chest pain 07/20/2018     Priority: Medium     GI bleed 07/20/2018     Priority: Medium     Abdominal pain, epigastric 07/20/2018     Priority: Medium     Hypochromic microcytic anemia 07/20/2018     Priority: Medium     Chronic migraine 01/18/2018     Priority: Medium     Chronic pain disorder 01/18/2018     Priority: Medium     Long term (current) use of opiate analgesic 01/12/2018     Priority: Medium     Overview:   Pain Diagnosis failed back syndrome, annular tear L4-5, history lumbar discectomy, s/p lumbar fusion.    Should be seen in the clinic every twelve weeks. Currently on a taper: yes, trying to wean away from the daytime hydrocodone-acetaminophen..    Plan for flares of chronic pain stretches, occasional use of more hydrocodone-acetaminophen for flare.    ED and UC: Opioid  medication will not be given in the ER or Urgent Care unless there is a medical emergency unrelated to chronic pain.  Limit to 3 day supply    Refills: Refills will only be given at a scheduled visit.    [facility] Agreement for Opioid Treatment.   Opioid Agreement Date: 5/21/12  Last UDT (Urine Drug Test) on date:    Pain Dx: chronic low back pain  Last Pain Visit: 6-24-11  [facility]acy: [facility]  Comments:      7/23/14:  Progressing well.  Over the last month, has decreased hydrocodone-acetaminophen from 6/day to 3/day.  Awaiting appointment with [facility].       Adjustment disorder with mixed anxiety and depressed mood 12/15/2017     Priority: Medium     Other insomnia 12/15/2017     Priority: Medium     Syncope and collapse 09/26/2015     Priority: Medium     Hypertensive urgency 09/26/2015     Priority: Medium     Concussion syndrome 09/26/2015     Priority: Medium     Hydronephrosis, right 04/20/2015     Priority: Medium     Pyelonephritis 04/20/2015     Priority: Medium     Urolithiasis 04/20/2015     Priority: Medium     Annular tear of lumbar disc 12/16/2014     Priority: Medium     Overview:   MRI 2/5/13:  Previous right L4-5 hemilaminectomy and partial facetectomy with mild to moderate right L4-5 facet arthropathy and broad-based bulge of disk and end plate osteophyte on the right with partial high signal intensity zone annular tear of the undersurface of the laterally protruding disk/annulus.       Failed back syndrome of lumbar spine 12/16/2014     Priority: Medium     Overview:   Three back surgeries, including an L3-sacrum AP fusion done at [facility] in 2013.  Has been doing very well in terms of reduction of pain, with minimal requirement of opiate pain medication.       Hx of lumbar discectomy 12/16/2014     Priority: Medium     Overview:   10/2006:   L4-5 Laminectomy and diskectomy L5-S1.   12/12/11:  L5S1 microdiscectomy ([place])       Vitamin B 12 deficiency 06/13/2011     Priority: Medium      Overview:   IMO Update 10/11       Gastric bypass status for obesity 01/04/2011     Priority: Medium     Overview:   12/14/10:  laparoscopic Prasanna-en-Y gastric bypass Dr. Hassan  IMO Update 10/11       Hyperlipidemia 09/23/2008     Priority: Medium     Overview:   IMO Update 10/11       Hypothyroidism 09/23/2008     Priority: Medium     Overview:   IMO Update 10/11  Overview:   IMO Update 10/11          Past Medical History:    Past Medical History:   Diagnosis Date     Cancer (H)      Cancer (H) 2004     Chronic pain      Depressive disorder      Diabetes (H)      History of blood transfusion      Hypertension      Prediabetes      Thyroid disease        Past Surgical History:    Past Surgical History:   Procedure Laterality Date     BACK SURGERY      2008; 2011     COLONOSCOPY N/A 2/13/2020    Procedure: COLONOSCOPY WITH BIOPSY AND TATOOING OF ASCENDING COLON,INCOMEPLETE EXAM;  Surgeon: Kang Cleveland MD;  Location: [facility]     ESOPHAGOSCOPY, GASTROSCOPY, DUODENOSCOPY (EGD), COMBINED N/A 7/21/2018    Procedure: COMBINED ESOPHAGOSCOPY, GASTROSCOPY, DUODENOSCOPY (EGD);  UPPER ENDOSCOPY WITH BIOPSIES;  Surgeon: Roger Vazquez MD;  Location: [facility] OR     ESOPHAGOSCOPY, GASTROSCOPY, DUODENOSCOPY (EGD), COMBINED N/A 11/21/2019    Procedure: UPPER ENDOSCOPY with biopsy;  Surgeon: Kang Cleveland MD;  Location: [facility] OR     HEAD & NECK SURGERY  2008    c4-c5 fusion     HYSTERECTOMY, CRISTIANE  04/2004    uterine cancer; [facility]     INSERT PORT VASCULAR ACCESS N/A 2/28/2020    Procedure: INSERTION, VASCULAR ACCESS PORT LEFT;  Surgeon: Kang Cleveland MD;  Location: [facility]     LAPAROSCOPIC ASSISTED COLECTOMY Right 2/24/2020    Procedure: RIGHT KEVIN COLECTOMY, LYSIS OF ADHESIONS;  Surgeon: Kang Cleveland MD;  Location: [facility]     LAPAROSCOPIC BYPASS GASTRIC  12/2010       Family History:    Family History   Problem Relation Age of Onset     Diabetes Mother      Hypertension Mother      Coronary  Artery Disease Mother      Myocardial Infarction Mother      Hypertension Father      Diabetes Maternal Grandmother      Hypertension Maternal Grandfather      Coronary Artery Disease Maternal Grandfather      Cancer Paternal Grandfather         unsure of type of cancer     Bladder Cancer Paternal Grandfather      Diabetes Maternal Aunt      Cerebrovascular Disease Maternal Aunt      Diabetes Maternal Aunt      Cerebrovascular Disease Maternal Aunt      Diabetes Maternal Aunt      Cerebrovascular Disease Maternal Aunt      Hyperlipidemia No family hx of      Breast Cancer No family hx of      Colon Cancer No family hx of      Prostate Cancer No family hx of      Thyroid Disease No family hx of      Anesthesia Reaction No family hx of      Asthma No family hx of      Genetic Disorder No family hx of        Social History:  Marital Status:   [5]  Social History     Tobacco Use     Smoking status: Never Smoker     Smokeless tobacco: Never Used   Substance Use Topics     Alcohol use: No     Frequency: Never     Comment: sober for 18 years     Drug use: No        Medications:    acetaminophen (TYLENOL) 500 MG tablet  capecitabine (XELODA) 500 MG tablet  cyanocobalamin (CYANOCOBALAMIN) 1000 MCG/ML injection  diazepam (VALIUM) 5 MG tablet  EPINEPHrine (ANY BX GENERIC EQUIV) 0.3 MG/0.3ML injection 2-pack  HYDROcodone-acetaminophen (NORCO) 5-325 MG tablet  levothyroxine 200 MCG PO tablet  lidocaine-prilocaine (EMLA) 2.5-2.5 % external cream  lisinopril (ZESTRIL) 10 MG tablet  loperamide (IMODIUM) 2 MG capsule  LORazepam (ATIVAN) 0.5 MG tablet  multivitamin, therapeutic with minerals (MULTI-VITAMIN) TABS  NARCAN 4 MG/0.1ML nasal spray  prochlorperazine (COMPAZINE) 10 MG tablet  sertraline (ZOLOFT) 100 MG tablet  tiZANidine (ZANAFLEX) 4 MG tablet  traZODone (DESYREL) 50 MG tablet  order for DME  order for DME          Review of Systems   Constitutional: Negative for fever.   HENT: Negative for congestion.    Eyes:  Negative for redness.   Respiratory: Negative for shortness of breath.    Cardiovascular: Negative for chest pain.   Gastrointestinal: Negative for abdominal pain.   Genitourinary: Negative for difficulty urinating.   Musculoskeletal: Negative for arthralgias and neck stiffness.   Skin: Negative for color change.   Neurological: Negative for dizziness, tremors, seizures, syncope, facial asymmetry, speech difficulty, weakness, light-headedness and numbness.   Psychiatric/Behavioral: Negative for confusion.       Physical Exam   BP: 157/92  Pulse: 83  Heart Rate: 79  Temp: 97.4  F (36.3  C)  Resp: 18  Weight: 74.8 kg (165 lb)  SpO2: 98 %      Physical Exam    General Appearance: well-developed, well-nourished, alert & oriented, no apparent distress.    HEENT: atraumatic. Pupils equal, round & reactive to light, extraocular movements intact. Bilateral ear canals clear. Nose without rhinorrhea or epistaxis. Clear oropharynx. Moist mucous membranes.    Neck: normal inspection, non-tender, full & painless ROM, supple, no lymphadenopathy or nuchal rigidity. No jugular venous distension.    Cardiovascular: regular rate, rhythm, normal S1 & S2, no murmurs, rubs or gallops.    Respiratory: clear lung sounds with good air entry, no wheezes rales or rhonchi, no acute respiratory distress.    Gastrointestinal: normal inspection, normal bowel sounds, non-tender, no masses or organomegaly.    Extremities: normal inspection, 2+/4+ pulses bilaterally, normal & painless ROM, non-tender, joints normal.    Neurologic: alert & oriented x 3, CN II - XII intact, 5/5 strength in proximal & distal musculature in all extremities, sensation intact and equal in all extremities. Normal speech, no aphasia or dysarthria. Coordination intact. DTRs symmetric. Normal steady gait. Negative Romberg test. Heel to shin normal. Negative Babinski test.    Skin: normal color, no skin rash.    ED Course     0079  Dr. Pantoja M [facility] Stroke  Neurology Department, called for patient's intial history and recommends proceeding with CT/CTA. He will call back after reviewing the imaging.    2315 (CT/CTA normal, treat for migraine)    2238  Dr. Low, [facility], called to confirm that there is no large vessel occlusion or other acute pathology revealed on the CTA.         Procedures           Results for orders placed or performed during the hospital encounter of 05/29/20 (from the past 24 hour(s))   Glucose by meter   Result Value Ref Range    Glucose 266 (H) 70 - 99 mg/dL   CBC with platelets differential   Result Value Ref Range    WBC 5.2 4.0 - 11.0 10e9/L    RBC Count 4.07 3.8 - 5.2 10e12/L    Hemoglobin 10.7 (L) 11.7 - 15.7 g/dL    Hematocrit 33.4 (L) 35.0 - 47.0 %    MCV 82 78 - 100 fl    MCH 26.3 (L) 26.5 - 33.0 pg    MCHC 32.0 31.5 - 36.5 g/dL    RDW Dimorphic population - unable to calculate 10.0 - 15.0 %    Platelet Count 172 150 - 450 10e9/L    Diff Method Automated Method     % Neutrophils 78.6 %    % Lymphocytes 13.0 %    % Monocytes 8.2 %    % Eosinophils 0.0 %    % Basophils 0.0 %    % Immature Granulocytes 0.2 %    Nucleated RBCs 0 0 /100    Absolute Neutrophil 4.1 1.6 - 8.3 10e9/L    Absolute Lymphocytes 0.7 (L) 0.8 - 5.3 10e9/L    Absolute Monocytes 0.4 0.0 - 1.3 10e9/L    Absolute Eosinophils 0.0 0.0 - 0.7 10e9/L    Absolute Basophils 0.0 0.0 - 0.2 10e9/L    Abs Immature Granulocytes 0.0 0 - 0.4 10e9/L    Absolute Nucleated RBC 0.0    Comprehensive metabolic panel   Result Value Ref Range    Sodium 138 133 - 144 mmol/L    Potassium 4.3 3.4 - 5.3 mmol/L    Chloride 109 94 - 109 mmol/L    Carbon Dioxide 20 20 - 32 mmol/L    Anion Gap 9 3 - 14 mmol/L    Glucose 263 (H) 70 - 99 mg/dL    Urea Nitrogen 23 7 - 30 mg/dL    Creatinine 0.78 0.52 - 1.04 mg/dL    GFR Estimate 89 >60 mL/min/[1.73_m2]    GFR Estimate If Black >90 >60 mL/min/[1.73_m2]    Calcium 8.7 8.5 - 10.1 mg/dL    Bilirubin Total 0.5 0.2 - 1.3 mg/dL    Albumin 3.6 3.4 - 5.0  g/dL    Protein Total 7.4 6.8 - 8.8 g/dL    Alkaline Phosphatase 87 40 - 150 U/L    ALT 27 0 - 50 U/L    AST 23 0 - 45 U/L   Troponin I   Result Value Ref Range    Troponin I ES <0.015 0.000 - 0.045 ug/L   CRP inflammation   Result Value Ref Range    CRP Inflammation <2.9 0.0 - 8.0 mg/L   INR   Result Value Ref Range    INR 1.14 0.86 - 1.14   Partial thromboplastin time   Result Value Ref Range    PTT 28 22 - 37 sec   Magnesium   Result Value Ref Range    Magnesium 1.9 1.6 - 2.3 mg/dL       Medications   promethazine (PHENERGAN) 25 mg in sodium chloride 0.9 % 50 mL intermittent infusion (has no administration in time range)   promethazine (PHENERGAN) 25 MG/ML IV injection (has no administration in time range)   iopamidol (ISOVUE-370) solution 50 mL (50 mLs Intravenous Given 5/29/20 2302)   sodium chloride (PF) 0.9% PF flush 100 mL (100 mLs Intravenous Given 5/29/20 2302)   SUMAtriptan (IMITREX) injection 6 mg (6 mg Subcutaneous Given 5/29/20 2336)   promethazine (PHENERGAN) 25 MG/ML IV injection (25 mg  Given 5/29/20 2333)   sodium chloride 0.9 % infusion (  Stopped 5/30/20 0016)       Assessments & Plan (with Medical Decision Making)   The patient is a 49 year old woman with a migraine headache.    1) Migraine headache - stable anemia. No leukocytosis or acute renal/hepatic abnormality. Negative troponin and no acute ST changes on EKG. No acute intracranial pathology or large vessel occlusion on CTA. Patient's pain and blurry vision completely resolved following treatment with sumatriptan and promethazine. Patient will be treated with rest in a dark, quiet room, oral fluids and alternating APAP/ibuprofen.    Plan for PCP follow-up in 5 - 7 days regarding this ER visit. The patient verbalizes agreement with this plan as well as to immediately return to the ER with any new/worsening S/Sx.      I have reviewed the nursing notes.    I have reviewed the findings, diagnosis, plan and need for follow up with the  patient.    New Prescriptions    No medications on file       Final diagnoses:   Migraine without status migrainosus, not intractable, unspecified migraine type       5/29/2020   [facility]     Cirilo Morales MD  05/30/20 0018       Cirilo Morales MD  05/30/20 0020

## 2020-05-30 NOTE — TELEPHONE ENCOUNTER
Pt called in states she has high BP.  Pt BP was 172/111 P 102, 175/112 P 103.  The BP was since last couple of days.  Pt is on chemo and infused today.  Has bad headache, and blurred vision.  No difficulty breathing.   The disposition is to be seen at the ED.  Care advice given per protocol.  The mother states she will take the Pt to the [facility] tomorrow.  Patient agrees with care advice given.   Agreed to call back if he has additional symptoms or questions.    Prashant Malone Carmi Nurse Advisor 5/29/2020 9:51 PM      Reason for Disposition    [1] Systolic BP  >= 160 OR Diastolic >= 100 AND [2] cardiac or neurologic symptoms (e.g., chest pain, difficulty breathing, unsteady gait, blurred vision)    Additional Information    Negative: Difficult to awaken or acting confused (e.g., disoriented, slurred speech)    Negative: Severe difficulty breathing (e.g., struggling for each breath, speaks in single words)    Negative: [1] Weakness of the face, arm or leg on one side of the body AND [2] new onset    Negative: [1] Numbness (i.e., loss of sensation) of the face, arm or leg on one side of the body AND [2] new onset    Negative: [1] Chest pain lasts > 5 minutes AND [2] history of heart disease  (i.e., heart attack, bypass surgery, angina, angioplasty, CHF)    Negative: [1] Chest pain AND [2] took nitrogylcerin AND [3] pain was not relieved    Negative: Sounds like a life-threatening emergency to the triager    Negative: Symptom is main concern  (e.g., headache, chest pain)    Negative: Low blood pressure is main concern    Protocols used: HIGH BLOOD PRESSURE-A-AH

## 2020-05-30 NOTE — ED NOTES
Patient: Hilaria Heard Date: 2017   : 1957    59 year old female      OUTPATIENT WOUND CARE PROGRESS NOTE    Supervising Wound Care / Hyperbaric Medicine Physician: Dr. Kaiser Pimentel  Consulting Provider:  FRANK Shoemaker  Date of Consultation/Last Comprehensive Exam:  2017  Referring  Provider:  Dr. Roth    SUBJECTIVE:    Chief Complaint:  Lower back surgical wound      Wound/Ulcer Present:  Non-healing surgical wound    Additional Wound Category:  None     Maximum Baseline Ambulatory Status:  Walks with walker    History of Present Illness:  This is a 59 year old female with medical history significant for obesity, AICD placement, diabetes mellitus, breast cancer with spine metastases s/p radiation and chemotherapy, low back pain and lumbar radiculopathy who has undergone an L2-L4 posterior lumbar fusion. Noted to have a lumbar 3-4 redo laminectomy with Dr. Roth in 2016. She developed drainage from the incision at home prior to admission. She was admitted under the care of Dr. Roth, and underwent L2-4 Posterior lumbar interbody fusion revision, placement of wound VAC 5/10/2017. She was started on a PCA post-operatively for improved pain control.     She has been tolerating the wound VAC without difficulty. No fever, chills, sweats, nausea or vomiting. No changes in her appetite. She has been wearing her back brace at all times, and has been ambulating independently.  She was discharged home and returns now for OP follow-up.      Current Treatment Regimen:  Dressing:  Negative-pressure wound therapy   Frequency:  Three times a week   Changed by:  Inpatient wound care team    Review of Systems:  Pertinent items are noted in HPI (history of present illness).    Past Medical History:   Diagnosis Date   • AICD (automatic cardioverter/defibrillator) present    • Anemia    • Anxiety    • Bronchitis    • Chronic pain     low back pain   • Diabetes mellitus (CMS/HCC)    •  Code Stroke called.    Gastroesophageal reflux disease    • Long Q-T syndrome    • Malignant neoplasm (CMS/HCC) 02/2016    rt mast, with spine mets   • Obese    • Pneumonia    • PONV (postoperative nausea and vomiting)    • Post-menopausal bleeding    • RAD (reactive airway disease)    • Sepsis    • Syncope and collapse    • Urinary incontinence     wear pads   • Ventricular fibrillation (CMS/HCC)      Past Surgical History:   Procedure Laterality Date   • APPENDECTOMY     • BACK SURGERY  02/01/2016    L2-L4 laminectomy and removal of extradural spinal tumor   • BACK SURGERY  12/30/2016    L2-[id number] lumbar decompression and fusion   • BACK SURGERY  04/10/2017    L2-[id number] lumbar decompression and fusion   • BILATERAL OOPHORECTOMY     • BREAST SURGERY      right mastectomy   • CARDIAC DEFIBRILLATOR PLACEMENT  02/2016   • HYSTERECTOMY     • INCISION AND DRAINAGE  05/05/2017    posterior lumbar wound   • MASTECTOMY      right side with reconstruction   • MEDIPORT INSERTION, SINGLE  02/2016    right sided   • REMOVAL GALLBLADDER     • VAGINAL DELIVERY      x2     Social History     Social History   • Marital status:      Spouse name: N/A   • Number of children: N/A   • Years of education: N/A     Occupational History   • Not on file.     Social History Main Topics   • Smoking status: Never Smoker   • Smokeless tobacco: Not on file   • Alcohol use Yes      Comment: rare   • Drug use: No   • Sexual activity: Not on file     Other Topics Concern   • Not on file     Social History Narrative     Family History   Problem Relation Age of Onset   • Stroke Father      tia   • COPD Paternal Aunt    • Early death Brother    • COPD Paternal Aunt        Current Outpatient Prescriptions   Medication Sig   • Ascorbic Acid (VITAMIN C) 500 MG tablet Take 500 mg by mouth daily.   • calcium carbonate-cholecalciferol (CALCIUM 600+D3) 600-800 MG-UNIT tablet Take 1 tablet by mouth 2 times daily.   • Iron, Ferrous Gluconate, 256 (28 Fe) MG Tab Take  1 tablet by mouth daily.   • pantoprazole (PROTONIX) 40 MG tablet Take 40 mg by mouth nightly.   • Pregabalin (LYRICA PO) Take 100 mg by mouth 2 times daily.    • oxyCODONE/APAP (PERCOCET)  MG per tablet Take 1-2 tablets by mouth every 6 hours as needed for Pain.   • CLONAZEPAM PO Take 1 mg by mouth nightly as needed. Does not take if taking oxycodone   • albuterol 108 (90 BASE) MCG/ACT inhaler Inhale 2 puffs into the lungs every 4 hours as needed for Shortness of Breath or Wheezing.   • sitaGLIPtin (JANUVIA) 100 MG tablet Take 100 mg by mouth daily. Indications: Type 2 Diabetes   • letrozole (FEMARA) 2.5 MG tablet Take 2.5 mg by mouth daily. Indications: Advanced Cancer of the Breast   • LORazepam (ATIVAN) 1 MG tablet Take 1 mg by mouth every 6 hours as needed for Anxiety. L2-M2yjadxltlr lumbar decompression and fusion   • magnesium 250 MG tablet Take 250 mg by mouth daily.   • cholecalciferol (VITAMIN D3) 1000 UNITS tablet Take 1,000 Units by mouth daily.   • montelukast (SINGULAIR) 10 MG tablet Take 10 mg by mouth nightly.   • Mometasone Furo-Formoterol Fum (DULERA IN) Inhale 2 inhalations into the lungs daily.   • Multiple Vitamin (MULTIVITAMINS PO) Take 1 tablet by mouth daily.      Current Facility-Administered Medications   Medication   • LIDOCAINE HCL 4 % EX SOLN Pyxis Override        ALLERGIES:  Allergy and Shrimp    OBJECTIVE:  Vital Signs:    Visit Vitals   • /81 (BP Location: [facility] [place], Patient Position: Sitting)   • Pulse 86   • Temp 97.7 °F (36.5 °C) (Temporal Artery)   • Resp 20       Estimated body mass index is 42.3 kg/(m^2) as calculated from the following:    Height as of 5/10/17: 5' 7\" (1.702 m).    Weight as of 5/12/17: 122.5 kg.    Physical Exam:  General appearance: Appears stated age, Alert, well-developed, obese, oriented to person, place, time and situation, in no distress and cooperative  Head:   normocephalic without obvious abnormality  Eye:  conjunctivae/sclerae normal. No  erythema, edema or exudate.  Mouth:   mucous membranes moist  Pulmonary: normal respiratory effort     Midline lower back with full thickness, vertical surgical incision. Visible sutures noted at wound base. Wound less dry today, with mix of granular and fibrinous tissue. Increased granulation today. Small purple area at wound base consistent with pressure change, remains stable. No drainage, malodor or purulence. +Undermining at the lateral margins, most significantly at 3 and 9 o'clock positions. No exposed/palpable bone. [place]-wound without induration, fluctuance, edema, erythema or increased warmth. Wound margins without ischemia/necrosis. Blistering along wound margin significantly improved.      Prior photo is still representative:      Wound Bed Quality:  Granulation tissue and Fibrin      [place]-wound Quality:  None    Additional Descriptors:  undermining,   intact sutures    Wound Measurements Per Flowsheet:     Wound Back Lower;Posterior Surgical incision-Wound Length (cm): 10 cm  Wound Back Lower;Posterior Surgical incision-Wound Width (cm): 1.5 cm  Wound Back Lower;Posterior Surgical incision-Depth (cm): 2.2 cm                                                       PROCEDURE:  None performed    Procedure was Performed by:  Not applicable    Laboratory assessments reviewed:  No results found for: PAB   Albumin (g/dL)   Date Value   07/06/2015 3.8      No results available in last 24 hours    Lab Results   Component Value Date    WBC 3.2 (L) 05/12/2017    GLUCOSE 115 (H) 07/09/2015    CREATININE 0.70 05/10/2017    GFRA >90 05/10/2017    GFRNA >90 05/10/2017        Culture results:  Specimen Description (no units)   Date Value   05/10/2017 TISSUE LUMBAR SPINE WOUND   05/10/2017 TISSUE LUMBAR SPINE WOUND    CULTURE (no units)   Date Value   05/10/2017 NO GROWTH 4 DAYS.   05/10/2017 NO GROWTH 6 DAYS.        Diagnostic Assessments Reviewed:  No new update    Nutritional Assessment:   Prealbumin and/or Albumin  reviewed    Wound treatment goals are palliative:  No    DIAGNOSES:  Non-healing surgical wound lower back  Diabetes mellitus  Obesity     Medical Decision Making:   Lower back wound s/p L2-4 Posterior lumbar interbody fusion revision, placement of wound VAC 5/10/2017.      Wound with intact sutures noted at the wound base, with mix of dry granular and fibrinous tissue. +Undermining at the lateral wound margins. No signs of infection or ischemia/necrosis.        Local wound care:  - Continue wound VAC to lower back wound on intermittent therapy at -125 mmHg, 5 on/2 off to stimulate granulation and offer improved moisture to wound bed. Change three times/week by VNA.    Infectious disease:  - Surgical cultures negative. Wound without signs of infection/cellulitis.      Off-loading:  - Discussed importance of frequent off-loading to promote wound healing. She should turn/reposition herself at least every 2 hours and avoid prolonged lying on her back.      Nutrition:  - Discussed the importance of good protein intake to promote timely wound healing. She verbalized understanding and will try to incorporate good protein sources into her daily meals.     Plan of Care:  Advanced Wound Care Recommendations:  Continue NPWT.  Percent Wound Closure from consult:  SHADIA, consult 5/12/2017  Care plan to augment wound closure:Not applicable.  Consult 5/12/2017    Patient stable. All questions were answered.     Alfredo Mcgarry MD [facility]:  [phone]   Pager [phone] [facility]:  [phone]

## 2020-05-31 DIAGNOSIS — E55.9 VITAMIN D DEFICIENCY: Primary | ICD-10-CM

## 2020-05-31 LAB — DEPRECATED CALCIDIOL+CALCIFEROL SERPL-MC: 7 UG/L (ref 20–75)

## 2020-05-31 RX ORDER — ERGOCALCIFEROL 1.25 MG/1
50000 CAPSULE, LIQUID FILLED ORAL
Qty: 16 CAPSULE | Refills: 0 | Status: ON HOLD | OUTPATIENT
Start: 2020-06-01 | End: 2020-11-06

## 2020-06-03 DIAGNOSIS — E53.8 VITAMIN B 12 DEFICIENCY: ICD-10-CM

## 2020-06-04 ENCOUNTER — PATIENT OUTREACH (OUTPATIENT)
Dept: ONCOLOGY | Facility: OTHER | Age: 50
End: 2020-06-04

## 2020-06-04 RX ORDER — CYANOCOBALAMIN 1000 UG/ML
INJECTION, SOLUTION INTRAMUSCULAR; SUBCUTANEOUS
Qty: 1 ML | Refills: 1 | Status: ON HOLD | OUTPATIENT
Start: 2020-06-04 | End: 2020-11-06

## 2020-06-04 NOTE — PROGRESS NOTES
Clinic Care Coordination Contact  Care Team Conversations    TC to patient for status check after ER visit. Patient reports that she did have a venofer infusion the day of severe headache. She has had developed hypertension over the past week and was started on lisinopril 10 mg. She had not started it prior to going into the ER. Patients headaches have resolved and BP has normalized reporting readings in the 120/70's. She is taking BP twice daily. She has not made a follow up appointment with PCP at this time. She reports that she is following closely with home care. Recommend she have home care send results to provider if she can not get in to see her. She states that home care nurse is coming today.  Brooklyn Casanova RN Oncology Care Coordinator

## 2020-06-10 ENCOUNTER — PATIENT OUTREACH (OUTPATIENT)
Dept: ONCOLOGY | Facility: OTHER | Age: 50
End: 2020-06-10

## 2020-06-10 DIAGNOSIS — C18.2 CANCER OF ASCENDING COLON (H): ICD-10-CM

## 2020-06-10 RX ORDER — CAPECITABINE 500 MG/1
1000 TABLET, FILM COATED ORAL 2 TIMES DAILY
Qty: 6 TABLET | Refills: 0 | Status: SHIPPED | OUTPATIENT
Start: 2020-06-10 | End: 2020-09-03

## 2020-06-10 NOTE — PROGRESS NOTES
Clinic Care Coordination Contact  Care Team Conversations    TC to patient, she has 46 remaining capsule of xeloda she will need a total of 6 capsules. Refill sent to [facility].  Brookyln Casanova RN Oncology Care Coordinator

## 2020-06-17 ENCOUNTER — PATIENT OUTREACH (OUTPATIENT)
Dept: ONCOLOGY | Facility: OTHER | Age: 50
End: 2020-06-17

## 2020-06-22 ENCOUNTER — ONCOLOGY VISIT (OUTPATIENT)
Dept: ONCOLOGY | Facility: OTHER | Age: 50
End: 2020-06-22
Attending: NURSE PRACTITIONER
Payer: COMMERCIAL

## 2020-06-22 ENCOUNTER — INFUSION THERAPY VISIT (OUTPATIENT)
Dept: INFUSION THERAPY | Facility: OTHER | Age: 50
End: 2020-06-22
Attending: INTERNAL MEDICINE
Payer: COMMERCIAL

## 2020-06-22 VITALS
TEMPERATURE: 95.1 F | OXYGEN SATURATION: 98 % | BODY MASS INDEX: 26.02 KG/M2 | HEIGHT: 67 IN | DIASTOLIC BLOOD PRESSURE: 84 MMHG | WEIGHT: 165.79 LBS | HEART RATE: 61 BPM | SYSTOLIC BLOOD PRESSURE: 122 MMHG

## 2020-06-22 VITALS — BODY MASS INDEX: 26.36 KG/M2 | TEMPERATURE: 95.1 F | WEIGHT: 165.79 LBS

## 2020-06-22 VITALS — SYSTOLIC BLOOD PRESSURE: 151 MMHG | HEART RATE: 75 BPM | DIASTOLIC BLOOD PRESSURE: 94 MMHG

## 2020-06-22 DIAGNOSIS — C18.2 CANCER OF ASCENDING COLON (H): Primary | ICD-10-CM

## 2020-06-22 LAB
ALBUMIN SERPL-MCNC: 3 G/DL (ref 3.4–5)
ALP SERPL-CCNC: 89 U/L (ref 40–150)
ALT SERPL W P-5'-P-CCNC: 32 U/L (ref 0–50)
ANION GAP SERPL CALCULATED.3IONS-SCNC: 2 MMOL/L (ref 3–14)
AST SERPL W P-5'-P-CCNC: 29 U/L (ref 0–45)
BASOPHILS # BLD AUTO: 0 10E9/L (ref 0–0.2)
BASOPHILS NFR BLD AUTO: 0 %
BILIRUB SERPL-MCNC: 0.3 MG/DL (ref 0.2–1.3)
BUN SERPL-MCNC: 17 MG/DL (ref 7–30)
CALCIUM SERPL-MCNC: 8.6 MG/DL (ref 8.5–10.1)
CHLORIDE SERPL-SCNC: 114 MMOL/L (ref 94–109)
CO2 SERPL-SCNC: 25 MMOL/L (ref 20–32)
CREAT SERPL-MCNC: 0.68 MG/DL (ref 0.52–1.04)
DIFFERENTIAL METHOD BLD: ABNORMAL
EOSINOPHIL # BLD AUTO: 0 10E9/L (ref 0–0.7)
EOSINOPHIL NFR BLD AUTO: 0 %
ERYTHROCYTE [DISTWIDTH] IN BLOOD BY AUTOMATED COUNT: ABNORMAL % (ref 10–15)
GFR SERPL CREATININE-BSD FRML MDRD: >90 ML/MIN/{1.73_M2}
GLUCOSE SERPL-MCNC: 82 MG/DL (ref 70–99)
HCT VFR BLD AUTO: 34.5 % (ref 35–47)
HGB BLD-MCNC: 11 G/DL (ref 11.7–15.7)
IMM GRANULOCYTES # BLD: 0 10E9/L (ref 0–0.4)
IMM GRANULOCYTES NFR BLD: 0.2 %
LYMPHOCYTES # BLD AUTO: 1.3 10E9/L (ref 0.8–5.3)
LYMPHOCYTES NFR BLD AUTO: 25 %
MCH RBC QN AUTO: 28.4 PG (ref 26.5–33)
MCHC RBC AUTO-ENTMCNC: 31.9 G/DL (ref 31.5–36.5)
MCV RBC AUTO: 89 FL (ref 78–100)
MONOCYTES # BLD AUTO: 0.6 10E9/L (ref 0–1.3)
MONOCYTES NFR BLD AUTO: 12.1 %
NEUTROPHILS # BLD AUTO: 3.2 10E9/L (ref 1.6–8.3)
NEUTROPHILS NFR BLD AUTO: 62.7 %
NRBC # BLD AUTO: 0 10*3/UL
NRBC BLD AUTO-RTO: 0 /100
PLATELET # BLD AUTO: 144 10E9/L (ref 150–450)
POTASSIUM SERPL-SCNC: 4.4 MMOL/L (ref 3.4–5.3)
PROT SERPL-MCNC: 6.7 G/DL (ref 6.8–8.8)
RBC # BLD AUTO: 3.87 10E12/L (ref 3.8–5.2)
SODIUM SERPL-SCNC: 141 MMOL/L (ref 133–144)
WBC # BLD AUTO: 5 10E9/L (ref 4–11)

## 2020-06-22 PROCEDURE — 99214 OFFICE O/P EST MOD 30 MIN: CPT | Performed by: INTERNAL MEDICINE

## 2020-06-22 PROCEDURE — 36415 COLL VENOUS BLD VENIPUNCTURE: CPT | Mod: ZL | Performed by: INTERNAL MEDICINE

## 2020-06-22 PROCEDURE — 25000128 H RX IP 250 OP 636: Performed by: INTERNAL MEDICINE

## 2020-06-22 PROCEDURE — 80053 COMPREHEN METABOLIC PANEL: CPT | Mod: ZL | Performed by: INTERNAL MEDICINE

## 2020-06-22 PROCEDURE — 25800030 ZZH RX IP 258 OP 636: Performed by: INTERNAL MEDICINE

## 2020-06-22 PROCEDURE — 96523 IRRIG DRUG DELIVERY DEVICE: CPT

## 2020-06-22 PROCEDURE — G0463 HOSPITAL OUTPT CLINIC VISIT: HCPCS

## 2020-06-22 PROCEDURE — G0463 HOSPITAL OUTPT CLINIC VISIT: HCPCS | Mod: 25

## 2020-06-22 PROCEDURE — 96367 TX/PROPH/DG ADDL SEQ IV INF: CPT

## 2020-06-22 PROCEDURE — 96415 CHEMO IV INFUSION ADDL HR: CPT

## 2020-06-22 PROCEDURE — 96413 CHEMO IV INFUSION 1 HR: CPT

## 2020-06-22 PROCEDURE — 85025 COMPLETE CBC W/AUTO DIFF WBC: CPT | Mod: ZL | Performed by: INTERNAL MEDICINE

## 2020-06-22 RX ORDER — HEPARIN SODIUM (PORCINE) LOCK FLUSH IV SOLN 100 UNIT/ML 100 UNIT/ML
5 SOLUTION INTRAVENOUS ONCE
Status: COMPLETED | OUTPATIENT
Start: 2020-06-22 | End: 2020-06-22

## 2020-06-22 RX ORDER — HEPARIN SODIUM (PORCINE) LOCK FLUSH IV SOLN 100 UNIT/ML 100 UNIT/ML
5 SOLUTION INTRAVENOUS ONCE
Status: CANCELLED | OUTPATIENT
Start: 2020-06-22

## 2020-06-22 RX ADMIN — OXALIPLATIN 235 MG: 100 INJECTION, SOLUTION, CONCENTRATE INTRAVENOUS at 14:59

## 2020-06-22 RX ADMIN — ONDANSETRON: 2 INJECTION INTRAMUSCULAR; INTRAVENOUS at 14:29

## 2020-06-22 RX ADMIN — HEPARIN 5 ML: 100 SYRINGE at 17:10

## 2020-06-22 RX ADMIN — DEXTROSE MONOHYDRATE 250 ML: 50 INJECTION, SOLUTION INTRAVENOUS at 14:29

## 2020-06-22 ASSESSMENT — PAIN SCALES - GENERAL: PAINLEVEL: NO PAIN (0)

## 2020-06-22 ASSESSMENT — MIFFLIN-ST. JEOR: SCORE: 1396.69

## 2020-06-22 NOTE — PROGRESS NOTES
Visit Date:   06/22/2020      HEMATOLOGY/ONCOLOGY CLINIC NOTE      HISTORY OF PRESENT ILLNESS:  Ms. Warren returns for followup of stage III colon cancer or pathologic T3 N1a M0 adenocarcinoma of the right colon.  We had seen the patient in consultation at the request of Dr. Kang Cleveland on 03/30/2020.  At that time, Ms. Warren was a 49-year-old white female with history of gastric bypass surgery and anemia whom we were asked to evaluate concerning new diagnosis of adenocarcinoma of the right colon.  Apparently, she had been monitored for anemia and was receiving B12 injections and then subsequently underwent colonoscopy performed by Dr. Cleveland, who found a near-obstructing lesion in the right colon.  Biopsy was performed via a colonoscopy on 02/13/2020.  Pathology revealed invasive mildly-differentiated adenocarcinoma.  The patient subsequently underwent laparoscopic-assisted right hemicolectomy, converted to open right hemicolectomy after Dr. Cleveland noted that the tumor was adherent to the pelvic brim.  Final pathology was read as mildly-differentiated adenocarcinoma of the right colon with 1/32 lymph nodes positive.  There was peritoneal invasion with mild peritonitis noted on omental biopsy.  Margins were negative.  The patient was staged pathologic T3 N1a M0 or stage IIIB adenocarcinoma of the right colon.  The patient subsequently underwent a PET scan, which was done on 03/25, and the findings were there was no evidence of distant metastatic disease.  Limited evaluation suggested a calcified nodule in the right lobe of the thyroid gland.  Otherwise, there were postoperative changes from recent right hemicolectomy.  Patient had a port placed in anticipation of chemotherapy.  When we saw the patient on 03/30, we felt she would be a candidate for adjuvant chemotherapy.  We recommended 4 cycles of XELOX, which is gemcitabine given a dose of 350 mg/m2 p.o. b.i.d. on days 1-14 of a 21-day cycle, oxaliplatin 100 mg/m2  given on day 1 of a 21-day cycle.  The patient was a candidate for 4 cycles of this regimen based on the IDEA trial that revealed 3 months of XELOX or equal to 6 months of FOLFOX for patients with T3 N1 disease.  The patient has completed 3 cycles thus far.  She is tolerating Xeloda well with only mild hand-foot symptoms.  She gets occasional symptoms of cold distal neuropathy, otherwise is tolerating oxaliplatin well.  She is here to receive cycle 4, which is her last cycle of chemotherapy.  Dr. Kang Cleveland had recommend Radiation Oncology consult but this was not indicated according to the radiation oncologist.  Therefore, the patient is doing well.  She offers no complaints of shortness of breath, chest pain, abdominal pain, fevers, night sweats, weight loss.      PHYSICAL EXAMINATION:   GENERAL:  She is a middle-aged white female in no acute distress.   VITAL SIGNS:  Blood pressure 122/84, pulse 61, temperature 97.1.   HEENT:  Atraumatic, normocephalic.  Oropharynx nonerythematous.   NECK:  Supple.   LUNGS:  Clear to auscultation and percussion.   HEART:  Regular rhythm, S1, S2 normal.   ABDOMEN:  Soft, normoactive bowel sounds.  No mass, nontender.   BACK:  No cervical, supraclavicular, axillary or inguinal nodes.   EXTREMITIES:  No edema.   NEUROLOGIC:  Nonfocal.      LABORATORY DATA:  Laboratories reveal CBC with white count 5.0, H and H 11.0 and 34.5, platelet count 144, BUN 17, creatinine 0.68.  LFTs are normal.      IMPRESSION:  Pathologic T3 N1a M0 moderately-differentiated adenocarcinoma of the right colon with negative margins, pathologic stage IB.  Based on this, the patient was deemed a candidate for 4 cycles of XELOX based on the IDEA trial which indicated that 3 months of XELOX was equivalent to 6 months of FOLFOX.  The patient has completed 3 cycles thus far.  She is here for her fourth cycle of XELOX.  There is no contraindication to therapy.  The plan is to restage the patient in approximately  3 weeks with a PET scan and also obtain CBC, CMP, LDH, CEA.      Forty minutes was spent with the patient, greater than half the time spent in counseling and coordination of care.         JOHNNY NAILS MD             D: 2020   T: 2020   MT: ARLIN      Name:     FELICITY RODRÍGUEZ   MRN:      [id number]        Account:      [id number]   :      1970           Visit Date:   2020      Document: [id number]       cc: Coco Cleveland MD

## 2020-06-22 NOTE — PROGRESS NOTES
Patient is a *** here accompanied by *** today for infusion of *** under the orders of Dr. Palma.  Power port accessed with *** gauge *** inch non-coring needle.  Line flushed with 10 cc's normal saline.  Needle secured with sterile transparent dressing.  10 cc's blood discarded, and blood taken for *** tubes of ordered labs.  Patient tolerated ***.  Denies pain and discomfort at this time.  Port flushes easily without resistance.    Hand hygiene performed: {YES NO:687728}   Mask donned by caregiver: {YES NO:577801} Site prepped with CHG: {YES NO:154944} Labs drawn: {YES NO:699960} Dressing applied using aseptic technique: {YES NO:316012}     *** lab values: WBC ***, ANC ***, PLT ***, HGB ***, AST ***, ALT ***, Alkaline Phosphatase ***, Creatinine ***.     Patient meets parameters for today's infusion.  Denies questions or concerns regarding today's infusion and/or medications being administered.      Independent dose check completed with ***, RN.    Patient identified with two identifiers, order verified, and verbal consent for today's infusion obtained from patient. Written consent for treatment is on file and valid.    *** IV pump verified with *** dose, drug, and rate of administration. Infusion administered per protocol. Patient tolerated infusion well, no signs or symptoms of adverse reaction noted. Patient denies pain nor discomfort.     Needle removed, tip intact. Site clean, dry and intact. Covered with a sterile bandage, slight pressure applied for 30 seconds. Pt instructed to leave bandage intact for a minimum of one hour, and to call with questions or concerns. Copy of appointments, discharge instructions, and after visit summary (AVS) provided to patient. Patient states understanding, discharged.

## 2020-06-22 NOTE — NURSING NOTE
"Chief Complaint   Patient presents with     RECHECK     malignant neoplasm of ascending colon       Initial /84   Pulse 61   Temp 95.1  F (35.1  C) (Tympanic)   Ht 1.689 m (5' 6.5\")   Wt 75.2 kg (165 lb 12.6 oz)   SpO2 98%   BMI 26.36 kg/m   Estimated body mass index is 26.36 kg/m  as calculated from the following:    Height as of this encounter: 1.689 m (5' 6.5\").    Weight as of this encounter: 75.2 kg (165 lb 12.6 oz).  Medication Reconciliation: complete     Immunizations and advance directives status reviewed. Pain scale 0 , PHQ-9 0.          Isabel Angelo LPN  "

## 2020-06-22 NOTE — PATIENT INSTRUCTIONS
We will see you back as planned. If you have any questions or concerns, we can be reached Monday through Friday 8am - 430pm at [phone] ([facility] [place]). If you have concerns related to a potential reaction/side effect after hours/weekends/holiday's, please seek emergent medical care.

## 2020-06-22 NOTE — PATIENT INSTRUCTIONS
We will see you back as planned. If you have any questions or concerns, we can be reached Monday through Friday 8am - 430pm at [phone] ([facility] [place]). If you have concerns related to a potential reaction/side effect after hours/weekends/holiday's, please seek emergent medical care.

## 2020-06-22 NOTE — PATIENT INSTRUCTIONS
PET scan ordered.  We would like to see you back in as scheduled, on 7/7/20.   When you are in need of a refill of your medications, please call your pharmacy and they will send us the request. If you have any questions please call [phone]

## 2020-06-22 NOTE — PROGRESS NOTES
Patients port accessed using non-coring, 19 gauge, 3/4 power needle, per facility protocol.     Hand hygiene performed: yes   Mask donned by caregiver: yes  Site prepped with CHG: yes   Labs drawn: yes   Dressing applied using aseptic technique: yes     Port flushed easily, without resistance. Flushed with 10 cc's normal saline.   Immediate blood return noted. 10 cc blood discarded.  2 vials blood draw and sent to lab for results.  Port flushed with 20 cc 0.9% normal saline and 5 cc heparinized saline.  Needle removed intact, sterile dressing applied.  Slight pressure applied for 30 seconds.   Patient tolerated port flush well, denies pain nor discomfort at this time. Patient instructed to leave dressing intact for a minimum of one hour, and to call with questions or concerns.  Patient states understanding and is in agreement with this plan. Patient discharged.

## 2020-06-22 NOTE — PROGRESS NOTES
Patient is a 49 year old female  here accompanied by self  today for infusion of Oxaliplatin under the orders of Dr. Palma.   Patient  lab values: WBC 5  ANC 3.2 , HGB 11  AST 29, ALT 32, Alkaline Phosphatase 89 , Creatinine 0.68.     Patient meets parameters for today's infusion.  Denies questions or concerns regarding today's infusion and/or medications being administered.      Independent dose check completed with Barry HERNANDEZ RN.  Patient identified with two identifiers, order verified, and verbal consent for today's infusion obtained from patient. Written consent for treatment is on file and valid.    1459  IV pump verified with Oxalipatin dose, drug, and rate of administration. Infusion administered per protocol. Patient tolerated infusion well, no signs or symptoms of adverse reaction noted. Patient denies pain nor discomfort.     Needle removed, tip intact. Site clean, dry and intact. Covered with a sterile bandage, slight pressure applied for 30 seconds. Pt instructed to leave bandage intact for a minimum of one hour, and to call with questions or concerns. AVS declined

## 2020-06-23 DIAGNOSIS — C18.2 CANCER OF ASCENDING COLON (H): Primary | ICD-10-CM

## 2020-06-30 ENCOUNTER — TELEPHONE (OUTPATIENT)
Dept: PET IMAGING | Facility: HOSPITAL | Age: 50
End: 2020-06-30

## 2020-06-30 NOTE — TELEPHONE ENCOUNTER
Appointment Reminder call    Good afternoon my name is Patrica and I am calling from [facility] in [place].   This message is for Constantino.  I am calling to remind you of your appointment tomorrow, Wednesday, July 1st with a registration time of 8 am to the [facility].   There is preparation for this exam.  If you have any questions, please call our scheduling department at [phone].

## 2020-07-01 ENCOUNTER — HOSPITAL ENCOUNTER (OUTPATIENT)
Dept: PET IMAGING | Facility: HOSPITAL | Age: 50
Discharge: HOME | End: 2020-07-01
Attending: INTERNAL MEDICINE | Admitting: INTERNAL MEDICINE
Payer: COMMERCIAL

## 2020-07-01 DIAGNOSIS — C18.2 CANCER OF ASCENDING COLON (H): ICD-10-CM

## 2020-07-01 PROCEDURE — A9552 F18 FDG: HCPCS | Performed by: INTERNAL MEDICINE

## 2020-07-01 PROCEDURE — 78815 PET IMAGE W/CT SKULL-THIGH: CPT | Mod: TC,PS

## 2020-07-01 PROCEDURE — 34300033 ZZH RX 343: Performed by: INTERNAL MEDICINE

## 2020-07-01 RX ADMIN — FLUDEOXYGLUCOSE F-18 12 MCI.: 500 INJECTION, SOLUTION INTRAVENOUS at 08:17

## 2020-07-06 ENCOUNTER — PATIENT OUTREACH (OUTPATIENT)
Dept: ONCOLOGY | Facility: OTHER | Age: 50
End: 2020-07-06

## 2020-07-06 NOTE — PROGRESS NOTES
Clinic Care Coordination Contact  Care Team Conversations    Received VM from [facility] . She reports that patient had opted out of palliative care at this time. They were giving her B-12 injections. TC to patient to discuss where she was previously receiving B-12 so this can be set up for her.  Brooklyn Casanova RN Oncology Care Coordinator

## 2020-07-14 DIAGNOSIS — G47.00 INSOMNIA, UNSPECIFIED TYPE: ICD-10-CM

## 2020-07-15 RX ORDER — TRAZODONE HYDROCHLORIDE 50 MG/1
TABLET, FILM COATED ORAL
Qty: 30 TABLET | Refills: 2 | Status: SHIPPED | OUTPATIENT
Start: 2020-07-15 | End: 2020-11-18

## 2020-07-16 DIAGNOSIS — C18.9 MALIGNANT NEOPLASM OF COLON, UNSPECIFIED PART OF COLON (H): ICD-10-CM

## 2020-07-16 RX ORDER — HYDROCODONE BITARTRATE AND ACETAMINOPHEN 5; 325 MG/1; MG/1
1 TABLET ORAL EVERY 4 HOURS PRN
Qty: 90 TABLET | Refills: 0 | Status: SHIPPED | OUTPATIENT
Start: 2020-07-16 | End: 2020-08-28

## 2020-07-16 NOTE — TELEPHONE ENCOUNTER
HYDROcodone-acetaminophen (NORCO) 5-325 MG tablet       Last Written Prescription Date:  05/29/20  Last Fill Quantity: 90,   # refills: 0  Last Office Visit: 03/09/20  Future Office visit:    Next 5 appointments (look out 90 days)    Jul 21, 2020  9:30 AM CDT  (Arrive by 9:15 AM)  Return Visit with Shona Palma MD  [facility] ([facility] ) [address] [place] [id number]  [phone]           Routing refill request to provider for review/approval because:  Drug not on the [facility], [facility] or  Health refill protocol or controlled substance

## 2020-07-21 ENCOUNTER — ONCOLOGY VISIT (OUTPATIENT)
Dept: ONCOLOGY | Facility: OTHER | Age: 50
End: 2020-07-21
Attending: INTERNAL MEDICINE
Payer: COMMERCIAL

## 2020-07-21 ENCOUNTER — INFUSION THERAPY VISIT (OUTPATIENT)
Dept: INFUSION THERAPY | Facility: OTHER | Age: 50
End: 2020-07-21
Attending: INTERNAL MEDICINE
Payer: COMMERCIAL

## 2020-07-21 VITALS
TEMPERATURE: 97.3 F | WEIGHT: 168.21 LBS | OXYGEN SATURATION: 99 % | RESPIRATION RATE: 20 BRPM | HEART RATE: 66 BPM | BODY MASS INDEX: 26.4 KG/M2 | SYSTOLIC BLOOD PRESSURE: 142 MMHG | HEIGHT: 67 IN | DIASTOLIC BLOOD PRESSURE: 91 MMHG

## 2020-07-21 DIAGNOSIS — C18.2 CANCER OF ASCENDING COLON (H): Primary | ICD-10-CM

## 2020-07-21 LAB
ALBUMIN SERPL-MCNC: 3.1 G/DL (ref 3.4–5)
ALP SERPL-CCNC: 84 U/L (ref 40–150)
ALT SERPL W P-5'-P-CCNC: 34 U/L (ref 0–50)
ANION GAP SERPL CALCULATED.3IONS-SCNC: 4 MMOL/L (ref 3–14)
AST SERPL W P-5'-P-CCNC: 35 U/L (ref 0–45)
BASOPHILS # BLD AUTO: 0 10E9/L (ref 0–0.2)
BASOPHILS NFR BLD AUTO: 0 %
BILIRUB SERPL-MCNC: 0.3 MG/DL (ref 0.2–1.3)
BUN SERPL-MCNC: 16 MG/DL (ref 7–30)
CALCIUM SERPL-MCNC: 8.6 MG/DL (ref 8.5–10.1)
CEA SERPL-MCNC: 2.6 UG/L (ref 0–2.5)
CHLORIDE SERPL-SCNC: 113 MMOL/L (ref 94–109)
CO2 SERPL-SCNC: 25 MMOL/L (ref 20–32)
CREAT SERPL-MCNC: 0.66 MG/DL (ref 0.52–1.04)
DIFFERENTIAL METHOD BLD: ABNORMAL
EOSINOPHIL # BLD AUTO: 0 10E9/L (ref 0–0.7)
EOSINOPHIL NFR BLD AUTO: 0 %
ERYTHROCYTE [DISTWIDTH] IN BLOOD BY AUTOMATED COUNT: 17.6 % (ref 10–15)
GFR SERPL CREATININE-BSD FRML MDRD: >90 ML/MIN/{1.73_M2}
GLUCOSE SERPL-MCNC: 95 MG/DL (ref 70–99)
HCT VFR BLD AUTO: 35.5 % (ref 35–47)
HGB BLD-MCNC: 11.7 G/DL (ref 11.7–15.7)
IMM GRANULOCYTES # BLD: 0 10E9/L (ref 0–0.4)
IMM GRANULOCYTES NFR BLD: 0.3 %
LDH SERPL L TO P-CCNC: 195 U/L (ref 81–234)
LYMPHOCYTES # BLD AUTO: 1.3 10E9/L (ref 0.8–5.3)
LYMPHOCYTES NFR BLD AUTO: 35.6 %
MCH RBC QN AUTO: 29.6 PG (ref 26.5–33)
MCHC RBC AUTO-ENTMCNC: 33 G/DL (ref 31.5–36.5)
MCV RBC AUTO: 90 FL (ref 78–100)
MONOCYTES # BLD AUTO: 0.4 10E9/L (ref 0–1.3)
MONOCYTES NFR BLD AUTO: 10.6 %
NEUTROPHILS # BLD AUTO: 2 10E9/L (ref 1.6–8.3)
NEUTROPHILS NFR BLD AUTO: 53.5 %
NRBC # BLD AUTO: 0 10*3/UL
NRBC BLD AUTO-RTO: 0 /100
PLATELET # BLD AUTO: 125 10E9/L (ref 150–450)
POTASSIUM SERPL-SCNC: 3.9 MMOL/L (ref 3.4–5.3)
PROT SERPL-MCNC: 7.1 G/DL (ref 6.8–8.8)
RBC # BLD AUTO: 3.95 10E12/L (ref 3.8–5.2)
SODIUM SERPL-SCNC: 142 MMOL/L (ref 133–144)
WBC # BLD AUTO: 3.8 10E9/L (ref 4–11)

## 2020-07-21 PROCEDURE — 85025 COMPLETE CBC W/AUTO DIFF WBC: CPT | Mod: ZL | Performed by: INTERNAL MEDICINE

## 2020-07-21 PROCEDURE — 99215 OFFICE O/P EST HI 40 MIN: CPT | Performed by: INTERNAL MEDICINE

## 2020-07-21 PROCEDURE — 96523 IRRIG DRUG DELIVERY DEVICE: CPT

## 2020-07-21 PROCEDURE — 36415 COLL VENOUS BLD VENIPUNCTURE: CPT | Mod: ZL | Performed by: INTERNAL MEDICINE

## 2020-07-21 PROCEDURE — 25000128 H RX IP 250 OP 636: Performed by: INTERNAL MEDICINE

## 2020-07-21 PROCEDURE — 83615 LACTATE (LD) (LDH) ENZYME: CPT | Mod: ZL | Performed by: INTERNAL MEDICINE

## 2020-07-21 PROCEDURE — G0463 HOSPITAL OUTPT CLINIC VISIT: HCPCS

## 2020-07-21 PROCEDURE — 82378 CARCINOEMBRYONIC ANTIGEN: CPT | Mod: ZL | Performed by: INTERNAL MEDICINE

## 2020-07-21 PROCEDURE — 80053 COMPREHEN METABOLIC PANEL: CPT | Mod: ZL | Performed by: INTERNAL MEDICINE

## 2020-07-21 RX ORDER — HEPARIN SODIUM (PORCINE) LOCK FLUSH IV SOLN 100 UNIT/ML 100 UNIT/ML
5 SOLUTION INTRAVENOUS ONCE
Status: COMPLETED | OUTPATIENT
Start: 2020-07-21 | End: 2020-07-21

## 2020-07-21 RX ORDER — HEPARIN SODIUM (PORCINE) LOCK FLUSH IV SOLN 100 UNIT/ML 100 UNIT/ML
5 SOLUTION INTRAVENOUS ONCE
Status: CANCELLED | OUTPATIENT
Start: 2020-07-21

## 2020-07-21 RX ADMIN — HEPARIN 5 ML: 100 SYRINGE at 09:04

## 2020-07-21 ASSESSMENT — PAIN SCALES - GENERAL: PAINLEVEL: SEVERE PAIN (7)

## 2020-07-21 ASSESSMENT — MIFFLIN-ST. JEOR: SCORE: 1407.69

## 2020-07-21 NOTE — PROGRESS NOTES
Please call patient to schedule B12 injections in the shot room.  Thank you.    Estrella Espinosa RN

## 2020-07-21 NOTE — PROGRESS NOTES
Patient is a 49 year old female here today accompanied by self for labs from port.    Hand hygiene performed: yes   Mask donned by caregiver: yes   Site prepped with CHG: yes   Labs drawn: yes   Dressing applied using aseptic technique: yes  Patients port accessed using non-coring, 19g 3/4 inch needle. Port accessed per facility protocol. Port flushed easily, blood return noted.  Flushed 10 mLs normal saline, 10 mLs blood wasted, labs drawn per orders (3 tubes).  Port flushed 20 mLs Normal Saline then Heparin 5mLs of 100 unit/mL.  No signs and symptoms of infection or infiltration.  Needle removed, small dressing applied.   Discharged.

## 2020-07-21 NOTE — PATIENT INSTRUCTIONS
CT abdomen and pelvis ordered for 3 months.  Labs to be done at same time.  We would like to see you back a week after completion; survivorship with [facility].      When you are in need of a refill of your medications, please call your pharmacy and they will send us the request. If you have any questions please call [phone]

## 2020-07-21 NOTE — PROGRESS NOTES
Spoke with patient today at follow up with Dr. Palma. Pt states she will need set up for Vitamin B12 injections monthly. Pt had been receiving injections through palliative care which was ended, She received these injections for hx of gastric bypass about 10 years ago per pt. I will route this message over to shot clinic to set up. Pt states she is due anytime now.  Maryanne Mckeon RN  Oncology Care Coordinator

## 2020-07-21 NOTE — NURSING NOTE
"Chief Complaint   Patient presents with     RECHECK     Follow up Colon cancer       Initial BP (!) 142/91   Pulse 66   Temp 97.3  F (36.3  C) (Tympanic)   Resp 20   Ht 1.689 m (5' 6.5\")   Wt 76.3 kg (168 lb 3.4 oz)   SpO2 99%   BMI 26.74 kg/m   Estimated body mass index is 26.74 kg/m  as calculated from the following:    Height as of this encounter: 1.689 m (5' 6.5\").    Weight as of this encounter: 76.3 kg (168 lb 3.4 oz).  Medication Reconciliation: complete.  Immunizations and advance directives status reviewed. Pain scale =7 low back , PHQ-2=0.            Juana Mo LPN    "

## 2020-07-22 NOTE — PROGRESS NOTES
Visit Date:   07/21/2020      HEMATOLOGY/ONCOLOGY CLINIC NOTE        HISTORY OF PRESENT ILLNESS:  Mrs. Warren returns for followup of stage III colon cancer, pathologic T3 N1a M0 adenocarcinoma of the right colon.  We had seen the patient in consultation at the request of Dr. Kang Cleveland on 03/30/2020.  At that time, Mrs. Warren was a 49-year-old white female with history of gastric bypass surgery and anemia we are asked to evaluate concerning a diagnosis of adenocarcinoma of the right colon.  Apparently she had been monitored for anemia and was receiving B12 injections, then subsequently underwent colonoscopy performed by Dr. Cleveland, who found a near obstructing lesion in the right colon.  Biopsy was performed via colonoscopy on 02/13/2020.  Pathology revealed invasive moderately differentiated adenocarcinoma.  The patient subsequently underwent laparoscopic-assisted right hemicolectomy converted to open right hemicolectomy after Dr. Cleveland noted that the tumor was adherent to the pelvic brim.  Final pathology was read as moderately differentiated adenocarcinoma of the right colon with 1/32 lymph nodes positive.  There was peritoneal invasion with mild peritonitis noted on omental biopsy.  Margins were negative.  The patient was staged pathologic T3 N1a M0, stage IIIB adenocarcinoma of the right colon.  The patient subsequently underwent a PET scan which was done on 03/25 and the findings were there was no evidence of distant metastatic disease.  Limited evaluation suggested a calcified nodule in the right lobe of the thyroid gland.  Otherwise, there were postoperative changes from recent right hemicolectomy.  The patient had a port placed in anticipation of chemotherapy.  We saw the patient on 03/30 and we felt she would be a candidate for adjuvant chemotherapy and recommended 4 cycles of XELOX which is capecitabine given at a dose of 850 mg/m2 p.o. b.i.d. on days 1 through 14 of a 21-day cycle, oxaliplatin 100  mg/m2 given on day 1 of a  21-day cycle.  The patient was a candidate for 4 cycles of this regimen based on the IDEA trial that every 3 months of XELOX is equal to 6 months of FOLFOX.  The patient had T3 N1 disease.  The patient went on to receive 4 cycles with last cycle being on 06/22/2020.  The patient tolerated chemotherapy well without any significant neuropathy, rash, alopecia.  She was on B12 injections prior to her diagnosis of colon cancer.  This is likely related to her colon cancer and she probably but does need to continue B12 injections.      PHYSICAL EXAMINATION:   GENERAL:  She is a middle-aged white female in no acute distress.   VITAL SIGNS:  Blood pressure 142/91, pulse 66, respirations 20, temperature 97.3.   HEENT:  Atraumatic, normocephalic.  Oropharynx nonerythematous.   NECK:  Supple.   LUNGS:  Clear to auscultation and percussion.   HEART:  Regular rhythm, S1, S2 normal.   ABDOMEN:  Soft, normoactive bowel sounds.  No mass, nontender.    LYMPHATICS:  No cervical, supraclavicular, axillary or inguinal nodes.   EXTREMITIES:  Without edema.   NEUROLOGIC:  Grossly nonfocal.      LABORATORY DATA:  CBC with white count 10.8, H and H 11.7 and 35.5, platelet count 125. BUN 16, creatinine 0.66, .       IMPRESSION:  Pathologic T3 N1a M0 moderately differentiated adenocarcinoma of the right colon with negative margins, pathologic stage IB.  Based on this, the patient was deemed a candidate for 4 cycles of XELOX based on the IDEA trial which indicates 3 months of XELOX is equivalent to 6 months of FOLFOX.  She completed 4 cycles of XELOX.  PET scan was negative for metastatic disease.      PLAN:  Continue surveillance.  We will see the patient in 3 months, obtain CBC, CMP, LDH, CEA and CT of the abdomen and pelvis.  She will need a colonoscopy in February 2021.        Forty minutes was spent with the patient, greater than half the time was spent in counseling and coordination of care.          JOHNNY NAILS MD             D: 2020   T: 2020   MT:       Name:     FELICITY RODRÍGUEZ   MRN:      [id number]        Account:      [id number]   :      1970           Visit Date:   2020      Document: [id number]       cc: Coco Cleveland MD

## 2020-08-03 ENCOUNTER — TELEPHONE (OUTPATIENT)
Dept: FAMILY MEDICINE | Facility: OTHER | Age: 50
End: 2020-08-03

## 2020-08-03 DIAGNOSIS — E53.8 VITAMIN B 12 DEFICIENCY: Primary | ICD-10-CM

## 2020-08-03 RX ORDER — CYANOCOBALAMIN 1000 UG/ML
1000 INJECTION, SOLUTION INTRAMUSCULAR; SUBCUTANEOUS
Status: DISCONTINUED | OUTPATIENT
Start: 2020-08-05 | End: 2020-12-22

## 2020-08-27 ENCOUNTER — TELEPHONE (OUTPATIENT)
Dept: FAMILY MEDICINE | Facility: OTHER | Age: 50
End: 2020-08-27

## 2020-08-27 DIAGNOSIS — G47.00 INSOMNIA, UNSPECIFIED TYPE: ICD-10-CM

## 2020-08-27 DIAGNOSIS — C18.9 MALIGNANT NEOPLASM OF COLON, UNSPECIFIED PART OF COLON (H): ICD-10-CM

## 2020-08-27 NOTE — TELEPHONE ENCOUNTER
tiZANidine (ZANAFLEX) 4 MG tablet      Last Written Prescription Date:  3/9/20  Last Fill Quantity: 30,   # refills: 3  Last Office Visit:  03/09/20  Future Office visit:    Next 5 appointments (look out 90 days)    Sep 03, 2020  1:00 PM CDT  (Arrive by 12:45 PM)  SHORT with Coco Cook MD  [facility] [address] [address]  [phone]   Oct 20, 2020 12:45 PM CDT  (Arrive by 12:30 PM)  Return Visit with Izzy Moran NP  [facility] ([facility] ) [address]  [facility] [address]  [phone]

## 2020-08-27 NOTE — TELEPHONE ENCOUNTER
Norco 5-325 mg      Last Written Prescription Date:  7/16/20  Last Fill Quantity: 90,   # refills: 0  Last Office Visit: 5/29/20  Future Office visit:    Next 5 appointments (look out 90 days)    Sep 03, 2020  1:00 PM CDT  (Arrive by 12:45 PM)  SHORT with Coco Cook MD  [facility] ([facility] ) [address] [address]  [phone]   Oct 20, 2020 12:45 PM CDT  (Arrive by 12:30 PM)  Return Visit with Izzy Moran NP  [facility] ([facility] ) [address]  [place] [facility] [address]  [phone]           Routing refill request to provider for review/approval because:

## 2020-08-28 RX ORDER — HYDROCODONE BITARTRATE AND ACETAMINOPHEN 5; 325 MG/1; MG/1
1 TABLET ORAL EVERY 4 HOURS PRN
Qty: 90 TABLET | Refills: 0 | Status: SHIPPED | OUTPATIENT
Start: 2020-08-28 | End: 2020-12-07

## 2020-08-28 NOTE — TELEPHONE ENCOUNTER
Script filled.   However, would advise follow up visit if this is to be chronic, unless scripts to be filled with oncology?
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
## 2020-09-03 ENCOUNTER — ANCILLARY PROCEDURE (OUTPATIENT)
Dept: GENERAL RADIOLOGY | Facility: OTHER | Age: 50
End: 2020-09-03
Attending: FAMILY MEDICINE
Payer: COMMERCIAL

## 2020-09-03 ENCOUNTER — OFFICE VISIT (OUTPATIENT)
Dept: FAMILY MEDICINE | Facility: OTHER | Age: 50
End: 2020-09-03
Attending: FAMILY MEDICINE
Payer: COMMERCIAL

## 2020-09-03 VITALS
TEMPERATURE: 97.9 F | WEIGHT: 178 LBS | OXYGEN SATURATION: 98 % | HEART RATE: 64 BPM | BODY MASS INDEX: 27.94 KG/M2 | SYSTOLIC BLOOD PRESSURE: 138 MMHG | HEIGHT: 67 IN | DIASTOLIC BLOOD PRESSURE: 80 MMHG

## 2020-09-03 DIAGNOSIS — M25.512 ACUTE PAIN OF LEFT SHOULDER: ICD-10-CM

## 2020-09-03 DIAGNOSIS — M54.41 ACUTE BILATERAL LOW BACK PAIN WITH RIGHT-SIDED SCIATICA: Primary | ICD-10-CM

## 2020-09-03 DIAGNOSIS — M54.41 ACUTE BILATERAL LOW BACK PAIN WITH RIGHT-SIDED SCIATICA: ICD-10-CM

## 2020-09-03 DIAGNOSIS — M54.16 LUMBAR RADICULOPATHY: ICD-10-CM

## 2020-09-03 PROCEDURE — G0463 HOSPITAL OUTPT CLINIC VISIT: HCPCS

## 2020-09-03 PROCEDURE — 99214 OFFICE O/P EST MOD 30 MIN: CPT | Performed by: FAMILY MEDICINE

## 2020-09-03 PROCEDURE — 73000 X-RAY EXAM OF COLLAR BONE: CPT | Mod: TC,LT

## 2020-09-03 PROCEDURE — 72100 X-RAY EXAM L-S SPINE 2/3 VWS: CPT | Mod: TC

## 2020-09-03 PROCEDURE — 73030 X-RAY EXAM OF SHOULDER: CPT | Mod: TC,LT

## 2020-09-03 RX ORDER — GABAPENTIN 300 MG/1
300 CAPSULE ORAL 3 TIMES DAILY
Qty: 90 CAPSULE | Refills: 1 | Status: SHIPPED | OUTPATIENT
Start: 2020-09-03 | End: 2020-12-07

## 2020-09-03 ASSESSMENT — MIFFLIN-ST. JEOR: SCORE: 1452.09

## 2020-09-03 ASSESSMENT — PAIN SCALES - GENERAL: PAINLEVEL: EXTREME PAIN (8)

## 2020-09-03 NOTE — NURSING NOTE
"Chief Complaint   Patient presents with     Musculoskeletal Problem     Back, shoulder       Initial /80 (BP Location: Right arm, Patient Position: Sitting, Cuff Size: Adult Regular)   Pulse 64   Temp 97.9  F (36.6  C) (Tympanic)   Ht 1.689 m (5' 6.5\")   Wt 80.7 kg (178 lb)   SpO2 98%   BMI 28.30 kg/m   Estimated body mass index is 28.3 kg/m  as calculated from the following:    Height as of this encounter: 1.689 m (5' 6.5\").    Weight as of this encounter: 80.7 kg (178 lb).  Medication Reconciliation: complete  Sarita Colunga LPN  "

## 2020-09-03 NOTE — PATIENT INSTRUCTIONS
Will call with xray results.  Start Neurontin/Gabapentin 300 mg at bedtime for 1-2 days, then twice daily for 1-2 days, then 3 times daily.  May need to adjust dose further.  Please call.  Referral to PT - they will call you to schedule.  Consider MRI as appropriate.  Attempt to reduce/taper use of Norco - not long term medication plan.

## 2020-09-09 ENCOUNTER — HOSPITAL ENCOUNTER (OUTPATIENT)
Dept: PHYSICAL THERAPY | Facility: HOSPITAL | Age: 50
Setting detail: THERAPIES SERIES
End: 2020-09-09
Attending: FAMILY MEDICINE
Payer: COMMERCIAL

## 2020-09-09 DIAGNOSIS — M54.16 LUMBAR RADICULOPATHY: ICD-10-CM

## 2020-09-09 DIAGNOSIS — M54.41 ACUTE BILATERAL LOW BACK PAIN WITH RIGHT-SIDED SCIATICA: ICD-10-CM

## 2020-09-09 DIAGNOSIS — M25.512 ACUTE PAIN OF LEFT SHOULDER: ICD-10-CM

## 2020-09-09 PROCEDURE — 97110 THERAPEUTIC EXERCISES: CPT | Mod: GP

## 2020-09-09 PROCEDURE — 97162 PT EVAL MOD COMPLEX 30 MIN: CPT | Mod: GP
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
## 2020-09-22 ENCOUNTER — HOSPITAL ENCOUNTER (OUTPATIENT)
Dept: PHYSICAL THERAPY | Facility: HOSPITAL | Age: 50
Setting detail: THERAPIES SERIES
End: 2020-09-22
Attending: FAMILY MEDICINE
Payer: COMMERCIAL

## 2020-09-22 PROCEDURE — 97110 THERAPEUTIC EXERCISES: CPT | Mod: GP

## 2020-09-24 ENCOUNTER — TELEPHONE (OUTPATIENT)
Dept: FAMILY MEDICINE | Facility: OTHER | Age: 50
End: 2020-09-24

## 2020-09-24 ENCOUNTER — HOSPITAL ENCOUNTER (OUTPATIENT)
Dept: PHYSICAL THERAPY | Facility: HOSPITAL | Age: 50
Setting detail: THERAPIES SERIES
End: 2020-09-24
Attending: FAMILY MEDICINE
Payer: COMMERCIAL

## 2020-09-24 DIAGNOSIS — G89.29 CHRONIC LEFT SHOULDER PAIN: Primary | ICD-10-CM

## 2020-09-24 DIAGNOSIS — M25.512 CHRONIC LEFT SHOULDER PAIN: Primary | ICD-10-CM

## 2020-09-24 PROCEDURE — 97110 THERAPEUTIC EXERCISES: CPT | Mod: GP

## 2020-09-24 NOTE — TELEPHONE ENCOUNTER
Hi Dr. Cook - wanted to give you an update on Constantino. Her left shoulder pain is not improving and she is now reporting some clicking/popping in the shoulder with certain motions. Due to her lack of progress - I believe that she would benefit from an MRI to rule out any underlying pathology that might be limiting her progress with PT. Thanks and let me know if you need anything from me

## 2020-09-24 NOTE — TELEPHONE ENCOUNTER
DR Cook on vacation -- will order MRI for pt.  Can Nurse Rob call and tell her and make sure she is not claustrophobic.

## 2020-09-28 RX ORDER — HEPARIN SODIUM (PORCINE) LOCK FLUSH IV SOLN 100 UNIT/ML 100 UNIT/ML
5 SOLUTION INTRAVENOUS
Status: CANCELLED | OUTPATIENT
Start: 2020-09-28

## 2020-10-05 ENCOUNTER — HOSPITAL ENCOUNTER (OUTPATIENT)
Dept: MRI IMAGING | Facility: HOSPITAL | Age: 50
Discharge: HOME | End: 2020-10-05
Attending: FAMILY MEDICINE | Admitting: FAMILY MEDICINE
Payer: COMMERCIAL

## 2020-10-05 ENCOUNTER — APPOINTMENT (OUTPATIENT)
Dept: GENERAL RADIOLOGY | Facility: HOSPITAL | Age: 50
End: 2020-10-05
Attending: EMERGENCY MEDICINE
Payer: COMMERCIAL

## 2020-10-05 ENCOUNTER — HOSPITAL ENCOUNTER (EMERGENCY)
Facility: HOSPITAL | Age: 50
Discharge: HOME | End: 2020-10-05
Attending: NURSE PRACTITIONER | Admitting: NURSE PRACTITIONER
Payer: COMMERCIAL

## 2020-10-05 VITALS
OXYGEN SATURATION: 100 % | SYSTOLIC BLOOD PRESSURE: 109 MMHG | HEART RATE: 62 BPM | DIASTOLIC BLOOD PRESSURE: 71 MMHG | RESPIRATION RATE: 18 BRPM | TEMPERATURE: 96.4 F

## 2020-10-05 DIAGNOSIS — G89.29 CHRONIC LEFT SHOULDER PAIN: ICD-10-CM

## 2020-10-05 DIAGNOSIS — M25.512 CHRONIC LEFT SHOULDER PAIN: ICD-10-CM

## 2020-10-05 DIAGNOSIS — S69.91XA INJURY OF FINGER OF RIGHT HAND, INITIAL ENCOUNTER: Primary | ICD-10-CM

## 2020-10-05 PROCEDURE — 999N000104 HC STATISTIC NO CHARGE

## 2020-10-05 PROCEDURE — 29125 APPL SHORT ARM SPLINT STATIC: CPT

## 2020-10-05 PROCEDURE — 73140 X-RAY EXAM OF FINGER(S): CPT | Mod: RT

## 2020-10-05 PROCEDURE — 73130 X-RAY EXAM OF HAND: CPT | Mod: RT

## 2020-10-05 PROCEDURE — 271N000006 HC CAST/SPLINT FIBERGLASS

## 2020-10-05 PROCEDURE — 29125 APPL SHORT ARM SPLINT STATIC: CPT | Performed by: NURSE PRACTITIONER

## 2020-10-05 PROCEDURE — 73221 MRI JOINT UPR EXTREM W/O DYE: CPT | Mod: LT

## 2020-10-05 RX ORDER — HEPARIN SODIUM (PORCINE) LOCK FLUSH IV SOLN 100 UNIT/ML 100 UNIT/ML
5 SOLUTION INTRAVENOUS
Status: CANCELLED | OUTPATIENT
Start: 2020-10-14

## 2020-10-05 ASSESSMENT — ENCOUNTER SYMPTOMS
ARTHRALGIAS: 1
JOINT SWELLING: 1

## 2020-10-05 NOTE — ED PROVIDER NOTES
History     Chief Complaint   Patient presents with     Hand Injury     HPI  Constantino Warren is a 49 year old female who presents to  for right ring finger and pinky finger pain. Yesterday, she was moving furniture when her fingers got smashed between the furniture. She reports pain to right ring and pinky finger along with inability to move pinky finger.  Pain increased overnight which is what prompted her visit today.      Reports history of a tendon injury to right little finger.    Allergies:  Allergies   Allergen Reactions     Aspartame      Other reaction(s): Other - Describe In Comment Field, Seizures  convulsions     Bee Venom Anaphylaxis     Bupropion Anaphylaxis     Throat gets tight and hives. Denies allergic reaction     Food Anaphylaxis     Peppers-anaphylaxis if eaten, if touched hands swell and reddened     Food Allergy Formula Anaphylaxis     Peppers, patient reports green peppers close off her throat     Ibuprofen Anaphylaxis     No Clinical Screening - See Comments Anaphylaxis     Peppers, patient reports green peppers close off her throat     Piper Anaphylaxis     Peppers--green, black, red, chili, etc. Derm symptoms, hives;  Strawberries--(cooked) Itching, breathing difficulties, GI symptoms. Mushrooms--GI symptoms, Hives.Spinach (cooked) throat irritation  (Listed on [facility] problem list.)     Fentanyl Swelling     Lips and tongue swelled.  Swelling of lips and tongue     Adhesive Tape      blisters     Amitriptyline Other (See Comments)     Respiratory symptoms - denies allergic reaction     Liquid Adhesive Dermatitis     blisters  Blisters  Silk tape and tegaderm is fine     Oxcarbazepine Other (See Comments)     Throat gets tight and hives. Denies allergic reaction  Throat gets tight and hives-       Prozac [Fluoxetine]      Other reaction(s): *Unknown  uncontrolled muscle spasms     Valproic Acid      Respiratory symptoms ([facility]). Denies allergic  reaction     Ziprasidone      Throat gets tight and hives. Denies allergic reaction       Problem List:    Patient Active Problem List    Diagnosis Date Noted     History of uterine cancer 05/27/2020     Priority: Medium     Palliative care patient 05/27/2020     Priority: Medium     Iron deficiency anemia, unspecified iron deficiency anemia type 05/08/2020     Priority: Medium     S/P partial resection of colon 02/24/2020     Priority: Medium     Prediabetes      Priority: Medium     Colon cancer (H) 02/19/2020     Priority: Medium     Added automatically from request for surgery [id number]       Cancer of ascending colon (H) 02/19/2020     Priority: Medium     Added automatically from request for surgery [id number]       Special screening for malignant neoplasms, colon 01/14/2020     Priority: Medium     Added automatically from request for surgery [id number]       Anastomotic ulcer S/P gastric bypass 07/21/2018     Priority: Medium     Anemia 07/21/2018     Priority: Medium     Chest pain 07/20/2018     Priority: Medium     GI bleed 07/20/2018     Priority: Medium     Abdominal pain, epigastric 07/20/2018     Priority: Medium     Hypochromic microcytic anemia 07/20/2018     Priority: Medium     Chronic migraine 01/18/2018     Priority: Medium     Chronic pain disorder 01/18/2018     Priority: Medium     Long term (current) use of opiate analgesic 01/12/2018     Priority: Medium     Overview:   Pain Diagnosis failed back syndrome, annular tear L4-5, history lumbar discectomy, s/p lumbar fusion.    Should be seen in the clinic every twelve weeks. Currently on a taper: yes, trying to wean away from the daytime hydrocodone-acetaminophen..    Plan for flares of chronic pain stretches, occasional use of more hydrocodone-acetaminophen for flare.    ED and UC: Opioid medication will not be given in the ER or Urgent Care unless there is a medical emergency unrelated to chronic pain.  Limit to 3 day supply    Refills: Refills will  only be given at a scheduled visit.    [facility] Agreement for Opioid Treatment.   Opioid Agreement Date: 5/21/12  Last UDT (Urine Drug Test) on date:    Pain Dx: chronic low back pain  Last Pain Visit: 6-24-11  [facility]  Comments:      7/23/14:  Progressing well.  Over the last month, has decreased hydrocodone-acetaminophen from 6/day to 3/day.  Awaiting appointment with [facility].       Adjustment disorder with mixed anxiety and depressed mood 12/15/2017     Priority: Medium     Other insomnia 12/15/2017     Priority: Medium     Syncope and collapse 09/26/2015     Priority: Medium     Hypertensive urgency 09/26/2015     Priority: Medium     Concussion syndrome 09/26/2015     Priority: Medium     Hydronephrosis, right 04/20/2015     Priority: Medium     Pyelonephritis 04/20/2015     Priority: Medium     Urolithiasis 04/20/2015     Priority: Medium     Annular tear of lumbar disc 12/16/2014     Priority: Medium     Overview:   MRI 2/5/13:  Previous right L4-5 hemilaminectomy and partial facetectomy with mild to moderate right L4-5 facet arthropathy and broad-based bulge of disk and end plate osteophyte on the right with partial high signal intensity zone annular tear of the undersurface of the laterally protruding disk/annulus.       Failed back syndrome of lumbar spine 12/16/2014     Priority: Medium     Overview:   Three back surgeries, including an L3-sacrum AP fusion done at [facility] in 2013.  Has been doing very well in terms of reduction of pain, with minimal requirement of opiate pain medication.       Hx of lumbar discectomy 12/16/2014     Priority: Medium     Overview:   10/2006:   L4-5 Laminectomy and diskectomy L5-S1.   12/12/11:  L5S1 microdiscectomy ([place])       Vitamin B 12 deficiency 06/13/2011     Priority: Medium     Overview:   IMO Update 10/11       Gastric bypass status for obesity 01/04/2011     Priority: Medium     Overview:   12/14/10:  laparoscopic Prasanna-en-Y gastric  joshua Hassan  IMO Update 10/11       Hyperlipidemia 09/23/2008     Priority: Medium     Overview:   IMO Update 10/11       Hypothyroidism 09/23/2008     Priority: Medium     Overview:   IMO Update 10/11  Overview:   IMO Update 10/11          Past Medical History:    Past Medical History:   Diagnosis Date     Cancer (H)      Cancer (H) 2004     Chronic pain      Depressive disorder      Diabetes (H)      History of blood transfusion      Hypertension      Prediabetes      Thyroid disease        Past Surgical History:    Past Surgical History:   Procedure Laterality Date     BACK SURGERY      2008; 2011     COLONOSCOPY N/A 2/13/2020    Procedure: COLONOSCOPY WITH BIOPSY AND TATOOING OF ASCENDING COLON,INCOMEPLETE EXAM;  Surgeon: Kang Cleveland MD;  Location: [facility]     ESOPHAGOSCOPY, GASTROSCOPY, DUODENOSCOPY (EGD), COMBINED N/A 7/21/2018    Procedure: COMBINED ESOPHAGOSCOPY, GASTROSCOPY, DUODENOSCOPY (EGD);  UPPER ENDOSCOPY WITH BIOPSIES;  Surgeon: Roger Vazquez MD;  Location: [facility]     ESOPHAGOSCOPY, GASTROSCOPY, DUODENOSCOPY (EGD), COMBINED N/A 11/21/2019    Procedure: UPPER ENDOSCOPY with biopsy;  Surgeon: Kang Cleveland MD;  Location: [facility]     HEAD & NECK SURGERY  2008    c4-c5 fusion     HYSTERECTOMY, CRISTIANE  04/2004    uterine cancer; [facility]     INSERT PORT VASCULAR ACCESS N/A 2/28/2020    Procedure: INSERTION, VASCULAR ACCESS PORT LEFT;  Surgeon: Kang Cleveland MD;  Location: [facility]     LAPAROSCOPIC ASSISTED COLECTOMY Right 2/24/2020    Procedure: RIGHT KEVIN COLECTOMY, LYSIS OF ADHESIONS;  Surgeon: Kang Cleveland MD;  Location: [facility]     LAPAROSCOPIC BYPASS GASTRIC  12/2010       Family History:    Family History   Problem Relation Age of Onset     Diabetes Mother      Hypertension Mother      Coronary Artery Disease Mother      Myocardial Infarction Mother      Hypertension Father      Diabetes Maternal Grandmother      Hypertension Maternal Grandfather       Coronary Artery Disease Maternal Grandfather      Cancer Paternal Grandfather         unsure of type of cancer     Bladder Cancer Paternal Grandfather      Diabetes Maternal Aunt      Cerebrovascular Disease Maternal Aunt      Diabetes Maternal Aunt      Cerebrovascular Disease Maternal Aunt      Diabetes Maternal Aunt      Cerebrovascular Disease Maternal Aunt      Hyperlipidemia No family hx of      Breast Cancer No family hx of      Colon Cancer No family hx of      Prostate Cancer No family hx of      Thyroid Disease No family hx of      Anesthesia Reaction No family hx of      Asthma No family hx of      Genetic Disorder No family hx of        Social History:  Marital Status:   [5]  Social History     Tobacco Use     Smoking status: Never Smoker     Smokeless tobacco: Never Used   Substance Use Topics     Alcohol use: No     Frequency: Never     Comment: sober for 18 years     Drug use: No        Medications:         cyanocobalamin (CYANOCOBALAMIN) 1000 MCG/ML injection       gabapentin (NEURONTIN) 300 MG capsule       levothyroxine 200 MCG PO tablet       lisinopril (ZESTRIL) 10 MG tablet       multivitamin, therapeutic with minerals (MULTI-VITAMIN) TABS       sertraline (ZOLOFT) 100 MG tablet       tiZANidine (ZANAFLEX) 4 MG tablet       traZODone (DESYREL) 50 MG tablet       vitamin D2 (ERGOCALCIFEROL) 42185 units (1250 mcg) capsule       acetaminophen (TYLENOL) 500 MG tablet       diazepam (VALIUM) 5 MG tablet       EPINEPHrine (ANY BX GENERIC EQUIV) 0.3 MG/0.3ML injection 2-pack       HYDROcodone-acetaminophen (NORCO) 5-325 MG tablet       lidocaine-prilocaine (EMLA) 2.5-2.5 % external cream          Review of Systems   Musculoskeletal: Positive for arthralgias and joint swelling.   All other systems reviewed and are negative.      Physical Exam   BP: 109/71  Pulse: 62  Temp: 96.4  F (35.8  C)  Resp: 18  SpO2: 100 %      Physical Exam  Vitals signs and nursing note reviewed.   Constitutional:        Appearance: Normal appearance. She is not ill-appearing or toxic-appearing.   HENT:      Head: Normocephalic.   Eyes:      Pupils: Pupils are equal, round, and reactive to light.   Neck:      Musculoskeletal: Neck supple.   Cardiovascular:      Rate and Rhythm: Normal rate.   Pulmonary:      Effort: Pulmonary effort is normal.   Musculoskeletal:         General: Swelling present. No deformity.      Right hand: She exhibits decreased range of motion, tenderness and swelling. She exhibits normal capillary refill and no laceration. Normal sensation noted.      Comments: TTP at PIP joint of right ring finger along with mild decreased range of motion and swelling.  Able to apply pressure with right ring finger.    TTP at PIP joint of right little finger along with mild swelling.  Unable to flex right little finger at the PIP joint.  Decreased strength to the right little finger.  No obvious deformity.  Right radial pulse 2+.  Cap refill less than 2 seconds.   Skin:     General: Skin is warm and dry.      Capillary Refill: Capillary refill takes less than 2 seconds.   Neurological:      Mental Status: She is alert and oriented to person, place, and time.         ED Course        [facility]    Splint Application    Date/Time: 10/5/2020 9:45 AM  Performed by: Marquita May CNP  Authorized by: Marquita May CNP       PRE-PROCEDURE DETAILS     Sensation:  Normal    Skin color:  Pink    PROCEDURE DETAILS     Laterality:  Right    Location:  Finger    Finger:  R ring finger and R small finger    Splint type:  Ulnar gutter    Supplies:  Elastic bandage, cotton padding and Ortho-Glass    POST PROCEDURE DETAILS     Pain:  Improved    Sensation:  Normal    Skin color:  Pink    Patient tolerance of procedure:  Patient tolerated the procedure well with no immediate complications    PROCEDURE   Patient Tolerance:  Patient tolerated the procedure well with no immediate complications                     Results for  orders placed or performed during the hospital encounter of 10/05/20 (from the past 24 hour(s))   XR Finger Right G/E 2 Views    Narrative    PROCEDURE: XR HAND RT G/E 3 VW, XR FINGER RT G/E 2 VW 10/5/2020 9:12  AM    HISTORY: got pinched in a dresser    COMPARISONS: None.    TECHNIQUE: 3 views of hand and 2 views of ring finger.    FINDINGS: No acute fracture or dislocation is seen. There is no focal  bone lesion.         Impression    IMPRESSION: No acute fracture.    PADMINI WILKINS MD   XR Hand Right G/E 3 Views    Narrative    PROCEDURE: XR HAND RT G/E 3 VW, XR FINGER RT G/E 2 VW 10/5/2020 9:12  AM    HISTORY: got pinched in a dresser    COMPARISONS: None.    TECHNIQUE: 3 views of hand and 2 views of ring finger.    FINDINGS: No acute fracture or dislocation is seen. There is no focal  bone lesion.         Impression    IMPRESSION: No acute fracture.    PADMINI WILKINS MD       Medications - No data to display    Assessments & Plan (with Medical Decision Making)   TTP at PIP joint of right ring finger along with mild decreased range of motion and swelling.  Able to apply pressure with right ring finger.  TTP at PIP joint of right little finger along with mild swelling.  Unable to flex right little finger at the PIP joint.  Decreased strength to the right little finger.  No obvious deformity.  Right radial pulse 2+.  Cap refill less than 2 seconds.  X-rays negative for acute fracture or dislocation.  Opted to splint right ring finger and little finger (see procedure note above).  Patient tolerated well.  Advised patient to keep the splint in place for 1 week.  Apply ice, elevate hand and take ibuprofen or Tylenol as needed for the pain.  Using shared decision making patient declined Ortho follow-up at this time.  She will follow-up with PCP if no improvement in the symptoms. Return to ED/UC for worsening concerning symptoms.  Patient verbalized understanding.    I have reviewed the nursing notes.    I have  reviewed the findings, diagnosis, plan and need for follow up with the patient.      New Prescriptions    No medications on file       Final diagnoses:   Injury of finger of right hand, initial encounter       10/5/2020   [facility]     Lalo, Marquita, CNP  10/05/20 [id number]

## 2020-10-05 NOTE — DISCHARGE INSTRUCTIONS
Keep splint in place for 1 week. Apply ice and elevate your hand.  Take ibuprofen or Tylenol as needed for pain.    Follow-up with your doctor as needed if no improvement in your symptoms.    Return to emergency department for worsening concerning symptoms.

## 2020-10-05 NOTE — ED AVS SNAPSHOT
[facility]  [address] [address]  Phone: [phone]                                    Constantino Warren   MRN: [id number]    Department: [facility]   Date of Visit: 10/5/2020           After Visit Summary Signature Page    I have received my discharge instructions, and my questions have been answered. I have discussed any challenges I see with this plan with the nurse or doctor.    ..........................................................................................................................................  Patient/Patient Representative Signature      ..........................................................................................................................................  Patient Representative Print Name and Relationship to Patient    ..................................................               ................................................  Date                                   Time    ..........................................................................................................................................  Reviewed by Signature/Title    ...................................................              ..............................................  Date                                               Time          22EPIC Rev 08/18

## 2020-10-06 DIAGNOSIS — G47.00 INSOMNIA, UNSPECIFIED TYPE: ICD-10-CM

## 2020-10-06 DIAGNOSIS — G89.29 CHRONIC LEFT SHOULDER PAIN: Primary | ICD-10-CM

## 2020-10-06 DIAGNOSIS — M25.512 CHRONIC LEFT SHOULDER PAIN: Primary | ICD-10-CM

## 2020-10-06 DIAGNOSIS — M75.42 IMPINGEMENT SYNDROME OF SHOULDER REGION, LEFT: ICD-10-CM

## 2020-10-07 ENCOUNTER — OFFICE VISIT (OUTPATIENT)
Dept: ORTHOPEDICS | Facility: OTHER | Age: 50
End: 2020-10-07
Attending: PHYSICIAN ASSISTANT
Payer: COMMERCIAL

## 2020-10-07 ENCOUNTER — ALLIED HEALTH/NURSE VISIT (OUTPATIENT)
Dept: FAMILY MEDICINE | Facility: OTHER | Age: 50
End: 2020-10-07
Attending: PHYSICIAN ASSISTANT
Payer: COMMERCIAL

## 2020-10-07 VITALS
BODY MASS INDEX: 26.87 KG/M2 | OXYGEN SATURATION: 99 % | SYSTOLIC BLOOD PRESSURE: 110 MMHG | WEIGHT: 169 LBS | TEMPERATURE: 97.2 F | DIASTOLIC BLOOD PRESSURE: 68 MMHG | HEART RATE: 62 BPM

## 2020-10-07 DIAGNOSIS — G89.29 CHRONIC LEFT SHOULDER PAIN: ICD-10-CM

## 2020-10-07 DIAGNOSIS — Z23 NEED FOR PROPHYLACTIC VACCINATION AND INOCULATION AGAINST INFLUENZA: Primary | ICD-10-CM

## 2020-10-07 DIAGNOSIS — Z23 NEED FOR VACCINATION: Primary | ICD-10-CM

## 2020-10-07 DIAGNOSIS — M25.512 CHRONIC LEFT SHOULDER PAIN: ICD-10-CM

## 2020-10-07 DIAGNOSIS — M75.42 IMPINGEMENT SYNDROME OF SHOULDER REGION, LEFT: ICD-10-CM

## 2020-10-07 PROCEDURE — G0008 ADMIN INFLUENZA VIRUS VAC: HCPCS | Performed by: PHYSICIAN ASSISTANT

## 2020-10-07 PROCEDURE — G0463 HOSPITAL OUTPT CLINIC VISIT: HCPCS | Mod: 25

## 2020-10-07 PROCEDURE — 99203 OFFICE O/P NEW LOW 30 MIN: CPT | Performed by: PHYSICIAN ASSISTANT

## 2020-10-07 PROCEDURE — 96372 THER/PROPH/DIAG INJ SC/IM: CPT

## 2020-10-07 PROCEDURE — G0463 HOSPITAL OUTPT CLINIC VISIT: HCPCS

## 2020-10-07 PROCEDURE — 90682 RIV4 VACC RECOMBINANT DNA IM: CPT

## 2020-10-07 RX ADMIN — CYANOCOBALAMIN 1000 MCG: 1000 INJECTION, SOLUTION INTRAMUSCULAR; SUBCUTANEOUS at 14:31

## 2020-10-07 ASSESSMENT — PAIN SCALES - GENERAL: PAINLEVEL: EXTREME PAIN (8)

## 2020-10-07 NOTE — PROGRESS NOTES
The following medication was given:     MEDICATION: Vitamin B12  1000mcg  ROUTE: IM  SITE: Arm - Right deltoid  DOSE: 1ml  LOT #: [id number]  :  [employer]  EXPIRATION DATE:  03/31/22  NDC: 22196-836-81  Pt tolerated well. No complaints. Advised to report any adverse reactions to clinic.  Cyndee Haji LPN

## 2020-10-07 NOTE — PROGRESS NOTES
New Patient Visit:     Patient Profile: 49-year old, RHD, Non-Smoker, Patient of Dr. Coco Cook MD    Chief Complaint: Right Shoulder Pain    HPI: Patient presented to her PCP on 9/3/2020 with a CC of 1-week of shoulder pain s/p 2 falls in the prior week. Falls included one fall in which her arm was caught on the fall and was pulled behind her and in the other she had an injury causing her to fall directly on the front of her shoulder. She had lost overhead ROM at this time. XR negative. PT referral placed.     PT started on 9/9/2020 - patient seeing NISHI Odell Phone encounter on 9/24 as patient not improving with her PT course and reported a clicking/popping in her shoulder. An MRI was ordered by Dr. Moscoso to assess. Orthopedics referral placed shortly afterwards.     Patient presents to orthopedics today for evaluation of her right shoulder pain as listed above. MRI was completed on 10/5/2020 and results are listed below. Pain is currently a dull constant toothache and is rated as a 8/10. Palliative measures include: Rest and hydrocodone. Provocative measures include: Attempting overhead range of motion, lifting, pushing and pulling. Associated symptoms: Denies carpal or cubital tunnel like syndrome.    Symptoms feel similar to her prior rotator cuff tear on the right side.    Review Of Systems  Skin: negative  Eyes: positive for glasses  Ears/Nose/Throat: negative  Respiratory: No shortness of breath, dyspnea on exertion, cough, or hemoptysis  Cardiovascular: negative for palpitations, tachycardia and irregular heart beat  Gastrointestinal: negative  Genitourinary: negative  Musculoskeletal: positive for joint pain, joint stiffness and injury to RIGHT shoulder  Neurologic: negative for, local weakness, numbness or tingling of hands and numbness or tingling of feet  Psychiatric: positive for chronic pain syndrome  Hematologic/Lymphatic/Immunologic: positive for allergies  Endocrine: positive for thyroid  disorder and diabetes    Prior Fracture Hx: yes, none to shoulder  Prior Surgical Hx: cervical and lumbar spine surgeries as listed below. Right rotator cuff in past    Allergies   Allergen Reactions     Aspartame      Other reaction(s): Other - Describe In Comment Field, Seizures  convulsions     Bee Venom Anaphylaxis     Bupropion Anaphylaxis     Throat gets tight and hives. Denies allergic reaction     Food Anaphylaxis     Peppers-anaphylaxis if eaten, if touched hands swell and reddened     Food Allergy Formula Anaphylaxis     Peppers, patient reports green peppers close off her throat     Ibuprofen Anaphylaxis     No Clinical Screening - See Comments Anaphylaxis     Peppers, patient reports green peppers close off her throat     Piper Anaphylaxis     Peppers--green, black, red, chili, etc. Derm symptoms, hives;  Strawberries--(cooked) Itching, breathing difficulties, GI symptoms. Mushrooms--GI symptoms, Hives.Spinach (cooked) throat irritation  (Listed on [facility] problem list.)     Fentanyl Swelling     Lips and tongue swelled.  Swelling of lips and tongue     Adhesive Tape      blisters     Amitriptyline Other (See Comments)     Respiratory symptoms - denies allergic reaction     Liquid Adhesive Dermatitis     blisters  Blisters  Silk tape and tegaderm is fine     Oxcarbazepine Other (See Comments)     Throat gets tight and hives. Denies allergic reaction  Throat gets tight and hives-       Prozac [Fluoxetine]      Other reaction(s): *Unknown  uncontrolled muscle spasms     Valproic Acid      Respiratory symptoms ([facility]). Denies allergic reaction     Ziprasidone      Throat gets tight and hives. Denies allergic reaction     Past Medical History:   Diagnosis Date     Cancer (H)     colon     Cancer (H) 2004    uterine     Chronic pain      Depressive disorder      Diabetes (H)      History of blood transfusion      Hypertension      Prediabetes      Thyroid disease      Past  Surgical History:   Procedure Laterality Date     BACK SURGERY      2008; 2011     COLONOSCOPY N/A 2/13/2020    Procedure: COLONOSCOPY WITH BIOPSY AND TATOOING OF ASCENDING COLON,INCOMEPLETE EXAM;  Surgeon: Kang Cleveland MD;  Location: [facility]     ESOPHAGOSCOPY, GASTROSCOPY, DUODENOSCOPY (EGD), COMBINED N/A 7/21/2018    Procedure: COMBINED ESOPHAGOSCOPY, GASTROSCOPY, DUODENOSCOPY (EGD);  UPPER ENDOSCOPY WITH BIOPSIES;  Surgeon: Roger Vazquez MD;  Location: [facility]     ESOPHAGOSCOPY, GASTROSCOPY, DUODENOSCOPY (EGD), COMBINED N/A 11/21/2019    Procedure: UPPER ENDOSCOPY with biopsy;  Surgeon: Kagn Cleveland MD;  Location: [facility]     HEAD & NECK SURGERY  2008    c4-c5 fusion     HYSTERECTOMY, CRISTIANE  04/2004    uterine cancer; [facility]     INSERT PORT VASCULAR ACCESS N/A 2/28/2020    Procedure: INSERTION, VASCULAR ACCESS PORT LEFT;  Surgeon: Kang Cleveland MD;  Location: [facility]     LAPAROSCOPIC ASSISTED COLECTOMY Right 2/24/2020    Procedure: RIGHT KEVIN COLECTOMY, LYSIS OF ADHESIONS;  Surgeon: Kang Cleveland MD;  Location: [facility]     LAPAROSCOPIC BYPASS GASTRIC  12/2010     Current Outpatient Medications   Medication     acetaminophen (TYLENOL) 500 MG tablet     cyanocobalamin (CYANOCOBALAMIN) 1000 MCG/ML injection     diazepam (VALIUM) 5 MG tablet     EPINEPHrine (ANY BX GENERIC EQUIV) 0.3 MG/0.3ML injection 2-pack     gabapentin (NEURONTIN) 300 MG capsule     HYDROcodone-acetaminophen (NORCO) 5-325 MG tablet     levothyroxine 200 MCG PO tablet     lidocaine-prilocaine (EMLA) 2.5-2.5 % external cream     lisinopril (ZESTRIL) 10 MG tablet     multivitamin, therapeutic with minerals (MULTI-VITAMIN) TABS     sertraline (ZOLOFT) 100 MG tablet     tiZANidine (ZANAFLEX) 4 MG tablet     traZODone (DESYREL) 50 MG tablet     vitamin D2 (ERGOCALCIFEROL) 79139 units (1250 mcg) capsule     Current Facility-Administered Medications   Medication     cyanocobalamin injection 1,000 mcg     Social  History     Socioeconomic History     Marital status:      Spouse name: Not on file     Number of children: 2     Years of education: Not on file     Highest education level: Not on file   Occupational History     Occupation: PCA     Comment: currently not working, unemployment    Social Needs     Financial resource strain: Not hard at all     Food insecurity     Worry: Not on file     Inability: Not on file     Transportation needs     Medical: Patient refused     Non-medical: Patient refused   Tobacco Use     Smoking status: Never Smoker     Smokeless tobacco: Never Used   Substance and Sexual Activity     Alcohol use: No     Frequency: Never     Comment: sober for 18 years     Drug use: No     Sexual activity: Never   Lifestyle     Physical activity     Days per week: Not on file     Minutes per session: Not on file     Stress: Not on file   Relationships     Social connections     Talks on phone: Not on file     Gets together: Not on file     Attends Alevism service: Not on file     Active member of club or organization: Not on file     Attends meetings of clubs or organizations: Not on file     Relationship status: Not on file     Intimate partner violence     Fear of current or ex partner: Not on file     Emotionally abused: Not on file     Physically abused: Not on file     Forced sexual activity: Not on file   Other Topics Concern     Parent/sibling w/ CABG, MI or angioplasty before 65F 55M? Yes     Comment: Mother   Social History Narrative     Not on file      Family History   Problem Relation Age of Onset     Diabetes Mother      Hypertension Mother      Coronary Artery Disease Mother      Myocardial Infarction Mother      Hypertension Father      Diabetes Maternal Grandmother      Hypertension Maternal Grandfather      Coronary Artery Disease Maternal Grandfather      Cancer Paternal Grandfather         unsure of type of cancer     Bladder Cancer Paternal Grandfather      Diabetes Maternal Aunt       Cerebrovascular Disease Maternal Aunt      Diabetes Maternal Aunt      Cerebrovascular Disease Maternal Aunt      Diabetes Maternal Aunt      Cerebrovascular Disease Maternal Aunt      Hyperlipidemia No family hx of      Breast Cancer No family hx of      Colon Cancer No family hx of      Prostate Cancer No family hx of      Thyroid Disease No family hx of      Anesthesia Reaction No family hx of      Asthma No family hx of      Genetic Disorder No family hx of      Objective: /68   Pulse 62   Temp 97.2  F (36.2  C)   Wt 76.7 kg (169 lb)   SpO2 99%   BMI 26.87 kg/m    Constantino is a pleasant, 49-year old. Head is normocephalic and atraumatic, sclerae are clear. Patient hears me normally, trachea midline, chest excursion is normal. Respiratory efforts are non-labored, all lung fields are clear to auscultation, there are no rales, rhonchi or wheezes. Cardiac: normal S1 and S2, regular rate/rhythm, there are no audible cardiac murmurs or gallops. Patient is alert and oriented and expresses proper mood and affect. Gait is normal and requires no ambulatory device.     RIGHT SHOULDER PHYSICAL EXAMINATION:  Gross Examination: shoulders appear symmetrical in appearance, no signs of bony deformity over the AC, clavicle or glenohumeral joints. No signs of previous or current trauma. No signs of ecchymosis. Skin is intact.     Palpation: TTP in bicipital groove: YES, AC joint: YES, greater tuberosity: no. Signs of muscular atrophy No. Sulcus sign: negative    ROM: flexion 90, extension 60, abduction 110, adduction 40, IR limited, ER limited    Special Testing:   Shoulder Strength:    Speed/Yergason: negative sign of injury to bicep tendon     Instability:    Apprehension: negative    Cross Over: negative     Impingement:    Neer: positive    Hendrix: positive    Empty Can: positive, sign of supraspinatus impingement     Neurovascular status: distal pulses are intact and are 2+. Two point discrimination intact.  Distal capillary refill intact < 3 seconds.     Imaging:   #1. MRI of the RIGHT shoulder from 10/5/2020 showed mild AC joint arthropathy as well as a small undersurface tear of the distal supraspinatus tendon (no full thickness tears noted of the SITS musculature). No acute labral pathology. No fracture or dislocation noted.   #2. XR of the RIGHT shoulder and clavicle from 09/03/2020 showed no evidence of fracture or dislocation.     Impression:   (M25.512,  G89.29) Chronic left shoulder pain  Comment: Status post 2 injuries in the last 2 months  Plan: Physical exam is available for review above -patient exhibits classic signs of a rotator cuff tear. Patient has exact same symptoms as her prior right sided rotator cuff pathology on right this as she has had a rotator cuff repair in the recent years on the right side, she recovered well with this without complications.      Discussed anatomy, physiology and typical treatment plans for the above diagnosis. Surgical intervention is recommended and will consist of: LEFT SHOULDER ARTHROSCOPY. Patient will require a preoperative H&P and a two-week postoperative visit with the orthopedics department. All risks and benefits of surgical intervention were discussed at length with patient. Allergies review and preoperative orders (antibiotics, etc.). are to be placed.     Patient was educated on the alternatives, risks and benefits of the proposed procedure. The patient was given time to ask questions about the surgery and I answered all of them to their satisfaction.  The patient then consented to the procedure. The patient understands to have nothing to eat or drink after midnight the night before surgery, and to have somebody drive them home after surgery.     (M75.42) Impingement syndrome of shoulder region, left  Comment: Chronic  Plan: See above    Patient is in agreement and understanding of the above treatment plan. All questions and concerns were addressed and  answered to patient's satisfaction. If questions/concerns arise before next visit they may contact the office at any point in time and we would be happy to assist them. Patient will follow up with orthopedics 2-weeks post op.     Patient has a port on LEFT side - per patient, she has been cleared by hematology/oncology    COVID, preop and surgery orders have not been placed.    Postoperative orders: Hydrocodone for pain and Zofran for nausea.  No allergies to antibiotics, will give Ancef preoperatively.    Mally Alejandra PA-C  Orthopedic Physician Assistant  [facility]

## 2020-10-07 NOTE — NURSING NOTE
"Chief Complaint   Patient presents with     Imm/Inj       Initial There were no vitals taken for this visit. Estimated body mass index is 26.87 kg/m  as calculated from the following:    Height as of 9/3/20: 1.689 m (5' 6.5\").    Weight as of an earlier encounter on 10/7/20: 76.7 kg (169 lb).  Medication Reconciliation: complete  Cyndee Haji LPN  "

## 2020-10-07 NOTE — NURSING NOTE
"Chief Complaint   Patient presents with     Musculoskeletal Problem       Initial /68   Pulse 62   Temp 97.2  F (36.2  C)   Wt 76.7 kg (169 lb)   SpO2 99%   BMI 26.87 kg/m   Estimated body mass index is 26.87 kg/m  as calculated from the following:    Height as of 9/3/20: 1.689 m (5' 6.5\").    Weight as of this encounter: 76.7 kg (169 lb).  Medication Reconciliation: complete  Jessa Behrman, LPN  "

## 2020-10-08 NOTE — TELEPHONE ENCOUNTER
tiZANidine (ZANAFLEX) 4 MG tablet    Last Written Prescription Date:  08/27/2020  Last Fill Quantity: 30,   # refills: 0  Last Office Visit: 09/03/2020  Future Office visit:    Next 5 appointments (look out 90 days)    Oct 20, 2020 12:45 PM  (Arrive by 12:30 PM)  Return Visit with Izzy Moran NP  [facility] ([facility] ) [address]  [place] [address]  [phone]   Oct 30, 2020  1:30 PM  (Arrive by 1:15 PM)  Pre-Op physical with Coco Cook MD  [facility] ([facility] ) [address]  [place] [address]  [phone]   Nov 02, 2020 10:00 AM  (Arrive by 9:45 AM)  SHORT with HC COLLECTION [facility] ([facility] ) [address]  [place] [address]  [phone]           Routing refill request to provider for review/approval because:

## 2020-10-13 ENCOUNTER — PREP FOR PROCEDURE (OUTPATIENT)
Dept: ORTHOPEDICS | Facility: OTHER | Age: 50
End: 2020-10-13

## 2020-10-13 DIAGNOSIS — Z41.9 ELECTIVE SURGERY: Primary | ICD-10-CM

## 2020-10-13 DIAGNOSIS — M75.100 ROTATOR CUFF TEAR: Primary | ICD-10-CM

## 2020-10-13 DIAGNOSIS — M19.90 ARTHRITIS: ICD-10-CM

## 2020-10-13 RX ORDER — CEFAZOLIN SODIUM 2 G/100ML
2 INJECTION, SOLUTION INTRAVENOUS
Status: CANCELLED | OUTPATIENT
Start: 2020-11-06

## 2020-10-13 NOTE — PROGRESS NOTES
Preop and COVID orders placed.    Mally Alejandra PA-C  Orthopedic Physician Assistant  [facility]

## 2020-10-14 ENCOUNTER — INFUSION THERAPY VISIT (OUTPATIENT)
Dept: INFUSION THERAPY | Facility: OTHER | Age: 50
End: 2020-10-14
Attending: INTERNAL MEDICINE
Payer: COMMERCIAL

## 2020-10-14 ENCOUNTER — TELEPHONE (OUTPATIENT)
Dept: INFUSION THERAPY | Facility: OTHER | Age: 50
End: 2020-10-14

## 2020-10-14 ENCOUNTER — HOSPITAL ENCOUNTER (OUTPATIENT)
Dept: CT IMAGING | Facility: HOSPITAL | Age: 50
End: 2020-10-14
Attending: INTERNAL MEDICINE
Payer: COMMERCIAL

## 2020-10-14 DIAGNOSIS — C18.2 CANCER OF ASCENDING COLON (H): Primary | ICD-10-CM

## 2020-10-14 DIAGNOSIS — C18.2 CANCER OF ASCENDING COLON (H): ICD-10-CM

## 2020-10-14 LAB
ALBUMIN SERPL-MCNC: 3.3 G/DL (ref 3.4–5)
ALP SERPL-CCNC: 105 U/L (ref 40–150)
ALT SERPL W P-5'-P-CCNC: 35 U/L (ref 0–50)
ANION GAP SERPL CALCULATED.3IONS-SCNC: 3 MMOL/L (ref 3–14)
AST SERPL W P-5'-P-CCNC: 31 U/L (ref 0–45)
BASOPHILS # BLD AUTO: 0 10E9/L (ref 0–0.2)
BASOPHILS NFR BLD AUTO: 0.2 %
BILIRUB SERPL-MCNC: 0.4 MG/DL (ref 0.2–1.3)
BUN SERPL-MCNC: 13 MG/DL (ref 7–30)
CALCIUM SERPL-MCNC: 8.5 MG/DL (ref 8.5–10.1)
CEA SERPL-MCNC: <0.5 UG/L (ref 0–2.5)
CHLORIDE SERPL-SCNC: 112 MMOL/L (ref 94–109)
CO2 SERPL-SCNC: 27 MMOL/L (ref 20–32)
CREAT SERPL-MCNC: 0.65 MG/DL (ref 0.52–1.04)
DIFFERENTIAL METHOD BLD: ABNORMAL
EOSINOPHIL # BLD AUTO: 0 10E9/L (ref 0–0.7)
EOSINOPHIL NFR BLD AUTO: 0 %
ERYTHROCYTE [DISTWIDTH] IN BLOOD BY AUTOMATED COUNT: 13.1 % (ref 10–15)
GFR SERPL CREATININE-BSD FRML MDRD: >90 ML/MIN/{1.73_M2}
GLUCOSE SERPL-MCNC: 89 MG/DL (ref 70–99)
HCT VFR BLD AUTO: 35.9 % (ref 35–47)
HGB BLD-MCNC: 11.9 G/DL (ref 11.7–15.7)
IMM GRANULOCYTES # BLD: 0 10E9/L (ref 0–0.4)
IMM GRANULOCYTES NFR BLD: 0.4 %
LDH SERPL L TO P-CCNC: 192 U/L (ref 81–234)
LYMPHOCYTES # BLD AUTO: 1.5 10E9/L (ref 0.8–5.3)
LYMPHOCYTES NFR BLD AUTO: 29.2 %
MCH RBC QN AUTO: 30.1 PG (ref 26.5–33)
MCHC RBC AUTO-ENTMCNC: 33.1 G/DL (ref 31.5–36.5)
MCV RBC AUTO: 91 FL (ref 78–100)
MONOCYTES # BLD AUTO: 0.5 10E9/L (ref 0–1.3)
MONOCYTES NFR BLD AUTO: 9 %
NEUTROPHILS # BLD AUTO: 3.1 10E9/L (ref 1.6–8.3)
NEUTROPHILS NFR BLD AUTO: 61.2 %
NRBC # BLD AUTO: 0 10*3/UL
NRBC BLD AUTO-RTO: 0 /100
PLATELET # BLD AUTO: 143 10E9/L (ref 150–450)
POTASSIUM SERPL-SCNC: 4.1 MMOL/L (ref 3.4–5.3)
PROT SERPL-MCNC: 6.8 G/DL (ref 6.8–8.8)
RBC # BLD AUTO: 3.95 10E12/L (ref 3.8–5.2)
SODIUM SERPL-SCNC: 142 MMOL/L (ref 133–144)
VIT B12 SERPL-MCNC: 131 PG/ML (ref 193–986)
WBC # BLD AUTO: 5.1 10E9/L (ref 4–11)

## 2020-10-14 PROCEDURE — 255N000002 HC RX 255 OP 636: Performed by: RADIOLOGY

## 2020-10-14 PROCEDURE — 83615 LACTATE (LD) (LDH) ENZYME: CPT | Mod: ZL | Performed by: INTERNAL MEDICINE

## 2020-10-14 PROCEDURE — 250N000011 HC RX IP 250 OP 636

## 2020-10-14 PROCEDURE — 74177 CT ABD & PELVIS W/CONTRAST: CPT

## 2020-10-14 PROCEDURE — 85025 COMPLETE CBC W/AUTO DIFF WBC: CPT | Mod: ZL | Performed by: INTERNAL MEDICINE

## 2020-10-14 PROCEDURE — 96523 IRRIG DRUG DELIVERY DEVICE: CPT

## 2020-10-14 PROCEDURE — 36415 COLL VENOUS BLD VENIPUNCTURE: CPT | Mod: ZL | Performed by: INTERNAL MEDICINE

## 2020-10-14 PROCEDURE — 80053 COMPREHEN METABOLIC PANEL: CPT | Mod: ZL | Performed by: INTERNAL MEDICINE

## 2020-10-14 PROCEDURE — 82378 CARCINOEMBRYONIC ANTIGEN: CPT | Mod: ZL | Performed by: INTERNAL MEDICINE

## 2020-10-14 PROCEDURE — 82607 VITAMIN B-12: CPT | Mod: ZL | Performed by: INTERNAL MEDICINE

## 2020-10-14 RX ORDER — HEPARIN SODIUM (PORCINE) LOCK FLUSH IV SOLN 100 UNIT/ML 100 UNIT/ML
5 SOLUTION INTRAVENOUS
Status: DISCONTINUED | OUTPATIENT
Start: 2020-10-14 | End: 2020-10-15 | Stop reason: HOSPADM

## 2020-10-14 RX ORDER — HEPARIN SODIUM (PORCINE) LOCK FLUSH IV SOLN 100 UNIT/ML 100 UNIT/ML
SOLUTION INTRAVENOUS
Status: COMPLETED
Start: 2020-10-14 | End: 2020-10-14

## 2020-10-14 RX ORDER — IOPAMIDOL 612 MG/ML
100 INJECTION, SOLUTION INTRAVASCULAR ONCE
Status: COMPLETED | OUTPATIENT
Start: 2020-10-14 | End: 2020-10-14

## 2020-10-14 RX ORDER — HEPARIN SODIUM (PORCINE) LOCK FLUSH IV SOLN 100 UNIT/ML 100 UNIT/ML
5 SOLUTION INTRAVENOUS ONCE
Status: CANCELLED | OUTPATIENT
Start: 2020-10-14 | End: 2020-10-14

## 2020-10-14 RX ORDER — HEPARIN SODIUM (PORCINE) LOCK FLUSH IV SOLN 100 UNIT/ML 100 UNIT/ML
5 SOLUTION INTRAVENOUS ONCE
Status: DISCONTINUED | OUTPATIENT
Start: 2020-10-14 | End: 2020-10-14 | Stop reason: HOSPADM

## 2020-10-14 RX ADMIN — IOPAMIDOL 100 ML: 612 INJECTION, SOLUTION INTRAVENOUS at 11:35

## 2020-10-14 RX ADMIN — HEPARIN 5 ML: 100 SYRINGE at 11:40

## 2020-10-14 RX ADMIN — HEPARIN SODIUM (PORCINE) LOCK FLUSH IV SOLN 100 UNIT/ML 5 ML: 100 SOLUTION at 11:40

## 2020-10-14 NOTE — TELEPHONE ENCOUNTER
Patient here today for labs from port prior to scan. She reports she is concerned, as she has had increasing abdominal pain/discomfort and sporadic blood in stools. Notes no clots, streaks only. Does not feel it is related to hemorrhoids and reports she is never constipated/hard stools. She notes she sees you later this week, and will review with you further at that appt.

## 2020-10-14 NOTE — PROGRESS NOTES
Patient is a 49 year old female here today accompanied by self for labs from port.    Hand hygiene performed: yes   Mask donned by caregiver: yes   Site prepped with CHG: yes   Labs drawn: yes   Dressing applied using aseptic technique: yes  Patients port accessed using non-coring, 19g 3/4 inch power needle. Port accessed per facility protocol. Port flushed easily, blood return noted.  Flushed 10 mLs normal saline, 10 mLs blood wasted, labs drawn per orders (4 tubes).  Port flushed 20 mLs Normal Saline then left accessed for scans.  No signs and symptoms of infection or infiltration. Discharged.

## 2020-10-19 NOTE — PROGRESS NOTES
Oncology Follow-up Visit:  October 20, 2020    Reason for Visit:  Patient presents with:  RECHECK: Follow up Colon cancer and Survivorship     Nursing Note and documentation reviewed: yes    HPI:  This is a 49-year-old female patient who presents to the oncology clinic today in follow up of StageIIIB adenocarcinoma of the ascending colon diagnosed February 2020.  She completed adjuvant chemotherapy on 6/22/2020 and is being followed with surveillance.    She presents to the clinic today stating she is doing well.  Her main complaint today is regards to about a 2-week history of rectal bleeding.  She has noticed drops in the toilet and also on her stool after a bowel movement stating this is not every time.  She has had loose stools since surgery and this is essentially unchanged.  She does also complain of some rectal pain rating this at about a 4 on a 0-to-10 scale at times.  She says it is uncomfortable fairly steadily.  She does experience some lower abdominal pain in the evening on occasion.  She had been doing monthly B12 injections while she was with the palliative care program.  She did not receive an injection for about 3 months and did receive 1 about 2 weeks ago.  Her appetite has been good and her activity level is pretty good and she feels she is improving after finishing chemotherapy.    She was noted to be vitamin D deficient at our last visit in May and states she did take the prescription I sent in for her but she is no longer on any vitamin D.    Note she will be having shoulder surgery on 11/6/2020.    Oncologic History:     11/21/19 to 11/21/2019 patient was hospitalized with a hemoglobin of 5.7 underwent EGD with no evidence of active bleeding.  12/12/2019 she was seen by Dr. Cook and established care with her as her new PCP.  Referral for screening colonoscopy was completed.  2/13/2020 patient underwent colonoscopy by Dr. Cleveland where a near obstructing lesion in the right colon was found.   Pathology showed an invasive moderately differentiated adenocarcinoma.  CT the abdomen and pelvis showed a mass in the junction of the cecum and ascending colon highly suspicious for malignancy.  2/24/2020  She then underwent laparoscopic-assisted right hemicolectomy by Dr. Cleveland that was converted to open right hemicolectomy when it was noted that the tumor was adherent to the pelvic brim; final pathology showed mildly differentiated adenocarcinoma of the right colon with 1/32 lymph nodes positive; there was perineural invasion; tumor budding and acutely inflamed tumor and reactive lymph nodes with peritonitis; the omentum showed mild peritonitis; margins were negative; patient was staged [id number]; IIIb adenocarcinoma of the right colon  2/28/2020 Port-A-Cath placement  3/25/2020 PET scan which showed no evidence of distant metastatic disease  3/30/2020 patient was seen by Dr. Palma with Medical Oncology with recommendation for XELOX; capecitabine 850 mg/m2 p.o. b.i.d. on days 1 through 14 of a 21-day cycle, oxaliplatin 130 mg/m2 to be given on day 1 x4 cycles.  4/10/2020 she was seen by Dr. rByant with Radiation Oncology with no recommendation for radiation therapy at this time related to negative margins on pathology  4/17/2020 initiation of chemotherapy  6/22/2020  Completion of 4 cycles of XELOX     MMR proteins intact  CEA prior to surgery was <0.5     Current Chemo Regime/TX:  N/a  Current Cycle:  n/a  # of completed cycles:  n/a     Previous treatment:   Oxaliplatin/Capecitabine x 4 cycles     Past Medical History:   Diagnosis Date     Arthritis 10/13/2020     Cancer (H)     colon     Cancer (H) 2004    uterine     Chronic pain      Depressive disorder      Diabetes (H)      History of blood transfusion      Hypertension      Prediabetes      Thyroid disease        Past Surgical History:   Procedure Laterality Date     BACK SURGERY      2008; 2011     COLONOSCOPY N/A 2/13/2020    Procedure: COLONOSCOPY WITH  BIOPSY AND TATOOING OF ASCENDING COLON,INCOMEPLETE EXAM;  Surgeon: Kang Cleveland MD;  Location: [facility]     ESOPHAGOSCOPY, GASTROSCOPY, DUODENOSCOPY (EGD), COMBINED N/A 7/21/2018    Procedure: COMBINED ESOPHAGOSCOPY, GASTROSCOPY, DUODENOSCOPY (EGD);  UPPER ENDOSCOPY WITH BIOPSIES;  Surgeon: Roger Vazquez MD;  Location: [facility]     ESOPHAGOSCOPY, GASTROSCOPY, DUODENOSCOPY (EGD), COMBINED N/A 11/21/2019    Procedure: UPPER ENDOSCOPY with biopsy;  Surgeon: Kang Cleveland MD;  Location: [facility]     HEAD & NECK SURGERY  2008    c4-c5 fusion     HYSTERECTOMY, CRISTIANE  04/2004    uterine cancer; [facility]     INSERT PORT VASCULAR ACCESS N/A 2/28/2020    Procedure: INSERTION, VASCULAR ACCESS PORT LEFT;  Surgeon: Kang Cleveland MD;  Location: [facility]     LAPAROSCOPIC ASSISTED COLECTOMY Right 2/24/2020    Procedure: RIGHT KEVIN COLECTOMY, LYSIS OF ADHESIONS;  Surgeon: Kang Cleveland MD;  Location: [facility]     LAPAROSCOPIC BYPASS GASTRIC  12/2010       Family History   Problem Relation Age of Onset     Diabetes Mother      Hypertension Mother      Coronary Artery Disease Mother      Myocardial Infarction Mother      Hypertension Father      Diabetes Maternal Grandmother      Hypertension Maternal Grandfather      Coronary Artery Disease Maternal Grandfather      Cancer Paternal Grandfather         unsure of type of cancer     Bladder Cancer Paternal Grandfather      Diabetes Maternal Aunt      Cerebrovascular Disease Maternal Aunt      Diabetes Maternal Aunt      Cerebrovascular Disease Maternal Aunt      Diabetes Maternal Aunt      Cerebrovascular Disease Maternal Aunt      Hyperlipidemia No family hx of      Breast Cancer No family hx of      Colon Cancer No family hx of      Prostate Cancer No family hx of      Thyroid Disease No family hx of      Anesthesia Reaction No family hx of      Asthma No family hx of      Genetic Disorder No family hx of        Social History     Socioeconomic History      Marital status:      Spouse name: Not on file     Number of children: 2     Years of education: Not on file     Highest education level: Not on file   Occupational History     Occupation: PCA     Comment: currently not working, unemployment    Social Needs     Financial resource strain: Not hard at all     Food insecurity     Worry: Not on file     Inability: Not on file     Transportation needs     Medical: Patient refused     Non-medical: Patient refused   Tobacco Use     Smoking status: Never Smoker     Smokeless tobacco: Never Used   Substance and Sexual Activity     Alcohol use: No     Frequency: Never     Comment: sober for 18 years     Drug use: No     Sexual activity: Never   Lifestyle     Physical activity     Days per week: Not on file     Minutes per session: Not on file     Stress: Not on file   Relationships     Social connections     Talks on phone: Not on file     Gets together: Not on file     Attends Congregation service: Not on file     Active member of club or organization: Not on file     Attends meetings of clubs or organizations: Not on file     Relationship status: Not on file     Intimate partner violence     Fear of current or ex partner: Not on file     Emotionally abused: Not on file     Physically abused: Not on file     Forced sexual activity: Not on file   Other Topics Concern     Parent/sibling w/ CABG, MI or angioplasty before 65F 55M? Yes     Comment: Mother   Social History Narrative     Not on file       Current Outpatient Medications   Medication     acetaminophen (TYLENOL) 500 MG tablet     cyanocobalamin (CYANOCOBALAMIN) 1000 MCG/ML injection     Cyanocobalamin 1000 MCG/ML KIT     gabapentin (NEURONTIN) 300 MG capsule     HYDROcodone-acetaminophen (NORCO) 5-325 MG tablet     levothyroxine 200 MCG PO tablet     lidocaine-prilocaine (EMLA) 2.5-2.5 % external cream     lisinopril (ZESTRIL) 10 MG tablet     multivitamin, therapeutic with minerals (MULTI-VITAMIN) TABS      sertraline (ZOLOFT) 100 MG tablet     tiZANidine (ZANAFLEX) 4 MG tablet     traZODone (DESYREL) 50 MG tablet     vitamin D2 (ERGOCALCIFEROL) 27783 units (1250 mcg) capsule     diazepam (VALIUM) 5 MG tablet     EPINEPHrine (ANY BX GENERIC EQUIV) 0.3 MG/0.3ML injection 2-pack     Current Facility-Administered Medications   Medication     cyanocobalamin injection 1,000 mcg        Allergies   Allergen Reactions     Aspartame      Other reaction(s): Other - Describe In Comment Field, Seizures  convulsions     Bee Venom Anaphylaxis     Bupropion Anaphylaxis     Throat gets tight and hives. Denies allergic reaction     Food Anaphylaxis     Peppers-anaphylaxis if eaten, if touched hands swell and reddened     Food Allergy Formula Anaphylaxis     Peppers, patient reports green peppers close off her throat     Ibuprofen Anaphylaxis     No Clinical Screening - See Comments Anaphylaxis     Peppers, patient reports green peppers close off her throat     Piper Anaphylaxis     Peppers--green, black, red, chili, etc. Derm symptoms, hives;  Strawberries--(cooked) Itching, breathing difficulties, GI symptoms. Mushrooms--GI symptoms, Hives.Spinach (cooked) throat irritation  (Listed on [facility] problem list.)     Fentanyl Swelling     Lips and tongue swelled.  Swelling of lips and tongue     Adhesive Tape      blisters     Amitriptyline Other (See Comments)     Respiratory symptoms - denies allergic reaction     Liquid Adhesive Dermatitis     blisters  Blisters  Silk tape and tegaderm is fine     Oxcarbazepine Other (See Comments)     Throat gets tight and hives. Denies allergic reaction  Throat gets tight and hives-       Prozac [Fluoxetine]      Other reaction(s): *Unknown  uncontrolled muscle spasms     Valproic Acid      Respiratory symptoms ([facility]). Denies allergic reaction     Ziprasidone      Throat gets tight and hives. Denies allergic reaction       Review Of Systems:  Constitutional:     denies fever, weight changes, chills, and night sweats.  Eyes:    denies double vision; has cataract in right eye  Ears/Nose/Throat:   denies ear pain, nose problems, difficulty swallowing  Respiratory:   denies shortness of breath, cough  Skin:   denies rash, lesions  Cardiovascular:   denies chest pain, palpitations, mild edema to ankles/feet if up on them too long  Gastrointestinal:   See hPI  Genitourinary:   denies difficulty with urination, blood in urine  Musculoskeletal:    denies new muscle pain, bone pain  Neurologic:   denies lightheadedness, headaches, numbness or tingling  Psychiatric:   denies anxiety, depression  Hematologic/Lymphatic/Immunologic:   denies easy bruising, easy bleeding, lumps or bumps noted  Endocrine:   Denies increased thirst      Survivorship assessment:     Cardiac toxicity:   1.  Did you receive anthracycline therapy?  no   2.  Do have shortness of breath or chest pain after physical activity or exercise?  no  3.  Do have shortness of breath when lying flat, wake up at night needing to get air, or have persistent leg swelling?  no  Anxiety and depression:  4.  Have you been bothered more than half the days with little interest or pleasure in doing things?  no  5.  Have you been bothered more than half the days by feeling down, depressed, or hopeless?  no  6.  Have you been bothered more than half the days by not being able to stop or control worrying, or have you felt nervous or on edge?  no  Cognitive function:  7.  Do have difficulties with multitasking or paying attention?  no  8.  Do have difficulties with remembering things?  Yes-worse since chemo-getting better  9.  Does your thinking seem slow?  no  Fatigue:  10.  Do you feel persistent fatigue despite a good night sleep?  Yes recently  11.  Does fatigue interfere with your usual activities?  no  12.  How would you rate your fatigue on a scale of 0-10 over the past month?  5  Pain:  13.  Are you having any pain?  Back  "pain/shoulder/rectal  14.  How would you rate your pain on a scale of 0-10 over the past month?  6/9/2  Sexual function:  15.  Are you dissatisfied with your sexual function?   no  16.  Do have any concerns regarding sexual function, sexual activity?  no  17.  Are these concerns causing you distress?  n/a  Sleep disorder:  18.  Are you having problems falling asleep or staying asleep?  No-uses trazadone  19.  Are you experiencing excessive sleepiness such as falling asleep in inappropriate places or sleeping more during a 24-hour period than previously?  no  20.  Have you ever been told you snore frequently or that you stop breathing during sleep?  no  Healthy lifestyle:  21.  Do you engage in regular physical activity or exercise, such as brisk walking jogging bicycling swimming?  yes         A. how often?  Walks/[place]-4 times a week  22.  Excluding white potatoes do eat at least 2-1/2 cups of fruits and/or vegetables each day?  yes  23.  During the past 30 days, did you diet to lose weight or to keep from gaining weight? yes  Immunizations and infections:   24.  Have you received your flu vaccine this flu season?   yes  25.  Have you received any vaccinations recently?  no  PCP Follow up:  26.  When was your last visit with your PCP?  Dr. Cook is PCP-9/3/2020      Physical Exam:  /74   Pulse 77   Temp 98.7  F (37.1  C) (Tympanic)   Resp 20   Ht 1.689 m (5' 6.5\")   Wt 78.5 kg (173 lb 1 oz)   SpO2 98%   BMI 27.51 kg/m      GENERAL APPEARANCE: Healthy, alert and in no acute distress.  HEENT: Normocephalic, Sclerae anicteric. Oropharynx without ulcers, lesions, or thrush.  NECK:   No asymmetry or masses, no thyromegaly.  LYMPHATICS: No palpable cervical, supraclavicular, axillary, or inguinal nodes   RESP: Lungs clear to auscultation bilaterally, respirations regular and easy  CARDIOVASCULAR: Regular rate and rhythm. Normal S1, S2; no murmur, gallop, or rub.  ABDOMEN: Soft, nontender. Bowel " sounds auscultated all 4 quadrants. No palpable organomegaly or masses.  MUSCULOSKELETAL: Extremities without gross deformities noted. No edema of bilateral lower extremities.  NEURO: Alert and oriented x 3.  Gait steady.  PSYCHIATRIC: Mentation and affect appear normal.  Mood appropriate.    Laboratory:  Component      Latest Ref Rng & Units 10/14/2020   WBC      4.0 - 11.0 10e9/L 5.1   RBC Count      3.8 - 5.2 10e12/L 3.95   Hemoglobin      11.7 - 15.7 g/dL 11.9   Hematocrit      35.0 - 47.0 % 35.9   MCV      78 - 100 fl 91   MCH      26.5 - 33.0 pg 30.1   MCHC      31.5 - 36.5 g/dL 33.1   RDW      10.0 - 15.0 % 13.1   Platelet Count      150 - 450 10e9/L 143 (L)   Diff Method       Automated Method   % Neutrophils      % 61.2   % Lymphocytes      % 29.2   % Monocytes      % 9.0   % Eosinophils      % 0.0   % Basophils      % 0.2   % Immature Granulocytes      % 0.4   Nucleated RBCs      0 /100 0   Absolute Neutrophil      1.6 - 8.3 10e9/L 3.1   Absolute Lymphocytes      0.8 - 5.3 10e9/L 1.5   Absolute Monocytes      0.0 - 1.3 10e9/L 0.5   Absolute Eosinophils      0.0 - 0.7 10e9/L 0.0   Absolute Basophils      0.0 - 0.2 10e9/L 0.0   Abs Immature Granulocytes      0 - 0.4 10e9/L 0.0   Absolute Nucleated RBC       0.0   Sodium      133 - 144 mmol/L 142   Potassium      3.4 - 5.3 mmol/L 4.1   Chloride      94 - 109 mmol/L 112 (H)   Carbon Dioxide      20 - 32 mmol/L 27   Anion Gap      3 - 14 mmol/L 3   Glucose      70 - 99 mg/dL 89   Urea Nitrogen      7 - 30 mg/dL 13   Creatinine      0.52 - 1.04 mg/dL 0.65   GFR Estimate      >60 mL/min/1.73:m2 >90   GFR Estimate If Black      >60 mL/min/1.73:m2 >90   Calcium      8.5 - 10.1 mg/dL 8.5   Bilirubin Total      0.2 - 1.3 mg/dL 0.4   Albumin      3.4 - 5.0 g/dL 3.3 (L)   Protein Total      6.8 - 8.8 g/dL 6.8   Alkaline Phosphatase      40 - 150 U/L 105   ALT      0 - 50 U/L 35   AST      0 - 45 U/L 31   Lactate Dehydrogenase      81 - 234 U/L 192   CEA      0 - 2.5  ug/L <0.5   Vitamin B12      193 - 986 pg/mL 131 (L)     Imaging Studies:      Results for orders placed or performed during the hospital encounter of 10/14/20   CT Abdomen pelvis w contrast*    Narrative    CT ABDOMEN PELVIS W CONTRAST    CLINICAL HISTORY: Female, age 49 years,  malignant neoplasm of  ascending colon; Cancer of ascending colon (H);    Comparison:  PET CT 7/1/2020    TECHNIQUE:  CT was performed of the abdomen and pelvis with IV and  oral contrast. Sagittal, coronal and axial reconstructions were  reviewed.     FINDINGS:  Areas of dependent atelectasis are again seen within the lung bases.    Extensive postoperative changes are again seen within the stomach and  distal esophagus without acute abnormality.    Liver demonstrates a nodular appearance which is not significant  changed. Portal venous system and the biliary tree are unremarkable.  The gallbladder surgically absent.    The spleen, pancreas, and adrenal glands are normal.    Kidneys, ureters and urinary bladder are unremarkable.    Postoperative changes again seen within the proximal colon. No  lymphadenopathy. Small bowel is unremarkable.    The uterus is surgically absent. Rectum is grossly normal.    Postoperative changes are seen within the lower lumbar spine. No  evidence of acute bony abnormality.    Inguinal lymph nodes are normal.      Impression    IMPRESSION:   No acute abnormality. No significant change compared to the 7/1/2020  PET CT examination.    NELSON PERKINS MD       ASSESSMENT/PLAN:    #1 colon cancer:   Stage IIIB adenocarcinoma of the ascending colon diagnosed February 2020.  She completed adjuvant chemotherapy with XELOX x 4 cycles on 6/22/2020 and is being followed with surveillance.   Imaging shows no evidence of recurrence.  We will plan to follow-up with her in 3 months with a CT chest abdomen pelvis along with a CBC, CMP, LDH and CEA.  Colonoscopy will can be completed next week.  We will also send a referral  through to cancer risk management group for genetic counseling.    #2  B12 deficiency: Her scription was sent to her pharmacy for vitamin B12 injections for her to administer at home.  We will plan to do vitamin B12 1000 mcg weekly x4, then every 2 weeks x 4, then monthly.  We will recheck a vitamin B12 and folate level when I see her in January.    #3 rectal bleeding: She will undergo colonoscopy next week.    #4 vitamin D deficiency: We will draw another vitamin D level when she returns for her next port flush.    I encouraged patient to call with any questions or concerns.     Patient's Survivorship careplan was reviewed in it's entirety.   A copy of patient's Survivorship Care Plan was given to patient and a copy will be sent to her primary care provider.     Approximately 60 minutes spent with the patient, with 75% of this time spent in counseling and discussion of her cancer diagnoses and the previous treatment she received for this along with the plan for further follow up.  Time was also spent in discussion of long term side effects and the possibility of late side effects.  We also spent time in discussion of healthy habits and decreasing cancer risks and prevention /screening recommendations.    Please see SCP.      Izzy Moran NP  APRN, FNP-BC, AOCNP

## 2020-10-20 ENCOUNTER — ONCOLOGY VISIT (OUTPATIENT)
Dept: ONCOLOGY | Facility: OTHER | Age: 50
End: 2020-10-20
Attending: NURSE PRACTITIONER
Payer: COMMERCIAL

## 2020-10-20 ENCOUNTER — PREP FOR PROCEDURE (OUTPATIENT)
Dept: SURGERY | Facility: OTHER | Age: 50
End: 2020-10-20

## 2020-10-20 ENCOUNTER — TELEPHONE (OUTPATIENT)
Dept: SURGERY | Facility: OTHER | Age: 50
End: 2020-10-20

## 2020-10-20 VITALS
DIASTOLIC BLOOD PRESSURE: 74 MMHG | OXYGEN SATURATION: 98 % | SYSTOLIC BLOOD PRESSURE: 110 MMHG | RESPIRATION RATE: 20 BRPM | BODY MASS INDEX: 27.16 KG/M2 | WEIGHT: 173.06 LBS | TEMPERATURE: 98.7 F | HEART RATE: 77 BPM | HEIGHT: 67 IN

## 2020-10-20 DIAGNOSIS — Z85.038 HISTORY OF COLON CANCER: Primary | ICD-10-CM

## 2020-10-20 DIAGNOSIS — Z98.84 GASTRIC BYPASS STATUS FOR OBESITY: ICD-10-CM

## 2020-10-20 DIAGNOSIS — Z01.818 PREOP TESTING: Primary | ICD-10-CM

## 2020-10-20 DIAGNOSIS — E53.8 VITAMIN B 12 DEFICIENCY: ICD-10-CM

## 2020-10-20 DIAGNOSIS — Z85.42 HISTORY OF UTERINE CANCER: ICD-10-CM

## 2020-10-20 DIAGNOSIS — E55.9 VITAMIN D DEFICIENCY: Primary | ICD-10-CM

## 2020-10-20 DIAGNOSIS — C18.2 CANCER OF ASCENDING COLON (H): ICD-10-CM

## 2020-10-20 PROCEDURE — 99215 OFFICE O/P EST HI 40 MIN: CPT | Performed by: NURSE PRACTITIONER

## 2020-10-20 PROCEDURE — G0463 HOSPITAL OUTPT CLINIC VISIT: HCPCS

## 2020-10-20 ASSESSMENT — MIFFLIN-ST. JEOR: SCORE: 1429.69

## 2020-10-20 ASSESSMENT — PAIN SCALES - GENERAL: PAINLEVEL: MODERATE PAIN (4)

## 2020-10-20 NOTE — TELEPHONE ENCOUNTER
Discussed with patient. Scheduled for colonoscopy 10/26/2020. Instructions briefly reviewed on phone and left at lower level registration desk for patient to .   Dr. Cleveland or Jennifer Robertson will clear morning of surgery.

## 2020-10-20 NOTE — LETTER
October 20, 2020          Constantino Warren  [address]  [address]   [place] [address]      Dear Constantino,       We want to your Colonoscopy to be as pleasant as possible. Please review the instructions below. If you have questions, you may contact us at the any of the following numbers:   [facility] Health Unit Coordinator: [phone]  Clinic Nurse (Dena): [phone] (or Pattie at [phone])  Surgery Education Nurse: [phone]      Date of procedure: 10/26/2020 with Dr. Cleveland  Admit Time: Hospital Surgery will call you the day before your procedure by 5pm with your arrival time. If your surgery is on Monday, expect a call on Friday.  If you are not contacted before 5PM, please call admitting at [phone].   After hours or on weekends, please call [phone] to postpone.   Call the clinic nurse if you become ill within 1 week of your procedure to reschedule.     COVID-19 test is needed 4 days before procedure in the morning. This testing is done in the [facility] on the [facility] side of [facility] (weekdays and weekends) or in the [facility] at the [facility] (weekdays only).  Follow the signage for parking and bring your mobile phone if you have one to call the phone number on the sign outside the testing site for check-in. If you do not have a cell phone, please call the nurse for instructions on checking in. This has been scheduled for 10/22/2020 at 9:15 at the [place] site.      Please  the following over the counter items for your bowel prep:    Two 5 mg Dulcolax (bisacodyl) tablets   One 8.3 ounce (238 g) bottle of miralax   One 10 ounce bottle of magnesium citrate   One 64 ounce bottle of gatorade-Not red, purple, or powdered.    7 DAYS BEFORE THE EXAM:  Call the Surgery Education Nurse at [phone] and have a medication list ready.   Stop Aspirin or NSAIDS (Ibuprofen, Celebrex, Naproxen, etc) 7 days before surgery.  Stop fiber supplements, herbals,  "vitamins, and iron. Stop eating corn, nuts and seeds.  If you are prescribed blood thinners or insulin, talk to your primary provider for instructions.      2 DAYS BEFORE THE EXAM:   Low fiber diet.   See list of low fiber foods on page 3 of the \"Miralax, Dulcolax and Magnesium Citrate\" packet.   Drink at least 4-6 large glasses of sports drink today and tomorrow. Avoid red and purple.      1 DAY BEFORE THE EXAM:  No solid food/milk products after 12:01 AM. Drink only clear liquids all day, at least 8-10 glasses.   Please see list of clear liquids on page 2 of \"Miralax, Dulcolax and Magnesium Citrate\" packet.    Avoid anything red or purple. No alcohol.            AT 12:00 PM NOON THE DAY BEFORE EXAM:  Take 2 Dulcolax tablets by mouth with clear liquids.            AT 6:00 PM THE DAY BEFORE EXAM:  Mix the bottle of Miralax and 64 oz. of Gatorade in a pitcher.   Drink one 8 oz. glass every 10-15 minutes until gone. Stay near a toilet.       DAY OF COLONOSCOPY:             6 HOURS PRIOR TO EXAM ON DAY OF PROCEDURE:  Drink the bottle of magnesium citrate followed by a full glass of water.   You may have clear liquids up until 2 hours before arrival.  If you need to take any medications after this, take them with a tiny sip of water.   If you have asthma, bring your inhaler with you.  Shower before arrival and wear clean, comfortable clothes.   No jewelry, make-up, nail polish, hair spray, lotions, or perfumes.   [place] in Admitting through the [facility].   You must have a responsible adult to drive and to stay with you for 4 hours at home.         TIPS FOR COLON CLEANSING BEFORE YOUR COLONOSCOPY  To get accurate results from your exam, your colon must be completely empty or you may need to repeat the colon prep and exam. If you followed instructions and your stool is clear or yellow liquid, you are ready. If you are not sure if your colon is clean, please call the clinic nurse.    You may use Tucks wipes, " hemorrhoid treatments, hydrocortisone cream or alcohol-free baby wipes to ease anal irritation. You may also use Vaseline to help protect the skin.     Quickly drink each glass. Even when you are sitting on the toilet, keep drinking every 15 minutes. If you have nausea or vomiting, take a break for 30 minutes and then resume drinking.    You will have loose watery stools and may also have chills. Dress for comfort. Expect to feel bloating, nausea and other discomfort until the stool clears from your colon.             Sincerely,        Kang Cleveland MD/Pattie AWAN LPN

## 2020-10-20 NOTE — LETTER
10/21/2020          Dear Dr. Cook :    Enclosed is a copy of the survivorship care plan (SCP) that has been completed on your patient Constantino Warren.  This is being sent for your review and is included in your patient's medical record.    The SCP is to be used as a tool to facilitate your patient's care after their cancer treatment.  It includes diagnoses, treatment and potential physical and or psychosocial issues that may affect your patient after therapy has been completed.  Some issues may not develop until years after therapy.    Thank you in advance for taking the time to review your patient's survivorship care plan and for allowing our oncology team to participate in your patient's care.  We are happy to help facilitate any future care needs and look forward to continuing the patient's follow up in relation to their cancer diagnosis.    Feel free to contact our oncology team with any questions, suggestions or concerns.      Sincerely,        JUNIOR Goetz, FNP-BC, AOCNP  Medical Oncology  Contact:  [phone]    Oncology Coordinator contact:  Brooklyn Casanova RN, OCN  [phone]

## 2020-10-20 NOTE — PATIENT INSTRUCTIONS
We will see how soon we can get you in for a colonoscopy-we will call you with the plan for that.    You will need monthly port flushes and we will have a Vitamin D drawn with your next flush.    We would like to see you back in 3 months. Please come 2-3 days prior for lab work and imaging.     Your prescription for Vitamin B12 has been sent to: [facility].      When you are in need of a refill, please call your pharmacy and they will send us a request.     If you have any questions please call [phone]    Other instructions:      I will send a referral to [facility] for Genetic counseling.  Their office will call you with an appointment.

## 2020-10-20 NOTE — NURSING NOTE
"Chief Complaint   Patient presents with     RECHECK     Follow up Colon cancer and Survivorship       Initial /74   Pulse 77   Temp 98.7  F (37.1  C) (Tympanic)   Resp 20   Ht 1.689 m (5' 6.5\")   Wt 78.5 kg (173 lb 1 oz)   SpO2 98%   BMI 27.51 kg/m   Estimated body mass index is 27.51 kg/m  as calculated from the following:    Height as of this encounter: 1.689 m (5' 6.5\").    Weight as of this encounter: 78.5 kg (173 lb 1 oz).  Medication Reconciliation: complete.  Immunizations and advance directives status reviewed. Pain scale =4 rectum , PHQ-2=0.      Juana Mo LPN    "

## 2020-10-20 NOTE — TELEPHONE ENCOUNTER
Dr. Cleveland would like this patient scheduled for colonoscopy before her shoulder surgery on 11/4 (per Charlene Moran in oncology). Dr. Cleveland can do colonoscopy on 10/26 and has already been approved by Randee in surgery to do on Monday.    Attempted to call patient to discuss, but she did not answer. Left message for patient to return call at [phone] or [phone].

## 2020-10-20 NOTE — NURSING NOTE
"Chief Complaint   Patient presents with     RECHECK     Follow up Colon cancer        Initial /74   Pulse 77   Temp 98.7  F (37.1  C) (Tympanic)   Resp 20   Ht 1.689 m (5' 6.5\")   Wt 78.5 kg (173 lb 1 oz)   SpO2 98%   BMI 27.51 kg/m   Estimated body mass index is 27.51 kg/m  as calculated from the following:    Height as of this encounter: 1.689 m (5' 6.5\").    Weight as of this encounter: 78.5 kg (173 lb 1 oz).  Medication Reconciliation: complete.  Immunizations and advance directives status reviewed. Pain scale =4 rectum , PHQ-2=0.            Juana Mo LPN    "

## 2020-10-21 ENCOUNTER — ANESTHESIA EVENT (OUTPATIENT)
Dept: SURGERY | Facility: HOSPITAL | Age: 50
End: 2020-10-21
Payer: COMMERCIAL

## 2020-10-21 NOTE — TELEPHONE ENCOUNTER
Oncology/Surgical Oncology Referral Request:     Specialty Requested: Medical Oncology     Referring Provider: Cecelia Moran MD     Referring Clinic/Organization: [facility]    Records location: [facility]     Requested Provider (if specified): Not Specified

## 2020-10-21 NOTE — ANESTHESIA PREPROCEDURE EVALUATION
Anesthesia Pre-Procedure Evaluation    Patient: Constantino Warren   MRN: [id number] : 1970          Preoperative Diagnosis: History of colon cancer [Z85.038]    Procedure(s):  surveillance colonoscopy     Past Medical History:   Diagnosis Date     Arthritis 10/13/2020     Cancer (H)     colon     Cancer (H)     uterine     Chronic pain      Depressive disorder      Diabetes (H)      History of blood transfusion      Hypertension      Prediabetes      Thyroid disease      Past Surgical History:   Procedure Laterality Date     BACK SURGERY      ;      COLONOSCOPY N/A 2020    Procedure: COLONOSCOPY WITH BIOPSY AND TATOOING OF ASCENDING COLON,INCOMEPLETE EXAM;  Surgeon: Kang Cleveland MD;  Location: [facility]     ESOPHAGOSCOPY, GASTROSCOPY, DUODENOSCOPY (EGD), COMBINED N/A 2018    Procedure: COMBINED ESOPHAGOSCOPY, GASTROSCOPY, DUODENOSCOPY (EGD);  UPPER ENDOSCOPY WITH BIOPSIES;  Surgeon: Roger Vazquez MD;  Location: [facility]     ESOPHAGOSCOPY, GASTROSCOPY, DUODENOSCOPY (EGD), COMBINED N/A 2019    Procedure: UPPER ENDOSCOPY with biopsy;  Surgeon: Kang Cleveland MD;  Location: [facility]     HEAD & NECK SURGERY      c4-c5 fusion     HYSTERECTOMY, CRISTIANE  2004    uterine cancer; [facility]     INSERT PORT VASCULAR ACCESS N/A 2020    Procedure: INSERTION, VASCULAR ACCESS PORT LEFT;  Surgeon: Kang Cleveland MD;  Location: [facility]     LAPAROSCOPIC ASSISTED COLECTOMY Right 2020    Procedure: RIGHT KEVIN COLECTOMY, LYSIS OF ADHESIONS;  Surgeon: Kang Cleveland MD;  Location: [facility]     LAPAROSCOPIC BYPASS GASTRIC  2010       Anesthesia Evaluation     . Pt has had prior anesthetic.            ROS/MED HX    ENT/Pulmonary:      (-) COPD and vocal abnormalities   Neurologic:     (+)migraines, other neuro Concussion    Cardiovascular:     (+) Dyslipidemia, hypertension--angina--. : . . fainting (syncope) (                       ). :. .      (-)  congenital heart disease   METS/Exercise Tolerance:     Hematologic:     (+) Anemia, History of Transfusion no previous transfusion reaction -      Musculoskeletal: Comment: Chronic LBP  (+) arthritis,  -      (-) fracture upper extremity   GI/Hepatic:     (+) Other GI/Hepatic s/p colectomy, gastric bypass     (-) hepatitis   Renal/Genitourinary:     (+) chronic renal disease,       Endo:     (+) type II DM thyroid problem hypothyroidism, .      Psychiatric:     (+) psychiatric history depression and anxiety      Infectious Disease:         Malignancy:   (+) Malignancy History of Other  Other CA Uterine status post         Other:    (+) H/O Chronic Pain,H/O chronic opiod use ,                         Physical Exam  Normal systems: cardiovascular and pulmonary    Airway   Mallampati: II    Dental     Cardiovascular   Rhythm and rate: regular      Pulmonary    breath sounds clear to auscultation            Lab Results   Component Value Date    WBC 5.1 10/14/2020    HGB 11.9 10/14/2020    HCT 35.9 10/14/2020     (L) 10/14/2020    CRP <2.9 05/29/2020    SED 35 (H) 04/18/2015     10/14/2020    POTASSIUM 4.1 10/14/2020    CHLORIDE 112 (H) 10/14/2020    CO2 27 10/14/2020    BUN 13 10/14/2020    CR 0.65 10/14/2020    GLC 89 10/14/2020    LOKESH 8.5 10/14/2020    MAG 1.9 05/29/2020    ALBUMIN 3.3 (L) 10/14/2020    PROTTOTAL 6.8 10/14/2020    ALT 35 10/14/2020    AST 31 10/14/2020    ALKPHOS 105 10/14/2020    BILITOTAL 0.4 10/14/2020    LIPASE 176 02/01/2019    AMYLASE 31 07/18/2015    PTT 28 05/29/2020    INR 1.14 05/29/2020    TSH 3.23 12/12/2019    HCG Negative 09/26/2015       Preop Vitals  BP Readings from Last 3 Encounters:   10/20/20 110/74   10/07/20 110/68   10/05/20 109/71    Pulse Readings from Last 3 Encounters:   10/20/20 77   10/07/20 62   10/05/20 62      Resp Readings from Last 3 Encounters:   10/20/20 20   10/05/20 18   07/21/20 20    SpO2 Readings from Last 3 Encounters:   10/20/20 98%   10/07/20  "99%   10/05/20 100%      Temp Readings from Last 1 Encounters:   10/20/20 98.7  F (37.1  C) (Tympanic)    Ht Readings from Last 1 Encounters:   10/20/20 1.689 m (5' 6.5\")      Wt Readings from Last 1 Encounters:   10/20/20 78.5 kg (173 lb 1 oz)    Estimated body mass index is 27.51 kg/m  as calculated from the following:    Height as of 10/20/20: 1.689 m (5' 6.5\").    Weight as of 10/20/20: 78.5 kg (173 lb 1 oz).       Anesthesia Plan      History & Physical Review  History and physical reviewed and following examination; no interval change.    ASA Status:  3 .        Plan for MAC Reason for MAC:  Deep or markedly invasive procedure (G8)    H and P 10-21-20        Postoperative Care      Consents                 JUNIOR Varner CRNA  "

## 2020-10-22 ENCOUNTER — OFFICE VISIT (OUTPATIENT)
Dept: FAMILY MEDICINE | Facility: OTHER | Age: 50
End: 2020-10-22
Attending: FAMILY MEDICINE
Payer: COMMERCIAL

## 2020-10-22 DIAGNOSIS — Z01.818 PREOP TESTING: ICD-10-CM

## 2020-10-22 PROCEDURE — U0003 INFECTIOUS AGENT DETECTION BY NUCLEIC ACID (DNA OR RNA); SEVERE ACUTE RESPIRATORY SYNDROME CORONAVIRUS 2 (SARS-COV-2) (CORONAVIRUS DISEASE [COVID-19]), AMPLIFIED PROBE TECHNIQUE, MAKING USE OF HIGH THROUGHPUT TECHNOLOGIES AS DESCRIBED BY CMS-2020-01-R: HCPCS | Mod: ZL | Performed by: SURGERY

## 2020-10-23 LAB
SARS-COV-2 RNA SPEC QL NAA+PROBE: NOT DETECTED
SPECIMEN SOURCE: NORMAL

## 2020-10-26 ENCOUNTER — ANESTHESIA (OUTPATIENT)
Dept: SURGERY | Facility: HOSPITAL | Age: 50
End: 2020-10-26
Payer: COMMERCIAL

## 2020-10-26 ENCOUNTER — HOSPITAL ENCOUNTER (OUTPATIENT)
Facility: HOSPITAL | Age: 50
Discharge: HOME | End: 2020-10-26
Attending: SURGERY | Admitting: SURGERY
Payer: COMMERCIAL

## 2020-10-26 ENCOUNTER — APPOINTMENT (OUTPATIENT)
Dept: GENERAL RADIOLOGY | Facility: HOSPITAL | Age: 50
End: 2020-10-26
Attending: SURGERY
Payer: COMMERCIAL

## 2020-10-26 VITALS
HEIGHT: 67 IN | HEART RATE: 65 BPM | RESPIRATION RATE: 16 BRPM | SYSTOLIC BLOOD PRESSURE: 131 MMHG | DIASTOLIC BLOOD PRESSURE: 91 MMHG | WEIGHT: 173.2 LBS | TEMPERATURE: 97.6 F | OXYGEN SATURATION: 97 % | BODY MASS INDEX: 27.18 KG/M2

## 2020-10-26 DIAGNOSIS — Z85.038 HISTORY OF COLON CANCER: ICD-10-CM

## 2020-10-26 PROCEDURE — 999N000135 HC STATISTIC PRE PROC ASSESS I: Performed by: SURGERY

## 2020-10-26 PROCEDURE — 250N000011 HC RX IP 250 OP 636: Performed by: SURGERY

## 2020-10-26 PROCEDURE — 370N000001 HC ANESTHESIA TECHNICAL FEE, 1ST 30 MIN: Performed by: SURGERY

## 2020-10-26 PROCEDURE — 250N000011 HC RX IP 250 OP 636: Performed by: NURSE ANESTHETIST, CERTIFIED REGISTERED

## 2020-10-26 PROCEDURE — G0105 COLORECTAL SCRN; HI RISK IND: HCPCS | Performed by: SURGERY

## 2020-10-26 PROCEDURE — 370N000002 HC ANESTHESIA TECHNICAL FEE, EACH ADDTL 15 MIN: Performed by: SURGERY

## 2020-10-26 PROCEDURE — 360N000014 HC SURGERY LEVEL 2 1ST 30 MIN: Performed by: SURGERY

## 2020-10-26 PROCEDURE — 761N000007 HC RECOVERY PHASE 2 EACH 15 MINS: Performed by: SURGERY

## 2020-10-26 PROCEDURE — 45378 DIAGNOSTIC COLONOSCOPY: CPT | Performed by: NURSE ANESTHETIST, CERTIFIED REGISTERED

## 2020-10-26 PROCEDURE — 250N000009 HC RX 250: Performed by: NURSE ANESTHETIST, CERTIFIED REGISTERED

## 2020-10-26 PROCEDURE — 258N000003 HC RX IP 258 OP 636: Performed by: NURSE ANESTHETIST, CERTIFIED REGISTERED

## 2020-10-26 PROCEDURE — 360N000015 HC SURGERY LEVEL 2 EA 15 ADDTL MIN: Performed by: SURGERY

## 2020-10-26 PROCEDURE — 74280 X-RAY XM COLON 2CNTRST STD: CPT

## 2020-10-26 RX ORDER — NALOXONE HYDROCHLORIDE 0.4 MG/ML
.1-.4 INJECTION, SOLUTION INTRAMUSCULAR; INTRAVENOUS; SUBCUTANEOUS
Status: DISCONTINUED | OUTPATIENT
Start: 2020-10-26 | End: 2020-10-26 | Stop reason: HOSPADM

## 2020-10-26 RX ORDER — PROPOFOL 10 MG/ML
INJECTION, EMULSION INTRAVENOUS PRN
Status: DISCONTINUED | OUTPATIENT
Start: 2020-10-26 | End: 2020-10-26

## 2020-10-26 RX ORDER — ALBUTEROL SULFATE 0.83 MG/ML
2.5 SOLUTION RESPIRATORY (INHALATION) EVERY 4 HOURS PRN
Status: DISCONTINUED | OUTPATIENT
Start: 2020-10-26 | End: 2020-10-26 | Stop reason: HOSPADM

## 2020-10-26 RX ORDER — SODIUM CHLORIDE, SODIUM LACTATE, POTASSIUM CHLORIDE, CALCIUM CHLORIDE 600; 310; 30; 20 MG/100ML; MG/100ML; MG/100ML; MG/100ML
INJECTION, SOLUTION INTRAVENOUS CONTINUOUS
Status: DISCONTINUED | OUTPATIENT
Start: 2020-10-26 | End: 2020-10-26 | Stop reason: HOSPADM

## 2020-10-26 RX ORDER — CEFAZOLIN SODIUM 2 G/100ML
2 INJECTION, SOLUTION INTRAVENOUS
Status: DISCONTINUED | OUTPATIENT
Start: 2020-10-26 | End: 2020-10-26

## 2020-10-26 RX ORDER — LIDOCAINE HYDROCHLORIDE 20 MG/ML
INJECTION, SOLUTION INFILTRATION; PERINEURAL PRN
Status: DISCONTINUED | OUTPATIENT
Start: 2020-10-26 | End: 2020-10-26

## 2020-10-26 RX ORDER — LIDOCAINE 40 MG/G
CREAM TOPICAL
Status: DISCONTINUED | OUTPATIENT
Start: 2020-10-26 | End: 2020-10-26 | Stop reason: HOSPADM

## 2020-10-26 RX ORDER — CEFAZOLIN SODIUM 1 G/3ML
1 INJECTION, POWDER, FOR SOLUTION INTRAMUSCULAR; INTRAVENOUS SEE ADMIN INSTRUCTIONS
Status: DISCONTINUED | OUTPATIENT
Start: 2020-10-26 | End: 2020-10-26

## 2020-10-26 RX ORDER — HEPARIN SODIUM (PORCINE) LOCK FLUSH IV SOLN 100 UNIT/ML 100 UNIT/ML
5 SOLUTION INTRAVENOUS
Status: DISCONTINUED | OUTPATIENT
Start: 2020-10-26 | End: 2020-10-26 | Stop reason: HOSPADM

## 2020-10-26 RX ORDER — HYDROMORPHONE HYDROCHLORIDE 1 MG/ML
.3-.5 INJECTION, SOLUTION INTRAMUSCULAR; INTRAVENOUS; SUBCUTANEOUS EVERY 10 MIN PRN
Status: DISCONTINUED | OUTPATIENT
Start: 2020-10-26 | End: 2020-10-26 | Stop reason: HOSPADM

## 2020-10-26 RX ORDER — LABETALOL 20 MG/4 ML (5 MG/ML) INTRAVENOUS SYRINGE
10
Status: DISCONTINUED | OUTPATIENT
Start: 2020-10-26 | End: 2020-10-26 | Stop reason: HOSPADM

## 2020-10-26 RX ADMIN — SODIUM CHLORIDE, POTASSIUM CHLORIDE, SODIUM LACTATE AND CALCIUM CHLORIDE: 600; 310; 30; 20 INJECTION, SOLUTION INTRAVENOUS at 12:44

## 2020-10-26 RX ADMIN — PROPOFOL 50 MG: 10 INJECTION, EMULSION INTRAVENOUS at 13:50

## 2020-10-26 RX ADMIN — PROPOFOL 50 MG: 10 INJECTION, EMULSION INTRAVENOUS at 13:40

## 2020-10-26 RX ADMIN — PROPOFOL 50 MG: 10 INJECTION, EMULSION INTRAVENOUS at 13:55

## 2020-10-26 RX ADMIN — PROPOFOL 50 MG: 10 INJECTION, EMULSION INTRAVENOUS at 13:22

## 2020-10-26 RX ADMIN — LIDOCAINE HYDROCHLORIDE 40 MG: 20 INJECTION, SOLUTION INFILTRATION; PERINEURAL at 13:22

## 2020-10-26 RX ADMIN — PROPOFOL 50 MG: 10 INJECTION, EMULSION INTRAVENOUS at 13:58

## 2020-10-26 RX ADMIN — PROPOFOL 50 MG: 10 INJECTION, EMULSION INTRAVENOUS at 13:35

## 2020-10-26 RX ADMIN — PROPOFOL 50 MG: 10 INJECTION, EMULSION INTRAVENOUS at 13:30

## 2020-10-26 RX ADMIN — HEPARIN 5 ML: 100 SYRINGE at 14:47

## 2020-10-26 RX ADMIN — PROPOFOL 50 MG: 10 INJECTION, EMULSION INTRAVENOUS at 13:45

## 2020-10-26 ASSESSMENT — MIFFLIN-ST. JEOR: SCORE: 1430.32

## 2020-10-26 ASSESSMENT — COPD QUESTIONNAIRES: COPD: 0

## 2020-10-26 NOTE — DISCHARGE INSTRUCTIONS

## 2020-10-26 NOTE — OR NURSING
Dr. Cleveland ordered an outpatient Barium Enema to be performed today, time to be determined by radiology. The patient is to remain NPO for the procedure. The patient ambulated to the bathroom and passed flatus and voided without difficulty. The patient's friend Ragini is at the bedside. The patient verbalizes understanding of the procedure for Barium Enema ordered.

## 2020-10-26 NOTE — OP NOTE
REPORT OF OPERATION  DATE OF PROCEDURE: 10/26/2020    PATIENT: Constantino Warren    SURGERY PREFORMED: Colonoscopy    PREOPERATIVE DIAGNOSIS: History of Colon Cancer    POSTOPERATIVE DIAGNOSIS:    Same   Incomplete colonosocpy   Diverticulosis was not identified.   Hemorrhoids  were  identified.    SURGEON: Kang Cleveland MD    ASSISTANTS: None    ANESTHESIA: Monitored Anesthesia Care    COMPLICATIONS: None apparent    TRANSFUSIONS: None    TISSUE TO PATHOLOGY: None    FINDINGS: incomplete colonoscopy  Very poor prep.  Diverticulosis was not identified.  Hemorrhoids  were  identified.    INDICATIONS: This is a 49 year old female in need of a colonoscopy for History of Colon Cancer.  The patient will be taken to the endoscopy suite for that procedure.    DESCRIPTIONS OF PROCEDURE IN DETAIL: After consent was obtained the patient was taken to the endoscopy suite and placed in the left lateral decubitus position.  The patient was identified and the correct patient was confirmed.  Monitored Anesthesia Care was given.  A time out was preformed verifying the correct patient and the correct procedure.  The entire operative team was in agreement.  All necessary equipment and supplies were in the room.    Rectal exam was preformed and no lesions of the anal canal were noted.  The colonoscope was inserted into the anus and passed probably the transverse colon.  Was unable to verify the anastomosis of the small bowel to the transverse colon. Upon withdrawal all walls of the colon were visualized.  There were no large masses noted.  Diverticulosis was not seen.  Upon reaching the rectum the scope was retroflexed and internal hemorrhoids  were  seen.  The scope was straightened back out and removed from the patient.  The patient was then taken to the recovery room in stable condition tolerating the procedure well.  Get an air-contrast barium enema.    Prep: Very poor    Withdrawal time was 7 minutes.    It is recommended that  the patient have another colonoscopy in 2 years.

## 2020-10-26 NOTE — ANESTHESIA CARE TRANSFER NOTE
Patient: Constantino Warren    Procedure(s):  incomplete colonoscopy     Diagnosis: History of colon cancer [Z85.038]  Diagnosis Additional Information: No value filed.    Anesthesia Type:   MAC     Note:  Airway :Nasal Cannula  Patient transferred to:Phase II  Handoff Report: Identifed the Patient, Identified the Reponsible Provider, Reviewed the pertinent medical history, Discussed the surgical course, Reviewed Intra-OP anesthesia mangement and issues during anesthesia, Set expectations for post-procedure period and Allowed opportunity for questions and acknowledgement of understanding      Vitals: (Last set prior to Anesthesia Care Transfer)    CRNA VITALS  10/26/2020 1328 - 10/26/2020 1400      10/26/2020             Resp Rate (set):  8                Electronically Signed By: JUNIOR Quevedo CRNA  October 26, 2020  2:00 PM

## 2020-10-26 NOTE — OR NURSING
Patient and responsible adult given discharge instructions with no questions regarding instructions. Kaylan score 20. Pain level 0/10.  Discharged from unit via wheelchair. Patient discharged to diagnostic imaging department.  Ragini aware of the patient's discharge transfer to diagnostic imaging for a Barium Enema. Dr. Cleveland spoke with the patient post procedure prior to departure and was aware of the patient discharge from [facility] to diagnostic imaging.

## 2020-10-26 NOTE — ANESTHESIA POSTPROCEDURE EVALUATION
Patient: Constantino Warren    Procedure(s):  incomplete colonoscopy     Diagnosis:History of colon cancer [Z85.038]  Diagnosis Additional Information: No value filed.    Anesthesia Type:  MAC    Note:  Anesthesia Post Evaluation    Patient location during evaluation: Phase 2  Patient participation: Able to fully participate in evaluation  Level of consciousness: awake and alert  Pain management: adequate  Airway patency: patent  Cardiovascular status: acceptable  Respiratory status: acceptable  Hydration status: stable  PONV: none     Anesthetic complications: None          Last vitals:  Vitals:    10/26/20 1410 10/26/20 1415 10/26/20 1425   BP: 98/63 99/68 124/79   Pulse: 61 79 73   Resp:  16 16   Temp:   97.6  F (36.4  C)   SpO2: 99% 98% 98%         Electronically Signed By: JUNIOR Kennedy CRNA  October 26, 2020  2:54 PM

## 2020-10-26 NOTE — H&P
HISTORY AND PHYSICAL - ENDOSCOPY   10/26/2020    Patient : Constantino Warren    Planned Procedures : Colonoscopy    This is a 49 year old female with a need for a colonoscopy for History of Colon Cancer.      Last colonoscopy : 8 months ago  Family history of colon cancer : NO  Family history of colon polyps : NO  Personal history of colon cancer : YES  Personal history of colon polyps : YES  Rectal bleeding : YES  Changes in bowel habits :NO  Personal history of inflammatory bowel disease : NO    Past Medical History:  Past Medical History:   Diagnosis Date     Arthritis 10/13/2020     Cancer (H)     colon     Cancer (H) 2004    uterine     Chronic pain      Depressive disorder      Diabetes (H)      History of blood transfusion      Hypertension      Prediabetes      Thyroid disease        Past Surgical History:  Past Surgical History:   Procedure Laterality Date     BACK SURGERY      2008; 2011     COLONOSCOPY N/A 2/13/2020    Procedure: COLONOSCOPY WITH BIOPSY AND TATOOING OF ASCENDING COLON,INCOMEPLETE EXAM;  Surgeon: Kang Cleveland MD;  Location: [facility]     ESOPHAGOSCOPY, GASTROSCOPY, DUODENOSCOPY (EGD), COMBINED N/A 7/21/2018    Procedure: COMBINED ESOPHAGOSCOPY, GASTROSCOPY, DUODENOSCOPY (EGD);  UPPER ENDOSCOPY WITH BIOPSIES;  Surgeon: Roger Vazquez MD;  Location: [facility]     ESOPHAGOSCOPY, GASTROSCOPY, DUODENOSCOPY (EGD), COMBINED N/A 11/21/2019    Procedure: UPPER ENDOSCOPY with biopsy;  Surgeon: Kang Cleveland MD;  Location: [facility]     HEAD & NECK SURGERY  2008    c4-c5 fusion     HYSTERECTOMY, CRISTIANE  04/2004    uterine cancer; [facility]     INSERT PORT VASCULAR ACCESS N/A 2/28/2020    Procedure: INSERTION, VASCULAR ACCESS PORT LEFT;  Surgeon: Kang Cleveland MD;  Location: [facility]     LAPAROSCOPIC ASSISTED COLECTOMY Right 2/24/2020    Procedure: RIGHT KEVIN COLECTOMY, LYSIS OF ADHESIONS;  Surgeon: Kang Cleveland MD;  Location: [facility]     LAPAROSCOPIC BYPASS GASTRIC  12/2010        Family History History:  Family History   Problem Relation Age of Onset     Diabetes Mother      Hypertension Mother      Coronary Artery Disease Mother      Myocardial Infarction Mother      Hypertension Father      Diabetes Maternal Grandmother      Hypertension Maternal Grandfather      Coronary Artery Disease Maternal Grandfather      Cancer Paternal Grandfather         unsure of type of cancer     Bladder Cancer Paternal Grandfather      Diabetes Maternal Aunt      Cerebrovascular Disease Maternal Aunt      Diabetes Maternal Aunt      Cerebrovascular Disease Maternal Aunt      Diabetes Maternal Aunt      Cerebrovascular Disease Maternal Aunt      Hyperlipidemia No family hx of      Breast Cancer No family hx of      Colon Cancer No family hx of      Prostate Cancer No family hx of      Thyroid Disease No family hx of      Anesthesia Reaction No family hx of      Asthma No family hx of      Genetic Disorder No family hx of        History of Tobacco Use:  History   Smoking Status     Never Smoker   Smokeless Tobacco     Never Used       Current Medications:  No current outpatient medications on file.       Allergies:  Allergies   Allergen Reactions     Aspartame      Other reaction(s): Other - Describe In Comment Field, Seizures  convulsions     Bee Venom Anaphylaxis     Bupropion Anaphylaxis     Throat gets tight and hives. Denies allergic reaction     Food Anaphylaxis     Peppers-anaphylaxis if eaten, if touched hands swell and reddened     Food Allergy Formula Anaphylaxis     Peppers, patient reports green peppers close off her throat     Ibuprofen Anaphylaxis     No Clinical Screening - See Comments Anaphylaxis     Peppers, patient reports green peppers close off her throat     Piper Anaphylaxis     Peppers--green, black, red, chili, etc. Derm symptoms, hives;  Strawberries--(cooked) Itching, breathing difficulties, GI symptoms. Mushrooms--GI symptoms, Hives.Spinach (cooked) throat irritation  (Listed  "on [facility] problem list.)     Fentanyl Swelling     Lips and tongue swelled.  Swelling of lips and tongue     Adhesive Tape      blisters     Amitriptyline Other (See Comments)     Respiratory symptoms - denies allergic reaction     Liquid Adhesive Dermatitis     blisters  Blisters  Silk tape and tegaderm is fine     Oxcarbazepine Other (See Comments)     Throat gets tight and hives. Denies allergic reaction  Throat gets tight and hives-       Prozac [Fluoxetine]      Other reaction(s): *Unknown  uncontrolled muscle spasms     Valproic Acid      Respiratory symptoms ([facility]). Denies allergic reaction     Ziprasidone      Throat gets tight and hives. Denies allergic reaction       ROS:  Pertinent items are noted in HPI.  All other systems are negative.    PHYSICAL EXAM:     Vital signs: /82   Temp 97.8  F (36.6  C) (Temporal)   Resp 16   Ht 1.689 m (5' 6.5\")   Wt 78.6 kg (173 lb 3.2 oz)   SpO2 99%   BMI 27.54 kg/m     Weight: [unfilled]   BMI: Body mass index is 27.54 kg/m .   General: Normal, healthy, cooperative, in no acute distress   Skin: no jaundice   HEENT: PERRLA and EOMI   Neck: supple   Lungs: clear to auscultation   CV: Regular rate and rhythm without murmer   Abdominal: abdomen is soft without significant tenderness, masses, organomegaly or guarding   Extremities: No cyanosis, clubbing or edema noted bilaterally in Upper and Lower Extremities   Neurological: without deficit    Assessment:   49 year old female with need of a colonoscopy for History of Colon Cancer:    Plan:   Will plan on doing a colonoscopy.  The procedures with their risks, benefits and alternatives were explained.  Risks include but are not limited to bleeding, perforation, missing lesions, need for additional procedures, reaction to anesthesia.  All the patients questions were answered.  The patient consents to proceed.  The procedures will be scheduled.      "

## 2020-10-29 NOTE — PATIENT INSTRUCTIONS

## 2020-10-29 NOTE — PROGRESS NOTES
[facility] [place]  [address]  [place] [address]  [phone]  Dept: [phone]    PRE-OP EVALUATION:  Today's date: 10/30/2020    Constantino Warren (: 1970) presents for pre-operative evaluation assessment as requested by Dr. Mujiac.  She requires evaluation and anesthesia risk assessment prior to undergoing surgery/procedure for treatment of left rotator cuff tear, shoulder arthroscopy left.    Proposed Surgery/ Procedure: left shoulder arthroscopy.  Date of Surgery/ Procedure: 20  Time of Surgery/ Procedure: [facility]  Physician: Coco Cook  Type of Anesthesia Anticipated: to be determined    Preoperative Questionnaire:   No - Have you ever had a heart attack or stroke?  No - Have you ever had surgery on your heart or blood vessels, such as a stent, coronary (heart) bypass, or surgery on an artery in the head, neck, heart, or legs?  No - Do you have chest pain when you are physically active?  No - Do you have a history of heart failure?  No - Do you currently have a cold, bronchitis, or symptoms of other respiratory (head and chest) infections?  No - Do you have a cough, shortness of breath, or wheezing?  No - Do you or anyone in your family have a history of blood clots?  No - Do you or anyone in your family have a serious bleeding problem, such as long-lasting bleeding after surgeries or cuts?  Yes- Have you ever had anemia or been told to take iron pills? Anemia  No - Have you had any abnormal blood loss such as black, tarry or bloody stools, or abnormal vaginal bleeding?  Yes- Have you ever had a blood transfusion?  Yes - Are you willing to have a blood transfusion if it is medically needed before, during, or after your surgery?  No - Have you or anyone in your family ever had problems with anesthesia (sedation for surgery)?  No - Do you have sleep apnea, excessive snoring, or daytime drowsiness?   No - Do you have any artifical  heart valves or other implanted medical devices, such as a pacemaker, defibrillator, or continuous glucose monitor?  No - Do you have any artifical joints?  No - Are you allergic to latex?  No - Is there any chance that you may be pregnant?    Patient has a Health Care Directive or Living Will:  NO-Pt declined paper work states she has copies at home    HPI:     HPI related to upcoming procedure: Patient with left rotator cuff tear.  Planning arthroscopy.  Did physical therapy.      See problem list for active medical problems.  Problems all longstanding and stable, except as noted/documented.  See ROS for pertinent symptoms related to these conditions.    DEPRESSION - Patient has a long history of Depression of moderate severity requiring medication for control with recent symptoms being stable.    PREDIABETES - prior diabetes, resolved.    HYPERTENSION - Patient has longstanding history of HTN , currently denies any symptoms referable to elevated blood pressure. Specifically denies chest pain, palpitations, dyspnea, orthopnea, PND or peripheral edema. Blood pressure readings have been in normal range. Current medication regimen is as listed below. Patient denies any side effects of medication.     HYPOTHYROIDISM - Patient has a longstanding history of chronic Hypothyroidism. Patient has been doing well, noting no tremor, insomnia, hair loss or changes in skin texture. Continues to take medications as directed, without adverse reactions or side effects. Last TSH   Lab Results   Component Value Date    TSH 3.23 12/12/2019   .      Cancer - colon cancer - followed by Dr Palma.  Adenocarcinoma.  S/p hemicolectomy, right.  Then had adjuvant chemotherapy.  Now on surveillance.  Just had colonoscopy 10/26/20 - negative.    MEDICAL HISTORY:     Patient Active Problem List    Diagnosis Date Noted     History of colon cancer 10/20/2020     Priority: Medium     Added automatically from request for surgery [id number]        Rotator cuff tear 10/13/2020     Priority: Medium     Added automatically from request for surgery [id number]       Arthritis 10/13/2020     Priority: Medium     Added automatically from request for surgery [id number]       History of uterine cancer 05/27/2020     Priority: Medium     Palliative care patient 05/27/2020     Priority: Medium     Iron deficiency anemia, unspecified iron deficiency anemia type 05/08/2020     Priority: Medium     S/P partial resection of colon 02/24/2020     Priority: Medium     Prediabetes      Priority: Medium     Colon cancer (H) 02/19/2020     Priority: Medium     Added automatically from request for surgery [id number]       Cancer of ascending colon (H) 02/19/2020     Priority: Medium     Added automatically from request for surgery [id number]       Special screening for malignant neoplasms, colon 01/14/2020     Priority: Medium     Added automatically from request for surgery [id number]       Anastomotic ulcer S/P gastric bypass 07/21/2018     Priority: Medium     Anemia 07/21/2018     Priority: Medium     Chest pain 07/20/2018     Priority: Medium     GI bleed 07/20/2018     Priority: Medium     Abdominal pain, epigastric 07/20/2018     Priority: Medium     Hypochromic microcytic anemia 07/20/2018     Priority: Medium     Chronic migraine 01/18/2018     Priority: Medium     Chronic pain disorder 01/18/2018     Priority: Medium     Long term (current) use of opiate analgesic 01/12/2018     Priority: Medium     Overview:   Pain Diagnosis failed back syndrome, annular tear L4-5, history lumbar discectomy, s/p lumbar fusion.    Should be seen in the clinic every twelve weeks. Currently on a taper: yes, trying to wean away from the daytime hydrocodone-acetaminophen..    Plan for flares of chronic pain stretches, occasional use of more hydrocodone-acetaminophen for flare.    ED and UC: Opioid medication will not be given in the ER or Urgent Care unless there is a medical emergency unrelated to chronic  pain.  Limit to 3 day supply    Refills: Refills will only be given at a scheduled visit.    [facility] Agreement for Opioid Treatment.   Opioid Agreement Date: 5/21/12  Last UDT (Urine Drug Test) on date:    Pain Dx: chronic low back pain  Last Pain Visit: 6-24-11  [facility]erred [facility]acy: [facility]  Comments:      7/23/14:  Progressing well.  Over the last month, has decreased hydrocodone-acetaminophen from 6/day to 3/day.  Awaiting appointment with [facility].       Adjustment disorder with mixed anxiety and depressed mood 12/15/2017     Priority: Medium     Other insomnia 12/15/2017     Priority: Medium     Syncope and collapse 09/26/2015     Priority: Medium     Hypertensive urgency 09/26/2015     Priority: Medium     Concussion syndrome 09/26/2015     Priority: Medium     Hydronephrosis, right 04/20/2015     Priority: Medium     Pyelonephritis 04/20/2015     Priority: Medium     Urolithiasis 04/20/2015     Priority: Medium     Annular tear of lumbar disc 12/16/2014     Priority: Medium     Overview:   MRI 2/5/13:  Previous right L4-5 hemilaminectomy and partial facetectomy with mild to moderate right L4-5 facet arthropathy and broad-based bulge of disk and end plate osteophyte on the right with partial high signal intensity zone annular tear of the undersurface of the laterally protruding disk/annulus.       Failed back syndrome of lumbar spine 12/16/2014     Priority: Medium     Overview:   Three back surgeries, including an L3-sacrum AP fusion done at [facility] in 2013.  Has been doing very well in terms of reduction of pain, with minimal requirement of opiate pain medication.       Hx of lumbar discectomy 12/16/2014     Priority: Medium     Overview:   10/2006:   L4-5 Laminectomy and diskectomy L5-S1.   12/12/11:  L5S1 microdiscectomy ([facility])       Vitamin B 12 deficiency 06/13/2011     Priority: Medium     Overview:   IMO Update 10/11       Gastric bypass status for obesity 01/04/2011     Priority: Medium      Overview:   12/14/10:  laparoscopic Prasanna-en-Y gastric bypass Dr. Hassan  IMO Update 10/11       Hyperlipidemia 09/23/2008     Priority: Medium     Overview:   IMO Update 10/11       Hypothyroidism 09/23/2008     Priority: Medium     Overview:   IMO Update 10/11  Overview:   IMO Update 10/11        Past Medical History:   Diagnosis Date     Arthritis 10/13/2020     Cancer (H)     colon     Cancer (H) 2004    uterine     Chronic pain      Depressive disorder      Diabetes (H)      History of blood transfusion      Hypertension      Prediabetes      Thyroid disease      Past Surgical History:   Procedure Laterality Date     BACK SURGERY      2008; 2011     COLONOSCOPY N/A 2/13/2020    Procedure: COLONOSCOPY WITH BIOPSY AND TATOOING OF ASCENDING COLON,INCOMEPLETE EXAM;  Surgeon: Kang Cleveland MD;  [facility]     COLONOSCOPY N/A 10/26/2020    Procedure: incomplete colonoscopy ;  Surgeon: Kang Cleveland MD;  [facility]     ESOPHAGOSCOPY, GASTROSCOPY, DUODENOSCOPY (EGD), COMBINED N/A 7/21/2018    Procedure: COMBINED ESOPHAGOSCOPY, GASTROSCOPY, DUODENOSCOPY (EGD);  UPPER ENDOSCOPY WITH BIOPSIES;  Surgeon: Roger Vazquez MD;  [facility]     ESOPHAGOSCOPY, GASTROSCOPY, DUODENOSCOPY (EGD), COMBINED N/A 11/21/2019    Procedure: UPPER ENDOSCOPY with biopsy;  Surgeon: Kang Cleveland MD;  [facility]     HEAD & NECK SURGERY  2008    c4-c5 fusion     HYSTERECTOMY, CRISTIANE  04/2004    uterine cancer; [facility]     INSERT PORT VASCULAR ACCESS N/A 2/28/2020    Procedure: INSERTION, VASCULAR ACCESS PORT LEFT;  Surgeon: Kang Cleveland MD;  [facility]     LAPAROSCOPIC ASSISTED COLECTOMY Right 2/24/2020    Procedure: RIGHT KEVIN COLECTOMY, LYSIS OF ADHESIONS;  Surgeon: Kang Cleveland MD;  [facility]     LAPAROSCOPIC BYPASS GASTRIC  12/2010     Current Outpatient Medications   Medication Sig Dispense Refill     acetaminophen (TYLENOL) 500 MG tablet Take 1,000 mg by  mouth every 6 hours as needed Max acetaminophen dose: 4000 mg in 24 hours       cyanocobalamin (CYANOCOBALAMIN) 1000 MCG/ML injection INJECT 1 ML (1,000 MCG) SUBCUTANEOUSLY EVERY 30 DAYS. 1 mL 1     Cyanocobalamin 1000 MCG/ML KIT Inject 1,000 mcg as directed once a week 1000mcg weekly x 4 then 1000mcg every 2 weeks x 4 then monthly 8 kit 0     diazepam (VALIUM) 5 MG tablet Take 1-2 tablets one hour prior to exam 2 tablet 0     EPINEPHrine (ANY BX GENERIC EQUIV) 0.3 MG/0.3ML injection 2-pack Inject 0.3 mLs (0.3 mg) into the muscle as needed for anaphylaxis 2 each 1     gabapentin (NEURONTIN) 300 MG capsule Take 1 capsule (300 mg) by mouth 3 times daily 90 capsule 1     HYDROcodone-acetaminophen (NORCO) 5-325 MG tablet Take 1 tablet by mouth every 4 hours as needed for pain 90 tablet 0     levothyroxine 200 MCG PO tablet Take 200 mcg by mouth daily       lidocaine-prilocaine (EMLA) 2.5-2.5 % external cream Apply topically as needed for moderate pain 30 g 1     lisinopril (ZESTRIL) 10 MG tablet Take 1 tablet (10 mg) by mouth daily 90 tablet 0     multivitamin, therapeutic with minerals (MULTI-VITAMIN) TABS Take 1 tablet by mouth daily       sertraline (ZOLOFT) 100 MG tablet Take 100 mg by mouth daily       tiZANidine (ZANAFLEX) 4 MG tablet TAKE 1 TABLET BY MOUTH ONCE DAILY AT BEDTIME 30 tablet 0     traZODone (DESYREL) 50 MG tablet TAKE 1 TABLET BY MOUTH AT BEDTIME 30 tablet 2     vitamin D2 (ERGOCALCIFEROL) 27183 units (1250 mcg) capsule Take 1 capsule (50,000 Units) by mouth twice a week 16 capsule 0     OTC products: None, except as noted above    Allergies   Allergen Reactions     Aspartame      Other reaction(s): Other - Describe In Comment Field, Seizures  convulsions     Bee Venom Anaphylaxis     Bupropion Anaphylaxis     Throat gets tight and hives. Denies allergic reaction     Food Anaphylaxis     Peppers-anaphylaxis if eaten, if touched hands swell and reddened     Food Allergy Formula Anaphylaxis      "Peppers, patient reports green peppers close off her throat     Ibuprofen Anaphylaxis     No Clinical Screening - See Comments Anaphylaxis     Peppers, patient reports green peppers close off her throat     Piper Anaphylaxis     Peppers--green, black, red, chili, etc. Derm symptoms, hives;  Strawberries--(cooked) Itching, breathing difficulties, GI symptoms. Mushrooms--GI symptoms, Hives.Spinach (cooked) throat irritation  (Listed on [facility] problem list.)     Fentanyl Swelling     Lips and tongue swelled.  Swelling of lips and tongue     Adhesive Tape      blisters     Amitriptyline Other (See Comments)     Respiratory symptoms - denies allergic reaction     Liquid Adhesive Dermatitis     blisters  Blisters  Silk tape and tegaderm is fine     Oxcarbazepine Other (See Comments)     Throat gets tight and hives. Denies allergic reaction  Throat gets tight and hives-       Prozac [Fluoxetine]      Other reaction(s): *Unknown  uncontrolled muscle spasms     Valproic Acid      Respiratory symptoms ([facility]). Denies allergic reaction     Ziprasidone      Throat gets tight and hives. Denies allergic reaction      Latex Allergy: NO    Social History     Tobacco Use     Smoking status: Never Smoker     Smokeless tobacco: Never Used   Substance Use Topics     Alcohol use: No     Frequency: Never     Comment: sober for 18 years     History   Drug Use No       REVIEW OF SYSTEMS:   Constitutional, neuro, ENT, endocrine, pulmonary, cardiac, gastrointestinal, genitourinary, musculoskeletal, integument and psychiatric systems are negative, except as otherwise noted.    EXAM:   /72 (BP Location: Right arm, Patient Position: Sitting, Cuff Size: Adult Regular)   Pulse 69   Temp 97.3  F (36.3  C) (Tympanic)   Ht 1.689 m (5' 6.5\")   Wt 78.5 kg (173 lb)   SpO2 97%   BMI 27.50 kg/m      GENERAL APPEARANCE: healthy, alert and no distress     EYES: EOMI, PERRL     HENT: ear canals and TM's " normal and nose and mouth without ulcers or lesions     NECK: no adenopathy, no asymmetry, masses, or scars and thyroid normal to palpation     RESP: lungs clear to auscultation - no rales, rhonchi or wheezes     CV: regular rates and rhythm, normal S1 S2, no S3 or S4 and no murmur, click or rub     ABDOMEN:  soft, nontender, no HSM or masses and bowel sounds normal     MS: extremities normal- no gross deformities noted, no evidence of inflammation in joints, FROM in all extremities.     SKIN: no suspicious lesions or rashes     NEURO: Normal strength and tone, sensory exam grossly normal, mentation intact and speech normal     PSYCH: mentation appears normal. and affect normal/bright     LYMPHATICS: No cervical adenopathy    DIAGNOSTICS:       Recent Labs   Lab Test 10/14/20  1035 07/21/20  0855 05/29/20  2245 05/29/20  2245 02/20/20  1133 02/20/20  1133 03/10/19  1425 03/10/19  1425   HGB 11.9 11.7   < > 10.7*   < > 11.1*   < > 11.1*   * 125*   < > 172   < > 341   < > 279   INR  --   --   --  1.14  --   --   --  1.11    142   < > 138   < > 140   < > 142   POTASSIUM 4.1 3.9   < > 4.3   < > 4.1   < > 3.9   CR 0.65 0.66   < > 0.78   < > 0.74   < > 0.83   A1C  --   --   --   --   --  5.7*  --   --     < > = values in this interval not displayed.      Normal EKG 5/29/2020.  COVID negative 10/22/2020 for colonoscopy.  Has to repeat - 11/2/2020.    IMPRESSION:   Reason for surgery/procedure: left rotator cuff tear; left shoulder arthroscopy  Diagnosis/reason for consult: cardiopulmonary clearance    The proposed surgical procedure is considered INTERMEDIATE risk.    REVISED CARDIAC RISK INDEX  The patient has the following serious cardiovascular risks for perioperative complications such as (MI, PE, VFib and 3  AV Block):  No serious cardiac risks  INTERPRETATION: 0 risks: Class I (very low risk - 0.4% complication rate)    The patient has the following additional risks for perioperative complications:  No  identified additional risks      ICD-10-CM    1. Preop general physical exam  Z01.818        RECOMMENDATIONS:         --Patient is to take all scheduled medications on the day of surgery EXCEPT for modifications listed below.    Anticoagulant or Antiplatelet Medication Use  ASPIRIN: Discontinue ASA 7-10 days prior to procedure to reduce bleeding risk.  It should be resumed post-operatively.  NSAIDS: Ibuprofen (Motrin):         Stop one day prior to surgery        APPROVAL GIVEN to proceed with proposed procedure, without further diagnostic evaluation       Signed Electronically by: Coco Schafer MD    Copy of this evaluation report is provided to requesting physician.    [facility] Preop Guidelines    Revised Cardiac Risk Index

## 2020-10-30 ENCOUNTER — OFFICE VISIT (OUTPATIENT)
Dept: FAMILY MEDICINE | Facility: OTHER | Age: 50
End: 2020-10-30
Attending: FAMILY MEDICINE
Payer: COMMERCIAL

## 2020-10-30 ENCOUNTER — ANESTHESIA EVENT (OUTPATIENT)
Dept: SURGERY | Facility: HOSPITAL | Age: 50
End: 2020-10-30
Payer: COMMERCIAL

## 2020-10-30 VITALS
TEMPERATURE: 97.3 F | BODY MASS INDEX: 27.15 KG/M2 | DIASTOLIC BLOOD PRESSURE: 72 MMHG | HEIGHT: 67 IN | OXYGEN SATURATION: 97 % | HEART RATE: 69 BPM | WEIGHT: 173 LBS | SYSTOLIC BLOOD PRESSURE: 122 MMHG

## 2020-10-30 DIAGNOSIS — Z01.818 PREOP GENERAL PHYSICAL EXAM: Primary | ICD-10-CM

## 2020-10-30 PROCEDURE — G0463 HOSPITAL OUTPT CLINIC VISIT: HCPCS

## 2020-10-30 PROCEDURE — 99214 OFFICE O/P EST MOD 30 MIN: CPT | Performed by: FAMILY MEDICINE

## 2020-10-30 ASSESSMENT — MIFFLIN-ST. JEOR: SCORE: 1429.41

## 2020-10-30 ASSESSMENT — COPD QUESTIONNAIRES: COPD: 0

## 2020-10-30 ASSESSMENT — PAIN SCALES - GENERAL: PAINLEVEL: EXTREME PAIN (8)

## 2020-10-30 NOTE — ANESTHESIA PREPROCEDURE EVALUATION
Anesthesia Pre-Procedure Evaluation    Patient: Constantino Warren   MRN: [id number] : 1970          Preoperative Diagnosis: Rotator cuff tear [M75.100]  Arthritis [M19.90]    Procedure(s):  LEFT SHOULDER ARTHROSCOPY    Past Medical History:   Diagnosis Date     Arthritis 10/13/2020     Cancer (H)     colon     Cancer (H)     uterine     Chronic pain      Depressive disorder      Diabetes (H)      History of blood transfusion      Hypertension      Prediabetes      Thyroid disease      Past Surgical History:   Procedure Laterality Date     BACK SURGERY      ;      COLONOSCOPY N/A 2020    Procedure: COLONOSCOPY WITH BIOPSY AND TATOOING OF ASCENDING COLON,INCOMEPLETE EXAM;  Surgeon: Kang Cleveland MD;  [facility]     COLONOSCOPY N/A 10/26/2020    Procedure: incomplete colonoscopy ;  Surgeon: Kang Cleveland MD;  [facility]     ESOPHAGOSCOPY, GASTROSCOPY, DUODENOSCOPY (EGD), COMBINED N/A 2018    Procedure: COMBINED ESOPHAGOSCOPY, GASTROSCOPY, DUODENOSCOPY (EGD);  UPPER ENDOSCOPY WITH BIOPSIES;  Surgeon: Roger Vazquez MD;  [facility]     ESOPHAGOSCOPY, GASTROSCOPY, DUODENOSCOPY (EGD), COMBINED N/A 2019    Procedure: UPPER ENDOSCOPY with biopsy;  Surgeon: Kang Cleveland MD;  [facility]     HEAD & NECK SURGERY      c4-c5 fusion     HYSTERECTOMY, [facility]  2004    uterine cancer; [facility]     INSERT PORT VASCULAR ACCESS N/A 2020    Procedure: INSERTION, VASCULAR ACCESS PORT LEFT;  Surgeon: Kang Cleveland MD;  [facility]     LAPAROSCOPIC ASSISTED COLECTOMY Right 2020    Procedure: RIGHT KEVIN COLECTOMY, LYSIS OF ADHESIONS;  Surgeon: Kang Cleveland MD;  [facility]     LAPAROSCOPIC BYPASS GASTRIC  2010       Anesthesia Evaluation     . Pt has had prior anesthetic. Type: General and MAC           ROS/MED HX    ENT/Pulmonary:  - neg pulmonary ROS    (-) COPD and vocal abnormalities   Neurologic:      (+)migraines, other neuro Concussion    Cardiovascular:     (+) Dyslipidemia, hypertension--angina--. : . . fainting (syncope) (                       ). :. .      (-) congenital heart disease   METS/Exercise Tolerance:  >4 METS   Hematologic:     (+) Anemia, History of Transfusion no previous transfusion reaction -      Musculoskeletal: Comment: Chronic LBP  (+) arthritis,  -      (-) fracture upper extremity   GI/Hepatic:     (+) Other GI/Hepatic s/p colectomy, gastric bypass     (-) hepatitis   Renal/Genitourinary:     (+) chronic renal disease,       Endo:     (+) type II DM thyroid problem hypothyroidism, .      Psychiatric:     (+) psychiatric history depression and anxiety      Infectious Disease: Comment: Remote hx of MRSA, several negative swabs since  (+) MRSA,       Malignancy:   (+) Malignancy History of Other  Other CA Uterine status post         Other:    (+) H/O Chronic Pain,H/O chronic opiod use ,                         Physical Exam  Normal systems: cardiovascular and pulmonary    Airway   Mallampati: II  TM distance: >3 FB  Neck ROM: full    Dental   (+) missing and chipped    Cardiovascular   Rhythm and rate: regular and normal      Pulmonary    breath sounds clear to auscultation            Lab Results   Component Value Date    WBC 5.1 10/14/2020    HGB 11.9 10/14/2020    HCT 35.9 10/14/2020     (L) 10/14/2020    CRP <2.9 05/29/2020    SED 35 (H) 04/18/2015     10/14/2020    POTASSIUM 4.1 10/14/2020    CHLORIDE 112 (H) 10/14/2020    CO2 27 10/14/2020    BUN 13 10/14/2020    CR 0.65 10/14/2020    GLC 89 10/14/2020    LOKESH 8.5 10/14/2020    MAG 1.9 05/29/2020    ALBUMIN 3.3 (L) 10/14/2020    PROTTOTAL 6.8 10/14/2020    ALT 35 10/14/2020    AST 31 10/14/2020    ALKPHOS 105 10/14/2020    BILITOTAL 0.4 10/14/2020    LIPASE 176 02/01/2019    AMYLASE 31 07/18/2015    PTT 28 05/29/2020    INR 1.14 05/29/2020    TSH 3.23 12/12/2019    HCG Negative 09/26/2015       Preop Vitals  BP Readings  "from Last 3 Encounters:   10/26/20 131/91   10/20/20 110/74   10/07/20 110/68    Pulse Readings from Last 3 Encounters:   10/26/20 65   10/20/20 77   10/07/20 62      Resp Readings from Last 3 Encounters:   10/26/20 16   10/20/20 20   10/05/20 18    SpO2 Readings from Last 3 Encounters:   10/26/20 97%   10/20/20 98%   10/07/20 99%      Temp Readings from Last 1 Encounters:   10/26/20 97.6  F (36.4  C) (Temporal)    Ht Readings from Last 1 Encounters:   10/26/20 1.689 m (5' 6.5\")      Wt Readings from Last 1 Encounters:   10/26/20 78.6 kg (173 lb 3.2 oz)    Estimated body mass index is 27.54 kg/m  as calculated from the following:    Height as of 10/26/20: 1.689 m (5' 6.5\").    Weight as of 10/26/20: 78.6 kg (173 lb 3.2 oz).       Anesthesia Plan      History & Physical Review  History and physical reviewed and following examination; no interval change.    ASA Status:  3 .    NPO Status:  > 8 hours    Plan for General and Peripheral Nerve Block with Intravenous and Propofol induction. Maintenance will be Balanced.    PONV prophylaxis:  Ondansetron (or other 5HT-3) and Dexamethasone or Solumedrol         Postoperative Care  Postoperative pain management:  Peripheral nerve block (Single Shot).      Consents  Anesthetic plan, risks, benefits and alternatives discussed with:  Patient..                 JUNIOR Quevedo CRNA  "

## 2020-10-30 NOTE — NURSING NOTE
"Chief Complaint   Patient presents with     Pre-Op Exam       Initial /72 (BP Location: Right arm, Patient Position: Sitting, Cuff Size: Adult Regular)   Pulse 69   Temp 97.3  F (36.3  C) (Tympanic)   Ht 1.689 m (5' 6.5\")   Wt 78.5 kg (173 lb)   SpO2 97%   BMI 27.50 kg/m   Estimated body mass index is 27.5 kg/m  as calculated from the following:    Height as of this encounter: 1.689 m (5' 6.5\").    Weight as of this encounter: 78.5 kg (173 lb).  Medication Reconciliation: complete  Cyndee Haji LPN  "

## 2020-11-02 ENCOUNTER — OFFICE VISIT (OUTPATIENT)
Dept: FAMILY MEDICINE | Facility: OTHER | Age: 50
End: 2020-11-02
Attending: ORTHOPAEDIC SURGERY
Payer: COMMERCIAL

## 2020-11-02 DIAGNOSIS — Z41.9 ELECTIVE SURGERY: ICD-10-CM

## 2020-11-02 PROCEDURE — U0003 INFECTIOUS AGENT DETECTION BY NUCLEIC ACID (DNA OR RNA); SEVERE ACUTE RESPIRATORY SYNDROME CORONAVIRUS 2 (SARS-COV-2) (CORONAVIRUS DISEASE [COVID-19]), AMPLIFIED PROBE TECHNIQUE, MAKING USE OF HIGH THROUGHPUT TECHNOLOGIES AS DESCRIBED BY CMS-2020-01-R: HCPCS | Mod: ZL | Performed by: PHYSICIAN ASSISTANT

## 2020-11-03 LAB
SARS-COV-2 RNA SPEC QL NAA+PROBE: NOT DETECTED
SPECIMEN SOURCE: NORMAL

## 2020-11-06 ENCOUNTER — APPOINTMENT (OUTPATIENT)
Dept: GENERAL RADIOLOGY | Facility: HOSPITAL | Age: 50
End: 2020-11-06
Attending: EMERGENCY MEDICINE
Payer: COMMERCIAL

## 2020-11-06 ENCOUNTER — HOSPITAL ENCOUNTER (OUTPATIENT)
Facility: HOSPITAL | Age: 50
Discharge: HOME | End: 2020-11-06
Attending: ORTHOPAEDIC SURGERY | Admitting: ORTHOPAEDIC SURGERY
Payer: COMMERCIAL

## 2020-11-06 ENCOUNTER — HOSPITAL ENCOUNTER (EMERGENCY)
Facility: HOSPITAL | Age: 50
Discharge: HOME | End: 2020-11-06
Attending: EMERGENCY MEDICINE | Admitting: EMERGENCY MEDICINE
Payer: COMMERCIAL

## 2020-11-06 ENCOUNTER — ANESTHESIA (OUTPATIENT)
Dept: SURGERY | Facility: HOSPITAL | Age: 50
End: 2020-11-06
Payer: COMMERCIAL

## 2020-11-06 VITALS
SYSTOLIC BLOOD PRESSURE: 135 MMHG | HEART RATE: 79 BPM | HEIGHT: 67 IN | RESPIRATION RATE: 18 BRPM | OXYGEN SATURATION: 94 % | WEIGHT: 172 LBS | TEMPERATURE: 97 F | BODY MASS INDEX: 27 KG/M2 | DIASTOLIC BLOOD PRESSURE: 92 MMHG

## 2020-11-06 VITALS
RESPIRATION RATE: 18 BRPM | OXYGEN SATURATION: 98 % | DIASTOLIC BLOOD PRESSURE: 111 MMHG | TEMPERATURE: 0.7 F | SYSTOLIC BLOOD PRESSURE: 175 MMHG | HEART RATE: 78 BPM

## 2020-11-06 DIAGNOSIS — M19.90 ARTHRITIS: ICD-10-CM

## 2020-11-06 DIAGNOSIS — R07.89 ATYPICAL CHEST PAIN: ICD-10-CM

## 2020-11-06 DIAGNOSIS — M75.100 ROTATOR CUFF TEAR: ICD-10-CM

## 2020-11-06 DIAGNOSIS — Z41.9 ELECTIVE SURGERY: ICD-10-CM

## 2020-11-06 DIAGNOSIS — I10 MALIGNANT ESSENTIAL HYPERTENSION: Primary | ICD-10-CM

## 2020-11-06 DIAGNOSIS — Z98.890 S/P ARTHROSCOPY OF LEFT SHOULDER: Primary | ICD-10-CM

## 2020-11-06 LAB
ALBUMIN SERPL-MCNC: 3.8 G/DL (ref 3.4–5)
ALP SERPL-CCNC: 125 U/L (ref 40–150)
ALT SERPL W P-5'-P-CCNC: 44 U/L (ref 0–50)
ANION GAP SERPL CALCULATED.3IONS-SCNC: 7 MMOL/L (ref 3–14)
AST SERPL W P-5'-P-CCNC: 31 U/L (ref 0–45)
BASOPHILS # BLD AUTO: 0 10E9/L (ref 0–0.2)
BASOPHILS NFR BLD AUTO: 0 %
BILIRUB SERPL-MCNC: 0.4 MG/DL (ref 0.2–1.3)
BUN SERPL-MCNC: 12 MG/DL (ref 7–30)
CALCIUM SERPL-MCNC: 8.7 MG/DL (ref 8.5–10.1)
CHLORIDE SERPL-SCNC: 112 MMOL/L (ref 94–109)
CO2 SERPL-SCNC: 23 MMOL/L (ref 20–32)
CREAT SERPL-MCNC: 0.7 MG/DL (ref 0.52–1.04)
D DIMER PPP FEU-MCNC: 0.5 UG/ML FEU (ref 0–0.5)
DIFFERENTIAL METHOD BLD: NORMAL
EOSINOPHIL # BLD AUTO: 0 10E9/L (ref 0–0.7)
EOSINOPHIL NFR BLD AUTO: 0 %
ERYTHROCYTE [DISTWIDTH] IN BLOOD BY AUTOMATED COUNT: 12.3 % (ref 10–15)
GFR SERPL CREATININE-BSD FRML MDRD: >90 ML/MIN/{1.73_M2}
GLUCOSE SERPL-MCNC: 186 MG/DL (ref 70–99)
HCT VFR BLD AUTO: 41.7 % (ref 35–47)
HGB BLD-MCNC: 14.3 G/DL (ref 11.7–15.7)
IMM GRANULOCYTES # BLD: 0 10E9/L (ref 0–0.4)
IMM GRANULOCYTES NFR BLD: 0.3 %
LYMPHOCYTES # BLD AUTO: 0.8 10E9/L (ref 0.8–5.3)
LYMPHOCYTES NFR BLD AUTO: 12 %
MAGNESIUM SERPL-MCNC: 1.9 MG/DL (ref 1.6–2.3)
MCH RBC QN AUTO: 30.4 PG (ref 26.5–33)
MCHC RBC AUTO-ENTMCNC: 34.3 G/DL (ref 31.5–36.5)
MCV RBC AUTO: 89 FL (ref 78–100)
MONOCYTES # BLD AUTO: 0.1 10E9/L (ref 0–1.3)
MONOCYTES NFR BLD AUTO: 0.8 %
NEUTROPHILS # BLD AUTO: 5.4 10E9/L (ref 1.6–8.3)
NEUTROPHILS NFR BLD AUTO: 86.9 %
NRBC # BLD AUTO: 0 10*3/UL
NRBC BLD AUTO-RTO: 0 /100
PLATELET # BLD AUTO: 156 10E9/L (ref 150–450)
POTASSIUM SERPL-SCNC: 4.5 MMOL/L (ref 3.4–5.3)
PROT SERPL-MCNC: 7.9 G/DL (ref 6.8–8.8)
RBC # BLD AUTO: 4.7 10E12/L (ref 3.8–5.2)
SODIUM SERPL-SCNC: 142 MMOL/L (ref 133–144)
TROPONIN I SERPL-MCNC: <0.015 UG/L (ref 0–0.04)
WBC # BLD AUTO: 6.2 10E9/L (ref 4–11)

## 2020-11-06 PROCEDURE — 250N000011 HC RX IP 250 OP 636: Performed by: EMERGENCY MEDICINE

## 2020-11-06 PROCEDURE — 360N000020 HC SURGERY LEVEL 3 1ST 30 MIN: Performed by: ORTHOPAEDIC SURGERY

## 2020-11-06 PROCEDURE — 96375 TX/PRO/DX INJ NEW DRUG ADDON: CPT

## 2020-11-06 PROCEDURE — 84484 ASSAY OF TROPONIN QUANT: CPT | Performed by: EMERGENCY MEDICINE

## 2020-11-06 PROCEDURE — 64415 NJX AA&/STRD BRCH PLXS IMG: CPT | Mod: XU | Performed by: NURSE ANESTHETIST, CERTIFIED REGISTERED

## 2020-11-06 PROCEDURE — 80053 COMPREHEN METABOLIC PANEL: CPT | Performed by: EMERGENCY MEDICINE

## 2020-11-06 PROCEDURE — 999N000135 HC STATISTIC PRE PROC ASSESS I: Performed by: ORTHOPAEDIC SURGERY

## 2020-11-06 PROCEDURE — 250N000013 HC RX MED GY IP 250 OP 250 PS 637: Performed by: EMERGENCY MEDICINE

## 2020-11-06 PROCEDURE — 93010 ELECTROCARDIOGRAM REPORT: CPT | Performed by: INTERNAL MEDICINE

## 2020-11-06 PROCEDURE — 370N000002 HC ANESTHESIA TECHNICAL FEE, EACH ADDTL 15 MIN: Performed by: ORTHOPAEDIC SURGERY

## 2020-11-06 PROCEDURE — 29827 SHO ARTHRS SRG RT8TR CUF RPR: CPT | Mod: LT | Performed by: ORTHOPAEDIC SURGERY

## 2020-11-06 PROCEDURE — 370N000001 HC ANESTHESIA TECHNICAL FEE, 1ST 30 MIN: Performed by: ORTHOPAEDIC SURGERY

## 2020-11-06 PROCEDURE — 83735 ASSAY OF MAGNESIUM: CPT | Performed by: EMERGENCY MEDICINE

## 2020-11-06 PROCEDURE — 71045 X-RAY EXAM CHEST 1 VIEW: CPT

## 2020-11-06 PROCEDURE — 250N000009 HC RX 250: Performed by: EMERGENCY MEDICINE

## 2020-11-06 PROCEDURE — 360N000021 HC SURGERY LEVEL 3 EA 15 ADDTL MIN: Performed by: ORTHOPAEDIC SURGERY

## 2020-11-06 PROCEDURE — 85379 FIBRIN DEGRADATION QUANT: CPT | Performed by: EMERGENCY MEDICINE

## 2020-11-06 PROCEDURE — 29827 SHO ARTHRS SRG RT8TR CUF RPR: CPT | Mod: AS | Performed by: PHYSICIAN ASSISTANT

## 2020-11-06 PROCEDURE — 94640 AIRWAY INHALATION TREATMENT: CPT

## 2020-11-06 PROCEDURE — 250N000009 HC RX 250: Performed by: NURSE ANESTHETIST, CERTIFIED REGISTERED

## 2020-11-06 PROCEDURE — 29826 SHO ARTHRS SRG DECOMPRESSION: CPT | Mod: AS | Performed by: PHYSICIAN ASSISTANT

## 2020-11-06 PROCEDURE — 250N000009 HC RX 250: Performed by: ORTHOPAEDIC SURGERY

## 2020-11-06 PROCEDURE — 250N000011 HC RX IP 250 OP 636: Performed by: PHYSICIAN ASSISTANT

## 2020-11-06 PROCEDURE — 36415 COLL VENOUS BLD VENIPUNCTURE: CPT

## 2020-11-06 PROCEDURE — 93005 ELECTROCARDIOGRAM TRACING: CPT

## 2020-11-06 PROCEDURE — 258N000003 HC RX IP 258 OP 636: Performed by: NURSE ANESTHETIST, CERTIFIED REGISTERED

## 2020-11-06 PROCEDURE — 250N000011 HC RX IP 250 OP 636: Performed by: NURSE ANESTHETIST, CERTIFIED REGISTERED

## 2020-11-06 PROCEDURE — 99285 EMERGENCY DEPT VISIT HI MDM: CPT | Performed by: EMERGENCY MEDICINE

## 2020-11-06 PROCEDURE — 761N000003 HC RECOVERY PHASE 1 LEVEL 2 FIRST HR: Performed by: ORTHOPAEDIC SURGERY

## 2020-11-06 PROCEDURE — 29824 SHO ARTHRS SRG DSTL CLAVICLC: CPT | Mod: LT | Performed by: ORTHOPAEDIC SURGERY

## 2020-11-06 PROCEDURE — 29826 SHO ARTHRS SRG DECOMPRESSION: CPT | Mod: LT | Performed by: ORTHOPAEDIC SURGERY

## 2020-11-06 PROCEDURE — 96374 THER/PROPH/DIAG INJ IV PUSH: CPT

## 2020-11-06 PROCEDURE — 29824 SHO ARTHRS SRG DSTL CLAVICLC: CPT | Mod: AS | Performed by: PHYSICIAN ASSISTANT

## 2020-11-06 PROCEDURE — 76942 ECHO GUIDE FOR BIOPSY: CPT | Mod: 26 | Performed by: NURSE ANESTHETIST, CERTIFIED REGISTERED

## 2020-11-06 PROCEDURE — 99285 EMERGENCY DEPT VISIT HI MDM: CPT | Mod: 25

## 2020-11-06 PROCEDURE — C1713 ANCHOR/SCREW BN/BN,TIS/BN: HCPCS | Performed by: ORTHOPAEDIC SURGERY

## 2020-11-06 PROCEDURE — 85025 COMPLETE CBC W/AUTO DIFF WBC: CPT | Performed by: EMERGENCY MEDICINE

## 2020-11-06 PROCEDURE — 29827 SHO ARTHRS SRG RT8TR CUF RPR: CPT | Performed by: NURSE ANESTHETIST, CERTIFIED REGISTERED

## 2020-11-06 PROCEDURE — 761N000007 HC RECOVERY PHASE 2 EACH 15 MINS: Performed by: ORTHOPAEDIC SURGERY

## 2020-11-06 PROCEDURE — 271N000001 HC OR GENERAL SUPPLY NON-STERILE: Performed by: ORTHOPAEDIC SURGERY

## 2020-11-06 PROCEDURE — 272N000001 HC OR GENERAL SUPPLY STERILE: Performed by: ORTHOPAEDIC SURGERY

## 2020-11-06 DEVICE — FIBERTAK DOUBLE LOADED W/1.3 SUTURETAP: Type: IMPLANTABLE DEVICE | Site: SHOULDER | Status: FUNCTIONAL

## 2020-11-06 RX ORDER — MORPHINE SULFATE 4 MG/ML
4 INJECTION, SOLUTION INTRAMUSCULAR; INTRAVENOUS
Status: DISCONTINUED | OUTPATIENT
Start: 2020-11-06 | End: 2020-11-06 | Stop reason: HOSPADM

## 2020-11-06 RX ORDER — PROPOFOL 10 MG/ML
INJECTION, EMULSION INTRAVENOUS PRN
Status: DISCONTINUED | OUTPATIENT
Start: 2020-11-06 | End: 2020-11-06

## 2020-11-06 RX ORDER — CLONIDINE HYDROCHLORIDE 0.1 MG/1
0.1 TABLET ORAL ONCE
Status: COMPLETED | OUTPATIENT
Start: 2020-11-06 | End: 2020-11-06

## 2020-11-06 RX ORDER — METHYLPREDNISOLONE SODIUM SUCCINATE 125 MG/2ML
125 INJECTION, POWDER, LYOPHILIZED, FOR SOLUTION INTRAMUSCULAR; INTRAVENOUS ONCE
Status: COMPLETED | OUTPATIENT
Start: 2020-11-06 | End: 2020-11-06

## 2020-11-06 RX ORDER — ONDANSETRON 2 MG/ML
4 INJECTION INTRAMUSCULAR; INTRAVENOUS EVERY 6 HOURS PRN
Status: DISCONTINUED | OUTPATIENT
Start: 2020-11-06 | End: 2020-11-06 | Stop reason: HOSPADM

## 2020-11-06 RX ORDER — SODIUM CHLORIDE, SODIUM LACTATE, POTASSIUM CHLORIDE, CALCIUM CHLORIDE 600; 310; 30; 20 MG/100ML; MG/100ML; MG/100ML; MG/100ML
INJECTION, SOLUTION INTRAVENOUS CONTINUOUS
Status: DISCONTINUED | OUTPATIENT
Start: 2020-11-06 | End: 2020-11-06 | Stop reason: HOSPADM

## 2020-11-06 RX ORDER — NITROGLYCERIN 0.4 MG/1
0.4 TABLET SUBLINGUAL EVERY 5 MIN PRN
Status: DISCONTINUED | OUTPATIENT
Start: 2020-11-06 | End: 2020-11-06 | Stop reason: HOSPADM

## 2020-11-06 RX ORDER — NALOXONE HYDROCHLORIDE 0.4 MG/ML
.1-.4 INJECTION, SOLUTION INTRAMUSCULAR; INTRAVENOUS; SUBCUTANEOUS
Status: DISCONTINUED | OUTPATIENT
Start: 2020-11-06 | End: 2020-11-06 | Stop reason: HOSPADM

## 2020-11-06 RX ORDER — BUPIVACAINE HYDROCHLORIDE AND EPINEPHRINE 2.5; 5 MG/ML; UG/ML
INJECTION, SOLUTION EPIDURAL; INFILTRATION; INTRACAUDAL; PERINEURAL PRN
Status: DISCONTINUED | OUTPATIENT
Start: 2020-11-06 | End: 2020-11-06 | Stop reason: HOSPADM

## 2020-11-06 RX ORDER — ROPIVACAINE HYDROCHLORIDE 5 MG/ML
INJECTION, SOLUTION EPIDURAL; INFILTRATION; PERINEURAL PRN
Status: DISCONTINUED | OUTPATIENT
Start: 2020-11-06 | End: 2020-11-06

## 2020-11-06 RX ORDER — LISINOPRIL 5 MG/1
10 TABLET ORAL ONCE
Status: COMPLETED | OUTPATIENT
Start: 2020-11-06 | End: 2020-11-06

## 2020-11-06 RX ORDER — FENTANYL CITRATE 50 UG/ML
INJECTION, SOLUTION INTRAMUSCULAR; INTRAVENOUS PRN
Status: DISCONTINUED | OUTPATIENT
Start: 2020-11-06 | End: 2020-11-06

## 2020-11-06 RX ORDER — GLYCOPYRROLATE 0.2 MG/ML
INJECTION, SOLUTION INTRAMUSCULAR; INTRAVENOUS PRN
Status: DISCONTINUED | OUTPATIENT
Start: 2020-11-06 | End: 2020-11-06

## 2020-11-06 RX ORDER — HYDROMORPHONE HYDROCHLORIDE 1 MG/ML
.3-.5 INJECTION, SOLUTION INTRAMUSCULAR; INTRAVENOUS; SUBCUTANEOUS EVERY 10 MIN PRN
Status: DISCONTINUED | OUTPATIENT
Start: 2020-11-06 | End: 2020-11-06 | Stop reason: HOSPADM

## 2020-11-06 RX ORDER — DIPHENHYDRAMINE HYDROCHLORIDE 50 MG/ML
25 INJECTION INTRAMUSCULAR; INTRAVENOUS ONCE
Status: COMPLETED | OUTPATIENT
Start: 2020-11-06 | End: 2020-11-06

## 2020-11-06 RX ORDER — FENTANYL CITRATE-0.9 % NACL/PF 10 MCG/ML
PLASTIC BAG, INJECTION (ML) INTRAVENOUS PRN
Status: DISCONTINUED | OUTPATIENT
Start: 2020-11-06 | End: 2020-11-06

## 2020-11-06 RX ORDER — ONDANSETRON 2 MG/ML
4 INJECTION INTRAMUSCULAR; INTRAVENOUS EVERY 30 MIN PRN
Status: DISCONTINUED | OUTPATIENT
Start: 2020-11-06 | End: 2020-11-06 | Stop reason: HOSPADM

## 2020-11-06 RX ORDER — ONDANSETRON 4 MG/1
4 TABLET, ORALLY DISINTEGRATING ORAL EVERY 8 HOURS PRN
Qty: 18 TABLET | Refills: 0 | Status: SHIPPED | OUTPATIENT
Start: 2020-11-06 | End: 2022-10-12

## 2020-11-06 RX ORDER — ONDANSETRON 4 MG/1
4 TABLET, ORALLY DISINTEGRATING ORAL EVERY 30 MIN PRN
Status: DISCONTINUED | OUTPATIENT
Start: 2020-11-06 | End: 2020-11-06 | Stop reason: HOSPADM

## 2020-11-06 RX ORDER — CEFAZOLIN SODIUM 2 G/100ML
2 INJECTION, SOLUTION INTRAVENOUS
Status: COMPLETED | OUTPATIENT
Start: 2020-11-06 | End: 2020-11-06

## 2020-11-06 RX ORDER — HYDROCODONE BITARTRATE AND ACETAMINOPHEN 5; 325 MG/1; MG/1
1 TABLET ORAL EVERY 6 HOURS PRN
Status: DISCONTINUED | OUTPATIENT
Start: 2020-11-06 | End: 2020-11-06 | Stop reason: HOSPADM

## 2020-11-06 RX ORDER — LIDOCAINE HYDROCHLORIDE 20 MG/ML
INJECTION, SOLUTION INFILTRATION; PERINEURAL PRN
Status: DISCONTINUED | OUTPATIENT
Start: 2020-11-06 | End: 2020-11-06

## 2020-11-06 RX ORDER — LIDOCAINE 40 MG/G
CREAM TOPICAL
Status: DISCONTINUED | OUTPATIENT
Start: 2020-11-06 | End: 2020-11-06 | Stop reason: HOSPADM

## 2020-11-06 RX ORDER — HYDROCODONE BITARTRATE AND ACETAMINOPHEN 5; 325 MG/1; MG/1
1 TABLET ORAL EVERY 4 HOURS PRN
Qty: 18 TABLET | Refills: 0 | Status: SHIPPED | OUTPATIENT
Start: 2020-11-06 | End: 2020-11-11

## 2020-11-06 RX ORDER — ONDANSETRON 2 MG/ML
INJECTION INTRAMUSCULAR; INTRAVENOUS PRN
Status: DISCONTINUED | OUTPATIENT
Start: 2020-11-06 | End: 2020-11-06

## 2020-11-06 RX ORDER — DEXAMETHASONE SODIUM PHOSPHATE 10 MG/ML
INJECTION, SOLUTION INTRAMUSCULAR; INTRAVENOUS PRN
Status: DISCONTINUED | OUTPATIENT
Start: 2020-11-06 | End: 2020-11-06

## 2020-11-06 RX ORDER — VITAMIN B COMPLEX
2000 TABLET ORAL DAILY
COMMUNITY
End: 2021-01-29

## 2020-11-06 RX ORDER — ONDANSETRON 4 MG/1
4 TABLET, ORALLY DISINTEGRATING ORAL EVERY 6 HOURS PRN
Status: DISCONTINUED | OUTPATIENT
Start: 2020-11-06 | End: 2020-11-06 | Stop reason: HOSPADM

## 2020-11-06 RX ORDER — CEFAZOLIN SODIUM 1 G/3ML
1 INJECTION, POWDER, FOR SOLUTION INTRAMUSCULAR; INTRAVENOUS SEE ADMIN INSTRUCTIONS
Status: DISCONTINUED | OUTPATIENT
Start: 2020-11-06 | End: 2020-11-06 | Stop reason: HOSPADM

## 2020-11-06 RX ADMIN — PROPOFOL 150 MG: 10 INJECTION, EMULSION INTRAVENOUS at 11:52

## 2020-11-06 RX ADMIN — SODIUM CHLORIDE, POTASSIUM CHLORIDE, SODIUM LACTATE AND CALCIUM CHLORIDE: 600; 310; 30; 20 INJECTION, SOLUTION INTRAVENOUS at 10:00

## 2020-11-06 RX ADMIN — METHYLPREDNISOLONE SODIUM SUCCINATE 125 MG: 125 INJECTION, POWDER, FOR SOLUTION INTRAMUSCULAR; INTRAVENOUS at 17:05

## 2020-11-06 RX ADMIN — FENTANYL CITRATE 25 MCG: 50 INJECTION, SOLUTION INTRAMUSCULAR; INTRAVENOUS at 12:38

## 2020-11-06 RX ADMIN — LIDOCAINE HYDROCHLORIDE 40 MG: 20 INJECTION, SOLUTION INFILTRATION; PERINEURAL at 11:52

## 2020-11-06 RX ADMIN — LIDOCAINE HYDROCHLORIDE 3 ML: 40 INJECTION, SOLUTION RETROBULBAR; TOPICAL at 17:19

## 2020-11-06 RX ADMIN — DIPHENHYDRAMINE HYDROCHLORIDE 50 MG: 50 INJECTION, SOLUTION INTRAMUSCULAR; INTRAVENOUS at 17:05

## 2020-11-06 RX ADMIN — FENTANYL CITRATE 25 MCG: 50 INJECTION, SOLUTION INTRAMUSCULAR; INTRAVENOUS at 12:42

## 2020-11-06 RX ADMIN — FENTANYL CITRATE 50 MCG: 50 INJECTION, SOLUTION INTRAMUSCULAR; INTRAVENOUS at 12:49

## 2020-11-06 RX ADMIN — MIDAZOLAM 2 MG: 1 INJECTION INTRAMUSCULAR; INTRAVENOUS at 11:48

## 2020-11-06 RX ADMIN — NITROGLYCERIN 0.4 MG: 0.4 TABLET SUBLINGUAL at 17:43

## 2020-11-06 RX ADMIN — GLYCOPYRROLATE 0.2 MG: 0.2 INJECTION, SOLUTION INTRAMUSCULAR; INTRAVENOUS at 12:16

## 2020-11-06 RX ADMIN — Medication 100 MCG: at 12:09

## 2020-11-06 RX ADMIN — LISINOPRIL 10 MG: 5 TABLET ORAL at 18:15

## 2020-11-06 RX ADMIN — CLONIDINE HYDROCHLORIDE 0.1 MG: 0.1 TABLET ORAL at 19:42

## 2020-11-06 RX ADMIN — CEFAZOLIN SODIUM 2 G: 2 INJECTION, SOLUTION INTRAVENOUS at 12:00

## 2020-11-06 RX ADMIN — MORPHINE SULFATE 4 MG: 4 INJECTION, SOLUTION INTRAMUSCULAR; INTRAVENOUS at 17:06

## 2020-11-06 RX ADMIN — Medication 100 MCG: at 12:14

## 2020-11-06 RX ADMIN — ONDANSETRON 4 MG: 2 INJECTION INTRAMUSCULAR; INTRAVENOUS at 12:00

## 2020-11-06 RX ADMIN — NITROGLYCERIN 0.4 MG: 0.4 TABLET SUBLINGUAL at 18:17

## 2020-11-06 RX ADMIN — Medication 100 MCG: at 12:59

## 2020-11-06 RX ADMIN — Medication 100 MCG: at 13:10

## 2020-11-06 RX ADMIN — NITROGLYCERIN 0.4 MG: 0.4 TABLET SUBLINGUAL at 17:05

## 2020-11-06 RX ADMIN — DEXAMETHASONE SODIUM PHOSPHATE 10 MG: 10 INJECTION, SOLUTION INTRAMUSCULAR; INTRAVENOUS at 10:13

## 2020-11-06 RX ADMIN — ROPIVACAINE HYDROCHLORIDE 30 ML: 5 INJECTION, SOLUTION EPIDURAL; INFILTRATION; PERINEURAL at 10:13

## 2020-11-06 RX ADMIN — Medication 100 MCG: at 12:22

## 2020-11-06 RX ADMIN — Medication 100 MCG: at 12:33

## 2020-11-06 RX ADMIN — ONDANSETRON 4 MG: 2 INJECTION INTRAMUSCULAR; INTRAVENOUS at 17:06

## 2020-11-06 RX ADMIN — Medication 100 MG: at 11:52

## 2020-11-06 ASSESSMENT — ENCOUNTER SYMPTOMS
ABDOMINAL PAIN: 0
FEVER: 0
SHORTNESS OF BREATH: 0

## 2020-11-06 ASSESSMENT — MIFFLIN-ST. JEOR: SCORE: 1424.88

## 2020-11-06 NOTE — ANESTHESIA PROCEDURE NOTES
Procedure note : Interscalene      Staff -   CRNA: Heber Moscoso APRN CRNA  Performed By: CRNA  Pre-Procedure    Location: pre-op    Procedure Times:11/6/2020 10:08 AM and 11/6/2020 10:13 AM  Pre-Anesthestic Checklist: patient identified, IV checked, site marked, risks and benefits discussed, informed consent, monitors and equipment checked, pre-op evaluation, at physician/surgeon's request and post-op pain management    Timeout  Correct Patient: Yes   Correct Procedure: Yes   Correct Site: Yes   Correct Laterality: Yes   Correct Position: Yes   Site Marked: Yes   .   Procedure Documentation    Diagnosis:RUE ARTHROSCOPY.    Procedure: Interscalene, right.   Patient Position:sitting   Ultrasound used to identify targeted nerve, plexus, or vascular marker and placed a needle adjacent to it., Ultrasound was used to visualize the spread of the anesthetic in close proximity to the above stated nerve. A permanent image is entered into the patient's record.  Patient Prep/Sterile Barriers; chlorhexidine gluconate and isopropyl alcohol.  .  Needle: short bevel   Needle Gauge: 20.    Needle Length (Inches) 4   Insertion Method: Single Shot.        Assessment/Narrative  Paresthesias: Resolved.  .  The placement was negative for: blood aspirated, painful injection and site bleeding.  Bolus given via needle..   Secured via.   Complications: none.

## 2020-11-06 NOTE — ANESTHESIA CARE TRANSFER NOTE
Patient: Constantino Warren    Procedure(s):  LEFT SHOULDER ARTHROSCOPY    Diagnosis: Rotator cuff tear [M75.100]  Arthritis [M19.90]  Diagnosis Additional Information: No value filed.    Anesthesia Type:   General, Peripheral Nerve Block     Note:  Airway :Nasal Cannula  Patient transferred to:PACU  Handoff Report: Identifed the Patient, Identified the Reponsible Provider, Reviewed the pertinent medical history, Discussed the surgical course, Reviewed Intra-OP anesthesia mangement and issues during anesthesia, Set expectations for post-procedure period and Allowed opportunity for questions and acknowledgement of understanding      Vitals: (Last set prior to Anesthesia Care Transfer)    CRNA VITALS  11/6/2020 1257 - 11/6/2020 1329      11/6/2020             Resp Rate (set):  8                Electronically Signed By: JUNIOR Quevedo CRNA  November 6, 2020  1:29 PM

## 2020-11-06 NOTE — OR NURSING
Patient and responsible adult given discharge instructions with no questions regarding instructions. Kaylan score 20. Pain level 0/10.  Discharged from unit via wheel chair. Patient discharged to home.

## 2020-11-06 NOTE — OP NOTE
Preoperative diagnosis: Left shoulder bursitis, impingement, acromioclavicular joint arthritis and possible small rotator cuff tear.  Postoperative diagnosis: Left shoulder bursitis, impingement, acromioclavicular joint arthritis and small rotator cuff tear of the supraspinatus tendon    Procedure performed: Diagnostic arthroscopy with bursectomy, subacromial decompression, acromioclavicular joint resection and arthroscopic repair of the torn rotator cuff    Anesthetic utilized: Interscalene block plus general anesthesia    Surgical assistant: Mally Alejandra physician's assistant necessary to assist with the complexity of the case    Description of procedure.  After the patient had been anesthetized, she was prepped and draped in the beachchair position.  Correct patient and correct procedure were confirmed with the operating crew.  Diagnostic arthroscopy of the left shoulder was then performed.  Positive findings included a small  full-thickness tear of the supraspinatus tendon at its attachment to the greater tuberosity measuring approximately 1 cm in width.  The biceps tendon, articular cartilage, labrum all appeared intact.  The arthroscope was then redirected in the subacromial space and a generous bursectomy performed on inflamed appearing abundant bursitis.  Once the subacromial space had been clear this bursitis, significant bony excrescences from around the acromioclavicular joint on both the acromial and clavicular contribution to this joint were noted.  These were treated by removing the bony excrescences and performing a distal claviculectomy, removing approximately 8 mm of the distal clavicle.  Once this was completed.  Attention was turned to addressing the supraspinatus tendon tear.  A motorized debrider and bur were used to decorticate superior surface of the greater tuberosity down to punctate bleeding bone.  A double-armed fiber tack suture anchor was then placed into the greater tuberosity.   Using arthroscopic technique, and double row fixation, the small rotator cuff tear was repaired back to this bed of bleeding bone.  Probing after securing the sutures demonstrated the tendon to be adequately fixed to the bed of bone.  The instrumentation was then withdrawn, and the wounds were closed with suture.  Sterile dressing was applied and the patient was awakened from anesthesia and taken to the recovery room to awaken from her general anesthetic.

## 2020-11-06 NOTE — ANESTHESIA POSTPROCEDURE EVALUATION
Patient: Constantino Warren    Procedure(s):  LEFT SHOULDER ARTHROSCOPY WITH ROTATOR CUFF REPAIR AND ACROMIUM CLAVICULAR JOINT RESECTION    Diagnosis:Rotator cuff tear [M75.100]  Arthritis [M19.90]  Diagnosis Additional Information: No value filed.    Anesthesia Type:  General, Peripheral Nerve Block    Note:  Anesthesia Post Evaluation    Patient location during evaluation: Phase 2  Patient participation: Able to fully participate in evaluation  Level of consciousness: awake and alert  Pain management: adequate  Airway patency: patent  Cardiovascular status: acceptable  Respiratory status: acceptable  Hydration status: acceptable  PONV: none     Anesthetic complications: None          Last vitals:  Vitals:    11/06/20 1450 11/06/20 1455 11/06/20 1515   BP:  127/95 135/92   Pulse: 77 83 79   Resp: 20 18 18   Temp:      SpO2: 93% 92% 95%         Electronically Signed By: JUNIOR Weir CRNA  November 6, 2020  3:31 PM

## 2020-11-06 NOTE — DISCHARGE INSTRUCTIONS
SHOULDER PROCEDURE  POSTOP CARE INSTRUCTIONS     Please read the instructions outlined below. Refer to these instructions for the next few weeks. These discharge instructions provide you with general information on caring for yourself after surgery. Your caregiver may also give you more detailed instructions. If you have any problems or questions after discharge, please call your caregiver.     POSTOPERATIVE VISIT:  *You were set up with a postoperative visit with the orthopedics department approximately 2-weeks after your procedure. Your appointment will be listed on your discharge summary today - and you will see either Dr. Mujica and/or Mally JHA in the office.     HOME CARE INSTRUCTIONS   ACTIVITY   *Have someone reliable drive you home.   *Wear sling/immobilizer until next office appointment unless instructed otherwise.   *Perform Elbow, wrist and hand range of motion 4-5x daily.   *OTHER: You may use the operative arm as a helper arm, do not lift anything heavier than a glass of milk/water until told by orthopedics.   *See Codman's exercises below  *PT referral to be made 4-6 post-operatively if needed    BATHING INSTRUCTIONS   *After 72 hours, you may shower, but should not soak in a tub or swim until seen in the office. The area around the wound may be cleansed with soap and water anytime after the first dressing change.   *Do not get incision wet.     DIET   *Start with clear liquids, then progress to light foods such as soup. Gradually resume normal diet.     MEDICATIONS   *Follow the medication recommendations outlined by your surgeon   *To relieve your pain, take medication as prescribed by your surgeon.   *Pain medication may cause dizziness or weakness so use caution.   *Call your surgeon if the prescribed medication does not adequately relieve your discomfort.   *Discontinue the medication and call your surgeon if you develop a rash, nausea, or vomiting after taking the medication that has been  "prescribed to you.   *Do not take sedatives or tranquilizers for 24 hours after surgery unless specifically instructed to do so.   *Do not drive or drink alcohol while taking pain medication.     DRESSING/WOUND CARES   *A soft compression dressing has been applied to the shoulder, which should be comfortable and absorb leakage of fluid and blood. The dressing may become moist or blood stained; this should not be a cause for alarm. A large amount of swelling is normal after surgery. Swelling should go down within 3 days. The dressing may be removed 72 hours after the operation down to the skin. The tapes or skin sutures from the small incisions should be left alone.   *Ice pack 20 minutes on--40 minutes off for 48 hours during waking hours, or Cryo-Cuff as much as practical.   *Band-Aids over wounds, change daily.   *Your sutures are above the skin, so they will be removed at your first post operative visit    IMPORTANT OBSERVATIONS  Check C-M-S of operative leg every 4 hours while you are awake for the first 48 hours:    * C: color - should appear normal/pink   * M: motion - able to move foot, wiggle toes   * S: sensation - able to \"feel\": no numbness, tingling  If you experience changes in C-M-S and/or in severe pain, you may remove the elastic bandages and reapply ot - tight enough to provide support for the gauze dressing but loose enough to improve C-M-S. The gauze dressing should NOT be removed when rewrapping the elastic bandage.     WHAT TO WATCH FOR & WHAT TO DO   Call your surgeon if you have any of the following symptoms:   **Temperature of 101 degrees F or over.   **Pain or bleeding that is not controlled.   **Infected drainage (pus).   **Report any swelling of the operative arm or hand.   **Redness or excessive swelling around the wound area.     SEEK IMMEDIATE MEDICAL CARE IF   **You develop a reaction or have serious side effects to medicines you were given.   **You have uncontrolled bleeding.   **The " wound breaks open after the stitches have been removed.   **Call 911 and go to the nearest hospital, if you have shortness of breath or chest pain.    Exercises:  -May begin 3-5 days post-operatively    Pendulum/Codman's/Circular Exercises:  Bend forward 90 degrees at the waist, placing your uninvolved hand on a table for support.  Rock body in a circular pattern to move arm clockwise 10 times, then counterclockwise 10 times. Keep your arm relaxed during the exercise. The circular pendular movement should occur through your shoulder joint.  *Do 3 sessions a day (as tolerated).         Illustration of pendulum exercise (above)  Pendulum, circular    OTHER INFORMATION:  If you have FMLA/short term disability or paperwork of any kind that needs to be filled out by either Dr. Mujica or Mally Alejandra PA-C, please let us know. Please allow 2-3 days for the provider to fill out the above paperwork. If you would like the paperwork faxed to your employer or a specific destination, please let us know. As well, we can happily mail you a copy of the finished paperwork at your request.                 Post-Anesthesia Patient Instructions    IMMEDIATELY FOLLOWING SURGERY:  Do not drive or operate machinery for the first twenty four hours after surgery.  Do not make any important decisions for twenty four hours after surgery or while taking narcotic pain medications or sedatives.  If you develop intractable nausea and vomiting or a severe headache please notify your doctor immediately.    FOLLOW-UP:  Please make an appointment with your surgeon as instructed. You do not need to follow up with anesthesia unless specifically instructed to do so.    WOUND CARE INSTRUCTIONS (if applicable):  Keep a dry clean dressing on the anesthesia/puncture wound site if there is drainage.  Once the wound has quit draining you may leave it open to air.  Generally you should leave the bandage intact for twenty four hours unless there is drainage.   If the epidural site drains for more than 36-48 hours please call the anesthesia department.    QUESTIONS?:  Please feel free to call your physician or the hospital  if you have any questions, and they will be happy to assist you.

## 2020-11-06 NOTE — ED AVS SNAPSHOT
[facility]  [address] [address]  Phone: [phone]                                    Constantino Warren   MRN: [id number]    Department: [facility]   Date of Visit: 11/6/2020           After Visit Summary Signature Page    I have received my discharge instructions, and my questions have been answered. I have discussed any challenges I see with this plan with the nurse or doctor.    ..........................................................................................................................................  Patient/Patient Representative Signature      ..........................................................................................................................................  Patient Representative Print Name and Relationship to Patient    ..................................................               ................................................  Date                                   Time    ..........................................................................................................................................  Reviewed by Signature/Title    ...................................................              ..............................................  Date                                               Time          22EPIC Rev 08/18

## 2020-11-06 NOTE — ED PROVIDER NOTES
History     Chief Complaint   Patient presents with     Chest Pain     c/o chest heaviness x 30 min, notes difficulty swallowing. pt had lt shoulder surg today.      HPI  Constantino Warren is a 49 year old female who presented emergency department with complaints of chest pain that started 30 minutes prior to arrival to the ED.  Patient just had left shoulder arthroplasty this morning under general anesthesia.  She is also complaining of difficulty swallowing after the surgery.  Patient denies fever, chills, nausea or vomiting.  She takes Zestril for hypertension and did not take it this morning because of the surgery.  Blood pressure is noted to be very high upon arrival to the ED at 202/131 mmHg.  Chest pain is central, does not radiate, rated as 7/10, no known aggravating or relieving factors.    Allergies:  Allergies   Allergen Reactions     Aspartame      Other reaction(s): Other - Describe In Comment Field, Seizures  convulsions     Bee Venom Anaphylaxis     Bupropion Anaphylaxis     Throat gets tight and hives. Denies allergic reaction     Food Anaphylaxis     Peppers-anaphylaxis if eaten, if touched hands swell and reddened     Food Allergy Formula Anaphylaxis     Peppers, patient reports green peppers close off her throat     Ibuprofen Anaphylaxis     No Clinical Screening - See Comments Anaphylaxis     Peppers, patient reports green peppers close off her throat     Piper Anaphylaxis     Peppers--green, black, red, chili, etc. Derm symptoms, hives;  Strawberries--(cooked) Itching, breathing difficulties, GI symptoms. Mushrooms--GI symptoms, Hives.Spinach (cooked) throat irritation  (Listed on [facility] problem list.)     Fentanyl Swelling     Lips and tongue swelled.  Swelling of lips and tongue     Adhesive Tape      blisters     Amitriptyline Other (See Comments)     Respiratory symptoms - denies allergic reaction     Liquid Adhesive Dermatitis     blisters  Blisters  Silk tape and tegaderm  is fine     Oxcarbazepine Other (See Comments)     Throat gets tight and hives. Denies allergic reaction  Throat gets tight and hives-       Prozac [Fluoxetine]      Other reaction(s): *Unknown  uncontrolled muscle spasms     Valproic Acid      Respiratory symptoms ([facility]). Denies allergic reaction     Ziprasidone      Throat gets tight and hives. Denies allergic reaction       Problem List:    Patient Active Problem List    Diagnosis Date Noted     History of colon cancer 10/20/2020     Priority: Medium     Added automatically from request for surgery [id number]       Rotator cuff tear 10/13/2020     Priority: Medium     Added automatically from request for surgery [id number]       Arthritis 10/13/2020     Priority: Medium     Added automatically from request for surgery [id number]       History of uterine cancer 05/27/2020     Priority: Medium     Palliative care patient 05/27/2020     Priority: Medium     Iron deficiency anemia, unspecified iron deficiency anemia type 05/08/2020     Priority: Medium     S/P partial resection of colon 02/24/2020     Priority: Medium     Prediabetes      Priority: Medium     Colon cancer (H) 02/19/2020     Priority: Medium     Added automatically from request for surgery [id number]       Cancer of ascending colon (H) 02/19/2020     Priority: Medium     Added automatically from request for surgery [id number]       Special screening for malignant neoplasms, colon 01/14/2020     Priority: Medium     Added automatically from request for surgery [id number]       Anastomotic ulcer S/P gastric bypass 07/21/2018     Priority: Medium     Anemia 07/21/2018     Priority: Medium     Chest pain 07/20/2018     Priority: Medium     GI bleed 07/20/2018     Priority: Medium     Abdominal pain, epigastric 07/20/2018     Priority: Medium     Hypochromic microcytic anemia 07/20/2018     Priority: Medium     Chronic migraine 01/18/2018     Priority: Medium     Chronic pain disorder 01/18/2018      Priority: Medium     Long term (current) use of opiate analgesic 01/12/2018     Priority: Medium     Overview:   Pain Diagnosis failed back syndrome, annular tear L4-5, history lumbar discectomy, s/p lumbar fusion.    Should be seen in the clinic every twelve weeks. Currently on a taper: yes, trying to wean away from the daytime hydrocodone-acetaminophen..    Plan for flares of chronic pain stretches, occasional use of more hydrocodone-acetaminophen for flare.    ED and UC: Opioid medication will not be given in the ER or Urgent Care unless there is a medical emergency unrelated to chronic pain.  Limit to 3 day supply    Refills: Refills will only be given at a scheduled visit.    [facility] Agreement for Opioid Treatment.   Opioid Agreement Date: 5/21/12  Last UDT (Urine Drug Test) on date:    Pain Dx: chronic low back pain  Last Pain Visit: 6-24-11  [facility]acy: [facility]  Comments:      7/23/14:  Progressing well.  Over the last month, has decreased hydrocodone-acetaminophen from 6/day to 3/day.  Awaiting appointment with [facility].       Adjustment disorder with mixed anxiety and depressed mood 12/15/2017     Priority: Medium     Other insomnia 12/15/2017     Priority: Medium     Syncope and collapse 09/26/2015     Priority: Medium     Hypertensive urgency 09/26/2015     Priority: Medium     Concussion syndrome 09/26/2015     Priority: Medium     Hydronephrosis, right 04/20/2015     Priority: Medium     Pyelonephritis 04/20/2015     Priority: Medium     Urolithiasis 04/20/2015     Priority: Medium     Annular tear of lumbar disc 12/16/2014     Priority: Medium     Overview:   MRI 2/5/13:  Previous right L4-5 hemilaminectomy and partial facetectomy with mild to moderate right L4-5 facet arthropathy and broad-based bulge of disk and end plate osteophyte on the right with partial high signal intensity zone annular tear of the undersurface of the laterally protruding disk/annulus.       Failed back syndrome  of lumbar spine 12/16/2014     Priority: Medium     Overview:   Three back surgeries, including an L3-sacrum AP fusion done at [facility] in 2013.  Has been doing very well in terms of reduction of pain, with minimal requirement of opiate pain medication.       Hx of lumbar discectomy 12/16/2014     Priority: Medium     Overview:   10/2006:   L4-5 Laminectomy and diskectomy L5-S1.   12/12/11:  L5S1 microdiscectomy ([place])       Vitamin B 12 deficiency 06/13/2011     Priority: Medium     Overview:   IMO Update 10/11       Gastric bypass status for obesity 01/04/2011     Priority: Medium     Overview:   12/14/10:  laparoscopic Prasanna-en-Y gastric bypass Dr. Hassan  IMO Update 10/11       Hyperlipidemia 09/23/2008     Priority: Medium     Overview:   IMO Update 10/11       Hypothyroidism 09/23/2008     Priority: Medium     Overview:   IMO Update 10/11  Overview:   IMO Update 10/11          Past Medical History:    Past Medical History:   Diagnosis Date     Arthritis 10/13/2020     Cancer (H)      Cancer (H) 2004     Chronic pain      Depressive disorder      Diabetes (H)      History of blood transfusion      Hypertension      Prediabetes      Thyroid disease        Past Surgical History:    Past Surgical History:   Procedure Laterality Date     BACK SURGERY      2008; 2011     COLONOSCOPY N/A 2/13/2020    Procedure: COLONOSCOPY WITH BIOPSY AND TATOOING OF ASCENDING COLON,INCOMEPLETE EXAM;  Surgeon: Kang Cleveland MD;  Location: [facility]     COLONOSCOPY N/A 10/26/2020    Procedure: incomplete colonoscopy ;  Surgeon: Kang Cleveland MD;  Location: [facility]     ESOPHAGOSCOPY, GASTROSCOPY, DUODENOSCOPY (EGD), COMBINED N/A 7/21/2018    Procedure: COMBINED ESOPHAGOSCOPY, GASTROSCOPY, DUODENOSCOPY (EGD);  UPPER ENDOSCOPY WITH BIOPSIES;  Surgeon: Roger Vazquez MD;  Location: [facility]     ESOPHAGOSCOPY, GASTROSCOPY, DUODENOSCOPY (EGD), COMBINED N/A 11/21/2019    Procedure: UPPER ENDOSCOPY with biopsy;  Surgeon: Cristofer  Kang Carrasco MD;  Location: [facility] OR     HEAD & NECK SURGERY  2008    c4-c5 fusion     HYSTERECTOMY, CRISTIANE  04/2004    uterine cancer; [facility]     INSERT PORT VASCULAR ACCESS N/A 2/28/2020    Procedure: INSERTION, VASCULAR ACCESS PORT LEFT;  Surgeon: Kang Cleveland MD;  Location: [facility]     LAPAROSCOPIC ASSISTED COLECTOMY Right 2/24/2020    Procedure: RIGHT KEVIN COLECTOMY, LYSIS OF ADHESIONS;  Surgeon: Kang Cleveland MD;  Location: [facility]     LAPAROSCOPIC BYPASS GASTRIC  12/2010       Family History:    Family History   Problem Relation Age of Onset     Diabetes Mother      Hypertension Mother      Coronary Artery Disease Mother      Myocardial Infarction Mother      Hypertension Father      Diabetes Maternal Grandmother      Hypertension Maternal Grandfather      Coronary Artery Disease Maternal Grandfather      Cancer Paternal Grandfather         unsure of type of cancer     Bladder Cancer Paternal Grandfather      Diabetes Maternal Aunt      Cerebrovascular Disease Maternal Aunt      Diabetes Maternal Aunt      Cerebrovascular Disease Maternal Aunt      Diabetes Maternal Aunt      Cerebrovascular Disease Maternal Aunt      Hyperlipidemia No family hx of      Breast Cancer No family hx of      Colon Cancer No family hx of      Prostate Cancer No family hx of      Thyroid Disease No family hx of      Anesthesia Reaction No family hx of      Asthma No family hx of      Genetic Disorder No family hx of        Social History:  Marital Status:   [5]  Social History     Tobacco Use     Smoking status: Never Smoker     Smokeless tobacco: Never Used   Substance Use Topics     Alcohol use: No     Frequency: Never     Comment: sober for 18 years     Drug use: No        Medications:         Cyanocobalamin 1000 MCG/ML KIT       diazepam (VALIUM) 5 MG tablet       gabapentin (NEURONTIN) 300 MG capsule       HYDROcodone-acetaminophen (NORCO) 5-325 MG tablet       HYDROcodone-acetaminophen (NORCO)  5-325 MG tablet       levothyroxine 200 MCG PO tablet       lisinopril (ZESTRIL) 10 MG tablet       multivitamin, therapeutic with minerals (MULTI-VITAMIN) TABS       sertraline (ZOLOFT) 100 MG tablet       tiZANidine (ZANAFLEX) 4 MG tablet       traZODone (DESYREL) 50 MG tablet       Vitamin D3 (CHOLECALCIFEROL) 25 mcg (1000 units) tablet       acetaminophen (TYLENOL) 500 MG tablet       EPINEPHrine (ANY BX GENERIC EQUIV) 0.3 MG/0.3ML injection 2-pack       lidocaine-prilocaine (EMLA) 2.5-2.5 % external cream       ondansetron (ZOFRAN-ODT) 4 MG ODT tab          Review of Systems   Constitutional: Negative for fever.   Respiratory: Negative for shortness of breath.    Cardiovascular: Negative for chest pain.   Gastrointestinal: Negative for abdominal pain.   All other systems reviewed and are negative.      Physical Exam   BP: (!) 202/131  Pulse: 97  Temp: (!) 0.7  F (-17.4  C)  Resp: 16  SpO2: 98 %      Physical Exam  Vitals signs and nursing note reviewed.   Constitutional:       General: She is not in acute distress.     Appearance: She is well-developed. She is not diaphoretic.   HENT:      Head: Normocephalic and atraumatic.   Eyes:      Extraocular Movements: Extraocular movements intact.      Pupils: Pupils are equal, round, and reactive to light.   Cardiovascular:      Rate and Rhythm: Normal rate and regular rhythm.      Pulses: Normal pulses.      Heart sounds: Normal heart sounds.   Pulmonary:      Effort: No respiratory distress.      Breath sounds: Normal breath sounds.   Abdominal:      General: Bowel sounds are normal.      Palpations: Abdomen is soft.      Tenderness: There is no abdominal tenderness.   Musculoskeletal:         General: No swelling or tenderness.   Skin:     General: Skin is warm.   Neurological:      Mental Status: She is alert.         ED Course   Patient evaluated and labs ordered.  Nitroglycerin given for chest pain and hypertension.  Lidocaine nebs given for throat irritation  with difficulty swallowing after general anesthesia.    Procedures       EKG Interpretation:      Interpreted by Yosef Verma MD  Time reviewed: 4:59 PM  Symptoms at time of EKG: Chest pain, hypertension  Rhythm: normal sinus   Rate: 105 bpm  Axis: normal  Ectopy: none  Conduction: normal  ST Segments/ T Waves: No ST-T wave changes  Q Waves: none  Comparison to prior: No old EKG available    Clinical Impression: Sinus tachycardia at 105 bpm      Results for orders placed or performed during the hospital encounter of 11/06/20 (from the past 24 hour(s))   CBC with platelets differential   Result Value Ref Range    WBC 6.2 4.0 - 11.0 10e9/L    RBC Count 4.70 3.8 - 5.2 10e12/L    Hemoglobin 14.3 11.7 - 15.7 g/dL    Hematocrit 41.7 35.0 - 47.0 %    MCV 89 78 - 100 fl    MCH 30.4 26.5 - 33.0 pg    MCHC 34.3 31.5 - 36.5 g/dL    RDW 12.3 10.0 - 15.0 %    Platelet Count 156 150 - 450 10e9/L    Diff Method Automated Method     % Neutrophils 86.9 %    % Lymphocytes 12.0 %    % Monocytes 0.8 %    % Eosinophils 0.0 %    % Basophils 0.0 %    % Immature Granulocytes 0.3 %    Nucleated RBCs 0 0 /100    Absolute Neutrophil 5.4 1.6 - 8.3 10e9/L    Absolute Lymphocytes 0.8 0.8 - 5.3 10e9/L    Absolute Monocytes 0.1 0.0 - 1.3 10e9/L    Absolute Eosinophils 0.0 0.0 - 0.7 10e9/L    Absolute Basophils 0.0 0.0 - 0.2 10e9/L    Abs Immature Granulocytes 0.0 0 - 0.4 10e9/L    Absolute Nucleated RBC 0.0    Troponin I   Result Value Ref Range    Troponin I ES <0.015 0.000 - 0.045 ug/L   Comprehensive metabolic panel   Result Value Ref Range    Sodium 142 133 - 144 mmol/L    Potassium 4.5 3.4 - 5.3 mmol/L    Chloride 112 (H) 94 - 109 mmol/L    Carbon Dioxide 23 20 - 32 mmol/L    Anion Gap 7 3 - 14 mmol/L    Glucose 186 (H) 70 - 99 mg/dL    Urea Nitrogen 12 7 - 30 mg/dL    Creatinine 0.70 0.52 - 1.04 mg/dL    GFR Estimate >90 >60 mL/min/[1.73_m2]    GFR Estimate If Black >90 >60 mL/min/[1.73_m2]    Calcium 8.7 8.5 - 10.1 mg/dL    Bilirubin  Total 0.4 0.2 - 1.3 mg/dL    Albumin 3.8 3.4 - 5.0 g/dL    Protein Total 7.9 6.8 - 8.8 g/dL    Alkaline Phosphatase 125 40 - 150 U/L    ALT 44 0 - 50 U/L    AST 31 0 - 45 U/L   Magnesium   Result Value Ref Range    Magnesium 1.9 1.6 - 2.3 mg/dL   D dimer quantitative   Result Value Ref Range    D Dimer 0.5 0.0 - 0.50 ug/ml FEU   XR Chest Port 1 View    Narrative    PROCEDURE:  XR CHEST PORT 1 VW    HISTORY:  chest pain.     COMPARISON:  February 2020    FINDINGS:   The cardiac silhouette is normal in size. The pulmonary vasculature is  normal.  Is some linear opacity at the left lung base representing  atelectasis or scarring. There is an infusion port with its tip in the  superior vena cava. No pleural effusion or pneumothorax.      Impression    IMPRESSION:  Linear atelectasis or scarring at the left lung base      ZENA EDWARDS MD       Medications   nitroGLYcerin (NITROSTAT) sublingual tablet 0.4 mg (0.4 mg Sublingual Given 11/6/20 1817)   morphine (PF) injection 4 mg (4 mg Intravenous Given 11/6/20 1706)   ondansetron (ZOFRAN) injection 4 mg (4 mg Intravenous Given 11/6/20 1706)   lidocaine inhalant 4% nebulizer solution 3 mL (3 mLs Nebulization Given 11/6/20 1719)   diphenhydrAMINE (BENADRYL) injection 25 mg (50 mg Intravenous Given 11/6/20 1705)   methylPREDNISolone sodium succinate (solu-MEDROL) injection 125 mg (125 mg Intravenous Given 11/6/20 1705)   lisinopril (ZESTRIL) tablet 10 mg (10 mg Oral Given 11/6/20 1815)   cloNIDine (CATAPRES) tablet 0.1 mg (0.1 mg Oral Given 11/6/20 1942)       Assessments & Plan (with Medical Decision Making)   Atypical chest pain, acute myocardial infarction has been ruled out.  Chest x-ray showed linear atelectasis in the left lung base but normal troponin, D-dimer, magnesium, CBC and CMP.  EKG showed sinus tachycardia at 105 bpm.  Patient suspected reaction to food that she had eaten and therefore received IV Solu-Medrol and Benadryl and felt better.  She also received  lidocaine nebulization because all itchiness and pain in the throat after intubation earlier this morning for left shoulder arthroplasty.  Malignant hypertension: Very severe pretension upon arrival to the ED at 180/118 mmHg.  Received 3 doses of nitroglycerin and blood pressure improved slightly but bounced back up.  Chest pain resolved after nitroglycerin.  Patient subsequently received lisinopril 10 mg oral which she normally uses at home with oral Catapres 0.1 mg.  Blood pressure at the time of discharge home was 175/100 mmHg.  Advised to continue home blood pressure monitoring and return to ED if condition worsens.  I have reviewed the nursing notes.    I have reviewed the findings, diagnosis, plan and need for follow up with the patient.    Discharge Medication List as of 11/6/2020  7:12 PM          Final diagnoses:   Malignant essential hypertension   Atypical chest pain       11/6/2020   [facility]     Yosef Verma MD  11/06/20 2011

## 2020-11-09 ENCOUNTER — TELEPHONE (OUTPATIENT)
Dept: ONCOLOGY | Facility: CLINIC | Age: 50
End: 2020-11-09

## 2020-11-09 ENCOUNTER — PRE VISIT (OUTPATIENT)
Dept: ONCOLOGY | Facility: CLINIC | Age: 50
End: 2020-11-09

## 2020-11-09 NOTE — TELEPHONE ENCOUNTER
I attempted to call Constantino Warren today for a virtual genetic counseling appointment. However, the phone number listed in the chart was incorrect.     I called the [facility] to let them know about not being able to contact Constantino and also emailed Constantino (address in her medical record) from my [employer] email to see if she could email me her updated phone number.     I also looked in Care Everywhere and found a different phone number listed so I tried that number as well. However, the voicemail was not set up and I was unable to leave a message. I also attempted to call her friend Ragini who has permission to discuss Constantino's health information but Ragini's voicemail also was not set up. I was able to get a hold of Brenda Hairston, another person Constantino signed to be able to discuss medical and scheduling information with and she asked me to text her my secure voicemail so she could get that to Constantino but I am not able to text patients so I called Brenda and left her my secure voicemail number and also the scheduling number in case Constantino would like to reschedule.    In my email I encouraged Constantino to contact me at [phone] or [email or web address].    Lorenzo Bryant MS, [facility]  Licensed, certified genetic counselor  [phone]

## 2020-11-12 NOTE — PROGRESS NOTES
"Postoperative Visit:    Chief Complaint: Left Shoulder Surgery    Procedure: Diagnostic arthroscopy with bursectomy, subacromial decompression, acromioclavicular joint resection and arthroscopic repair of the torn rotator cuff on 11/6/2020    HPI: Refer to preoperative notes    Appears patient was seen in ED on 11/6 for malignant HTN. Given 3 doses of nitroglycerin. Also given catapres and lisinopril. Discharged home /100 by ED provider    Subjective:   Location: LEFT Shoulder   Quality: sore  Severity: 7/10  Palliative: sling with pillow  Provocative: nighttime  Associated Signs and Symptoms: nighttime pain worse    Objective: /68 (BP Location: Right arm, Patient Position: Sitting, Cuff Size: Adult Regular)   Pulse 68   Temp 97.6  F (36.4  C) (Tympanic)   Resp 14   Ht 1.702 m (5' 7\")   Wt 78 kg (172 lb)   SpO2 97%   BMI 26.94 kg/m    Constantino is a pleasant, 49-year old. Head is normocephalic and atraumatic, sclerae are clear. Patient hears me normally, trachea midline, chest excursion is normal. Respiratory efforts are non-labored. Cardiac: there are no audible cardiac murmurs or gallops. Patient is alert and oriented and expresses proper mood and affect. Gait is normal.     Left Shoulder; sling is intact. Skin/Incision sites are C/D/I. TTP none. ROM full elbow, wrist and fingers, shoulder ROM not assessed due to acute postoperative status. Strength not assessed due to acute postoperative status. Neurovascular status intact, distal pulses 2+, light touch sensation intact.     Imaging; no new imaging needed.     Impression:   (Z98.890) S/P arthroscopy of left shoulder  (primary encounter diagnosis)  Comment: 2-week postop  Plan: Continue with sling - as patient has RTC repair, will plan on sling x 6-weeks postop, followed by PT and HEP programs to be put in place. Will place PT referral at next office visit ([facility]). Work status - N/A. Patient may use heat, RICE protocol and OTC analgesics PRN for " pain and swelling control. Patient is in agreement and understanding of the above treatment plan. All questions and concerns were addressed and answered to patient's satisfaction. If questions/concerns arise before next visit they may contact the office at any point in time and we would be happy to assist them. Patient will follow up with orthopedics 3-weeks.     Refill of Hydrocodone 5-325 mg PO Q6, # 14 (fourteen) PRN for pain control was written and sent through to pharmacy of patient's choice. Safe storage reviewed with patient, as well as safe taking of medication as prescribed. All questions/concerns were answered.     Mally Alejandra PA-C  Orthopedic Physician Assistant  [facility]      Mally Alejandra PA-C  Orthopedic Physician Assistant  [facility]

## 2020-11-16 ENCOUNTER — INFUSION THERAPY VISIT (OUTPATIENT)
Dept: INFUSION THERAPY | Facility: OTHER | Age: 50
End: 2020-11-16
Attending: INTERNAL MEDICINE
Payer: COMMERCIAL

## 2020-11-16 DIAGNOSIS — C18.2 CANCER OF ASCENDING COLON (H): ICD-10-CM

## 2020-11-16 DIAGNOSIS — E55.9 VITAMIN D DEFICIENCY: Primary | ICD-10-CM

## 2020-11-16 PROCEDURE — 36415 COLL VENOUS BLD VENIPUNCTURE: CPT | Mod: ZL | Performed by: NURSE PRACTITIONER

## 2020-11-16 PROCEDURE — 82306 VITAMIN D 25 HYDROXY: CPT | Mod: ZL | Performed by: NURSE PRACTITIONER

## 2020-11-16 PROCEDURE — 250N000011 HC RX IP 250 OP 636: Performed by: INTERNAL MEDICINE

## 2020-11-16 PROCEDURE — 96523 IRRIG DRUG DELIVERY DEVICE: CPT

## 2020-11-16 RX ORDER — HEPARIN SODIUM (PORCINE) LOCK FLUSH IV SOLN 100 UNIT/ML 100 UNIT/ML
5 SOLUTION INTRAVENOUS ONCE
Status: CANCELLED | OUTPATIENT
Start: 2020-11-16 | End: 2020-11-16

## 2020-11-16 RX ORDER — HEPARIN SODIUM (PORCINE) LOCK FLUSH IV SOLN 100 UNIT/ML 100 UNIT/ML
5 SOLUTION INTRAVENOUS ONCE
Status: COMPLETED | OUTPATIENT
Start: 2020-11-16 | End: 2020-11-16

## 2020-11-16 RX ADMIN — HEPARIN 5 ML: 100 SYRINGE at 14:40

## 2020-11-16 NOTE — PATIENT INSTRUCTIONS
We will see you back as planned. If you have any questions or concerns, we can be reached Monday through Friday 8am - 430pm at [phone] ([facility]). If you have concerns related to a potential reaction/side effect after hours/weekends/holiday's, please seek emergent medical care.

## 2020-11-16 NOTE — PROGRESS NOTES
Patients left port accessed using non-coring, 19 gauge, 3/4 power needle. Port accessed per facility protocol.  Hand hygiene performed: yes   Mask donned by caregiver: yes Site prepped with CHG: yes Labs drawn: yes Dressing applied using aseptic technique: yes Port flushed easily, without resistance. Flushed with 10 cc's normal saline.   Immediate blood return noted. 10 cc blood discarded.  1 vial blood draw and sent to lab for results.  Port flushed with 20 cc 0.9% normal saline and 5 cc heparinized saline.  Needle removed intact, sterile dressing applied.  Slight pressure applied for 30 seconds.   Patient tolerated port flush well, denies pain nor discomfort at this time. Patient instructed to leave dressing intact for a minimum of one hour, and to call with questions or concerns.  Patient states understanding and is in agreement with this plan. Patient discharged ambulatory.

## 2020-11-17 DIAGNOSIS — G47.00 INSOMNIA, UNSPECIFIED TYPE: ICD-10-CM

## 2020-11-18 ENCOUNTER — OFFICE VISIT (OUTPATIENT)
Dept: ORTHOPEDICS | Facility: OTHER | Age: 50
End: 2020-11-18
Attending: PHYSICIAN ASSISTANT
Payer: COMMERCIAL

## 2020-11-18 VITALS
OXYGEN SATURATION: 97 % | DIASTOLIC BLOOD PRESSURE: 68 MMHG | RESPIRATION RATE: 14 BRPM | HEART RATE: 68 BPM | WEIGHT: 172 LBS | HEIGHT: 67 IN | SYSTOLIC BLOOD PRESSURE: 122 MMHG | TEMPERATURE: 97.6 F | BODY MASS INDEX: 27 KG/M2

## 2020-11-18 DIAGNOSIS — Z98.890 S/P ARTHROSCOPY OF LEFT SHOULDER: Primary | ICD-10-CM

## 2020-11-18 LAB — DEPRECATED CALCIDIOL+CALCIFEROL SERPL-MC: 7 UG/L (ref 20–75)

## 2020-11-18 PROCEDURE — G0463 HOSPITAL OUTPT CLINIC VISIT: HCPCS

## 2020-11-18 PROCEDURE — 99024 POSTOP FOLLOW-UP VISIT: CPT | Performed by: PHYSICIAN ASSISTANT

## 2020-11-18 RX ORDER — TRAZODONE HYDROCHLORIDE 50 MG/1
TABLET, FILM COATED ORAL
Qty: 30 TABLET | Refills: 0 | Status: SHIPPED | OUTPATIENT
Start: 2020-11-18 | End: 2021-01-26

## 2020-11-18 RX ORDER — HYDROCODONE BITARTRATE AND ACETAMINOPHEN 5; 325 MG/1; MG/1
1 TABLET ORAL EVERY 6 HOURS PRN
Qty: 14 TABLET | Refills: 0 | Status: SHIPPED | OUTPATIENT
Start: 2020-11-18 | End: 2020-11-22

## 2020-11-18 ASSESSMENT — PAIN SCALES - GENERAL: PAINLEVEL: SEVERE PAIN (7)

## 2020-11-18 ASSESSMENT — MIFFLIN-ST. JEOR: SCORE: 1427.82

## 2020-11-18 NOTE — NURSING NOTE
"Chief Complaint   Patient presents with     Post-op Visit       Initial /68 (BP Location: Right arm, Patient Position: Sitting, Cuff Size: Adult Regular)   Pulse 68   Temp 97.6  F (36.4  C) (Tympanic)   Resp 14   Ht 1.702 m (5' 7\")   Wt 78 kg (172 lb)   SpO2 97%   BMI 26.94 kg/m   Estimated body mass index is 26.94 kg/m  as calculated from the following:    Height as of this encounter: 1.702 m (5' 7\").    Weight as of this encounter: 78 kg (172 lb).  Medication Reconciliation: complete  LAURA RAMOS LPN  "

## 2020-11-18 NOTE — TELEPHONE ENCOUNTER
TRAZADONE      Last Written Prescription Date:  7-  Last Fill Quantity: 30,   # refills: 2  Last Office Visit: 10-  Future Office visit:          ZANAFLEX      Last Written Prescription Date:  10-8-2020  Last Fill Quantity: 30,   # refills: 0  Last Office Visit: 10-  Future Office visit:    Next 5 appointments (look out 90 days)    Jan 21, 2021 10:30 AM  (Arrive by 10:15 AM)  Return Visit with Izzy Moran NP  [facility] ([facility] ) [address] [place] [id number]  [phone]           Routing refill request to provider for review/approval because:

## 2020-11-19 DIAGNOSIS — E55.9 VITAMIN D DEFICIENCY: Primary | ICD-10-CM

## 2020-11-19 RX ORDER — ERGOCALCIFEROL 1.25 MG/1
50000 CAPSULE, LIQUID FILLED ORAL
Qty: 16 CAPSULE | Refills: 0 | Status: SHIPPED | OUTPATIENT
Start: 2020-11-19 | End: 2021-01-29

## 2020-12-06 ENCOUNTER — HOSPITAL ENCOUNTER (EMERGENCY)
Facility: HOSPITAL | Age: 50
Discharge: HOME | End: 2020-12-06
Attending: INTERNAL MEDICINE | Admitting: INTERNAL MEDICINE
Payer: COMMERCIAL

## 2020-12-06 ENCOUNTER — APPOINTMENT (OUTPATIENT)
Dept: GENERAL RADIOLOGY | Facility: HOSPITAL | Age: 50
End: 2020-12-06
Attending: INTERNAL MEDICINE
Payer: COMMERCIAL

## 2020-12-06 VITALS
OXYGEN SATURATION: 98 % | TEMPERATURE: 98 F | SYSTOLIC BLOOD PRESSURE: 120 MMHG | DIASTOLIC BLOOD PRESSURE: 90 MMHG | HEART RATE: 86 BPM | RESPIRATION RATE: 16 BRPM

## 2020-12-06 DIAGNOSIS — S43.401A SPRAIN OF RIGHT SHOULDER, UNSPECIFIED SHOULDER SPRAIN TYPE, INITIAL ENCOUNTER: ICD-10-CM

## 2020-12-06 PROCEDURE — 99283 EMERGENCY DEPT VISIT LOW MDM: CPT | Performed by: INTERNAL MEDICINE

## 2020-12-06 PROCEDURE — 99283 EMERGENCY DEPT VISIT LOW MDM: CPT

## 2020-12-06 PROCEDURE — 73030 X-RAY EXAM OF SHOULDER: CPT | Mod: LT

## 2020-12-06 PROCEDURE — 73130 X-RAY EXAM OF HAND: CPT | Mod: RT

## 2020-12-06 PROCEDURE — 250N000013 HC RX MED GY IP 250 OP 250 PS 637: Performed by: INTERNAL MEDICINE

## 2020-12-06 RX ORDER — DIAZEPAM 5 MG
10 TABLET ORAL ONCE
Status: COMPLETED | OUTPATIENT
Start: 2020-12-06 | End: 2020-12-06

## 2020-12-06 RX ORDER — ACETAMINOPHEN 325 MG/1
975 TABLET ORAL ONCE
Status: COMPLETED | OUTPATIENT
Start: 2020-12-06 | End: 2020-12-06

## 2020-12-06 RX ADMIN — ACETAMINOPHEN 975 MG: 325 TABLET, FILM COATED ORAL at 01:58

## 2020-12-06 RX ADMIN — DIAZEPAM 10 MG: 5 TABLET ORAL at 01:59

## 2020-12-06 NOTE — ED NOTES
Pt sitting up in bed playing on her cellphone. Reports she is feeling better. Rates left shoulder pain 8/10.

## 2020-12-06 NOTE — ED AVS SNAPSHOT
[facility]  [address] [place] [address]  Phone: [phone]                                    Constantino Warren   MRN: [id number]    Department: [facility]   Date of Visit: 12/6/2020           After Visit Summary Signature Page    I have received my discharge instructions, and my questions have been answered. I have discussed any challenges I see with this plan with the nurse or doctor.    ..........................................................................................................................................  Patient/Patient Representative Signature      ..........................................................................................................................................  Patient Representative Print Name and Relationship to Patient    ..................................................               ................................................  Date                                   Time    ..........................................................................................................................................  Reviewed by Signature/Title    ...................................................              ..............................................  Date                                               Time          22EPIC Rev 08/18

## 2020-12-06 NOTE — ED NOTES
Patient given verbal and written discharge instructions. Patient verbalized understanding of discharge instructions. Pt has a follow up appointment with [facility] tomorrow. CMS intact. Up ambulating without difficulty. Pt able to move right thumb without difficulty.
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
## 2020-12-07 ENCOUNTER — OFFICE VISIT (OUTPATIENT)
Dept: ORTHOPEDICS | Facility: OTHER | Age: 50
End: 2020-12-07
Attending: PHYSICIAN ASSISTANT
Payer: COMMERCIAL

## 2020-12-07 VITALS
OXYGEN SATURATION: 99 % | WEIGHT: 165 LBS | DIASTOLIC BLOOD PRESSURE: 88 MMHG | SYSTOLIC BLOOD PRESSURE: 134 MMHG | BODY MASS INDEX: 25.84 KG/M2 | HEART RATE: 78 BPM | TEMPERATURE: 95.8 F

## 2020-12-07 DIAGNOSIS — G47.00 INSOMNIA, UNSPECIFIED TYPE: ICD-10-CM

## 2020-12-07 DIAGNOSIS — Z98.890 S/P ARTHROSCOPY OF LEFT SHOULDER: Primary | ICD-10-CM

## 2020-12-07 PROCEDURE — G0463 HOSPITAL OUTPT CLINIC VISIT: HCPCS

## 2020-12-07 PROCEDURE — 99024 POSTOP FOLLOW-UP VISIT: CPT | Performed by: PHYSICIAN ASSISTANT

## 2020-12-07 ASSESSMENT — PAIN SCALES - GENERAL: PAINLEVEL: EXTREME PAIN (9)
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
## 2020-12-07 NOTE — NURSING NOTE
"Chief Complaint   Patient presents with     Surgical Followup     Left Shoulder        Initial /88   Pulse 78   Temp 95.8  F (35.4  C) (Tympanic)   Wt 74.8 kg (165 lb)   SpO2 99%   BMI 25.84 kg/m   Estimated body mass index is 25.84 kg/m  as calculated from the following:    Height as of 11/18/20: 1.702 m (5' 7\").    Weight as of this encounter: 74.8 kg (165 lb).  Medication Reconciliation: complete  Kellee Mishra LPN  "

## 2020-12-08 ASSESSMENT — ENCOUNTER SYMPTOMS
ABDOMINAL PAIN: 0
SHORTNESS OF BREATH: 0
FEVER: 0
ARTHRALGIAS: 0
DIFFICULTY URINATING: 0
NECK STIFFNESS: 0
CONFUSION: 0
EYE REDNESS: 0
HEADACHES: 0
COLOR CHANGE: 0
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
## 2020-12-22 ENCOUNTER — OFFICE VISIT (OUTPATIENT)
Dept: ORTHOPEDICS | Facility: OTHER | Age: 50
End: 2020-12-22
Attending: ORTHOPAEDIC SURGERY
Payer: COMMERCIAL

## 2020-12-22 VITALS
DIASTOLIC BLOOD PRESSURE: 64 MMHG | TEMPERATURE: 96.3 F | HEART RATE: 65 BPM | SYSTOLIC BLOOD PRESSURE: 120 MMHG | OXYGEN SATURATION: 99 %

## 2020-12-22 DIAGNOSIS — Z98.890 S/P ARTHROSCOPY OF LEFT SHOULDER: Primary | ICD-10-CM

## 2020-12-22 PROCEDURE — G0463 HOSPITAL OUTPT CLINIC VISIT: HCPCS

## 2020-12-22 PROCEDURE — 99024 POSTOP FOLLOW-UP VISIT: CPT | Performed by: ORTHOPAEDIC SURGERY

## 2020-12-22 RX ORDER — HYDROCODONE BITARTRATE AND ACETAMINOPHEN 5; 325 MG/1; MG/1
1 TABLET ORAL EVERY 6 HOURS PRN
Qty: 18 TABLET | Refills: 0 | Status: SHIPPED | OUTPATIENT
Start: 2020-12-22 | End: 2020-12-25

## 2020-12-22 ASSESSMENT — PAIN SCALES - GENERAL: PAINLEVEL: MODERATE PAIN (5)

## 2020-12-22 NOTE — PROGRESS NOTES
Patient presents today 6 weeks after her left shoulder rotator cuff repair.  She is begun physical therapy and is having no major difficulties.  She still has a considerable bout of night pain that Tylenol is not helping.  She has begun using the arm as a helper arm.    Assessment and plan: Her wounds are clean and well-healed, her arm is neurovascularly intact.  And when a prescriber few hydrocodone tablets to help her with the night pain.  She will attend physical therapy regularly, begin using the arm for a bit more activities around the home and follow-up in approximately 4 weeks.

## 2020-12-22 NOTE — NURSING NOTE
"Chief Complaint   Patient presents with     Medication Problem       Initial /64   Pulse 65   Temp 96.3  F (35.7  C)   SpO2 99%  Estimated body mass index is 25.84 kg/m  as calculated from the following:    Height as of 11/18/20: 1.702 m (5' 7\").    Weight as of 12/7/20: 74.8 kg (165 lb).  Medication Reconciliation: complete  Jessa Behrman, LPN  "

## 2021-01-11 RX ORDER — HEPARIN SODIUM (PORCINE) LOCK FLUSH IV SOLN 100 UNIT/ML 100 UNIT/ML
5 SOLUTION INTRAVENOUS
Status: CANCELLED | OUTPATIENT
Start: 2021-01-11

## 2021-01-18 ENCOUNTER — INFUSION THERAPY VISIT (OUTPATIENT)
Dept: INFUSION THERAPY | Facility: OTHER | Age: 51
End: 2021-01-18
Attending: INTERNAL MEDICINE
Payer: COMMERCIAL

## 2021-01-18 DIAGNOSIS — E55.9 VITAMIN D DEFICIENCY: Primary | ICD-10-CM

## 2021-01-18 DIAGNOSIS — Z98.84 GASTRIC BYPASS STATUS FOR OBESITY: ICD-10-CM

## 2021-01-18 DIAGNOSIS — C18.2 CANCER OF ASCENDING COLON (H): ICD-10-CM

## 2021-01-18 DIAGNOSIS — E53.8 VITAMIN B 12 DEFICIENCY: ICD-10-CM

## 2021-01-18 LAB
ALBUMIN SERPL-MCNC: 3.7 G/DL (ref 3.4–5)
ALP SERPL-CCNC: 78 U/L (ref 40–150)
ALT SERPL W P-5'-P-CCNC: 52 U/L (ref 0–50)
ANION GAP SERPL CALCULATED.3IONS-SCNC: 3 MMOL/L (ref 3–14)
AST SERPL W P-5'-P-CCNC: 43 U/L (ref 0–45)
BASOPHILS # BLD AUTO: 0 10E9/L (ref 0–0.2)
BASOPHILS NFR BLD AUTO: 0 %
BILIRUB SERPL-MCNC: 0.6 MG/DL (ref 0.2–1.3)
BUN SERPL-MCNC: 11 MG/DL (ref 7–30)
CALCIUM SERPL-MCNC: 8.4 MG/DL (ref 8.5–10.1)
CHLORIDE SERPL-SCNC: 112 MMOL/L (ref 94–109)
CO2 SERPL-SCNC: 28 MMOL/L (ref 20–32)
CREAT SERPL-MCNC: 0.67 MG/DL (ref 0.52–1.04)
DIFFERENTIAL METHOD BLD: NORMAL
EOSINOPHIL # BLD AUTO: 0 10E9/L (ref 0–0.7)
EOSINOPHIL NFR BLD AUTO: 0 %
ERYTHROCYTE [DISTWIDTH] IN BLOOD BY AUTOMATED COUNT: 11.9 % (ref 10–15)
FERRITIN SERPL-MCNC: 54 NG/ML (ref 8–252)
GFR SERPL CREATININE-BSD FRML MDRD: >90 ML/MIN/{1.73_M2}
GLUCOSE SERPL-MCNC: 151 MG/DL (ref 70–99)
HCT VFR BLD AUTO: 35.3 % (ref 35–47)
HGB BLD-MCNC: 12.3 G/DL (ref 11.7–15.7)
IMM GRANULOCYTES # BLD: 0 10E9/L (ref 0–0.4)
IMM GRANULOCYTES NFR BLD: 0.4 %
IRON SATN MFR SERPL: 19 % (ref 15–46)
IRON SERPL-MCNC: 69 UG/DL (ref 35–180)
LDH SERPL L TO P-CCNC: 242 U/L (ref 81–234)
LYMPHOCYTES # BLD AUTO: 1.5 10E9/L (ref 0.8–5.3)
LYMPHOCYTES NFR BLD AUTO: 28.7 %
MCH RBC QN AUTO: 30.7 PG (ref 26.5–33)
MCHC RBC AUTO-ENTMCNC: 34.8 G/DL (ref 31.5–36.5)
MCV RBC AUTO: 88 FL (ref 78–100)
MONOCYTES # BLD AUTO: 0.4 10E9/L (ref 0–1.3)
MONOCYTES NFR BLD AUTO: 6.9 %
NEUTROPHILS # BLD AUTO: 3.4 10E9/L (ref 1.6–8.3)
NEUTROPHILS NFR BLD AUTO: 64 %
NRBC # BLD AUTO: 0 10*3/UL
NRBC BLD AUTO-RTO: 0 /100
PLATELET # BLD AUTO: 194 10E9/L (ref 150–450)
POTASSIUM SERPL-SCNC: 3.4 MMOL/L (ref 3.4–5.3)
PROT SERPL-MCNC: 7.3 G/DL (ref 6.8–8.8)
RBC # BLD AUTO: 4.01 10E12/L (ref 3.8–5.2)
SODIUM SERPL-SCNC: 143 MMOL/L (ref 133–144)
TIBC SERPL-MCNC: 355 UG/DL (ref 240–430)
WBC # BLD AUTO: 5.4 10E9/L (ref 4–11)

## 2021-01-18 PROCEDURE — 80053 COMPREHEN METABOLIC PANEL: CPT | Mod: ZL | Performed by: NURSE PRACTITIONER

## 2021-01-18 PROCEDURE — 83540 ASSAY OF IRON: CPT | Mod: ZL | Performed by: NURSE PRACTITIONER

## 2021-01-18 PROCEDURE — 82746 ASSAY OF FOLIC ACID SERUM: CPT | Mod: ZL | Performed by: NURSE PRACTITIONER

## 2021-01-18 PROCEDURE — 82378 CARCINOEMBRYONIC ANTIGEN: CPT | Mod: ZL | Performed by: NURSE PRACTITIONER

## 2021-01-18 PROCEDURE — 36415 COLL VENOUS BLD VENIPUNCTURE: CPT | Mod: ZL | Performed by: NURSE PRACTITIONER

## 2021-01-18 PROCEDURE — 83615 LACTATE (LD) (LDH) ENZYME: CPT | Mod: ZL | Performed by: NURSE PRACTITIONER

## 2021-01-18 PROCEDURE — 82607 VITAMIN B-12: CPT | Mod: ZL | Performed by: NURSE PRACTITIONER

## 2021-01-18 PROCEDURE — 82728 ASSAY OF FERRITIN: CPT | Mod: ZL | Performed by: NURSE PRACTITIONER

## 2021-01-18 PROCEDURE — 250N000011 HC RX IP 250 OP 636: Performed by: INTERNAL MEDICINE

## 2021-01-18 PROCEDURE — 83550 IRON BINDING TEST: CPT | Mod: ZL | Performed by: NURSE PRACTITIONER

## 2021-01-18 PROCEDURE — 96523 IRRIG DRUG DELIVERY DEVICE: CPT

## 2021-01-18 PROCEDURE — 85025 COMPLETE CBC W/AUTO DIFF WBC: CPT | Mod: ZL | Performed by: NURSE PRACTITIONER

## 2021-01-18 PROCEDURE — 82306 VITAMIN D 25 HYDROXY: CPT | Mod: ZL | Performed by: NURSE PRACTITIONER

## 2021-01-18 RX ORDER — HEPARIN SODIUM (PORCINE) LOCK FLUSH IV SOLN 100 UNIT/ML 100 UNIT/ML
5 SOLUTION INTRAVENOUS ONCE
Status: COMPLETED | OUTPATIENT
Start: 2021-01-18 | End: 2021-01-18

## 2021-01-18 RX ORDER — HEPARIN SODIUM (PORCINE) LOCK FLUSH IV SOLN 100 UNIT/ML 100 UNIT/ML
5 SOLUTION INTRAVENOUS ONCE
Status: CANCELLED | OUTPATIENT
Start: 2021-01-18 | End: 2021-01-18

## 2021-01-18 RX ADMIN — HEPARIN 5 ML: 100 SYRINGE at 13:20

## 2021-01-18 NOTE — PATIENT INSTRUCTIONS
We will see you back as planned. If you have any questions or concerns, we can be reached Monday through Friday 8am - 430pm at [phone] ([facility] [place]). If you have concerns related to a potential reaction/side effect after hours/weekends/holiday's, please seek emergent medical care.

## 2021-01-18 NOTE — PROGRESS NOTES
Patients leftport accessed using non-coring, 19 gauge, 3/4 power needle. Port accessed per facility protocol.  Hand hygiene performed: yes   Mask donned by caregiver: yes Site prepped with CHG: yes Labs drawn: yes Dressing applied using aseptic technique: yes Port flushed easily, without resistance. Flushed with 10 cc's normal saline.   Immediate blood return noted. 10 cc blood discarded.  9 vials blood draw and sent to lab for results.  Port flushed with 20 cc 0.9% normal saline and 5 cc heparinized saline.  Needle removed intact, sterile dressing applied.  Slight pressure applied for 30 seconds.   Patient tolerated port flush well, denies pain nor discomfort at this time. Patient instructed to leave dressing intact for a minimum of one hour, and to call with questions or concerns.  Patient states understanding and is in agreement with this plan. Patient discharged ambulatory.

## 2021-01-19 LAB
CEA SERPL-MCNC: 0.8 UG/L (ref 0–2.5)
FOLATE SERPL-MCNC: 20.5 NG/ML
VIT B12 SERPL-MCNC: 377 PG/ML (ref 193–986)

## 2021-01-20 LAB — DEPRECATED CALCIDIOL+CALCIFEROL SERPL-MC: 10 UG/L (ref 20–75)

## 2021-01-25 ENCOUNTER — TELEPHONE (OUTPATIENT)
Dept: INFUSION THERAPY | Facility: OTHER | Age: 51
End: 2021-01-25

## 2021-01-25 DIAGNOSIS — G47.00 INSOMNIA, UNSPECIFIED TYPE: ICD-10-CM

## 2021-01-25 DIAGNOSIS — Z98.890 S/P ARTHROSCOPY OF LEFT SHOULDER: ICD-10-CM

## 2021-01-25 NOTE — TELEPHONE ENCOUNTER
Patient is on our schedule Wed 1-27-21 for labs from port. There are no labs in open orders. Your note from October states you planned on CBC, CMP,LDH and CEA. Can you enter those orders? Thank you!

## 2021-01-25 NOTE — TELEPHONE ENCOUNTER
Labs for me were done on 1/18/21.  She canceled her appointment and rescheduled so I'm guessing she needs access for scan only.

## 2021-01-26 RX ORDER — TRAZODONE HYDROCHLORIDE 50 MG/1
TABLET, FILM COATED ORAL
Qty: 30 TABLET | Refills: 0 | Status: SHIPPED | OUTPATIENT
Start: 2021-01-26 | End: 2021-03-04

## 2021-01-27 ENCOUNTER — INFUSION THERAPY VISIT (OUTPATIENT)
Dept: INFUSION THERAPY | Facility: OTHER | Age: 51
End: 2021-01-27
Attending: NURSE PRACTITIONER
Payer: COMMERCIAL

## 2021-01-27 ENCOUNTER — HOSPITAL ENCOUNTER (OUTPATIENT)
Dept: CT IMAGING | Facility: HOSPITAL | Age: 51
Discharge: HOME | End: 2021-01-27
Attending: NURSE PRACTITIONER | Admitting: NURSE PRACTITIONER
Payer: COMMERCIAL

## 2021-01-27 DIAGNOSIS — C18.2 CANCER OF ASCENDING COLON (H): ICD-10-CM

## 2021-01-27 DIAGNOSIS — C18.2 CANCER OF ASCENDING COLON (H): Primary | ICD-10-CM

## 2021-01-27 PROCEDURE — 96523 IRRIG DRUG DELIVERY DEVICE: CPT

## 2021-01-27 PROCEDURE — 255N000002 HC RX 255 OP 636: Performed by: RADIOLOGY

## 2021-01-27 PROCEDURE — 250N000011 HC RX IP 250 OP 636

## 2021-01-27 PROCEDURE — 71260 CT THORAX DX C+: CPT

## 2021-01-27 RX ORDER — HEPARIN SODIUM (PORCINE) LOCK FLUSH IV SOLN 100 UNIT/ML 100 UNIT/ML
5 SOLUTION INTRAVENOUS
Status: DISCONTINUED | OUTPATIENT
Start: 2021-01-27 | End: 2021-01-28 | Stop reason: HOSPADM

## 2021-01-27 RX ORDER — IOPAMIDOL 612 MG/ML
100 INJECTION, SOLUTION INTRAVASCULAR ONCE
Status: COMPLETED | OUTPATIENT
Start: 2021-01-27 | End: 2021-01-27

## 2021-01-27 RX ORDER — HEPARIN SODIUM (PORCINE) LOCK FLUSH IV SOLN 100 UNIT/ML 100 UNIT/ML
SOLUTION INTRAVENOUS
Status: COMPLETED
Start: 2021-01-27 | End: 2021-01-27

## 2021-01-27 RX ORDER — HEPARIN SODIUM (PORCINE) LOCK FLUSH IV SOLN 100 UNIT/ML 100 UNIT/ML
5 SOLUTION INTRAVENOUS ONCE
Status: CANCELLED | OUTPATIENT
Start: 2021-01-27 | End: 2021-01-27

## 2021-01-27 RX ADMIN — IOPAMIDOL 100 ML: 612 INJECTION, SOLUTION INTRAVENOUS at 14:22

## 2021-01-27 RX ADMIN — HEPARIN 5 ML: 100 SYRINGE at 14:30

## 2021-01-27 RX ADMIN — HEPARIN SODIUM (PORCINE) LOCK FLUSH IV SOLN 100 UNIT/ML 5 ML: 100 SOLUTION at 14:30

## 2021-01-27 NOTE — PATIENT INSTRUCTIONS
We will see you back as planned. If you have any questions or concerns, we can be reached Monday through Friday 8am - 430pm at [phone] ([facility] [place]). If you have concerns related to a potential reaction/side effect after hours/weekends/holiday's, please seek emergent medical care.

## 2021-01-27 NOTE — PROGRESS NOTES
Patients port accessed using non-coring, 20 gauge, 3/4 needle. Port accessed per facility protocol.   Hand hygiene performed: yes   Mask donned by caregiver: yes   Site prepped with CHG: yes   Labs drawn: no   Dressing applied using aseptic technique: yes   Port flushed easily, blood return noted.  No signs and symptoms of infection or infiltration.  Port flushed 10 mLs Normal Saline  Patient discharged to  for scans with no complaints.

## 2021-01-27 NOTE — TELEPHONE ENCOUNTER
Zanaflex 4 mg     Last Written Prescription Date:  12/7/20  Last Fill Quantity: 30,   # refills: 0  Last Office Visit: 10/30/20  Future Office visit:    Next 5 appointments (look out 90 days)    Jan 29, 2021 12:45 PM  (Arrive by 12:30 PM)  Return Visit with Izzy Moran NP  [facility] ([facility] ) [address]  [place] [address]  [phone]           Routing refill request to provider for review/approval because:

## 2021-01-29 ENCOUNTER — ONCOLOGY VISIT (OUTPATIENT)
Dept: ONCOLOGY | Facility: OTHER | Age: 51
End: 2021-01-29
Attending: NURSE PRACTITIONER
Payer: COMMERCIAL

## 2021-01-29 ENCOUNTER — TELEPHONE (OUTPATIENT)
Dept: ONCOLOGY | Facility: OTHER | Age: 51
End: 2021-01-29

## 2021-01-29 ENCOUNTER — HOSPITAL ENCOUNTER (OUTPATIENT)
Dept: MRI IMAGING | Facility: HOSPITAL | Age: 51
End: 2021-01-29
Attending: NURSE PRACTITIONER
Payer: COMMERCIAL

## 2021-01-29 ENCOUNTER — DOCUMENTATION ONLY (OUTPATIENT)
Dept: INTERVENTIONAL RADIOLOGY/VASCULAR | Facility: HOSPITAL | Age: 51
End: 2021-01-29

## 2021-01-29 VITALS
TEMPERATURE: 97.4 F | HEART RATE: 76 BPM | DIASTOLIC BLOOD PRESSURE: 80 MMHG | RESPIRATION RATE: 20 BRPM | OXYGEN SATURATION: 98 % | BODY MASS INDEX: 27.96 KG/M2 | SYSTOLIC BLOOD PRESSURE: 138 MMHG | HEIGHT: 67 IN | WEIGHT: 178.13 LBS

## 2021-01-29 DIAGNOSIS — Z12.31 VISIT FOR SCREENING MAMMOGRAM: ICD-10-CM

## 2021-01-29 DIAGNOSIS — C18.2 CANCER OF ASCENDING COLON (H): Primary | ICD-10-CM

## 2021-01-29 DIAGNOSIS — E55.9 VITAMIN D DEFICIENCY: ICD-10-CM

## 2021-01-29 DIAGNOSIS — R51.9 BAD HEADACHE: ICD-10-CM

## 2021-01-29 DIAGNOSIS — E53.8 VITAMIN B 12 DEFICIENCY: ICD-10-CM

## 2021-01-29 PROCEDURE — A9585 GADOBUTROL INJECTION: HCPCS | Performed by: RADIOLOGY

## 2021-01-29 PROCEDURE — 250N000011 HC RX IP 250 OP 636

## 2021-01-29 PROCEDURE — G0463 HOSPITAL OUTPT CLINIC VISIT: HCPCS

## 2021-01-29 PROCEDURE — 99215 OFFICE O/P EST HI 40 MIN: CPT | Performed by: NURSE PRACTITIONER

## 2021-01-29 PROCEDURE — G0463 HOSPITAL OUTPT CLINIC VISIT: HCPCS | Mod: 25

## 2021-01-29 PROCEDURE — 70553 MRI BRAIN STEM W/O & W/DYE: CPT

## 2021-01-29 PROCEDURE — 255N000002 HC RX 255 OP 636: Performed by: RADIOLOGY

## 2021-01-29 RX ORDER — HEPARIN SODIUM (PORCINE) LOCK FLUSH IV SOLN 100 UNIT/ML 100 UNIT/ML
SOLUTION INTRAVENOUS
Status: COMPLETED
Start: 2021-01-29 | End: 2021-01-29

## 2021-01-29 RX ORDER — HEPARIN SODIUM (PORCINE) LOCK FLUSH IV SOLN 100 UNIT/ML 100 UNIT/ML
5 SOLUTION INTRAVENOUS
Status: DISCONTINUED | OUTPATIENT
Start: 2021-01-29 | End: 2021-01-30 | Stop reason: HOSPADM

## 2021-01-29 RX ORDER — VITAMIN B COMPLEX
125 TABLET ORAL DAILY
Qty: 30 TABLET | Refills: 0 | COMMUNITY
Start: 2021-01-29 | End: 2021-08-28

## 2021-01-29 RX ORDER — CYANOCOBALAMIN 1000 UG/ML
1000 INJECTION, SOLUTION INTRAMUSCULAR; SUBCUTANEOUS ONCE
Status: CANCELLED | OUTPATIENT
Start: 2021-02-01 | End: 2021-02-01

## 2021-01-29 RX ORDER — GADOBUTROL 604.72 MG/ML
7.5 INJECTION INTRAVENOUS ONCE
Status: COMPLETED | OUTPATIENT
Start: 2021-01-29 | End: 2021-01-29

## 2021-01-29 RX ORDER — HEPARIN SODIUM (PORCINE) LOCK FLUSH IV SOLN 100 UNIT/ML 100 UNIT/ML
5 SOLUTION INTRAVENOUS
Status: CANCELLED | OUTPATIENT
Start: 2021-01-29

## 2021-01-29 RX ADMIN — HEPARIN SODIUM (PORCINE) LOCK FLUSH IV SOLN 100 UNIT/ML 5 ML: 100 SOLUTION at 15:40

## 2021-01-29 RX ADMIN — HEPARIN 5 ML: 100 SYRINGE at 15:40

## 2021-01-29 RX ADMIN — GADOBUTROL 7.5 ML: 604.72 INJECTION INTRAVENOUS at 15:32

## 2021-01-29 ASSESSMENT — MIFFLIN-ST. JEOR: SCORE: 1455.63

## 2021-01-29 ASSESSMENT — PAIN SCALES - GENERAL: PAINLEVEL: SEVERE PAIN (6)

## 2021-01-29 NOTE — TELEPHONE ENCOUNTER
Please call patient and let her know that Dr. Cleveland said that he would repeat her colonoscopy.  Let me know if she is willing to do this and when and I can put the colonoscopy referral in.

## 2021-01-29 NOTE — NURSING NOTE
"Chief Complaint   Patient presents with     RECHECK     Follow up Colon cancer       Initial /80   Pulse 76   Temp 97.4  F (36.3  C) (Tympanic)   Resp 20   Ht 1.702 m (5' 7\")   Wt 80.8 kg (178 lb 2.1 oz)   SpO2 98%   BMI 27.90 kg/m   Estimated body mass index is 27.9 kg/m  as calculated from the following:    Height as of this encounter: 1.702 m (5' 7\").    Weight as of this encounter: 80.8 kg (178 lb 2.1 oz).  Medication Reconciliation: complete.  Immunizations and advance directives status reviewed. Pain scale =6 headache, PHQ-2=0.            Juana Mo LPN    "

## 2021-01-29 NOTE — PATIENT INSTRUCTIONS
MRI of the brain today and we will call you with the results.    We will schedule you for a mammogram.    We will schedule you for B12 every 2 weeks.    Please take 5000units Vitamin D3 daily.    We would like to see you back in 3 months. Please come 1 week prior for lab work.     If you have any questions please call [phone]    Other instructions:  Genetics Number:  [phone] to schedule a genetic counseling appointment

## 2021-01-29 NOTE — PROGRESS NOTES
Oncology Follow-up Visit:  January 29, 2021    Reason for Visit:  Patient presents with:  RECHECK: Follow up Colon cancer     Nursing Note and documentation reviewed: yes    HPI:  This is a 50-year-old female patient who presents to the oncology clinic today in follow up of StageIIIB adenocarcinoma of the ascending colon diagnosed February 2020.  She completed adjuvant chemotherapy on 6/22/2020 and is being followed with surveillance.    Colonoscopy was completed on 10/26/2020 and was considered incomplete related to very poor prep.  Anastomosis site not visualized.  Recommendation was for another in 2 years.    She presents to the clinic today stating she has been having a headache for 3 weeks.  She states today is day 22 of the headache and she has it every day all day stating that nothing that she takes in regards to pain meds will help.  She rates it at a 6 on a 0-to-10 scale.  She denies any issues with blurred or double vision, denies any issues with her balance and denies any nausea or other associated symptoms.  Patient states she is not one to get headaches and she has had maybe 2 headaches over the past year.  This is very concerning to her.  She developed some discomfort to the right shin over the past 2-1/2 weeks stating this has been going on at nighttime only and is not every night.  She describes it as a burning to the shin bone.  She states she has been consistent with her B12 injections and was due for her last injection on Saturday but has not done this.  She requests coming into the clinic to get these injections as it is expensive for her to do outpatient.  She confirms taking the vitamin D prescription that was given and has run out of this.  She states since running out of this she has been on 5000 units of vitamin D3 daily.  She complains of some constipation that she feels is related to increasing protein in her diet.  She denies any blood in her stool.  She was unable to connect with the  cancer risk management program to schedule a genetics appointment.    She underwent left rotator cuff surgery in November.    Oncologic History:     11/21/19 to 11/21/2019 patient was hospitalized with a hemoglobin of 5.7 underwent EGD with no evidence of active bleeding.  12/12/2019 she was seen by Dr. Cook and established care with her as her new PCP.  Referral for screening colonoscopy was completed.  2/13/2020 patient underwent colonoscopy by Dr. Cleveland where a near obstructing lesion in the right colon was found.  Pathology showed an invasive moderately differentiated adenocarcinoma.  CT the abdomen and pelvis showed a mass in the junction of the cecum and ascending colon highly suspicious for malignancy.  2/24/2020  She then underwent laparoscopic-assisted right hemicolectomy by Dr. Cleveland that was converted to open right hemicolectomy when it was noted that the tumor was adherent to the pelvic brim; final pathology showed mildly differentiated adenocarcinoma of the right colon with 1/32 lymph nodes positive; there was perineural invasion; tumor budding and acutely inflamed tumor and reactive lymph nodes with peritonitis; the omentum showed mild peritonitis; margins were negative; patient was staged [id number]; IIIb adenocarcinoma of the right colon  2/28/2020 Port-A-Cath placement  3/25/2020 PET scan which showed no evidence of distant metastatic disease  3/30/2020 patient was seen by Dr. Palma with Medical Oncology with recommendation for XELOX; capecitabine 850 mg/m2 p.o. b.i.d. on days 1 through 14 of a 21-day cycle, oxaliplatin 130 mg/m2 to be given on day 1 x4 cycles.  4/10/2020 she was seen by Dr. Bryant with Radiation Oncology with no recommendation for radiation therapy at this time related to negative margins on pathology  4/17/2020 initiation of chemotherapy  6/22/2020  Completion of 4 cycles of XELOX     MMR proteins intact  CEA prior to surgery was <0.5     Current Chemo Regime/TX:  N/a  Current  Cycle:  n/a  # of completed cycles:  n/a     Previous treatment:   Oxaliplatin/Capecitabine x 4 cycles    Past Medical History:   Diagnosis Date     Arthritis 10/13/2020     Cancer (H)     colon     Cancer (H) 2004    uterine     Chronic pain      Depressive disorder      Diabetes (H)      History of blood transfusion      Hypertension      Prediabetes      Thyroid disease        Past Surgical History:   Procedure Laterality Date     ARTHROSCOPY SHOULDER Left 11/6/2020    Procedure: LEFT SHOULDER ARTHROSCOPY WITH ROTATOR CUFF REPAIR AND ACROMIUM CLAVICULAR JOINT RESECTION;  Surgeon: Tanner Mujica DO;  Location: [facility]     BACK SURGERY      2008; 2011     COLONOSCOPY N/A 2/13/2020    Procedure: COLONOSCOPY WITH BIOPSY AND TATOOING OF ASCENDING COLON,INCOMEPLETE EXAM;  Surgeon: Kang Cleveland MD;  Location: [facility]     COLONOSCOPY N/A 10/26/2020    Procedure: incomplete colonoscopy ;  Surgeon: Kang Cleveland MD;  Location: [facility]     ESOPHAGOSCOPY, GASTROSCOPY, DUODENOSCOPY (EGD), COMBINED N/A 7/21/2018    Procedure: COMBINED ESOPHAGOSCOPY, GASTROSCOPY, DUODENOSCOPY (EGD);  UPPER ENDOSCOPY WITH BIOPSIES;  Surgeon: Roger Vazquez MD;  Location: [facility]     ESOPHAGOSCOPY, GASTROSCOPY, DUODENOSCOPY (EGD), COMBINED N/A 11/21/2019    Procedure: UPPER ENDOSCOPY with biopsy;  Surgeon: Kang Cleveland MD;  Location: [facility]     HEAD & NECK SURGERY  2008    c4-c5 fusion     HYSTERECTOMY, CRISTIANE  04/2004    uterine cancer; [facility]     INSERT PORT VASCULAR ACCESS N/A 2/28/2020    Procedure: INSERTION, VASCULAR ACCESS PORT LEFT;  Surgeon: Kang Cleveland MD;  Location: [facility]     LAPAROSCOPIC ASSISTED COLECTOMY Right 2/24/2020    Procedure: RIGHT KEVIN COLECTOMY, LYSIS OF ADHESIONS;  Surgeon: Kang Cleveland MD;  Location: [facility]     LAPAROSCOPIC BYPASS GASTRIC  12/2010       Family History   Problem Relation Age of Onset     Diabetes Mother      Hypertension Mother      Coronary Artery  Disease Mother      Myocardial Infarction Mother      Hypertension Father      Diabetes Maternal Grandmother      Hypertension Maternal Grandfather      Coronary Artery Disease Maternal Grandfather      Cancer Paternal Grandfather         unsure of type of cancer     Bladder Cancer Paternal Grandfather      Diabetes Maternal Aunt      Cerebrovascular Disease Maternal Aunt      Diabetes Maternal Aunt      Cerebrovascular Disease Maternal Aunt      Diabetes Maternal Aunt      Cerebrovascular Disease Maternal Aunt      Hyperlipidemia No family hx of      Breast Cancer No family hx of      Colon Cancer No family hx of      Prostate Cancer No family hx of      Thyroid Disease No family hx of      Anesthesia Reaction No family hx of      Asthma No family hx of      Genetic Disorder No family hx of        Social History     Socioeconomic History     Marital status:      Spouse name: Not on file     Number of children: 2     Years of education: Not on file     Highest education level: Not on file   Occupational History     Occupation: PCA     Comment: currently not working, unemployment    Social Needs     Financial resource strain: Not hard at all     Food insecurity     Worry: Not on file     Inability: Not on file     Transportation needs     Medical: Patient refused     Non-medical: Patient refused   Tobacco Use     Smoking status: Never Smoker     Smokeless tobacco: Never Used   Substance and Sexual Activity     Alcohol use: No     Frequency: Never     Comment: sober for 18 years     Drug use: No     Sexual activity: Never   Lifestyle     Physical activity     Days per week: Not on file     Minutes per session: Not on file     Stress: Not on file   Relationships     Social connections     Talks on phone: Not on file     Gets together: Not on file     Attends Yarsani service: Not on file     Active member of club or organization: Not on file     Attends meetings of clubs or organizations: Not on file      Relationship status: Not on file     Intimate partner violence     Fear of current or ex partner: Not on file     Emotionally abused: Not on file     Physically abused: Not on file     Forced sexual activity: Not on file   Other Topics Concern     Parent/sibling w/ CABG, MI or angioplasty before 65F 55M? Yes     Comment: Mother   Social History Narrative     Not on file       Current Outpatient Medications   Medication     acetaminophen (TYLENOL) 500 MG tablet     Cyanocobalamin 1000 MCG/ML KIT     levothyroxine 200 MCG PO tablet     lisinopril (ZESTRIL) 10 MG tablet     multivitamin, therapeutic with minerals (MULTI-VITAMIN) TABS     sertraline (ZOLOFT) 100 MG tablet     tiZANidine (ZANAFLEX) 4 MG tablet     traZODone (DESYREL) 50 MG tablet     vitamin D2 (ERGOCALCIFEROL) 95846 units (1250 mcg) capsule     Vitamin D3 (CHOLECALCIFEROL) 25 mcg (1000 units) tablet     diazepam (VALIUM) 5 MG tablet     EPINEPHrine (ANY BX GENERIC EQUIV) 0.3 MG/0.3ML injection 2-pack     lidocaine-prilocaine (EMLA) 2.5-2.5 % external cream     ondansetron (ZOFRAN-ODT) 4 MG ODT tab     No current facility-administered medications for this visit.         Allergies   Allergen Reactions     Aspartame      Other reaction(s): Other - Describe In Comment Field, Seizures  convulsions     Bee Venom Anaphylaxis     Bupropion Anaphylaxis     Throat gets tight and hives. Denies allergic reaction     Food Anaphylaxis     Peppers-anaphylaxis if eaten, if touched hands swell and reddened     Food Allergy Formula Anaphylaxis     Peppers, patient reports green peppers close off her throat     Ibuprofen Anaphylaxis     No Clinical Screening - See Comments Anaphylaxis     Peppers, patient reports green peppers close off her throat     Piper Anaphylaxis     Peppers--green, black, red, chili, etc. Derm symptoms, hives;  Strawberries--(cooked) Itching, breathing difficulties, GI symptoms. Mushrooms--GI symptoms, Hives.Spinach (cooked) throat  "irritation  (Listed on [facility] problem list.)     Fentanyl Swelling     Lips and tongue swelled.  Swelling of lips and tongue     Adhesive Tape      blisters     Amitriptyline Other (See Comments)     Respiratory symptoms - denies allergic reaction     Liquid Adhesive Dermatitis     blisters  Blisters  Silk tape and tegaderm is fine     Oxcarbazepine Other (See Comments)     Throat gets tight and hives. Denies allergic reaction  Throat gets tight and hives-       Prozac [Fluoxetine]      Other reaction(s): *Unknown  uncontrolled muscle spasms     Valproic Acid      Respiratory symptoms ([facility]). Denies allergic reaction     Ziprasidone      Throat gets tight and hives. Denies allergic reaction       Review Of Systems:  Constitutional:    denies fever, weight changes, chills, and night sweats.  Eyes:    denies blurred or double vision  Ears/Nose/Throat:   denies ear pain, nose problems, difficulty swallowing  Respiratory:   denies shortness of breath, cough   Skin:   denies rash, lesions  Cardiovascular:   denies chest pain, palpitations, edema  Gastrointestinal:   denies abdominal pain, bloating, nausea, early satiety  Genitourinary:   denies difficulty with urination, blood in urine  Musculoskeletal:    denies new muscle pain, bone pain  Neurologic:   See hPI  Psychiatric:   denies anxiety, depression  Hematologic/Lymphatic/Immunologic:   denies easy bruising, easy bleeding, lumps or bumps noted  Endocrine:   Denies increased thirst      Physical Exam:  /80   Pulse 76   Temp 97.4  F (36.3  C) (Tympanic)   Resp 20   Ht 1.702 m (5' 7\")   Wt 80.8 kg (178 lb 2.1 oz)   SpO2 98%   BMI 27.90 kg/m      GENERAL APPEARANCE: Healthy, alert and in no acute distress.  HEENT: Normocephalic, Sclerae anicteric.  NECK:   No asymmetry or masses, no thyromegaly.  LYMPHATICS: No palpable cervical, supraclavicular, axillary, or inguinal nodes   RESP: Lungs clear to auscultation " bilaterally, respirations regular and easy  CARDIOVASCULAR: Regular rate and rhythm. Normal S1, S2; no murmur, gallop, or rub.  ABDOMEN: Soft, nontender. Bowel sounds auscultated all 4 quadrants. No palpable organomegaly or masses.  MUSCULOSKELETAL: Extremities without gross deformities noted. No edema of bilateral lower extremities.  NEURO: Alert and oriented x 3.  Gait steady.  PSYCHIATRIC: Mentation and affect appear normal.  Mood appropriate.    Laboratory:  Component      Latest Ref Rng & Units 1/18/2021   WBC      4.0 - 11.0 10e9/L 5.4   RBC Count      3.8 - 5.2 10e12/L 4.01   Hemoglobin      11.7 - 15.7 g/dL 12.3   Hematocrit      35.0 - 47.0 % 35.3   MCV      78 - 100 fl 88   MCH      26.5 - 33.0 pg 30.7   MCHC      31.5 - 36.5 g/dL 34.8   RDW      10.0 - 15.0 % 11.9   Platelet Count      150 - 450 10e9/L 194   Diff Method       Automated Method   % Neutrophils      % 64.0   % Lymphocytes      % 28.7   % Monocytes      % 6.9   % Eosinophils      % 0.0   % Basophils      % 0.0   % Immature Granulocytes      % 0.4   Nucleated RBCs      0 /100 0   Absolute Neutrophil      1.6 - 8.3 10e9/L 3.4   Absolute Lymphocytes      0.8 - 5.3 10e9/L 1.5   Absolute Monocytes      0.0 - 1.3 10e9/L 0.4   Absolute Eosinophils      0.0 - 0.7 10e9/L 0.0   Absolute Basophils      0.0 - 0.2 10e9/L 0.0   Abs Immature Granulocytes      0 - 0.4 10e9/L 0.0   Absolute Nucleated RBC       0.0   Sodium      133 - 144 mmol/L 143   Potassium      3.4 - 5.3 mmol/L 3.4   Chloride      94 - 109 mmol/L 112 (H)   Carbon Dioxide      20 - 32 mmol/L 28   Anion Gap      3 - 14 mmol/L 3   Glucose      70 - 99 mg/dL 151 (H)   Urea Nitrogen      7 - 30 mg/dL 11   Creatinine      0.52 - 1.04 mg/dL 0.67   GFR Estimate      >60 mL/min/1.73:m2 >90   GFR Estimate If Black      >60 mL/min/1.73:m2 >90   Calcium      8.5 - 10.1 mg/dL 8.4 (L)   Bilirubin Total      0.2 - 1.3 mg/dL 0.6   Albumin      3.4 - 5.0 g/dL 3.7   Protein Total      6.8 - 8.8 g/dL 7.3    Alkaline Phosphatase      40 - 150 U/L 78   ALT      0 - 50 U/L 52 (H)   AST      0 - 45 U/L 43   Iron      35 - 180 ug/dL 69   Iron Binding Cap      240 - 430 ug/dL 355   Iron Saturation Index      15 - 46 % 19   Vitamin D Deficiency screening      20 - 75 ug/L 10 (L)   Vitamin B12      193 - 986 pg/mL 377   Folate      >5.4 ng/mL 20.5   Ferritin      8 - 252 ng/mL 54   Lactate Dehydrogenase      81 - 234 U/L 242 (H)   CEA      0 - 2.5 ug/L 0.8     Imaging Studies:      Results for orders placed or performed during the hospital encounter of 01/27/21   CT Chest/Abdomen/Pelvis w Contrast    Narrative    PROCEDURE:  CT CHEST/ABDOMEN/PELVIS W CONTRAST.      HISTORY:  Surveillance colon cancer; Cancer of ascending colon (H)      TECHNIQUE:  Helical CT of the chest, abdomen and pelvis was performed  using non-ionic intravenous contrast.  Ingested oral contrast  partially opacifies the bowel.      COMPARISON:  CT abdomen pelvis 10/14/2018, PET CT 7/1/2020.    MEDS/CONTRAST: ISOVUE 300  100ML    FINDINGS:      The neck base is grossly symmetric. Cardiac size is stable. There is  no pericardial or pleural effusion. The main pulmonary artery is  borderline dilated.. No abnormal thoracic adenopathy is identified.    The central airways are patent. There is no focal consolidation. Trace  scattered atelectasis is present in the subpleural lung. No pulmonary  mass is identified.    The liver, spleen, pancreas and adrenal glands are stable. .  Gallbladder is surgically absent. Symmetric nephrograms are present  without hydronephrosis.  No abnormal bowel dilatation is present.  Mildly prominent air and stool are noted in the colon.    No free fluid, free air or adenopathy is present.  No suspicious  osseous lesions are identified.    COMBINED     Impression    IMPRESSION:      No evidence of metastatic colorectal cancer. Possible constipation.    KONSTANTIN STROUD MD       ASSESSMENT/PLAN:    #1 colon cancer:   Stage  IIIB adenocarcinoma of the ascending colon diagnosed February 2020.  She completed adjuvant chemotherapy with XELOX x 4 cycles on 6/22/2020 and is being followed with surveillance.   Imaging shows no evidence of recurrence.  Pending results of MRI of the brain, we will plan to follow-up with her in 3 months with a CBC, CMP, LDH and CEA.  Colonoscopy per Dr. Cleveland would be planned for October 2022.  Note sent to him in regards to poor prep.  Telephone number for the Cancer Risk Management Program was given to the patient for her to schedule a genetic counseling appointment.    #2 headache: MRI of the brain to be completed today.  She is aware we will call her with this result.     #3  B12 deficiency:  Vitamin B12 level within normal limits but still low.  Will place plan to have her get this here at the clinic every 14 days.  We will recheck her B12 and folate level when I see her in 3 months.     #4 vitamin D deficiency: Vitamin D level 7 in November and she was placed on vitamin D2 50,000 units twice a week.  Most recent vitamin D level completed on 1/18/2021 showed a level of 10.  She will continue with the 5000 units of vitamin D3 daily and will repeat her vitamin D level when I see her in 3 months.    #5 screening mammogram: Order placed for screening mammogram if she is willing to have this completed.    I encouraged patient to call with any questions or concerns.     Approximately 45 minutes spent the patient's encounter today with time spent in review of her chart along with chart preparation.  Time was also spent in obtaining a review of systems and performing a physical exam along with reviewing all of patient's lab work and CT scan in detail.  Time was spent in discussion of her vitamin B-12 deficiency along with vitamin D deficiency and also the need for genetic counseling.  We also spent time in discussion of her complaint of headache and the need for further follow-up on this so time was spent in  ordering an MRI of the brain.  Time was also spent in discussion of plan for follow-up and and ordering testing for future follow-up along with in chart documentation.    Izzy Moran, NP  APRN, FNP-BC, AOCNP

## 2021-02-01 NOTE — TELEPHONE ENCOUNTER
I spoke with patient she is willing to do the colonoscopy and she said when ever it can get done. Will forward to Charlene to place referral

## 2021-02-05 ENCOUNTER — TELEPHONE (OUTPATIENT)
Dept: SURGERY | Facility: OTHER | Age: 51
End: 2021-02-05

## 2021-02-05 ENCOUNTER — PREP FOR PROCEDURE (OUTPATIENT)
Dept: SURGERY | Facility: OTHER | Age: 51
End: 2021-02-05

## 2021-02-05 DIAGNOSIS — Z01.818 PREOP TESTING: Primary | ICD-10-CM

## 2021-02-05 DIAGNOSIS — C18.2 CANCER OF ASCENDING COLON (H): Primary | ICD-10-CM

## 2021-02-05 NOTE — TELEPHONE ENCOUNTER
Referral received for colonoscopy.   This patient was approved by surgery education nurses for meet and greet colonoscopy and will need a preop appointment.   Patient scheduled for colonoscopy on 3/11/21 with  at [facility] with gatorade bowel prep.   Instructions given via phone and instructions mailed to patient with surgery handbook. Jeannette Grayson LPN

## 2021-02-08 ENCOUNTER — HOSPITAL ENCOUNTER (OUTPATIENT)
Facility: HOSPITAL | Age: 51
End: 2021-02-08
Attending: SURGERY | Admitting: SURGERY
Payer: COMMERCIAL

## 2021-02-08 PROBLEM — C18.2 CANCER OF ASCENDING COLON (H): Status: ACTIVE | Noted: 2020-02-19

## 2021-02-11 ENCOUNTER — INFUSION THERAPY VISIT (OUTPATIENT)
Dept: INFUSION THERAPY | Facility: OTHER | Age: 51
End: 2021-02-11
Attending: INTERNAL MEDICINE
Payer: COMMERCIAL

## 2021-02-11 DIAGNOSIS — D50.9 IRON DEFICIENCY ANEMIA, UNSPECIFIED IRON DEFICIENCY ANEMIA TYPE: Primary | ICD-10-CM

## 2021-02-11 DIAGNOSIS — E53.8 VITAMIN B 12 DEFICIENCY: ICD-10-CM

## 2021-02-11 PROCEDURE — 96372 THER/PROPH/DIAG INJ SC/IM: CPT | Performed by: NURSE PRACTITIONER

## 2021-02-11 PROCEDURE — 250N000011 HC RX IP 250 OP 636: Performed by: NURSE PRACTITIONER

## 2021-02-11 RX ORDER — CYANOCOBALAMIN 1000 UG/ML
1000 INJECTION, SOLUTION INTRAMUSCULAR; SUBCUTANEOUS ONCE
Status: COMPLETED | OUTPATIENT
Start: 2021-02-11 | End: 2021-02-11

## 2021-02-11 RX ORDER — CYANOCOBALAMIN 1000 UG/ML
1000 INJECTION, SOLUTION INTRAMUSCULAR; SUBCUTANEOUS ONCE
Status: CANCELLED | OUTPATIENT
Start: 2021-02-25 | End: 2021-02-25

## 2021-02-11 RX ADMIN — CYANOCOBALAMIN 1000 MCG: 1000 INJECTION, SOLUTION INTRAMUSCULAR at 13:09

## 2021-02-11 NOTE — PROGRESS NOTES
Patient is 50 years old, here today accompanied by self for injection of B12 per order of Charlene Moran NP .     Patient denies questions nor concerns regarding medication, administration site, side effects, nor aftercare.  Patient identified with two identifiers, order verified, and verbal consent for today's injection obtained from patient.   Patient education provided on s/s of injection site infection, and/or medication specific side effects, and when to call a provider.  Patient instructed to report any adverse effects.     Medication administered per protocol in Lt Deltoid at 90 degree angle.  Site covered with sterile bandage.  Patient tolerated injection well, no verbal nor non-verbal signs of discomfort noted.  No adverse effects noted at this time.     Patient instructed to call with further questions or concerns.  Patient states understanding and is in agreement with this plan.  Copy of appointments, and after visit summary (AVS) given to patient. Patient discharged.

## 2021-02-16 ENCOUNTER — NURSE TRIAGE (OUTPATIENT)
Dept: FAMILY MEDICINE | Facility: OTHER | Age: 51
End: 2021-02-16

## 2021-02-16 NOTE — TELEPHONE ENCOUNTER
"Patient overbook tomorrow if possible    Reason for Disposition    Headache is a chronic symptom (recurrent or ongoing AND present > 4 weeks)    Answer Assessment - Initial Assessment Questions  1. LOCATION: \"Where does it hurt?\"       head  2. ONSET: \"When did the headache start?\" (Minutes, hours or days)       January 5th  3. PATTERN: \"Does the pain come and go, or has it been constant since it started?\"      Constant every day  Since January 4. SEVERITY: \"How bad is the pain?\" and \"What does it keep you from doing?\"  (e.g., Scale 1-10; mild, moderate, or severe)    - MILD (1-3): doesn't interfere with normal activities     - MODERATE (4-7): interferes with normal activities or awakens from sleep     - SEVERE (8-10): excruciating pain, unable to do any normal activities         5/10  5. RECURRENT SYMPTOM: \"Have you ever had headaches before?\" If so, ask: \"When was the last time?\" and \"What happened that time?\"       no  6. CAUSE: \"What do you think is causing the headache?\"      Unknown, maybe cancer is back  7. MIGRAINE: \"Have you been diagnosed with migraine headaches?\" If so, ask: \"Is this headache similar?\"       Years ago had migraines  8. HEAD INJURY: \"Has there been any recent injury to the head?\"       no  9. OTHER SYMPTOMS: \"Do you have any other symptoms?\" (fever, stiff neck, eye pain, sore throat, cold symptoms)      Stiff neck  10. PREGNANCY: \"Is there any chance you are pregnant?\" \"When was your last menstrual period?\"        no    Protocols used: HEADACHE-A-AH      "

## 2021-02-24 ENCOUNTER — INFUSION THERAPY VISIT (OUTPATIENT)
Dept: INFUSION THERAPY | Facility: OTHER | Age: 51
End: 2021-02-24
Attending: NURSE PRACTITIONER
Payer: COMMERCIAL

## 2021-02-24 ENCOUNTER — TELEPHONE (OUTPATIENT)
Dept: ONCOLOGY | Facility: OTHER | Age: 51
End: 2021-02-24

## 2021-02-24 DIAGNOSIS — E53.8 VITAMIN B 12 DEFICIENCY: ICD-10-CM

## 2021-02-24 DIAGNOSIS — C18.2 CANCER OF ASCENDING COLON (H): Primary | ICD-10-CM

## 2021-02-24 DIAGNOSIS — D50.9 IRON DEFICIENCY ANEMIA, UNSPECIFIED IRON DEFICIENCY ANEMIA TYPE: ICD-10-CM

## 2021-02-24 PROCEDURE — 96372 THER/PROPH/DIAG INJ SC/IM: CPT | Performed by: NURSE PRACTITIONER

## 2021-02-24 PROCEDURE — 96523 IRRIG DRUG DELIVERY DEVICE: CPT

## 2021-02-24 PROCEDURE — 250N000011 HC RX IP 250 OP 636: Performed by: NURSE PRACTITIONER

## 2021-02-24 PROCEDURE — 250N000011 HC RX IP 250 OP 636: Performed by: INTERNAL MEDICINE

## 2021-02-24 RX ORDER — HEPARIN SODIUM (PORCINE) LOCK FLUSH IV SOLN 100 UNIT/ML 100 UNIT/ML
5 SOLUTION INTRAVENOUS ONCE
Status: COMPLETED | OUTPATIENT
Start: 2021-02-24 | End: 2021-02-24

## 2021-02-24 RX ORDER — HEPARIN SODIUM (PORCINE) LOCK FLUSH IV SOLN 100 UNIT/ML 100 UNIT/ML
5 SOLUTION INTRAVENOUS ONCE
Status: CANCELLED | OUTPATIENT
Start: 2021-02-24 | End: 2021-02-24

## 2021-02-24 RX ORDER — CYANOCOBALAMIN 1000 UG/ML
1000 INJECTION, SOLUTION INTRAMUSCULAR; SUBCUTANEOUS ONCE
Status: CANCELLED | OUTPATIENT
Start: 2021-02-25 | End: 2021-02-25

## 2021-02-24 RX ORDER — CYANOCOBALAMIN 1000 UG/ML
1000 INJECTION, SOLUTION INTRAMUSCULAR; SUBCUTANEOUS ONCE
Status: COMPLETED | OUTPATIENT
Start: 2021-02-24 | End: 2021-02-24

## 2021-02-24 RX ADMIN — CYANOCOBALAMIN 1000 MCG: 1000 INJECTION, SOLUTION INTRAMUSCULAR at 13:52

## 2021-02-24 RX ADMIN — HEPARIN 5 ML: 100 SYRINGE at 13:54

## 2021-02-24 NOTE — PROGRESS NOTES
Patient is 50  years old, here today accompanied by self for injection of B12 per order of Charlene Moran NP     Patient denies questions nor concerns regarding medication, administration site, side effects, nor aftercare.  Patient identified with two identifiers, order verified, and verbal consent for today's injection obtained from patient.   Patient education provided on s/s of injection site infection, and/or medication specific side effects, and when to call a provider.  Patient instructed to report any adverse effects.     Medication administered per protocol in left Deltoid  at 45 degree angle.  Site covered with sterile bandage.  Patient tolerated injection well, no verbal nor non-verbal signs of discomfort noted.  No adverse effects noted at this time.     Patient instructed to call with further questions or concerns.  Patient states understanding and is in agreement with this plan.  Copy of appointments, and after visit summary (AVS) given to patient. Patient discharged.

## 2021-02-24 NOTE — TELEPHONE ENCOUNTER
Patient here for B12 and port flush. She wanted to speak with Nurse as she is having leg pain. She states the right lower leg is more painful than the left lower leg. No significant swelling, no bruising, no warmth to touch. I did speak with Charlene and she should get evaluated at urgent care for possible blood clots. I spoke with patient and she did agree to get evaluated.  Juana Mo LPN

## 2021-02-24 NOTE — PATIENT INSTRUCTIONS
We will see you back as planned. If you have any questions or concerns, we can be reached Monday through Friday 8am - 430pm at [phone] ([facility] [place]). If you have concerns related to a potential reaction/side effect after hours/weekends/holiday's, please seek emergent medical care.

## 2021-02-24 NOTE — PROGRESS NOTES
Patients port accessed using non-coring, 19 gauge, 3/4 needle. Port accessed per facility protocol.   Hand hygiene performed: yes   Mask donned by caregiver: yes   Site prepped with CHG: yes   Labs drawn: no   Dressing applied using aseptic technique: yes   Port flushed easily, blood return noted.  No signs and symptoms of infection or infiltration.  Port flushed 10 mLs Normal Saline then Heparin 5mLs of 100 unit/mL.  Needle removed, small dressing applied.  Patient discharged with no complaints.

## 2021-02-25 ENCOUNTER — APPOINTMENT (OUTPATIENT)
Dept: GENERAL RADIOLOGY | Facility: HOSPITAL | Age: 51
End: 2021-02-25
Attending: FAMILY MEDICINE
Payer: COMMERCIAL

## 2021-02-25 ENCOUNTER — HOSPITAL ENCOUNTER (EMERGENCY)
Facility: HOSPITAL | Age: 51
Discharge: HOME | End: 2021-02-25
Attending: FAMILY MEDICINE | Admitting: FAMILY MEDICINE
Payer: COMMERCIAL

## 2021-02-25 ENCOUNTER — ANCILLARY PROCEDURE (OUTPATIENT)
Dept: MAMMOGRAPHY | Facility: OTHER | Age: 51
End: 2021-02-25
Attending: NURSE PRACTITIONER
Payer: COMMERCIAL

## 2021-02-25 ENCOUNTER — APPOINTMENT (OUTPATIENT)
Dept: ULTRASOUND IMAGING | Facility: HOSPITAL | Age: 51
End: 2021-02-25
Attending: FAMILY MEDICINE
Payer: COMMERCIAL

## 2021-02-25 VITALS
SYSTOLIC BLOOD PRESSURE: 134 MMHG | DIASTOLIC BLOOD PRESSURE: 91 MMHG | OXYGEN SATURATION: 98 % | TEMPERATURE: 97.5 F | HEART RATE: 65 BPM | RESPIRATION RATE: 16 BRPM

## 2021-02-25 DIAGNOSIS — M79.604 PAIN OF RIGHT LOWER EXTREMITY: ICD-10-CM

## 2021-02-25 DIAGNOSIS — Z12.31 VISIT FOR SCREENING MAMMOGRAM: ICD-10-CM

## 2021-02-25 LAB
ANION GAP SERPL CALCULATED.3IONS-SCNC: 5 MMOL/L (ref 3–14)
BASOPHILS # BLD AUTO: 0 10E9/L (ref 0–0.2)
BASOPHILS NFR BLD AUTO: 0 %
BUN SERPL-MCNC: 21 MG/DL (ref 7–30)
CALCIUM SERPL-MCNC: 8.7 MG/DL (ref 8.5–10.1)
CHLORIDE SERPL-SCNC: 110 MMOL/L (ref 94–109)
CO2 SERPL-SCNC: 25 MMOL/L (ref 20–32)
CREAT SERPL-MCNC: 0.72 MG/DL (ref 0.52–1.04)
D DIMER PPP FEU-MCNC: 0.3 UG/ML FEU (ref 0–0.5)
DIFFERENTIAL METHOD BLD: ABNORMAL
EOSINOPHIL # BLD AUTO: 0 10E9/L (ref 0–0.7)
EOSINOPHIL NFR BLD AUTO: 0 %
ERYTHROCYTE [DISTWIDTH] IN BLOOD BY AUTOMATED COUNT: 12.6 % (ref 10–15)
GFR SERPL CREATININE-BSD FRML MDRD: >90 ML/MIN/{1.73_M2}
GLUCOSE SERPL-MCNC: 129 MG/DL (ref 70–99)
HCT VFR BLD AUTO: 37.7 % (ref 35–47)
HGB BLD-MCNC: 12.4 G/DL (ref 11.7–15.7)
IMM GRANULOCYTES # BLD: 0 10E9/L (ref 0–0.4)
IMM GRANULOCYTES NFR BLD: 0.2 %
LYMPHOCYTES # BLD AUTO: 1.7 10E9/L (ref 0.8–5.3)
LYMPHOCYTES NFR BLD AUTO: 30.3 %
MCH RBC QN AUTO: 29.8 PG (ref 26.5–33)
MCHC RBC AUTO-ENTMCNC: 32.9 G/DL (ref 31.5–36.5)
MCV RBC AUTO: 91 FL (ref 78–100)
MONOCYTES # BLD AUTO: 0.4 10E9/L (ref 0–1.3)
MONOCYTES NFR BLD AUTO: 7.4 %
NEUTROPHILS # BLD AUTO: 3.4 10E9/L (ref 1.6–8.3)
NEUTROPHILS NFR BLD AUTO: 62.1 %
NRBC # BLD AUTO: 0 10*3/UL
NRBC BLD AUTO-RTO: 0 /100
PLATELET # BLD AUTO: 139 10E9/L (ref 150–450)
PLATELET # BLD EST: ABNORMAL 10*3/UL
POTASSIUM SERPL-SCNC: 4 MMOL/L (ref 3.4–5.3)
RBC # BLD AUTO: 4.16 10E12/L (ref 3.8–5.2)
RBC MORPH BLD: ABNORMAL
SODIUM SERPL-SCNC: 140 MMOL/L (ref 133–144)
TROPONIN I SERPL-MCNC: <0.015 UG/L (ref 0–0.04)
WBC # BLD AUTO: 5.5 10E9/L (ref 4–11)

## 2021-02-25 PROCEDURE — 250N000013 HC RX MED GY IP 250 OP 250 PS 637: Performed by: FAMILY MEDICINE

## 2021-02-25 PROCEDURE — 93971 EXTREMITY STUDY: CPT | Mod: RT

## 2021-02-25 PROCEDURE — 77067 SCR MAMMO BI INCL CAD: CPT | Mod: TC

## 2021-02-25 PROCEDURE — 71046 X-RAY EXAM CHEST 2 VIEWS: CPT

## 2021-02-25 PROCEDURE — 93010 ELECTROCARDIOGRAM REPORT: CPT | Performed by: INTERNAL MEDICINE

## 2021-02-25 PROCEDURE — 80048 BASIC METABOLIC PNL TOTAL CA: CPT | Performed by: FAMILY MEDICINE

## 2021-02-25 PROCEDURE — 85379 FIBRIN DEGRADATION QUANT: CPT | Performed by: FAMILY MEDICINE

## 2021-02-25 PROCEDURE — 36415 COLL VENOUS BLD VENIPUNCTURE: CPT | Performed by: FAMILY MEDICINE

## 2021-02-25 PROCEDURE — 84484 ASSAY OF TROPONIN QUANT: CPT | Performed by: FAMILY MEDICINE

## 2021-02-25 PROCEDURE — 93005 ELECTROCARDIOGRAM TRACING: CPT

## 2021-02-25 PROCEDURE — 85025 COMPLETE CBC W/AUTO DIFF WBC: CPT | Performed by: FAMILY MEDICINE

## 2021-02-25 PROCEDURE — 99285 EMERGENCY DEPT VISIT HI MDM: CPT | Mod: 25

## 2021-02-25 PROCEDURE — 99284 EMERGENCY DEPT VISIT MOD MDM: CPT | Performed by: FAMILY MEDICINE

## 2021-02-25 RX ORDER — OXYCODONE HYDROCHLORIDE 5 MG/1
5 TABLET ORAL ONCE
Status: COMPLETED | OUTPATIENT
Start: 2021-02-25 | End: 2021-02-25

## 2021-02-25 RX ADMIN — OXYCODONE HYDROCHLORIDE 5 MG: 5 TABLET ORAL at 12:53

## 2021-02-25 ASSESSMENT — ENCOUNTER SYMPTOMS
NEUROLOGICAL NEGATIVE: 1
CHEST TIGHTNESS: 1
ABDOMINAL PAIN: 1
SHORTNESS OF BREATH: 0
CONSTITUTIONAL NEGATIVE: 1
COUGH: 0

## 2021-02-25 NOTE — ED NOTES
AVS reviewed with pt. Educated to come back with worsening symptoms. Pt states no further questions at this time. Pt states she has a ride home.

## 2021-02-25 NOTE — ED PROVIDER NOTES
History     Chief Complaint   Patient presents with     Leg Pain     x2 months. specific area under right knee. no hx of blood clots. states it feels bruised. no SOB     Headache     x6 weeks. denies being the worst headache she has ever had but states balance is altered along with intermittent blurred vision     HPI  Constantino Warren is a 50 year old female with a history of colon cancer status post partial colectomy and 6 months of chemotherapy who who finished chemotherapy back in August and now presents with about a 2-month history of right lower leg pain.  Patient states that she has a PET scan done about a month ago which was negative and a CT scan done recently of her head for persistent headaches.  This was also negative.  She states that she has started a fullness in her leg in the medial portion of her leg and it is gotten worse and now feels more painful to touch.  She is obviously concerned about a clot in her leg.    She describes the pain as initially a full sensation followed by a burning sensation to the bone and now just is painful to touch.    Allergies:  Allergies   Allergen Reactions     Aspartame      Other reaction(s): Other - Describe In Comment Field, Seizures  convulsions     Bee Venom Anaphylaxis     Bupropion Anaphylaxis     Throat gets tight and hives. Denies allergic reaction     Food Anaphylaxis     Peppers-anaphylaxis if eaten, if touched hands swell and reddened     Food Allergy Formula Anaphylaxis     Peppers, patient reports green peppers close off her throat     Ibuprofen Anaphylaxis     No Clinical Screening - See Comments Anaphylaxis     Peppers, patient reports green peppers close off her throat     Piper Anaphylaxis     Peppers--green, black, red, chili, etc. Derm symptoms, hives;  Strawberries--(cooked) Itching, breathing difficulties, GI symptoms. Mushrooms--GI symptoms, Hives.Spinach (cooked) throat irritation  (Listed on [facility] problem list.)     Fentanyl  Swelling     Lips and tongue swelled.  Swelling of lips and tongue     Adhesive Tape      blisters     Amitriptyline Other (See Comments)     Respiratory symptoms - denies allergic reaction     Liquid Adhesive Dermatitis     blisters  Blisters  Silk tape and tegaderm is fine     Oxcarbazepine Other (See Comments)     Throat gets tight and hives. Denies allergic reaction  Throat gets tight and hives-       Prozac [Fluoxetine]      Other reaction(s): *Unknown  uncontrolled muscle spasms     Valproic Acid      Respiratory symptoms ([facility]]). Denies allergic reaction     Ziprasidone      Throat gets tight and hives. Denies allergic reaction       Problem List:    Patient Active Problem List    Diagnosis Date Noted     History of colon cancer 10/20/2020     Priority: Medium     Added automatically from request for surgery [id number]       Rotator cuff tear 10/13/2020     Priority: Medium     Added automatically from request for surgery [id number]       Arthritis 10/13/2020     Priority: Medium     Added automatically from request for surgery [id number]       History of uterine cancer 05/27/2020     Priority: Medium     Palliative care patient 05/27/2020     Priority: Medium     Iron deficiency anemia, unspecified iron deficiency anemia type 05/08/2020     Priority: Medium     S/P partial resection of colon 02/24/2020     Priority: Medium     Prediabetes      Priority: Medium     Colon cancer (H) 02/19/2020     Priority: Medium     Added automatically from request for surgery [id number]       Cancer of ascending colon (H) 02/19/2020     Priority: Medium     Added automatically from request for surgery [id number]       Special screening for malignant neoplasms, colon 01/14/2020     Priority: Medium     Added automatically from request for surgery [id number]       Anastomotic ulcer S/P gastric bypass 07/21/2018     Priority: Medium     Anemia 07/21/2018     Priority: Medium     Chest pain 07/20/2018     Priority: Medium      GI bleed 07/20/2018     Priority: Medium     Abdominal pain, epigastric 07/20/2018     Priority: Medium     Hypochromic microcytic anemia 07/20/2018     Priority: Medium     Chronic migraine 01/18/2018     Priority: Medium     Chronic pain disorder 01/18/2018     Priority: Medium     Long term (current) use of opiate analgesic 01/12/2018     Priority: Medium     Overview:   Pain Diagnosis failed back syndrome, annular tear L4-5, history lumbar discectomy, s/p lumbar fusion.    Should be seen in the clinic every twelve weeks. Currently on a taper: yes, trying to wean away from the daytime hydrocodone-acetaminophen..    Plan for flares of chronic pain stretches, occasional use of more hydrocodone-acetaminophen for flare.    ED and UC: Opioid medication will not be given in the ER or Urgent Care unless there is a medical emergency unrelated to chronic pain.  Limit to 3 day supply    Refills: Refills will only be given at a scheduled visit.    [facility] Agreement for Opioid Treatment.   Opioid Agreement Date: 5/21/12  Last UDT (Urine Drug Test) on date:    Pain Dx: chronic low back pain  Last Pain Visit: 6-24-11  [facility]acy: [facility]  Comments:      7/23/14:  Progressing well.  Over the last month, has decreased hydrocodone-acetaminophen from 6/day to 3/day.  Awaiting appointment with [facility].       Adjustment disorder with mixed anxiety and depressed mood 12/15/2017     Priority: Medium     Other insomnia 12/15/2017     Priority: Medium     Syncope and collapse 09/26/2015     Priority: Medium     Hypertensive urgency 09/26/2015     Priority: Medium     Concussion syndrome 09/26/2015     Priority: Medium     Hydronephrosis, right 04/20/2015     Priority: Medium     Pyelonephritis 04/20/2015     Priority: Medium     Urolithiasis 04/20/2015     Priority: Medium     Annular tear of lumbar disc 12/16/2014     Priority: Medium     Overview:   MRI 2/5/13:  Previous right L4-5 hemilaminectomy and partial  facetectomy with mild to moderate right L4-5 facet arthropathy and broad-based bulge of disk and end plate osteophyte on the right with partial high signal intensity zone annular tear of the undersurface of the laterally protruding disk/annulus.       Failed back syndrome of lumbar spine 12/16/2014     Priority: Medium     Overview:   Three back surgeries, including an L3-sacrum AP fusion done at [facility] in 2013.  Has been doing very well in terms of reduction of pain, with minimal requirement of opiate pain medication.       Hx of lumbar discectomy 12/16/2014     Priority: Medium     Overview:   10/2006:   L4-5 Laminectomy and diskectomy L5-S1.   12/12/11:  L5S1 microdiscectomy ([place])       Vitamin B 12 deficiency 06/13/2011     Priority: Medium     Overview:   IMO Update 10/11       Gastric bypass status for obesity 01/04/2011     Priority: Medium     Overview:   12/14/10:  laparoscopic Prasanna-en-Y gastric bypass Dr. Hassan  IMO Update 10/11       Hyperlipidemia 09/23/2008     Priority: Medium     Overview:   IMO Update 10/11       Hypothyroidism 09/23/2008     Priority: Medium     Overview:   IMO Update 10/11  Overview:   IMO Update 10/11          Past Medical History:    Past Medical History:   Diagnosis Date     Arthritis 10/13/2020     Cancer (H)      Cancer (H) 2004     Chronic pain      Depressive disorder      Diabetes (H)      History of blood transfusion      Hypertension      Prediabetes      Thyroid disease        Past Surgical History:    Past Surgical History:   Procedure Laterality Date     ARTHROSCOPY SHOULDER Left 11/6/2020    Procedure: LEFT SHOULDER ARTHROSCOPY WITH ROTATOR CUFF REPAIR AND ACROMIUM CLAVICULAR JOINT RESECTION;  Surgeon: Tanner Mujica DO;  Location: [facility]     BACK SURGERY      2008; 2011     COLONOSCOPY N/A 2/13/2020    Procedure: COLONOSCOPY WITH BIOPSY AND TATOOING OF ASCENDING COLON,INCOMEPLETE EXAM;  Surgeon: Kang Cleveland MD;  Location: [facility]     COLONOSCOPY N/A  10/26/2020    Procedure: incomplete colonoscopy ;  Surgeon: Kang Cleveland MD;  Location: [facility]     ESOPHAGOSCOPY, GASTROSCOPY, DUODENOSCOPY (EGD), COMBINED N/A 7/21/2018    Procedure: COMBINED ESOPHAGOSCOPY, GASTROSCOPY, DUODENOSCOPY (EGD);  UPPER ENDOSCOPY WITH BIOPSIES;  Surgeon: Roger Vazquez MD;  Location: [facility]     ESOPHAGOSCOPY, GASTROSCOPY, DUODENOSCOPY (EGD), COMBINED N/A 11/21/2019    Procedure: UPPER ENDOSCOPY with biopsy;  Surgeon: Kang Cleveland MD;  Location: [facility]     HEAD & NECK SURGERY  2008    c4-c5 fusion     HYSTERECTOMY, CRISTIANE  04/2004    uterine cancer; [facility]     INSERT PORT VASCULAR ACCESS N/A 2/28/2020    Procedure: INSERTION, VASCULAR ACCESS PORT LEFT;  Surgeon: Kang Cleveland MD;  Location: [facility]     LAPAROSCOPIC ASSISTED COLECTOMY Right 2/24/2020    Procedure: RIGHT KEVIN COLECTOMY, LYSIS OF ADHESIONS;  Surgeon: Kang Cleveland MD;  Location: [facility]     LAPAROSCOPIC BYPASS GASTRIC  12/2010       Family History:    Family History   Problem Relation Age of Onset     Diabetes Mother      Hypertension Mother      Coronary Artery Disease Mother      Myocardial Infarction Mother      Hypertension Father      Diabetes Maternal Grandmother      Hypertension Maternal Grandfather      Coronary Artery Disease Maternal Grandfather      Cancer Paternal Grandfather         unsure of type of cancer     Bladder Cancer Paternal Grandfather      Diabetes Maternal Aunt      Cerebrovascular Disease Maternal Aunt      Diabetes Maternal Aunt      Cerebrovascular Disease Maternal Aunt      Diabetes Maternal Aunt      Cerebrovascular Disease Maternal Aunt      Hyperlipidemia No family hx of      Breast Cancer No family hx of      Colon Cancer No family hx of      Prostate Cancer No family hx of      Thyroid Disease No family hx of      Anesthesia Reaction No family hx of      Asthma No family hx of      Genetic Disorder No family hx of        Social History:  Marital  Status:   [5]  Social History     Tobacco Use     Smoking status: Never Smoker     Smokeless tobacco: Never Used   Substance Use Topics     Alcohol use: No     Frequency: Never     Comment: sober for 18 years     Drug use: No        Medications:    levothyroxine 200 MCG PO tablet  sertraline (ZOLOFT) 100 MG tablet  Vitamin D3 (CHOLECALCIFEROL) 25 mcg (1000 units) tablet  acetaminophen (TYLENOL) 500 MG tablet  Cyanocobalamin 1000 MCG/ML KIT  diazepam (VALIUM) 5 MG tablet  EPINEPHrine (ANY BX GENERIC EQUIV) 0.3 MG/0.3ML injection 2-pack  lidocaine-prilocaine (EMLA) 2.5-2.5 % external cream  lisinopril (ZESTRIL) 10 MG tablet  multivitamin, therapeutic with minerals (MULTI-VITAMIN) TABS  ondansetron (ZOFRAN-ODT) 4 MG ODT tab  tiZANidine (ZANAFLEX) 4 MG tablet  traZODone (DESYREL) 50 MG tablet      Review of Systems   Constitutional: Negative.    HENT: Negative.    Respiratory: Positive for chest tightness. Negative for cough and shortness of breath.    Cardiovascular: Negative for chest pain.   Gastrointestinal: Positive for abdominal pain.   Musculoskeletal:        Right shin pain   Neurological: Negative.      Physical Exam   BP: 150/97  Pulse: 65  Temp: 97  F (36.1  C)  Resp: 16  SpO2: 99 %    Physical Exam  Vitals signs and nursing note reviewed.   Constitutional:       Appearance: Normal appearance. She is normal weight.   HENT:      Head: Normocephalic and atraumatic.   Neck:      Musculoskeletal: Normal range of motion and neck supple.   Cardiovascular:      Rate and Rhythm: Normal rate and regular rhythm.      Heart sounds: Normal heart sounds.   Pulmonary:      Effort: Pulmonary effort is normal.      Breath sounds: Normal breath sounds.   Musculoskeletal: Normal range of motion.         General: Tenderness present. No swelling.      Comments: Right calf   Skin:     General: Skin is warm and dry.   Neurological:      General: No focal deficit present.      Mental Status: She is alert.        ED Course    Patient seen and examined. Sent for Doppler.  Also did EKG and troponin secondary to patient's chest pressure that is been intermittent.  This was negative.  Doppler ultrasound was negative for clot.  Patient is requesting pain medication but states she is allergic to ibuprofen and Tylenol does not work.  We will give her 1 oxycodone and she will just be discharged home to follow-up with her primary.  Patient was comfortable with that plan.     Procedures         EKG Interpretation:      Interpreted by Guevara Kendall MD  Time reviewed: 12:20  Symptoms at time of EKG: chest pressure  Rhythm: normal sinus   Rate: normal  Axis: normal  Ectopy: none  Conduction: normal  ST Segments/ T Waves: No ST-T wave changes  Q Waves: none  Comparison to prior: No old EKG available    Clinical Impression: normal EKG    Results for orders placed or performed during the hospital encounter of 02/25/21 (from the past 24 hour(s))   US Lower Extremity Venous Duplex Right    Narrative    US LOWER EXTREMITY VENOUS DUPLEX RIGHT  2/25/2021 12:02 PM    History:Female, age 50 years; ; right calf pain, cancer patient with  last chemo 6 months ago- 2 month history of worsening leg pain    Comparison: None.    Technique: Grayscale and color Doppler ultrasound of the right  lower  extremity deep venous structures.    Findings:   The deep venous structures are patent and fully compressible. There is  normal augmentation of flow.      Impression    Impression:  No evidence of DVT.    NELSON PERKINS MD   Basic metabolic panel   Result Value Ref Range    Sodium 140 133 - 144 mmol/L    Potassium 4.0 3.4 - 5.3 mmol/L    Chloride 110 (H) 94 - 109 mmol/L    Carbon Dioxide 25 20 - 32 mmol/L    Anion Gap 5 3 - 14 mmol/L    Glucose 129 (H) 70 - 99 mg/dL    Urea Nitrogen 21 7 - 30 mg/dL    Creatinine 0.72 0.52 - 1.04 mg/dL    GFR Estimate >90 >60 mL/min/[1.73_m2]    GFR Estimate If Black >90 >60 mL/min/[1.73_m2]    Calcium 8.7 8.5 - 10.1 mg/dL   CBC with  platelets differential   Result Value Ref Range    WBC 5.5 4.0 - 11.0 10e9/L    RBC Count 4.16 3.8 - 5.2 10e12/L    Hemoglobin 12.4 11.7 - 15.7 g/dL    Hematocrit 37.7 35.0 - 47.0 %    MCV 91 78 - 100 fl    MCH 29.8 26.5 - 33.0 pg    MCHC 32.9 31.5 - 36.5 g/dL    RDW 12.6 10.0 - 15.0 %    Platelet Count 139 (L) 150 - 450 10e9/L    Diff Method Automated Method     % Neutrophils 62.1 %    % Lymphocytes 30.3 %    % Monocytes 7.4 %    % Eosinophils 0.0 %    % Basophils 0.0 %    % Immature Granulocytes 0.2 %    Nucleated RBCs 0 0 /100    Absolute Neutrophil 3.4 1.6 - 8.3 10e9/L    Absolute Lymphocytes 1.7 0.8 - 5.3 10e9/L    Absolute Monocytes 0.4 0.0 - 1.3 10e9/L    Absolute Eosinophils 0.0 0.0 - 0.7 10e9/L    Absolute Basophils 0.0 0.0 - 0.2 10e9/L    Abs Immature Granulocytes 0.0 0 - 0.4 10e9/L    Absolute Nucleated RBC 0.0     RBC Morphology Consistent with reported results     Platelet Estimate       Automated count confirmed.  Platelet morphology is normal.   D dimer quantitative   Result Value Ref Range    D Dimer 0.3 0.0 - 0.50 ug/ml FEU   Troponin I   Result Value Ref Range    Troponin I ES <0.015 0.000 - 0.045 ug/L   Chest XR,  PA & LAT    Narrative    Procedure:XR CHEST 2 VW    Clinical history:Female, 50 years, intermittent chest pressure- leg  pain    Technique: Two views are submitted.    Comparison: 11/6/2020    Findings: The cardiac silhouette is normal. The pulmonary vasculature  is normal.    The lungs are clear. Bony structures are unremarkable. Left-sided  subcutaneous port remains satisfactorily positioned and grossly  intact.      Impression    Impression:   No acute abnormality. No evidence of acute or active cardiopulmonary  disease.    NELSON PERKINS MD       Medications   oxyCODONE (ROXICODONE) tablet 5 mg (5 mg Oral Given 2/25/21 1253)       Assessments & Plan (with Medical Decision Making)     I have reviewed the nursing notes.    I have reviewed the findings, diagnosis, plan and need for  follow up with the patient.    New Prescriptions    No medications on file       Final diagnoses:   Pain of right lower extremity       2/25/2021   [facility]     Guevara Kendall MD  02/25/21 [id number]

## 2021-02-25 NOTE — ED NOTES
Pt comes into ED today due to right calf leg pain. States the pain is 9/10 and feels like it is being twisted. Worse when walking. States she has not had falls, but her 8 pound dog walked over it. States that she also had a headache with blurred vision and nausea. Denies vomiting. Rates the headache has been at a 5/10 and constant since Jan 5th. States she takes tylenol for it but it doesn't help.

## 2021-03-01 NOTE — PATIENT INSTRUCTIONS
Stress test ordered.  Labs today - will call with results.  Stop - do not fill Lisinopril.  Start Verapamil 40 mg 3 times per day for headaches.  Also treats high blood pressure.  Compression stockings advised for leg discomfort and varicose veins.  Medications refilled.      Preparing for Your [facility] rgery  Getting started  A nurse will call you to review your health history and instructions. They will give you an arrival time based on your scheduled surgery time.  Please be ready to share the following:    Your doctor's clinic name and phone number    Your medical, surgical and anesthesia history    A list of allergies and sensitivities    A list of medicines, including herbal treatments and over-the-counter drugs    Whether the patient has a legal guardian (ask how to send us the papers in advance)  If you have a child who's having surgery, please ask for a copy of Preparing for Your Child's Surgery.    Preparing for surgery    Within 30 days of surgery: Have a pre-op exam (sometimes called an H&P, or History and Physical). This can be done at a clinic or pre-operative center.  ? If you're having a , you may not need this exam. Talk to your care team    At your pre-op exam, talk to your care team about all medicines you take. If you need to stop any medicines before surgery, ask when to start taking them again.  ? We do this for your safety. Many medicines can make you bleed too much during surgery. Some change how well surgery (anesthesia) drugs work.    Call your insurance company to let them know you're having surgery. (If you don't have insurance, call [phone].)    Call your clinic if there's any change in your health. This includes signs of a cold or flu (sore throat, runny nose, cough, rash, fever). It also includes a scrape or scratch near the surgery site.    If you have questions on the day of surgery, call your hospital or surgery center.  Eating and drinking guidelines  For your safety:  Unless your surgeon tells you otherwise, follow the guidelines below.    Eat and drink as usual until 8 hours before surgery. After that, no food or milk.    Drink clear liquids until 2 hours before surgery. These are liquids you can see through, like water, Gatorade and Propel Water. You may also have black coffee and tea (no cream or milk).    Nothing by mouth within 2 hours of surgery. This includes gum, candy and breath mints.    If you drink, stop drinking alcohol the night before surgery.    If your care team tells you to take medicine on the morning of surgery, it's okay to take it with a sip of water.  Preventing infection    Shower or bathe the night before and morning of your surgery. Follow the instructions your clinic gave you. (If no instructions, use regular soap.)    Don't shave or clip hair near your surgery site. We'll remove the hair if needed.    Don't smoke or vape the morning of surgery. You may chew nicotine gum up to 2 hours before surgery. A nicotine patch is okay.  ? Note: Some surgeries require you to completely quit smoking and nicotine. Check with your surgeon.    Your care team will make every effort to keep you safe from infection. We will:  ? Clean our hands often with soap and water (or an alcohol-based hand rub).  ? Clean the skin at your surgery site with a special soap that kills germs.  ? Give you a special gown to keep you warm. (Cold raises the risk of infection.)  ? Wear special hair covers, masks, gowns and gloves during surgery.  ? Give antibiotic medicine, if prescribed. Not all surgeries need antibiotics.  What to bring on the day of surgery    Photo ID and insurance card    Copy of your health care directive, if you have one    Glasses and hearing aides (bring cases)  ? You can't wear contacts during surgery    Inhaler and eye drops, if you use them (tell us about these when you arrive)    CPAP machine or breathing device, if you use them    A few personal items, if  spending the night    If you have . . .  ? A pacemaker or ICD (cardiac defibrillator): Bring the ID card.  ? An implanted stimulator: Bring the remote control.  ? A legal guardian: Bring a copy of the certified (court-stamped) guardianship papers.  Please remove any jewelry, including body piercings. Leave jewelry and other valuables at home.  If you're going home the day of surgery  Important: If you don't follow the rules below, we must cancel your surgery.     Arrange for someone to drive you home after surgery. You may not drive, take a taxi or take public transportation by yourself (unless you'll have local anesthesia only).    Arrange for a responsible adult to stay with you overnight. If you don't, we may keep you in the hospital overnight, and you may need to pay the costs yourself.  Questions?   If you have any questions for your care team, list them here: _________________________________________________________________________________________________________________________________________________________________________________________________________________________________________________________________________________________________________________________  For informational purposes only. Not to replace the advice of your health care provider. Copyright   2003, 2019 [facility]. All rights reserved. Clinically reviewed by Katheryn Garza MD. [employer] [id number] - REV 4/20.

## 2021-03-01 NOTE — PROGRESS NOTES
[facility] [place]  [address]  [place] [address]  Phone: [phone]  Primary Provider: Coco Cook        PREOPERATIVE EVALUATION:  Today's date: 3/4/2021    Constantino Warren is a 50 year old female who presents for a preoperative evaluation.    Surgical Information:  Surgery/Procedure: colonoscopy   [place] [facility] [place]geon: Dr. Cleveland  Surgery Date: 3-11-21  Time of Surgery: TBD  Where patient plans to recover: At home with family  Fax number for surgical facility: Note does not need to be faxed, will be available electronically in Epic.    Type of Anesthesia Anticipated: to be determined    Assessment & Plan     The proposed surgical procedure is considered LOW risk.    Preop general physical exam  Had recent CBC, CMP.  EKG in ER 2/25/2021 as well.  - Estimated Average Glucose  - T4 free    History of colon cancer  Surveillance now s/p treatment.    Elevated BP without diagnosis of hypertension  Prior script for Lisinopril but not covered?  BP ok today.  Addressing migraine with calcium channel blocker, which can also treat BP.    Insomnia, unspecified type  Stable with Trazodone.  - traZODone (DESYREL) 50 MG tablet; TAKE 1 TABLET BY MOUTH ONCE DAILY AT BEDTIME    S/P arthroscopy of left shoulder  - tiZANidine (ZANAFLEX) 4 MG tablet; Take 1 tablet (4 mg) by mouth At Bedtime    Hyperlipidemia, unspecified hyperlipidemia type  Lab today  - Lipid Profile (Chol, Trig, HDL, LDL calc)    Hypothyroidism, unspecified type  Lab today - running low.  Add 25 mcg to 200 mcg for total 225.  Recheck in 1-2 months.  - levothyroxine (SYNTHROID/LEVOTHROID) 200 MCG tablet; Take 1 tablet (200 mcg) by mouth daily  - TSH with free T4 reflex    Prediabetes  - Hemoglobin A1c    Adjustment disorder with mixed anxiety and depressed mood  Stable.  - sertraline (ZOLOFT) 100 MG tablet; Take 1 tablet (100 mg) by mouth daily    Chest pain, unspecified type  With exertion.  New.  EKG done in ER.    Stress test  ordered.  Not cleared for colonoscopy procedure until results reviewed.  - NM Lexiscan stress test; Future    Daily headache  Prior Neurontin not effective.  Avoid Elavil with other sleep aids in place already.  Trial of calcium channel blocker - can help with headaches and BP.  MRI recently negative.  - verapamil (CALAN) 40 MG tablet; Take 1 tablet (40 mg) by mouth 3 times daily    Varicose veins of bilateral lower extremities with pain  Right leg more than left.  Compression stocking trial.    Will help sort if that is the source of her leg symptoms.      Risks and Recommendations:  The patient has the following additional risks and recommendations for perioperative complications:   - No identified additional risk factors other than previously addressed    Medication Instructions:  Patient is to take all scheduled medications on the day of surgery    RECOMMENDATION:  APPROVAL GIVEN to proceed with proposed procedure pending review of diagnostic evaluation.  STRESS TEST.      3/12/21 addendum - stress test performed today was negative. Proceed with surgery.     The nuclear stress test is negative for inducible myocardial ischemia or infarction.     The left ventricular ejection fraction at rest is 71%.  The left ventricular ejection fraction at stress is 73%.     There is no prior study for comparison.     There is no reversible ischemia.  Normal left ventricular function      44 minutes spent on the date of the encounter doing chart review, history and exam, documentation and further activities as noted above        Subjective     HPI related to upcoming procedure: Patient with history of colon cancer, scheduled for surveillance colonoscopy.  Rescheduled due to inadequate colon prep.      Preop Questions 3/4/2021   1. Have you ever had a heart attack or stroke? No   2. Have you ever had surgery on your heart or blood vessels, such as a stent placement, a coronary artery bypass, or surgery on an artery in your  head, neck, heart, or legs? No   3. Do you have chest pain with activity? YES - sharp mid sternum - past 3 weeks, with activity   4. Do you have a history of  heart failure? No   5. Do you currently have a cold, bronchitis or symptoms of other infection? No   6. Do you have a cough, shortness of breath, or wheezing? No   7. Do you or anyone in your family have previous history of blood clots? YES - mother   8. Do you or does anyone in your family have a serious bleeding problem such as prolonged bleeding following surgeries or cuts? No   9. Have you ever had problems with anemia or been told to take iron pills? YES - anemia   10. Have you had any abnormal blood loss such as black, tarry or bloody stools, or abnormal vaginal bleeding? YES - vaginal blood loss previously    11. Have you ever had a blood transfusion? YES - previously with no issues    11a. Have you ever had a transfusion reaction? No   12. Are you willing to have a blood transfusion if it is medically needed before, during, or after your surgery? Yes   13. Have you or any of your relatives ever had problems with anesthesia? No   14. Do you have sleep apnea, excessive snoring or daytime drowsiness? No   15. Do you have any artifical heart valves or other implanted medical devices like a pacemaker, defibrillator, or continuous glucose monitor? No   16. Do you have artificial joints? No   17. Are you allergic to latex? No   18. Is there any chance that you may be pregnant? No     Health Care Directive:  Patient has a Health Care Directive on file      Preoperative Review of :   reviewed - controlled substances reflected in medication list.      Status of Chronic Conditions:  See problem list for active medical problems.  Problems all longstanding and stable, except as noted/documented.  See ROS for pertinent symptoms related to these conditions.    PREDIABETES (Diabetes resolved since gastric bypass) - Patient has a longstanding history of  "DiabetesType Type II . Patient is being treated with diet and denies significant side effects. Control has been good. Complicating factors include but are not limited to: hypertension and hyperlipidemia.     HYPERTENSION - Patient has longstanding history of HTN , currently denies any symptoms referable to elevated blood pressure. Specifically denies chest pain, palpitations, dyspnea, orthopnea, PND or peripheral edema. Blood pressure readings have been in normal range. Current medication regimen is as listed below. Patient denies any side effects of medication.   Not currently on Lisinopril - states wasn't covered.    HYPOTHYROIDISM - Patient has a longstanding history of chronic Hypothyroidism. Patient has been doing well, noting no tremor, insomnia, hair loss or changes in skin texture. Continues to take medications as directed, without adverse reactions or side effects. Last TSH   Lab Results   Component Value Date    TSH 8.20 (H) 03/04/2021   .      Chest pain - 3 weeks, sharp, central chest, with exercise, lasting 30 min or so.  Exercises - weights, circuit, cardio.  Mild dyspnea - \"heavier\".  Mom - CHF.  MGF - MI - fatal.  PGF - CAD.  EKG in ER 2/25/21.    Headaches      Duration: january 5th (2 months)    Description  Location: bilateral in the frontal area   Character: throbbing pain  Frequency:  Every day   Duration:  24 hours     Intensity:  moderate    Accompanying signs and symptoms:    Precipitating or Alleviating factors:  Nausea/vomiting: usually - vomited 3 times in past 2 months  Dizziness: sometimes  Weakness or numbness: no  Visual changes: blurry vision   Fever: no   Sinus or URI symptoms no     History  Head trauma: YES - several concussions - last one few years ago  Family history of migraines: no   Previous tests for headaches: YES- had CT 5/2020; MRI brain 1/29/2021 negative ordered by oncology  Neurologist evaluations: no   Able to do daily activities when headache present: YES  Wake with " headaches: YES  Daily pain medication use: no   Any changes in: none    Precipitating or Alleviating factors (light/sound/sleep/caffeine): none    Therapies tried and outcome: tylenol    Outcome - not effective.  No prior daily medications.  Frequent/daily pain medication use: no   Stress.  First year since losing .  Prior Neurontin for another reason - was not effective.    Burning in legs    Duration: 2 months     Description (location/character/radiation): burning right leg leg     Intensity:  moderate    Accompanying signs and symptoms: feels as if it has flames on it     History (similar episodes/previous evaluation): None    Precipitating or alleviating factors: None    Therapies tried and outcome: None    History of chemo    Constant - feels tender to touch    Seen in ER - negative for DVT    Varicose veins    No compression stockings        Review of Systems  Constitutional, neuro, ENT, endocrine, pulmonary, cardiac, gastrointestinal, genitourinary, musculoskeletal, integument and psychiatric systems are negative, except as otherwise noted.    Patient Active Problem List    Diagnosis Date Noted     History of colon cancer 10/20/2020     Priority: Medium     Added automatically from request for surgery [id number]       Rotator cuff tear 10/13/2020     Priority: Medium     Added automatically from request for surgery [id number]       Arthritis 10/13/2020     Priority: Medium     Added automatically from request for surgery [id number]       History of uterine cancer 05/27/2020     Priority: Medium     Palliative care patient 05/27/2020     Priority: Medium     Iron deficiency anemia, unspecified iron deficiency anemia type 05/08/2020     Priority: Medium     S/P partial resection of colon 02/24/2020     Priority: Medium     Prediabetes      Priority: Medium     Colon cancer (H) 02/19/2020     Priority: Medium     Added automatically from request for surgery [id number]       Cancer of ascending colon (H) 02/19/2020      Priority: Medium     Added automatically from request for surgery [id number]       Special screening for malignant neoplasms, colon 01/14/2020     Priority: Medium     Added automatically from request for surgery [id number]       Anastomotic ulcer S/P gastric bypass 07/21/2018     Priority: Medium     Anemia 07/21/2018     Priority: Medium     Chest pain 07/20/2018     Priority: Medium     GI bleed 07/20/2018     Priority: Medium     Abdominal pain, epigastric 07/20/2018     Priority: Medium     Hypochromic microcytic anemia 07/20/2018     Priority: Medium     Chronic migraine 01/18/2018     Priority: Medium     Chronic pain disorder 01/18/2018     Priority: Medium     Long term (current) use of opiate analgesic 01/12/2018     Priority: Medium     Overview:   Pain Diagnosis failed back syndrome, annular tear L4-5, history lumbar discectomy, s/p lumbar fusion.    Should be seen in the clinic every twelve weeks. Currently on a taper: yes, trying to wean away from the daytime hydrocodone-acetaminophen..    Plan for flares of chronic pain stretches, occasional use of more hydrocodone-acetaminophen for flare.    ED and UC: Opioid medication will not be given in the ER or Urgent Care unless there is a medical emergency unrelated to chronic pain.  Limit to 3 day supply    Refills: Refills will only be given at a scheduled visit.    [facility] Agreement for Opioid Treatment.   Opioid Agreement Date: 5/21/12  Last UDT (Urine Drug Test) on date:    Pain Dx: chronic low back pain  Last Pain Visit: 6-24-11  [facility]: [facility]  Comments:      7/23/14:  Progressing well.  Over the last month, has decreased hydrocodone-acetaminophen from 6/day to 3/day.  Awaiting appointment with [facility].       Adjustment disorder with mixed anxiety and depressed mood 12/15/2017     Priority: Medium     Other insomnia 12/15/2017     Priority: Medium     Syncope and collapse 09/26/2015     Priority: Medium     Hypertensive urgency  09/26/2015     Priority: Medium     Concussion syndrome 09/26/2015     Priority: Medium     Hydronephrosis, right 04/20/2015     Priority: Medium     Pyelonephritis 04/20/2015     Priority: Medium     Urolithiasis 04/20/2015     Priority: Medium     Annular tear of lumbar disc 12/16/2014     Priority: Medium     Overview:   MRI 2/5/13:  Previous right L4-5 hemilaminectomy and partial facetectomy with mild to moderate right L4-5 facet arthropathy and broad-based bulge of disk and end plate osteophyte on the right with partial high signal intensity zone annular tear of the undersurface of the laterally protruding disk/annulus.       Failed back syndrome of lumbar spine 12/16/2014     Priority: Medium     Overview:   Three back surgeries, including an L3-sacrum AP fusion done at [facility] in 2013.  Has been doing very well in terms of reduction of pain, with minimal requirement of opiate pain medication.       Hx of lumbar discectomy 12/16/2014     Priority: Medium     Overview:   10/2006:   L4-5 Laminectomy and diskectomy L5-S1.   12/12/11:  L5S1 microdiscectomy ([place])       Vitamin B 12 deficiency 06/13/2011     Priority: Medium     Overview:   IMO Update 10/11       Gastric bypass status for obesity 01/04/2011     Priority: Medium     Overview:   12/14/10:  laparoscopic Prasanna-en-Y gastric bypass Dr. Hassan  IMO Update 10/11       Hyperlipidemia 09/23/2008     Priority: Medium     Overview:   IMO Update 10/11       Hypothyroidism 09/23/2008     Priority: Medium     Overview:   IMO Update 10/11  Overview:   IMO Update 10/11        Past Medical History:   Diagnosis Date     Arthritis 10/13/2020     Cancer (H)     colon     Cancer (H) 2004    uterine     Chronic pain      Depressive disorder      Diabetes (H)      History of blood transfusion      Hypertension      Prediabetes      Thyroid disease      Past Surgical History:   Procedure Laterality Date     ARTHROSCOPY SHOULDER Left 11/6/2020    Procedure: LEFT SHOULDER  ARTHROSCOPY WITH ROTATOR CUFF REPAIR AND ACROMIUM CLAVICULAR JOINT RESECTION;  Surgeon: Tanner Mujica DO;  Location: [facility]     BACK SURGERY      2008; 2011     COLONOSCOPY N/A 2/13/2020    Procedure: COLONOSCOPY WITH BIOPSY AND TATOOING OF ASCENDING COLON,INCOMEPLETE EXAM;  Surgeon: Kang Cleveland MD;  Location: [facility]     COLONOSCOPY N/A 10/26/2020    Procedure: incomplete colonoscopy ;  Surgeon: Kang Cleveland MD;  Location: [facility]     ESOPHAGOSCOPY, GASTROSCOPY, DUODENOSCOPY (EGD), COMBINED N/A 7/21/2018    Procedure: COMBINED ESOPHAGOSCOPY, GASTROSCOPY, DUODENOSCOPY (EGD);  UPPER ENDOSCOPY WITH BIOPSIES;  Surgeon: Roger Vazquez MD;  Location: [facility]     ESOPHAGOSCOPY, GASTROSCOPY, DUODENOSCOPY (EGD), COMBINED N/A 11/21/2019    Procedure: UPPER ENDOSCOPY with biopsy;  Surgeon: Kang Cleveland MD;  Location: [facility]     HEAD & NECK SURGERY  2008    c4-c5 fusion     HYSTERECTOMY, [facility]  04/2004    uterine cancer; [facility]     INSERT PORT VASCULAR ACCESS N/A 2/28/2020    Procedure: INSERTION, VASCULAR ACCESS PORT LEFT;  Surgeon: Kang Cleveland MD;  Location: [facility]     LAPAROSCOPIC ASSISTED COLECTOMY Right 2/24/2020    Procedure: RIGHT KEVIN COLECTOMY, LYSIS OF ADHESIONS;  Surgeon: Kang Cleveland MD;  Location: [facility]     LAPAROSCOPIC BYPASS GASTRIC  12/2010     Current Outpatient Medications   Medication Sig Dispense Refill     acetaminophen (TYLENOL) 500 MG tablet Take 1,000 mg by mouth every 6 hours as needed Max acetaminophen dose: 4000 mg in 24 hours       Cyanocobalamin 1000 MCG/ML KIT Inject 1,000 mcg as directed once a week 1000mcg weekly x 4 then 1000mcg every 2 weeks x 4 then monthly 8 kit 0     levothyroxine (SYNTHROID/LEVOTHROID) 200 MCG tablet Take 1 tablet (200 mcg) by mouth daily 90 tablet 1     levothyroxine (SYNTHROID/LEVOTHROID) 25 MCG tablet Take 1 tablet (25 mcg) by mouth daily Take with 200 mcg tab for total of 225 mcg 90 tablet 0      lidocaine-prilocaine (EMLA) 2.5-2.5 % external cream Apply topically as needed for moderate pain 30 g 1     multivitamin, therapeutic with minerals (MULTI-VITAMIN) TABS Take 1 tablet by mouth daily       ondansetron (ZOFRAN-ODT) 4 MG ODT tab Take 1 tablet (4 mg) by mouth every 8 hours as needed for nausea Dissolve ON the tongue. 18 tablet 0     sertraline (ZOLOFT) 100 MG tablet Take 1 tablet (100 mg) by mouth daily 90 tablet 1     tiZANidine (ZANAFLEX) 4 MG tablet Take 1 tablet (4 mg) by mouth At Bedtime 90 tablet 3     traZODone (DESYREL) 50 MG tablet TAKE 1 TABLET BY MOUTH ONCE DAILY AT BEDTIME 90 tablet 1     verapamil (CALAN) 40 MG tablet Take 1 tablet (40 mg) by mouth 3 times daily 90 tablet 1     Vitamin D3 (CHOLECALCIFEROL) 25 mcg (1000 units) tablet Take 5 tablets (125 mcg) by mouth daily Gummy vits OTC 30 tablet 0     diazepam (VALIUM) 5 MG tablet Take 1-2 tablets one hour prior to exam (Patient not taking: Reported on 1/29/2021) 2 tablet 0     EPINEPHrine (ANY BX GENERIC EQUIV) 0.3 MG/0.3ML injection 2-pack Inject 0.3 mLs (0.3 mg) into the muscle as needed for anaphylaxis (Patient not taking: Reported on 1/29/2021) 2 each 1       Allergies   Allergen Reactions     Aspartame      Other reaction(s): Other - Describe In Comment Field, Seizures  convulsions     Bee Venom Anaphylaxis     Bupropion Anaphylaxis     Throat gets tight and hives. Denies allergic reaction     Food Anaphylaxis     Peppers-anaphylaxis if eaten, if touched hands swell and reddened     Food Allergy Formula Anaphylaxis     Peppers, patient reports green peppers close off her throat     Ibuprofen Anaphylaxis     No Clinical Screening - See Comments Anaphylaxis     Peppers, patient reports green peppers close off her throat     Piper Anaphylaxis     Peppers--green, black, red, chili, etc. Derm symptoms, hives;  Strawberries--(cooked) Itching, breathing difficulties, GI symptoms. Mushrooms--GI symptoms, Hives.Spinach (cooked) throat  irritation  (Listed on [facility] problem list.)     Fentanyl Swelling     Lips and tongue swelled.  Swelling of lips and tongue     Adhesive Tape      blisters     Amitriptyline Other (See Comments)     Respiratory symptoms - denies allergic reaction     Liquid Adhesive Dermatitis     blisters  Blisters  Silk tape and tegaderm is fine     Oxcarbazepine Other (See Comments)     Throat gets tight and hives. Denies allergic reaction  Throat gets tight and hives-       Prozac [Fluoxetine]      Other reaction(s): *Unknown  uncontrolled muscle spasms     Valproic Acid      Respiratory symptoms ([facility]). Denies allergic reaction     Ziprasidone      Throat gets tight and hives. Denies allergic reaction        Social History     Tobacco Use     Smoking status: Never Smoker     Smokeless tobacco: Never Used   Substance Use Topics     Alcohol use: No     Frequency: Never     Comment: sober for 18 years     Family History   Problem Relation Age of Onset     Diabetes Mother      Hypertension Mother      Coronary Artery Disease Mother      Myocardial Infarction Mother      Hypertension Father      Diabetes Maternal Grandmother      Hypertension Maternal Grandfather      Coronary Artery Disease Maternal Grandfather      Cancer Paternal Grandfather         unsure of type of cancer     Bladder Cancer Paternal Grandfather      Diabetes Maternal Aunt      Cerebrovascular Disease Maternal Aunt      Diabetes Maternal Aunt      Cerebrovascular Disease Maternal Aunt      Diabetes Maternal Aunt      Cerebrovascular Disease Maternal Aunt      Hyperlipidemia No family hx of      Breast Cancer No family hx of      Colon Cancer No family hx of      Prostate Cancer No family hx of      Thyroid Disease No family hx of      Anesthesia Reaction No family hx of      Asthma No family hx of      Genetic Disorder No family hx of      History   Drug Use No         Objective     /80 (BP Location: Right arm,  "Patient Position: Chair, Cuff Size: Adult Regular)   Pulse 86   Temp 97.3  F (36.3  C) (Tympanic)   Ht 1.702 m (5' 7\")   Wt 81.2 kg (179 lb)   SpO2 98%   BMI 28.04 kg/m      Physical Exam    GENERAL APPEARANCE: alert, no distress, cooperative      EYES: EOMI, PERRL     HENT: ear canals and TM's normal and nose and mouth without ulcers or lesions     HENT: poor dentition     NECK: no adenopathy, no asymmetry, masses, or scars and thyroid normal to palpation     RESP: lungs clear to auscultation - no rales, rhonchi or wheezes     CV: regular rates and rhythm, normal S1 S2, no S3 or S4 and no murmur, click or rub     ABDOMEN:  soft, nontender, no HSM or masses and bowel sounds normal     MS: trace ankle edema; bilateral varicosities, right more noticeable     SKIN: no suspicious lesions or rashes     NEURO: Normal strength and tone, sensory exam grossly normal, mentation intact and speech normal     PSYCH: mentation appears normal. and affect normal/bright     LYMPHATICS: No cervical adenopathy    Recent Labs   Lab Test 02/25/21  1207 01/18/21  1320 05/29/20  2245 05/29/20  2245 02/20/20  1133 02/20/20  1133 03/10/19  1425 03/10/19  1425   HGB 12.4 12.3   < > 10.7*   < > 11.1*   < > 11.1*   * 194   < > 172   < > 341   < > 279   INR  --   --   --  1.14  --   --   --  1.11    143   < > 138   < > 140   < > 142   POTASSIUM 4.0 3.4   < > 4.3   < > 4.1   < > 3.9   CR 0.72 0.67   < > 0.78   < > 0.74   < > 0.83   A1C  --   --   --   --   --  5.7*  --   --     < > = values in this interval not displayed.        Diagnostics:  Recent Results (from the past 24 hour(s))   TSH with free T4 reflex    Collection Time: 03/04/21 11:40 AM   Result Value Ref Range    TSH 8.20 (H) 0.40 - 4.00 mU/L   Hemoglobin A1c    Collection Time: 03/04/21 11:40 AM   Result Value Ref Range    Hemoglobin A1C 5.0 0 - 5.6 %   Lipid Profile (Chol, Trig, HDL, LDL calc)    Collection Time: 03/04/21 11:40 AM   Result Value Ref Range    " Cholesterol 157 <200 mg/dL    Triglycerides 141 <150 mg/dL    HDL Cholesterol 60 >49 mg/dL    LDL Cholesterol Calculated 69 <100 mg/dL    Non HDL Cholesterol 97 <130 mg/dL   Estimated Average Glucose    Collection Time: 03/04/21 11:40 AM   Result Value Ref Range    Estimated Average Glucose 97 mg/dL   T4 free    Collection Time: 03/04/21 11:40 AM   Result Value Ref Range    T4 Free 0.61 (L) 0.76 - 1.46 ng/dL      No EKG required for low risk surgery (cataract, skin procedure, breast biopsy, etc).  No EKG required, no history of coronary heart disease, significant arrhythmia, peripheral arterial disease or other structural heart disease.    Revised Cardiac Risk Index (RCRI):  The patient has the following serious cardiovascular risks for perioperative complications:   - No serious cardiac risks = 0 points     RCRI Interpretation: 0 points: Class I (very low risk - 0.4% complication rate)             Signed Electronically by: Coco Schafer MD  Copy of this evaluation report is provided to requesting physician.    [facility] Guidelines    Revised Cardiac Risk Index

## 2021-03-04 ENCOUNTER — ANESTHESIA EVENT (OUTPATIENT)
Facility: HOSPITAL | Age: 51
End: 2021-03-04

## 2021-03-04 ENCOUNTER — OFFICE VISIT (OUTPATIENT)
Dept: FAMILY MEDICINE | Facility: OTHER | Age: 51
End: 2021-03-04
Attending: FAMILY MEDICINE
Payer: COMMERCIAL

## 2021-03-04 VITALS
OXYGEN SATURATION: 98 % | HEIGHT: 67 IN | SYSTOLIC BLOOD PRESSURE: 130 MMHG | DIASTOLIC BLOOD PRESSURE: 80 MMHG | HEART RATE: 86 BPM | BODY MASS INDEX: 28.09 KG/M2 | TEMPERATURE: 97.3 F | WEIGHT: 179 LBS

## 2021-03-04 DIAGNOSIS — R73.03 PREDIABETES: ICD-10-CM

## 2021-03-04 DIAGNOSIS — R03.0 ELEVATED BP WITHOUT DIAGNOSIS OF HYPERTENSION: ICD-10-CM

## 2021-03-04 DIAGNOSIS — E03.9 HYPOTHYROIDISM, UNSPECIFIED TYPE: ICD-10-CM

## 2021-03-04 DIAGNOSIS — Z01.818 PREOP GENERAL PHYSICAL EXAM: Primary | ICD-10-CM

## 2021-03-04 DIAGNOSIS — I83.813 VARICOSE VEINS OF BILATERAL LOWER EXTREMITIES WITH PAIN: ICD-10-CM

## 2021-03-04 DIAGNOSIS — F43.23 ADJUSTMENT DISORDER WITH MIXED ANXIETY AND DEPRESSED MOOD: ICD-10-CM

## 2021-03-04 DIAGNOSIS — E78.5 HYPERLIPIDEMIA, UNSPECIFIED HYPERLIPIDEMIA TYPE: ICD-10-CM

## 2021-03-04 DIAGNOSIS — Z85.038 HISTORY OF COLON CANCER: ICD-10-CM

## 2021-03-04 DIAGNOSIS — Z98.890 S/P ARTHROSCOPY OF LEFT SHOULDER: ICD-10-CM

## 2021-03-04 DIAGNOSIS — R07.9 CHEST PAIN, UNSPECIFIED TYPE: ICD-10-CM

## 2021-03-04 DIAGNOSIS — R51.9 DAILY HEADACHE: ICD-10-CM

## 2021-03-04 DIAGNOSIS — G47.00 INSOMNIA, UNSPECIFIED TYPE: ICD-10-CM

## 2021-03-04 LAB
CHOLEST SERPL-MCNC: 157 MG/DL
EST. AVERAGE GLUCOSE BLD GHB EST-MCNC: 97 MG/DL
HBA1C MFR BLD: 5 % (ref 0–5.6)
HDLC SERPL-MCNC: 60 MG/DL
LDLC SERPL CALC-MCNC: 69 MG/DL
NONHDLC SERPL-MCNC: 97 MG/DL
T4 FREE SERPL-MCNC: 0.61 NG/DL (ref 0.76–1.46)
TRIGL SERPL-MCNC: 141 MG/DL
TSH SERPL DL<=0.005 MIU/L-ACNC: 8.2 MU/L (ref 0.4–4)

## 2021-03-04 PROCEDURE — 99215 OFFICE O/P EST HI 40 MIN: CPT | Performed by: FAMILY MEDICINE

## 2021-03-04 PROCEDURE — 80061 LIPID PANEL: CPT | Mod: ZL | Performed by: FAMILY MEDICINE

## 2021-03-04 PROCEDURE — 84443 ASSAY THYROID STIM HORMONE: CPT | Mod: ZL | Performed by: FAMILY MEDICINE

## 2021-03-04 PROCEDURE — 84439 ASSAY OF FREE THYROXINE: CPT | Mod: ZL | Performed by: FAMILY MEDICINE

## 2021-03-04 PROCEDURE — 999N001182 HC STATISTIC ESTIMATED AVERAGE GLUCOSE: Mod: ZL | Performed by: FAMILY MEDICINE

## 2021-03-04 PROCEDURE — G0463 HOSPITAL OUTPT CLINIC VISIT: HCPCS

## 2021-03-04 PROCEDURE — 36415 COLL VENOUS BLD VENIPUNCTURE: CPT | Mod: ZL | Performed by: FAMILY MEDICINE

## 2021-03-04 PROCEDURE — 83036 HEMOGLOBIN GLYCOSYLATED A1C: CPT | Mod: ZL | Performed by: FAMILY MEDICINE

## 2021-03-04 RX ORDER — ONDANSETRON 2 MG/ML
4 INJECTION INTRAMUSCULAR; INTRAVENOUS EVERY 30 MIN PRN
Status: CANCELLED | OUTPATIENT
Start: 2021-03-04

## 2021-03-04 RX ORDER — LIDOCAINE 40 MG/G
CREAM TOPICAL
Status: CANCELLED | OUTPATIENT
Start: 2021-03-04

## 2021-03-04 RX ORDER — LEVOTHYROXINE SODIUM 25 UG/1
25 TABLET ORAL DAILY
Qty: 90 TABLET | Refills: 0 | Status: SHIPPED | OUTPATIENT
Start: 2021-03-04 | End: 2021-09-21

## 2021-03-04 RX ORDER — TRAZODONE HYDROCHLORIDE 50 MG/1
TABLET, FILM COATED ORAL
Qty: 90 TABLET | Refills: 1 | Status: SHIPPED | OUTPATIENT
Start: 2021-03-04 | End: 2021-12-16

## 2021-03-04 RX ORDER — NALOXONE HYDROCHLORIDE 0.4 MG/ML
0.2 INJECTION, SOLUTION INTRAMUSCULAR; INTRAVENOUS; SUBCUTANEOUS
Status: CANCELLED | OUTPATIENT
Start: 2021-03-04 | End: 2021-03-05

## 2021-03-04 RX ORDER — SERTRALINE HYDROCHLORIDE 100 MG/1
100 TABLET, FILM COATED ORAL DAILY
Qty: 90 TABLET | Refills: 1 | Status: SHIPPED | OUTPATIENT
Start: 2021-03-04 | End: 2021-04-29 | Stop reason: ALTCHOICE

## 2021-03-04 RX ORDER — LEVOTHYROXINE SODIUM 200 UG/1
200 TABLET ORAL DAILY
Qty: 90 TABLET | Refills: 1 | Status: SHIPPED | OUTPATIENT
Start: 2021-03-04 | End: 2022-07-14

## 2021-03-04 RX ORDER — ONDANSETRON 4 MG/1
4 TABLET, ORALLY DISINTEGRATING ORAL EVERY 30 MIN PRN
Status: CANCELLED | OUTPATIENT
Start: 2021-03-04

## 2021-03-04 RX ORDER — VERAPAMIL HYDROCHLORIDE 40 MG/1
40 TABLET ORAL 3 TIMES DAILY
Qty: 90 TABLET | Refills: 1 | Status: SHIPPED | OUTPATIENT
Start: 2021-03-04 | End: 2021-12-21

## 2021-03-04 RX ORDER — SODIUM CHLORIDE, SODIUM LACTATE, POTASSIUM CHLORIDE, CALCIUM CHLORIDE 600; 310; 30; 20 MG/100ML; MG/100ML; MG/100ML; MG/100ML
INJECTION, SOLUTION INTRAVENOUS CONTINUOUS
Status: CANCELLED | OUTPATIENT
Start: 2021-03-04

## 2021-03-04 RX ORDER — NALOXONE HYDROCHLORIDE 0.4 MG/ML
0.4 INJECTION, SOLUTION INTRAMUSCULAR; INTRAVENOUS; SUBCUTANEOUS
Status: CANCELLED | OUTPATIENT
Start: 2021-03-04 | End: 2021-03-05

## 2021-03-04 ASSESSMENT — MIFFLIN-ST. JEOR: SCORE: 1459.57

## 2021-03-04 NOTE — ANESTHESIA PREPROCEDURE EVALUATION
Anesthesia Pre-Procedure Evaluation    Patient: Constantino Warren   MRN: [id number] : 1970        Preoperative Diagnosis: Cancer of ascending colon (H) [C18.2]   Procedure : Procedure(s):  COLONOSCOPY     Past Medical History:   Diagnosis Date     Arthritis 10/13/2020     Cancer (H)     colon     Cancer (H)     uterine     Chronic pain      Depressive disorder      Diabetes (H)      History of blood transfusion      Hypertension      Prediabetes      Thyroid disease       Past Surgical History:   Procedure Laterality Date     ARTHROSCOPY SHOULDER Left 2020    Procedure: LEFT SHOULDER ARTHROSCOPY WITH ROTATOR CUFF REPAIR AND ACROMIUM CLAVICULAR JOINT RESECTION;  Surgeon: Tanner Mujica DO;  Location: [facility]     BACK SURGERY      ;      COLONOSCOPY N/A 2020    Procedure: COLONOSCOPY WITH BIOPSY AND TATOOING OF ASCENDING COLON,INCOMEPLETE EXAM;  Surgeon: Kang Cleveland MD;  Location: [facility]     COLONOSCOPY N/A 10/26/2020    Procedure: incomplete colonoscopy ;  Surgeon: Kang Cleveland MD;  Location: [facility]     ESOPHAGOSCOPY, GASTROSCOPY, DUODENOSCOPY (EGD), COMBINED N/A 2018    Procedure: COMBINED ESOPHAGOSCOPY, GASTROSCOPY, DUODENOSCOPY (EGD);  UPPER ENDOSCOPY WITH BIOPSIES;  Surgeon: Roger Vazquez MD;  Location: [facility]     ESOPHAGOSCOPY, GASTROSCOPY, DUODENOSCOPY (EGD), COMBINED N/A 2019    Procedure: UPPER ENDOSCOPY with biopsy;  Surgeon: Kang Cleveland MD;  Location: [facility]     HEAD & NECK SURGERY      c4-c5 fusion     HYSTERECTOMY, CRISTIANE  2004    uterine cancer; [facility]     INSERT PORT VASCULAR ACCESS N/A 2020    Procedure: INSERTION, VASCULAR ACCESS PORT LEFT;  Surgeon: Kang Cleveland MD;  Location: [facility]     LAPAROSCOPIC ASSISTED COLECTOMY Right 2020    Procedure: RIGHT KEVIN COLECTOMY, LYSIS OF ADHESIONS;  Surgeon: Kang Cleveland MD;  [facility]     LAPAROSCOPIC BYPASS GASTRIC  2010      Allergies    Allergen Reactions     Aspartame      Other reaction(s): Other - Describe In Comment Field, Seizures  convulsions     Bee Venom Anaphylaxis     Bupropion Anaphylaxis     Throat gets tight and hives. Denies allergic reaction     Food Anaphylaxis     Peppers-anaphylaxis if eaten, if touched hands swell and reddened     Food Allergy Formula Anaphylaxis     Peppers, patient reports green peppers close off her throat     Ibuprofen Anaphylaxis     No Clinical Screening - See Comments Anaphylaxis     Peppers, patient reports green peppers close off her throat     Piper Anaphylaxis     Peppers--green, black, red, chili, etc. Derm symptoms, hives;  Strawberries--(cooked) Itching, breathing difficulties, GI symptoms. Mushrooms--GI symptoms, Hives.Spinach (cooked) throat irritation  (Listed on [facility] problem list.)     Fentanyl Swelling     Lips and tongue swelled.  Swelling of lips and tongue     Adhesive Tape      blisters     Amitriptyline Other (See Comments)     Respiratory symptoms - denies allergic reaction     Liquid Adhesive Dermatitis     blisters  Blisters  Silk tape and tegaderm is fine     Oxcarbazepine Other (See Comments)     Throat gets tight and hives. Denies allergic reaction  Throat gets tight and hives-       Prozac [Fluoxetine]      Other reaction(s): *Unknown  uncontrolled muscle spasms     Valproic Acid      Respiratory symptoms ([facility]). Denies allergic reaction     Ziprasidone      Throat gets tight and hives. Denies allergic reaction      Social History     Tobacco Use     Smoking status: Never Smoker     Smokeless tobacco: Never Used   Substance Use Topics     Alcohol use: No     Frequency: Never     Comment: sober for 18 years      Wt Readings from Last 1 Encounters:   03/04/21 81.2 kg (179 lb)        Anesthesia Evaluation   Pt has had prior anesthetic.         ROS/MED HX  ENT/Pulmonary:       Neurologic: Comment: Concussion syndrome  insomnia    (+) migraines,      Cardiovascular:     (+) Dyslipidemia hypertension-----CHEN. fainting (syncope).     METS/Exercise Tolerance:     Hematologic:     (+) anemia, history of blood transfusion,     Musculoskeletal:   (+) arthritis,     GI/Hepatic: Comment: H/o GI bleed  S/p gastric bypass    (+) bowel prep,     Renal/Genitourinary: Comment: hydronephrosis    (+) renal disease,     Endo:     (+) type II DM, thyroid problem, hypothyroidism,     Psychiatric/Substance Use:     (+) psychiatric history anxiety and depression alcohol abuse H/O chronic opiod use .     Infectious Disease:       Malignancy:   (+) Malignancy, History of GI and Other.GI CA status post Surgery and Chemo.  Other CA uterine status post.    Other:      (+) , H/O Chronic Pain,           OUTSIDE LABS:  CBC:   Lab Results   Component Value Date    WBC 5.5 02/25/2021    WBC 5.4 01/18/2021    HGB 12.4 02/25/2021    HGB 12.3 01/18/2021    HCT 37.7 02/25/2021    HCT 35.3 01/18/2021     (L) 02/25/2021     01/18/2021     BMP:   Lab Results   Component Value Date     02/25/2021     01/18/2021    POTASSIUM 4.0 02/25/2021    POTASSIUM 3.4 01/18/2021    CHLORIDE 110 (H) 02/25/2021    CHLORIDE 112 (H) 01/18/2021    CO2 25 02/25/2021    CO2 28 01/18/2021    BUN 21 02/25/2021    BUN 11 01/18/2021    CR 0.72 02/25/2021    CR 0.67 01/18/2021     (H) 02/25/2021     (H) 01/18/2021     COAGS:   Lab Results   Component Value Date    PTT 28 05/29/2020    INR 1.14 05/29/2020     POC:   Lab Results   Component Value Date     (H) 05/29/2020    HCG Negative 09/26/2015     HEPATIC:   Lab Results   Component Value Date    ALBUMIN 3.7 01/18/2021    PROTTOTAL 7.3 01/18/2021    ALT 52 (H) 01/18/2021    AST 43 01/18/2021    ALKPHOS 78 01/18/2021    BILITOTAL 0.6 01/18/2021     OTHER:   Lab Results   Component Value Date    LACT 1.0 11/20/2019    A1C 5.0 03/04/2021    LOKESH 8.7 02/25/2021    MAG 1.9 11/06/2020    LIPASE 176 02/01/2019    AMYLASE 31  07/18/2015    TSH 8.20 (H) 03/04/2021    T4 0.61 (L) 03/04/2021    CRP <2.9 05/29/2020    SED 35 (H) 04/18/2015       Anesthesia Plan          Anesthesia Type: MAC.   Induction: Intravenous, Propofol.   Maintenance: Balanced.        Consents            Postoperative Care            Comments:    H and P 3/4/21 with pending stress test             JUNIOR Patel CRNA

## 2021-03-04 NOTE — NURSING NOTE
"Chief Complaint   Patient presents with     Pre-Op Exam       Initial /80 (BP Location: Right arm, Patient Position: Chair, Cuff Size: Adult Regular)   Pulse 86   Temp 97.3  F (36.3  C) (Tympanic)   Ht 1.702 m (5' 7\")   Wt 81.2 kg (179 lb)   SpO2 98%   BMI 28.04 kg/m   Estimated body mass index is 28.04 kg/m  as calculated from the following:    Height as of this encounter: 1.702 m (5' 7\").    Weight as of this encounter: 81.2 kg (179 lb).  Medication Reconciliation: complete  Stevo Watters LPN  "

## 2021-03-05 ENCOUNTER — TELEPHONE (OUTPATIENT)
Dept: FAMILY MEDICINE | Facility: OTHER | Age: 51
End: 2021-03-05

## 2021-03-05 ENCOUNTER — TELEPHONE (OUTPATIENT)
Dept: SURGERY | Facility: OTHER | Age: 51
End: 2021-03-05

## 2021-03-05 NOTE — TELEPHONE ENCOUNTER
Discussed with Constantino. She will reschedule tentatively to 3/25/2021 pending results of stress test. COVID test rescheduled. Randee in OR notified.

## 2021-03-05 NOTE — TELEPHONE ENCOUNTER
Called and notified pt and she wanted copies of this note, advised her to talk to health information and she agreed

## 2021-03-05 NOTE — TELEPHONE ENCOUNTER
10:19 AM    Reason for Call: Phone Call    Description: Constantino needs to speak with Dr Cook's nurse regarding she needs something written up on how her left should injury happened her insurance won't cover the up coming surgery she will be having. Constantino stated she has her medical records from her fall and it doesn't say in the records how it happened. Patient has an [employer] Policy.    Was an appointment offered for this call? No  If yes : Appointment type              Date    Preferred method for responding to this message: Telephone Call  What is your phone number ? [phone]    If we cannot reach you directly, may we leave a detailed response at the number you provided? Yes    Can this message wait until your PCP/provider returns, if available today? Светлана Saez

## 2021-03-05 NOTE — TELEPHONE ENCOUNTER
Call received from Ana Hector indicating that per anesthesia, this patient will need to postpone her 3/11 colonoscopy with Dr. Cleveland until after she has her stress test which was ordered by her PCP at preop due to chest pain. The stress test is scheduled for 3/12/2021.

## 2021-03-05 NOTE — TELEPHONE ENCOUNTER
Pt needing visit from 9-3-2020 stating that she fell down the stairs and that I show she hurt he shoulder. Insurance is not willing to cover surgery unless this is stated

## 2021-03-10 ENCOUNTER — INFUSION THERAPY VISIT (OUTPATIENT)
Dept: INFUSION THERAPY | Facility: OTHER | Age: 51
End: 2021-03-10
Attending: NURSE PRACTITIONER
Payer: COMMERCIAL

## 2021-03-10 DIAGNOSIS — E53.8 VITAMIN B 12 DEFICIENCY: ICD-10-CM

## 2021-03-10 DIAGNOSIS — D50.9 IRON DEFICIENCY ANEMIA, UNSPECIFIED IRON DEFICIENCY ANEMIA TYPE: Primary | ICD-10-CM

## 2021-03-10 PROCEDURE — 96372 THER/PROPH/DIAG INJ SC/IM: CPT | Performed by: NURSE PRACTITIONER

## 2021-03-10 PROCEDURE — 250N000011 HC RX IP 250 OP 636: Performed by: NURSE PRACTITIONER

## 2021-03-10 RX ORDER — CYANOCOBALAMIN 1000 UG/ML
1000 INJECTION, SOLUTION INTRAMUSCULAR; SUBCUTANEOUS ONCE
Status: COMPLETED | OUTPATIENT
Start: 2021-03-10 | End: 2021-03-10

## 2021-03-10 RX ORDER — CYANOCOBALAMIN 1000 UG/ML
1000 INJECTION, SOLUTION INTRAMUSCULAR; SUBCUTANEOUS ONCE
Status: CANCELLED | OUTPATIENT
Start: 2021-03-11 | End: 2021-03-11

## 2021-03-10 RX ADMIN — CYANOCOBALAMIN 1000 MCG: 1000 INJECTION, SOLUTION INTRAMUSCULAR at 09:23

## 2021-03-10 NOTE — PROGRESS NOTES
Patient is 50 years old, here today accompanied by self for injection of B12 per order of Charlene Soliz NP .      Patient denies questions nor concerns regarding medication, administration site, side effects, nor aftercare.  Patient identified with two identifiers, order verified, and verbal consent for today's injection obtained from patient.   Patient education provided on s/s of injection site infection, and/or medication specific side effects, and when to call a provider.  Patient instructed to report any adverse effects.     Medication administered per protocol in Lt Deltoid at 90 degree angle.  Site covered with sterile bandage.  Patient tolerated injection well, no verbal nor non-verbal signs of discomfort noted.  No adverse effects noted at this time.     Patient instructed to call with further questions or concerns.  Patient states understanding and is in agreement with this plan.  Copy of appointments, and after visit summary (AVS) given to patient. Patient discharged.

## 2021-03-10 NOTE — PATIENT INSTRUCTIONS
We will see you back as planned. If you have any questions or concerns, we can be reached Monday through Friday 8am - 430pm at [phone] [place] [place] If you have concerns related to a potential reaction/side effect after hours/weekends/holiday's, please seek emergent medical care.

## 2021-03-10 NOTE — PROGRESS NOTES
Physical Therapy Discharge Note      Name: Constantino Warren MRN# [id number]   Age: 50 year old YOB: 1970        Requesting provider: Dr. Cook  Diagnosis: acute bilateral low back pain with R sided sciatica  Prescription: Evaluate and treat  Frequency/duration: Therapists discretion  Services provided: The patient was seen for a total of 3 visits from 9/9/20 to 9/24/20.  See the previous documentation for treatment details.        Plan  Intervention:  The patient was sent for MRI of persistent left shoulder pain. It is uncertain if the patient has attained any further goals at this time.  The patient will be formally discharged from physical therapy care.  We will resume physical therapy services as per physician referral and discretion.     Follow up:  Recheck with physician as needed

## 2021-03-11 ENCOUNTER — TELEPHONE (OUTPATIENT)
Dept: FAMILY MEDICINE | Facility: OTHER | Age: 51
End: 2021-03-11

## 2021-03-11 ENCOUNTER — ANESTHESIA (OUTPATIENT)
Facility: HOSPITAL | Age: 51
End: 2021-03-11

## 2021-03-11 NOTE — TELEPHONE ENCOUNTER
Received a PA from [employer] for Tizanidine HCL 4mg tablets. Submitted on [facility]. Received an instant APPROVAL from [employer] for Tizanidine HCL 4mg tablets. Effective 1/1/2021 to 12/31/2021. Forms scanned to [facility].

## 2021-03-12 ENCOUNTER — HOSPITAL ENCOUNTER (OUTPATIENT)
Dept: NUCLEAR MEDICINE | Facility: HOSPITAL | Age: 51
Setting detail: NUCLEAR MEDICINE
End: 2021-03-12
Attending: FAMILY MEDICINE
Payer: COMMERCIAL

## 2021-03-12 ENCOUNTER — HOSPITAL ENCOUNTER (OUTPATIENT)
Dept: CARDIOLOGY | Facility: HOSPITAL | Age: 51
Setting detail: NUCLEAR MEDICINE
End: 2021-03-12
Attending: FAMILY MEDICINE
Payer: COMMERCIAL

## 2021-03-12 DIAGNOSIS — R07.9 CHEST PAIN, UNSPECIFIED TYPE: ICD-10-CM

## 2021-03-12 LAB
CV BLOOD PRESSURE: 71 %
CV STRESS MAX HR HE: 86
NUC STRESS EJECTION FRACTION: 73 %
RATE PRESSURE PRODUCT: NORMAL
STRESS ECHO BASELINE DIASTOLIC HE: 85
STRESS ECHO BASELINE HR: 51
STRESS ECHO BASELINE SYSTOLIC BP: 145
STRESS ECHO CALCULATED PERCENT HR: 51 %
STRESS ECHO LAST STRESS DIASTOLIC BP: 75
STRESS ECHO LAST STRESS SYSTOLIC BP: 125
STRESS ECHO TARGET HR: 169

## 2021-03-12 PROCEDURE — 78452 HT MUSCLE IMAGE SPECT MULT: CPT

## 2021-03-12 PROCEDURE — 93017 CV STRESS TEST TRACING ONLY: CPT | Performed by: INTERNAL MEDICINE

## 2021-03-12 PROCEDURE — A9500 TC99M SESTAMIBI: HCPCS | Performed by: RADIOLOGY

## 2021-03-12 PROCEDURE — 343N000001 HC RX 343: Performed by: RADIOLOGY

## 2021-03-12 PROCEDURE — 93016 CV STRESS TEST SUPVJ ONLY: CPT | Performed by: INTERNAL MEDICINE

## 2021-03-12 PROCEDURE — 93018 CV STRESS TEST I&R ONLY: CPT | Performed by: INTERNAL MEDICINE

## 2021-03-12 PROCEDURE — 250N000011 HC RX IP 250 OP 636: Performed by: INTERNAL MEDICINE

## 2021-03-12 RX ORDER — REGADENOSON 0.08 MG/ML
0.4 INJECTION, SOLUTION INTRAVENOUS ONCE
Status: COMPLETED | OUTPATIENT
Start: 2021-03-12 | End: 2021-03-12

## 2021-03-12 RX ORDER — AMINOPHYLLINE 25 MG/ML
INJECTION, SOLUTION INTRAVENOUS
Status: DISCONTINUED
Start: 2021-03-12 | End: 2021-03-12 | Stop reason: WASHOUT

## 2021-03-12 RX ADMIN — Medication 31.4 MILLICURIE: at 08:19

## 2021-03-12 RX ADMIN — REGADENOSON 0.4 MG: 0.08 INJECTION, SOLUTION INTRAVENOUS at 08:17

## 2021-03-12 RX ADMIN — Medication 10.9 MILLICURIE: at 06:33

## 2021-03-19 ENCOUNTER — TELEPHONE (OUTPATIENT)
Dept: SURGERY | Facility: OTHER | Age: 51
End: 2021-03-19

## 2021-03-19 NOTE — TELEPHONE ENCOUNTER
Patient called and wanted to cancel procedure. All appointments have been cancelled for patient.Patient did not provide reason for cancellation. Jeannette Grayson LPN

## 2021-03-31 NOTE — PROGRESS NOTES
[place] [facility] [place]: [phone] DR GONZALEZ

## 2021-04-02 ENCOUNTER — OFFICE VISIT (OUTPATIENT)
Dept: FAMILY MEDICINE | Facility: OTHER | Age: 51
End: 2021-04-02
Attending: OPHTHALMOLOGY
Payer: COMMERCIAL

## 2021-04-02 DIAGNOSIS — Z01.818 PREOP TESTING: ICD-10-CM

## 2021-04-02 LAB
SARS-COV-2 RNA RESP QL NAA+PROBE: NORMAL
SPECIMEN SOURCE: NORMAL

## 2021-04-02 PROCEDURE — U0005 INFEC AGEN DETEC AMPLI PROBE: HCPCS | Mod: ZL | Performed by: SURGERY

## 2021-04-02 PROCEDURE — U0003 INFECTIOUS AGENT DETECTION BY NUCLEIC ACID (DNA OR RNA); SEVERE ACUTE RESPIRATORY SYNDROME CORONAVIRUS 2 (SARS-COV-2) (CORONAVIRUS DISEASE [COVID-19]), AMPLIFIED PROBE TECHNIQUE, MAKING USE OF HIGH THROUGHPUT TECHNOLOGIES AS DESCRIBED BY CMS-2020-01-R: HCPCS | Mod: ZL | Performed by: SURGERY

## 2021-04-03 LAB
LABORATORY COMMENT REPORT: NORMAL
SARS-COV-2 RNA RESP QL NAA+PROBE: NEGATIVE
SPECIMEN SOURCE: NORMAL

## 2021-04-07 ENCOUNTER — INFUSION THERAPY VISIT (OUTPATIENT)
Dept: INFUSION THERAPY | Facility: OTHER | Age: 51
End: 2021-04-07
Attending: NURSE PRACTITIONER
Payer: COMMERCIAL

## 2021-04-07 DIAGNOSIS — D50.9 IRON DEFICIENCY ANEMIA, UNSPECIFIED IRON DEFICIENCY ANEMIA TYPE: Primary | ICD-10-CM

## 2021-04-07 DIAGNOSIS — E53.8 VITAMIN B 12 DEFICIENCY: ICD-10-CM

## 2021-04-07 PROCEDURE — 96372 THER/PROPH/DIAG INJ SC/IM: CPT | Performed by: NURSE PRACTITIONER

## 2021-04-07 PROCEDURE — 250N000011 HC RX IP 250 OP 636: Performed by: NURSE PRACTITIONER

## 2021-04-07 RX ORDER — CYANOCOBALAMIN 1000 UG/ML
1000 INJECTION, SOLUTION INTRAMUSCULAR; SUBCUTANEOUS ONCE
Status: CANCELLED | OUTPATIENT
Start: 2021-04-08 | End: 2021-04-08

## 2021-04-07 RX ORDER — CYANOCOBALAMIN 1000 UG/ML
1000 INJECTION, SOLUTION INTRAMUSCULAR; SUBCUTANEOUS ONCE
Status: COMPLETED | OUTPATIENT
Start: 2021-04-07 | End: 2021-04-07

## 2021-04-07 RX ADMIN — CYANOCOBALAMIN 1000 MCG: 1000 INJECTION, SOLUTION INTRAMUSCULAR at 13:49

## 2021-04-07 NOTE — PROGRESS NOTES
Patient is 50 years old, here today accompanied by self for injection of B12 per order of Charlene Moran.     Patient  lab values are not required for today's visit.     Labs meet parameters for today's injection.     Patient denies questions nor concerns regarding medication, administration site, side effects, nor aftercare.  Patient identified with two identifiers, order verified, and verbal consent for today's injection obtained from patient.   Patient education provided on s/s of injection site infection, and/or medication specific side effects, and when to call a provider.  Patient instructed to report any adverse effects.     Medication administered per protocol in left deltoid at 90  degree angle.  Site covered with sterile bandage.  Patient tolerated injection well, no verbal nor non-verbal signs of discomfort noted.  No adverse effects noted at this time.     Patient instructed to call with further questions or concerns.  Patient states understanding and is in agreement with this plan.  Copy of appointments, and after visit summary (AVS) given to patient. Patient discharged.

## 2021-04-07 NOTE — PATIENT INSTRUCTIONS
We will see you back as planned. If you have any questions or concerns, we can be reached Monday through Friday 8am - 430pm at [phone] [place] [place] If you have concerns related to a potential reaction/side effect after hours/weekends/holiday's, please seek emergent medical care.

## 2021-04-15 ENCOUNTER — OFFICE VISIT (OUTPATIENT)
Dept: FAMILY MEDICINE | Facility: OTHER | Age: 51
End: 2021-04-15
Attending: OPHTHALMOLOGY
Payer: COMMERCIAL

## 2021-04-15 DIAGNOSIS — Z20.822 COVID-19 RULED OUT: Primary | ICD-10-CM

## 2021-04-15 PROCEDURE — U0005 INFEC AGEN DETEC AMPLI PROBE: HCPCS | Mod: ZL | Performed by: OPHTHALMOLOGY

## 2021-04-15 PROCEDURE — U0003 INFECTIOUS AGENT DETECTION BY NUCLEIC ACID (DNA OR RNA); SEVERE ACUTE RESPIRATORY SYNDROME CORONAVIRUS 2 (SARS-COV-2) (CORONAVIRUS DISEASE [COVID-19]), AMPLIFIED PROBE TECHNIQUE, MAKING USE OF HIGH THROUGHPUT TECHNOLOGIES AS DESCRIBED BY CMS-2020-01-R: HCPCS | Mod: ZL | Performed by: OPHTHALMOLOGY

## 2021-04-20 LAB
LABORATORY COMMENT REPORT: NORMAL
SARS-COV-2 RNA RESP QL NAA+PROBE: NEGATIVE
SARS-COV-2 RNA RESP QL NAA+PROBE: NORMAL
SPECIMEN SOURCE: NORMAL
SPECIMEN SOURCE: NORMAL

## 2021-04-29 ENCOUNTER — ONCOLOGY VISIT (OUTPATIENT)
Dept: ONCOLOGY | Facility: OTHER | Age: 51
End: 2021-04-29
Attending: NURSE PRACTITIONER
Payer: COMMERCIAL

## 2021-04-29 ENCOUNTER — INFUSION THERAPY VISIT (OUTPATIENT)
Dept: INFUSION THERAPY | Facility: OTHER | Age: 51
End: 2021-04-29
Attending: NURSE PRACTITIONER
Payer: COMMERCIAL

## 2021-04-29 VITALS
SYSTOLIC BLOOD PRESSURE: 137 MMHG | RESPIRATION RATE: 20 BRPM | HEART RATE: 83 BPM | BODY MASS INDEX: 28.89 KG/M2 | OXYGEN SATURATION: 98 % | WEIGHT: 184.08 LBS | DIASTOLIC BLOOD PRESSURE: 81 MMHG | HEIGHT: 67 IN | TEMPERATURE: 96.4 F

## 2021-04-29 DIAGNOSIS — E03.9 HYPOTHYROIDISM, UNSPECIFIED TYPE: Primary | ICD-10-CM

## 2021-04-29 DIAGNOSIS — C18.2 CANCER OF ASCENDING COLON (H): ICD-10-CM

## 2021-04-29 DIAGNOSIS — E03.9 HYPOTHYROIDISM, UNSPECIFIED TYPE: ICD-10-CM

## 2021-04-29 DIAGNOSIS — R53.83 FATIGUE, UNSPECIFIED TYPE: ICD-10-CM

## 2021-04-29 DIAGNOSIS — R51.9 CHRONIC INTRACTABLE HEADACHE, UNSPECIFIED HEADACHE TYPE: ICD-10-CM

## 2021-04-29 DIAGNOSIS — G89.29 CHRONIC INTRACTABLE HEADACHE, UNSPECIFIED HEADACHE TYPE: ICD-10-CM

## 2021-04-29 DIAGNOSIS — E55.9 VITAMIN D DEFICIENCY: ICD-10-CM

## 2021-04-29 DIAGNOSIS — D50.9 IRON DEFICIENCY ANEMIA, UNSPECIFIED IRON DEFICIENCY ANEMIA TYPE: ICD-10-CM

## 2021-04-29 DIAGNOSIS — E83.51 HYPOCALCEMIA: ICD-10-CM

## 2021-04-29 DIAGNOSIS — G62.9 NEUROPATHY: ICD-10-CM

## 2021-04-29 DIAGNOSIS — C18.2 CANCER OF ASCENDING COLON (H): Primary | ICD-10-CM

## 2021-04-29 DIAGNOSIS — E53.8 VITAMIN B 12 DEFICIENCY: ICD-10-CM

## 2021-04-29 DIAGNOSIS — E61.1 IRON DEFICIENCY: ICD-10-CM

## 2021-04-29 LAB
ALBUMIN SERPL-MCNC: 3.6 G/DL (ref 3.4–5)
ALP SERPL-CCNC: 80 U/L (ref 40–150)
ALT SERPL W P-5'-P-CCNC: 38 U/L (ref 0–50)
ANION GAP SERPL CALCULATED.3IONS-SCNC: 5 MMOL/L (ref 3–14)
AST SERPL W P-5'-P-CCNC: 31 U/L (ref 0–45)
BASOPHILS # BLD AUTO: 0 10E9/L (ref 0–0.2)
BASOPHILS NFR BLD AUTO: 0.1 %
BILIRUB SERPL-MCNC: 0.3 MG/DL (ref 0.2–1.3)
BUN SERPL-MCNC: 19 MG/DL (ref 7–30)
CALCIUM SERPL-MCNC: 8.4 MG/DL (ref 8.5–10.1)
CHLORIDE SERPL-SCNC: 111 MMOL/L (ref 94–109)
CO2 SERPL-SCNC: 25 MMOL/L (ref 20–32)
CREAT SERPL-MCNC: 0.66 MG/DL (ref 0.52–1.04)
DIFFERENTIAL METHOD BLD: NORMAL
EOSINOPHIL # BLD AUTO: 0 10E9/L (ref 0–0.7)
EOSINOPHIL NFR BLD AUTO: 0 %
ERYTHROCYTE [DISTWIDTH] IN BLOOD BY AUTOMATED COUNT: 12.7 % (ref 10–15)
FERRITIN SERPL-MCNC: 13 NG/ML (ref 8–252)
FOLATE SERPL-MCNC: 22.4 NG/ML
GFR SERPL CREATININE-BSD FRML MDRD: >90 ML/MIN/{1.73_M2}
GLUCOSE SERPL-MCNC: 130 MG/DL (ref 70–99)
HCT VFR BLD AUTO: 35.5 % (ref 35–47)
HGB BLD-MCNC: 11.8 G/DL (ref 11.7–15.7)
IMM GRANULOCYTES # BLD: 0 10E9/L (ref 0–0.4)
IMM GRANULOCYTES NFR BLD: 0.4 %
IRON SATN MFR SERPL: 14 % (ref 15–46)
IRON SERPL-MCNC: 59 UG/DL (ref 35–180)
LDH SERPL L TO P-CCNC: 173 U/L (ref 81–234)
LYMPHOCYTES # BLD AUTO: 1.6 10E9/L (ref 0.8–5.3)
LYMPHOCYTES NFR BLD AUTO: 20.5 %
MCH RBC QN AUTO: 29.6 PG (ref 26.5–33)
MCHC RBC AUTO-ENTMCNC: 33.2 G/DL (ref 31.5–36.5)
MCV RBC AUTO: 89 FL (ref 78–100)
MONOCYTES # BLD AUTO: 0.6 10E9/L (ref 0–1.3)
MONOCYTES NFR BLD AUTO: 7.2 %
NEUTROPHILS # BLD AUTO: 5.8 10E9/L (ref 1.6–8.3)
NEUTROPHILS NFR BLD AUTO: 71.8 %
NRBC # BLD AUTO: 0 10*3/UL
NRBC BLD AUTO-RTO: 0 /100
PLATELET # BLD AUTO: 171 10E9/L (ref 150–450)
POTASSIUM SERPL-SCNC: 3.8 MMOL/L (ref 3.4–5.3)
PROT SERPL-MCNC: 7.1 G/DL (ref 6.8–8.8)
RBC # BLD AUTO: 3.98 10E12/L (ref 3.8–5.2)
SODIUM SERPL-SCNC: 141 MMOL/L (ref 133–144)
T4 FREE SERPL-MCNC: 0.76 NG/DL (ref 0.76–1.46)
TIBC SERPL-MCNC: 410 UG/DL (ref 240–430)
TSH SERPL DL<=0.005 MIU/L-ACNC: 8.25 MU/L (ref 0.4–4)
WBC # BLD AUTO: 8 10E9/L (ref 4–11)

## 2021-04-29 PROCEDURE — 99215 OFFICE O/P EST HI 40 MIN: CPT | Performed by: NURSE PRACTITIONER

## 2021-04-29 PROCEDURE — 82746 ASSAY OF FOLIC ACID SERUM: CPT | Mod: ZL | Performed by: NURSE PRACTITIONER

## 2021-04-29 PROCEDURE — G0463 HOSPITAL OUTPT CLINIC VISIT: HCPCS | Mod: 25

## 2021-04-29 PROCEDURE — 84439 ASSAY OF FREE THYROXINE: CPT | Mod: ZL | Performed by: FAMILY MEDICINE

## 2021-04-29 PROCEDURE — 82728 ASSAY OF FERRITIN: CPT | Mod: ZL | Performed by: FAMILY MEDICINE

## 2021-04-29 PROCEDURE — 96523 IRRIG DRUG DELIVERY DEVICE: CPT

## 2021-04-29 PROCEDURE — 36415 COLL VENOUS BLD VENIPUNCTURE: CPT | Mod: ZL | Performed by: FAMILY MEDICINE

## 2021-04-29 PROCEDURE — 250N000011 HC RX IP 250 OP 636: Performed by: NURSE PRACTITIONER

## 2021-04-29 PROCEDURE — 85025 COMPLETE CBC W/AUTO DIFF WBC: CPT | Mod: ZL | Performed by: FAMILY MEDICINE

## 2021-04-29 PROCEDURE — 82378 CARCINOEMBRYONIC ANTIGEN: CPT | Mod: ZL | Performed by: FAMILY MEDICINE

## 2021-04-29 PROCEDURE — 80053 COMPREHEN METABOLIC PANEL: CPT | Mod: ZL | Performed by: FAMILY MEDICINE

## 2021-04-29 PROCEDURE — 82306 VITAMIN D 25 HYDROXY: CPT | Mod: ZL | Performed by: FAMILY MEDICINE

## 2021-04-29 PROCEDURE — G0463 HOSPITAL OUTPT CLINIC VISIT: HCPCS

## 2021-04-29 PROCEDURE — 83540 ASSAY OF IRON: CPT | Mod: ZL | Performed by: FAMILY MEDICINE

## 2021-04-29 PROCEDURE — 83550 IRON BINDING TEST: CPT | Mod: ZL | Performed by: FAMILY MEDICINE

## 2021-04-29 PROCEDURE — 84443 ASSAY THYROID STIM HORMONE: CPT | Mod: ZL | Performed by: FAMILY MEDICINE

## 2021-04-29 PROCEDURE — 82607 VITAMIN B-12: CPT | Mod: ZL | Performed by: FAMILY MEDICINE

## 2021-04-29 PROCEDURE — 83615 LACTATE (LD) (LDH) ENZYME: CPT | Mod: ZL | Performed by: FAMILY MEDICINE

## 2021-04-29 PROCEDURE — 96372 THER/PROPH/DIAG INJ SC/IM: CPT | Performed by: NURSE PRACTITIONER

## 2021-04-29 RX ORDER — KETOROLAC TROMETHAMINE 5 MG/ML
SOLUTION OPHTHALMIC
COMMUNITY
Start: 2021-04-07 | End: 2021-08-12

## 2021-04-29 RX ORDER — HEPARIN SODIUM (PORCINE) LOCK FLUSH IV SOLN 100 UNIT/ML 100 UNIT/ML
5 SOLUTION INTRAVENOUS
Status: CANCELLED | OUTPATIENT
Start: 2021-05-05

## 2021-04-29 RX ORDER — PREDNISOLONE ACETATE 10 MG/ML
SUSPENSION/ DROPS OPHTHALMIC
COMMUNITY
Start: 2021-04-07 | End: 2021-08-12

## 2021-04-29 RX ORDER — CYANOCOBALAMIN 1000 UG/ML
1000 INJECTION, SOLUTION INTRAMUSCULAR; SUBCUTANEOUS
Status: CANCELLED
Start: 2021-05-13

## 2021-04-29 RX ORDER — CYANOCOBALAMIN 1000 UG/ML
1000 INJECTION, SOLUTION INTRAMUSCULAR; SUBCUTANEOUS ONCE
Status: COMPLETED | OUTPATIENT
Start: 2021-04-29 | End: 2021-04-29

## 2021-04-29 RX ORDER — LISINOPRIL 10 MG/1
10 TABLET ORAL DAILY
COMMUNITY
Start: 2020-05-29 | End: 2021-08-28

## 2021-04-29 RX ORDER — HEPARIN SODIUM (PORCINE) LOCK FLUSH IV SOLN 100 UNIT/ML 100 UNIT/ML
5 SOLUTION INTRAVENOUS
Status: CANCELLED | OUTPATIENT
Start: 2021-05-13

## 2021-04-29 RX ORDER — DULOXETIN HYDROCHLORIDE 30 MG/1
30 CAPSULE, DELAYED RELEASE ORAL 2 TIMES DAILY
Qty: 60 CAPSULE | Refills: 3 | Status: SHIPPED | OUTPATIENT
Start: 2021-04-29 | End: 2022-02-25

## 2021-04-29 RX ORDER — CYANOCOBALAMIN 1000 UG/ML
1000 INJECTION, SOLUTION INTRAMUSCULAR; SUBCUTANEOUS ONCE
Status: CANCELLED | OUTPATIENT
Start: 2021-05-05 | End: 2021-05-05

## 2021-04-29 RX ORDER — HEPARIN SODIUM (PORCINE) LOCK FLUSH IV SOLN 100 UNIT/ML 100 UNIT/ML
5 SOLUTION INTRAVENOUS ONCE
Status: COMPLETED | OUTPATIENT
Start: 2021-04-29 | End: 2021-04-29

## 2021-04-29 RX ADMIN — HEPARIN 5 ML: 100 SYRINGE at 12:49

## 2021-04-29 RX ADMIN — CYANOCOBALAMIN 1000 MCG: 1000 INJECTION, SOLUTION INTRAMUSCULAR at 12:47

## 2021-04-29 ASSESSMENT — MIFFLIN-ST. JEOR: SCORE: 1482.63

## 2021-04-29 ASSESSMENT — PAIN SCALES - GENERAL: PAINLEVEL: MODERATE PAIN (5)

## 2021-04-29 NOTE — PROGRESS NOTES
Patients port accessed using non-coring, 19 gauge, 3/4 needle, per facility protocol.     Hand hygiene performed: yes   Mask donned by caregiver: yes  Site prepped with CHG: yes   Labs drawn: yes   Dressing applied using aseptic technique: yes     Port flushed easily, without resistance. Flushed with 10 cc's normal saline.   Immediate blood return noted. 10 cc blood discarded.  6 vials blood draw and sent to lab for results.  Port flushed with 20 cc 0.9% normal saline and 5 cc heparinized saline.  Needle removed intact, sterile dressing applied.  Slight pressure applied for 30 seconds.   Patient tolerated port flush well, denies pain nor discomfort at this time. Patient instructed to leave dressing intact for a minimum of one hour, and to call with questions or concerns.  Patient states understanding and is in agreement with this plan. Patient discharged.

## 2021-04-29 NOTE — PATIENT INSTRUCTIONS
We will try and get an iron infusion prior authorized for you.  We will call you when we know if it will be covered.    Continue with the Vitamin B12 injections every 14 days and flush monthly.    We would like to see you back in 3 months. Please come 2-3 days prior for lab work and imaging.     Your prescription for Cymbalta (duloxetene)  has been sent to: [facility].      When you are in need of a refill, please call your pharmacy and they will send us a request.     If you have any questions please call [phone]    Other instructions:      Take your levothyroxine on an empty stomach at least 30 minutes prior to other medications and at least 4 hours from calcium.   Malaika Rogers.PA Dr Ibarra.PA Dr Ibarra.PA

## 2021-04-29 NOTE — NURSING NOTE
"Chief Complaint   Patient presents with     RECHECK     Follow up Colon cancer        Initial /81   Pulse 83   Temp 96.4  F (35.8  C) (Tympanic)   Resp 20   Ht 1.702 m (5' 7\")   Wt 83.5 kg (184 lb 1.4 oz)   SpO2 98%   BMI 28.83 kg/m   Estimated body mass index is 28.83 kg/m  as calculated from the following:    Height as of this encounter: 1.702 m (5' 7\").    Weight as of this encounter: 83.5 kg (184 lb 1.4 oz).  Medication Reconciliation: complete.  Immunizations and advance directives status reviewed. Pain scale =0 , PHQ-2=0.            Juana Mo LPN    "

## 2021-04-29 NOTE — PROGRESS NOTES
Oncology Follow-up Visit:  April 29, 2021    Reason for Visit:  Patient presents with:  RECHECK: Follow up Colon cancer      Nursing Note and documentation reviewed: yes    HPI:   This is a 50-year-old female patient who presents to the oncology clinic today in follow up of StageIIIB adenocarcinoma of the ascending colon diagnosed February 2020.  She completed adjuvant chemotherapy on 6/22/2020 and is being followed with surveillance.     Colonoscopy was completed on 10/26/2020 and was considered incomplete related to very poor prep.  Anastomosis site not visualized.  The plan was for a repeat colonoscopy.  She has not had this rescheduled as of yet due to multiple health issues and personal issues.    Patient presents to the clinic today stating she is doing okay.  She continues to have a headache stating this is not continuous but remains.  Was placed on verapamil by her PCP and states she does not feel it is helping.  She has undergone cataract surgery in both eyes in the last 3 weeks and knows that her glasses are not right.  She will be seen by her eye doctor next week to get new glasses.    She continues with numbness and tingling in her hands and her feet.  Her hands mainly bother her when she reaches into the freezer.  She does have pain in her feet at times if she is walking a lot.  She states at times she is unable to walk because of it.    She also complains of fairly extreme fatigue.  She has not been consistent with her vitamin D and states she has taken 10,000 units for the last 3 days.  She initiated  a calcium supplement with vitamin D yesterday.  Her B12 injections are to be done every 2 weeks and is unclear regarding consistency with these.    She states she is taking her thyroid medications at the same time as all of her medications.    Oncologic History:     11/21/19 to 11/21/2019 patient was hospitalized with a hemoglobin of 5.7 underwent EGD with no evidence of active bleeding.  12/12/2019 she  was seen by Dr. Cook and established care with her as her new PCP.  Referral for screening colonoscopy was completed.  2/13/2020 patient underwent colonoscopy by Dr. Cleveland where a near obstructing lesion in the right colon was found.  Pathology showed an invasive moderately differentiated adenocarcinoma.  CT the abdomen and pelvis showed a mass in the junction of the cecum and ascending colon highly suspicious for malignancy.  2/24/2020  She then underwent laparoscopic-assisted right hemicolectomy by Dr. Cleveland that was converted to open right hemicolectomy when it was noted that the tumor was adherent to the pelvic brim; final pathology showed mildly differentiated adenocarcinoma of the right colon with 1/32 lymph nodes positive; there was perineural invasion; tumor budding and acutely inflamed tumor and reactive lymph nodes with peritonitis; the omentum showed mild peritonitis; margins were negative; patient was staged [id number]; IIIb adenocarcinoma of the right colon  2/28/2020 Port-A-Cath placement  3/25/2020 PET scan which showed no evidence of distant metastatic disease  3/30/2020 patient was seen by Dr. Palma with Medical Oncology with recommendation for XELOX; capecitabine 850 mg/m2 p.o. b.i.d. on days 1 through 14 of a 21-day cycle, oxaliplatin 130 mg/m2 to be given on day 1 x4 cycles.  4/10/2020 she was seen by Dr. Bryant with Radiation Oncology with no recommendation for radiation therapy at this time related to negative margins on pathology  4/17/2020 initiation of chemotherapy  6/22/2020  Completion of 4 cycles of XELOX     MMR proteins intact  CEA prior to surgery was <0.5     Current Chemo Regime/TX:  N/a  Current Cycle:  n/a  # of completed cycles:  n/a     Previous treatment:   Oxaliplatin/Capecitabine x 4 cycles     Past Medical History:   Diagnosis Date     Arthritis 10/13/2020     Cancer (H)     colon     Cancer (H) 2004    uterine     Chronic pain      Depressive disorder      Diabetes (H)       History of blood transfusion      Hypertension      Prediabetes      Thyroid disease        Past Surgical History:   Procedure Laterality Date     ARTHROSCOPY SHOULDER Left 11/6/2020    Procedure: LEFT SHOULDER ARTHROSCOPY WITH ROTATOR CUFF REPAIR AND ACROMIUM CLAVICULAR JOINT RESECTION;  Surgeon: Tanner Mujica DO;  Location: [facility]     BACK SURGERY      2008; 2011     COLONOSCOPY N/A 2/13/2020    Procedure: COLONOSCOPY WITH BIOPSY AND TATOOING OF ASCENDING COLON,INCOMEPLETE EXAM;  Surgeon: Kang Cleveland MD;  Location: [facility]     COLONOSCOPY N/A 10/26/2020    Procedure: incomplete colonoscopy ;  Surgeon: Kang Cleveland MD;  Location: [facility]     ESOPHAGOSCOPY, GASTROSCOPY, DUODENOSCOPY (EGD), COMBINED N/A 7/21/2018    Procedure: COMBINED ESOPHAGOSCOPY, GASTROSCOPY, DUODENOSCOPY (EGD);  UPPER ENDOSCOPY WITH BIOPSIES;  Surgeon: Roger Vazquez MD;  Location: [facility]     ESOPHAGOSCOPY, GASTROSCOPY, DUODENOSCOPY (EGD), COMBINED N/A 11/21/2019    Procedure: UPPER ENDOSCOPY with biopsy;  Surgeon: Kang Cleveland MD;  Location: [facility]     HEAD & NECK SURGERY  2008    c4-c5 fusion     HYSTERECTOMY, [facility]  04/2004    uterine cancer; [facility]     INSERT PORT VASCULAR ACCESS N/A 2/28/2020    Procedure: INSERTION, VASCULAR ACCESS PORT LEFT;  Surgeon: Kang Cleveland MD;  Location: [facility]     LAPAROSCOPIC ASSISTED COLECTOMY Right 2/24/2020    Procedure: RIGHT KEVIN COLECTOMY, LYSIS OF ADHESIONS;  Surgeon: Kang Cleveland MD;  Location: [facility]     LAPAROSCOPIC BYPASS GASTRIC  12/2010       Family History   Problem Relation Age of Onset     Diabetes Mother      Hypertension Mother      Coronary Artery Disease Mother      Myocardial Infarction Mother      Hypertension Father      Diabetes Maternal Grandmother      Hypertension Maternal Grandfather      Coronary Artery Disease Maternal Grandfather      Cancer Paternal Grandfather         unsure of type of cancer     Bladder Cancer Paternal  Grandfather      Diabetes Maternal Aunt      Cerebrovascular Disease Maternal Aunt      Diabetes Maternal Aunt      Cerebrovascular Disease Maternal Aunt      Diabetes Maternal Aunt      Cerebrovascular Disease Maternal Aunt      Hyperlipidemia No family hx of      Breast Cancer No family hx of      Colon Cancer No family hx of      Prostate Cancer No family hx of      Thyroid Disease No family hx of      Anesthesia Reaction No family hx of      Asthma No family hx of      Genetic Disorder No family hx of        Social History     Socioeconomic History     Marital status:      Spouse name: Not on file     Number of children: 2     Years of education: Not on file     Highest education level: Not on file   Occupational History     Occupation: PCA     Comment: currently not working, unemployment    Social Needs     Financial resource strain: Not hard at all     Food insecurity     Worry: Not on file     Inability: Not on file     Transportation needs     Medical: Patient refused     Non-medical: Patient refused   Tobacco Use     Smoking status: Never Smoker     Smokeless tobacco: Never Used   Substance and Sexual Activity     Alcohol use: No     Frequency: Never     Comment: sober for 18 years     Drug use: No     Sexual activity: Never   Lifestyle     Physical activity     Days per week: Not on file     Minutes per session: Not on file     Stress: Not on file   Relationships     Social connections     Talks on phone: Not on file     Gets together: Not on file     Attends Congregation service: Not on file     Active member of club or organization: Not on file     Attends meetings of clubs or organizations: Not on file     Relationship status: Not on file     Intimate partner violence     Fear of current or ex partner: Not on file     Emotionally abused: Not on file     Physically abused: Not on file     Forced sexual activity: Not on file   Other Topics Concern     Parent/sibling w/ CABG, MI or angioplasty before  65F 55M? Yes     Comment: Mother   Social History Narrative     Not on file       Current Outpatient Medications   Medication     acetaminophen (TYLENOL) 500 MG tablet     Cyanocobalamin 1000 MCG/ML KIT     ketorolac (ACULAR) 0.5 % ophthalmic solution     levothyroxine (SYNTHROID/LEVOTHROID) 200 MCG tablet     levothyroxine (SYNTHROID/LEVOTHROID) 25 MCG tablet     lisinopril (ZESTRIL) 10 MG tablet     multivitamin, therapeutic with minerals (MULTI-VITAMIN) TABS     prednisoLONE acetate (PRED FORTE) 1 % ophthalmic suspension     sertraline (ZOLOFT) 100 MG tablet     tiZANidine (ZANAFLEX) 4 MG tablet     traZODone (DESYREL) 50 MG tablet     verapamil (CALAN) 40 MG tablet     Vitamin D3 (CHOLECALCIFEROL) 25 mcg (1000 units) tablet     diazepam (VALIUM) 5 MG tablet     EPINEPHrine (ANY BX GENERIC EQUIV) 0.3 MG/0.3ML injection 2-pack     lidocaine-prilocaine (EMLA) 2.5-2.5 % external cream     ondansetron (ZOFRAN-ODT) 4 MG ODT tab     No current facility-administered medications for this visit.         Allergies   Allergen Reactions     Aspartame      Other reaction(s): Other - Describe In Comment Field, Seizures  convulsions     Bee Venom Anaphylaxis     Bupropion Anaphylaxis     Throat gets tight and hives. Denies allergic reaction     Food Anaphylaxis     Peppers-anaphylaxis if eaten, if touched hands swell and reddened     Food Allergy Formula Anaphylaxis     Peppers, patient reports green peppers close off her throat     Ibuprofen Anaphylaxis     No Clinical Screening - See Comments Anaphylaxis     Peppers, patient reports green peppers close off her throat     Piper Anaphylaxis     Peppers--green, black, red, chili, etc. Derm symptoms, hives;  Strawberries--(cooked) Itching, breathing difficulties, GI symptoms. Mushrooms--GI symptoms, Hives.Spinach (cooked) throat irritation  (Listed on [facility] problem list.)     Fentanyl Swelling     Lips and tongue swelled.  Swelling of lips and tongue      "Adhesive Tape      blisters     Amitriptyline Other (See Comments)     Respiratory symptoms - denies allergic reaction     Liquid Adhesive Dermatitis     blisters  Blisters  Silk tape and tegaderm is fine     Oxcarbazepine Other (See Comments)     Throat gets tight and hives. Denies allergic reaction  Throat gets tight and hives-       Prozac [Fluoxetine]      Other reaction(s): *Unknown  uncontrolled muscle spasms     Valproic Acid      Respiratory symptoms ([facility]). Denies allergic reaction     Ziprasidone      Throat gets tight and hives. Denies allergic reaction       Review Of Systems:  Constitutional:    denies fever, weight changes, chills, and night sweats.  Eyes:    denies blurred or double vision  Ears/Nose/Throat:   denies ear pain, nose problems, difficulty swallowing  Respiratory:   denies shortness of breath, cough or hemoptysis  Skin:   denies rash, lesions  Cardiovascular:   denies chest pain, palpitations, edema  Gastrointestinal:   denies abdominal pain, bloating, nausea, vomiting, early satiety; no change in bowel habits or blood in stool  Genitourinary:   denies difficulty with urination, blood in urine  Musculoskeletal:    denies new muscle pain, bone pain  Neurologic:   denies lightheadedness, headaches, numbness or tingling  Psychiatric:   denies anxiety, depression  Hematologic/Lymphatic/Immunologic:   denies easy bruising, easy bleeding, lumps or bumps noted  Endocrine:   Denies increased thirst      Physical Exam:  /81   Pulse 83   Temp 96.4  F (35.8  C) (Tympanic)   Resp 20   Ht 1.702 m (5' 7\")   Wt 83.5 kg (184 lb 1.4 oz)   SpO2 98%   BMI 28.83 kg/m      GENERAL APPEARANCE: Healthy, alert and in no acute distress.  HEENT: Normocephalic, Sclerae anicteric.   NECK:   No asymmetry or masses, no thyromegaly.  LYMPHATICS: No palpable cervical, supraclavicular, axillary, or inguinal nodes   RESP: Lungs clear to auscultation bilaterally, respirations regular and " easy  CARDIOVASCULAR: Regular rate and rhythm. Normal S1, S2  ABDOMEN: Soft, nontender. Bowel sounds auscultated all 4 quadrants. No palpable organomegaly or masses.  MUSCULOSKELETAL: Extremities without gross deformities noted.   NEURO: Alert and oriented x 3.  Gait steady.  PSYCHIATRIC: Mentation and affect appear normal.  Mood appropriate.    Laboratory:    Results for orders placed or performed in visit on 04/29/21   TSH with free T4 reflex     Status: Abnormal   Result Value Ref Range    TSH 8.25 (H) 0.40 - 4.00 mU/L   Iron and iron binding capacity     Status: Abnormal   Result Value Ref Range    Iron 59 35 - 180 ug/dL    Iron Binding Cap 410 240 - 430 ug/dL    Iron Saturation Index 14 (L) 15 - 46 %   Ferritin     Status: None   Result Value Ref Range    Ferritin 13 8 - 252 ng/mL   Lactate Dehydrogenase     Status: None   Result Value Ref Range    Lactate Dehydrogenase 173 81 - 234 U/L   Comprehensive metabolic panel     Status: Abnormal   Result Value Ref Range    Sodium 141 133 - 144 mmol/L    Potassium 3.8 3.4 - 5.3 mmol/L    Chloride 111 (H) 94 - 109 mmol/L    Carbon Dioxide 25 20 - 32 mmol/L    Anion Gap 5 3 - 14 mmol/L    Glucose 130 (H) 70 - 99 mg/dL    Urea Nitrogen 19 7 - 30 mg/dL    Creatinine 0.66 0.52 - 1.04 mg/dL    GFR Estimate >90 >60 mL/min/[1.73_m2]    GFR Estimate If Black >90 >60 mL/min/[1.73_m2]    Calcium 8.4 (L) 8.5 - 10.1 mg/dL    Bilirubin Total 0.3 0.2 - 1.3 mg/dL    Albumin 3.6 3.4 - 5.0 g/dL    Protein Total 7.1 6.8 - 8.8 g/dL    Alkaline Phosphatase 80 40 - 150 U/L    ALT 38 0 - 50 U/L    AST 31 0 - 45 U/L   CBC with platelets differential     Status: None   Result Value Ref Range    WBC 8.0 4.0 - 11.0 10e9/L    RBC Count 3.98 3.8 - 5.2 10e12/L    Hemoglobin 11.8 11.7 - 15.7 g/dL    Hematocrit 35.5 35.0 - 47.0 %    MCV 89 78 - 100 fl    MCH 29.6 26.5 - 33.0 pg    MCHC 33.2 31.5 - 36.5 g/dL    RDW 12.7 10.0 - 15.0 %    Platelet Count 171 150 - 450 10e9/L    Diff Method Automated  Method     % Neutrophils 71.8 %    % Lymphocytes 20.5 %    % Monocytes 7.2 %    % Eosinophils 0.0 %    % Basophils 0.1 %    % Immature Granulocytes 0.4 %    Nucleated RBCs 0 0 /100    Absolute Neutrophil 5.8 1.6 - 8.3 10e9/L    Absolute Lymphocytes 1.6 0.8 - 5.3 10e9/L    Absolute Monocytes 0.6 0.0 - 1.3 10e9/L    Absolute Eosinophils 0.0 0.0 - 0.7 10e9/L    Absolute Basophils 0.0 0.0 - 0.2 10e9/L    Abs Immature Granulocytes 0.0 0 - 0.4 10e9/L    Absolute Nucleated RBC 0.0    T4 free     Status: None   Result Value Ref Range    T4 Free 0.76 0.76 - 1.46 ng/dL       Imaging Studies: None completed for today's visit      ASSESSMENT/PLAN:    #1 colon cancer:   Stage IIIB adenocarcinoma of the ascending colon diagnosed February 2020.  She completed adjuvant chemotherapy with XELOX x 4 cycles on 6/22/2020 and is being followed with surveillance.  I will see her back in 3 months with a CBC, CMP, LDH and CEA along with CT chest, abdomen and pelvis.    #2  B12 deficiency: Currently injection every 2 weeks.  Will continue.  Vitamin B12 and folate level pending.    #3 vitamin D deficiency: Vitamin D level 7 in November and she was placed on vitamin D2 50,000 units twice a week.  Most recent vitamin D level completed on 1/18/2021 showed a level of 10.  She was placed on 5000 units of vitamin D3 daily.  Vitamin D level repeated today and this is pending.  She admits to not taking consistently.    #4  Hypothyroidism:  TSH elevated with normal T4.  I will let her PCP know that she has not been taking separate form other meds.  She was instructed to take at least 30 minutes prior to other meds and at least 4 hours form calcium.    #5  Hypocalcemia:  Vitamin D level pending.  She started a calcium supplement with vitamin D yesterday.    #6  Fatigue:  Multifactorial.  Ferritin is declining quickly.  Will try and get an iron infusion covered.  Also, discussed proper taking of her levothyroxine.  Recommended being consistent with her  Vitamin D.    #7  Iron Deficiency:  Iron saturation is 14%.  With history of gastric bypass and rapid decline of her ferritin, I will order venofer 200mg every 48 hours x5 infusions.     #8  Neuropathy:  Will discontinue the zoloft and initiate Cymbalta 30mg every 12 hours.    #9  Headaches:  Will be getting new glasses next week.  If no improvement in her headache, she will follow up with her PCP.    I encouraged patient to call with any questions or concerns.    60 minutes spent in the patient's encounter today with time spent in review of the chart along with in chart preparation.  Time was also spent obtaining a review of systems and performing a physical exam along with review of her labwork.  Time was also spent in discussion of her fatigue and neuropathy and ways to manage these.  We also discussed being consistent with her supplements and proper way to take her thyroid medication.  Time was spent in placing a plan for iron and also in ordering future testing and in chart documentation.    Izzy Moran NP  APRN, FNP-BC, AOCNP

## 2021-04-29 NOTE — PATIENT INSTRUCTIONS
We will see you back as planned. If you have any questions or concerns, we can be reached Monday through Friday 8am - 430pm at [phone] ([facility]). If you have concerns related to a potential reaction/side effect after hours/weekends/holiday's, please seek emergent medical care.

## 2021-04-29 NOTE — PROGRESS NOTES
Patient is 50 years old, here today accompanied by self for injection of b 12 per order of Charlene Moran NP  under the supervision of Charlene Moran NP.     Patient  lab values are Obtained and not required for today's infusion.     Labs meet parameters for today's injection.     Patient denies questions nor concerns regarding medication, administration site, side effects, nor aftercare.  Patient identified with two identifiers, order verified, and verbal consent for today's injection obtained from patient.   Patient education provided on s/s of injection site infection, and/or medication specific side effects, and when to call a provider.  Patient instructed to report any adverse effects.     Medication administered per protocol in right deltoid at 90 degree angle.  Site covered with sterile bandage.  Patient tolerated injection well, no verbal nor non-verbal signs of discomfort noted.  No adverse effects noted at this time.     Patient instructed to call with further questions or concerns.  Patient states understanding and is in agreement with this plan.  Copy of appointments, and after visit summary (AVS) given to patient. Patient discharged.

## 2021-04-30 LAB
CEA SERPL-MCNC: 3 UG/L (ref 0–2.5)
VIT B12 SERPL-MCNC: 546 PG/ML (ref 193–986)

## 2021-05-01 LAB — DEPRECATED CALCIDIOL+CALCIFEROL SERPL-MC: 16 UG/L (ref 20–75)

## 2021-05-03 DIAGNOSIS — C18.2 CANCER OF ASCENDING COLON (H): Primary | ICD-10-CM

## 2021-05-07 ENCOUNTER — INFUSION THERAPY VISIT (OUTPATIENT)
Dept: INFUSION THERAPY | Facility: OTHER | Age: 51
End: 2021-05-07
Attending: NURSE PRACTITIONER
Payer: COMMERCIAL

## 2021-05-07 VITALS
RESPIRATION RATE: 18 BRPM | BODY MASS INDEX: 28.82 KG/M2 | WEIGHT: 183.64 LBS | HEART RATE: 68 BPM | DIASTOLIC BLOOD PRESSURE: 86 MMHG | TEMPERATURE: 97.9 F | SYSTOLIC BLOOD PRESSURE: 145 MMHG | OXYGEN SATURATION: 98 % | HEIGHT: 67 IN

## 2021-05-07 DIAGNOSIS — E53.8 VITAMIN B 12 DEFICIENCY: ICD-10-CM

## 2021-05-07 DIAGNOSIS — Z98.84 GASTRIC BYPASS STATUS FOR OBESITY: ICD-10-CM

## 2021-05-07 DIAGNOSIS — E61.1 IRON DEFICIENCY: Primary | ICD-10-CM

## 2021-05-07 PROCEDURE — 258N000003 HC RX IP 258 OP 636: Performed by: NURSE PRACTITIONER

## 2021-05-07 PROCEDURE — 96365 THER/PROPH/DIAG IV INF INIT: CPT

## 2021-05-07 PROCEDURE — 250N000011 HC RX IP 250 OP 636: Performed by: NURSE PRACTITIONER

## 2021-05-07 PROCEDURE — 96523 IRRIG DRUG DELIVERY DEVICE: CPT

## 2021-05-07 RX ORDER — HEPARIN SODIUM (PORCINE) LOCK FLUSH IV SOLN 100 UNIT/ML 100 UNIT/ML
5 SOLUTION INTRAVENOUS
Status: DISCONTINUED | OUTPATIENT
Start: 2021-05-07 | End: 2021-05-07 | Stop reason: HOSPADM

## 2021-05-07 RX ORDER — HEPARIN SODIUM (PORCINE) LOCK FLUSH IV SOLN 100 UNIT/ML 100 UNIT/ML
5 SOLUTION INTRAVENOUS
Status: CANCELLED | OUTPATIENT
Start: 2021-05-09

## 2021-05-07 RX ADMIN — IRON SUCROSE 200 MG: 20 INJECTION, SOLUTION INTRAVENOUS at 13:40

## 2021-05-07 RX ADMIN — HEPARIN 5 ML: 100 SYRINGE at 14:05

## 2021-05-07 RX ADMIN — SODIUM CHLORIDE 250 ML: 9 INJECTION, SOLUTION INTRAVENOUS at 13:38

## 2021-05-07 ASSESSMENT — MIFFLIN-ST. JEOR: SCORE: 1480.75

## 2021-05-07 NOTE — PATIENT INSTRUCTIONS
Thank you for scheduling your appointment with us today. If you have any questions or concerns related to procedure/medication at today's visit, please contact your primary care provider, or the provider who ordered today's procedure/medication. If you have any questions related to scheduling with our unit, or if you are having trouble getting in contact with your ordering provider, we can be reached at [phone].   Patient Education     Venofer Injection 20 mg/mL  Uses  For anemia.  Instructions  This is an IV medicine. It is given through a sterile tube directly into the vein by a healthcare provider.  This medicine is given gradually through the IV line.  Read and make sure you understand the instructions for measuring your dose and using the syringe before using this medicine.  Always inspect the medicine before using.  The liquid should be clear and dark brown.  Do not use the medicine if it contains any particles or if it has changed color.  Keep this medicine at room temperature.  Protect medicine from light.  Speak with your nurse or pharmacist about how long the medicine can be stored safely at room temperature or in the refrigerator before it needs to be discarded.  Never use any medicine that has .  Discard any remaining medicine after your dose is given.  If you miss a dose, contact your doctor for instructions.  Please tell your doctor and pharmacist about all the medicines you take. Include both prescription and over-the-counter medicines. Also tell them about any vitamins, herbal medicines, or anything else you take for your health.  It is very important that you follow your doctor's instructions for all blood tests.  Cautions  Tell your doctor and pharmacist if you ever had an allergic reaction to a medicine. Symptoms of an allergic reaction can include trouble breathing, skin rash, itching, swelling, or severe dizziness.  Do not use the medication any more than instructed.  This medicine  may cause dizziness or fainting, especially after exercising or in hot weather. Be very careful when standing or sitting up quickly.  Tell the doctor or pharmacist if you are pregnant, planning to be pregnant, or breastfeeding.  Ask your pharmacist if this medicine can interact with any of your other medicines. Be sure to tell them about all the medicines you take.  Please tell all your doctors and dentists that you are on this medicine before they provide care.  Do not start or stop any other medicines without first speaking to your doctor or pharmacist.  Used needles and syringes should be thrown away properly in a medical waste container. Ask your doctor or pharmacist if you need help.  Do not share this medicine with anyone who has not been prescribed this medicine.  Side Effects  The following is a list of some common side effects from this medicine. Please speak with your doctor about what you should do if you experience these or other side effects.    constipation    diarrhea    dizziness    swelling of the legs, feet, and hands    muscle cramps    nausea    changes in taste or unpleasant taste    vomiting  Call your doctor or get medical help right away if you notice any of these more serious side effects:    chest pain    fainting    severe or persistent headache    fast or irregular heart beats    severe stomach or bowel pain    blurring or changes of vision  A few people may have an allergic reactions to this medicine. Symptoms can include difficulty breathing, skin rash, itching, swelling, or severe dizziness. If you notice any of these symptoms, seek medical help quickly.  Extra  Please speak with your doctor, nurse, or pharmacist if you have any questions about this medicine.  [email or web address]  IMPORTANT NOTE: This document tells you briefly how to take your medicine, but it does not tell you all there is to know about it.Your doctor or pharmacist may give you other  documents about your medicine. Please talk to them if you have any questions.Always follow their advice. There is a more complete description of this medicine available in English.Scan this code on your smartphone or tablet or use the web address below. You can also ask your pharmacist for a printout. If you have any questions, please ask your pharmacist.     2021 [employer].

## 2021-05-07 NOTE — PROGRESS NOTES
Patient is a 50 year old here today for infusion of venofer per order of FRANK Moran NP. Patient identified with two identifiers, order verified, and verbal consent for today's infusion obtained from patient.      Patient meets order parameters for today's treatment.     Patient denies questions or concerns regarding infusion and/or medication(s) being administered.    Patients port accessed using non-coring, 20 gauge, 3/4 inch needle. Port accessed per facility protocol. Port flushed easily, blood return noted.  No signs and symptoms of infection or infiltration.      1340 IV pump verified with dose, drug, and rate of administration.  Infusion administered per protocol.  Patient tolerated infusion well, no signs or symptoms of adverse reaction noted.  Patient denies pain nor discomfort.     IV removed, catheter intact. Site clean, dry and intact. No signs or symptoms of infiltration or infection. Covered with a sterile bandage, slight pressure applied for 30 seconds. Pt instructed to leave bandage intact for a minimum of one hour, and to call with questions or concerns.  Copy of appointments, discharge instructions, and after visit summary (AVS) provided to patient.  Patient states understanding, discharged.

## 2021-05-09 NOTE — ADDENDUM NOTE
Addended by: DEEPIKA PEREZ on: 5/14/2020 11:06 AM     Modules accepted: Level of Service     received report form Yuli HARTMANN. Pt sitting comfortable at present, no acute distress.

## 2021-05-10 ENCOUNTER — INFUSION THERAPY VISIT (OUTPATIENT)
Dept: INFUSION THERAPY | Facility: OTHER | Age: 51
End: 2021-05-10
Attending: NURSE PRACTITIONER
Payer: COMMERCIAL

## 2021-05-10 VITALS
SYSTOLIC BLOOD PRESSURE: 146 MMHG | HEART RATE: 73 BPM | DIASTOLIC BLOOD PRESSURE: 93 MMHG | OXYGEN SATURATION: 98 % | HEIGHT: 67 IN | WEIGHT: 186.73 LBS | TEMPERATURE: 97.7 F | RESPIRATION RATE: 18 BRPM | BODY MASS INDEX: 29.31 KG/M2

## 2021-05-10 DIAGNOSIS — E61.1 IRON DEFICIENCY: Primary | ICD-10-CM

## 2021-05-10 DIAGNOSIS — Z98.84 GASTRIC BYPASS STATUS FOR OBESITY: ICD-10-CM

## 2021-05-10 DIAGNOSIS — E53.8 VITAMIN B 12 DEFICIENCY: ICD-10-CM

## 2021-05-10 PROCEDURE — 250N000011 HC RX IP 250 OP 636: Performed by: NURSE PRACTITIONER

## 2021-05-10 PROCEDURE — 96523 IRRIG DRUG DELIVERY DEVICE: CPT

## 2021-05-10 PROCEDURE — 258N000003 HC RX IP 258 OP 636: Performed by: NURSE PRACTITIONER

## 2021-05-10 PROCEDURE — 96365 THER/PROPH/DIAG IV INF INIT: CPT

## 2021-05-10 RX ORDER — HEPARIN SODIUM (PORCINE) LOCK FLUSH IV SOLN 100 UNIT/ML 100 UNIT/ML
5 SOLUTION INTRAVENOUS
Status: DISCONTINUED | OUTPATIENT
Start: 2021-05-10 | End: 2021-05-10 | Stop reason: HOSPADM

## 2021-05-10 RX ORDER — HEPARIN SODIUM (PORCINE) LOCK FLUSH IV SOLN 100 UNIT/ML 100 UNIT/ML
5 SOLUTION INTRAVENOUS
Status: CANCELLED | OUTPATIENT
Start: 2021-05-11

## 2021-05-10 RX ADMIN — HEPARIN 5 ML: 100 SYRINGE at 10:31

## 2021-05-10 RX ADMIN — IRON SUCROSE 200 MG: 20 INJECTION, SOLUTION INTRAVENOUS at 10:10

## 2021-05-10 RX ADMIN — SODIUM CHLORIDE 250 ML: 9 INJECTION, SOLUTION INTRAVENOUS at 10:08

## 2021-05-10 ASSESSMENT — MIFFLIN-ST. JEOR: SCORE: 1494.75

## 2021-05-10 NOTE — PROGRESS NOTES
Patient is a 50 year old here today for infusion of Venofer per order of FRANK Moran NP. Patient identified with two identifiers, order verified, and verbal consent for today's infusion obtained from patient.      Patient meets order parameters for today's treatment.     Patient denies questions or concerns regarding infusion and/or medication(s) being administered.    Patients port accessed using non-coring, 20 gauge, 3/4 inch needle. Port accessed per facility protocol. Port flushed easily, blood return noted.  No signs and symptoms of infection or infiltration.      1010 IV pump verified with Venofer dose, drug, and rate of administration. Infusion administered per protocol. Patient tolerated infusion well, no signs or symptoms of adverse reaction noted.  Patient denies pain nor discomfort.     IV removed, catheter intact. Site clean, dry and intact. No signs or symptoms of infiltration or infection. Covered with a sterile bandage, slight pressure applied for 30 seconds. Pt instructed to leave bandage intact for a minimum of one hour, and to call with questions or concerns.  Patient states understanding, discharged.

## 2021-05-10 NOTE — PATIENT INSTRUCTIONS

## 2021-05-11 ENCOUNTER — ONCOLOGY VISIT (OUTPATIENT)
Dept: ONCOLOGY | Facility: OTHER | Age: 51
End: 2021-05-11
Attending: NURSE PRACTITIONER
Payer: COMMERCIAL

## 2021-05-11 VITALS
HEIGHT: 67 IN | SYSTOLIC BLOOD PRESSURE: 134 MMHG | RESPIRATION RATE: 20 BRPM | DIASTOLIC BLOOD PRESSURE: 72 MMHG | TEMPERATURE: 97.4 F | HEART RATE: 106 BPM | OXYGEN SATURATION: 97 % | BODY MASS INDEX: 29.55 KG/M2 | WEIGHT: 188.27 LBS

## 2021-05-11 DIAGNOSIS — R53.83 FATIGUE, UNSPECIFIED TYPE: ICD-10-CM

## 2021-05-11 DIAGNOSIS — C18.2 CANCER OF ASCENDING COLON (H): Primary | ICD-10-CM

## 2021-05-11 DIAGNOSIS — G62.9 NEUROPATHY: ICD-10-CM

## 2021-05-11 PROCEDURE — 99214 OFFICE O/P EST MOD 30 MIN: CPT | Performed by: NURSE PRACTITIONER

## 2021-05-11 PROCEDURE — G0463 HOSPITAL OUTPT CLINIC VISIT: HCPCS

## 2021-05-11 RX ORDER — GABAPENTIN 300 MG/1
300 CAPSULE ORAL 3 TIMES DAILY
Qty: 90 CAPSULE | Refills: 0 | Status: SHIPPED | OUTPATIENT
Start: 2021-05-11 | End: 2021-06-30

## 2021-05-11 ASSESSMENT — MIFFLIN-ST. JEOR: SCORE: 1501.63

## 2021-05-11 ASSESSMENT — PAIN SCALES - GENERAL: PAINLEVEL: SEVERE PAIN (7)

## 2021-05-11 NOTE — PROGRESS NOTES
Oncology Follow-up Visit:  May 11, 2021    Reason for Visit:  Patient presents with:  Symptoms: Rectal bleeding and abdominal pain and headache     Nursing Note and documentation reviewed: yes    HPI:  This is a 50-year-old female patient who presents to the oncology clinic today in a nonroutine fashion to discuss symptoms.  She was seen on 4/29/2021 in follow up of StageIIIB adenocarcinoma of the ascending colon diagnosed February 2020.  She completed adjuvant chemotherapy on 6/22/2020 and is being followed with surveillance.    CEA was elevated at 3.0 with plan to repeat this in 3 weeks and go forth with CT scans earlier than the planned July scans.      Patient presents today stating that she is very concerned in regards to her extreme fatigue.  She is receiving iron infusions and has received 2 thus far.  She will be receiving an additional 3 infusions.  Vitamin D was also noted to be very low and she was instructed to be consistent with vitamin D dosing.  She states she is taking her vitamin D.  She also has complaints of abdominal pain.  She states she would have episodes where she had a feeling that someone was stabbing her in the stomach in the area of her incision and this was previously only at times and has been fairly continuous over the past 2 weeks.  States she did have some blood in her stool on Saturday and Sunday and this is now resolved.  She has yet to undergo repeat colonoscopy.  She also complains of her feet hurting.  We had discussed this at our last visit and she was initiated on Cymbalta twice a day and she did initiate this.    She is very concerned as many of the symptoms she is having now feel the same as when she was diagnosed with cancer.    Past Medical History:   Diagnosis Date     Arthritis 10/13/2020     Cancer (H)     colon     Cancer (H) 2004    uterine     Chronic pain      Depressive disorder      Diabetes (H)      History of blood transfusion      Hypertension      Prediabetes       Thyroid disease        Past Surgical History:   Procedure Laterality Date     ARTHROSCOPY SHOULDER Left 11/6/2020    Procedure: LEFT SHOULDER ARTHROSCOPY WITH ROTATOR CUFF REPAIR AND ACROMIUM CLAVICULAR JOINT RESECTION;  Surgeon: Tanner Mujica DO;  Location: [facility]     BACK SURGERY      2008; 2011     COLONOSCOPY N/A 2/13/2020    Procedure: COLONOSCOPY WITH BIOPSY AND TATOOING OF ASCENDING COLON,INCOMEPLETE EXAM;  Surgeon: Kang Cleveland MD;  Location: [facility]     COLONOSCOPY N/A 10/26/2020    Procedure: incomplete colonoscopy ;  Surgeon: Kang Cleveland MD;  Location: [facility]     ESOPHAGOSCOPY, GASTROSCOPY, DUODENOSCOPY (EGD), COMBINED N/A 7/21/2018    Procedure: COMBINED ESOPHAGOSCOPY, GASTROSCOPY, DUODENOSCOPY (EGD);  UPPER ENDOSCOPY WITH BIOPSIES;  Surgeon: Roger Vazquez MD;  Location: [facility]     ESOPHAGOSCOPY, GASTROSCOPY, DUODENOSCOPY (EGD), COMBINED N/A 11/21/2019    Procedure: UPPER ENDOSCOPY with biopsy;  Surgeon: Kang Cleveland MD;  Location: [facility]     HEAD & NECK SURGERY  2008    c4-c5 fusion     HYSTERECTOMY, CRISTIANE  04/2004    uterine cancer; [facility]     INSERT PORT VASCULAR ACCESS N/A 2/28/2020    Procedure: INSERTION, VASCULAR ACCESS PORT LEFT;  Surgeon: Kang Cleveland MD;  Location: [facility]     LAPAROSCOPIC ASSISTED COLECTOMY Right 2/24/2020    Procedure: RIGHT KEVIN COLECTOMY, LYSIS OF ADHESIONS;  Surgeon: Kang Cleveland MD;  Location: [facility]     LAPAROSCOPIC BYPASS GASTRIC  12/2010       Family History   Problem Relation Age of Onset     Diabetes Mother      Hypertension Mother      Coronary Artery Disease Mother      Myocardial Infarction Mother      Hypertension Father      Diabetes Maternal Grandmother      Hypertension Maternal Grandfather      Coronary Artery Disease Maternal Grandfather      Cancer Paternal Grandfather         unsure of type of cancer     Bladder Cancer Paternal Grandfather      Diabetes Maternal Aunt      Cerebrovascular Disease  Maternal Aunt      Diabetes Maternal Aunt      Cerebrovascular Disease Maternal Aunt      Diabetes Maternal Aunt      Cerebrovascular Disease Maternal Aunt      Hyperlipidemia No family hx of      Breast Cancer No family hx of      Colon Cancer No family hx of      Prostate Cancer No family hx of      Thyroid Disease No family hx of      Anesthesia Reaction No family hx of      Asthma No family hx of      Genetic Disorder No family hx of        Social History     Socioeconomic History     Marital status:      Spouse name: Not on file     Number of children: 2     Years of education: Not on file     Highest education level: Not on file   Occupational History     Occupation: PCA     Comment: currently not working, unemployment    Social Needs     Financial resource strain: Not hard at all     Food insecurity     Worry: Not on file     Inability: Not on file     Transportation needs     Medical: Patient refused     Non-medical: Patient refused   Tobacco Use     Smoking status: Never Smoker     Smokeless tobacco: Never Used   Substance and Sexual Activity     Alcohol use: No     Frequency: Never     Comment: sober for 18 years     Drug use: No     Sexual activity: Never   Lifestyle     Physical activity     Days per week: Not on file     Minutes per session: Not on file     Stress: Not on file   Relationships     Social connections     Talks on phone: Not on file     Gets together: Not on file     Attends [employer] service: Not on file     Active member of club or organization: Not on file     Attends meetings of clubs or organizations: Not on file     Relationship status: Not on file     Intimate partner violence     Fear of current or ex partner: Not on file     Emotionally abused: Not on file     Physically abused: Not on file     Forced sexual activity: Not on file   Other Topics Concern     Parent/sibling w/ CABG, MI or angioplasty before 65F 55M? Yes     Comment: Mother   Social History Narrative     Not on  file       Current Outpatient Medications   Medication     acetaminophen (TYLENOL) 500 MG tablet     Cyanocobalamin 1000 MCG/ML KIT     DULoxetine (CYMBALTA) 30 MG capsule     gabapentin (NEURONTIN) 300 MG capsule     ketorolac (ACULAR) 0.5 % ophthalmic solution     levothyroxine (SYNTHROID/LEVOTHROID) 200 MCG tablet     levothyroxine (SYNTHROID/LEVOTHROID) 25 MCG tablet     lisinopril (ZESTRIL) 10 MG tablet     multivitamin, therapeutic with minerals (MULTI-VITAMIN) TABS     prednisoLONE acetate (PRED FORTE) 1 % ophthalmic suspension     tiZANidine (ZANAFLEX) 4 MG tablet     traZODone (DESYREL) 50 MG tablet     verapamil (CALAN) 40 MG tablet     Vitamin D3 (CHOLECALCIFEROL) 25 mcg (1000 units) tablet     diazepam (VALIUM) 5 MG tablet     EPINEPHrine (ANY BX GENERIC EQUIV) 0.3 MG/0.3ML injection 2-pack     lidocaine-prilocaine (EMLA) 2.5-2.5 % external cream     ondansetron (ZOFRAN-ODT) 4 MG ODT tab     No current facility-administered medications for this visit.         Allergies   Allergen Reactions     Aspartame      Other reaction(s): Other - Describe In Comment Field, Seizures  convulsions     Bee Venom Anaphylaxis     Bupropion Anaphylaxis     Throat gets tight and hives. Denies allergic reaction     Food Anaphylaxis     Peppers-anaphylaxis if eaten, if touched hands swell and reddened     Food Allergy Formula Anaphylaxis     Peppers, patient reports green peppers close off her throat     Ibuprofen Anaphylaxis     No Clinical Screening - See Comments Anaphylaxis     Peppers, patient reports green peppers close off her throat     Piper Anaphylaxis     Peppers--green, black, red, chili, etc. Derm symptoms, hives;  Strawberries--(cooked) Itching, breathing difficulties, GI symptoms. Mushrooms--GI symptoms, Hives.Spinach (cooked) throat irritation  (Listed on [facility] problem list.)     Fentanyl Swelling     Lips and tongue swelled.  Swelling of lips and tongue     Adhesive Tape      blisters      "Amitriptyline Other (See Comments)     Respiratory symptoms - denies allergic reaction     Liquid Adhesive Dermatitis     blisters  Blisters  Silk tape and tegaderm is fine     Oxcarbazepine Other (See Comments)     Throat gets tight and hives. Denies allergic reaction  Throat gets tight and hives-       Prozac [Fluoxetine]      Other reaction(s): *Unknown  uncontrolled muscle spasms     Valproic Acid      Respiratory symptoms ([facility]). Denies allergic reaction     Ziprasidone      Throat gets tight and hives. Denies allergic reaction       Review Of Systems: See HPI    Physical Exam:  /72   Pulse 106   Temp 97.4  F (36.3  C) (Tympanic)   Resp 20   Ht 1.702 m (5' 7\")   Wt 85.4 kg (188 lb 4.4 oz)   SpO2 97%   BMI 29.49 kg/m      GENERAL APPEARANCE: Healthy, alert and in no acute distress.  HEENT: Normocephalic, Sclerae anicteric.   NECK:   No asymmetry or masses, no thyromegaly.  LYMPHATICS: No palpable cervical, supraclavicular, axillary, or inguinal nodes   RESP: Lungs clear to auscultation bilaterally, respirations regular and easy  CARDIOVASCULAR: Regular rate and rhythm. Normal S1, S2; no murmur, gallop, or rub.  ABDOMEN: Soft, tender to mid incision with palpation. Bowel sounds auscultated all 4 quadrants. No palpable organomegaly or masses.  NEURO: Alert and oriented x 3.    PSYCHIATRIC: Mentation and affect appear normal.  Mood appropriate.    Laboratory: None completed for today's visit    Imaging Studies: None completed for today's visit      ASSESSMENT/PLAN:    #1 colon cancer:   Stage IIIB adenocarcinoma of the ascending colon diagnosed February 2020.  She completed adjuvant chemotherapy with XELOX x 4 cycles on 6/22/2020 and is being followed with surveillance.  Due to patient's symptoms we will obtain a CT scan of the chest, abdomen and pelvis as soon as possible and I will see her for this result.  I recommended she give surgery a call to reschedule her colonoscopy.    #2 "  Fatigue: Likely multifactorial.  Will obtain a CT scan to rule out occult malignancy or recurrence of disease.  She will also continue with the vitamin B12 injections and vitamin D.  Also recommended she continue with taking her thyroid on an empty stomach at least 30 minutes prior to any other medications for better absorption.    #3  Neuropathy: We did discuss that the Cymbalta may take some time to work.  We will initiate gabapentin 300 mg 3 times a day and dosing instructions were given.    I encouraged patient to call with any questions or concerns.    Izzy Moran NP  APRN, FNP-BC, AOCNP

## 2021-05-12 ENCOUNTER — ONCOLOGY VISIT (OUTPATIENT)
Dept: ONCOLOGY | Facility: OTHER | Age: 51
End: 2021-05-12
Attending: NURSE PRACTITIONER
Payer: COMMERCIAL

## 2021-05-12 ENCOUNTER — INFUSION THERAPY VISIT (OUTPATIENT)
Dept: INFUSION THERAPY | Facility: OTHER | Age: 51
End: 2021-05-12
Attending: NURSE PRACTITIONER
Payer: COMMERCIAL

## 2021-05-12 ENCOUNTER — HOSPITAL ENCOUNTER (OUTPATIENT)
Dept: CT IMAGING | Facility: HOSPITAL | Age: 51
End: 2021-05-12
Attending: NURSE PRACTITIONER
Payer: COMMERCIAL

## 2021-05-12 VITALS
TEMPERATURE: 96.9 F | DIASTOLIC BLOOD PRESSURE: 80 MMHG | RESPIRATION RATE: 20 BRPM | SYSTOLIC BLOOD PRESSURE: 124 MMHG | WEIGHT: 188.27 LBS | BODY MASS INDEX: 29.55 KG/M2 | HEART RATE: 86 BPM | OXYGEN SATURATION: 98 % | HEIGHT: 67 IN

## 2021-05-12 DIAGNOSIS — C18.2 CANCER OF ASCENDING COLON (H): ICD-10-CM

## 2021-05-12 DIAGNOSIS — E53.8 VITAMIN B 12 DEFICIENCY: ICD-10-CM

## 2021-05-12 DIAGNOSIS — C18.2 CANCER OF ASCENDING COLON (H): Primary | ICD-10-CM

## 2021-05-12 DIAGNOSIS — R10.84 ABDOMINAL PAIN, GENERALIZED: ICD-10-CM

## 2021-05-12 DIAGNOSIS — R53.83 FATIGUE, UNSPECIFIED TYPE: ICD-10-CM

## 2021-05-12 DIAGNOSIS — G62.9 NEUROPATHY: ICD-10-CM

## 2021-05-12 PROCEDURE — 99213 OFFICE O/P EST LOW 20 MIN: CPT | Performed by: NURSE PRACTITIONER

## 2021-05-12 PROCEDURE — 250N000011 HC RX IP 250 OP 636: Performed by: NURSE PRACTITIONER

## 2021-05-12 PROCEDURE — 255N000002 HC RX 255 OP 636: Performed by: RADIOLOGY

## 2021-05-12 PROCEDURE — G0463 HOSPITAL OUTPT CLINIC VISIT: HCPCS

## 2021-05-12 PROCEDURE — 71260 CT THORAX DX C+: CPT

## 2021-05-12 RX ORDER — HEPARIN SODIUM,PORCINE 10 UNIT/ML
5-10 VIAL (ML) INTRAVENOUS
Status: DISCONTINUED | OUTPATIENT
Start: 2021-05-12 | End: 2021-05-13 | Stop reason: HOSPADM

## 2021-05-12 RX ORDER — HEPARIN SODIUM (PORCINE) LOCK FLUSH IV SOLN 100 UNIT/ML 100 UNIT/ML
5 SOLUTION INTRAVENOUS
Status: CANCELLED | OUTPATIENT
Start: 2021-05-12

## 2021-05-12 RX ORDER — CYANOCOBALAMIN 1000 UG/ML
1000 INJECTION, SOLUTION INTRAMUSCULAR; SUBCUTANEOUS
Status: CANCELLED
Start: 2021-05-12

## 2021-05-12 RX ORDER — HEPARIN SODIUM (PORCINE) LOCK FLUSH IV SOLN 100 UNIT/ML 100 UNIT/ML
5 SOLUTION INTRAVENOUS
Status: DISCONTINUED | OUTPATIENT
Start: 2021-05-12 | End: 2021-05-12 | Stop reason: HOSPADM

## 2021-05-12 RX ORDER — HEPARIN SODIUM,PORCINE 10 UNIT/ML
5-10 VIAL (ML) INTRAVENOUS EVERY 24 HOURS
Status: CANCELLED | OUTPATIENT
Start: 2021-05-12

## 2021-05-12 RX ORDER — HEPARIN SODIUM,PORCINE 10 UNIT/ML
5-10 VIAL (ML) INTRAVENOUS
Status: CANCELLED | OUTPATIENT
Start: 2021-05-12

## 2021-05-12 RX ORDER — IOPAMIDOL 612 MG/ML
100 INJECTION, SOLUTION INTRAVASCULAR ONCE
Status: COMPLETED | OUTPATIENT
Start: 2021-05-12 | End: 2021-05-12

## 2021-05-12 RX ADMIN — HEPARIN 5 ML: 100 SYRINGE at 10:55

## 2021-05-12 RX ADMIN — IOPAMIDOL 100 ML: 612 INJECTION, SOLUTION INTRAVENOUS at 09:18

## 2021-05-12 ASSESSMENT — MIFFLIN-ST. JEOR: SCORE: 1501.63

## 2021-05-12 ASSESSMENT — PAIN SCALES - GENERAL: PAINLEVEL: SEVERE PAIN (6)

## 2021-05-12 NOTE — NURSING NOTE
"Chief Complaint   Patient presents with     RECHECK     Follow up Scan       Initial /80   Pulse 86   Temp 96.9  F (36.1  C) (Tympanic)   Resp 20   Ht 1.702 m (5' 7\")   Wt 85.4 kg (188 lb 4.4 oz)   SpO2 98%   BMI 29.49 kg/m   Estimated body mass index is 29.49 kg/m  as calculated from the following:    Height as of this encounter: 1.702 m (5' 7\").    Weight as of this encounter: 85.4 kg (188 lb 4.4 oz).  Medication Reconciliation: complete.  Immunizations and advance directives status reviewed. Pain scale 6 abdominal , PHQ-2=0.            Juana Mo LPN    "

## 2021-05-12 NOTE — PATIENT INSTRUCTIONS
We would like to see you back in 3 months with lab only.  Please have lab 1 week prior to your visit. We will cancel the lab that was going to be done with your next flush.    When you are in need of a refill, please call your pharmacy and they will send us a request.     If you have any questions please call [phone]    Other instructions: none

## 2021-05-12 NOTE — PROGRESS NOTES
Oncology Follow-up Visit:  May 12, 2021    Reason for Visit:  Patient presents with:  RECHECK: Follow up Scan     Nursing Note and documentation reviewed: yes    HPI:  This is a 50-year-old female patient who presents to the oncology clinic today in a nonroutine fashion to discuss results of CT scan completed this morning.  She was seen on 4/29/2021 in follow up and CEA was elevated at 3.0 with plan to repeat this in 3 weeks and go forth with CT scans earlier than the planned July scans.   She has been followed for Stage IIIB adenocarcinoma of the ascending colon diagnosed February 2020.  She completed adjuvant chemotherapy on 6/22/2020 and is being followed with surveillance.     Patient was seen again yesterday with complaints of extreme fatigue and abdominal pain.  CT scan was ordered.  She also complained of numbness and tingling and pain in her feet.  She was initiated on Cymbalta twice a day and has been doing this for the past week or so.  We initiated gabapentin yesterday.  She has no new complaints today.    Oncologic History:     11/21/19 to 11/21/2019 patient was hospitalized with a hemoglobin of 5.7 underwent EGD with no evidence of active bleeding.  12/12/2019 she was seen by Dr. Cook and established care with her as her new PCP.  Referral for screening colonoscopy was completed.  2/13/2020 patient underwent colonoscopy by Dr. Cleveland where a near obstructing lesion in the right colon was found.  Pathology showed an invasive moderately differentiated adenocarcinoma.  CT the abdomen and pelvis showed a mass in the junction of the cecum and ascending colon highly suspicious for malignancy.  2/24/2020  She then underwent laparoscopic-assisted right hemicolectomy by Dr. Cleveland that was converted to open right hemicolectomy when it was noted that the tumor was adherent to the pelvic brim; final pathology showed mildly differentiated adenocarcinoma of the right colon with 1/32 lymph nodes positive; there was  perineural invasion; tumor budding and acutely inflamed tumor and reactive lymph nodes with peritonitis; the omentum showed mild peritonitis; margins were negative; patient was staged [id number]; IIIb adenocarcinoma of the right colon  2/28/2020 Port-A-Cath placement  3/25/2020 PET scan which showed no evidence of distant metastatic disease  3/30/2020 patient was seen by Dr. Palma with Medical Oncology with recommendation for XELOX; capecitabine 850 mg/m2 p.o. b.i.d. on days 1 through 14 of a 21-day cycle, oxaliplatin 130 mg/m2 to be given on day 1 x4 cycles.  4/10/2020 she was seen by Dr. Bryant with Radiation Oncology with no recommendation for radiation therapy at this time related to negative margins on pathology  4/17/2020 initiation of chemotherapy  6/22/2020  Completion of 4 cycles of XELOX     MMR proteins intact  CEA prior to surgery was <0.5     Current Chemo Regime/TX:  N/a  Current Cycle:  n/a  # of completed cycles:  n/a     Previous treatment:   Oxaliplatin/Capecitabine x 4 cycles     Past Medical History:   Diagnosis Date     Arthritis 10/13/2020     Cancer (H)     colon     Cancer (H) 2004    uterine     Chronic pain      Depressive disorder      Diabetes (H)      History of blood transfusion      Hypertension      Prediabetes      Thyroid disease        Past Surgical History:   Procedure Laterality Date     ARTHROSCOPY SHOULDER Left 11/6/2020    Procedure: LEFT SHOULDER ARTHROSCOPY WITH ROTATOR CUFF REPAIR AND ACROMIUM CLAVICULAR JOINT RESECTION;  Surgeon: Tanner Mujica DO;  Location: [facility]     BACK SURGERY      2008; 2011     COLONOSCOPY N/A 2/13/2020    Procedure: COLONOSCOPY WITH BIOPSY AND TATOOING OF ASCENDING COLON,INCOMEPLETE EXAM;  Surgeon: Kang Cleveland MD;  Location: [facility]     COLONOSCOPY N/A 10/26/2020    Procedure: incomplete colonoscopy ;  Surgeon: Kang Cleveland MD;  Location: [facility]     ESOPHAGOSCOPY, GASTROSCOPY, DUODENOSCOPY (EGD), COMBINED N/A 7/21/2018     Procedure: COMBINED ESOPHAGOSCOPY, GASTROSCOPY, DUODENOSCOPY (EGD);  UPPER ENDOSCOPY WITH BIOPSIES;  Surgeon: Roger Vazquez MD;  Location: [facility]     ESOPHAGOSCOPY, GASTROSCOPY, DUODENOSCOPY (EGD), COMBINED N/A 11/21/2019    Procedure: UPPER ENDOSCOPY with biopsy;  Surgeon: Kang Cleveland MD;  Location: [facility]     HEAD & NECK SURGERY  2008    c4-c5 fusion     HYSTERECTOMY, CRISTIANE  04/2004    uterine cancer; [facility]     INSERT PORT VASCULAR ACCESS N/A 2/28/2020    Procedure: INSERTION, VASCULAR ACCESS PORT LEFT;  Surgeon: Kang Cleveland MD;  Location: [facility]     LAPAROSCOPIC ASSISTED COLECTOMY Right 2/24/2020    Procedure: RIGHT KEVIN COLECTOMY, LYSIS OF ADHESIONS;  Surgeon: Kang Cleveland MD;  Location: [facility]     LAPAROSCOPIC BYPASS GASTRIC  12/2010       Family History   Problem Relation Age of Onset     Diabetes Mother      Hypertension Mother      Coronary Artery Disease Mother      Myocardial Infarction Mother      Hypertension Father      Diabetes Maternal Grandmother      Hypertension Maternal Grandfather      Coronary Artery Disease Maternal Grandfather      Cancer Paternal Grandfather         unsure of type of cancer     Bladder Cancer Paternal Grandfather      Diabetes Maternal Aunt      Cerebrovascular Disease Maternal Aunt      Diabetes Maternal Aunt      Cerebrovascular Disease Maternal Aunt      Diabetes Maternal Aunt      Cerebrovascular Disease Maternal Aunt      Hyperlipidemia No family hx of      Breast Cancer No family hx of      Colon Cancer No family hx of      Prostate Cancer No family hx of      Thyroid Disease No family hx of      Anesthesia Reaction No family hx of      Asthma No family hx of      Genetic Disorder No family hx of        Social History     Socioeconomic History     Marital status:      Spouse name: Not on file     Number of children: 2     Years of education: Not on file     Highest education level: Not on file   Occupational History      Occupation: PCA     Comment: currently not working, unemployment    Social Needs     Financial resource strain: Not hard at all     Food insecurity     Worry: Not on file     Inability: Not on file     Transportation needs     Medical: Patient refused     Non-medical: Patient refused   Tobacco Use     Smoking status: Never Smoker     Smokeless tobacco: Never Used   Substance and Sexual Activity     Alcohol use: No     Frequency: Never     Comment: sober for 18 years     Drug use: No     Sexual activity: Never   Lifestyle     Physical activity     Days per week: Not on file     Minutes per session: Not on file     Stress: Not on file   Relationships     Social connections     Talks on phone: Not on file     Gets together: Not on file     Attends [employer] service: Not on file     Active member of club or organization: Not on file     Attends meetings of clubs or organizations: Not on file     Relationship status: Not on file     Intimate partner violence     Fear of current or ex partner: Not on file     Emotionally abused: Not on file     Physically abused: Not on file     Forced sexual activity: Not on file   Other Topics Concern     Parent/sibling w/ CABG, MI or angioplasty before 65F 55M? Yes     Comment: Mother   Social History Narrative     Not on file       Current Outpatient Medications   Medication     acetaminophen (TYLENOL) 500 MG tablet     Cyanocobalamin 1000 MCG/ML KIT     DULoxetine (CYMBALTA) 30 MG capsule     gabapentin (NEURONTIN) 300 MG capsule     ketorolac (ACULAR) 0.5 % ophthalmic solution     levothyroxine (SYNTHROID/LEVOTHROID) 200 MCG tablet     levothyroxine (SYNTHROID/LEVOTHROID) 25 MCG tablet     lisinopril (ZESTRIL) 10 MG tablet     multivitamin, therapeutic with minerals (MULTI-VITAMIN) TABS     prednisoLONE acetate (PRED FORTE) 1 % ophthalmic suspension     tiZANidine (ZANAFLEX) 4 MG tablet     traZODone (DESYREL) 50 MG tablet     verapamil (CALAN) 40 MG tablet     Vitamin D3  (CHOLECALCIFEROL) 25 mcg (1000 units) tablet     diazepam (VALIUM) 5 MG tablet     EPINEPHrine (ANY BX GENERIC EQUIV) 0.3 MG/0.3ML injection 2-pack     lidocaine-prilocaine (EMLA) 2.5-2.5 % external cream     ondansetron (ZOFRAN-ODT) 4 MG ODT tab     No current facility-administered medications for this visit.      Facility-Administered Medications Ordered in Other Visits   Medication     heparin lock flush 10 UNIT/ML injection 5-10 mL     sodium chloride (PF) 0.9% PF flush 10-20 mL     sodium chloride (PF) 0.9% PF flush 10-20 mL        Allergies   Allergen Reactions     Aspartame      Other reaction(s): Other - Describe In Comment Field, Seizures  convulsions     Bee Venom Anaphylaxis     Bupropion Anaphylaxis     Throat gets tight and hives. Denies allergic reaction     Food Anaphylaxis     Peppers-anaphylaxis if eaten, if touched hands swell and reddened     Food Allergy Formula Anaphylaxis     Peppers, patient reports green peppers close off her throat     Ibuprofen Anaphylaxis     No Clinical Screening - See Comments Anaphylaxis     Peppers, patient reports green peppers close off her throat     Piper Anaphylaxis     Peppers--green, black, red, chili, etc. Derm symptoms, hives;  Strawberries--(cooked) Itching, breathing difficulties, GI symptoms. Mushrooms--GI symptoms, Hives.Spinach (cooked) throat irritation  (Listed on [facility] problem list.)     Fentanyl Swelling     Lips and tongue swelled.  Swelling of lips and tongue     Adhesive Tape      blisters     Amitriptyline Other (See Comments)     Respiratory symptoms - denies allergic reaction     Liquid Adhesive Dermatitis     blisters  Blisters  Silk tape and tegaderm is fine     Oxcarbazepine Other (See Comments)     Throat gets tight and hives. Denies allergic reaction  Throat gets tight and hives-       Prozac [Fluoxetine]      Other reaction(s): *Unknown  uncontrolled muscle spasms     Valproic Acid      Respiratory symptoms ([facility]  "[facility]). Denies allergic reaction     Ziprasidone      Throat gets tight and hives. Denies allergic reaction       Review Of Systems: See HPI and dictation from 5/11/2021    Physical Exam:  /80   Pulse 86   Temp 96.9  F (36.1  C) (Tympanic)   Resp 20   Ht 1.702 m (5' 7\")   Wt 85.4 kg (188 lb 4.4 oz)   SpO2 98%   BMI 29.49 kg/m      GENERAL APPEARANCE: Healthy, alert and in no acute distress.  HEENT: Normocephalic, Sclerae anicteric.   RESP:  respirations regular and easy  MUSCULOSKELETAL: Extremities without gross deformities noted.   NEURO: Alert and oriented x 3.  Gait steady.  PSYCHIATRIC: Mentation and affect appear normal.  Mood appropriate.    Laboratory/Imaging Studies:   Results for orders placed or performed during the hospital encounter of 05/12/21   CT Chest/Abdomen/Pelvis w Contrast     Status: None    Narrative    PROCEDURE: CT CHEST/ABDOMEN/PELVIS W CONTRAST 5/12/2021 9:36 AM    HISTORY: Surveillance colon cancer; Cancer of ascending colon (H)    COMPARISONS: January 2021    Meds/Dose Given: ISOVUE 300  100ML    TECHNIQUE: CT scan of the chest abdomen and pelvis with IV contrast  sagittal coronal reconstructions    FINDINGS: CT chest: There are no pulmonary masses or infiltrates. The  hilar and mediastinal lymph nodes are normal in caliber. The heart is  normal in size. Axillary and superior clavicular lymph nodes are  normal. There is a small right thyroid nodule measuring 6 mm in  diameter.    CT scan of the abdomen and pelvis: The liver is free of masses or  biliary ductal enlargement. The gallbladder is been removed. The  spleen and pancreas appear normal. The adrenal glands are normal. The  right left kidneys are free of masses or hydronephrosis. The  periaortic lymph nodes are normal in caliber. Postoperative changes  are seen at the esophagogastric junction and in the right side of the  abdomen.. No intraperitoneal masses or inflammatory changes are noted.    The bladder and " rectum are normal.    Degenerative changes are present in the thoracic spine. Postoperative  changes are noted in the lower lumbar spine.         Impression    IMPRESSION: No change from January 2021    ZENA EDWARDS MD       ASSESSMENT/PLAN:    #1 colon cancer:   Stage IIIB adenocarcinoma of the ascending colon diagnosed February 2020.  She completed adjuvant chemotherapy with XELOX x 4 cycles on 6/22/2020 and is being followed with surveillance.  Slight elevation of her CEA on 4/29/2021 and patient presenting with symptoms yesterday.  CT scan of the chest abdomen pelvis was completed showing stability with no evidence of recurrence.  I will see her back in 3 months with a CBC, CMP and CEA.    #2 fatigue: Managing with the B12 injections, vitamin D and also iron infusions.    #3  Neuropathy: She will continue with the Cymbalta twice a day and gabapentin was instituted yesterday.    #4 abdominal pain: Likely related to adhesions and scar formation.  I let her know that I would discuss her situation with the pelvic specialist with rehab.  She is aware I will get back to her.    I encouraged patient to call with any questions or concerns.    Izzy Moran, NP  APRN, FNP-BC, AOCNP

## 2021-05-13 ENCOUNTER — INFUSION THERAPY VISIT (OUTPATIENT)
Dept: INFUSION THERAPY | Facility: OTHER | Age: 51
End: 2021-05-13
Attending: NURSE PRACTITIONER
Payer: COMMERCIAL

## 2021-05-13 VITALS
DIASTOLIC BLOOD PRESSURE: 79 MMHG | HEART RATE: 84 BPM | OXYGEN SATURATION: 97 % | TEMPERATURE: 96.9 F | RESPIRATION RATE: 20 BRPM | SYSTOLIC BLOOD PRESSURE: 139 MMHG

## 2021-05-13 DIAGNOSIS — E53.8 VITAMIN B 12 DEFICIENCY: ICD-10-CM

## 2021-05-13 DIAGNOSIS — C18.2 CANCER OF ASCENDING COLON (H): ICD-10-CM

## 2021-05-13 DIAGNOSIS — E61.1 IRON DEFICIENCY: Primary | ICD-10-CM

## 2021-05-13 DIAGNOSIS — Z98.84 GASTRIC BYPASS STATUS FOR OBESITY: ICD-10-CM

## 2021-05-13 PROCEDURE — 258N000003 HC RX IP 258 OP 636: Performed by: NURSE PRACTITIONER

## 2021-05-13 PROCEDURE — 250N000011 HC RX IP 250 OP 636: Performed by: NURSE PRACTITIONER

## 2021-05-13 PROCEDURE — 96372 THER/PROPH/DIAG INJ SC/IM: CPT | Performed by: NURSE PRACTITIONER

## 2021-05-13 PROCEDURE — 96365 THER/PROPH/DIAG IV INF INIT: CPT

## 2021-05-13 RX ORDER — CYANOCOBALAMIN 1000 UG/ML
1000 INJECTION, SOLUTION INTRAMUSCULAR; SUBCUTANEOUS
Status: DISCONTINUED | OUTPATIENT
Start: 2021-05-13 | End: 2021-05-13 | Stop reason: HOSPADM

## 2021-05-13 RX ORDER — HEPARIN SODIUM (PORCINE) LOCK FLUSH IV SOLN 100 UNIT/ML 100 UNIT/ML
5 SOLUTION INTRAVENOUS
Status: DISCONTINUED | OUTPATIENT
Start: 2021-05-13 | End: 2021-05-13 | Stop reason: HOSPADM

## 2021-05-13 RX ORDER — HEPARIN SODIUM (PORCINE) LOCK FLUSH IV SOLN 100 UNIT/ML 100 UNIT/ML
5 SOLUTION INTRAVENOUS
Status: CANCELLED | OUTPATIENT
Start: 2021-05-13

## 2021-05-13 RX ORDER — HEPARIN SODIUM (PORCINE) LOCK FLUSH IV SOLN 100 UNIT/ML 100 UNIT/ML
5 SOLUTION INTRAVENOUS
Status: CANCELLED | OUTPATIENT
Start: 2021-05-14

## 2021-05-13 RX ORDER — CYANOCOBALAMIN 1000 UG/ML
1000 INJECTION, SOLUTION INTRAMUSCULAR; SUBCUTANEOUS
Status: CANCELLED
Start: 2021-05-13

## 2021-05-13 RX ADMIN — SODIUM CHLORIDE 250 ML: 9 INJECTION, SOLUTION INTRAVENOUS at 11:16

## 2021-05-13 RX ADMIN — IRON SUCROSE 200 MG: 20 INJECTION, SOLUTION INTRAVENOUS at 11:17

## 2021-05-13 RX ADMIN — HEPARIN 5 ML: 100 SYRINGE at 11:39

## 2021-05-13 RX ADMIN — CYANOCOBALAMIN 1000 MCG: 1000 INJECTION, SOLUTION INTRAMUSCULAR at 11:09

## 2021-05-13 NOTE — PROGRESS NOTES
Patient denies questions nor concerns regarding medication, administration site, side effects, nor aftercare.  Patient identified with two identifiers, order verified, and verbal consent for today's injection obtained from patient.   Patient education provided on s/s of injection site infection, and/or medication specific side effects, and when to call a provider.  Patient instructed to report any adverse effects.     Medication administered per protocol in RT ARM at 90 degree angle.  Site covered with sterile bandage.  Patient tolerated injection well, no verbal nor non-verbal signs of discomfort noted.  No adverse effects noted at this time.     Patient instructed to call with further questions or concerns.  Patient states understanding and is in agreement with this plan.  Copy of appointments, and after visit summary (AVS) given to patient. Patient discharged.

## 2021-05-13 NOTE — PATIENT INSTRUCTIONS

## 2021-05-13 NOTE — PROGRESS NOTES
Patient is a 50 year old here today for injectin of B12 and infusion of Venofer per order of FRANK Moran NP.  Patient identified with two identifiers, order verified, and verbal consent for today's infusion obtained from patient.      Patient meets order parameters for today's treatment.     Patient denies questions or concerns regarding infusion and/or medications being administered.    Patients port accessed using non-coring, 20 gauge, 3/4 inch needle. Port accessed per facility protocol. Port flushed easily, blood return noted.  No signs and symptoms of infection or infiltration.      1117 IV pump verified with Venofer dose, drug, and rate of administration.  Infusion administered per protocol.  Patient tolerated infusion well, no signs or symptoms of adverse reaction noted.  Patient denies pain nor discomfort.     IV removed, catheter intact. Site clean, dry and intact.  No signs or symptoms of infiltration or infection. Covered with a sterile bandage, slight pressure applied for 30 seconds. Pt instructed to leave bandage intact for a minimum of one hour, and to call with questions or concerns. Copy of appointments, discharge instructions, and after visit summary (AVS) provided to patient.  Patient states understanding, discharged.

## 2021-05-17 ENCOUNTER — INFUSION THERAPY VISIT (OUTPATIENT)
Dept: INFUSION THERAPY | Facility: OTHER | Age: 51
End: 2021-05-17
Attending: NURSE PRACTITIONER
Payer: COMMERCIAL

## 2021-05-17 VITALS
TEMPERATURE: 97.5 F | SYSTOLIC BLOOD PRESSURE: 138 MMHG | RESPIRATION RATE: 18 BRPM | HEIGHT: 67 IN | WEIGHT: 185.63 LBS | OXYGEN SATURATION: 96 % | DIASTOLIC BLOOD PRESSURE: 88 MMHG | HEART RATE: 83 BPM | BODY MASS INDEX: 29.13 KG/M2

## 2021-05-17 DIAGNOSIS — E53.8 VITAMIN B 12 DEFICIENCY: ICD-10-CM

## 2021-05-17 DIAGNOSIS — Z98.84 GASTRIC BYPASS STATUS FOR OBESITY: ICD-10-CM

## 2021-05-17 DIAGNOSIS — E61.1 IRON DEFICIENCY: Primary | ICD-10-CM

## 2021-05-17 PROCEDURE — 96365 THER/PROPH/DIAG IV INF INIT: CPT

## 2021-05-17 PROCEDURE — 250N000011 HC RX IP 250 OP 636: Performed by: NURSE PRACTITIONER

## 2021-05-17 PROCEDURE — 96374 THER/PROPH/DIAG INJ IV PUSH: CPT

## 2021-05-17 PROCEDURE — 258N000003 HC RX IP 258 OP 636: Performed by: NURSE PRACTITIONER

## 2021-05-17 RX ORDER — HEPARIN SODIUM (PORCINE) LOCK FLUSH IV SOLN 100 UNIT/ML 100 UNIT/ML
5 SOLUTION INTRAVENOUS
Status: CANCELLED | OUTPATIENT
Start: 2021-05-19

## 2021-05-17 RX ORDER — HEPARIN SODIUM (PORCINE) LOCK FLUSH IV SOLN 100 UNIT/ML 100 UNIT/ML
5 SOLUTION INTRAVENOUS
Status: DISCONTINUED | OUTPATIENT
Start: 2021-05-17 | End: 2021-05-17 | Stop reason: HOSPADM

## 2021-05-17 RX ADMIN — SODIUM CHLORIDE 250 ML: 9 INJECTION, SOLUTION INTRAVENOUS at 12:46

## 2021-05-17 RX ADMIN — IRON SUCROSE 200 MG: 20 INJECTION, SOLUTION INTRAVENOUS at 12:46

## 2021-05-17 RX ADMIN — HEPARIN 5 ML: 100 SYRINGE at 13:02

## 2021-05-17 ASSESSMENT — MIFFLIN-ST. JEOR: SCORE: 1489.75

## 2021-05-17 ASSESSMENT — PAIN SCALES - GENERAL: PAINLEVEL: SEVERE PAIN (6)

## 2021-05-17 NOTE — PROGRESS NOTES
Patient is 50 years old, here accompanied by self today for infusion of Venofer per order of Dr Palma.  Patient identified with two identifiers, order verified, and verbal consent for today's infusion obtained from patient.      Patients port accessed using non-coring, 20 gauge, 3/4 inch needle. Port accessed per facility protocol. Port flushed easily, blood return noted.  No signs and symptoms of infection or infiltration.      IV pump verified with dose, drug, and rate of administration.  Infusion administered per protocol.  Patient tolerated infusion well, no signs or symptoms of adverse reaction noted.  Patient denies pain nor discomfort.     Patient wishes to remain accessed for tomorrows infusion, orders are in place to do so (VORB from Dr Palma this date, this nurse added to therapy plan). Flushed with 20mL normal saline after ensuring blood return. Flushes easily. Patient tolerated well. Clamp taped shut, line secured with tape to patient.

## 2021-05-17 NOTE — PATIENT INSTRUCTIONS
We will see you back as planned. If you have any questions or concerns, we can be reached Monday through Friday 8am - 430pm at [phone] ([facility] [place] If you have concerns related to a potential reaction/side effect after hours/weekends/holiday's, please seek emergent medical care.

## 2021-05-20 ENCOUNTER — INFUSION THERAPY VISIT (OUTPATIENT)
Dept: INFUSION THERAPY | Facility: OTHER | Age: 51
End: 2021-05-20
Attending: NURSE PRACTITIONER
Payer: COMMERCIAL

## 2021-05-20 ENCOUNTER — TELEPHONE (OUTPATIENT)
Dept: ONCOLOGY | Facility: OTHER | Age: 51
End: 2021-05-20

## 2021-05-20 DIAGNOSIS — C18.2 CANCER OF ASCENDING COLON (H): ICD-10-CM

## 2021-05-20 DIAGNOSIS — R10.84 ABDOMINAL PAIN, GENERALIZED: Primary | ICD-10-CM

## 2021-05-20 DIAGNOSIS — R53.83 FATIGUE, UNSPECIFIED TYPE: ICD-10-CM

## 2021-05-20 DIAGNOSIS — Z98.84 GASTRIC BYPASS STATUS FOR OBESITY: ICD-10-CM

## 2021-05-20 DIAGNOSIS — E61.1 IRON DEFICIENCY: Primary | ICD-10-CM

## 2021-05-20 DIAGNOSIS — E53.8 VITAMIN B 12 DEFICIENCY: ICD-10-CM

## 2021-05-20 PROCEDURE — 96365 THER/PROPH/DIAG IV INF INIT: CPT

## 2021-05-20 PROCEDURE — 250N000011 HC RX IP 250 OP 636: Performed by: NURSE PRACTITIONER

## 2021-05-20 PROCEDURE — 258N000003 HC RX IP 258 OP 636: Performed by: NURSE PRACTITIONER

## 2021-05-20 RX ORDER — HEPARIN SODIUM (PORCINE) LOCK FLUSH IV SOLN 100 UNIT/ML 100 UNIT/ML
5 SOLUTION INTRAVENOUS
Status: DISCONTINUED | OUTPATIENT
Start: 2021-05-20 | End: 2021-05-20

## 2021-05-20 RX ORDER — HEPARIN SODIUM (PORCINE) LOCK FLUSH IV SOLN 100 UNIT/ML 100 UNIT/ML
5 SOLUTION INTRAVENOUS
Status: CANCELLED | OUTPATIENT
Start: 2021-05-21

## 2021-05-20 RX ADMIN — IRON SUCROSE 200 MG: 20 INJECTION, SOLUTION INTRAVENOUS at 12:43

## 2021-05-20 RX ADMIN — SODIUM CHLORIDE 250 ML: 9 INJECTION, SOLUTION INTRAVENOUS at 12:43

## 2021-05-20 RX ADMIN — HEPARIN 5 ML: 100 SYRINGE at 13:05

## 2021-05-20 NOTE — TELEPHONE ENCOUNTER
Please call patient and let her know that I did talk with Lizzy from physical therapy and she feels she can help with her abdominal pain around the scar area.  She also could do cancer rehab and that may help with improving her fatigue.  I will send a referral and they will call her to schedule.

## 2021-05-20 NOTE — PROGRESS NOTES
Patient is a 50 year old female here accompanied by self today for infusion of Venofer per order of FRANK Moran NP.  Patient identified with two identifiers, order verified, and verbal consent for today's infusion obtained from patient.      Patients port was left accessed from tuesdays appointment. Port flushed easily, blood return noted.  No signs and symptoms of infection or infiltration.      IV pump verified with dose, drug, and rate of administration.  Infusion administered per protocol.  Patient tolerated infusion well, no signs or symptoms of adverse reaction noted.  Patient denies pain nor discomfort.     IV removed, catheter intact.  Site clean, dry and intact.  No signs or symptoms of infiltration or infection.  Covered with a sterile bandage, slight pressure applied for 30 seconds.  Pt instructed to leave bandage intact for a minimum of one hour, and to call with questions or concerns.  Copy of appointments, discharge instructions, and after visit summary (AVS) provided to patient.  Patient states understanding, discharged.

## 2021-05-27 ENCOUNTER — INFUSION THERAPY VISIT (OUTPATIENT)
Dept: INFUSION THERAPY | Facility: OTHER | Age: 51
End: 2021-05-27
Attending: NURSE PRACTITIONER
Payer: COMMERCIAL

## 2021-05-27 VITALS
DIASTOLIC BLOOD PRESSURE: 88 MMHG | HEART RATE: 69 BPM | BODY MASS INDEX: 29.31 KG/M2 | WEIGHT: 187.17 LBS | OXYGEN SATURATION: 97 % | SYSTOLIC BLOOD PRESSURE: 146 MMHG

## 2021-05-27 DIAGNOSIS — C18.2 CANCER OF ASCENDING COLON (H): Primary | ICD-10-CM

## 2021-05-27 DIAGNOSIS — E53.8 VITAMIN B 12 DEFICIENCY: ICD-10-CM

## 2021-05-27 PROCEDURE — 96372 THER/PROPH/DIAG INJ SC/IM: CPT | Performed by: NURSE PRACTITIONER

## 2021-05-27 PROCEDURE — 250N000011 HC RX IP 250 OP 636: Performed by: NURSE PRACTITIONER

## 2021-05-27 RX ORDER — CYANOCOBALAMIN 1000 UG/ML
1000 INJECTION, SOLUTION INTRAMUSCULAR; SUBCUTANEOUS
Status: DISCONTINUED | OUTPATIENT
Start: 2021-05-27 | End: 2021-05-27 | Stop reason: HOSPADM

## 2021-05-27 RX ORDER — HEPARIN SODIUM (PORCINE) LOCK FLUSH IV SOLN 100 UNIT/ML 100 UNIT/ML
5 SOLUTION INTRAVENOUS
Status: CANCELLED | OUTPATIENT
Start: 2021-05-27

## 2021-05-27 RX ORDER — CYANOCOBALAMIN 1000 UG/ML
1000 INJECTION, SOLUTION INTRAMUSCULAR; SUBCUTANEOUS
Status: CANCELLED
Start: 2021-05-27

## 2021-05-27 RX ADMIN — CYANOCOBALAMIN 1000 MCG: 1000 INJECTION, SOLUTION INTRAMUSCULAR at 11:18

## 2021-05-27 NOTE — PROGRESS NOTES
Patient denies questions nor concerns regarding medication, administration site, side effects, nor aftercare.  Patient identified with two identifiers, order verified, and verbal consent for today's injection obtained from patient.   Patient education provided on s/s of injection site infection, and/or medication specific side effects, and when to call a provider.  Patient instructed to report any adverse effects.     Medication B12 administered per protocol in LT ARM at 90 degree angle.  Site covered with sterile bandage.  Patient tolerated injection well, no verbal nor non-verbal signs of discomfort noted.  No adverse effects noted at this time.     Patient instructed to call with further questions or concerns.  Patient states understanding and is in agreement with this plan.  Copy of appointments, and after visit summary (AVS) given to patient. Patient discharged.

## 2021-05-27 NOTE — PATIENT INSTRUCTIONS
We will see you back as planned. If you have any questions or concerns, we can be reached Monday through Friday 8am - 430pm at [phone] ([facility] [place] If you have concerns related to a potential reaction/side effect after hours/weekends/holiday's, please seek emergent medical care.

## 2021-06-03 ENCOUNTER — HOSPITAL ENCOUNTER (OUTPATIENT)
Dept: PHYSICAL THERAPY | Facility: HOSPITAL | Age: 51
Setting detail: THERAPIES SERIES
End: 2021-06-03
Attending: NURSE PRACTITIONER
Payer: COMMERCIAL

## 2021-06-03 DIAGNOSIS — R10.84 ABDOMINAL PAIN, GENERALIZED: ICD-10-CM

## 2021-06-03 DIAGNOSIS — R53.83 FATIGUE, UNSPECIFIED TYPE: ICD-10-CM

## 2021-06-03 DIAGNOSIS — C18.2 CANCER OF ASCENDING COLON (H): ICD-10-CM

## 2021-06-03 PROCEDURE — 97161 PT EVAL LOW COMPLEX 20 MIN: CPT | Mod: GP

## 2021-06-03 PROCEDURE — 97530 THERAPEUTIC ACTIVITIES: CPT | Mod: GP
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
## 2021-06-10 ENCOUNTER — INFUSION THERAPY VISIT (OUTPATIENT)
Dept: INFUSION THERAPY | Facility: OTHER | Age: 51
End: 2021-06-10
Attending: NURSE PRACTITIONER
Payer: COMMERCIAL

## 2021-06-10 DIAGNOSIS — E53.8 VITAMIN B 12 DEFICIENCY: ICD-10-CM

## 2021-06-10 DIAGNOSIS — C18.2 CANCER OF ASCENDING COLON (H): Primary | ICD-10-CM

## 2021-06-10 PROCEDURE — 96372 THER/PROPH/DIAG INJ SC/IM: CPT | Performed by: NURSE PRACTITIONER

## 2021-06-10 PROCEDURE — 250N000011 HC RX IP 250 OP 636: Performed by: NURSE PRACTITIONER

## 2021-06-10 PROCEDURE — 96523 IRRIG DRUG DELIVERY DEVICE: CPT

## 2021-06-10 RX ORDER — HEPARIN SODIUM (PORCINE) LOCK FLUSH IV SOLN 100 UNIT/ML 100 UNIT/ML
5 SOLUTION INTRAVENOUS
Status: CANCELLED | OUTPATIENT
Start: 2021-06-10

## 2021-06-10 RX ORDER — CYANOCOBALAMIN 1000 UG/ML
1000 INJECTION, SOLUTION INTRAMUSCULAR; SUBCUTANEOUS
Status: DISCONTINUED | OUTPATIENT
Start: 2021-06-10 | End: 2021-06-10 | Stop reason: HOSPADM

## 2021-06-10 RX ORDER — CYANOCOBALAMIN 1000 UG/ML
1000 INJECTION, SOLUTION INTRAMUSCULAR; SUBCUTANEOUS
Status: CANCELLED
Start: 2021-06-10

## 2021-06-10 RX ORDER — HEPARIN SODIUM (PORCINE) LOCK FLUSH IV SOLN 100 UNIT/ML 100 UNIT/ML
5 SOLUTION INTRAVENOUS
Status: DISCONTINUED | OUTPATIENT
Start: 2021-06-10 | End: 2021-06-10 | Stop reason: HOSPADM

## 2021-06-10 RX ADMIN — CYANOCOBALAMIN 1000 MCG: 1000 INJECTION, SOLUTION INTRAMUSCULAR at 10:03

## 2021-06-10 RX ADMIN — HEPARIN 5 ML: 100 SYRINGE at 10:03

## 2021-06-10 NOTE — PROGRESS NOTES
Patient is 50 year old, here today for injection of B12 per order of FRANK Moran NP.     Patient denies questions nor concerns regarding medication, administration site, side effects, nor aftercare.  Patient identified with two identifiers, order verified, and verbal consent for today's injection obtained from patient.     Patient education provided on s/s of injection site infection, and/or medication specific side effects, and when to call a provider.  Patient instructed to report any adverse effects.     B12 administered per protocol in right deltoid at 90 degree angle.  Site covered with sterile bandage.  Patient tolerated injection well, no verbal nor non-verbal signs of discomfort noted.  No adverse effects noted at this time.     Patient instructed to call with further questions or concerns.  Patient states understanding and is in agreement with this plan.  Patient declines copy of appointments, and after visit summary (AVS). Patient discharged.

## 2021-06-10 NOTE — PATIENT INSTRUCTIONS

## 2021-06-10 NOTE — PROGRESS NOTES
Patient is a 50 year old here today for port flush per order of FRANK Moran NP.  Skin is warm, clean, dry, and intact without redness, swelling, nor other signs of infection noted.  Port accessed via sterile technique per facility protocol with a 20 gauge, 3/4 inch non coring 90 degree bent rodrigez needle. Port flushed easily, without resistance. Immediate blood return noted.  Flushed with 10 cc 0.9% normal saline and 5 cc heparinized saline.  Needle removed intact, sterile dressing applied.  Slight pressure applied for 30 seconds.   Patient tolerated port flush well, denies pain nor discomfort at this time. Patient instructed to leave dressing intact for a minimum of one hour, and to call with questions or concerns.  Patient states understanding and is in agreement with this plan.  Patient discharged ambulatory.

## 2021-06-14 ENCOUNTER — APPOINTMENT (OUTPATIENT)
Dept: GENERAL RADIOLOGY | Facility: HOSPITAL | Age: 51
End: 2021-06-14
Attending: NURSE PRACTITIONER
Payer: COMMERCIAL

## 2021-06-14 ENCOUNTER — HOSPITAL ENCOUNTER (EMERGENCY)
Facility: HOSPITAL | Age: 51
Discharge: HOME | End: 2021-06-14
Attending: NURSE PRACTITIONER | Admitting: NURSE PRACTITIONER
Payer: COMMERCIAL

## 2021-06-14 VITALS
HEART RATE: 64 BPM | SYSTOLIC BLOOD PRESSURE: 124 MMHG | RESPIRATION RATE: 16 BRPM | DIASTOLIC BLOOD PRESSURE: 80 MMHG | OXYGEN SATURATION: 96 % | TEMPERATURE: 96.6 F

## 2021-06-14 DIAGNOSIS — S63.502A LEFT WRIST SPRAIN: Primary | ICD-10-CM

## 2021-06-14 DIAGNOSIS — S63.502A SPRAIN OF LEFT WRIST, INITIAL ENCOUNTER: ICD-10-CM

## 2021-06-14 PROCEDURE — 73110 X-RAY EXAM OF WRIST: CPT | Mod: LT

## 2021-06-14 PROCEDURE — G0463 HOSPITAL OUTPT CLINIC VISIT: HCPCS

## 2021-06-14 PROCEDURE — 99213 OFFICE O/P EST LOW 20 MIN: CPT | Performed by: NURSE PRACTITIONER

## 2021-06-14 ASSESSMENT — ENCOUNTER SYMPTOMS
PSYCHIATRIC NEGATIVE: 1
WOUND: 0
VOMITING: 0
NAUSEA: 0
DIARRHEA: 0
NUMBNESS: 0
SHORTNESS OF BREATH: 0
WEAKNESS: 0
ARTHRALGIAS: 1
MYALGIAS: 1
FEVER: 0
CHILLS: 0

## 2021-06-14 NOTE — ED TRIAGE NOTES
Pt is here with c/o left wrist pain onset 2 weeks ago   Pt reports it wasn't as bad when it happened but now its getting worse

## 2021-06-14 NOTE — ED PROVIDER NOTES
"  History     Chief Complaint   Patient presents with     Wrist Pain     HPI  Constantino Warren is a 50 year old female who presents to urgent care today for complaints of left wrist discomfort after playing football on 6/5/2021.  Pain 7/10, has been attempting ice, heat and APAP which takes \"the edge off.\"  Denies any history of fractures, dislocations or surgeries to left hand/wrist.  No open areas to skin.  Denies fever, chills, nausea, vomiting, diarrhea, SOB or chest pain.  No other concerns.       Allergies:  Allergies   Allergen Reactions     Aspartame      Other reaction(s): Other - Describe In Comment Field, Seizures  convulsions     Bee Venom Anaphylaxis     Bupropion Anaphylaxis     Throat gets tight and hives. Denies allergic reaction     Food Anaphylaxis     Peppers-anaphylaxis if eaten, if touched hands swell and reddened     Food Allergy Formula Anaphylaxis     Peppers, patient reports green peppers close off her throat     Ibuprofen Anaphylaxis     No Clinical Screening - See Comments Anaphylaxis     Peppers, patient reports green peppers close off her throat     Piper Anaphylaxis     Peppers--green, black, red, chili, etc. Derm symptoms, hives;  Strawberries--(cooked) Itching, breathing difficulties, GI symptoms. Mushrooms--GI symptoms, Hives.Spinach (cooked) throat irritation  (Listed on [facility] problem list.)     Fentanyl Swelling     Lips and tongue swelled.  Swelling of lips and tongue     Adhesive Tape      blisters     Amitriptyline Other (See Comments)     Respiratory symptoms - denies allergic reaction     Liquid Adhesive Dermatitis     blisters  Blisters  Silk tape and tegaderm is fine     Oxcarbazepine Other (See Comments)     Throat gets tight and hives. Denies allergic reaction  Throat gets tight and hives-       Prozac [Fluoxetine]      Other reaction(s): *Unknown  uncontrolled muscle spasms     Valproic Acid      Respiratory symptoms ([facility]). Denies " allergic reaction     Ziprasidone      Throat gets tight and hives. Denies allergic reaction       Problem List:    Patient Active Problem List    Diagnosis Date Noted     Iron deficiency 04/29/2021     Priority: Medium     History of colon cancer 10/20/2020     Priority: Medium     Added automatically from request for surgery [id number]       Rotator cuff tear 10/13/2020     Priority: Medium     Added automatically from request for surgery [id number]       Arthritis 10/13/2020     Priority: Medium     Added automatically from request for surgery [id number]       History of uterine cancer 05/27/2020     Priority: Medium     Palliative care patient 05/27/2020     Priority: Medium     Iron deficiency anemia, unspecified iron deficiency anemia type 05/08/2020     Priority: Medium     S/P partial resection of colon 02/24/2020     Priority: Medium     Prediabetes      Priority: Medium     Colon cancer (H) 02/19/2020     Priority: Medium     Added automatically from request for surgery [id number]       Cancer of ascending colon (H) 02/19/2020     Priority: Medium     Added automatically from request for surgery [id number]       Special screening for malignant neoplasms, colon 01/14/2020     Priority: Medium     Added automatically from request for surgery [id number]       Anastomotic ulcer S/P gastric bypass 07/21/2018     Priority: Medium     Anemia 07/21/2018     Priority: Medium     Chest pain 07/20/2018     Priority: Medium     GI bleed 07/20/2018     Priority: Medium     Abdominal pain, epigastric 07/20/2018     Priority: Medium     Hypochromic microcytic anemia 07/20/2018     Priority: Medium     Chronic migraine 01/18/2018     Priority: Medium     Chronic pain disorder 01/18/2018     Priority: Medium     Long term (current) use of opiate analgesic 01/12/2018     Priority: Medium     Overview:   Pain Diagnosis failed back syndrome, annular tear L4-5, history lumbar discectomy, s/p lumbar fusion.    Should be seen in the clinic every  twelve weeks. Currently on a taper: yes, trying to wean away from the daytime hydrocodone-acetaminophen..    Plan for flares of chronic pain stretches, occasional use of more hydrocodone-acetaminophen for flare.    ED and UC: Opioid medication will not be given in the ER or Urgent Care unless there is a medical emergency unrelated to chronic pain.  Limit to 3 day supply    Refills: Refills will only be given at a scheduled visit.    [facility] Agreement for Opioid Treatment.   Opioid Agreement Date: 5/21/12  Last UDT (Urine Drug Test) on date:    Pain Dx: chronic low back pain  Last Pain Visit: 6-24-11  [facility]acy[facility]  Comments:      7/23/14:  Progressing well.  Over the last month, has decreased hydrocodone-acetaminophen from 6/day to 3/day.  Awaiting appointment with [facility].       Adjustment disorder with mixed anxiety and depressed mood 12/15/2017     Priority: Medium     Other insomnia 12/15/2017     Priority: Medium     Syncope and collapse 09/26/2015     Priority: Medium     Hypertensive urgency 09/26/2015     Priority: Medium     Concussion syndrome 09/26/2015     Priority: Medium     Hydronephrosis, right 04/20/2015     Priority: Medium     Pyelonephritis 04/20/2015     Priority: Medium     Urolithiasis 04/20/2015     Priority: Medium     Annular tear of lumbar disc 12/16/2014     Priority: Medium     Overview:   MRI 2/5/13:  Previous right L4-5 hemilaminectomy and partial facetectomy with mild to moderate right L4-5 facet arthropathy and broad-based bulge of disk and end plate osteophyte on the right with partial high signal intensity zone annular tear of the undersurface of the laterally protruding disk/annulus.       Failed back syndrome of lumbar spine 12/16/2014     Priority: Medium     Overview:   Three back surgeries, including an L3-sacrum AP fusion done at [facility] in 2013.  Has been doing very well in terms of reduction of pain, with minimal requirement of opiate pain  medication.       Hx of lumbar discectomy 12/16/2014     Priority: Medium     Overview:   10/2006:   L4-5 Laminectomy and diskectomy L5-S1.   12/12/11:  L5S1 microdiscectomy ([facility])       Vitamin B 12 deficiency 06/13/2011     Priority: Medium     Overview:   IMO Update 10/11       Gastric bypass status for obesity 01/04/2011     Priority: Medium     Overview:   12/14/10:  laparoscopic Prasanna-en-Y gastric bypass Dr. Hassan  IMO Update 10/11       Hyperlipidemia 09/23/2008     Priority: Medium     Overview:   IMO Update 10/11       Hypothyroidism 09/23/2008     Priority: Medium     Overview:   IMO Update 10/11  Overview:   IMO Update 10/11          Past Medical History:    Past Medical History:   Diagnosis Date     Arthritis 10/13/2020     Cancer (H)      Cancer (H) 2004     Chronic pain      Depressive disorder      Diabetes (H)      History of blood transfusion      Hypertension      Prediabetes      Thyroid disease        Past Surgical History:    Past Surgical History:   Procedure Laterality Date     ARTHROSCOPY SHOULDER Left 11/6/2020    Procedure: LEFT SHOULDER ARTHROSCOPY WITH ROTATOR CUFF REPAIR AND ACROMIUM CLAVICULAR JOINT RESECTION;  Surgeon: Tanner Mujica DO;  Location: [facility]     BACK SURGERY      2008; 2011     COLONOSCOPY N/A 2/13/2020    Procedure: COLONOSCOPY WITH BIOPSY AND TATOOING OF ASCENDING COLON,INCOMEPLETE EXAM;  Surgeon: Kang Cleveland MD;  Location: [facility]     COLONOSCOPY N/A 10/26/2020    Procedure: incomplete colonoscopy ;  Surgeon: Kang Cleveland MD;  Location: [facility]     ESOPHAGOSCOPY, GASTROSCOPY, DUODENOSCOPY (EGD), COMBINED N/A 7/21/2018    Procedure: COMBINED ESOPHAGOSCOPY, GASTROSCOPY, DUODENOSCOPY (EGD);  UPPER ENDOSCOPY WITH BIOPSIES;  Surgeon: Roger Vazquez MD;  Location[facility]     ESOPHAGOSCOPY, GASTROSCOPY, DUODENOSCOPY (EGD), COMBINED N/A 11/21/2019    Procedure: UPPER ENDOSCOPY with biopsy;  Surgeon: Kang Cleveland MD;  Location: [facility]     HEAD &  NECK SURGERY  2008    c4-c5 fusion     HYSTERECTOMY, CRISTIANE  04/2004    uterine cancer; [facility]     INSERT PORT VASCULAR ACCESS N/A 2/28/2020    Procedure: INSERTION, VASCULAR ACCESS PORT LEFT;  Surgeon: Kang Cleveland MD;  Location: [facility]     LAPAROSCOPIC ASSISTED COLECTOMY Right 2/24/2020    Procedure: RIGHT KEVIN COLECTOMY, LYSIS OF ADHESIONS;  Surgeon: Kang Cleveland MD;  Location: [facility]     LAPAROSCOPIC BYPASS GASTRIC  12/2010       Family History:    Family History   Problem Relation Age of Onset     Diabetes Mother      Hypertension Mother      Coronary Artery Disease Mother      Myocardial Infarction Mother      Hypertension Father      Diabetes Maternal Grandmother      Hypertension Maternal Grandfather      Coronary Artery Disease Maternal Grandfather      Cancer Paternal Grandfather         unsure of type of cancer     Bladder Cancer Paternal Grandfather      Diabetes Maternal Aunt      Cerebrovascular Disease Maternal Aunt      Diabetes Maternal Aunt      Cerebrovascular Disease Maternal Aunt      Diabetes Maternal Aunt      Cerebrovascular Disease Maternal Aunt      Hyperlipidemia No family hx of      Breast Cancer No family hx of      Colon Cancer No family hx of      Prostate Cancer No family hx of      Thyroid Disease No family hx of      Anesthesia Reaction No family hx of      Asthma No family hx of      Genetic Disorder No family hx of        Social History:  Marital Status:   [5]  Social History     Tobacco Use     Smoking status: Never Smoker     Smokeless tobacco: Never Used   Substance Use Topics     Alcohol use: No     Frequency: Never     Comment: sober for 18 years     Drug use: No        Medications:    acetaminophen (TYLENOL) 500 MG tablet  Cyanocobalamin 1000 MCG/ML KIT  diazepam (VALIUM) 5 MG tablet  DULoxetine (CYMBALTA) 30 MG capsule  EPINEPHrine (ANY BX GENERIC EQUIV) 0.3 MG/0.3ML injection 2-pack  gabapentin (NEURONTIN) 300 MG capsule  ketorolac (ACULAR)  0.5 % ophthalmic solution  levothyroxine (SYNTHROID/LEVOTHROID) 200 MCG tablet  levothyroxine (SYNTHROID/LEVOTHROID) 25 MCG tablet  lidocaine-prilocaine (EMLA) 2.5-2.5 % external cream  lisinopril (ZESTRIL) 10 MG tablet  multivitamin, therapeutic with minerals (MULTI-VITAMIN) TABS  ondansetron (ZOFRAN-ODT) 4 MG ODT tab  prednisoLONE acetate (PRED FORTE) 1 % ophthalmic suspension  tiZANidine (ZANAFLEX) 4 MG tablet  traZODone (DESYREL) 50 MG tablet  verapamil (CALAN) 40 MG tablet  Vitamin D3 (CHOLECALCIFEROL) 25 mcg (1000 units) tablet      Review of Systems   Constitutional: Negative for chills and fever.   Respiratory: Negative for shortness of breath.    Cardiovascular: Negative for chest pain.   Gastrointestinal: Negative for diarrhea, nausea and vomiting.   Musculoskeletal: Positive for arthralgias and myalgias. Negative for gait problem.   Skin: Negative for wound.   Neurological: Negative for weakness and numbness.   Psychiatric/Behavioral: Negative.      Physical Exam   BP: 124/80  Pulse: 64  Temp: 96.6  F (35.9  C)  Resp: 16  SpO2: 96 %    Physical Exam  Vitals signs and nursing note reviewed.   Constitutional:       General: She is not in acute distress.  Cardiovascular:      Rate and Rhythm: Normal rate and regular rhythm.      Pulses: Normal pulses.      Heart sounds: Normal heart sounds.   Pulmonary:      Effort: Pulmonary effort is normal.      Breath sounds: Normal breath sounds.   Musculoskeletal:      Left wrist: She exhibits decreased range of motion, tenderness, bony tenderness and swelling. She exhibits no effusion, no crepitus, no deformity and no laceration.      Comments: Left WRIST PHYSICAL EXAMINATION:  General Examination of the wrist: Skin is intact     Palpation: Tenderness to palpation: Entire Wrist    ROM Moderately Impaired: flexion, extension, radial deviation, ulnar deviation, pronation, supination.    Neurovascular status: Sensation is intact in the radial, ulnar and median nerve  distributions supplying the distal hand/fingers.  Distal pulses 2+.      Skin:     General: Skin is warm and dry.      Capillary Refill: Capillary refill takes less than 2 seconds.   Neurological:      Mental Status: She is alert.   Psychiatric:         Mood and Affect: Mood normal.       ED Course     Results for orders placed or performed during the hospital encounter of 06/14/21 (from the past 24 hour(s))   XR Wrist Left G/E 3 Views    Narrative    Exam: XR WRIST LEFT G/E 3 VIEWS     History:Female, age 50 years, left wrist pain from injury 2 weeks ago    Comparison:  None    Technique: Four views are submitted.    Findings: Bones are normally mineralized. No evidence of acute or  subacute fracture.  No evidence of dislocation.           Impression    Impression:  No evidence of apparent fracture. No evidence of acute dislocation.    Slight subluxation of the first metacarpal in relation to the  trapezium, more likely a chronic process.    NELSON PERKINS MD       Medications - No data to display    Assessments & Plan (with Medical Decision Making)     I have reviewed the nursing notes.    I have reviewed the findings, diagnosis, plan and need for follow up with the patient.  (P11.391X) Left wrist sprain  (primary encounter diagnosis)  Plan:  Hyperextended thumb while playing football with her children.  Patient has moderate impairment with ROM to left wrist.  Will place in thumb spica removable wrist splint.  Patient wants to follow up with orthopedic associates due to decreased ROM and continued pain over 2 weeks.  XR left wrist completed today and shows no evidence of apparent fracture.  No evidence of acute dislocation. Will refer to orthopedic associates and have patient follow up with PCP or return to urgent care/ED as needed.   Patient in agreement with treatment plan    Patient Education  Wrist Brace.  - Musculoskeletal injuries can take 6-8 weeks to fully heal  - Continue with conservative measures  including Rest, Ice, Compression, Elevation, Tylenol.  After initial injury you can try heat for comfort if this feels better than ice  - Topical anesthetic such as Biofreeze, Bengay, Icy Hot, Lidocaine, others.   - Avoid overusing or exerting which could delay healing and cause further injury    Follow up with [facility] if does not improve at [phone]    Follow-up with primary care provider or return to urgent care/ED with any worsening in condition or additional concerns.   New Prescriptions    No medications on file     Final diagnoses:   Left wrist sprain     6/14/2021   [facility]     Karen Tucker NP  06/14/21 [id number]

## 2021-06-14 NOTE — DISCHARGE INSTRUCTIONS
Wrist Brace.  - Musculoskeletal injuries can take 6-8 weeks to fully heal  - Continue with conservative measures including Rest, Ice, Compression, Elevation, Tylenol.  After initial injury you can try heat for comfort if this feels better than ice  - Topical anesthetic such as Biofreeze, Bengay, Icy Hot, Lidocaine, others.   - Avoid overusing or exerting which could delay healing and cause further injury    Follow up with [facility] if does not improve at [phone]    Follow-up with primary care provider or return to urgent care/ED with any worsening in condition or additional concerns.

## 2021-06-15 ENCOUNTER — HOSPITAL ENCOUNTER (OUTPATIENT)
Dept: PHYSICAL THERAPY | Facility: HOSPITAL | Age: 51
Setting detail: THERAPIES SERIES
End: 2021-06-15
Attending: NURSE PRACTITIONER
Payer: COMMERCIAL

## 2021-06-15 PROCEDURE — 97140 MANUAL THERAPY 1/> REGIONS: CPT | Mod: GP

## 2021-06-22 NOTE — PROGRESS NOTES
"    Assessment & Plan     Trigger finger, acquired  - Orthopedic  Referral; Future    De Quervain's tenosynovitis, left  Patient requesting something for pain.    Asked for Lortab for at night.  Declined.  Will increase her Neurontin bedtime dose from 300 to 600 mg.  - Orthopedic  Referral; Future    Prediabetes  Normal a1c 3-4 months ago.  However, with history of diabetes, and with eye changes on recent exam, will update lab and send to [facility].  - Hemoglobin A1c    Concern about eye disease without diagnosis  - Hemoglobin A1c    Encounter for hepatitis C screening test for low risk patient  - Hepatitis C Screen Reflex to HCV RNA Quant and Genotype       BMI:   Estimated body mass index is 29.44 kg/m  as calculated from the following:    Height as of this encounter: 1.702 m (5' 7\").    Weight as of this encounter: 85.3 kg (188 lb).   Weight management plan: Discussed healthy diet and exercise guidelines    Patient Instructions   Ortho referral.  Continue splint of finger, brace of wrist.  Increase night time Neurontin dose to 600 mg.  Will call with labs.  Will fax a1c to [facility].      No follow-ups on file.    Coco Schafer MD  [facility] - RICHARD Meeks is a 50 year old who presents for the following health issues     HPI     prediabetes Follow-up    How often are you checking your blood sugar? Not at all    What concerns do you have today about your diabetes? Other: At eye appointment stated they noted the nerve damage in eye that could related to uncontrolled diabetes     Do you have any of these symptoms? (Select all that apply)  Numbness in feet and Burning in feet     Had recent eye exam - some \"damage noted\" - felt to be related typically to poorly controlled diabetes; Toney Soliman    Does have follow up 7/6/21    BP Readings from Last 2 Encounters:   06/30/21 128/78   06/29/21 131/91     Hemoglobin A1C (%)   Date Value   03/04/2021 5.0   02/20/2020 " "5.7 (H)     LDL Cholesterol Calculated (mg/dL)   Date Value   03/04/2021 69   02/20/2020 46       How many servings of fruits and vegetables do you eat daily?  4 or more    On average, how many sweetened beverages do you drink each day (Examples: soda, juice, sweet tea, etc.  Do NOT count diet or artificially sweetened beverages)?   2 sweet teas    How many days per week do you exercise enough to make your heart beat faster? 5    How many minutes a day do you exercise enough to make your heart beat faster? 30 - 60    How many days per week do you miss taking your medication? 0    Musculoskeletal problem/pain  Onset/Duration: 6/14/21  Description  Location: wrist - left (xrays negative  Joint Swelling: no  Redness: no  Pain: YES  Warmth: no  Intensity:  moderate  Progression of Symptoms:  same and constant- also trigger finger present (seen in UC yesterday - right middle - put on steroid - referred to orthopedics)  Accompanying signs and symptoms:   Fevers: no  Numbness/tingling/weakness: no  History  Trauma to the area: YES  Recent illness:  no  Previous similar problem: YES  Previous evaluation:  YES  Precipitating or alleviating factors:  Aggravating factors include: lifting, exercise and overuse  Therapies tried and outcome: rest/inactivity, immobilization and acetaminophen- in-effective; max dose      Review of Systems   Constitutional, HEENT, cardiovascular, pulmonary, gi and gu systems are negative, except as otherwise noted.      Objective    /78 (BP Location: Right arm, Patient Position: Sitting, Cuff Size: Adult Regular)   Pulse 80   Temp 98.4  F (36.9  C) (Tympanic)   Ht 1.702 m (5' 7\")   Wt 85.3 kg (188 lb)   SpO2 97%   BMI 29.44 kg/m    Body mass index is 29.44 kg/m .  Physical Exam   GENERAL: healthy, alert and no distress  NECK: no adenopathy, no asymmetry, masses, or scars and thyroid normal to palpation  RESP: lungs clear to auscultation - no rales, rhonchi or wheezes  CV: regular rate " and rhythm, normal S1 S2, no S3 or S4, no murmur, click or rub, no peripheral edema and peripheral pulses strong  MS: normal muscle tone, peripheral pulses normal and right middle finger trigger finger present; left wrist with tenderness along radial head and into forearm; positive Finkelstein's; normal AROM wrist  SKIN: no suspicious lesions or rashes  NEURO: Normal strength and tone, mentation intact and speech normal  PSYCH: mentation appears normal, affect normal/bright    Labs pending

## 2021-06-29 ENCOUNTER — APPOINTMENT (OUTPATIENT)
Dept: GENERAL RADIOLOGY | Facility: HOSPITAL | Age: 51
End: 2021-06-29
Attending: NURSE PRACTITIONER
Payer: COMMERCIAL

## 2021-06-29 ENCOUNTER — HOSPITAL ENCOUNTER (EMERGENCY)
Facility: HOSPITAL | Age: 51
Discharge: HOME | End: 2021-06-29
Attending: NURSE PRACTITIONER | Admitting: NURSE PRACTITIONER
Payer: COMMERCIAL

## 2021-06-29 VITALS
HEART RATE: 67 BPM | RESPIRATION RATE: 14 BRPM | DIASTOLIC BLOOD PRESSURE: 91 MMHG | OXYGEN SATURATION: 98 % | TEMPERATURE: 96.4 F | SYSTOLIC BLOOD PRESSURE: 131 MMHG

## 2021-06-29 DIAGNOSIS — M65.30 TRIGGER FINGER, ACQUIRED: ICD-10-CM

## 2021-06-29 PROCEDURE — 99213 OFFICE O/P EST LOW 20 MIN: CPT | Performed by: NURSE PRACTITIONER

## 2021-06-29 PROCEDURE — G0463 HOSPITAL OUTPT CLINIC VISIT: HCPCS

## 2021-06-29 PROCEDURE — 73130 X-RAY EXAM OF HAND: CPT | Mod: RT

## 2021-06-29 RX ORDER — PREDNISONE 10 MG/1
10 TABLET ORAL DAILY
Qty: 5 TABLET | Refills: 0 | Status: SHIPPED | OUTPATIENT
Start: 2021-06-29 | End: 2021-08-12

## 2021-06-29 ASSESSMENT — ENCOUNTER SYMPTOMS
FEVER: 0
ARTHRALGIAS: 1
MYALGIAS: 1
NUMBNESS: 1
PSYCHIATRIC NEGATIVE: 1

## 2021-06-29 NOTE — ED PROVIDER NOTES
History     Chief Complaint   Patient presents with     Hand Pain     c/o rt hand pain, denies injury     HPI  Constantino Warren is a 50 year old female who has had right hand pain for a day, woke up today and could not move fingers on right hand, was able to get pointer finger and 4th finger to move, but middle finger painful to bend or move, and difficult to bend.  No fevers, no injuries.      Allergies:  Allergies   Allergen Reactions     Aspartame      Other reaction(s): Other - Describe In Comment Field, Seizures  convulsions     Bee Venom Anaphylaxis     Bupropion Anaphylaxis     Throat gets tight and hives. Denies allergic reaction     Food Anaphylaxis     Peppers-anaphylaxis if eaten, if touched hands swell and reddened     Food Allergy Formula Anaphylaxis     Peppers, patient reports green peppers close off her throat     Ibuprofen Anaphylaxis     No Clinical Screening - See Comments Anaphylaxis     Peppers, patient reports green peppers close off her throat     Piper Anaphylaxis     Peppers--green, black, red, chili, etc. Derm symptoms, hives;  Strawberries--(cooked) Itching, breathing difficulties, GI symptoms. Mushrooms--GI symptoms, Hives.Spinach (cooked) throat irritation  (Listed on [facility] problem list.)     Fentanyl Swelling     Lips and tongue swelled.  Swelling of lips and tongue     Adhesive Tape      blisters     Amitriptyline Other (See Comments)     Respiratory symptoms - denies allergic reaction     Liquid Adhesive Dermatitis     blisters  Blisters  Silk tape and tegaderm is fine     Oxcarbazepine Other (See Comments)     Throat gets tight and hives. Denies allergic reaction  Throat gets tight and hives-       Prozac [Fluoxetine]      Other reaction(s): *Unknown  uncontrolled muscle spasms     Valproic Acid      Respiratory symptoms ([facility]). Denies allergic reaction     Ziprasidone      Throat gets tight and hives. Denies allergic reaction       Problem  List:    Patient Active Problem List    Diagnosis Date Noted     Iron deficiency 04/29/2021     Priority: Medium     History of colon cancer 10/20/2020     Priority: Medium     Added automatically from request for surgery [id number]       Rotator cuff tear 10/13/2020     Priority: Medium     Added automatically from request for surgery [id number]       Arthritis 10/13/2020     Priority: Medium     Added automatically from request for surgery [id number]       History of uterine cancer 05/27/2020     Priority: Medium     Palliative care patient 05/27/2020     Priority: Medium     Iron deficiency anemia, unspecified iron deficiency anemia type 05/08/2020     Priority: Medium     S/P partial resection of colon 02/24/2020     Priority: Medium     Prediabetes      Priority: Medium     Colon cancer (H) 02/19/2020     Priority: Medium     Added automatically from request for surgery [id number]       Cancer of ascending colon (H) 02/19/2020     Priority: Medium     Added automatically from request for surgery [id number]       Special screening for malignant neoplasms, colon 01/14/2020     Priority: Medium     Added automatically from request for surgery [id number]       Anastomotic ulcer S/P gastric bypass 07/21/2018     Priority: Medium     Anemia 07/21/2018     Priority: Medium     Chest pain 07/20/2018     Priority: Medium     GI bleed 07/20/2018     Priority: Medium     Abdominal pain, epigastric 07/20/2018     Priority: Medium     Hypochromic microcytic anemia 07/20/2018     Priority: Medium     Chronic migraine 01/18/2018     Priority: Medium     Chronic pain disorder 01/18/2018     Priority: Medium     Long term (current) use of opiate analgesic 01/12/2018     Priority: Medium     Overview:   Pain Diagnosis failed back syndrome, annular tear L4-5, history lumbar discectomy, s/p lumbar fusion.    Should be seen in the clinic every twelve weeks. Currently on a taper: yes, trying to wean away from the daytime  hydrocodone-acetaminophen..    Plan for flares of chronic pain stretches, occasional use of more hydrocodone-acetaminophen for flare.    ED and UC: Opioid medication will not be given in the ER or Urgent Care unless there is a medical emergency unrelated to chronic pain.  Limit to 3 day supply    Refills: Refills will only be given at a scheduled visit.    [facility] Agreement for Opioid Treatment.   Opioid Agreement Date: 5/21/12  Last UDT (Urine Drug Test) on date:    Pain Dx: chronic low back pain  Last Pain Visit: 6-24-11  [facility]acy: [facility]  Comments:      7/23/14:  Progressing well.  Over the last month, has decreased hydrocodone-acetaminophen from 6/day to 3/day.  Awaiting appointment with [facility].       Adjustment disorder with mixed anxiety and depressed mood 12/15/2017     Priority: Medium     Other insomnia 12/15/2017     Priority: Medium     Syncope and collapse 09/26/2015     Priority: Medium     Hypertensive urgency 09/26/2015     Priority: Medium     Concussion syndrome 09/26/2015     Priority: Medium     Hydronephrosis, right 04/20/2015     Priority: Medium     Pyelonephritis 04/20/2015     Priority: Medium     Urolithiasis 04/20/2015     Priority: Medium     Annular tear of lumbar disc 12/16/2014     Priority: Medium     Overview:   MRI 2/5/13:  Previous right L4-5 hemilaminectomy and partial facetectomy with mild to moderate right L4-5 facet arthropathy and broad-based bulge of disk and end plate osteophyte on the right with partial high signal intensity zone annular tear of the undersurface of the laterally protruding disk/annulus.       Failed back syndrome of lumbar spine 12/16/2014     Priority: Medium     Overview:   Three back surgeries, including an L3-sacrum AP fusion done at [facility] in 2013.  Has been doing very well in terms of reduction of pain, with minimal requirement of opiate pain medication.       Hx of lumbar discectomy 12/16/2014     Priority: Medium     Overview:    10/2006:   L4-5 Laminectomy and diskectomy L5-S1.   12/12/11:  L5S1 microdiscectomy ([facility])       Vitamin B 12 deficiency 06/13/2011     Priority: Medium     Overview:   IMO Update 10/11       Gastric bypass status for obesity 01/04/2011     Priority: Medium     Overview:   12/14/10:  laparoscopic Prasanna-en-Y gastric bypass Dr. Hassan  IMO Update 10/11       Hyperlipidemia 09/23/2008     Priority: Medium     Overview:   IMO Update 10/11       Hypothyroidism 09/23/2008     Priority: Medium     Overview:   IMO Update 10/11  Overview:   IMO Update 10/11          Past Medical History:    Past Medical History:   Diagnosis Date     Arthritis 10/13/2020     Cancer (H)      Cancer (H) 2004     Chronic pain      Depressive disorder      Diabetes (H)      History of blood transfusion      Hypertension      Prediabetes      Thyroid disease        Past Surgical History:    Past Surgical History:   Procedure Laterality Date     ARTHROSCOPY SHOULDER Left 11/6/2020    Procedure: LEFT SHOULDER ARTHROSCOPY WITH ROTATOR CUFF REPAIR AND ACROMIUM CLAVICULAR JOINT RESECTION;  Surgeon: Tanner Mujica DO;  Location: [facility]     BACK SURGERY      2008; 2011     COLONOSCOPY N/A 2/13/2020    Procedure: COLONOSCOPY WITH BIOPSY AND TATOOING OF ASCENDING COLON,INCOMEPLETE EXAM;  Surgeon: Kang Cleveland MD;  Location: [facility]     COLONOSCOPY N/A 10/26/2020    Procedure: incomplete colonoscopy ;  Surgeon: Kang Cleveland MD;  Location: [facility]     ESOPHAGOSCOPY, GASTROSCOPY, DUODENOSCOPY (EGD), COMBINED N/A 7/21/2018    Procedure: COMBINED ESOPHAGOSCOPY, GASTROSCOPY, DUODENOSCOPY (EGD);  UPPER ENDOSCOPY WITH BIOPSIES;  Surgeon: Roger Vazquez MD;  Location: [facility]     ESOPHAGOSCOPY, GASTROSCOPY, DUODENOSCOPY (EGD), COMBINED N/A 11/21/2019    Procedure: UPPER ENDOSCOPY with biopsy;  Surgeon: Kang Cleveland MD;  Location: [facility]     HEAD & NECK SURGERY  2008    c4-c5 fusion     HYSTERECTOMY, [facility]  04/2004    uterine cancer;  [facility]     INSERT PORT VASCULAR ACCESS N/A 2/28/2020    Procedure: INSERTION, VASCULAR ACCESS PORT LEFT;  Surgeon: Kang Cleveland MD;  Location: [facility]     LAPAROSCOPIC ASSISTED COLECTOMY Right 2/24/2020    Procedure: RIGHT KEVIN COLECTOMY, LYSIS OF ADHESIONS;  Surgeon: Kang Cleveland MD;  Location: [facility]     LAPAROSCOPIC BYPASS GASTRIC  12/2010       Family History:    Family History   Problem Relation Age of Onset     Diabetes Mother      Hypertension Mother      Coronary Artery Disease Mother      Myocardial Infarction Mother      Hypertension Father      Diabetes Maternal Grandmother      Hypertension Maternal Grandfather      Coronary Artery Disease Maternal Grandfather      Cancer Paternal Grandfather         unsure of type of cancer     Bladder Cancer Paternal Grandfather      Diabetes Maternal Aunt      Cerebrovascular Disease Maternal Aunt      Diabetes Maternal Aunt      Cerebrovascular Disease Maternal Aunt      Diabetes Maternal Aunt      Cerebrovascular Disease Maternal Aunt      Hyperlipidemia No family hx of      Breast Cancer No family hx of      Colon Cancer No family hx of      Prostate Cancer No family hx of      Thyroid Disease No family hx of      Anesthesia Reaction No family hx of      Asthma No family hx of      Genetic Disorder No family hx of        Social History:  Marital Status:   [5]  Social History     Tobacco Use     Smoking status: Never Smoker     Smokeless tobacco: Never Used   Substance Use Topics     Alcohol use: No     Frequency: Never     Comment: sober for 18 years     Drug use: No        Medications:    Cyanocobalamin 1000 MCG/ML KIT  DULoxetine (CYMBALTA) 30 MG capsule  gabapentin (NEURONTIN) 300 MG capsule  levothyroxine (SYNTHROID/LEVOTHROID) 200 MCG tablet  levothyroxine (SYNTHROID/LEVOTHROID) 25 MCG tablet  lisinopril (ZESTRIL) 10 MG tablet  multivitamin, therapeutic with minerals (MULTI-VITAMIN) TABS  prednisoLONE acetate (PRED FORTE) 1 %  ophthalmic suspension  predniSONE (DELTASONE) 10 MG tablet  tiZANidine (ZANAFLEX) 4 MG tablet  traZODone (DESYREL) 50 MG tablet  verapamil (CALAN) 40 MG tablet  Vitamin D3 (CHOLECALCIFEROL) 25 mcg (1000 units) tablet  acetaminophen (TYLENOL) 500 MG tablet  diazepam (VALIUM) 5 MG tablet  EPINEPHrine (ANY BX GENERIC EQUIV) 0.3 MG/0.3ML injection 2-pack  ketorolac (ACULAR) 0.5 % ophthalmic solution  lidocaine-prilocaine (EMLA) 2.5-2.5 % external cream  ondansetron (ZOFRAN-ODT) 4 MG ODT tab          Review of Systems   Constitutional: Negative for fever.   Musculoskeletal: Positive for arthralgias and myalgias.   Skin: Negative.    Neurological: Positive for numbness.   Psychiatric/Behavioral: Negative.        Physical Exam   BP: 131/91  Pulse: 67  Temp: 96.4  F (35.8  C)  Resp: 14  SpO2: 98 %      Physical Exam  Constitutional:       Appearance: Normal appearance.   Musculoskeletal:         General: Tenderness present. No swelling or signs of injury.      Comments: Painful and difficult to move middle finger right hand.     Skin:     General: Skin is warm and dry.      Findings: No erythema.   Neurological:      General: No focal deficit present.      Mental Status: She is alert and oriented to person, place, and time.      Sensory: Sensory deficit present.      Comments: Numbness and tingling to middle finger  Right hand.     Psychiatric:         Mood and Affect: Mood normal.         Behavior: Behavior normal.         ED Course        Procedures          Splint to middle finger right hand.           Results for orders placed or performed during the hospital encounter of 06/29/21 (from the past 24 hour(s))   XR Hand Right G/E 3 Views    Narrative    PROCEDURE: XR HAND RT G/E 3 VW 6/29/2021 2:34 PM    HISTORY: states she cant bend her fingers. Pain.    COMPARISONS: 12/6/2020.    TECHNIQUE: 3 views.    FINDINGS: No fracture, dislocation or other focal bony abnormality is  seen.         Impression    IMPRESSION: No acute  fracture.    PADMINI WILKINS MD       Medications - No data to display    Assessments & Plan (with Medical Decision Making)     I have reviewed the nursing notes.    I have reviewed the findings, diagnosis, plan and need for follow up with the patient.      New Prescriptions    PREDNISONE (DELTASONE) 10 MG TABLET    Take 1 tablet (10 mg) by mouth daily       Final diagnoses:   Trigger finger, acquired   ASSESSMENT / PLAN:  (M65.30) Trigger finger, acquired  Comment:   Plan: Orthopedic  Referral  1.       Keep splint on til seen by Ortho   2. 2. Referral to [facility] associates for trigger joint evaluation.  [phone])  3. Prednisone 10mg a day for 5 days(can't tolerate Ibuprofens)          6/29/2021   [facility]     Aaron Lama NP  06/29/21 [id number]

## 2021-06-29 NOTE — DISCHARGE INSTRUCTIONS
Keep splint on til seen by Ortho   2. Referral to Ortho associates for trigger joint evaluation.  [phone])  3. Prednisone 10mg a day for 5 days(can't tolerate Ibuprofens)

## 2021-06-30 ENCOUNTER — OFFICE VISIT (OUTPATIENT)
Dept: FAMILY MEDICINE | Facility: OTHER | Age: 51
End: 2021-06-30
Attending: FAMILY MEDICINE
Payer: COMMERCIAL

## 2021-06-30 VITALS
WEIGHT: 188 LBS | HEIGHT: 67 IN | HEART RATE: 80 BPM | SYSTOLIC BLOOD PRESSURE: 128 MMHG | DIASTOLIC BLOOD PRESSURE: 78 MMHG | BODY MASS INDEX: 29.51 KG/M2 | TEMPERATURE: 98.4 F | OXYGEN SATURATION: 97 %

## 2021-06-30 DIAGNOSIS — G62.9 NEUROPATHY: ICD-10-CM

## 2021-06-30 DIAGNOSIS — C18.2 CANCER OF ASCENDING COLON (H): ICD-10-CM

## 2021-06-30 DIAGNOSIS — Z11.59 ENCOUNTER FOR HEPATITIS C SCREENING TEST FOR LOW RISK PATIENT: ICD-10-CM

## 2021-06-30 DIAGNOSIS — M65.4 DE QUERVAIN'S TENOSYNOVITIS, LEFT: ICD-10-CM

## 2021-06-30 DIAGNOSIS — M65.30 TRIGGER FINGER, ACQUIRED: Primary | ICD-10-CM

## 2021-06-30 DIAGNOSIS — R73.03 PREDIABETES: ICD-10-CM

## 2021-06-30 DIAGNOSIS — Z71.1 CONCERN ABOUT EYE DISEASE WITHOUT DIAGNOSIS: ICD-10-CM

## 2021-06-30 LAB
EST. AVERAGE GLUCOSE BLD GHB EST-MCNC: 100 MG/DL
HBA1C MFR BLD: 5.1 % (ref 0–5.6)

## 2021-06-30 PROCEDURE — 99214 OFFICE O/P EST MOD 30 MIN: CPT | Performed by: FAMILY MEDICINE

## 2021-06-30 PROCEDURE — 83036 HEMOGLOBIN GLYCOSYLATED A1C: CPT | Mod: ZL | Performed by: FAMILY MEDICINE

## 2021-06-30 PROCEDURE — 36415 COLL VENOUS BLD VENIPUNCTURE: CPT | Mod: ZL | Performed by: FAMILY MEDICINE

## 2021-06-30 PROCEDURE — 999N001182 HC STATISTIC ESTIMATED AVERAGE GLUCOSE: Mod: ZL | Performed by: FAMILY MEDICINE

## 2021-06-30 PROCEDURE — G0463 HOSPITAL OUTPT CLINIC VISIT: HCPCS

## 2021-06-30 PROCEDURE — 86803 HEPATITIS C AB TEST: CPT | Mod: ZL | Performed by: FAMILY MEDICINE

## 2021-06-30 PROCEDURE — 36416 COLLJ CAPILLARY BLOOD SPEC: CPT | Mod: ZL | Performed by: FAMILY MEDICINE

## 2021-06-30 RX ORDER — GABAPENTIN 300 MG/1
CAPSULE ORAL
Qty: 90 CAPSULE | Refills: 0 | COMMUNITY
Start: 2021-06-30 | End: 2021-08-12

## 2021-06-30 ASSESSMENT — MIFFLIN-ST. JEOR: SCORE: 1500.39

## 2021-06-30 ASSESSMENT — PAIN SCALES - GENERAL: PAINLEVEL: EXTREME PAIN (8)

## 2021-06-30 NOTE — PATIENT INSTRUCTIONS
Ortho referral.  Continue splint of finger, brace of wrist.  Increase night time Neurontin dose to 600 mg.  Will call with labs.  Will fax a1c to [facility].

## 2021-06-30 NOTE — NURSING NOTE
"Chief Complaint   Patient presents with     Diabetes     Musculoskeletal Problem       Initial /78 (BP Location: Right arm, Patient Position: Sitting, Cuff Size: Adult Regular)   Pulse 80   Temp 98.4  F (36.9  C) (Tympanic)   Ht 1.702 m (5' 7\")   Wt 85.3 kg (188 lb)   SpO2 97%   BMI 29.44 kg/m   Estimated body mass index is 29.44 kg/m  as calculated from the following:    Height as of this encounter: 1.702 m (5' 7\").    Weight as of this encounter: 85.3 kg (188 lb).  Medication Reconciliation: complete  Cyndee Haji LPN  "

## 2021-07-01 ENCOUNTER — HOSPITAL ENCOUNTER (OUTPATIENT)
Dept: PHYSICAL THERAPY | Facility: HOSPITAL | Age: 51
Setting detail: THERAPIES SERIES
End: 2021-07-01
Attending: NURSE PRACTITIONER
Payer: COMMERCIAL

## 2021-07-01 PROCEDURE — 97140 MANUAL THERAPY 1/> REGIONS: CPT | Mod: GP

## 2021-07-02 LAB — HCV AB SERPL QL IA: NONREACTIVE

## 2021-07-08 ENCOUNTER — INFUSION THERAPY VISIT (OUTPATIENT)
Dept: INFUSION THERAPY | Facility: OTHER | Age: 51
End: 2021-07-08
Attending: NURSE PRACTITIONER
Payer: COMMERCIAL

## 2021-07-08 DIAGNOSIS — E53.8 VITAMIN B 12 DEFICIENCY: ICD-10-CM

## 2021-07-08 DIAGNOSIS — C18.2 CANCER OF ASCENDING COLON (H): Primary | ICD-10-CM

## 2021-07-08 PROCEDURE — 250N000011 HC RX IP 250 OP 636: Performed by: NURSE PRACTITIONER

## 2021-07-08 PROCEDURE — 96523 IRRIG DRUG DELIVERY DEVICE: CPT

## 2021-07-08 PROCEDURE — 96372 THER/PROPH/DIAG INJ SC/IM: CPT | Performed by: NURSE PRACTITIONER

## 2021-07-08 RX ORDER — CYANOCOBALAMIN 1000 UG/ML
1000 INJECTION, SOLUTION INTRAMUSCULAR; SUBCUTANEOUS
Status: CANCELLED
Start: 2021-07-08

## 2021-07-08 RX ORDER — HEPARIN SODIUM (PORCINE) LOCK FLUSH IV SOLN 100 UNIT/ML 100 UNIT/ML
5 SOLUTION INTRAVENOUS
Status: CANCELLED | OUTPATIENT
Start: 2021-07-08

## 2021-07-08 RX ORDER — CYANOCOBALAMIN 1000 UG/ML
1000 INJECTION, SOLUTION INTRAMUSCULAR; SUBCUTANEOUS
Status: DISCONTINUED | OUTPATIENT
Start: 2021-07-08 | End: 2021-07-08 | Stop reason: HOSPADM

## 2021-07-08 RX ORDER — HEPARIN SODIUM (PORCINE) LOCK FLUSH IV SOLN 100 UNIT/ML 100 UNIT/ML
5 SOLUTION INTRAVENOUS
Status: DISCONTINUED | OUTPATIENT
Start: 2021-07-08 | End: 2021-07-08 | Stop reason: HOSPADM

## 2021-07-08 RX ADMIN — CYANOCOBALAMIN 1000 MCG: 1000 INJECTION, SOLUTION INTRAMUSCULAR at 11:01

## 2021-07-08 RX ADMIN — HEPARIN 5 ML: 100 SYRINGE at 11:01

## 2021-07-08 NOTE — PATIENT INSTRUCTIONS

## 2021-07-08 NOTE — PROGRESS NOTES
Patient is 50 year old, here today for injection of B12 per order of FRANK Moran NP.      Patient denies questions nor concerns regarding medication, administration site, side effects, nor aftercare. Patient identified with two identifiers, order verified, and verbal consent for today's injection obtained from patient.     Patient education provided on s/s of injection site infection, and/or medication specific side effects, and when to call a provider. Patient instructed to report any adverse effects.     B12 administered per protocol in Right deltoid at 90 degree angle. Site covered with sterile bandage. Patient tolerated injection well, no verbal nor non-verbal signs of discomfort noted. No adverse effects noted at this time.     Patient instructed to call with further questions or concerns. Patient states understanding and is in agreement with this plan. Patient discharged.     Class I (easy) - visualization of the soft palate, fauces, uvula, and both anterior and posterior pillars

## 2021-07-16 ENCOUNTER — TRANSFERRED RECORDS (OUTPATIENT)
Dept: HEALTH INFORMATION MANAGEMENT | Facility: CLINIC | Age: 51
End: 2021-07-16

## 2021-07-22 ENCOUNTER — INFUSION THERAPY VISIT (OUTPATIENT)
Dept: INFUSION THERAPY | Facility: OTHER | Age: 51
End: 2021-07-22
Attending: NURSE PRACTITIONER
Payer: COMMERCIAL

## 2021-07-22 DIAGNOSIS — E53.8 VITAMIN B 12 DEFICIENCY: ICD-10-CM

## 2021-07-22 DIAGNOSIS — C18.2 CANCER OF ASCENDING COLON (H): Primary | ICD-10-CM

## 2021-07-22 PROCEDURE — 96372 THER/PROPH/DIAG INJ SC/IM: CPT | Performed by: NURSE PRACTITIONER

## 2021-07-22 PROCEDURE — 250N000011 HC RX IP 250 OP 636: Performed by: NURSE PRACTITIONER

## 2021-07-22 RX ORDER — HEPARIN SODIUM (PORCINE) LOCK FLUSH IV SOLN 100 UNIT/ML 100 UNIT/ML
5 SOLUTION INTRAVENOUS
Status: CANCELLED | OUTPATIENT
Start: 2021-07-22

## 2021-07-22 RX ORDER — CYANOCOBALAMIN 1000 UG/ML
1000 INJECTION, SOLUTION INTRAMUSCULAR; SUBCUTANEOUS
Status: DISCONTINUED | OUTPATIENT
Start: 2021-07-22 | End: 2021-07-22 | Stop reason: HOSPADM

## 2021-07-22 RX ORDER — CYANOCOBALAMIN 1000 UG/ML
1000 INJECTION, SOLUTION INTRAMUSCULAR; SUBCUTANEOUS
Status: CANCELLED
Start: 2021-07-22

## 2021-07-22 RX ADMIN — CYANOCOBALAMIN 1000 MCG: 1000 INJECTION, SOLUTION INTRAMUSCULAR at 11:05

## 2021-07-22 NOTE — PROGRESS NOTES
Patient is 50 years old, here today accompanied by self for injection of B12  per order of Charlene Moran NP under the supervision of Charlene Moran NP.   Patient  meet parameters for today's injection.     Patient denies questions nor concerns regarding medication, administration site, side effects, nor aftercare.  Patient identified with two identifiers, order verified, and verbal consent for today's injection obtained from patient.   Patient education provided on s/s of injection site infection, and/or medication specific side effects, and when to call a provider.  Patient instructed to report any adverse effects.     Medication administered per protocol in left deltoid at 90 degree angle.  Site covered with sterile bandage.  Patient tolerated injection well, no verbal nor non-verbal signs of discomfort noted.  No adverse effects noted at this time.     Patient instructed to call with further questions or concerns.  Patient states understanding and is in agreement with this plan.  Copy of appointments, and after visit summary (AVS) given to patient. Patient discharged.

## 2021-08-09 ENCOUNTER — LAB (OUTPATIENT)
Dept: INFUSION THERAPY | Facility: OTHER | Age: 51
End: 2021-08-09
Attending: NURSE PRACTITIONER
Payer: COMMERCIAL

## 2021-08-09 DIAGNOSIS — E53.8 VITAMIN B 12 DEFICIENCY: ICD-10-CM

## 2021-08-09 DIAGNOSIS — C18.2 CANCER OF ASCENDING COLON (H): Primary | ICD-10-CM

## 2021-08-09 DIAGNOSIS — E61.1 IRON DEFICIENCY: ICD-10-CM

## 2021-08-09 DIAGNOSIS — E55.9 VITAMIN D DEFICIENCY: ICD-10-CM

## 2021-08-09 LAB
ALBUMIN SERPL-MCNC: 3.6 G/DL (ref 3.4–5)
ALP SERPL-CCNC: 80 U/L (ref 40–150)
ALT SERPL W P-5'-P-CCNC: 41 U/L (ref 0–50)
ANION GAP SERPL CALCULATED.3IONS-SCNC: 4 MMOL/L (ref 3–14)
AST SERPL W P-5'-P-CCNC: 27 U/L (ref 0–45)
BASOPHILS # BLD AUTO: 0 10E3/UL (ref 0–0.2)
BASOPHILS NFR BLD AUTO: 0 %
BILIRUB SERPL-MCNC: 0.7 MG/DL (ref 0.2–1.3)
BUN SERPL-MCNC: 19 MG/DL (ref 7–30)
CALCIUM SERPL-MCNC: 8.8 MG/DL (ref 8.5–10.1)
CEA SERPL-MCNC: 2.8 UG/L (ref 0–2.5)
CHLORIDE BLD-SCNC: 112 MMOL/L (ref 94–109)
CO2 SERPL-SCNC: 25 MMOL/L (ref 20–32)
CREAT SERPL-MCNC: 0.71 MG/DL (ref 0.52–1.04)
EOSINOPHIL # BLD AUTO: 0 10E3/UL (ref 0–0.7)
EOSINOPHIL NFR BLD AUTO: 0 %
ERYTHROCYTE [DISTWIDTH] IN BLOOD BY AUTOMATED COUNT: 12.1 % (ref 10–15)
FERRITIN SERPL-MCNC: 130 NG/ML (ref 8–252)
FOLATE SERPL-MCNC: 30.8 NG/ML
GFR SERPL CREATININE-BSD FRML MDRD: >90 ML/MIN/1.73M2
GLUCOSE BLD-MCNC: 80 MG/DL (ref 70–99)
HCT VFR BLD AUTO: 38.3 % (ref 35–47)
HGB BLD-MCNC: 12.9 G/DL (ref 11.7–15.7)
IMM GRANULOCYTES # BLD: 0 10E3/UL
IMM GRANULOCYTES NFR BLD: 0 %
IRON SATN MFR SERPL: 34 % (ref 15–46)
IRON SERPL-MCNC: 120 UG/DL (ref 35–180)
LDH SERPL L TO P-CCNC: 185 U/L (ref 81–234)
LYMPHOCYTES # BLD AUTO: 1.6 10E3/UL (ref 0.8–5.3)
LYMPHOCYTES NFR BLD AUTO: 22 %
MCH RBC QN AUTO: 30.9 PG (ref 26.5–33)
MCHC RBC AUTO-ENTMCNC: 33.7 G/DL (ref 31.5–36.5)
MCV RBC AUTO: 92 FL (ref 78–100)
MONOCYTES # BLD AUTO: 0.6 10E3/UL (ref 0–1.3)
MONOCYTES NFR BLD AUTO: 8 %
NEUTROPHILS # BLD AUTO: 5.1 10E3/UL (ref 1.6–8.3)
NEUTROPHILS NFR BLD AUTO: 70 %
NRBC # BLD AUTO: 0 10E3/UL
NRBC BLD AUTO-RTO: 0 /100
PLATELET # BLD AUTO: 159 10E3/UL (ref 150–450)
POTASSIUM BLD-SCNC: 4.2 MMOL/L (ref 3.4–5.3)
PROT SERPL-MCNC: 7 G/DL (ref 6.8–8.8)
RBC # BLD AUTO: 4.18 10E6/UL (ref 3.8–5.2)
SODIUM SERPL-SCNC: 141 MMOL/L (ref 133–144)
TIBC SERPL-MCNC: 355 UG/DL (ref 240–430)
VIT B12 SERPL-MCNC: 392 PG/ML (ref 193–986)
WBC # BLD AUTO: 7.3 10E3/UL (ref 4–11)

## 2021-08-09 PROCEDURE — 82040 ASSAY OF SERUM ALBUMIN: CPT | Mod: ZL

## 2021-08-09 PROCEDURE — 82378 CARCINOEMBRYONIC ANTIGEN: CPT | Mod: ZL

## 2021-08-09 PROCEDURE — 83615 LACTATE (LD) (LDH) ENZYME: CPT | Mod: ZL

## 2021-08-09 PROCEDURE — 82306 VITAMIN D 25 HYDROXY: CPT | Mod: ZL

## 2021-08-09 PROCEDURE — 82746 ASSAY OF FOLIC ACID SERUM: CPT | Mod: ZL

## 2021-08-09 PROCEDURE — 250N000011 HC RX IP 250 OP 636: Performed by: NURSE PRACTITIONER

## 2021-08-09 PROCEDURE — 85025 COMPLETE CBC W/AUTO DIFF WBC: CPT | Mod: ZL

## 2021-08-09 PROCEDURE — 82607 VITAMIN B-12: CPT | Mod: ZL

## 2021-08-09 PROCEDURE — 82728 ASSAY OF FERRITIN: CPT | Mod: ZL

## 2021-08-09 PROCEDURE — 96372 THER/PROPH/DIAG INJ SC/IM: CPT | Performed by: NURSE PRACTITIONER

## 2021-08-09 PROCEDURE — 83550 IRON BINDING TEST: CPT | Mod: ZL

## 2021-08-09 PROCEDURE — 96523 IRRIG DRUG DELIVERY DEVICE: CPT

## 2021-08-09 RX ORDER — CYANOCOBALAMIN 1000 UG/ML
1000 INJECTION, SOLUTION INTRAMUSCULAR; SUBCUTANEOUS
Status: DISCONTINUED | OUTPATIENT
Start: 2021-08-09 | End: 2021-08-09 | Stop reason: HOSPADM

## 2021-08-09 RX ORDER — CYANOCOBALAMIN 1000 UG/ML
1000 INJECTION, SOLUTION INTRAMUSCULAR; SUBCUTANEOUS
Status: CANCELLED
Start: 2021-08-09

## 2021-08-09 RX ORDER — HEPARIN SODIUM (PORCINE) LOCK FLUSH IV SOLN 100 UNIT/ML 100 UNIT/ML
5 SOLUTION INTRAVENOUS
Status: DISCONTINUED | OUTPATIENT
Start: 2021-08-09 | End: 2021-08-09 | Stop reason: HOSPADM

## 2021-08-09 RX ORDER — HEPARIN SODIUM (PORCINE) LOCK FLUSH IV SOLN 100 UNIT/ML 100 UNIT/ML
5 SOLUTION INTRAVENOUS
Status: CANCELLED | OUTPATIENT
Start: 2021-08-09

## 2021-08-09 RX ADMIN — CYANOCOBALAMIN 1000 MCG: 1000 INJECTION, SOLUTION INTRAMUSCULAR at 10:28

## 2021-08-09 RX ADMIN — HEPARIN 5 ML: 100 SYRINGE at 10:18

## 2021-08-09 NOTE — PROGRESS NOTES
"Patients left port accessed using non-coring, 19 gauge, 3/4\" power needle. Port accessed per facility protocol.  Hand hygiene performed: yes   Mask donned by caregiver: yes Site prepped with CHG: yes Labs drawn: yes Dressing applied using aseptic technique: yes Port flushed easily, without resistance. Flushed with 10 cc's normal saline.   Immediate blood return noted. 10 cc blood discarded.  6 vials blood draw and sent to lab for results.  Port flushed with 20 cc 0.9% normal saline and 5 cc heparinized saline.  Needle removed intact, sterile dressing applied.  Slight pressure applied for 30 seconds.   Patient tolerated port flush well, denies pain nor discomfort at this time. Patient instructed to leave dressing intact for a minimum of one hour, and to call with questions or concerns.  Patient states understanding and is in agreement with this plan. Patient discharged ambulatory. Noemi Stanton RN    "

## 2021-08-09 NOTE — PROGRESS NOTES
Patient is 50 years old, here today accompanied by self for injection of B12 per order of Chralene Moran NP.     Patient denies questions nor concerns regarding medication, administration site, side effects, nor aftercare.  Patient identified with two identifiers, order verified, and verbal consent for today's injection obtained from patient.   Patient education provided on s/s of injection site infection, and/or medication specific side effects, and when to call a provider.  Patient instructed to report any adverse effects.     Medication administered per protocol in right deltoid at 90 degree angle.  Site covered with sterile bandage.  Patient tolerated injection well, no verbal nor non-verbal signs of discomfort noted.  No adverse effects noted at this time.     Patient instructed to call with further questions or concerns.  Patient states understanding and is in agreement with this plan.  Copy of appointments, and after visit summary (AVS) given to patient. Patient discharged.

## 2021-08-10 LAB — DEPRECATED CALCIDIOL+CALCIFEROL SERPL-MC: 16 UG/L (ref 20–75)

## 2021-08-12 ENCOUNTER — ONCOLOGY VISIT (OUTPATIENT)
Dept: ONCOLOGY | Facility: OTHER | Age: 51
End: 2021-08-12
Attending: NURSE PRACTITIONER
Payer: COMMERCIAL

## 2021-08-12 ENCOUNTER — TRANSCRIBE ORDERS (OUTPATIENT)
Dept: OTHER | Age: 51
End: 2021-08-12

## 2021-08-12 VITALS
HEIGHT: 67 IN | RESPIRATION RATE: 20 BRPM | SYSTOLIC BLOOD PRESSURE: 110 MMHG | WEIGHT: 183.64 LBS | DIASTOLIC BLOOD PRESSURE: 68 MMHG | HEART RATE: 85 BPM | OXYGEN SATURATION: 98 % | BODY MASS INDEX: 28.82 KG/M2 | TEMPERATURE: 98.1 F

## 2021-08-12 DIAGNOSIS — G62.9 NEUROPATHY: ICD-10-CM

## 2021-08-12 DIAGNOSIS — E53.8 VITAMIN B 12 DEFICIENCY: ICD-10-CM

## 2021-08-12 DIAGNOSIS — E55.9 VITAMIN D DEFICIENCY: ICD-10-CM

## 2021-08-12 DIAGNOSIS — Z85.42 PERSONAL HISTORY OF MALIGNANT NEOPLASM OF UTERUS: Primary | ICD-10-CM

## 2021-08-12 DIAGNOSIS — R51.9 ACUTE NONINTRACTABLE HEADACHE, UNSPECIFIED HEADACHE TYPE: ICD-10-CM

## 2021-08-12 DIAGNOSIS — E61.1 IRON DEFICIENCY: ICD-10-CM

## 2021-08-12 DIAGNOSIS — R53.83 FATIGUE, UNSPECIFIED TYPE: ICD-10-CM

## 2021-08-12 DIAGNOSIS — Z85.038 PERSONAL HISTORY OF COLON CANCER: ICD-10-CM

## 2021-08-12 DIAGNOSIS — Z85.038 HISTORY OF COLON CANCER, STAGE III: Primary | ICD-10-CM

## 2021-08-12 DIAGNOSIS — R10.84 ABDOMINAL PAIN, GENERALIZED: ICD-10-CM

## 2021-08-12 PROCEDURE — G0463 HOSPITAL OUTPT CLINIC VISIT: HCPCS

## 2021-08-12 PROCEDURE — 99215 OFFICE O/P EST HI 40 MIN: CPT | Performed by: NURSE PRACTITIONER

## 2021-08-12 RX ORDER — GABAPENTIN 300 MG/1
600 CAPSULE ORAL AT BEDTIME
Qty: 30 CAPSULE | Refills: 0 | Status: SHIPPED | OUTPATIENT
Start: 2021-08-12 | End: 2022-01-26

## 2021-08-12 RX ORDER — DUREZOL 0.5 MG/ML
EMULSION OPHTHALMIC
COMMUNITY
Start: 2021-07-07 | End: 2021-08-28

## 2021-08-12 ASSESSMENT — MIFFLIN-ST. JEOR: SCORE: 1480.63

## 2021-08-12 ASSESSMENT — PAIN SCALES - GENERAL: PAINLEVEL: SEVERE PAIN (6)

## 2021-08-12 NOTE — NURSING NOTE
"Oncology Rooming Note    August 12, 2021 10:28 AM   Constantino Warren is a 50 year old female who presents for:    Chief Complaint   Patient presents with     Oncology Clinic Visit     Follow up colon cancer     Initial Vitals: /68   Pulse 85   Temp 98.1  F (36.7  C) (Tympanic)   Resp 20   Ht 1.702 m (5' 7\")   Wt 83.3 kg (183 lb 10.3 oz)   SpO2 98%   BMI 28.76 kg/m   Estimated body mass index is 28.76 kg/m  as calculated from the following:    Height as of this encounter: 1.702 m (5' 7\").    Weight as of this encounter: 83.3 kg (183 lb 10.3 oz). Body surface area is 1.98 meters squared.  Severe Pain (6) Comment: Data Unavailable   No LMP recorded. Patient has had a hysterectomy.  Allergies reviewed: Yes  Medications reviewed: Yes    Medications: Medication refills not needed today.  Pharmacy name entered into [facility]:    [facility] [address] [place], [place] - [address] [facility] [address]  [facility] - [place], [place] - [address]   [facility] [address]  [facility]    Clinical concerns: Fatigue, abdominal and pelvic pain, and left eye pain       Juana Mo LPN              "

## 2021-08-12 NOTE — PROGRESS NOTES
Oncology Follow-up Visit:  August 12, 2021    Reason for Visit:  Patient presents with:  Oncology Clinic Visit: Follow up colon cancer     Nursing Note and documentation reviewed: yes    HPI:  This is a 50-year-old female patient who presents to the oncology clinic today in follow-up for Stage IIIB adenocarcinoma of the ascending colon diagnosed February 2020.  She completed adjuvant chemotherapy on 6/22/2020 and is being followed with surveillance.    She presents to the clinic today complaining of extreme fatigue.  She felt it had improved after her last iron infusions but now over the last month or so it has been extreme again.  About 3-4 weeks ago she was so exhausted that she had 3 episodes of not being able to process her actions-feeling very slow with certain actions.  She has had headaches on a daily basis over the last 3 weeks or so describing them as different than ones she has has had in the past stating they are all over her head; rest helps.  She tells me sh looses her balance a times.    She also describes having episodes of stabbing pains in her chest last weekend that went to the right arm to the elbow-these lasted 3 days and has had none since.  She denies any SOB with these episodes and they didn't occur specifically with activity.  She had a lexiscan completed in March that was normal.    She describes bloating and RLQ abdominal pain about a week ago.      She has not undergone repeat colonoscopy nor Genetic counseling as recommended.    She's been on 4000units of vitamin D daily.    Her numbness and tingling in her hands and feet are much improved. She denies and pain in them.  She continues on the cymbalta and the gabapentin as HS.    Oncologic History:     11/21/19 to 11/21/2019 patient was hospitalized with a hemoglobin of 5.7 underwent EGD with no evidence of active bleeding.  12/12/2019 she was seen by Dr. Cook and established care with her as her new PCP.  Referral for screening  colonoscopy was completed.  2/13/2020 patient underwent colonoscopy by Dr. Cleveland where a near obstructing lesion in the right colon was found.  Pathology showed an invasive moderately differentiated adenocarcinoma.  CT the abdomen and pelvis showed a mass in the junction of the cecum and ascending colon highly suspicious for malignancy.  2/24/2020  She then underwent laparoscopic-assisted right hemicolectomy by Dr. Cleveland that was converted to open right hemicolectomy when it was noted that the tumor was adherent to the pelvic brim; final pathology showed mildly differentiated adenocarcinoma of the right colon with 1/32 lymph nodes positive; there was perineural invasion; tumor budding and acutely inflamed tumor and reactive lymph nodes with peritonitis; the omentum showed mild peritonitis; margins were negative; patient was staged [id number]; IIIb adenocarcinoma of the right colon  2/28/2020 Port-A-Cath placement  3/25/2020 PET scan which showed no evidence of distant metastatic disease  3/30/2020 patient was seen by Dr. Palma with Medical Oncology with recommendation for XELOX; capecitabine 850 mg/m2 p.o. b.i.d. on days 1 through 14 of a 21-day cycle, oxaliplatin 130 mg/m2 to be given on day 1 x4 cycles.  4/10/2020 she was seen by Dr. Bryant with Radiation Oncology with no recommendation for radiation therapy at this time related to negative margins on pathology  4/17/2020 initiation of chemotherapy  6/22/2020  Completion of 4 cycles of XELOX     MMR proteins intact  CEA prior to surgery was <0.5     Current Chemo Regime/TX:  N/a  Current Cycle:  n/a  # of completed cycles:  n/a     Previous treatment:   Oxaliplatin/Capecitabine x 4 cycles    Past Medical History:   Diagnosis Date     Arthritis 10/13/2020     Cancer (H)     colon     Cancer (H) 2004    uterine     Chronic pain      Depressive disorder      Diabetes (H)      History of blood transfusion      Hypertension      Prediabetes      Thyroid disease         Past Surgical History:   Procedure Laterality Date     ARTHROSCOPY SHOULDER Left 11/6/2020    Procedure: LEFT SHOULDER ARTHROSCOPY WITH ROTATOR CUFF REPAIR AND ACROMIUM CLAVICULAR JOINT RESECTION;  Surgeon: Tanner Mujica DO;  Location: [facility]     BACK SURGERY      2008; 2011     COLONOSCOPY N/A 2/13/2020    Procedure: COLONOSCOPY WITH BIOPSY AND TATOOING OF ASCENDING COLON,INCOMEPLETE EXAM;  Surgeon: Kang Cleveland MD;  Location: [facility]     COLONOSCOPY N/A 10/26/2020    Procedure: incomplete colonoscopy ;  Surgeon: Kang Cleveland MD;  Location: [facility]     ESOPHAGOSCOPY, GASTROSCOPY, DUODENOSCOPY (EGD), COMBINED N/A 7/21/2018    Procedure: COMBINED ESOPHAGOSCOPY, GASTROSCOPY, DUODENOSCOPY (EGD);  UPPER ENDOSCOPY WITH BIOPSIES;  Surgeon: Roger Vazquez MD;  Location: [facility]     ESOPHAGOSCOPY, GASTROSCOPY, DUODENOSCOPY (EGD), COMBINED N/A 11/21/2019    Procedure: UPPER ENDOSCOPY with biopsy;  Surgeon: Kang Cleveland MD;  Location: [facility]     HEAD & NECK SURGERY  2008    c4-c5 fusion     HYSTERECTOMY, [facility]  04/2004    uterine cancer; [facility]     INSERT PORT VASCULAR ACCESS N/A 2/28/2020    Procedure: INSERTION, VASCULAR ACCESS PORT LEFT;  Surgeon: Kang Cleveland MD;  Location: [facility]     LAPAROSCOPIC ASSISTED COLECTOMY Right 2/24/2020    Procedure: RIGHT KEVIN COLECTOMY, LYSIS OF ADHESIONS;  Surgeon: Kang Cleveland MD;  Location: [facility]     LAPAROSCOPIC BYPASS GASTRIC  12/2010       Family History   Problem Relation Age of Onset     Diabetes Mother      Hypertension Mother      Coronary Artery Disease Mother      Myocardial Infarction Mother      Hypertension Father      Diabetes Maternal Grandmother      Hypertension Maternal Grandfather      Coronary Artery Disease Maternal Grandfather      Cancer Paternal Grandfather         unsure of type of cancer     Bladder Cancer Paternal Grandfather      Diabetes Maternal Aunt      Cerebrovascular Disease Maternal Aunt       Diabetes Maternal Aunt      Cerebrovascular Disease Maternal Aunt      Diabetes Maternal Aunt      Cerebrovascular Disease Maternal Aunt      Hyperlipidemia No family hx of      Breast Cancer No family hx of      Colon Cancer No family hx of      Prostate Cancer No family hx of      Thyroid Disease No family hx of      Anesthesia Reaction No family hx of      Asthma No family hx of      Genetic Disorder No family hx of        Social History     Socioeconomic History     Marital status:      Spouse name: Not on file     Number of children: 2     Years of education: Not on file     Highest education level: Not on file   Occupational History     Occupation: PCA     Comment: currently not working, unemployment    Tobacco Use     Smoking status: Never Smoker     Smokeless tobacco: Never Used   Substance and Sexual Activity     Alcohol use: No     Comment: sober for 18 years     Drug use: No     Sexual activity: Never   Other Topics Concern     Parent/sibling w/ CABG, MI or angioplasty before 65F 55M? Yes     Comment: Mother   Social History Narrative     Not on file     Social Determinants of Health     Financial Resource Strain: Low Risk      Difficulty of Paying Living Expenses: Not hard at all   Food Insecurity:      Worried About Running Out of Food in the Last Year:      Ran Out of Food in the Last Year:    Transportation Needs: Unknown     Lack of Transportation (Medical): Patient refused     Lack of Transportation (Non-Medical): Patient refused   Physical Activity:      Days of Exercise per Week:      Minutes of Exercise per Session:    Stress:      Feeling of Stress :    Social Connections:      Frequency of Communication with Friends and Family:      Frequency of Social Gatherings with Friends and Family:      Attends Confucianism Services:      Active Member of Clubs or Organizations:      Attends Club or Organization Meetings:      Marital Status:    Intimate Partner Violence:      Fear of Current or  Ex-Partner:      Emotionally Abused:      Physically Abused:      Sexually Abused:        Current Outpatient Medications   Medication     acetaminophen (TYLENOL) 500 MG tablet     Cyanocobalamin 1000 MCG/ML KIT     DULoxetine (CYMBALTA) 30 MG capsule     DUREZOL 0.05 % ophthalmic emulsion     gabapentin (NEURONTIN) 300 MG capsule     ILEVRO 0.3 % ophthalmic suspension     levothyroxine (SYNTHROID/LEVOTHROID) 200 MCG tablet     levothyroxine (SYNTHROID/LEVOTHROID) 25 MCG tablet     lisinopril (ZESTRIL) 10 MG tablet     multivitamin, therapeutic with minerals (MULTI-VITAMIN) TABS     tiZANidine (ZANAFLEX) 4 MG tablet     traZODone (DESYREL) 50 MG tablet     verapamil (CALAN) 40 MG tablet     Vitamin D3 (CHOLECALCIFEROL) 25 mcg (1000 units) tablet     diazepam (VALIUM) 5 MG tablet     EPINEPHrine (ANY BX GENERIC EQUIV) 0.3 MG/0.3ML injection 2-pack     lidocaine-prilocaine (EMLA) 2.5-2.5 % external cream     ondansetron (ZOFRAN-ODT) 4 MG ODT tab     No current facility-administered medications for this visit.        Allergies   Allergen Reactions     Aspartame      Other reaction(s): Other - Describe In Comment Field, Seizures  convulsions     Bee Venom Anaphylaxis     Bupropion Anaphylaxis     Throat gets tight and hives. Denies allergic reaction     Food Anaphylaxis     Peppers-anaphylaxis if eaten, if touched hands swell and reddened     Food Allergy Formula Anaphylaxis     Peppers, patient reports green peppers close off her throat     Ibuprofen Anaphylaxis     No Clinical Screening - See Comments Anaphylaxis     Peppers, patient reports green peppers close off her throat     Piper Anaphylaxis     Peppers--green, black, red, chili, etc. Derm symptoms, hives;  Strawberries--(cooked) Itching, breathing difficulties, GI symptoms. Mushrooms--GI symptoms, Hives.Spinach (cooked) throat irritation  (Listed on [facility] problem list.)     Fentanyl Swelling     Lips and tongue swelled.  Swelling of lips and  "tongue     Adhesive Tape      blisters     Amitriptyline Other (See Comments)     Respiratory symptoms - denies allergic reaction     Liquid Adhesive Dermatitis     blisters  Blisters  Silk tape and tegaderm is fine     Oxcarbazepine Other (See Comments)     Throat gets tight and hives. Denies allergic reaction  Throat gets tight and hives-       Prozac [Fluoxetine]      Other reaction(s): *Unknown  uncontrolled muscle spasms     Valproic Acid      Respiratory symptoms ([facility]). Denies allergic reaction     Ziprasidone      Throat gets tight and hives. Denies allergic reaction       Review Of Systems:  Constitutional:    denies fever, weight changes, chills, and night sweats.  Eyes:    denies double vision  Ears/Nose/Throat:   denies ear pain, nose problems, difficulty swallowing  Respiratory:   denies shortness of breath, cough   Skin:   denies rash, lesions  Cardiovascular:  See HPI  Gastrointestinal:   See HPI  Genitourinary:   denies difficulty with urination, blood in urine  Musculoskeletal:   Has developed some discomfort to her shins; has had mild edema to her ankles  Neurologic:   See HPI  Psychiatric:   denies anxiety, depression  Hematologic/Lymphatic/Immunologic:   denies easy bruising, easy bleeding, lumps or bumps noted  Endocrine:   Denies increased thirst      Physical Exam:  /68   Pulse 85   Temp 98.1  F (36.7  C) (Tympanic)   Resp 20   Ht 1.702 m (5' 7\")   Wt 83.3 kg (183 lb 10.3 oz)   SpO2 98%   BMI 28.76 kg/m      GENERAL APPEARANCE: Healthy, alert and in no acute distress.  HEENT: Normocephalic, Sclerae anicteric.   NECK:   No asymmetry or masses, no thyromegaly.  LYMPHATICS: No palpable cervical, supraclavicular, axillary, or inguinal nodes   RESP: Lungs clear to auscultation bilaterally, respirations regular and easy  CARDIOVASCULAR: Regular rate and rhythm. Normal S1, S2; murmur  ABDOMEN: Soft, tender to RLQ. Bowel sounds auscultated all 4 quadrants. No palpable " organomegaly or masses.  MUSCULOSKELETAL: Extremities without gross deformities noted. No edema of bilateral lower extremities.  NEURO: Alert and oriented x 3.  Gait steady.  PSYCHIATRIC: Mentation and affect appear normal.  Mood appropriate.    Laboratory:  Component      Latest Ref Rng & Units 8/9/2021   WBC      4.0 - 11.0 10e3/uL 7.3   RBC Count      3.80 - 5.20 10e6/uL 4.18   Hemoglobin      11.7 - 15.7 g/dL 12.9   Hematocrit      35.0 - 47.0 % 38.3   MCV      78 - 100 fL 92   MCH      26.5 - 33.0 pg 30.9   MCHC      31.5 - 36.5 g/dL 33.7   RDW      10.0 - 15.0 % 12.1   Platelet Count      150 - 450 10e3/uL 159   % Neutrophils      % 70   % Lymphocytes      % 22   % Monocytes      % 8   % Eosinophils      % 0   % Basophils      % 0   % Immature Granulocytes      % 0   NRBCs per 100 WBC      <1 /100 0   Absolute Neutrophils      1.6 - 8.3 10e3/uL 5.1   Absolute Lymphocytes      0.8 - 5.3 10e3/uL 1.6   Absolute Monocytes      0.0 - 1.3 10e3/uL 0.6   Absolute Eosinophils      0.0 - 0.7 10e3/uL 0.0   Absolute Basophils      0.0 - 0.2 10e3/uL 0.0   Absolute Immature Granulocytes      <=0.0 10e3/uL 0.0   Absolute NRBCs      10e3/uL 0.0   Sodium      133 - 144 mmol/L 141   Potassium      3.4 - 5.3 mmol/L 4.2   Chloride      94 - 109 mmol/L 112 (H)   Carbon Dioxide      20 - 32 mmol/L 25   Anion Gap      3 - 14 mmol/L 4   Urea Nitrogen      7 - 30 mg/dL 19   Creatinine      0.52 - 1.04 mg/dL 0.71   Calcium      8.5 - 10.1 mg/dL 8.8   Glucose      70 - 99 mg/dL 80   Alkaline Phosphatase      40 - 150 U/L 80   AST      0 - 45 U/L 27   ALT      0 - 50 U/L 41   Protein Total      6.8 - 8.8 g/dL 7.0   Albumin      3.4 - 5.0 g/dL 3.6   Bilirubin Total      0.2 - 1.3 mg/dL 0.7   GFR Estimate      >60 mL/min/1.73m2 >90   Iron      35 - 180 ug/dL 120   Iron Binding Cap      240 - 430 ug/dL 355   Iron Saturation Index      15 - 46 % 34   Lactate Dehydrogenase      81 - 234 U/L 185   CEA      0.0 - 2.5 ug/L 2.8 (H)   Vitamin D  Deficiency screening      20 - 75 ug/L 16 (L)   Ferritin      8 - 252 ng/mL 130   Folate      >=5.4 ng/mL 30.8   Vitamin B12      193 - 986 pg/mL 392     Imaging Studies: None completed for today's visit      ASSESSMENT/PLAN:    #1 colon cancer:   Stage IIIB adenocarcinoma of the ascending colon diagnosed February 2020.  She completed adjuvant chemotherapy with XELOX x 4 cycles on 6/22/2020 and is being followed with surveillance.  Due to multiple complaints, we will obtain a CT scan of her chest, abdomen and pelvis and call her with the results.  Number given for her to call and schedule a Genetic counseling appointment.  Will reach out to Surgery department and have them call her to set up her colonoscopy.  Anastomosis site not visualized well on previous colonoscopy.    #2  Headaches: Will obtain and MRI of the brain.    #3  Fatigue:  Unsure of etiology.  Hemoglobin and iron studies are excellent.  B12 on low side and she is to be receiving her injections every 2 weeks with some inconsistency over the last few months.  Will obtain CT to rule out and recurrence.   I'll have her follow up with Dr. Cook.    #4  Vitamin D deficiency:  She will continue with the 4000units at this point.  [facility] will reach out to her GI provider who did her bypass. I will reach out to her PCP.    #5  Vitamin B12 deficiency:  Will continue with every 2 week injections.    I encouraged patient to call with any questions or concerns.    45 minutes spent in the patient's encounter today with time spent in review of the chart along with in chart preparation.  Time was also spent obtaining a review of systems and performing a physical exam along with review of labwork.  Time was spent in discussion of her fatigue and b12 and vitamin D deficiency.  Time was spent in ordering further testing and in chart documentation.    Izzy Moran, NP  APRN, FNP-BC, AOCNP

## 2021-08-12 NOTE — PATIENT INSTRUCTIONS
We will schedule you for a CT scan and MRI of the brain and we will call you with that result.    Please reach out to Dr. Cook for an appointment to discuss your extreme fatigue.    I will have our health unit coordinator get in touch with the surgery department so they call to set you up for your colonoscopy.    Please call the number given for Cancer Risk management to set up a genetics appointment.    If you have any questions please call [phone]    Other instructions: I will reach out to Dr. Cook for her recommendations in regards to vitamin D dosing.

## 2021-08-16 ENCOUNTER — HOSPITAL ENCOUNTER (OUTPATIENT)
Dept: CT IMAGING | Facility: HOSPITAL | Age: 51
End: 2021-08-16
Attending: NURSE PRACTITIONER
Payer: COMMERCIAL

## 2021-08-16 ENCOUNTER — HOSPITAL ENCOUNTER (OUTPATIENT)
Dept: MRI IMAGING | Facility: HOSPITAL | Age: 51
End: 2021-08-16
Attending: NURSE PRACTITIONER
Payer: COMMERCIAL

## 2021-08-16 DIAGNOSIS — Z85.038 HISTORY OF COLON CANCER, STAGE III: ICD-10-CM

## 2021-08-16 DIAGNOSIS — R53.83 FATIGUE, UNSPECIFIED TYPE: ICD-10-CM

## 2021-08-16 DIAGNOSIS — R10.84 ABDOMINAL PAIN, GENERALIZED: Primary | ICD-10-CM

## 2021-08-16 DIAGNOSIS — Z85.038 HISTORY OF COLON CANCER, STAGE III: Primary | ICD-10-CM

## 2021-08-16 DIAGNOSIS — R51.9 ACUTE NONINTRACTABLE HEADACHE, UNSPECIFIED HEADACHE TYPE: ICD-10-CM

## 2021-08-16 DIAGNOSIS — C18.2 CANCER OF ASCENDING COLON (H): ICD-10-CM

## 2021-08-16 DIAGNOSIS — E53.8 VITAMIN B 12 DEFICIENCY: ICD-10-CM

## 2021-08-16 PROCEDURE — 70553 MRI BRAIN STEM W/O & W/DYE: CPT

## 2021-08-16 PROCEDURE — 255N000002 HC RX 255 OP 636: Performed by: RADIOLOGY

## 2021-08-16 PROCEDURE — 74177 CT ABD & PELVIS W/CONTRAST: CPT

## 2021-08-16 PROCEDURE — 250N000011 HC RX IP 250 OP 636: Performed by: NURSE PRACTITIONER

## 2021-08-16 PROCEDURE — A9585 GADOBUTROL INJECTION: HCPCS | Performed by: RADIOLOGY

## 2021-08-16 RX ORDER — CYANOCOBALAMIN 1000 UG/ML
1000 INJECTION, SOLUTION INTRAMUSCULAR; SUBCUTANEOUS
Status: DISCONTINUED | OUTPATIENT
Start: 2021-08-16 | End: 2021-08-17 | Stop reason: HOSPADM

## 2021-08-16 RX ORDER — IOPAMIDOL 612 MG/ML
100 INJECTION, SOLUTION INTRAVASCULAR ONCE
Status: COMPLETED | OUTPATIENT
Start: 2021-08-16 | End: 2021-08-16

## 2021-08-16 RX ORDER — HEPARIN SODIUM (PORCINE) LOCK FLUSH IV SOLN 100 UNIT/ML 100 UNIT/ML
5 SOLUTION INTRAVENOUS
Status: CANCELLED | OUTPATIENT
Start: 2021-08-16

## 2021-08-16 RX ORDER — CYANOCOBALAMIN 1000 UG/ML
1000 INJECTION, SOLUTION INTRAMUSCULAR; SUBCUTANEOUS
Status: CANCELLED
Start: 2021-08-16

## 2021-08-16 RX ORDER — GADOBUTROL 604.72 MG/ML
7.5 INJECTION INTRAVENOUS ONCE
Status: COMPLETED | OUTPATIENT
Start: 2021-08-16 | End: 2021-08-16

## 2021-08-16 RX ORDER — HEPARIN SODIUM (PORCINE) LOCK FLUSH IV SOLN 100 UNIT/ML 100 UNIT/ML
5 SOLUTION INTRAVENOUS
Status: DISCONTINUED | OUTPATIENT
Start: 2021-08-16 | End: 2021-08-17 | Stop reason: HOSPADM

## 2021-08-16 RX ADMIN — IOPAMIDOL 100 ML: 612 INJECTION, SOLUTION INTRAVENOUS at 09:03

## 2021-08-16 RX ADMIN — GADOBUTROL 7.5 ML: 604.72 INJECTION INTRAVENOUS at 07:55

## 2021-08-16 RX ADMIN — HEPARIN 5 ML: 100 SYRINGE at 09:12

## 2021-08-28 ENCOUNTER — APPOINTMENT (OUTPATIENT)
Dept: GENERAL RADIOLOGY | Facility: HOSPITAL | Age: 51
End: 2021-08-28
Attending: NURSE PRACTITIONER
Payer: COMMERCIAL

## 2021-08-28 ENCOUNTER — HOSPITAL ENCOUNTER (EMERGENCY)
Facility: HOSPITAL | Age: 51
Discharge: HOME | End: 2021-08-28
Attending: NURSE PRACTITIONER | Admitting: NURSE PRACTITIONER
Payer: COMMERCIAL

## 2021-08-28 VITALS
RESPIRATION RATE: 16 BRPM | HEART RATE: 62 BPM | OXYGEN SATURATION: 97 % | SYSTOLIC BLOOD PRESSURE: 139 MMHG | TEMPERATURE: 96.8 F | DIASTOLIC BLOOD PRESSURE: 92 MMHG

## 2021-08-28 DIAGNOSIS — M25.532 ACUTE PAIN OF LEFT WRIST: Primary | ICD-10-CM

## 2021-08-28 PROCEDURE — 99213 OFFICE O/P EST LOW 20 MIN: CPT | Performed by: NURSE PRACTITIONER

## 2021-08-28 PROCEDURE — G0463 HOSPITAL OUTPT CLINIC VISIT: HCPCS

## 2021-08-28 PROCEDURE — 73110 X-RAY EXAM OF WRIST: CPT | Mod: LT

## 2021-08-28 ASSESSMENT — ENCOUNTER SYMPTOMS
MYALGIAS: 1
ARTHRALGIAS: 1

## 2021-08-28 NOTE — ED PROVIDER NOTES
History     Chief Complaint   Patient presents with     Wrist Pain     c/o lt wrist pain, notes kicked by a horse at 1000 today     HPI  Constantino Warren is a 50 year old female who presents to urgent care for evaluation of left wrist following an injury.  Patient states that she was riding her horse alongside another person.  The other person's horse got spooked and kicked her to the radial aspect of her left wrist.  Incident happened in [place] and the patient drove all the way back to this area.  While driving here she notes pain has gradually increased along with numbness to her left thumb.  History of carpal tunnel surgery.    Allergies:  Allergies   Allergen Reactions     Aspartame      Other reaction(s): Other - Describe In Comment Field, Seizures  convulsions     Bee Venom Anaphylaxis     Bupropion Anaphylaxis     Throat gets tight and hives. Denies allergic reaction     Food Anaphylaxis     Peppers-anaphylaxis if eaten, if touched hands swell and reddened     Food Allergy Formula Anaphylaxis     Peppers, patient reports green peppers close off her throat     Ibuprofen Anaphylaxis     No Clinical Screening - See Comments Anaphylaxis     Peppers, patient reports green peppers close off her throat     Piper Anaphylaxis     Peppers--green, black, red, chili, etc. Derm symptoms, hives;  Strawberries--(cooked) Itching, breathing difficulties, GI symptoms. Mushrooms--GI symptoms, Hives.Spinach (cooked) throat irritation  (Listed on [facility] problem list.)     Fentanyl Swelling     Lips and tongue swelled.  Swelling of lips and tongue     Adhesive Tape      blisters     Amitriptyline Other (See Comments)     Respiratory symptoms - denies allergic reaction     Liquid Adhesive Dermatitis     blisters  Blisters  Silk tape and tegaderm is fine     Oxcarbazepine Other (See Comments)     Throat gets tight and hives. Denies allergic reaction  Throat gets tight and hives-       Prozac [Fluoxetine]       Other reaction(s): *Unknown  uncontrolled muscle spasms     Valproic Acid      Respiratory symptoms ([facility]). Denies allergic reaction     Ziprasidone      Throat gets tight and hives. Denies allergic reaction       Problem List:    Patient Active Problem List    Diagnosis Date Noted     Iron deficiency 04/29/2021     Priority: Medium     History of colon cancer, stage III 10/20/2020     Priority: Medium     Added automatically from request for surgery [id number]       Rotator cuff tear 10/13/2020     Priority: Medium     Added automatically from request for surgery [id number]       Arthritis 10/13/2020     Priority: Medium     Added automatically from request for surgery [id number]       History of uterine cancer 05/27/2020     Priority: Medium     Palliative care patient 05/27/2020     Priority: Medium     Iron deficiency anemia, unspecified iron deficiency anemia type 05/08/2020     Priority: Medium     S/P partial resection of colon 02/24/2020     Priority: Medium     Prediabetes      Priority: Medium     Colon cancer (H) 02/19/2020     Priority: Medium     Added automatically from request for surgery [id number]       Cancer of ascending colon (H) 02/19/2020     Priority: Medium     Added automatically from request for surgery [id number]       Special screening for malignant neoplasms, colon 01/14/2020     Priority: Medium     Added automatically from request for surgery [id number]       Anastomotic ulcer S/P gastric bypass 07/21/2018     Priority: Medium     Anemia 07/21/2018     Priority: Medium     Chest pain 07/20/2018     Priority: Medium     GI bleed 07/20/2018     Priority: Medium     Abdominal pain, epigastric 07/20/2018     Priority: Medium     Hypochromic microcytic anemia 07/20/2018     Priority: Medium     Chronic migraine 01/18/2018     Priority: Medium     Chronic pain disorder 01/18/2018     Priority: Medium     Long term (current) use of opiate analgesic 01/12/2018     Priority: Medium      Overview:   Pain Diagnosis failed back syndrome, annular tear L4-5, history lumbar discectomy, s/p lumbar fusion.    Should be seen in the clinic every twelve weeks. Currently on a taper: yes, trying to wean away from the daytime hydrocodone-acetaminophen..    Plan for flares of chronic pain stretches, occasional use of more hydrocodone-acetaminophen for flare.    ED and UC: Opioid medication will not be given in the ER or Urgent Care unless there is a medical emergency unrelated to chronic pain.  Limit to 3 day supply    Refills: Refills will only be given at a scheduled visit.    [facility] Agreement for Opioid Treatment.   Opioid Agreement Date: 5/21/12  Last UDT (Urine Drug Test) on date:    Pain Dx: chronic low back pain  Last Pain Visit: 6-24-11  [facility]acy: [facility]  Comments:      7/23/14:  Progressing well.  Over the last month, has decreased hydrocodone-acetaminophen from 6/day to 3/day.  Awaiting appointment with [facility].       Adjustment disorder with mixed anxiety and depressed mood 12/15/2017     Priority: Medium     Other insomnia 12/15/2017     Priority: Medium     Syncope and collapse 09/26/2015     Priority: Medium     Hypertensive urgency 09/26/2015     Priority: Medium     Concussion syndrome 09/26/2015     Priority: Medium     Hydronephrosis, right 04/20/2015     Priority: Medium     Pyelonephritis 04/20/2015     Priority: Medium     Urolithiasis 04/20/2015     Priority: Medium     Annular tear of lumbar disc 12/16/2014     Priority: Medium     Overview:   MRI 2/5/13:  Previous right L4-5 hemilaminectomy and partial facetectomy with mild to moderate right L4-5 facet arthropathy and broad-based bulge of disk and end plate osteophyte on the right with partial high signal intensity zone annular tear of the undersurface of the laterally protruding disk/annulus.       Failed back syndrome of lumbar spine 12/16/2014     Priority: Medium     Overview:   Three back surgeries, including an  L3-sacrum AP fusion [facility].  Has been doing very well in terms of reduction of pain, with minimal requirement of opiate pain medication.       Hx of lumbar discectomy 12/16/2014     Priority: Medium     Overview:   10/2006:   L4-5 Laminectomy and diskectomy L5-S1.   12/12/11:  L5S1 microdiscectomy ([facility])       Vitamin B 12 deficiency 06/13/2011     Priority: Medium     Overview:   IMO Update 10/11       Gastric bypass status for obesity 01/04/2011     Priority: Medium     Overview:   12/14/10:  laparoscopic Prasanna-en-Y gastric bypass Dr. Hassan  IMO Update 10/11       Hyperlipidemia 09/23/2008     Priority: Medium     Overview:   IMO Update 10/11       Hypothyroidism 09/23/2008     Priority: Medium     Overview:   IMO Update 10/11  Overview:   IMO Update 10/11          Past Medical History:    Past Medical History:   Diagnosis Date     Arthritis 10/13/2020     Cancer (H)      Cancer (H) 2004     Chronic pain      Depressive disorder      Diabetes (H)      History of blood transfusion      Hypertension      Prediabetes      Thyroid disease        Past Surgical History:    Past Surgical History:   Procedure Laterality Date     ARTHROSCOPY SHOULDER Left 11/6/2020    Procedure: LEFT SHOULDER ARTHROSCOPY WITH ROTATOR CUFF REPAIR AND ACROMIUM CLAVICULAR JOINT RESECTION;  Surgeon: Tanner Mujica DO;  Location: [facility]     BACK SURGERY      2008; 2011     COLONOSCOPY N/A 2/13/2020    Procedure: COLONOSCOPY WITH BIOPSY AND TATOOING OF ASCENDING COLON,INCOMEPLETE EXAM;  Surgeon: Kang Cleveland MD;  Location: [facility]     COLONOSCOPY N/A 10/26/2020    Procedure: incomplete colonoscopy ;  Surgeon: Kang Cleveland MD;  [facility]     ESOPHAGOSCOPY, GASTROSCOPY, DUODENOSCOPY (EGD), COMBINED N/A 7/21/2018    Procedure: COMBINED ESOPHAGOSCOPY, GASTROSCOPY, DUODENOSCOPY (EGD);  UPPER ENDOSCOPY WITH BIOPSIES;  Surgeon: Roger Vazquez MD;  [facility]     ESOPHAGOSCOPY, GASTROSCOPY, DUODENOSCOPY  (EGD), COMBINED N/A 11/21/2019    Procedure: UPPER ENDOSCOPY with biopsy;  Surgeon: Kang Cleveland MD;  [facility]     HEAD & NECK SURGERY  2008    c4-c5 fusion     HYSTERECTOMY, CRISTIANE  04/2004    uterine cancer; [facility]     INSERT PORT VASCULAR ACCESS N/A 2/28/2020    Procedure: INSERTION, VASCULAR ACCESS PORT LEFT;  Surgeon: Kang Cleveland MD;  [facility]: [facility]     LAPAROSCOPIC ASSISTED COLECTOMY Right 2/24/2020    Procedure: RIGHT KEVIN COLECTOMY, LYSIS OF ADHESIONS;  Surgeon: Kang Cleveland MD;  [facility]     LAPAROSCOPIC BYPASS GASTRIC  12/2010       Family History:    Family History   Problem Relation Age of Onset     Diabetes Mother      Hypertension Mother      Coronary Artery Disease Mother      Myocardial Infarction Mother      Hypertension Father      Diabetes Maternal Grandmother      Hypertension Maternal Grandfather      Coronary Artery Disease Maternal Grandfather      Cancer Paternal Grandfather         unsure of type of cancer     Bladder Cancer Paternal Grandfather      Diabetes Maternal Aunt      Cerebrovascular Disease Maternal Aunt      Diabetes Maternal Aunt      Cerebrovascular Disease Maternal Aunt      Diabetes Maternal Aunt      Cerebrovascular Disease Maternal Aunt      Hyperlipidemia No family hx of      Breast Cancer No family hx of      Colon Cancer No family hx of      Prostate Cancer No family hx of      Thyroid Disease No family hx of      Anesthesia Reaction No family hx of      Asthma No family hx of      Genetic Disorder No family hx of        Social History:  Marital Status:   [5]  Social History     Tobacco Use     Smoking status: Never Smoker     Smokeless tobacco: Never Used   Substance Use Topics     Alcohol use: No     Comment: sober for 18 years     Drug use: No        Medications:    Cyanocobalamin 1000 MCG/ML KIT  DULoxetine (CYMBALTA) 30 MG capsule  gabapentin (NEURONTIN) 300 MG capsule  levothyroxine (SYNTHROID/LEVOTHROID)  200 MCG tablet  levothyroxine (SYNTHROID/LEVOTHROID) 25 MCG tablet  multivitamin, therapeutic with minerals (MULTI-VITAMIN) TABS  tiZANidine (ZANAFLEX) 4 MG tablet  traZODone (DESYREL) 50 MG tablet  verapamil (CALAN) 40 MG tablet  acetaminophen (TYLENOL) 500 MG tablet  diazepam (VALIUM) 5 MG tablet  EPINEPHrine (ANY BX GENERIC EQUIV) 0.3 MG/0.3ML injection 2-pack  lidocaine-prilocaine (EMLA) 2.5-2.5 % external cream  ondansetron (ZOFRAN-ODT) 4 MG ODT tab          Review of Systems   Musculoskeletal: Positive for arthralgias and myalgias.   All other systems reviewed and are negative.      Physical Exam   BP: 139/92  Pulse: 62  Temp: 96.8  F (36  C)  Resp: 16  SpO2: 97 %      Physical Exam  Vitals and nursing note reviewed.   Constitutional:       Appearance: Normal appearance. She is not ill-appearing or toxic-appearing.   HENT:      Head: Normocephalic.   Eyes:      Pupils: Pupils are equal, round, and reactive to light.   Cardiovascular:      Rate and Rhythm: Normal rate.   Pulmonary:      Effort: Pulmonary effort is normal.   Musculoskeletal:         General: No deformity or signs of injury.      Left wrist: Swelling, tenderness, bony tenderness and snuff box tenderness present. No deformity, effusion, lacerations or crepitus. Decreased range of motion. Normal pulse.      Cervical back: Neck supple.   Skin:     Capillary Refill: Capillary refill takes less than 2 seconds.      Findings: No bruising or erythema.   Neurological:      Mental Status: She is alert and oriented to person, place, and time.         ED Course        Procedures            Results for orders placed or performed during the hospital encounter of 08/28/21 (from the past 24 hour(s))   XR Wrist Left G/E 3 Views    Narrative    PROCEDURE:  XR WRIST LEFT G/E 3 VIEWS    HISTORY: left wrist pain kicked by a horse onset today.    COMPARISON:  6/14/2021    TECHNIQUE:  4 views left wrist.    FINDINGS:  No fracture or dislocation is identified.  Degenerative  change at the STT joint is similar to prior. No foreign body is seen.       Impression    IMPRESSION: Unchanged radiographic appearance of the left wrist.      KONSTANTIN STROUD MD         SYSTEM ID:  [id number]       Medications - No data to display    Assessments & Plan (with Medical Decision Making)     I have reviewed the nursing notes.    50-year-old female that presented for evaluation of left wrist following an injury where she was kicked by a horse.  Patient has tenderness to the radial aspect of her left wrist as well as snuffbox tenderness.  Decreased range of motion to left wrist.  Able to move fingers with minimal difficulty.  X-ray negative for acute fracture or dislocation.  She declined any pain medication during this visit.    Wrist splint applied.  Recommended applying ice pack and taking Tylenol ibuprofen as needed for pain.  Follow-up with PCP as needed.  Return to ED/UC for any concerning symptoms.  Patient voiced understanding.    I have reviewed the findings, diagnosis, plan and need for follow up with the patient.  This document was prepared using a combination of typing and voice generated software.  While every attempt was made for accuracy, spelling and grammatical errors may exist.    New Prescriptions    No medications on file       Final diagnoses:   Acute pain of left wrist       8/28/2021   [facility]     Mpofu, Prudence, CNP  08/28/21 1970

## 2021-08-28 NOTE — DISCHARGE INSTRUCTIONS
Use the wrist brace.  Apply ice packs to your wrist.  Take Tylenol or ibuprofen as needed for pain.    Follow-up with your doctor as needed.    Return to emergency department for any concerning symptoms.

## 2021-09-07 ENCOUNTER — INFUSION THERAPY VISIT (OUTPATIENT)
Dept: INFUSION THERAPY | Facility: OTHER | Age: 51
End: 2021-09-07
Attending: INTERNAL MEDICINE
Payer: COMMERCIAL

## 2021-09-07 DIAGNOSIS — C18.2 CANCER OF ASCENDING COLON (H): Primary | ICD-10-CM

## 2021-09-07 DIAGNOSIS — E53.8 VITAMIN B 12 DEFICIENCY: ICD-10-CM

## 2021-09-07 PROCEDURE — 250N000011 HC RX IP 250 OP 636: Performed by: NURSE PRACTITIONER

## 2021-09-07 PROCEDURE — 96523 IRRIG DRUG DELIVERY DEVICE: CPT

## 2021-09-07 PROCEDURE — 96372 THER/PROPH/DIAG INJ SC/IM: CPT | Performed by: NURSE PRACTITIONER

## 2021-09-07 RX ORDER — CYANOCOBALAMIN 1000 UG/ML
1000 INJECTION, SOLUTION INTRAMUSCULAR; SUBCUTANEOUS
Status: DISCONTINUED | OUTPATIENT
Start: 2021-09-07 | End: 2021-09-07 | Stop reason: HOSPADM

## 2021-09-07 RX ORDER — HEPARIN SODIUM (PORCINE) LOCK FLUSH IV SOLN 100 UNIT/ML 100 UNIT/ML
5 SOLUTION INTRAVENOUS
Status: DISCONTINUED | OUTPATIENT
Start: 2021-09-07 | End: 2021-09-07 | Stop reason: HOSPADM

## 2021-09-07 RX ORDER — CYANOCOBALAMIN 1000 UG/ML
1000 INJECTION, SOLUTION INTRAMUSCULAR; SUBCUTANEOUS
Status: CANCELLED
Start: 2021-09-07

## 2021-09-07 RX ORDER — HEPARIN SODIUM (PORCINE) LOCK FLUSH IV SOLN 100 UNIT/ML 100 UNIT/ML
5 SOLUTION INTRAVENOUS
Status: CANCELLED | OUTPATIENT
Start: 2021-09-07

## 2021-09-07 RX ADMIN — CYANOCOBALAMIN 1000 MCG: 1000 INJECTION, SOLUTION INTRAMUSCULAR at 11:07

## 2021-09-07 RX ADMIN — HEPARIN 5 ML: 100 SYRINGE at 11:15

## 2021-09-07 NOTE — PATIENT INSTRUCTIONS

## 2021-09-07 NOTE — PROGRESS NOTES
Patient is 50 year old here today for injection of B12 per order of FRANK Moran NP.     Patient denies questions nor concerns regarding medication, administration site, side effects, nor aftercare.  Patient identified with two identifiers, order verified, and verbal consent for today's injection obtained from patient.     Patient education provided on s/s of injection site infection, and/or medication specific side effects, and when to call a provider.  Patient instructed to report any adverse effects.     B12 administered per protocol in right deltoid at 90 degree angle.  Site covered with sterile bandage.  Patient tolerated injection well, no verbal nor non-verbal signs of discomfort noted.  No adverse effects noted at this time.     Patient instructed to call with further questions or concerns.  Patient states understanding and is in agreement with this plan. Patient discharged.

## 2021-09-07 NOTE — PROGRESS NOTES
Patients port accessed using non-coring, 20 gauge, 3/4 needle. Port accessed per facility protocol.   Hand hygiene performed: yes   Mask donned by caregiver: yes   Site prepped with CHG: yes   Labs drawn: no   Dressing applied using aseptic technique: yes   Port flushed easily, blood return noted.  No signs and symptoms of infection or infiltration.  Port flushed 20 mLs Normal Saline then Heparin 5mLs of 100 unit/mL.  Needle removed, small dressing applied.  Patient discharged with no complaints.

## 2021-09-20 DIAGNOSIS — E03.9 HYPOTHYROIDISM, UNSPECIFIED TYPE: ICD-10-CM

## 2021-09-21 ENCOUNTER — INFUSION THERAPY VISIT (OUTPATIENT)
Dept: INFUSION THERAPY | Facility: OTHER | Age: 51
End: 2021-09-21
Attending: INTERNAL MEDICINE
Payer: COMMERCIAL

## 2021-09-21 DIAGNOSIS — E53.8 VITAMIN B 12 DEFICIENCY: ICD-10-CM

## 2021-09-21 DIAGNOSIS — C18.2 CANCER OF ASCENDING COLON (H): Primary | ICD-10-CM

## 2021-09-21 PROCEDURE — 96372 THER/PROPH/DIAG INJ SC/IM: CPT | Performed by: NURSE PRACTITIONER

## 2021-09-21 PROCEDURE — 250N000011 HC RX IP 250 OP 636: Performed by: NURSE PRACTITIONER

## 2021-09-21 RX ORDER — CYANOCOBALAMIN 1000 UG/ML
1000 INJECTION, SOLUTION INTRAMUSCULAR; SUBCUTANEOUS
Status: CANCELLED
Start: 2021-09-21

## 2021-09-21 RX ORDER — LEVOTHYROXINE SODIUM 25 UG/1
TABLET ORAL
Qty: 90 TABLET | Refills: 0 | Status: SHIPPED | OUTPATIENT
Start: 2021-09-21 | End: 2021-12-16

## 2021-09-21 RX ORDER — CYANOCOBALAMIN 1000 UG/ML
1000 INJECTION, SOLUTION INTRAMUSCULAR; SUBCUTANEOUS
Status: DISCONTINUED | OUTPATIENT
Start: 2021-09-21 | End: 2021-09-21 | Stop reason: HOSPADM

## 2021-09-21 RX ORDER — HEPARIN SODIUM (PORCINE) LOCK FLUSH IV SOLN 100 UNIT/ML 100 UNIT/ML
5 SOLUTION INTRAVENOUS
Status: CANCELLED | OUTPATIENT
Start: 2021-09-21

## 2021-09-21 RX ADMIN — CYANOCOBALAMIN 1000 MCG: 1000 INJECTION, SOLUTION INTRAMUSCULAR at 11:08

## 2021-09-21 NOTE — PROGRESS NOTES
Patient is 50 year old, here today for injection of B12  per order of brea Moran NP.     Patient denies questions nor concerns regarding medication, administration site, side effects, nor aftercare.  Patient identified with two identifiers, order verified, and verbal consent for today's injection obtained from patient.     Patient education provided on s/s of injection site infection, and/or medication specific side effects, and when to call a provider.  Patient instructed to report any adverse effects.     B12 administered per protocol in left deltoid at 90 degree angle.  Site covered with sterile bandage.  Patient tolerated injection well, no verbal nor non-verbal signs of discomfort noted.  No adverse effects noted at this time.     Patient instructed to call with further questions or concerns.  Patient states understanding and is in agreement with this plan. Patient discharged.

## 2021-09-21 NOTE — PATIENT INSTRUCTIONS

## 2021-09-21 NOTE — TELEPHONE ENCOUNTER
Synthroid      Last Written Prescription Date:  3.4.21  Last Fill Quantity: #90,   # refills: 0  Last Office Visit: 6.30.21  Future Office visit:    Next 5 appointments (look out 90 days)    Nov 16, 2021  9:45 AM  (Arrive by 9:30 AM)  Return Visit with Izzy Moran NP  [facility] [address] [place] [id number]  [phone]           Routing refill request to provider for review/approval because:

## 2021-10-23 ENCOUNTER — HOSPITAL ENCOUNTER (EMERGENCY)
Facility: HOSPITAL | Age: 51
Discharge: HOME | End: 2021-10-23
Attending: PHYSICIAN ASSISTANT | Admitting: PHYSICIAN ASSISTANT
Payer: COMMERCIAL

## 2021-10-23 VITALS
HEART RATE: 78 BPM | OXYGEN SATURATION: 98 % | DIASTOLIC BLOOD PRESSURE: 97 MMHG | TEMPERATURE: 97.2 F | SYSTOLIC BLOOD PRESSURE: 147 MMHG | RESPIRATION RATE: 16 BRPM

## 2021-10-23 DIAGNOSIS — M65.4 DE QUERVAIN'S DISEASE (TENOSYNOVITIS): ICD-10-CM

## 2021-10-23 PROCEDURE — G0463 HOSPITAL OUTPT CLINIC VISIT: HCPCS

## 2021-10-23 PROCEDURE — 99213 OFFICE O/P EST LOW 20 MIN: CPT | Performed by: PHYSICIAN ASSISTANT

## 2021-10-23 RX ORDER — PREDNISONE 20 MG/1
TABLET ORAL
Qty: 10 TABLET | Refills: 0 | Status: SHIPPED | OUTPATIENT
Start: 2021-10-23 | End: 2021-12-21

## 2021-10-23 ASSESSMENT — ENCOUNTER SYMPTOMS
CONSTITUTIONAL NEGATIVE: 1
NUMBNESS: 0
CARDIOVASCULAR NEGATIVE: 1
RESPIRATORY NEGATIVE: 1
ARTHRALGIAS: 1
COLOR CHANGE: 0
WEAKNESS: 0
MYALGIAS: 1
WOUND: 0

## 2021-10-23 NOTE — ED PROVIDER NOTES
History     Chief Complaint   Patient presents with     Arm Pain     left wrist, lower forearm, denies injury, pain with picking things up. Pain x 2 months     HPI  Constantino Warren is a 50 year old female who presents with swelling of the LT thumb/wrist. She had an injury to the wrist about 2 months ago, f/u XR negative for Fx. She reports onset of swelling over the past few days and she is now unable to  material for needle-point. Area is sore at rest, with pain intensifying with gripping, tucking her thumb or deviation at the wrist. She denies re-injury.     Allergies:  Allergies   Allergen Reactions     Aspartame      Other reaction(s): Other - Describe In Comment Field, Seizures  convulsions     Bee Venom Anaphylaxis     Bupropion Anaphylaxis     Throat gets tight and hives. Denies allergic reaction     Food Anaphylaxis     Peppers-anaphylaxis if eaten, if touched hands swell and reddened     Food Allergy Formula Anaphylaxis     Peppers, patient reports green peppers close off her throat     Ibuprofen Anaphylaxis     No Clinical Screening - See Comments Anaphylaxis     Peppers, patient reports green peppers close off her throat     Piper Anaphylaxis     Peppers--green, black, red, chili, etc. Derm symptoms, hives;  Strawberries--(cooked) Itching, breathing difficulties, GI symptoms. Mushrooms--GI symptoms, Hives.Spinach (cooked) throat irritation  (Listed on [facility] problem list.)     Fentanyl Swelling     Lips and tongue swelled.  Swelling of lips and tongue     Adhesive Tape      blisters     Amitriptyline Other (See Comments)     Respiratory symptoms - denies allergic reaction     Liquid Adhesive Dermatitis     blisters  Blisters  Silk tape and tegaderm is fine     Oxcarbazepine Other (See Comments)     Throat gets tight and hives. Denies allergic reaction  Throat gets tight and hives-       Prozac [Fluoxetine]      Other reaction(s): *Unknown  uncontrolled muscle spasms     Valproic  Acid      Respiratory symptoms ([facility]). Denies allergic reaction     Ziprasidone      Throat gets tight and hives. Denies allergic reaction       Problem List:    Patient Active Problem List    Diagnosis Date Noted     Iron deficiency 04/29/2021     Priority: Medium     History of colon cancer, stage III 10/20/2020     Priority: Medium     Added automatically from request for surgery [id number]       Rotator cuff tear 10/13/2020     Priority: Medium     Added automatically from request for surgery [id number]       Arthritis 10/13/2020     Priority: Medium     Added automatically from request for surgery [id number]       History of uterine cancer 05/27/2020     Priority: Medium     Palliative care patient 05/27/2020     Priority: Medium     Iron deficiency anemia, unspecified iron deficiency anemia type 05/08/2020     Priority: Medium     S/P partial resection of colon 02/24/2020     Priority: Medium     Prediabetes      Priority: Medium     Colon cancer (H) 02/19/2020     Priority: Medium     Added automatically from request for surgery [id number]       Cancer of ascending colon (H) 02/19/2020     Priority: Medium     Added automatically from request for surgery [id number]       Special screening for malignant neoplasms, colon 01/14/2020     Priority: Medium     Added automatically from request for surgery [id number]       Anastomotic ulcer S/P gastric bypass 07/21/2018     Priority: Medium     Anemia 07/21/2018     Priority: Medium     Chest pain 07/20/2018     Priority: Medium     GI bleed 07/20/2018     Priority: Medium     Abdominal pain, epigastric 07/20/2018     Priority: Medium     Hypochromic microcytic anemia 07/20/2018     Priority: Medium     Chronic migraine 01/18/2018     Priority: Medium     Replacing diagnoses that were inactivated after the 10/1/2021 regulatory import.       Chronic pain disorder 01/18/2018     Priority: Medium     Long term (current) use of opiate analgesic 01/12/2018     Priority:  Medium     Overview:   Pain Diagnosis failed back syndrome, annular tear L4-5, history lumbar discectomy, s/p lumbar fusion.    Should be seen in the clinic every twelve weeks. Currently on a taper: yes, trying to wean away from the daytime hydrocodone-acetaminophen..    Plan for flares of chronic pain stretches, occasional use of more hydrocodone-acetaminophen for flare.    ED and UC: Opioid medication will not be given in the ER or Urgent Care unless there is a medical emergency unrelated to chronic pain.  Limit to 3 day supply    Refills: Refills will only be given at a scheduled visit.    [facility] Agreement for Opioid Treatment.   Opioid Agreement Date: 5/21/12  Last UDT (Urine Drug Test) on date:    Pain Dx: chronic low back pain  Last Pain Visit: 6-24-11  [facility]  Comments:      7/23/14:  Progressing well.  Over the last month, has decreased hydrocodone-acetaminophen from 6/day to 3/day.  Awaiting appointment with [facility].       Adjustment disorder with mixed anxiety and depressed mood 12/15/2017     Priority: Medium     Other insomnia 12/15/2017     Priority: Medium     Syncope and collapse 09/26/2015     Priority: Medium     Hypertensive urgency 09/26/2015     Priority: Medium     Concussion syndrome 09/26/2015     Priority: Medium     Hydronephrosis, right 04/20/2015     Priority: Medium     Pyelonephritis 04/20/2015     Priority: Medium     Urolithiasis 04/20/2015     Priority: Medium     Annular tear of lumbar disc 12/16/2014     Priority: Medium     Overview:   MRI 2/5/13:  Previous right L4-5 hemilaminectomy and partial facetectomy with mild to moderate right L4-5 facet arthropathy and broad-based bulge of disk and end plate osteophyte on the right with partial high signal intensity zone annular tear of the undersurface of the laterally protruding disk/annulus.       Failed back syndrome of lumbar spine 12/16/2014     Priority: Medium     Overview:   Three back surgeries,  including an L3-sacrum AP fusion done at [facility] in 2013.  Has been doing very well in terms of reduction of pain, with minimal requirement of opiate pain medication.       Hx of lumbar discectomy 12/16/2014     Priority: Medium     Overview:   10/2006:   L4-5 Laminectomy and diskectomy L5-S1.   12/12/11:  L5S1 microdiscectomy ([place])       Vitamin B 12 deficiency 06/13/2011     Priority: Medium     Overview:   IMO Update 10/11       Gastric bypass status for obesity 01/04/2011     Priority: Medium     Overview:   12/14/10:  laparoscopic Prasanna-en-Y gastric bypass Dr. Hassan  IMO Update 10/11       Hyperlipidemia 09/23/2008     Priority: Medium     Overview:   IMO Update 10/11       Hypothyroidism 09/23/2008     Priority: Medium     Overview:   IMO Update 10/11  Overview:   IMO Update 10/11          Past Medical History:    Past Medical History:   Diagnosis Date     Arthritis 10/13/2020     Cancer (H)      Cancer (H) 2004     Chronic pain      Depressive disorder      Diabetes (H)      History of blood transfusion      Hypertension      Prediabetes      Thyroid disease        Past Surgical History:    Past Surgical History:   Procedure Laterality Date     ARTHROSCOPY SHOULDER Left 11/6/2020    Procedure: LEFT SHOULDER ARTHROSCOPY WITH ROTATOR CUFF REPAIR AND ACROMIUM CLAVICULAR JOINT RESECTION;  Surgeon: Tanner Mujica DO;  Location: [facility]     BACK SURGERY      2008; 2011     COLONOSCOPY N/A 2/13/2020    Procedure: COLONOSCOPY WITH BIOPSY AND TATOOING OF ASCENDING COLON,INCOMEPLETE EXAM;  Surgeon: Kang Cleveland MD;  Location: [facility]     COLONOSCOPY N/A 10/26/2020    Procedure: incomplete colonoscopy ;  Surgeon: Kang Cleveland MD;  Location: [facility]     ESOPHAGOSCOPY, GASTROSCOPY, DUODENOSCOPY (EGD), COMBINED N/A 7/21/2018    Procedure: COMBINED ESOPHAGOSCOPY, GASTROSCOPY, DUODENOSCOPY (EGD);  UPPER ENDOSCOPY WITH BIOPSIES;  Surgeon: Roger Vazquez MD;  Location: [facility]     ESOPHAGOSCOPY, GASTROSCOPY,  DUODENOSCOPY (EGD), COMBINED N/A 11/21/2019    Procedure: UPPER ENDOSCOPY with biopsy;  Surgeon: Kang Cleveland MD;  Location: [facility]     HEAD & NECK SURGERY  2008    c4-c5 fusion     HYSTERECTOMY, CRISTIANE  04/2004    uterine cancer; [facility]     INSERT PORT VASCULAR ACCESS N/A 2/28/2020    Procedure: INSERTION, VASCULAR ACCESS PORT LEFT;  Surgeon: Kang Cleveland MD;  Location: [facility]     LAPAROSCOPIC ASSISTED COLECTOMY Right 2/24/2020    Procedure: RIGHT KEVIN COLECTOMY, LYSIS OF ADHESIONS;  Surgeon: Kang Cleveland MD;  Location: [facility]     LAPAROSCOPIC BYPASS GASTRIC  12/2010       Family History:    Family History   Problem Relation Age of Onset     Diabetes Mother      Hypertension Mother      Coronary Artery Disease Mother      Myocardial Infarction Mother      Hypertension Father      Diabetes Maternal Grandmother      Hypertension Maternal Grandfather      Coronary Artery Disease Maternal Grandfather      Cancer Paternal Grandfather         unsure of type of cancer     Bladder Cancer Paternal Grandfather      Diabetes Maternal Aunt      Cerebrovascular Disease Maternal Aunt      Diabetes Maternal Aunt      Cerebrovascular Disease Maternal Aunt      Diabetes Maternal Aunt      Cerebrovascular Disease Maternal Aunt      Hyperlipidemia No family hx of      Breast Cancer No family hx of      Colon Cancer No family hx of      Prostate Cancer No family hx of      Thyroid Disease No family hx of      Anesthesia Reaction No family hx of      Asthma No family hx of      Genetic Disorder No family hx of        Social History:  Marital Status:   [5]  Social History     Tobacco Use     Smoking status: Never Smoker     Smokeless tobacco: Never Used   Substance Use Topics     Alcohol use: No     Comment: sober for 18 years     Drug use: No        Medications:    predniSONE (DELTASONE) 20 MG tablet  acetaminophen (TYLENOL) 500 MG tablet  Cyanocobalamin 1000 MCG/ML KIT  diazepam (VALIUM) 5 MG  tablet  DULoxetine (CYMBALTA) 30 MG capsule  EPINEPHrine (ANY BX GENERIC EQUIV) 0.3 MG/0.3ML injection 2-pack  gabapentin (NEURONTIN) 300 MG capsule  levothyroxine (SYNTHROID/LEVOTHROID) 200 MCG tablet  levothyroxine (SYNTHROID/LEVOTHROID) 25 MCG tablet  lidocaine-prilocaine (EMLA) 2.5-2.5 % external cream  multivitamin, therapeutic with minerals (MULTI-VITAMIN) TABS  ondansetron (ZOFRAN-ODT) 4 MG ODT tab  tiZANidine (ZANAFLEX) 4 MG tablet  traZODone (DESYREL) 50 MG tablet  verapamil (CALAN) 40 MG tablet          Review of Systems   Constitutional: Negative.    Respiratory: Negative.    Cardiovascular: Negative.    Musculoskeletal: Positive for arthralgias and myalgias.   Skin: Negative for color change and wound.   Neurological: Negative for weakness and numbness.       Physical Exam   BP: (!) 158/109  Pulse: 78  Temp: 97.2  F (36.2  C)  Resp: 16  SpO2: 98 %      Physical Exam  Vitals and nursing note reviewed.   Constitutional:       General: She is not in acute distress.  Cardiovascular:      Rate and Rhythm: Normal rate.   Pulmonary:      Effort: Pulmonary effort is normal.   Musculoskeletal:      Left wrist: Swelling and tenderness present. Decreased range of motion (pain limits ROM).        Hands:       Comments: Finkelstein POSITIVE   Skin:     General: Skin is warm and dry.   Neurological:      Mental Status: She is alert.         ED Course        Procedures      No results found for this or any previous visit (from the past 24 hour(s)).    Medications - No data to display    Assessments & Plan (with Medical Decision Making)     I have reviewed the nursing notes.    I have reviewed the findings, diagnosis, plan and need for follow up with the patient.    Discharge Medication List as of 10/23/2021  5:35 PM      START taking these medications    Details   predniSONE (DELTASONE) 20 MG tablet Take two tablets (= 40mg) each day for 5 (five) days, Disp-10 tablet, R-0, E-Prescribe             Final diagnoses:   De  Quervain's disease (tenosynovitis)   Pt placed in thumb spica for immobilization. Unable to take ibuprofen, so trial prednisone starting tomorrow. If Sx persist despite Tx, will f/u with ortho for ongoing care. Handout on DeQuervain's given to patient for reference. Will seek attention with pain or swelling out of proportion.     10/23/2021   [facility]     [place], [place]  10/23/21 [id number]

## 2021-10-23 NOTE — ED TRIAGE NOTES
Patient notes 2 month history or left wrist/lower FA pain. Patient reports that she did fall a couple months ago and did have an x-ray of the left wrist, but states she did not break anything. No new injury. Patient A/O x4 and ABCs intact in triage.

## 2021-10-23 NOTE — DISCHARGE INSTRUCTIONS
- Rest, ice, splint (compression, elevation)  - Rotate tylenol 1000mg 3x daily for 3-5 days.   - Topicals: Arnica Cream (5$ at [employer]) and/or Capsaicin. (1/4 teaspoon cayenne pepper in a palmful olive oil and rub in 2-3 times daily for 5-7 days for pain)  May want to start the prednisone tomorrow morning: it may keep you up all night.   [facility]  [phone]

## 2021-10-23 NOTE — ED TRIAGE NOTES
Pt presents with c/o left hand/wrist pain. 2 months ago she fell and bent thumb back, pt was seen and treated for it, no breaks, and states that it is getting worse. Pt states she can bend it but reports that when she tried to  objects she gets sharp pains. If pt does not move it there is no pain. Only slight pain with movement. Pt took tylenol.

## 2021-10-29 ENCOUNTER — TRANSFERRED RECORDS (OUTPATIENT)
Dept: HEALTH INFORMATION MANAGEMENT | Facility: CLINIC | Age: 51
End: 2021-10-29
Payer: COMMERCIAL

## 2021-11-02 ENCOUNTER — LAB (OUTPATIENT)
Dept: INFUSION THERAPY | Facility: OTHER | Age: 51
End: 2021-11-02
Attending: INTERNAL MEDICINE
Payer: COMMERCIAL

## 2021-11-02 DIAGNOSIS — Z85.038 HISTORY OF COLON CANCER, STAGE III: Primary | ICD-10-CM

## 2021-11-02 DIAGNOSIS — E53.8 VITAMIN B 12 DEFICIENCY: ICD-10-CM

## 2021-11-02 DIAGNOSIS — C18.2 CANCER OF ASCENDING COLON (H): ICD-10-CM

## 2021-11-02 LAB
ALBUMIN SERPL-MCNC: 3.7 G/DL (ref 3.4–5)
ALP SERPL-CCNC: 80 U/L (ref 40–150)
ALT SERPL W P-5'-P-CCNC: 45 U/L (ref 0–50)
ANION GAP SERPL CALCULATED.3IONS-SCNC: 3 MMOL/L (ref 3–14)
AST SERPL W P-5'-P-CCNC: 31 U/L (ref 0–45)
BASOPHILS # BLD AUTO: 0 10E3/UL (ref 0–0.2)
BASOPHILS NFR BLD AUTO: 0 %
BILIRUB SERPL-MCNC: 0.5 MG/DL (ref 0.2–1.3)
BUN SERPL-MCNC: 13 MG/DL (ref 7–30)
CALCIUM SERPL-MCNC: 8.8 MG/DL (ref 8.5–10.1)
CEA SERPL-MCNC: 2 UG/L (ref 0–2.5)
CHLORIDE BLD-SCNC: 111 MMOL/L (ref 94–109)
CO2 SERPL-SCNC: 26 MMOL/L (ref 20–32)
CREAT SERPL-MCNC: 0.74 MG/DL (ref 0.52–1.04)
EOSINOPHIL # BLD AUTO: 0 10E3/UL (ref 0–0.7)
EOSINOPHIL NFR BLD AUTO: 0 %
ERYTHROCYTE [DISTWIDTH] IN BLOOD BY AUTOMATED COUNT: 12.3 % (ref 10–15)
GFR SERPL CREATININE-BSD FRML MDRD: >90 ML/MIN/1.73M2
GLUCOSE BLD-MCNC: 90 MG/DL (ref 70–99)
HCT VFR BLD AUTO: 37.8 % (ref 35–47)
HGB BLD-MCNC: 12.7 G/DL (ref 11.7–15.7)
IMM GRANULOCYTES # BLD: 0 10E3/UL
IMM GRANULOCYTES NFR BLD: 1 %
LYMPHOCYTES # BLD AUTO: 1.6 10E3/UL (ref 0.8–5.3)
LYMPHOCYTES NFR BLD AUTO: 24 %
MCH RBC QN AUTO: 30.7 PG (ref 26.5–33)
MCHC RBC AUTO-ENTMCNC: 33.6 G/DL (ref 31.5–36.5)
MCV RBC AUTO: 91 FL (ref 78–100)
MONOCYTES # BLD AUTO: 0.6 10E3/UL (ref 0–1.3)
MONOCYTES NFR BLD AUTO: 9 %
NEUTROPHILS # BLD AUTO: 4.2 10E3/UL (ref 1.6–8.3)
NEUTROPHILS NFR BLD AUTO: 66 %
NRBC # BLD AUTO: 0 10E3/UL
NRBC BLD AUTO-RTO: 0 /100
PLATELET # BLD AUTO: 186 10E3/UL (ref 150–450)
POTASSIUM BLD-SCNC: 4.3 MMOL/L (ref 3.4–5.3)
PROT SERPL-MCNC: 7.1 G/DL (ref 6.8–8.8)
RBC # BLD AUTO: 4.14 10E6/UL (ref 3.8–5.2)
SODIUM SERPL-SCNC: 140 MMOL/L (ref 133–144)
WBC # BLD AUTO: 6.4 10E3/UL (ref 4–11)

## 2021-11-02 PROCEDURE — 82040 ASSAY OF SERUM ALBUMIN: CPT | Mod: ZL

## 2021-11-02 PROCEDURE — 85025 COMPLETE CBC W/AUTO DIFF WBC: CPT | Mod: ZL

## 2021-11-02 PROCEDURE — 96523 IRRIG DRUG DELIVERY DEVICE: CPT

## 2021-11-02 PROCEDURE — 96372 THER/PROPH/DIAG INJ SC/IM: CPT | Performed by: NURSE PRACTITIONER

## 2021-11-02 PROCEDURE — 82378 CARCINOEMBRYONIC ANTIGEN: CPT | Mod: ZL

## 2021-11-02 PROCEDURE — 250N000011 HC RX IP 250 OP 636: Performed by: NURSE PRACTITIONER

## 2021-11-02 RX ORDER — HEPARIN SODIUM (PORCINE) LOCK FLUSH IV SOLN 100 UNIT/ML 100 UNIT/ML
5 SOLUTION INTRAVENOUS
Status: CANCELLED | OUTPATIENT
Start: 2021-11-02

## 2021-11-02 RX ORDER — CYANOCOBALAMIN 1000 UG/ML
1000 INJECTION, SOLUTION INTRAMUSCULAR; SUBCUTANEOUS
Status: CANCELLED
Start: 2021-11-02

## 2021-11-02 RX ORDER — CYANOCOBALAMIN 1000 UG/ML
1000 INJECTION, SOLUTION INTRAMUSCULAR; SUBCUTANEOUS
Status: DISCONTINUED | OUTPATIENT
Start: 2021-11-02 | End: 2021-11-02 | Stop reason: HOSPADM

## 2021-11-02 RX ORDER — HEPARIN SODIUM (PORCINE) LOCK FLUSH IV SOLN 100 UNIT/ML 100 UNIT/ML
5 SOLUTION INTRAVENOUS
Status: DISCONTINUED | OUTPATIENT
Start: 2021-11-02 | End: 2021-11-02 | Stop reason: HOSPADM

## 2021-11-02 RX ADMIN — HEPARIN 5 ML: 100 SYRINGE at 10:43

## 2021-11-02 RX ADMIN — CYANOCOBALAMIN 1000 MCG: 1000 INJECTION, SOLUTION INTRAMUSCULAR at 10:46

## 2021-11-02 NOTE — PATIENT INSTRUCTIONS

## 2021-11-02 NOTE — PROGRESS NOTES
Patient is 50 year old years old, here today accompanied by selffor injection of B12 per order of FRANK Moran NP.     Patient denies questions nor concerns regarding medication, administration site, side effects, nor aftercare.  Patient identified with two identifiers, order verified, and verbal consent for today's injection obtained from patient.     Patient education provided on s/s of injection site infection, and/or medication specific side effects, and when to call a provider.  Patient instructed to report any adverse effects.     B12 administered per protocol in right deltoid at 90 degree angle. Site covered with sterile bandage. Patient tolerated injection well, no verbal nor non-verbal signs of discomfort noted. No adverse effects noted at this time.     Patient instructed to call with further questions or concerns.  Patient states understanding and is in agreement with this plan.  Copy of appointments, and after visit summary (AVS) given to patient. Patient discharged.

## 2021-11-02 NOTE — PROGRESS NOTES
Patients  port accessed using non-coring, 19 gauge, 3/4 inch power needle. Port accessed per facility protocol.  Hand hygiene performed: yes   Mask donned by caregiver: yes Site prepped with CHG: yes Labs drawn: yes Dressing applied using aseptic technique: yes Port flushed easily, without resistance. Flushed with 10 cc's normal saline.   Immediate blood return noted. 10 cc blood discarded.  3 vials blood draw and sent to lab for results.  Port flushed with 20 cc 0.9% normal saline and 5 cc heparinized saline.  Needle removed intact, sterile dressing applied.  Slight pressure applied for 30 seconds.   Patient tolerated port flush well, denies pain nor discomfort at this time. Patient instructed to leave dressing intact for a minimum of one hour, and to call with questions or concerns.  Patient states understanding and is in agreement with this plan. Patient discharged ambulatory.

## 2021-11-08 ENCOUNTER — NURSE TRIAGE (OUTPATIENT)
Dept: FAMILY MEDICINE | Facility: OTHER | Age: 51
End: 2021-11-08
Payer: COMMERCIAL

## 2021-11-08 DIAGNOSIS — Z20.822 SUSPECTED 2019 NOVEL CORONAVIRUS INFECTION: Primary | ICD-10-CM

## 2021-11-09 ENCOUNTER — OFFICE VISIT (OUTPATIENT)
Dept: FAMILY MEDICINE | Facility: OTHER | Age: 51
End: 2021-11-09
Attending: FAMILY MEDICINE
Payer: COMMERCIAL

## 2021-11-09 DIAGNOSIS — Z20.822 SUSPECTED 2019 NOVEL CORONAVIRUS INFECTION: ICD-10-CM

## 2021-11-09 PROCEDURE — U0003 INFECTIOUS AGENT DETECTION BY NUCLEIC ACID (DNA OR RNA); SEVERE ACUTE RESPIRATORY SYNDROME CORONAVIRUS 2 (SARS-COV-2) (CORONAVIRUS DISEASE [COVID-19]), AMPLIFIED PROBE TECHNIQUE, MAKING USE OF HIGH THROUGHPUT TECHNOLOGIES AS DESCRIBED BY CMS-2020-01-R: HCPCS | Mod: ZL

## 2021-11-10 ENCOUNTER — TELEPHONE (OUTPATIENT)
Dept: FAMILY MEDICINE | Facility: OTHER | Age: 51
End: 2021-11-10
Payer: COMMERCIAL

## 2021-11-10 LAB — SARS-COV-2 RNA RESP QL NAA+PROBE: NEGATIVE

## 2021-11-11 NOTE — TELEPHONE ENCOUNTER

## 2021-11-15 ENCOUNTER — OFFICE VISIT (OUTPATIENT)
Dept: FAMILY MEDICINE | Facility: OTHER | Age: 51
End: 2021-11-15
Attending: ORTHOPAEDIC SURGERY
Payer: COMMERCIAL

## 2021-11-15 DIAGNOSIS — Z20.822 COVID-19 RULED OUT: Primary | ICD-10-CM

## 2021-11-15 PROCEDURE — U0005 INFEC AGEN DETEC AMPLI PROBE: HCPCS | Mod: ZL

## 2021-11-16 LAB — SARS-COV-2 RNA RESP QL NAA+PROBE: POSITIVE

## 2021-11-17 ENCOUNTER — VIRTUAL VISIT (OUTPATIENT)
Dept: ONCOLOGY | Facility: OTHER | Age: 51
End: 2021-11-17
Attending: NURSE PRACTITIONER
Payer: COMMERCIAL

## 2021-11-17 ENCOUNTER — TELEPHONE (OUTPATIENT)
Dept: FAMILY MEDICINE | Facility: OTHER | Age: 51
End: 2021-11-17

## 2021-11-17 DIAGNOSIS — E53.8 VITAMIN B 12 DEFICIENCY: ICD-10-CM

## 2021-11-17 DIAGNOSIS — Z98.84 GASTRIC BYPASS STATUS FOR OBESITY: ICD-10-CM

## 2021-11-17 DIAGNOSIS — E61.1 IRON DEFICIENCY: ICD-10-CM

## 2021-11-17 DIAGNOSIS — G62.9 NEUROPATHY: ICD-10-CM

## 2021-11-17 DIAGNOSIS — Z85.038 HISTORY OF COLON CANCER, STAGE III: Primary | ICD-10-CM

## 2021-11-17 PROCEDURE — 99443 PR PHYSICIAN TELEPHONE EVALUATION 21-30 MIN: CPT | Performed by: NURSE PRACTITIONER

## 2021-11-17 ASSESSMENT — PAIN SCALES - GENERAL: PAINLEVEL: NO PAIN (0)

## 2021-11-17 NOTE — TELEPHONE ENCOUNTER
Patient calling and states she had tested positive for covid on 11/15. Reports her symptoms started on Friday and that her symptoms have improved. Patient advised to quarantine for 10 days from the day symptoms started and can stop quarantine after the 10 days as long as symptoms have improved and 24 hours with no fever and without the use of fever-reducing medications, tylenol/ibuprofen. Patient verbalized understanding. Patient had question in regards to possibly testing positive for up to 90 days. Patient is to have surgery. Advised patient to speak with surgeon in regards to testing prior to surgery and it possibly coming back positive due to being within the 90 day period. Patient verbalized understanding.

## 2021-11-17 NOTE — PATIENT INSTRUCTIONS
We would like to see you back in 3 months.  Please come 2 to 3 days prior for lab work and CT scan of your chest, abdomen and pelvis.    When you are in need of a refill, please call your pharmacy and they will send us a request.     If you have any questions please call [phone]    Other instructions: None    Genetic Counseling number:  [phone]  Please schedule colonoscopy

## 2021-11-17 NOTE — PROGRESS NOTES
Oncology Follow-up Visit:  November 17, 2021    Reason for Visit:  Patient presents with:  Oncology Clinic Visit: Follow up History of colon cancer, stage III     Nursing Note and documentation reviewed: yes    HPI:  This is a 51-year-old female patient who is undergoing a billable telephone visit in follow-up for Stage IIIB adenocarcinoma of the ascending colon diagnosed February 2020.  She completed adjuvant chemotherapy on 6/22/2020 and is being followed with surveillance.  Patient is unable to be seen in the clinic as she is Covid positive.    Patient states she is doing well and feels completely fine.  The only thing she has in relation to possible Covid it is a very slight decrease in taste.  She overall is doing well and offers no new complaints today.  She does continue with neuropathy in her hands and her feet and is to actually undergo release of the trigger finger on her right hand and de Quervain's surgery on her left.  She not quite sure about the numbness and tingling in her hands if it is related to previous chemo or her issues.  She does have issues with numbness and tingling in her feet but feels these are improved since initiating the Cymbalta.  She continues on the Neurontin 600 mg at night because she does feel that it helps her sleep not only from a neuropathy standpoint but it helps her pain.    She tells me she is taking oral iron drops 8 drops/day and also oral B12 liquid supplement.  She does continue with B12 injections every 2 weeks.    Oncologic History:     11/21/19 to 11/21/2019 patient was hospitalized with a hemoglobin of 5.7 underwent EGD with no evidence of active bleeding.  12/12/2019 she was seen by Dr. Cook and established care with her as her new PCP.  Referral for screening colonoscopy was completed.  2/13/2020 patient underwent colonoscopy by Dr. Cleveland where a near obstructing lesion in the right colon was found.  Pathology showed an invasive moderately differentiated  adenocarcinoma.  CT the abdomen and pelvis showed a mass in the junction of the cecum and ascending colon highly suspicious for malignancy.  2/24/2020  She then underwent laparoscopic-assisted right hemicolectomy by Dr. Cleveland that was converted to open right hemicolectomy when it was noted that the tumor was adherent to the pelvic brim; final pathology showed mildly differentiated adenocarcinoma of the right colon with 1/32 lymph nodes positive; there was perineural invasion; tumor budding and acutely inflamed tumor and reactive lymph nodes with peritonitis; the omentum showed mild peritonitis; margins were negative; patient was staged [id number]; IIIb adenocarcinoma of the right colon  2/28/2020 Port-A-Cath placement  3/25/2020 PET scan which showed no evidence of distant metastatic disease  3/30/2020 patient was seen by Dr. Palma with Medical Oncology with recommendation for XELOX; capecitabine 850 mg/m2 p.o. b.i.d. on days 1 through 14 of a 21-day cycle, oxaliplatin 130 mg/m2 to be given on day 1 x4 cycles.  4/10/2020 she was seen by Dr. Bryant with Radiation Oncology with no recommendation for radiation therapy at this time related to negative margins on pathology  4/17/2020 initiation of chemotherapy  6/22/2020  Completion of 4 cycles of XELOX     MMR proteins intact  CEA prior to surgery was <0.5     Current Chemo Regime/TX:  N/a  Current Cycle:  n/a  # of completed cycles:  n/a     Previous treatment:   Oxaliplatin/Capecitabine x 4 cycles    Past Medical History:   Diagnosis Date     Arthritis 10/13/2020     Cancer (H)     colon     Cancer (H) 2004    uterine     Chronic pain      Depressive disorder      Diabetes (H)      History of blood transfusion      Hypertension      Prediabetes      Syncope and collapse      Thyroid disease        Past Surgical History:   Procedure Laterality Date     ARTHROSCOPY SHOULDER Left 11/6/2020    Procedure: LEFT SHOULDER ARTHROSCOPY WITH ROTATOR CUFF REPAIR AND ACROMIUM  CLAVICULAR JOINT RESECTION;  Surgeon: Tanner Mujica DO;  Location: [facility]     BACK SURGERY      2008; 2011     COLONOSCOPY N/A 2/13/2020    Procedure: COLONOSCOPY WITH BIOPSY AND TATOOING OF ASCENDING COLON,INCOMEPLETE EXAM;  Surgeon: Kang Cleveland MD;  Location: [facility]     COLONOSCOPY N/A 10/26/2020    Procedure: incomplete colonoscopy ;  Surgeon: Kang Cleveland MD;  Location: [facility]     ESOPHAGOSCOPY, GASTROSCOPY, DUODENOSCOPY (EGD), COMBINED N/A 7/21/2018    Procedure: COMBINED ESOPHAGOSCOPY, GASTROSCOPY, DUODENOSCOPY (EGD);  UPPER ENDOSCOPY WITH BIOPSIES;  Surgeon: Roger Vazquez MD;  Location: [facility]     ESOPHAGOSCOPY, GASTROSCOPY, DUODENOSCOPY (EGD), COMBINED N/A 11/21/2019    Procedure: UPPER ENDOSCOPY with biopsy;  Surgeon: Kang Clevelnad MD;  Location: [facility]     HEAD & NECK SURGERY  2008    c4-c5 fusion     HYSTERECTOMY, CRISTIANE  04/2004    uterine cancer; [facility]     INSERT PORT VASCULAR ACCESS N/A 2/28/2020    Procedure: INSERTION, VASCULAR ACCESS PORT LEFT;  Surgeon: Kang Cleveland MD;  Location: [facility]     LAPAROSCOPIC ASSISTED COLECTOMY Right 2/24/2020    Procedure: RIGHT KEVIN COLECTOMY, LYSIS OF ADHESIONS;  Surgeon: Kang Cleveland MD;  Location: [facility]     LAPAROSCOPIC BYPASS GASTRIC  12/2010       Family History   Problem Relation Age of Onset     Diabetes Mother      Hypertension Mother      Coronary Artery Disease Mother      Myocardial Infarction Mother      Hypertension Father      Diabetes Maternal Grandmother      Hypertension Maternal Grandfather      Coronary Artery Disease Maternal Grandfather      Cancer Paternal Grandfather         unsure of type of cancer     Bladder Cancer Paternal Grandfather      Diabetes Maternal Aunt      Cerebrovascular Disease Maternal Aunt      Diabetes Maternal Aunt      Cerebrovascular Disease Maternal Aunt      Diabetes Maternal Aunt      Cerebrovascular Disease Maternal Aunt      Hyperlipidemia No family hx of       Breast Cancer No family hx of      Colon Cancer No family hx of      Prostate Cancer No family hx of      Thyroid Disease No family hx of      Anesthesia Reaction No family hx of      Asthma No family hx of      Genetic Disorder No family hx of        Social History     Socioeconomic History     Marital status:      Spouse name: Not on file     Number of children: 2     Years of education: Not on file     Highest education level: Not on file   Occupational History     Occupation: PCA     Comment: currently not working, unemployment    Tobacco Use     Smoking status: Never Smoker     Smokeless tobacco: Never Used   Substance and Sexual Activity     Alcohol use: No     Comment: sober for 18 years     Drug use: No     Sexual activity: Never   Other Topics Concern     Parent/sibling w/ CABG, MI or angioplasty before 65F 55M? Yes     Comment: Mother   Social History Narrative     Not on file     Social Determinants of Health     Financial Resource Strain: Low Risk      Difficulty of Paying Living Expenses: Not hard at all   Food Insecurity: Not on file   Transportation Needs: Unknown     Lack of Transportation (Medical): Patient refused     Lack of Transportation (Non-Medical): Patient refused   Physical Activity: Not on file   Stress: Not on file   Social Connections: Not on file   Intimate Partner Violence: Not on file   Housing Stability: Not on file       Current Outpatient Medications   Medication     acetaminophen (TYLENOL) 500 MG tablet     Cyanocobalamin 1000 MCG/ML KIT     diazepam (VALIUM) 5 MG tablet     DULoxetine (CYMBALTA) 30 MG capsule     EPINEPHrine (ANY BX GENERIC EQUIV) 0.3 MG/0.3ML injection 2-pack     gabapentin (NEURONTIN) 300 MG capsule     levothyroxine (SYNTHROID/LEVOTHROID) 200 MCG tablet     levothyroxine (SYNTHROID/LEVOTHROID) 25 MCG tablet     lidocaine-prilocaine (EMLA) 2.5-2.5 % external cream     multivitamin, therapeutic with minerals (MULTI-VITAMIN) TABS     ondansetron  (ZOFRAN-ODT) 4 MG ODT tab     predniSONE (DELTASONE) 20 MG tablet     tiZANidine (ZANAFLEX) 4 MG tablet     traZODone (DESYREL) 50 MG tablet     verapamil (CALAN) 40 MG tablet     No current facility-administered medications for this visit.        Allergies   Allergen Reactions     Aspartame      Other reaction(s): Other - Describe In Comment Field, Seizures  convulsions     Bee Venom Anaphylaxis     Bupropion Anaphylaxis     Throat gets tight and hives. Denies allergic reaction     Food Anaphylaxis     Peppers-anaphylaxis if eaten, if touched hands swell and reddened     Food Allergy Formula Anaphylaxis     Peppers, patient reports green peppers close off her throat     Ibuprofen Anaphylaxis     No Clinical Screening - See Comments Anaphylaxis     Peppers, patient reports green peppers close off her throat     Piper Anaphylaxis     Peppers--green, black, red, chili, etc. Derm symptoms, hives;  Strawberries--(cooked) Itching, breathing difficulties, GI symptoms. Mushrooms--GI symptoms, Hives.Spinach (cooked) throat irritation  (Listed on [facility] problem list.)     Fentanyl Swelling     Lips and tongue swelled.  Swelling of lips and tongue     Adhesive Tape      blisters     Amitriptyline Other (See Comments)     Respiratory symptoms - denies allergic reaction     Liquid Adhesive Dermatitis     blisters  Blisters  Silk tape and tegaderm is fine     Oxcarbazepine Other (See Comments)     Throat gets tight and hives. Denies allergic reaction  Throat gets tight and hives-       Prozac [Fluoxetine]      Other reaction(s): *Unknown  uncontrolled muscle spasms     Valproic Acid      Respiratory symptoms ([facility]). Denies allergic reaction     Ziprasidone      Throat gets tight and hives. Denies allergic reaction       Review Of Systems:  Constitutional:    denies fever, weight changes, chills, and night sweats.  Eyes:    denies blurred or double vision  Ears/Nose/Throat:   denies ear pain,  nose problems, difficulty swallowing  Respiratory:   denies shortness of breath, cough   Skin:   denies rash, lesions  Cardiovascular:   denies chest pain, palpitations, edema-some pressure in her chest that is ongoing and constant  Gastrointestinal:   denies abdominal pain, bloating, nausea, early satiety; no change in bowel habits or blood in stool-3 times a day  Genitourinary:   denies difficulty with urination, blood in urine  Musculoskeletal:    denies new muscle pain, bone pain  Neurologic:   denies headaches; occasional lightheadedness  Psychiatric:   denies anxiety, depression  Hematologic/Lymphatic/Immunologic:   denies easy bruising, easy bleeding, lumps or bumps noted  Endocrine:   Denies increased thirst      Physical Exam: No physical exam/telephone.    PSYCHIATRIC: Mentation normal.  Mood appropriate.    Laboratory:  Component      Latest Ref Rng & Units 11/2/2021   WBC      4.0 - 11.0 10e3/uL 6.4   RBC Count      3.80 - 5.20 10e6/uL 4.14   Hemoglobin      11.7 - 15.7 g/dL 12.7   Hematocrit      35.0 - 47.0 % 37.8   MCV      78 - 100 fL 91   MCH      26.5 - 33.0 pg 30.7   MCHC      31.5 - 36.5 g/dL 33.6   RDW      10.0 - 15.0 % 12.3   Platelet Count      150 - 450 10e3/uL 186   % Neutrophils      % 66   % Lymphocytes      % 24   % Monocytes      % 9   % Eosinophils      % 0   % Basophils      % 0   % Immature Granulocytes      % 1   NRBCs per 100 WBC      <1 /100 0   Absolute Neutrophils      1.6 - 8.3 10e3/uL 4.2   Absolute Lymphocytes      0.8 - 5.3 10e3/uL 1.6   Absolute Monocytes      0.0 - 1.3 10e3/uL 0.6   Absolute Eosinophils      0.0 - 0.7 10e3/uL 0.0   Absolute Basophils      0.0 - 0.2 10e3/uL 0.0   Absolute Immature Granulocytes      <=0.0 10e3/uL 0.0   Absolute NRBCs      10e3/uL 0.0   Sodium      133 - 144 mmol/L 140   Potassium      3.4 - 5.3 mmol/L 4.3   Chloride      94 - 109 mmol/L 111 (H)   Carbon Dioxide      20 - 32 mmol/L 26   Anion Gap      3 - 14 mmol/L 3   Urea Nitrogen      7 -  30 mg/dL 13   Creatinine      0.52 - 1.04 mg/dL 0.74   Calcium      8.5 - 10.1 mg/dL 8.8   Glucose      70 - 99 mg/dL 90   Alkaline Phosphatase      40 - 150 U/L 80   AST      0 - 45 U/L 31   ALT      0 - 50 U/L 45   Protein Total      6.8 - 8.8 g/dL 7.1   Albumin      3.4 - 5.0 g/dL 3.7   Bilirubin Total      0.2 - 1.3 mg/dL 0.5   GFR Estimate      >60 mL/min/1.73m2 >90   CEA      0.0 - 2.5 ug/L 2.0     Imaging Studies: None completed for today's visit      ASSESSMENT/PLAN:    #1 colon cancer:   Stage IIIB adenocarcinoma of the ascending colon diagnosed February 2020.  She completed adjuvant chemotherapy with XELOX x 4 cycles on 6/22/2020 and is being followed with surveillance.  Discussed repeat colonoscopy that is overdue due to Anastomosis site not visualized well on previous colonoscopy.  I recommended she call and get this scheduled.  We also discussed the need for genetics consultation and the number will be placed on her AVS.  I will see her back in 3 months with a CBC, CMP, CEA and CT scan of the chest, abdomen and pelvis.    #2  Iron deficiency: Currently on an oral liquid iron supplement per recommendation of her gastric bypass provider.  We will recheck her ferritin/TIBC and ferritin in 3 months.    #3  B12 deficiency: Currently on oral liquid B12 supplement per her gastric bypass providers recommendation.  We will check a RBC folate and B12 when she returns in 3 months.    #4 neuropathy: Grade 1-2.  Improving.  We will continue with current management of Cymbalta 30 mg twice daily and gabapentin 600 mg at at bedtime.    I encouraged patient to call with any questions or concerns.    35 minutes spent in the patient's encounter today with time spent in review of the chart along with in chart preparation.  Time was also spent on the telephone call obtaining a review of systems.  Time was spent in reviewing her lab work with her and making a plan for follow-up.  Time was also spent in ordering future  imaging and labs and in chart documentation.    Izzy Moran, NP  APRN, FNP-BC, AOCNP

## 2021-11-17 NOTE — NURSING NOTE
"Oncology Rooming Note    November 17, 2021 8:25 AM   Constantino Warren is a 51 year old female who presents for:    Chief Complaint   Patient presents with     Oncology Clinic Visit     Follow up History of colon cancer, stage III     Initial Vitals: There were no vitals taken for this visit. Estimated body mass index is 28.76 kg/m  as calculated from the following:    Height as of an earlier encounter on 11/17/21: 1.702 m (5' 7\").    Weight as of 8/12/21: 83.3 kg (183 lb 10.3 oz). There is no height or weight on file to calculate BSA.  No Pain (0) Comment: Data Unavailable   No LMP recorded. Patient has had a hysterectomy.  Allergies reviewed: Yes  Medications reviewed: Yes    Medications: Medication refills not needed today.  Pharmacy name entered into [facility]:    [id number] - [place], [place] - [id number]   [facility] - [place], [place] - [address]   [facility], [place] - [address] - RICHARD Mo LPN            "

## 2021-11-30 ENCOUNTER — INFUSION THERAPY VISIT (OUTPATIENT)
Dept: INFUSION THERAPY | Facility: OTHER | Age: 51
End: 2021-11-30
Attending: INTERNAL MEDICINE
Payer: COMMERCIAL

## 2021-11-30 DIAGNOSIS — C18.2 CANCER OF ASCENDING COLON (H): Primary | ICD-10-CM

## 2021-11-30 DIAGNOSIS — E53.8 VITAMIN B 12 DEFICIENCY: ICD-10-CM

## 2021-11-30 PROCEDURE — 96523 IRRIG DRUG DELIVERY DEVICE: CPT

## 2021-11-30 PROCEDURE — 96372 THER/PROPH/DIAG INJ SC/IM: CPT | Performed by: NURSE PRACTITIONER

## 2021-11-30 PROCEDURE — 250N000011 HC RX IP 250 OP 636: Performed by: NURSE PRACTITIONER

## 2021-11-30 RX ORDER — CYANOCOBALAMIN 1000 UG/ML
1000 INJECTION, SOLUTION INTRAMUSCULAR; SUBCUTANEOUS
Status: DISCONTINUED | OUTPATIENT
Start: 2021-11-30 | End: 2021-11-30 | Stop reason: HOSPADM

## 2021-11-30 RX ORDER — HEPARIN SODIUM (PORCINE) LOCK FLUSH IV SOLN 100 UNIT/ML 100 UNIT/ML
5 SOLUTION INTRAVENOUS
Status: DISCONTINUED | OUTPATIENT
Start: 2021-11-30 | End: 2021-11-30 | Stop reason: HOSPADM

## 2021-11-30 RX ORDER — CYANOCOBALAMIN 1000 UG/ML
1000 INJECTION, SOLUTION INTRAMUSCULAR; SUBCUTANEOUS
Status: CANCELLED
Start: 2021-11-30

## 2021-11-30 RX ORDER — HEPARIN SODIUM (PORCINE) LOCK FLUSH IV SOLN 100 UNIT/ML 100 UNIT/ML
5 SOLUTION INTRAVENOUS
Status: CANCELLED | OUTPATIENT
Start: 2021-11-30

## 2021-11-30 RX ADMIN — CYANOCOBALAMIN 1000 MCG: 1000 INJECTION, SOLUTION INTRAMUSCULAR at 11:18

## 2021-11-30 RX ADMIN — Medication 5 ML: at 11:25

## 2021-12-15 ENCOUNTER — APPOINTMENT (OUTPATIENT)
Dept: OCCUPATIONAL MEDICINE | Facility: OTHER | Age: 51
End: 2021-12-15

## 2021-12-15 ENCOUNTER — INFUSION THERAPY VISIT (OUTPATIENT)
Dept: INFUSION THERAPY | Facility: OTHER | Age: 51
End: 2021-12-15
Attending: NURSE PRACTITIONER
Payer: COMMERCIAL

## 2021-12-15 DIAGNOSIS — C18.2 CANCER OF ASCENDING COLON (H): Primary | ICD-10-CM

## 2021-12-15 DIAGNOSIS — E53.8 VITAMIN B 12 DEFICIENCY: ICD-10-CM

## 2021-12-15 PROCEDURE — 250N000011 HC RX IP 250 OP 636: Performed by: NURSE PRACTITIONER

## 2021-12-15 PROCEDURE — 96372 THER/PROPH/DIAG INJ SC/IM: CPT | Performed by: NURSE PRACTITIONER

## 2021-12-15 RX ORDER — CYANOCOBALAMIN 1000 UG/ML
1000 INJECTION, SOLUTION INTRAMUSCULAR; SUBCUTANEOUS
Status: DISCONTINUED | OUTPATIENT
Start: 2021-12-15 | End: 2021-12-15 | Stop reason: HOSPADM

## 2021-12-15 RX ORDER — CYANOCOBALAMIN 1000 UG/ML
1000 INJECTION, SOLUTION INTRAMUSCULAR; SUBCUTANEOUS
Status: CANCELLED
Start: 2021-12-15

## 2021-12-15 RX ORDER — HEPARIN SODIUM (PORCINE) LOCK FLUSH IV SOLN 100 UNIT/ML 100 UNIT/ML
5 SOLUTION INTRAVENOUS
Status: CANCELLED | OUTPATIENT
Start: 2021-12-15

## 2021-12-15 RX ADMIN — CYANOCOBALAMIN 1000 MCG: 1000 INJECTION, SOLUTION INTRAMUSCULAR at 10:58

## 2021-12-15 NOTE — PROGRESS NOTES
Patient is 51 year old, here today for injection of B12 per order of  FRANK Moran NP.    Patient denies questions nor concerns regarding medication, administration site, side effects, nor aftercare.  Patient identified with two identifiers, order verified, and verbal consent for today's injection obtained from patient.     Patient education provided on s/s of injection site infection, and/or medication specific side effects, and when to call a provider.  Patient instructed to report any adverse effects.     B12 administered per protocol in right deltoid at 90 degree angle.  Site covered with sterile bandage.  Patient tolerated injection well, no verbal nor non-verbal signs of discomfort noted.  No adverse effects noted at this time.     Patient instructed to call with further questions or concerns.  Patient states understanding and is in agreement with this plan. Patient discharged.

## 2021-12-15 NOTE — PATIENT INSTRUCTIONS

## 2021-12-17 ENCOUNTER — APPOINTMENT (OUTPATIENT)
Dept: OCCUPATIONAL MEDICINE | Facility: OTHER | Age: 51
End: 2021-12-17

## 2021-12-21 ENCOUNTER — APPOINTMENT (OUTPATIENT)
Dept: ULTRASOUND IMAGING | Facility: HOSPITAL | Age: 51
End: 2021-12-21
Attending: EMERGENCY MEDICINE
Payer: COMMERCIAL

## 2021-12-21 ENCOUNTER — APPOINTMENT (OUTPATIENT)
Dept: GENERAL RADIOLOGY | Facility: HOSPITAL | Age: 51
End: 2021-12-21
Attending: NURSE PRACTITIONER
Payer: COMMERCIAL

## 2021-12-21 ENCOUNTER — HOSPITAL ENCOUNTER (OUTPATIENT)
Dept: GENERAL RADIOLOGY | Facility: HOSPITAL | Age: 51
End: 2021-12-21
Attending: FAMILY MEDICINE
Payer: COMMERCIAL

## 2021-12-21 ENCOUNTER — APPOINTMENT (OUTPATIENT)
Dept: CT IMAGING | Facility: HOSPITAL | Age: 51
End: 2021-12-21
Attending: NURSE PRACTITIONER
Payer: COMMERCIAL

## 2021-12-21 ENCOUNTER — TRANSFERRED RECORDS (OUTPATIENT)
Dept: HEALTH INFORMATION MANAGEMENT | Facility: CLINIC | Age: 51
End: 2021-12-21
Payer: COMMERCIAL

## 2021-12-21 ENCOUNTER — HOSPITAL ENCOUNTER (EMERGENCY)
Facility: HOSPITAL | Age: 51
Discharge: OTHER INSTITUTION | End: 2021-12-21
Attending: STUDENT IN AN ORGANIZED HEALTH CARE EDUCATION/TRAINING PROGRAM
Payer: COMMERCIAL

## 2021-12-21 VITALS
DIASTOLIC BLOOD PRESSURE: 79 MMHG | BODY MASS INDEX: 28.98 KG/M2 | WEIGHT: 185 LBS | HEART RATE: 51 BPM | TEMPERATURE: 97.7 F | OXYGEN SATURATION: 98 % | SYSTOLIC BLOOD PRESSURE: 116 MMHG | RESPIRATION RATE: 17 BRPM

## 2021-12-21 DIAGNOSIS — I21.4 NSTEMI (NON-ST ELEVATED MYOCARDIAL INFARCTION) (H): ICD-10-CM

## 2021-12-21 DIAGNOSIS — Z11.1 SCREENING EXAMINATION FOR PULMONARY TUBERCULOSIS: ICD-10-CM

## 2021-12-21 LAB
ALBUMIN SERPL-MCNC: 3.7 G/DL (ref 3.4–5)
ALP SERPL-CCNC: 90 U/L (ref 40–150)
ALT SERPL W P-5'-P-CCNC: 46 U/L (ref 0–50)
ANION GAP SERPL CALCULATED.3IONS-SCNC: 8 MMOL/L (ref 3–14)
AST SERPL W P-5'-P-CCNC: 56 U/L (ref 0–45)
BASOPHILS # BLD AUTO: 0 10E3/UL (ref 0–0.2)
BASOPHILS # BLD AUTO: 0 10E3/UL (ref 0–0.2)
BASOPHILS NFR BLD AUTO: 0 %
BASOPHILS NFR BLD AUTO: 0 %
BILIRUB SERPL-MCNC: 0.4 MG/DL (ref 0.2–1.3)
BUN SERPL-MCNC: 13 MG/DL (ref 7–30)
CALCIUM SERPL-MCNC: 9 MG/DL (ref 8.5–10.1)
CHLORIDE BLD-SCNC: 112 MMOL/L (ref 94–109)
CHOLEST SERPL-MCNC: 150 MG/DL
CO2 SERPL-SCNC: 19 MMOL/L (ref 20–32)
CREAT SERPL-MCNC: 0.63 MG/DL (ref 0.52–1.04)
EOSINOPHIL # BLD AUTO: 0 10E3/UL (ref 0–0.7)
EOSINOPHIL # BLD AUTO: 0 10E3/UL (ref 0–0.7)
EOSINOPHIL NFR BLD AUTO: 0 %
EOSINOPHIL NFR BLD AUTO: 0 %
ERYTHROCYTE [DISTWIDTH] IN BLOOD BY AUTOMATED COUNT: 12 % (ref 10–15)
ERYTHROCYTE [DISTWIDTH] IN BLOOD BY AUTOMATED COUNT: 12.1 % (ref 10–15)
GFR SERPL CREATININE-BSD FRML MDRD: >90 ML/MIN/1.73M2
GLUCOSE BLD-MCNC: 154 MG/DL (ref 70–99)
HCT VFR BLD AUTO: 38.1 % (ref 35–47)
HCT VFR BLD AUTO: 41.1 % (ref 35–47)
HDLC SERPL-MCNC: 46 MG/DL
HGB BLD-MCNC: 12.7 G/DL (ref 11.7–15.7)
HGB BLD-MCNC: 13.7 G/DL (ref 11.7–15.7)
HOLD SPECIMEN: NORMAL
IMM GRANULOCYTES # BLD: 0 10E3/UL
IMM GRANULOCYTES # BLD: 0 10E3/UL
IMM GRANULOCYTES NFR BLD: 0 %
IMM GRANULOCYTES NFR BLD: 1 %
LDLC SERPL CALC-MCNC: 70 MG/DL
LIPASE SERPL-CCNC: 110 U/L (ref 73–393)
LYMPHOCYTES # BLD AUTO: 1.7 10E3/UL (ref 0.8–5.3)
LYMPHOCYTES # BLD AUTO: 2.1 10E3/UL (ref 0.8–5.3)
LYMPHOCYTES NFR BLD AUTO: 21 %
LYMPHOCYTES NFR BLD AUTO: 29 %
MCH RBC QN AUTO: 30 PG (ref 26.5–33)
MCH RBC QN AUTO: 30.2 PG (ref 26.5–33)
MCHC RBC AUTO-ENTMCNC: 33.3 G/DL (ref 31.5–36.5)
MCHC RBC AUTO-ENTMCNC: 33.3 G/DL (ref 31.5–36.5)
MCV RBC AUTO: 90 FL (ref 78–100)
MCV RBC AUTO: 91 FL (ref 78–100)
MONOCYTES # BLD AUTO: 0.5 10E3/UL (ref 0–1.3)
MONOCYTES # BLD AUTO: 0.5 10E3/UL (ref 0–1.3)
MONOCYTES NFR BLD AUTO: 6 %
MONOCYTES NFR BLD AUTO: 7 %
NEUTROPHILS # BLD AUTO: 4.5 10E3/UL (ref 1.6–8.3)
NEUTROPHILS # BLD AUTO: 6 10E3/UL (ref 1.6–8.3)
NEUTROPHILS NFR BLD AUTO: 64 %
NEUTROPHILS NFR BLD AUTO: 72 %
NONHDLC SERPL-MCNC: 104 MG/DL
NRBC # BLD AUTO: 0 10E3/UL
NRBC # BLD AUTO: 0 10E3/UL
NRBC BLD AUTO-RTO: 0 /100
NRBC BLD AUTO-RTO: 0 /100
PLATELET # BLD AUTO: 172 10E3/UL (ref 150–450)
PLATELET # BLD AUTO: 179 10E3/UL (ref 150–450)
POTASSIUM BLD-SCNC: 5.2 MMOL/L (ref 3.4–5.3)
PROT SERPL-MCNC: 7.6 G/DL (ref 6.8–8.8)
RBC # BLD AUTO: 4.21 10E6/UL (ref 3.8–5.2)
RBC # BLD AUTO: 4.57 10E6/UL (ref 3.8–5.2)
SODIUM SERPL-SCNC: 139 MMOL/L (ref 133–144)
T4 FREE SERPL-MCNC: 0.78 NG/DL (ref 0.76–1.46)
TRIGL SERPL-MCNC: 170 MG/DL
TROPONIN I SERPL HS-MCNC: 189 NG/L
TROPONIN I SERPL HS-MCNC: 214 NG/L
TROPONIN I SERPL HS-MCNC: 295 NG/L
TROPONIN I SERPL HS-MCNC: 329 NG/L
TSH SERPL DL<=0.005 MIU/L-ACNC: 6.5 MU/L (ref 0.4–4)
UFH PPP CHRO-ACNC: 0.33 IU/ML
WBC # BLD AUTO: 7.1 10E3/UL (ref 4–11)
WBC # BLD AUTO: 8.2 10E3/UL (ref 4–11)

## 2021-12-21 PROCEDURE — 84443 ASSAY THYROID STIM HORMONE: CPT | Performed by: INTERNAL MEDICINE

## 2021-12-21 PROCEDURE — 93308 TTE F-UP OR LMTD: CPT | Mod: TC

## 2021-12-21 PROCEDURE — 999N000029 XR CHEST 1 VIEW, JOB CARE

## 2021-12-21 PROCEDURE — 83690 ASSAY OF LIPASE: CPT | Performed by: NURSE PRACTITIONER

## 2021-12-21 PROCEDURE — 85025 COMPLETE CBC W/AUTO DIFF WBC: CPT | Performed by: NURSE PRACTITIONER

## 2021-12-21 PROCEDURE — 70450 CT HEAD/BRAIN W/O DYE: CPT

## 2021-12-21 PROCEDURE — 80053 COMPREHEN METABOLIC PANEL: CPT | Performed by: NURSE PRACTITIONER

## 2021-12-21 PROCEDURE — 71275 CT ANGIOGRAPHY CHEST: CPT

## 2021-12-21 PROCEDURE — 93308 TTE F-UP OR LMTD: CPT | Mod: 26 | Performed by: STUDENT IN AN ORGANIZED HEALTH CARE EDUCATION/TRAINING PROGRAM

## 2021-12-21 PROCEDURE — 84484 ASSAY OF TROPONIN QUANT: CPT | Performed by: INTERNAL MEDICINE

## 2021-12-21 PROCEDURE — 84439 ASSAY OF FREE THYROXINE: CPT | Performed by: INTERNAL MEDICINE

## 2021-12-21 PROCEDURE — 250N000013 HC RX MED GY IP 250 OP 250 PS 637: Performed by: NURSE PRACTITIONER

## 2021-12-21 PROCEDURE — 96374 THER/PROPH/DIAG INJ IV PUSH: CPT | Mod: XU

## 2021-12-21 PROCEDURE — 84484 ASSAY OF TROPONIN QUANT: CPT | Performed by: NURSE PRACTITIONER

## 2021-12-21 PROCEDURE — 250N000013 HC RX MED GY IP 250 OP 250 PS 637: Performed by: INTERNAL MEDICINE

## 2021-12-21 PROCEDURE — 99245 OFF/OP CONSLTJ NEW/EST HI 55: CPT | Performed by: INTERNAL MEDICINE

## 2021-12-21 PROCEDURE — 36415 COLL VENOUS BLD VENIPUNCTURE: CPT | Performed by: NURSE PRACTITIONER

## 2021-12-21 PROCEDURE — 250N000009 HC RX 250: Performed by: INTERNAL MEDICINE

## 2021-12-21 PROCEDURE — 85520 HEPARIN ASSAY: CPT | Performed by: NURSE PRACTITIONER

## 2021-12-21 PROCEDURE — 71045 X-RAY EXAM CHEST 1 VIEW: CPT

## 2021-12-21 PROCEDURE — 96375 TX/PRO/DX INJ NEW DRUG ADDON: CPT | Mod: XU

## 2021-12-21 PROCEDURE — 250N000009 HC RX 250: Performed by: EMERGENCY MEDICINE

## 2021-12-21 PROCEDURE — 250N000011 HC RX IP 250 OP 636: Performed by: RADIOLOGY

## 2021-12-21 PROCEDURE — 36415 COLL VENOUS BLD VENIPUNCTURE: CPT | Performed by: INTERNAL MEDICINE

## 2021-12-21 PROCEDURE — 93010 ELECTROCARDIOGRAM REPORT: CPT | Mod: 77 | Performed by: INTERNAL MEDICINE

## 2021-12-21 PROCEDURE — 80061 LIPID PANEL: CPT | Performed by: INTERNAL MEDICINE

## 2021-12-21 PROCEDURE — 36415 COLL VENOUS BLD VENIPUNCTURE: CPT | Performed by: STUDENT IN AN ORGANIZED HEALTH CARE EDUCATION/TRAINING PROGRAM

## 2021-12-21 PROCEDURE — 93005 ELECTROCARDIOGRAM TRACING: CPT

## 2021-12-21 PROCEDURE — 250N000011 HC RX IP 250 OP 636: Performed by: NURSE PRACTITIONER

## 2021-12-21 PROCEDURE — 258N000003 HC RX IP 258 OP 636: Performed by: EMERGENCY MEDICINE

## 2021-12-21 PROCEDURE — 258N000003 HC RX IP 258 OP 636: Performed by: NURSE PRACTITIONER

## 2021-12-21 PROCEDURE — 99285 EMERGENCY DEPT VISIT HI MDM: CPT | Mod: 25

## 2021-12-21 PROCEDURE — 99291 CRITICAL CARE FIRST HOUR: CPT | Mod: 25 | Performed by: STUDENT IN AN ORGANIZED HEALTH CARE EDUCATION/TRAINING PROGRAM

## 2021-12-21 RX ORDER — GABAPENTIN 300 MG/1
600 CAPSULE ORAL AT BEDTIME
Status: DISCONTINUED | OUTPATIENT
Start: 2021-12-21 | End: 2021-12-22 | Stop reason: HOSPADM

## 2021-12-21 RX ORDER — SODIUM CHLORIDE 9 MG/ML
INJECTION, SOLUTION INTRAVENOUS CONTINUOUS
Status: DISCONTINUED | OUTPATIENT
Start: 2021-12-21 | End: 2021-12-22 | Stop reason: HOSPADM

## 2021-12-21 RX ORDER — NITROGLYCERIN 0.4 MG/1
0.4 TABLET SUBLINGUAL EVERY 5 MIN PRN
Status: COMPLETED | OUTPATIENT
Start: 2021-12-21 | End: 2021-12-21

## 2021-12-21 RX ORDER — NITROGLYCERIN 0.4 MG/1
0.4 TABLET SUBLINGUAL EVERY 5 MIN PRN
Status: DISCONTINUED | OUTPATIENT
Start: 2021-12-21 | End: 2021-12-22 | Stop reason: HOSPADM

## 2021-12-21 RX ORDER — METOPROLOL TARTRATE 1 MG/ML
5 INJECTION, SOLUTION INTRAVENOUS ONCE
Status: COMPLETED | OUTPATIENT
Start: 2021-12-21 | End: 2021-12-21

## 2021-12-21 RX ORDER — IOPAMIDOL 755 MG/ML
64 INJECTION, SOLUTION INTRAVASCULAR ONCE
Status: COMPLETED | OUTPATIENT
Start: 2021-12-21 | End: 2021-12-21

## 2021-12-21 RX ORDER — ONDANSETRON 2 MG/ML
4 INJECTION INTRAMUSCULAR; INTRAVENOUS
Status: COMPLETED | OUTPATIENT
Start: 2021-12-21 | End: 2021-12-21

## 2021-12-21 RX ORDER — TRAZODONE HYDROCHLORIDE 50 MG/1
50 TABLET, FILM COATED ORAL AT BEDTIME
Status: DISCONTINUED | OUTPATIENT
Start: 2021-12-21 | End: 2021-12-22 | Stop reason: HOSPADM

## 2021-12-21 RX ORDER — METOPROLOL TARTRATE 50 MG
50 TABLET ORAL 2 TIMES DAILY
Status: DISCONTINUED | OUTPATIENT
Start: 2021-12-21 | End: 2021-12-22 | Stop reason: HOSPADM

## 2021-12-21 RX ORDER — HEPARIN SODIUM 10000 [USP'U]/100ML
0-5000 INJECTION, SOLUTION INTRAVENOUS CONTINUOUS
Status: DISCONTINUED | OUTPATIENT
Start: 2021-12-21 | End: 2021-12-22 | Stop reason: HOSPADM

## 2021-12-21 RX ORDER — ONDANSETRON 4 MG/1
4 TABLET, ORALLY DISINTEGRATING ORAL EVERY 8 HOURS PRN
Status: DISCONTINUED | OUTPATIENT
Start: 2021-12-21 | End: 2021-12-22 | Stop reason: HOSPADM

## 2021-12-21 RX ORDER — ACETAMINOPHEN 325 MG/1
650 TABLET ORAL EVERY 4 HOURS PRN
Status: DISCONTINUED | OUTPATIENT
Start: 2021-12-21 | End: 2021-12-22 | Stop reason: HOSPADM

## 2021-12-21 RX ORDER — DULOXETIN HYDROCHLORIDE 30 MG/1
30 CAPSULE, DELAYED RELEASE ORAL 2 TIMES DAILY
Status: DISCONTINUED | OUTPATIENT
Start: 2021-12-21 | End: 2021-12-22 | Stop reason: HOSPADM

## 2021-12-21 RX ORDER — ASPIRIN 81 MG/1
324 TABLET, CHEWABLE ORAL ONCE
Status: COMPLETED | OUTPATIENT
Start: 2021-12-21 | End: 2021-12-21

## 2021-12-21 RX ORDER — NOREPINEPHRINE BITARTRATE 0.02 MG/ML
.01-.6 INJECTION, SOLUTION INTRAVENOUS CONTINUOUS
Status: DISCONTINUED | OUTPATIENT
Start: 2021-12-21 | End: 2021-12-22 | Stop reason: HOSPADM

## 2021-12-21 RX ORDER — ASPIRIN 81 MG/1
324 TABLET, CHEWABLE ORAL ONCE
Status: DISCONTINUED | OUTPATIENT
Start: 2021-12-21 | End: 2021-12-22 | Stop reason: HOSPADM

## 2021-12-21 RX ADMIN — NITROGLYCERIN 0.4 MG: 0.4 TABLET SUBLINGUAL at 14:34

## 2021-12-21 RX ADMIN — METOPROLOL TARTRATE 50 MG: 50 TABLET, FILM COATED ORAL at 21:26

## 2021-12-21 RX ADMIN — GABAPENTIN 600 MG: 300 CAPSULE ORAL at 21:26

## 2021-12-21 RX ADMIN — SODIUM CHLORIDE: 9 INJECTION, SOLUTION INTRAVENOUS at 22:11

## 2021-12-21 RX ADMIN — NITROGLYCERIN 0.4 MG: 0.4 TABLET SUBLINGUAL at 12:58

## 2021-12-21 RX ADMIN — METOPROLOL TARTRATE 5 MG: 1 INJECTION, SOLUTION INTRAVENOUS at 18:57

## 2021-12-21 RX ADMIN — IOPAMIDOL 64 ML: 755 INJECTION, SOLUTION INTRAVENOUS at 15:17

## 2021-12-21 RX ADMIN — METOPROLOL TARTRATE 50 MG: 50 TABLET, FILM COATED ORAL at 19:03

## 2021-12-21 RX ADMIN — SODIUM CHLORIDE: 9 INJECTION, SOLUTION INTRAVENOUS at 13:11

## 2021-12-21 RX ADMIN — HEPARIN SODIUM 1000 UNITS/HR: 10000 INJECTION, SOLUTION INTRAVENOUS at 16:42

## 2021-12-21 RX ADMIN — SODIUM CHLORIDE 1000 ML: 9 INJECTION, SOLUTION INTRAVENOUS at 22:29

## 2021-12-21 RX ADMIN — NITROGLYCERIN 0.4 MG: 0.4 TABLET SUBLINGUAL at 14:28

## 2021-12-21 RX ADMIN — TRAZODONE HYDROCHLORIDE 50 MG: 50 TABLET ORAL at 21:26

## 2021-12-21 RX ADMIN — TIZANIDINE 4 MG: 4 TABLET ORAL at 21:26

## 2021-12-21 RX ADMIN — ONDANSETRON 4 MG: 2 INJECTION INTRAMUSCULAR; INTRAVENOUS at 13:13

## 2021-12-21 RX ADMIN — ASPIRIN 81 MG CHEWABLE TABLET 324 MG: 81 TABLET CHEWABLE at 12:57

## 2021-12-21 RX ADMIN — ACETAMINOPHEN 650 MG: 325 TABLET, FILM COATED ORAL at 21:26

## 2021-12-21 RX ADMIN — Medication 0.03 MCG/KG/MIN: at 22:51

## 2021-12-21 RX ADMIN — NITROGLYCERIN 0.4 MG: 0.4 TABLET SUBLINGUAL at 21:10

## 2021-12-21 RX ADMIN — DULOXETINE 30 MG: 30 CAPSULE, DELAYED RELEASE ORAL at 21:26

## 2021-12-21 RX ADMIN — NITROGLYCERIN 0.4 MG: 0.4 TABLET SUBLINGUAL at 21:26

## 2021-12-21 ASSESSMENT — ENCOUNTER SYMPTOMS
NAUSEA: 1
CONSTITUTIONAL NEGATIVE: 1
PSYCHIATRIC NEGATIVE: 1
RESPIRATORY NEGATIVE: 1
HEADACHES: 1
ENDOCRINE NEGATIVE: 1
ALLERGIC/IMMUNOLOGIC NEGATIVE: 1
HEMATOLOGIC/LYMPHATIC NEGATIVE: 1
MUSCULOSKELETAL NEGATIVE: 1
EYES NEGATIVE: 1

## 2021-12-21 NOTE — DISCHARGE INSTRUCTIONS

## 2021-12-21 NOTE — ED PROVIDER NOTES
History     Chief Complaint   Patient presents with     Hypertension     Chest Pain     HPI  Constantino Warren is a 51 year old female with a history of iron deficiency, colon cancer stage III, rotator cuff tear, arthritis, uterine cancer, chest pain, chronic pain disorder, long-term use of opiate analgesics, who presents with severe pounding headache, stabbing chest pain, and nausea.  Her symptoms started just prior to arrival.  She is currently training for a new position and has been on the computer all morning long, developed severe headache thinking it was from being on the computer.  She went to have some lunch, developed sharp shooting chest pain in the epigastric area.  Denies pain radiates to left chest area, left arm, neck, or jaw.  Did have an episode of nausea without vomiting.  Had someone check her blood pressure and she was 200/120.  She came to the emergency room.  She does have known hypertension and was originally started on lisinopril that was discontinued and her PCP switched her over to verapamil for her chronic migraine headaches and patient states PCP stated this will also help her with her hypertension.  She recently was taken off of verapamil, she has not sure as to why but was not restarted on anything for blood pressure.    Allergies:  Allergies   Allergen Reactions     Aspartame      Other reaction(s): Other - Describe In Comment Field, Seizures  convulsions     Bee Venom Anaphylaxis     Bupropion Anaphylaxis     Throat gets tight and hives. Denies allergic reaction     Food Anaphylaxis     Peppers-anaphylaxis if eaten, if touched hands swell and reddened     Food Allergy Formula Anaphylaxis     Peppers, patient reports green peppers close off her throat     Ibuprofen Anaphylaxis     No Clinical Screening - See Comments Anaphylaxis     Peppers, patient reports green peppers close off her throat     Piper Anaphylaxis     Peppers--green, black, red, chili, etc. Derm symptoms,  hives;  Strawberries--(cooked) Itching, breathing difficulties, GI symptoms. Mushrooms--GI symptoms, Hives.Spinach (cooked) throat irritation  (Listed on [facility] problem list.)     Fentanyl Swelling     Lips and tongue swelled.  Swelling of lips and tongue     Adhesive Tape      blisters     Amitriptyline Other (See Comments)     Respiratory symptoms - denies allergic reaction     Liquid Adhesive Dermatitis     blisters  Blisters  Silk tape and tegaderm is fine     Oxcarbazepine Other (See Comments)     Throat gets tight and hives. Denies allergic reaction  Throat gets tight and hives-       Prozac [Fluoxetine]      Other reaction(s): *Unknown  uncontrolled muscle spasms     Valproic Acid      Respiratory symptoms ([facility] Denies allergic reaction     Ziprasidone      Throat gets tight and hives. Denies allergic reaction       Problem List:    Patient Active Problem List    Diagnosis Date Noted     Iron deficiency 04/29/2021     Priority: Medium     History of colon cancer, stage III 10/20/2020     Priority: Medium     Added automatically from request for surgery [id number]       Rotator cuff tear 10/13/2020     Priority: Medium     Added automatically from request for surgery [id number]       Arthritis 10/13/2020     Priority: Medium     Added automatically from request for surgery [id number]       History of uterine cancer 05/27/2020     Priority: Medium     Palliative care patient 05/27/2020     Priority: Medium     Iron deficiency anemia, unspecified iron deficiency anemia type 05/08/2020     Priority: Medium     S/P partial resection of colon 02/24/2020     Priority: Medium     Prediabetes      Priority: Medium     Colon cancer (H) 02/19/2020     Priority: Medium     Added automatically from request for surgery [id number]       Cancer of ascending colon (H) 02/19/2020     Priority: Medium     Added automatically from request for surgery [id number]       Special screening for malignant  neoplasms, colon 01/14/2020     Priority: Medium     Added automatically from request for surgery [id number]       Anastomotic ulcer S/P gastric bypass 07/21/2018     Priority: Medium     Anemia 07/21/2018     Priority: Medium     Chest pain 07/20/2018     Priority: Medium     GI bleed 07/20/2018     Priority: Medium     Abdominal pain, epigastric 07/20/2018     Priority: Medium     Hypochromic microcytic anemia 07/20/2018     Priority: Medium     Chronic migraine 01/18/2018     Priority: Medium     Replacing diagnoses that were inactivated after the 10/1/2021 regulatory import.       Chronic pain disorder 01/18/2018     Priority: Medium     Long term (current) use of opiate analgesic 01/12/2018     Priority: Medium     Overview:   Pain Diagnosis failed back syndrome, annular tear L4-5, history lumbar discectomy, s/p lumbar fusion.    Should be seen in the clinic every twelve weeks. Currently on a taper: yes, trying to wean away from the daytime hydrocodone-acetaminophen..    Plan for flares of chronic pain stretches, occasional use of more hydrocodone-acetaminophen for flare.    ED and UC: Opioid medication will not be given in the ER or Urgent Care unless there is a medical emergency unrelated to chronic pain.  Limit to 3 day supply    Refills: Refills will only be given at a scheduled visit.    [facility] Agreement for Opioid Treatment.   Opioid Agreement Date: 5/21/12  Last UDT (Urine Drug Test) on date:    Pain Dx: chronic low back pain  Last Pain Visit: 6-24-11  [facility]acy: [facility]  Comments:      7/23/14:  Progressing well.  Over the last month, has decreased hydrocodone-acetaminophen from 6/day to 3/day.  Awaiting appointment with [facility].       Adjustment disorder with mixed anxiety and depressed mood 12/15/2017     Priority: Medium     Other insomnia 12/15/2017     Priority: Medium     Syncope and collapse 09/26/2015     Priority: Medium     Hypertensive urgency 09/26/2015     Priority: Medium      Concussion syndrome 09/26/2015     Priority: Medium     Hydronephrosis, right 04/20/2015     Priority: Medium     Pyelonephritis 04/20/2015     Priority: Medium     Urolithiasis 04/20/2015     Priority: Medium     Annular tear of lumbar disc 12/16/2014     Priority: Medium     Overview:   MRI 2/5/13:  Previous right L4-5 hemilaminectomy and partial facetectomy with mild to moderate right L4-5 facet arthropathy and broad-based bulge of disk and end plate osteophyte on the right with partial high signal intensity zone annular tear of the undersurface of the laterally protruding disk/annulus.       Failed back syndrome of lumbar spine 12/16/2014     Priority: Medium     Overview:   Three back surgeries, including an L3-sacrum AP fusion done at [facility] in 2013.  Has been doing very well in terms of reduction of pain, with minimal requirement of opiate pain medication.       Hx of lumbar discectomy 12/16/2014     Priority: Medium     Overview:   10/2006:   L4-5 Laminectomy and diskectomy L5-S1.   12/12/11:  L5S1 microdiscectomy ([place])       Vitamin B 12 deficiency 06/13/2011     Priority: Medium     Overview:   IMO Update 10/11       Gastric bypass status for obesity 01/04/2011     Priority: Medium     Overview:   12/14/10:  laparoscopic Prasanna-en-Y gastric bypass Dr. Hassan  IMO Update 10/11       Hyperlipidemia 09/23/2008     Priority: Medium     Overview:   IMO Update 10/11       Hypothyroidism 09/23/2008     Priority: Medium     Overview:   IMO Update 10/11  Overview:   IMO Update 10/11          Past Medical History:    Past Medical History:   Diagnosis Date     Arthritis 10/13/2020     Cancer (H)      Cancer (H) 2004     Chronic pain      De Quervain's disease (tenosynovitis) 12/2021     Depressive disorder      Diabetes (H)      History of blood transfusion      Hypertension      Prediabetes      Syncope and collapse      Thyroid disease      Trigger finger 12/2021       Past Surgical History:    Past Surgical  History:   Procedure Laterality Date     ARTHROSCOPY SHOULDER Left 11/6/2020    Procedure: LEFT SHOULDER ARTHROSCOPY WITH ROTATOR CUFF REPAIR AND ACROMIUM CLAVICULAR JOINT RESECTION;  Surgeon: Tanner Mujica DO;  Location: [facility]     BACK SURGERY      2008; 2011     COLONOSCOPY N/A 2/13/2020    Procedure: COLONOSCOPY WITH BIOPSY AND TATOOING OF ASCENDING COLON,INCOMEPLETE EXAM;  Surgeon: Kang Cleveland MD;  Location: [facility] OR     COLONOSCOPY N/A 10/26/2020    Procedure: incomplete colonoscopy ;  Surgeon: Kang Cleveland MD;  Location: [facility]     ESOPHAGOSCOPY, GASTROSCOPY, DUODENOSCOPY (EGD), COMBINED N/A 7/21/2018    Procedure: COMBINED ESOPHAGOSCOPY, GASTROSCOPY, DUODENOSCOPY (EGD);  UPPER ENDOSCOPY WITH BIOPSIES;  Surgeon: Roger Vazquez MD;  Location: [facility]     ESOPHAGOSCOPY, GASTROSCOPY, DUODENOSCOPY (EGD), COMBINED N/A 11/21/2019    Procedure: UPPER ENDOSCOPY with biopsy;  Surgeon: Kang Cleveland MD;  Location: [facility]     HEAD & NECK SURGERY  2008    c4-c5 fusion     HYSTERECTOMY, [facility]  04/2004    uterine cancer; [facility]     INSERT PORT VASCULAR ACCESS N/A 2/28/2020    Procedure: INSERTION, VASCULAR ACCESS PORT LEFT;  Surgeon: Kang Cleveland MD;  Location: [facility] OR     LAPAROSCOPIC ASSISTED COLECTOMY Right 2/24/2020    Procedure: RIGHT KEVIN COLECTOMY, LYSIS OF ADHESIONS;  Surgeon: Kang Cleveland MD;  Location: [facility] OR     LAPAROSCOPIC BYPASS GASTRIC  12/2010       Family History:    Family History   Problem Relation Age of Onset     Diabetes Mother      Hypertension Mother      Coronary Artery Disease Mother      Myocardial Infarction Mother      Hypertension Father      Diabetes Maternal Grandmother      Hypertension Maternal Grandfather      Coronary Artery Disease Maternal Grandfather      Cancer Paternal Grandfather         unsure of type of cancer     Bladder Cancer Paternal Grandfather      Diabetes Maternal Aunt      Cerebrovascular Disease Maternal Aunt       Diabetes Maternal Aunt      Cerebrovascular Disease Maternal Aunt      Diabetes Maternal Aunt      Cerebrovascular Disease Maternal Aunt      Hyperlipidemia No family hx of      Breast Cancer No family hx of      Colon Cancer No family hx of      Prostate Cancer No family hx of      Thyroid Disease No family hx of      Anesthesia Reaction No family hx of      Asthma No family hx of      Genetic Disorder No family hx of        Social History:  Marital Status:   [5]  Social History     Tobacco Use     Smoking status: Never Smoker     Smokeless tobacco: Never Used   Substance Use Topics     Alcohol use: No     Comment: sober for 18 years     Drug use: No        Medications:    acetaminophen (TYLENOL) 500 MG tablet  Cyanocobalamin 1000 MCG/ML KIT  diazepam (VALIUM) 5 MG tablet  DULoxetine (CYMBALTA) 30 MG capsule  EPINEPHrine (ANY BX GENERIC EQUIV) 0.3 MG/0.3ML injection 2-pack  gabapentin (NEURONTIN) 300 MG capsule  levothyroxine (SYNTHROID/LEVOTHROID) 200 MCG tablet  levothyroxine (SYNTHROID/LEVOTHROID) 25 MCG tablet  multivitamin, therapeutic with minerals (MULTI-VITAMIN) TABS  ondansetron (ZOFRAN-ODT) 4 MG ODT tab  tiZANidine (ZANAFLEX) 4 MG tablet  atorvastatin (LIPITOR) 40 MG tablet  EQ ASPIRIN ADULT LOW DOSE 81 MG EC tablet  lisinopril (ZESTRIL) 20 MG tablet  omeprazole (PRILOSEC) 20 MG DR capsule  traZODone (DESYREL) 50 MG tablet          Review of Systems   Constitutional: Negative.    HENT: Negative.    Eyes: Negative.    Respiratory: Negative.    Cardiovascular: Positive for chest pain.   Gastrointestinal: Positive for nausea.   Endocrine: Negative.    Genitourinary: Negative.    Musculoskeletal: Negative.    Skin: Negative.    Allergic/Immunologic: Negative.    Neurological: Positive for headaches.   Hematological: Negative.    Psychiatric/Behavioral: Negative.        Physical Exam   BP: (!) 220/136  Pulse: 118  Temp: 97.7  F (36.5  C)  Resp: 14  Weight: 83.9 kg (185 lb)  SpO2: 100 %      Physical  Exam  Vitals and nursing note reviewed.   Constitutional:       Appearance: She is well-developed and normal weight.   HENT:      Head: Normocephalic and atraumatic.   Eyes:      Extraocular Movements: Extraocular movements intact.      Pupils: Pupils are equal, round, and reactive to light.   Cardiovascular:      Rate and Rhythm: Normal rate and regular rhythm.      Pulses:           Carotid pulses are 1+ on the right side and 1+ on the left side.       Radial pulses are 1+ on the right side and 1+ on the left side.      Heart sounds: Murmur heard.       Pulmonary:      Effort: Pulmonary effort is normal.      Breath sounds: Normal breath sounds.   Abdominal:      General: Bowel sounds are normal.   Musculoskeletal:         General: Normal range of motion.      Cervical back: Normal range of motion and neck supple.   Skin:     General: Skin is warm and dry.   Neurological:      General: No focal deficit present.      Mental Status: She is alert.   Psychiatric:         Mood and Affect: Mood normal.         Behavior: Behavior normal.         ED Course      17:30 [facility] cardiology contacted for transfer, no beds available.  [facility] contacted for transfer to cardiology, no beds available.   [facility] contacted for transfer to cardiology, no beds available.   17:55 Dr Amado hospitalist contacted for boarding of patient to [facility], patient accepted by [facility] for consultation and orders written.         ED Course as of 01/12/22 1218   Tue Dec 21, 2021   1304 Nitro given sublingual 5 minutes ago patient currently pain-free from chest pain.     1409 Asymptomatic COVID-19 Virus (Coronavirus) by PCR Nasopharyngeal   1544 Dr Amado Hospitalist accepted pt for boarding until transfer for cardiology is available.      Procedures    EKG: Sinus Tachycardia rate 109  2nd EKG: NSR rate 85    Critical Care time:  was 60 minutes for this patient excluding procedures         No results found for this or any previous  visit (from the past 24 hour(s)).    Medications   aspirin (ASA) chewable tablet 324 mg (324 mg Oral Given 12/21/21 1257)   nitroGLYcerin (NITROSTAT) sublingual tablet 0.4 mg (0.4 mg Sublingual Given 12/21/21 1258)   ondansetron (ZOFRAN) injection 4 mg (4 mg Intravenous Given 12/21/21 1313)   sodium chloride (PF) 0.9% PF flush 100 mL (100 mLs Intravenous Given 12/21/21 1516)   iopamidol (ISOVUE-370) solution 64 mL (64 mLs Intravenous Given 12/21/21 1517)   heparin loading dose for LOW INTENSITY TREATMENT * Give BEFORE starting heparin infusion (5,000 Units Intravenous Given 12/21/21 1638)   metoprolol (LOPRESSOR) injection 5 mg (5 mg Intravenous Given 12/21/21 1857)   0.9% sodium chloride BOLUS (1,000 mLs Intravenous New Bag 12/21/21 2229)       Assessments & Plan (with Medical Decision Making)   Constantino  presents with severe pounding headache, stabbing chest pain, and nausea.  Patient's EKG showed sinus tachycardia rate 109.  CBC, CMP within normal limits.  Troponin 189.  I attempted to transfer patient to higher level of care and was unsuccessful as there were no [facility] beds available.  Patient admitted to [place] [facility] here to care of Dr. Amado.    I have reviewed the nursing notes.    I have reviewed the findings, diagnosis, plan and need for follow up with the patient.      Discharge Medication List as of 12/22/2021 12:03 AM          Final diagnoses:   NSTEMI (non-ST elevated myocardial infarction) (H)       12/21/2021   [facility] EMERGENCY [facility]     Marie Wilson APRN CNP  12/25/21 1307       Marie Wilson APRN CNP  01/12/22 1219

## 2021-12-21 NOTE — ED NOTES
.DATE:  12/21/2021   TIME OF RECEIPT FROM LAB:  7940  LAB TEST:  Troponin  LAB VALUE:  214  RESULTS GIVEN WITH READ-BACK TO (PROVIDER):  Marie Wilson CNP and Dr. Catalan   TIME LAB VALUE REPORTED TO PROVIDER:   7941

## 2021-12-21 NOTE — ED NOTES
C4 and SOC put patient on the list.  [facility], [facility] and [facility] called earlier with no beds available.

## 2021-12-22 ENCOUNTER — TELEPHONE (OUTPATIENT)
Dept: FAMILY MEDICINE | Facility: OTHER | Age: 51
End: 2021-12-22
Payer: COMMERCIAL

## 2021-12-22 ENCOUNTER — TRANSFERRED RECORDS (OUTPATIENT)
Dept: HEALTH INFORMATION MANAGEMENT | Facility: CLINIC | Age: 51
End: 2021-12-22
Payer: COMMERCIAL

## 2021-12-22 LAB
CREATININE (EXTERNAL): 0.74 MG/DL (ref 0.7–1.2)
GFR ESTIMATED (EXTERNAL): >60 ML/MIN/1.73M2
GLUCOSE (EXTERNAL): 109 MG/DL (ref 60–99)
INR (EXTERNAL): 1.1 (ref 0.9–1.1)
POTASSIUM (EXTERNAL): 4.2 MMOL/L (ref 3.5–5.1)

## 2021-12-22 NOTE — ED NOTES
Pt noted CP improving, rated pain 2/10. Second nitroglycerin given. Hospitalist paged. Message sent to hospitalist via web text.

## 2021-12-22 NOTE — ED NOTES
Emergency Department Attending Supervision Note    I have personally seen and examined the patient.  Case reviewed and discussed with Marie Wilson  I have reviewed and agree with the PMH, FH, SOC, ROS.  Please see today's note by NATASHA.  NATASHA care under my supervision.    Key Exam:  Constitutional: Alert and conversant. NAD   HENT: NCAT   Eyes: Normal pupils   Neck: supple   CV: No pallor, RRR  Pulmonary/Chest: Non-labored respirations  Abdominal: non-distended   MSK: PHILLIP.   Neuro: Alert and appropriate CN2-12 wnl, SILTx4 ext, symmetrical 5/5 str in uppers and lowers  Skin: Warm and dry. No diaphoresis. No rashes on exposed skin    Psych: Appropriate mood and affect       Assessment:  Chest pain, RAPP    [facility] Decision Making/Plan:  Rule out ICH, Dissection, ACS  Trop +, ASA, heparin, NSTEMI.  Transfer      Julien Catalan MD on 12/22/2021 at 7:29 AM         Julien Catalan MD  12/22/21 0732

## 2021-12-22 NOTE — TELEPHONE ENCOUNTER
1:51 PM    Reason for Call: OVERBOOK    Patient is in need of hospital follow up for BP/ Med Review. Please call patient to schedule appt.     The patient is requesting an appointment for 7-10 days with Coco Cook.    Was an appointment offered for this call? No  If yes : Appointment type              Date    Preferred method for responding to this message: Telephone Call  What is your phone number ? [phone]    If we cannot reach you directly, may we leave a detailed response at the number you provided? Yes    Can this message wait until your PCP/provider returns, if unavailable today? Not applicable, provider is in today     Cinthya Pollack

## 2021-12-22 NOTE — ED NOTES
Pt c/o 3/10 CP with radiation to left shoulder. Nitroglycerin administered, EKG completed given to NP in ED.

## 2021-12-22 NOTE — CONSULTS
[facility]  Consult Note - Hospitalist Service     Date of Admission:  12/21/2021  Consult Requested by:  ER Provider, Dr Catalan  Reason for Consult: medical assistance with NSTEMI management    Assessment & Plan   Constantino Warren is a 51 year old female admitted on 12/21/2021.     NSTEMI: [facility] recommends heparin gtt and transfer when bed is available. ASA given. Update lipid panel, Metoprolol, prn SL Nitroglycerin for chest pain, with gtt started if SL not effective.    Headaches: history of migraines; patient prefers Tylenol.    Medically managed chronic hypothyroidism: update TSH    General: check UA     The patient's care was discussed with the Patient, ER Provider    Jaxon Amado, [facility]  Securely message with the Vocera Web Console (learn more here)  Text page via [employer] Paging/Directory           ______________________________________________________________________    Chief Complaint   Headache and chest pain and pressure developing suddenly today after starting to eat lunch; similar episode yesterday, though not as severe.    History is obtained from the patient, ER Provider, EHR review    History of Present Illness   Constantino Warren is a 51 year old female who has no prior CAD history; she does have a family history of CHF and CAD. She also has a migraine headache history, with infrequent episodes generally treated with Tylenol    She is training for a new employment position and on 12/20/21, while doing non-physical activity, experienced a 10-minute episode of mid-sternal pain and simultaneous headache that resolved without intervention.    Today, while starting to take a lunch break from training (non physical activity), she had a recurrence of the chief complaints, without relief.    ER Course: vitals showed HTN (151/98); there was some distress from the chest pain and headache. Lab diagnostics resulted a normal heme profile. The chemistry profile showed  preserved stage 1 renal insufficiency and mild AST elevation (56). Sensitive Tropin was initially elevated (214) without EKG changes. A repeat Troponin four hours later was higher (329), again without EKG changes. Chest and head imaging showed no acute processes. Chest pain and headache were substantially reduced with SL Nitroglycerin. The case was reviewed with Cardiology; ASA given and Heparin gtt started    Review of Systems  currently    Constitutional: No fever or chills, no generalized weakness, no unintentional weight change, good appetite  Ears, Nose & Throat: no sore throat, no nasal drainage, no congestion. No ear pain, no ear drainage, no particular change in hearing  Eyes: no particular change in vision, no redness, no drainage  Cardiovascular: No chest pain at rest, but very slight mid-sternal chest pressure  Pulmonary: No cough, no particular change in work of breathing, no particular change in shortness of breath with position changes or familiar activites  Gastrointestinal: No abdominal pain, no nausea, no vomiting, no diarrhea. No particular change in bowel movement pattern, no black stools, no bloody stools  Genitourinary: No particular change in incontinence, no pain with urination, no particular change in stream, no particular change in amount urinated with urge, no discharge  Skin: No particular change in bruising, no rashes  Musculoskeletal: no particular change in strength, no particular change in muscle development  Neurological: no numbness and tingling, no headache, no particular change in balance, no dizziness  Psychologic: No particular change in depression and/or anxiety  Endocrine: No particular change in heat or cold intolerance           Past Medical History    I have reviewed this patient's medical history and updated it with pertinent information if needed.   Past Medical History:   Diagnosis Date     Arthritis 10/13/2020     Cancer (H)     colon     Cancer (H) 2004    uterine      Chronic pain      Depressive disorder      Diabetes (H)      History of blood transfusion      Hypertension      Prediabetes      Syncope and collapse      Thyroid disease        Past Surgical History   I have reviewed this patient's surgical history and updated it with pertinent information if needed.  Past Surgical History:   Procedure Laterality Date     ARTHROSCOPY SHOULDER Left 11/6/2020    Procedure: LEFT SHOULDER ARTHROSCOPY WITH ROTATOR CUFF REPAIR AND ACROMIUM CLAVICULAR JOINT RESECTION;  Surgeon: Tanner Mujica DO;  Location: [facility]     BACK SURGERY      2008; 2011     COLONOSCOPY N/A 2/13/2020    Procedure: COLONOSCOPY WITH BIOPSY AND TATOOING OF ASCENDING COLON,INCOMEPLETE EXAM;  Surgeon: Kang Cleveland MD;  Location: [facility]     COLONOSCOPY N/A 10/26/2020    Procedure: incomplete colonoscopy ;  Surgeon: Kang Cleveland MD;  Location: [facility]     ESOPHAGOSCOPY, GASTROSCOPY, DUODENOSCOPY (EGD), COMBINED N/A 7/21/2018    Procedure: COMBINED ESOPHAGOSCOPY, GASTROSCOPY, DUODENOSCOPY (EGD);  UPPER ENDOSCOPY WITH BIOPSIES;  Surgeon: Roger Vazquez MD;  Location: [facility] OR     ESOPHAGOSCOPY, GASTROSCOPY, DUODENOSCOPY (EGD), COMBINED N/A 11/21/2019    Procedure: UPPER ENDOSCOPY with biopsy;  Surgeon: Kang Cleveland MD;  Location: [facility]     HEAD & NECK SURGERY  2008    c4-c5 fusion     HYSTERECTOMY, [facility]  04/2004    uterine cancer; [facility]     INSERT PORT VASCULAR ACCESS N/A 2/28/2020    Procedure: INSERTION, VASCULAR ACCESS PORT LEFT;  Surgeon: Kang Cleveland MD;  Location: [facility] OR     LAPAROSCOPIC ASSISTED COLECTOMY Right 2/24/2020    Procedure: RIGHT KEVIN COLECTOMY, LYSIS OF ADHESIONS;  Surgeon: Kang Cleveland MD;  Location: [facility]     LAPAROSCOPIC BYPASS GASTRIC  12/2010       Social History   I have reviewed this patient's social history and updated it with pertinent information if needed.  Social History     Tobacco Use     Smoking status: Never Smoker     Smokeless  tobacco: Never Used   Substance Use Topics     Alcohol use: No     Comment: sober for 18 years     Drug use: No       Family History   I have reviewed this patient's family history and updated it with pertinent information if needed.  Family History   Problem Relation Age of Onset     Diabetes Mother      Hypertension Mother      Coronary Artery Disease Mother      Myocardial Infarction Mother      Hypertension Father      Diabetes Maternal Grandmother      Hypertension Maternal Grandfather      Coronary Artery Disease Maternal Grandfather      Cancer Paternal Grandfather         unsure of type of cancer     Bladder Cancer Paternal Grandfather      Diabetes Maternal Aunt      Cerebrovascular Disease Maternal Aunt      Diabetes Maternal Aunt      Cerebrovascular Disease Maternal Aunt      Diabetes Maternal Aunt      Cerebrovascular Disease Maternal Aunt      Hyperlipidemia No family hx of      Breast Cancer No family hx of      Colon Cancer No family hx of      Prostate Cancer No family hx of      Thyroid Disease No family hx of      Anesthesia Reaction No family hx of      Asthma No family hx of      Genetic Disorder No family hx of        Medications   Home medications reviewed and reordered; current MAR reviewed    Allergies   Allergies   Allergen Reactions     Aspartame      Other reaction(s): Other - Describe In Comment Field, Seizures  convulsions     Bee Venom Anaphylaxis     Bupropion Anaphylaxis     Throat gets tight and hives. Denies allergic reaction     Food Anaphylaxis     Peppers-anaphylaxis if eaten, if touched hands swell and reddened     Food Allergy Formula Anaphylaxis     Peppers, patient reports green peppers close off her throat     Ibuprofen Anaphylaxis     No Clinical Screening - See Comments Anaphylaxis     Peppers, patient reports green peppers close off her throat     Piper Anaphylaxis     Peppers--green, black, red, chili, etc. Derm symptoms, hives;  Strawberries--(cooked) Itching,  "breathing difficulties, GI symptoms. Mushrooms--GI symptoms, Hives.Spinach (cooked) throat irritation  (Listed on [facility] problem list.)     Fentanyl Swelling     Lips and tongue swelled.  Swelling of lips and tongue     Adhesive Tape      blisters     Amitriptyline Other (See Comments)     Respiratory symptoms - denies allergic reaction     Liquid Adhesive Dermatitis     blisters  Blisters  Silk tape and tegaderm is fine     Oxcarbazepine Other (See Comments)     Throat gets tight and hives. Denies allergic reaction  Throat gets tight and hives-       Prozac [Fluoxetine]      Other reaction(s): *Unknown  uncontrolled muscle spasms     Valproic Acid      Respiratory symptoms ([facility]). Denies allergic reaction     Ziprasidone      Throat gets tight and hives. Denies allergic reaction       Physical Exam   Vital Signs: Temp: 97.7  F (36.5  C) Temp src: Tympanic BP: 151/98 Pulse: 83   Resp: 13 SpO2: 97 % O2 Device: None (Room air)    Weight: 185 lbs 0 oz    Case reviewed with the ER Provider, EHR reviewed; patient seen in ER room 4    Vital signs:  Temp: 97.7  F (36.5  C) Temp src: Tympanic BP: 151/98 Pulse: 83   Resp: 13 SpO2: 97 % O2 Device: None (Room air)     Weight: 83.9 kg (185 lb)  Estimated body mass index is 28.98 kg/m  as calculated from the following:    Height as of 11/17/21: 1.702 m (5' 7\").    Weight as of this encounter: 83.9 kg (185 lb).      General: No distress, interactive  Head: normocephalic, no obvious trauma  Eyes: Gaze directed normally, sclera clear, no discharge, no abnormal ocular movements  Ears: Normal appearing age-related external ears, no discharge, stable hearing acuity loss  Nose: Normal age-related appearance  Mouth: Normal appearing oral mucosa, Gums and throat appear age-related normal  Neck: Normal age-related appearance, age-related range of motion, supple, no adenopathy  Pulmonary: Normal work of breathing, no expiratory delay, no coarseness, no " wheezing  Cardiovascular: Distant heart sounds, regular rhythm   Abdomen: No obvious distention, soft, bowel sounds present with normal frequency and pitch  Rectal: Deferred  Back: Age-related normal  Skin: Age-related normal, no rashes  Extremities: Not tender, no lower extremity edema. Moving upper and lower extremities  Neurological: Grossly in tact  Psychiatric: Mood is stable, appropriately interactive          Data

## 2021-12-22 NOTE — ED NOTES
DATE:  12/21/2021   TIME OF RECEIPT FROM LAB:  2250  LAB TEST:  Troponin  LAB VALUE:  189  RESULTS GIVEN WITH READ-BACK TO (PROVIDER):  DR Boyd  TIME LAB VALUE REPORTED TO PROVIDER:   1932

## 2021-12-23 ENCOUNTER — TRANSFERRED RECORDS (OUTPATIENT)
Dept: HEALTH INFORMATION MANAGEMENT | Facility: CLINIC | Age: 51
End: 2021-12-23
Payer: COMMERCIAL

## 2021-12-24 ENCOUNTER — HOSPITAL ENCOUNTER (EMERGENCY)
Facility: HOSPITAL | Age: 51
Discharge: HOME | End: 2021-12-24
Attending: STUDENT IN AN ORGANIZED HEALTH CARE EDUCATION/TRAINING PROGRAM | Admitting: STUDENT IN AN ORGANIZED HEALTH CARE EDUCATION/TRAINING PROGRAM
Payer: COMMERCIAL

## 2021-12-24 VITALS
DIASTOLIC BLOOD PRESSURE: 105 MMHG | WEIGHT: 185 LBS | HEART RATE: 67 BPM | BODY MASS INDEX: 29.03 KG/M2 | RESPIRATION RATE: 20 BRPM | HEIGHT: 67 IN | TEMPERATURE: 97.7 F | SYSTOLIC BLOOD PRESSURE: 156 MMHG | OXYGEN SATURATION: 94 %

## 2021-12-24 DIAGNOSIS — R78.81 POSITIVE BLOOD CULTURES: ICD-10-CM

## 2021-12-24 PROCEDURE — 99282 EMERGENCY DEPT VISIT SF MDM: CPT

## 2021-12-24 PROCEDURE — 99282 EMERGENCY DEPT VISIT SF MDM: CPT | Performed by: STUDENT IN AN ORGANIZED HEALTH CARE EDUCATION/TRAINING PROGRAM

## 2021-12-24 RX ORDER — LISINOPRIL 10 MG/1
10 TABLET ORAL DAILY
COMMUNITY
Start: 2021-12-23 | End: 2022-01-05

## 2021-12-24 RX ORDER — ATORVASTATIN CALCIUM 40 MG/1
40 TABLET, FILM COATED ORAL AT BEDTIME
COMMUNITY
Start: 2021-12-23 | End: 2024-02-02

## 2021-12-24 RX ORDER — HYDROGEN PEROXIDE 2.65 ML/100ML
81 LIQUID ORAL; TOPICAL DAILY
COMMUNITY
Start: 2021-12-23

## 2021-12-24 ASSESSMENT — MIFFLIN-ST. JEOR: SCORE: 1481.78

## 2021-12-24 NOTE — ED NOTES
Medical Records @ [facility]  was left a message to gather pt records of last visit and lab results. Provider aware

## 2021-12-24 NOTE — ED NOTES
Patient presents to have follow up of abnormal lab results from [facility].  Patient is awake/alert and interactive.  Skin is pink, warm, dry and intact.  Patient denies of being sick in any way/shape/form.  Patient notes that she tried calling the clinic but was unable to talk to anyone due to the clinic being closed.  Patient without any complaints at this time.

## 2021-12-24 NOTE — ED NOTES
[facility] will fax over the requested information to the ER. A SILVA form is being filled out by pt and will be faxed to [facility] @ [phone]

## 2021-12-24 NOTE — ED TRIAGE NOTES
Pt in for redraw/evaluation of lab work. Pt states she was sent to [facility] a few days ago for cardiology evaluation of possible nstemi. Pt states she received a call last night from lab at [facility] regarding possible blood infection and to come in for repeat lab work. Pt was supposed to contact her PCP but was unable to get in contact with anyone from the clinic. Denies any sx of fever, CP, SOB.

## 2021-12-24 NOTE — DISCHARGE INSTRUCTIONS
Return to the emergency department if you have worsening of symptoms or new concerning symptoms, especially signs of infection such as fevers, your heart starting to race, body aches, fatigue, or certainly other new changes.  Follow-up with your primary care provider.  Call to schedule appointment, you should probably be seen within the next week.

## 2021-12-24 NOTE — ED PROVIDER NOTES
History     Chief Complaint   Patient presents with     Abnormal Labs     HPI  Constantino Warren is a 51 year old female who recently presented to our emergency department with very high blood pressures, chest pain and headache initially evaluated for intracranial hemorrhage, dissection, found to have elevated troponin with suspected demand ischemia versus NSTEMI, treated for NSTEMI and transferred to [facility] where blood cultures were subsequently drawn, Angiocath performed found to have only modest coronary artery disease, blood pressure controlled symptoms abated, and patient discharged with a diagnosis of hypertensive emergency.  She returns here today because 2 of 4 blood culture results from [facility], both grown from the same draw were growing coagulase negative staph.  She was subsequently called by Dr. Antonio who was caring for who suspected that she had an infection but recommended that she come in for an evaluation.  Here she states she feels perfectly fine has not been having fevers has not been taking ibuprofen or Tylenol, has no body aches, feels no weakness or fatigue.  She states she feels great and has no complaints.    Allergies:  Allergies   Allergen Reactions     Aspartame      Other reaction(s): Other - Describe In Comment Field, Seizures  convulsions     Bee Venom Anaphylaxis     Bupropion Anaphylaxis     Throat gets tight and hives. Denies allergic reaction     Food Anaphylaxis     Peppers-anaphylaxis if eaten, if touched hands swell and reddened     Food Allergy Formula Anaphylaxis     Peppers, patient reports green peppers close off her throat     Ibuprofen Anaphylaxis     No Clinical Screening - See Comments Anaphylaxis     Peppers, patient reports green peppers close off her throat     Piper Anaphylaxis     Peppers--green, black, red, chili, etc. Derm symptoms, hives;  Strawberries--(cooked) Itching, breathing difficulties, GI symptoms. Mushrooms--GI symptoms, Hives.Spinach (cooked)  throat irritation  (Listed on [facility] problem list.)     Fentanyl Swelling     Lips and tongue swelled.  Swelling of lips and tongue     Adhesive Tape      blisters     Amitriptyline Other (See Comments)     Respiratory symptoms - denies allergic reaction     Liquid Adhesive Dermatitis     blisters  Blisters  Silk tape and tegaderm is fine     Oxcarbazepine Other (See Comments)     Throat gets tight and hives. Denies allergic reaction  Throat gets tight and hives-       Prozac [Fluoxetine]      Other reaction(s): *Unknown  uncontrolled muscle spasms     Valproic Acid      Respiratory symptoms ([facility]). Denies allergic reaction     Ziprasidone      Throat gets tight and hives. Denies allergic reaction       Problem List:    Patient Active Problem List    Diagnosis Date Noted     Iron deficiency 04/29/2021     Priority: Medium     History of colon cancer, stage III 10/20/2020     Priority: Medium     Added automatically from request for surgery [id number]       Rotator cuff tear 10/13/2020     Priority: Medium     Added automatically from request for surgery [id number]       Arthritis 10/13/2020     Priority: Medium     Added automatically from request for surgery [id number]       History of uterine cancer 05/27/2020     Priority: Medium     Palliative care patient 05/27/2020     Priority: Medium     Iron deficiency anemia, unspecified iron deficiency anemia type 05/08/2020     Priority: Medium     S/P partial resection of colon 02/24/2020     Priority: Medium     Prediabetes      Priority: Medium     Colon cancer (H) 02/19/2020     Priority: Medium     Added automatically from request for surgery [id number]       Cancer of ascending colon (H) 02/19/2020     Priority: Medium     Added automatically from request for surgery [id number]       Special screening for malignant neoplasms, colon 01/14/2020     Priority: Medium     Added automatically from request for surgery [id number]       Anastomotic ulcer  S/P gastric bypass 07/21/2018     Priority: Medium     Anemia 07/21/2018     Priority: Medium     Chest pain 07/20/2018     Priority: Medium     GI bleed 07/20/2018     Priority: Medium     Abdominal pain, epigastric 07/20/2018     Priority: Medium     Hypochromic microcytic anemia 07/20/2018     Priority: Medium     Chronic migraine 01/18/2018     Priority: Medium     Replacing diagnoses that were inactivated after the 10/1/2021 regulatory import.       Chronic pain disorder 01/18/2018     Priority: Medium     Long term (current) use of opiate analgesic 01/12/2018     Priority: Medium     Overview:   Pain Diagnosis failed back syndrome, annular tear L4-5, history lumbar discectomy, s/p lumbar fusion.    Should be seen in the clinic every twelve weeks. Currently on a taper: yes, trying to wean away from the daytime hydrocodone-acetaminophen..    Plan for flares of chronic pain stretches, occasional use of more hydrocodone-acetaminophen for flare.    ED and UC: Opioid medication will not be given in the ER or Urgent Care unless there is a medical emergency unrelated to chronic pain.  Limit to 3 day supply    Refills: Refills will only be given at a scheduled visit.    [facility] Agreement for Opioid Treatment.   Opioid Agreement Date: 5/21/12  Last UDT (Urine Drug Test) on date:    Pain Dx: chronic low back pain  Last Pain Visit: 6-24-11  Preferred Pharmacy: 's  Comments:      7/23/14:  Progressing well.  Over the last month, has decreased hydrocodone-acetaminophen from 6/day to 3/day.  Awaiting appointment with [facility].       Adjustment disorder with mixed anxiety and depressed mood 12/15/2017     Priority: Medium     Other insomnia 12/15/2017     Priority: Medium     Syncope and collapse 09/26/2015     Priority: Medium     Hypertensive urgency 09/26/2015     Priority: Medium     Concussion syndrome 09/26/2015     Priority: Medium     Hydronephrosis, right 04/20/2015     Priority: Medium     Pyelonephritis  04/20/2015     Priority: Medium     Urolithiasis 04/20/2015     Priority: Medium     Annular tear of lumbar disc 12/16/2014     Priority: Medium     Overview:   MRI 2/5/13:  Previous right L4-5 hemilaminectomy and partial facetectomy with mild to moderate right L4-5 facet arthropathy and broad-based bulge of disk and end plate osteophyte on the right with partial high signal intensity zone annular tear of the undersurface of the laterally protruding disk/annulus.       Failed back syndrome of lumbar spine 12/16/2014     Priority: Medium     Overview:   Three back surgeries, including an L3-sacrum AP fusion done at [facility] in 2013.  Has been doing very well in terms of reduction of pain, with minimal requirement of opiate pain medication.       Hx of lumbar discectomy 12/16/2014     Priority: Medium     Overview:   10/2006:   L4-5 Laminectomy and diskectomy L5-S1.   12/12/11:  L5S1 microdiscectomy ([place])       Vitamin B 12 deficiency 06/13/2011     Priority: Medium     Overview:   IMO Update 10/11       Gastric bypass status for obesity 01/04/2011     Priority: Medium     Overview:   12/14/10:  laparoscopic Prasanna-en-Y gastric bypass Dr. Hassan  IMO Update 10/11       Hyperlipidemia 09/23/2008     Priority: Medium     Overview:   IMO Update 10/11       Hypothyroidism 09/23/2008     Priority: Medium     Overview:   IMO Update 10/11  Overview:   IMO Update 10/11          Past Medical History:    Past Medical History:   Diagnosis Date     Arthritis 10/13/2020     Cancer (H)      Cancer (H) 2004     Chronic pain      Depressive disorder      Diabetes (H)      History of blood transfusion      Hypertension      Prediabetes      Syncope and collapse      Thyroid disease        Past Surgical History:    Past Surgical History:   Procedure Laterality Date     ARTHROSCOPY SHOULDER Left 11/6/2020    Procedure: LEFT SHOULDER ARTHROSCOPY WITH ROTATOR CUFF REPAIR AND ACROMIUM CLAVICULAR JOINT RESECTION;  Surgeon: Tanner Mujica,  DO;  Location: [facility]     BACK SURGERY      2008; 2011     COLONOSCOPY N/A 2/13/2020    Procedure: COLONOSCOPY WITH BIOPSY AND TATOOING OF ASCENDING COLON,INCOMEPLETE EXAM;  Surgeon: Kang Cleveland MD;  Location: [facility] OR     COLONOSCOPY N/A 10/26/2020    Procedure: incomplete colonoscopy ;  Surgeon: Kang Cleveland MD;  Location: [facility]     ESOPHAGOSCOPY, GASTROSCOPY, DUODENOSCOPY (EGD), COMBINED N/A 7/21/2018    Procedure: COMBINED ESOPHAGOSCOPY, GASTROSCOPY, DUODENOSCOPY (EGD);  UPPER ENDOSCOPY WITH BIOPSIES;  Surgeon: Roger Vazquez MD;  Location: [facility]     ESOPHAGOSCOPY, GASTROSCOPY, DUODENOSCOPY (EGD), COMBINED N/A 11/21/2019    Procedure: UPPER ENDOSCOPY with biopsy;  Surgeon: Kang Cleveland MD;  Location: [facility]     HEAD & NECK SURGERY  2008    c4-c5 fusion     HYSTERECTOMY, CRISTIANE  04/2004    uterine cancer; [facility]     INSERT PORT VASCULAR ACCESS N/A 2/28/2020    Procedure: INSERTION, VASCULAR ACCESS PORT LEFT;  Surgeon: Kang Cleveland MD;  Location: [facility] OR     LAPAROSCOPIC ASSISTED COLECTOMY Right 2/24/2020    Procedure: RIGHT KEVIN COLECTOMY, LYSIS OF ADHESIONS;  Surgeon: Kang Cleveland MD;  Location: [facility]     LAPAROSCOPIC BYPASS GASTRIC  12/2010       Family History:    Family History   Problem Relation Age of Onset     Diabetes Mother      Hypertension Mother      Coronary Artery Disease Mother      Myocardial Infarction Mother      Hypertension Father      Diabetes Maternal Grandmother      Hypertension Maternal Grandfather      Coronary Artery Disease Maternal Grandfather      Cancer Paternal Grandfather         unsure of type of cancer     Bladder Cancer Paternal Grandfather      Diabetes Maternal Aunt      Cerebrovascular Disease Maternal Aunt      Diabetes Maternal Aunt      Cerebrovascular Disease Maternal Aunt      Diabetes Maternal Aunt      Cerebrovascular Disease Maternal Aunt      Hyperlipidemia No family hx of      Breast Cancer No family hx of       "Colon Cancer No family hx of      Prostate Cancer No family hx of      Thyroid Disease No family hx of      Anesthesia Reaction No family hx of      Asthma No family hx of      Genetic Disorder No family hx of        Social History:  Marital Status:   [5]  Social History     Tobacco Use     Smoking status: Never Smoker     Smokeless tobacco: Never Used   Substance Use Topics     Alcohol use: No     Comment: sober for 18 years     Drug use: No        Medications:    acetaminophen (TYLENOL) 500 MG tablet  DULoxetine (CYMBALTA) 30 MG capsule  gabapentin (NEURONTIN) 300 MG capsule  levothyroxine (SYNTHROID/LEVOTHROID) 200 MCG tablet  levothyroxine (SYNTHROID/LEVOTHROID) 25 MCG tablet  multivitamin, therapeutic with minerals (MULTI-VITAMIN) TABS  ondansetron (ZOFRAN-ODT) 4 MG ODT tab  tiZANidine (ZANAFLEX) 4 MG tablet  atorvastatin (LIPITOR) 40 MG tablet  Cyanocobalamin 1000 MCG/ML KIT  diazepam (VALIUM) 5 MG tablet  EPINEPHrine (ANY BX GENERIC EQUIV) 0.3 MG/0.3ML injection 2-pack  EQ ASPIRIN ADULT LOW DOSE 81 MG EC tablet  lisinopril (ZESTRIL) 10 MG tablet  traZODone (DESYREL) 50 MG tablet          Review of Systems  A complete review of systems was performed and is otherwise negative.     Physical Exam   BP: 166/93  Pulse: 68  Temp: 97.7  F (36.5  C)  Resp: 20  Height: 170.2 cm (5' 7\")  Weight: 83.9 kg (185 lb)  SpO2: 98 %      Physical Exam  Constitutional: Alert and conversant. NAD   HENT: NCAT   Eyes: Normal pupils   Neck: supple   CV: Normal rate, regular rhythm, no murmur   Pulmonary/Chest: Non-labored respirations, clear to auscultation bilaterally   Abdominal: Soft, non-tender, non-distended   MSK: PHILLIP.   Neuro: Alert and appropriate   Skin: Warm and dry. No diaphoresis. No rashes on exposed skin    Psych: Appropriate mood and affect     ED Course              ED Course as of 12/24/21 1230   Fri Dec 24, 2021   1146 We have reached out to [facility] for records and requested Culture results as well as " "other available records   [address] [facility]: 2 blood cultures, 1 NGTD, 1both coag neg staff. \"looks like staff Epi\" but not definite. Sensitivities pending. Drawn on 12/22   1227 51-year-old female here with 2 of 4 blood culture results positive for coag negative staph.  I did speak with [facility], they noticed to cultures with no growth to date, while the other set of cultures were both growing the coag negative staph that to them look like staph epidermidis but it was not definitive yet.  There is still pending sensitivities.  Here she feels great, is asymptomatic, has not been having fevers, has not been having rigors, no fatigue or any other signs or symptoms that would suggest a bacteremia.  The presence of what appears to be a contaminant has been discussed with patient, I talked about the risks and benefits of drawing a new set of cultures versus continuing to proceed with of watch and wait approach.  She stated that she would prefer to not draw cultures, but return if she develops any new symptoms.  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  She has close follow-up planned with her primary care doctor for monitoring of her blood pressure.  She is appropriate for further outpatient management and will hold off on blood cultures at this time.  Patient discharged in stable condition with all questions answered and return precautions given.     Procedures         No results found for this or any previous visit (from the past 24 hour(s)).    Medications - No data to display    Assessments & Plan (with Medical Decision Making)     I have reviewed the nursing notes.    I have reviewed the findings, diagnosis, plan and need for follow up with the patient.    New Prescriptions    No medications on file       Final diagnoses:   Positive blood cultures       12/24/2021   [facility] EM[facility] " DEPARTMENT     Julien Catalan MD  12/24/21 [id number]

## 2021-12-24 NOTE — ED NOTES
Patient playing on cell phone; no distress noted.  Discharge instructions reviewed with patient.  Patient without any other concerns at this time.  We did receive faxes from [facility] and these were placed in medical records bins for scanning.  Patient will return if symptoms worsen and/or any new concerns.  Home.

## 2021-12-28 NOTE — TELEPHONE ENCOUNTER
Patient called to schedule hospital follow up. Patient was in ICU in [place]. Please call patient to schedule appt. Original overbook was sent 12/22/2021.

## 2022-01-04 NOTE — PROGRESS NOTES
"  Assessment & Plan     NSTEMI (non-ST elevated myocardial infarction) (H)  Secondary to hypertension.  Non-occlusive angiogram.  Normal echol.  Secondary prevention - aspirin, statin, blood pressure and sugar control.  No tobacco use.    Hypertensive urgency  May have been avoidable?  Verapamil was not specifically discontinued - but pharmacy did not have it?   Encouraged her to reach out with concerns/questions such as these.  Prior Verapamil for headaches - but those have not been an issue.  Will stick with Lisinopril.  Increase to 20 mg daily.  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 Can have staff monitor BP.  Recheck 1 month.  Update BMP today.  - Basic metabolic panel; Future  - Basic metabolic panel    Prediabetes  Stable.    Hypothyroidism, unspecified type  Stable.  - TSH with free T4 reflex    Hyperlipidemia, unspecified hyperlipidemia type  Stable.  Continue statin.    Insomnia, unspecified type  Also was discontinued somehow?  Restarted today.  - traZODone (DESYREL) 50 MG tablet; TAKE 1 TABLET BY MOUTH ONCE DAILY AT BEDTIME    Encounter for monitoring of chronic aspirin therapy  Chronic Aspirin use now.  Prior PUD.  Add PPI.  - omeprazole (PRILOSEC) 20 MG DR capsule; Take 1 capsule (20 mg) by mouth daily    Essential hypertension  As above.  - lisinopril (ZESTRIL) 20 MG tablet; Take 1 tablet (20 mg) by mouth daily    Review of prior external note(s) from [facility] information from [facility] reviewed  31 minutes spent on the date of the encounter doing chart review, history and exam, documentation and further activities per the note       BMI:   Estimated body mass index is 30.54 kg/m  as calculated from the following:    Height as of 12/24/21: 1.702 m (5' 7\").    Weight as of this encounter: 88.5 kg (195 lb).   Weight management plan: Discussed healthy diet and exercise guidelines    Patient Instructions   Increase Lisinopril to 20 mg daily.  Recheck 1 month.  Check at work periodically if able.  Bring to " next visit.  Lab today - will call with results.    Restart Trazodone.    Continue Aspirin daily.  Add Prilosec daily.      Return in about 1 month (around 2/5/2022) for BP Recheck.    Coco Schafer MD  [facility] - RICHARD Meeks is a 51 year old who presents for the following health issues     HPI       Hospital Follow-up Visit:    Hospital/Nursing Home/ Rehab Facility: [facility]  Date of Admission: 12/21/2021, transferred 12/22/2021  Date of Discharge: 12/23/2021  Reason(s) for Admission: hypertensive urgency, NSTEMI    Angiogram non-occlusive.  ECHO normal EF.  No abnormalities.     Prior Verapamil.    Started on Lisinopril instead.  No home monitor.  Statin and Aspirin.      Concerns of history of ulcers.  Needs antacid.    Was your hospitalization related to COVID-19? No   Problems taking medications regularly:  None  Medication changes since discharge: Low Dose Aspirin 81 mg, Lisinopril 10 mg  Problems adhering to non-medication therapy:  None    Summary of hospitalization:  See outside records, reviewed and scanned  Diagnostic Tests/Treatments reviewed.  Follow up needed: none  Other Healthcare Providers Involved in Patient s Care:         None  Update since discharge: improved.   Post Discharge Medication Reconciliation: discharge medications reconciled, continue medications without change.  Plan of care communicated with patient          Had been on Verapamil 40 mg TID for headaches.  For some reason was stopped 12/2021.  Wasn't on file at pharmacy??  No recurrence of headaches.    Review of Systems   Constitutional, HEENT, cardiovascular, pulmonary, gi and gu systems are negative, except as otherwise noted.      Objective    BP (!) 148/90   Pulse 77   Temp 97  F (36.1  C) (Tympanic)   Resp 18   Wt 88.5 kg (195 lb)   SpO2 97%   BMI 30.54 kg/m    Body mass index is 30.54 kg/m .  Physical Exam   GENERAL: healthy, alert and no  distress  NECK: no adenopathy, no asymmetry, masses, or scars and thyroid normal to palpation  RESP: lungs clear to auscultation - no rales, rhonchi or wheezes  CV: regular rate and rhythm, normal S1 S2, no S3 or S4, no murmur, click or rub, no peripheral edema and peripheral pulses strong  ABDOMEN: soft, nontender, no hepatosplenomegaly, no masses and bowel sounds normal  MS: no gross musculoskeletal defects noted, no edema  PSYCH: mentation appears normal, affect normal/bright    Results for orders placed or performed in visit on 01/05/22 (from the past 24 hour(s))   Basic metabolic panel   Result Value Ref Range    Sodium 141 133 - 144 mmol/L    Potassium 4.0 3.4 - 5.3 mmol/L    Chloride 110 (H) 94 - 109 mmol/L    Carbon Dioxide (CO2) 26 20 - 32 mmol/L    Anion Gap 5 3 - 14 mmol/L    Urea Nitrogen 14 7 - 30 mg/dL    Creatinine 0.88 0.52 - 1.04 mg/dL    Calcium 9.0 8.5 - 10.1 mg/dL    Glucose 169 (H) 70 - 99 mg/dL    GFR Estimate 79 >60 mL/min/1.73m2

## 2022-01-05 ENCOUNTER — OFFICE VISIT (OUTPATIENT)
Dept: FAMILY MEDICINE | Facility: OTHER | Age: 52
End: 2022-01-05
Attending: FAMILY MEDICINE
Payer: COMMERCIAL

## 2022-01-05 VITALS
DIASTOLIC BLOOD PRESSURE: 90 MMHG | BODY MASS INDEX: 30.54 KG/M2 | WEIGHT: 195 LBS | HEART RATE: 77 BPM | OXYGEN SATURATION: 97 % | TEMPERATURE: 97 F | SYSTOLIC BLOOD PRESSURE: 148 MMHG | RESPIRATION RATE: 18 BRPM

## 2022-01-05 DIAGNOSIS — I21.4 NSTEMI (NON-ST ELEVATED MYOCARDIAL INFARCTION) (H): Primary | ICD-10-CM

## 2022-01-05 DIAGNOSIS — G47.00 INSOMNIA, UNSPECIFIED TYPE: ICD-10-CM

## 2022-01-05 DIAGNOSIS — I16.0 HYPERTENSIVE URGENCY: ICD-10-CM

## 2022-01-05 DIAGNOSIS — I10 ESSENTIAL HYPERTENSION: ICD-10-CM

## 2022-01-05 DIAGNOSIS — R73.03 PREDIABETES: ICD-10-CM

## 2022-01-05 DIAGNOSIS — E03.9 HYPOTHYROIDISM, UNSPECIFIED TYPE: ICD-10-CM

## 2022-01-05 DIAGNOSIS — E78.5 HYPERLIPIDEMIA, UNSPECIFIED HYPERLIPIDEMIA TYPE: ICD-10-CM

## 2022-01-05 DIAGNOSIS — Z51.81 ENCOUNTER FOR MONITORING OF CHRONIC ASPIRIN THERAPY: ICD-10-CM

## 2022-01-05 DIAGNOSIS — Z79.82 ENCOUNTER FOR MONITORING OF CHRONIC ASPIRIN THERAPY: ICD-10-CM

## 2022-01-05 LAB
ANION GAP SERPL CALCULATED.3IONS-SCNC: 5 MMOL/L (ref 3–14)
BUN SERPL-MCNC: 14 MG/DL (ref 7–30)
CALCIUM SERPL-MCNC: 9 MG/DL (ref 8.5–10.1)
CHLORIDE BLD-SCNC: 110 MMOL/L (ref 94–109)
CO2 SERPL-SCNC: 26 MMOL/L (ref 20–32)
CREAT SERPL-MCNC: 0.88 MG/DL (ref 0.52–1.04)
GFR SERPL CREATININE-BSD FRML MDRD: 79 ML/MIN/1.73M2
GLUCOSE BLD-MCNC: 169 MG/DL (ref 70–99)
POTASSIUM BLD-SCNC: 4 MMOL/L (ref 3.4–5.3)
SODIUM SERPL-SCNC: 141 MMOL/L (ref 133–144)
TSH SERPL DL<=0.005 MIU/L-ACNC: 0.94 MU/L (ref 0.4–4)

## 2022-01-05 PROCEDURE — 36415 COLL VENOUS BLD VENIPUNCTURE: CPT | Mod: ZL | Performed by: FAMILY MEDICINE

## 2022-01-05 PROCEDURE — 80048 BASIC METABOLIC PNL TOTAL CA: CPT | Mod: ZL | Performed by: FAMILY MEDICINE

## 2022-01-05 PROCEDURE — 2894A VOIDCORRECT: CPT | Performed by: FAMILY MEDICINE

## 2022-01-05 PROCEDURE — 80048 BASIC METABOLIC PNL TOTAL CA: CPT | Performed by: FAMILY MEDICINE

## 2022-01-05 PROCEDURE — 84443 ASSAY THYROID STIM HORMONE: CPT | Mod: ZL | Performed by: FAMILY MEDICINE

## 2022-01-05 PROCEDURE — 36415 COLL VENOUS BLD VENIPUNCTURE: CPT | Performed by: FAMILY MEDICINE

## 2022-01-05 PROCEDURE — G0463 HOSPITAL OUTPT CLINIC VISIT: HCPCS

## 2022-01-05 PROCEDURE — 99214 OFFICE O/P EST MOD 30 MIN: CPT | Performed by: FAMILY MEDICINE

## 2022-01-05 PROCEDURE — 84443 ASSAY THYROID STIM HORMONE: CPT | Performed by: FAMILY MEDICINE

## 2022-01-05 PROCEDURE — G0463 HOSPITAL OUTPT CLINIC VISIT: HCPCS | Mod: 25

## 2022-01-05 RX ORDER — TRAZODONE HYDROCHLORIDE 50 MG/1
TABLET, FILM COATED ORAL
Qty: 90 TABLET | Refills: 3 | Status: SHIPPED | OUTPATIENT
Start: 2022-01-05

## 2022-01-05 RX ORDER — LISINOPRIL 20 MG/1
20 TABLET ORAL DAILY
Qty: 90 TABLET | Refills: 1 | Status: SHIPPED | OUTPATIENT
Start: 2022-01-05 | End: 2023-04-17 | Stop reason: SINTOL

## 2022-01-05 ASSESSMENT — PAIN SCALES - GENERAL: PAINLEVEL: NO PAIN (0)

## 2022-01-05 NOTE — NURSING NOTE
"Chief Complaint   Patient presents with     Hospital F/U       Initial BP (!) 148/90 (BP Location: Right arm, Patient Position: Chair, Cuff Size: Adult Regular)   Pulse 77   Temp 97  F (36.1  C) (Tympanic)   Resp 18   Wt 88.5 kg (195 lb)   SpO2 97%   BMI 30.54 kg/m   Estimated body mass index is 30.54 kg/m  as calculated from the following:    Height as of 12/24/21: 1.702 m (5' 7\").    Weight as of this encounter: 88.5 kg (195 lb).  Medication Reconciliation: complete  Mayuri Linder LPN  "

## 2022-01-05 NOTE — LETTER
January 12, 2022      Constantino Warren  [address]  [address]   [place] [address]        Dear ,    We are writing to inform you of your test results.    Stable labs.    Resulted Orders   TSH with free T4 reflex   Result Value Ref Range    TSH 0.94 0.40 - 4.00 mU/L   Basic metabolic panel   Result Value Ref Range    Sodium 141 133 - 144 mmol/L    Potassium 4.0 3.4 - 5.3 mmol/L    Chloride 110 (H) 94 - 109 mmol/L    Carbon Dioxide (CO2) 26 20 - 32 mmol/L    Anion Gap 5 3 - 14 mmol/L    Urea Nitrogen 14 7 - 30 mg/dL    Creatinine 0.88 0.52 - 1.04 mg/dL    Calcium 9.0 8.5 - 10.1 mg/dL    Glucose 169 (H) 70 - 99 mg/dL    GFR Estimate 79 >60 mL/min/1.73m2      Comment:      Effective December 21, 2021 eGFRcr in adults is calculated using the 2021 CKD-EPI creatinine equation which includes age and gender (Julio et al., NEJM, DOI: 10.1056/DXKRkv8420248)       If you have any questions or concerns, please call the clinic at the number listed above.       Sincerely,      Coco Cook MD

## 2022-01-05 NOTE — PATIENT INSTRUCTIONS
Increase Lisinopril to 20 mg daily.  Recheck 1 month.  Check at work periodically if able.  Bring to next visit.  Lab today - will call with results.    Restart Trazodone.    Continue Aspirin daily.  Add Prilosec daily.

## 2022-01-06 ENCOUNTER — TELEPHONE (OUTPATIENT)
Dept: FAMILY MEDICINE | Facility: OTHER | Age: 52
End: 2022-01-06
Payer: COMMERCIAL

## 2022-01-24 ENCOUNTER — LAB (OUTPATIENT)
Dept: LAB | Facility: OTHER | Age: 52
End: 2022-01-24
Payer: COMMERCIAL

## 2022-01-24 DIAGNOSIS — I16.0 HYPERTENSIVE URGENCY: Primary | ICD-10-CM

## 2022-01-24 LAB
ANION GAP SERPL CALCULATED.3IONS-SCNC: 6 MMOL/L (ref 3–14)
BUN SERPL-MCNC: 15 MG/DL (ref 7–30)
CALCIUM SERPL-MCNC: 9.2 MG/DL (ref 8.5–10.1)
CHLORIDE BLD-SCNC: 110 MMOL/L (ref 94–109)
CO2 SERPL-SCNC: 25 MMOL/L (ref 20–32)
CREAT SERPL-MCNC: 0.7 MG/DL (ref 0.52–1.04)
GFR SERPL CREATININE-BSD FRML MDRD: >90 ML/MIN/1.73M2
GLUCOSE BLD-MCNC: 107 MG/DL (ref 70–99)
HOLD SPECIMEN: NORMAL
HOLD SPECIMEN: NORMAL
POTASSIUM BLD-SCNC: 3.9 MMOL/L (ref 3.4–5.3)
SODIUM SERPL-SCNC: 141 MMOL/L (ref 133–144)

## 2022-01-24 PROCEDURE — 36415 COLL VENOUS BLD VENIPUNCTURE: CPT

## 2022-01-24 PROCEDURE — 80048 BASIC METABOLIC PNL TOTAL CA: CPT | Mod: ZL

## 2022-01-24 PROCEDURE — 80048 BASIC METABOLIC PNL TOTAL CA: CPT

## 2022-01-24 PROCEDURE — 36415 COLL VENOUS BLD VENIPUNCTURE: CPT | Mod: ZL

## 2022-01-25 DIAGNOSIS — G62.9 NEUROPATHY: ICD-10-CM

## 2022-01-25 RX ORDER — GABAPENTIN 300 MG/1
CAPSULE ORAL
Qty: 90 CAPSULE | Refills: 0 | Status: CANCELLED | OUTPATIENT
Start: 2022-01-25

## 2022-01-25 NOTE — TELEPHONE ENCOUNTER
Gabapentin      Last Written Prescription Date:  5.11.21  Last Fill Quantity: #90,   # refills: 0  Last Office Visit: 11.17.21  Future Office visit:    Next 5 appointments (look out 90 days)    Feb 07, 2022  4:00 PM  (Arrive by 3:45 PM)  SHORT with Coco Cook MD  [facility] ([facility] ) [address] [address]  [phone]   Feb 25, 2022 10:30 AM  (Arrive by 10:15 AM)  Return Visit with Izzy Moran NP  [facility] ([facility] ) [address] [address]  [phone]           Routing refill request to provider for review/approval because:  Drug not on the [facility], [facility] or [facility] refill protocol or controlled substance

## 2022-01-25 NOTE — TELEPHONE ENCOUNTER
Please call and see what dose of the gabapentin she is taking.  I have a refill request and want to fill it appropriately.

## 2022-01-26 ENCOUNTER — INFUSION THERAPY VISIT (OUTPATIENT)
Dept: INFUSION THERAPY | Facility: OTHER | Age: 52
End: 2022-01-26
Attending: NURSE PRACTITIONER
Payer: COMMERCIAL

## 2022-01-26 DIAGNOSIS — C18.2 CANCER OF ASCENDING COLON (H): Primary | ICD-10-CM

## 2022-01-26 DIAGNOSIS — E53.8 VITAMIN B 12 DEFICIENCY: ICD-10-CM

## 2022-01-26 PROCEDURE — 96523 IRRIG DRUG DELIVERY DEVICE: CPT

## 2022-01-26 PROCEDURE — 250N000011 HC RX IP 250 OP 636: Performed by: NURSE PRACTITIONER

## 2022-01-26 PROCEDURE — 96372 THER/PROPH/DIAG INJ SC/IM: CPT | Performed by: NURSE PRACTITIONER

## 2022-01-26 RX ORDER — CYANOCOBALAMIN 1000 UG/ML
1000 INJECTION, SOLUTION INTRAMUSCULAR; SUBCUTANEOUS
Status: CANCELLED
Start: 2022-01-26

## 2022-01-26 RX ORDER — HEPARIN SODIUM (PORCINE) LOCK FLUSH IV SOLN 100 UNIT/ML 100 UNIT/ML
5 SOLUTION INTRAVENOUS
Status: CANCELLED | OUTPATIENT
Start: 2022-01-26

## 2022-01-26 RX ORDER — HEPARIN SODIUM (PORCINE) LOCK FLUSH IV SOLN 100 UNIT/ML 100 UNIT/ML
5 SOLUTION INTRAVENOUS
Status: DISCONTINUED | OUTPATIENT
Start: 2022-01-26 | End: 2022-01-26 | Stop reason: HOSPADM

## 2022-01-26 RX ORDER — GABAPENTIN 300 MG/1
600 CAPSULE ORAL AT BEDTIME
Qty: 60 CAPSULE | Refills: 3 | Status: SHIPPED | OUTPATIENT
Start: 2022-01-26 | End: 2022-02-25

## 2022-01-26 RX ORDER — CYANOCOBALAMIN 1000 UG/ML
1000 INJECTION, SOLUTION INTRAMUSCULAR; SUBCUTANEOUS
Status: DISCONTINUED | OUTPATIENT
Start: 2022-01-26 | End: 2022-01-26 | Stop reason: HOSPADM

## 2022-01-26 RX ADMIN — CYANOCOBALAMIN 1000 MCG: 1000 INJECTION, SOLUTION INTRAMUSCULAR at 11:13

## 2022-01-26 RX ADMIN — Medication 5 ML: at 11:05

## 2022-01-26 NOTE — PROGRESS NOTES
Patients port accessed using non-coring, 20 gauge, 3/4 inch needle. Port accessed per facility protocol.   Hand hygiene performed: yes   Mask donned by caregiver: yes   Site prepped with CHG: yes   Labs drawn: no   Dressing applied using aseptic technique: yes   Port flushed easily, blood return noted.  No signs and symptoms of infection or infiltration.  Port flushed 10 mLs Normal Saline then Heparin 5mLs of 100 unit/mL.  Needle removed, small dressing applied.      Patient is also here today for injection of B12 per order of Dr Palma.     Patient denies questions nor concerns regarding medication, administration site, side effects, nor aftercare.

## 2022-01-26 NOTE — PROGRESS NOTES
Patient identified with two identifiers, order verified, and verbal consent for today's injection obtained from patient.   Patient education provided on s/s of injection site infection, and/or medication specific side effects, and when to call a provider.  Patient instructed to report any adverse effects.     Medication administered per protocol in LT DELTOID at 90 degree angle.  Site covered with sterile bandage.  Patient tolerated injection well, no verbal nor non-verbal signs of discomfort noted.  No adverse effects noted at this time.     Patient instructed to call with further questions or concerns.  Patient states understanding and is in agreement with this plan. Patient discharged.

## 2022-02-07 ENCOUNTER — OFFICE VISIT (OUTPATIENT)
Dept: FAMILY MEDICINE | Facility: OTHER | Age: 52
End: 2022-02-07
Attending: FAMILY MEDICINE
Payer: COMMERCIAL

## 2022-02-07 VITALS
SYSTOLIC BLOOD PRESSURE: 124 MMHG | HEIGHT: 66 IN | WEIGHT: 190 LBS | DIASTOLIC BLOOD PRESSURE: 80 MMHG | BODY MASS INDEX: 30.53 KG/M2 | HEART RATE: 84 BPM

## 2022-02-07 DIAGNOSIS — I10 ESSENTIAL HYPERTENSION: Primary | ICD-10-CM

## 2022-02-07 DIAGNOSIS — G47.00 INSOMNIA, UNSPECIFIED TYPE: ICD-10-CM

## 2022-02-07 PROCEDURE — G0463 HOSPITAL OUTPT CLINIC VISIT: HCPCS | Mod: 25

## 2022-02-07 PROCEDURE — G0463 HOSPITAL OUTPT CLINIC VISIT: HCPCS

## 2022-02-07 PROCEDURE — 99213 OFFICE O/P EST LOW 20 MIN: CPT | Performed by: FAMILY MEDICINE

## 2022-02-07 ASSESSMENT — PAIN SCALES - GENERAL: PAINLEVEL: MILD PAIN (3)

## 2022-02-07 ASSESSMENT — MIFFLIN-ST. JEOR: SCORE: 1488.58

## 2022-02-07 NOTE — PROGRESS NOTES
"  Assessment & Plan     Essential hypertension  Improved with 20 mg dose of Lisinopril.  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  Average 120s/70s.  Continue current dose.  Refills on file.    Insomnia, unspecified type  Doing well with Trazodone without side effects.  Continue.     See Patient Instructions        Coco Schafer MD  [facility] - RICHARD Meeks is a 51 year old who presents for the following health issues     HPI       Hypertension Follow-up - Lisinopril increased to 20 mg 2022    Do you check your blood pressure regularly outside of the clinic? Yes     Are you following a low salt diet? Yes    Are your blood pressures ever more than 140 on the top number (systolic) OR more   than 90 on the bottom number (diastolic), for example 140/90? 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  125-124//70-80.  No side effects.      Insomnia - restarted Trazodone last visit    Duration:  Ongoing     Description  Frequency of insomnia:  1-2 times a week  Time to fall asleep: 15 minutes  Middle of night awakenin-2 times a week  Early morning awakenin-2 times a week    Accompanying signs and symptoms:  none    History  Similar episodes in past:  YES  Previous evaluation/sleep study:  no     Precipitating or alleviating factors:  New stressful situation: no   Caffeine intake after lunchtime: no   OTC decongestants: no   Any new medications: no     Therapies tried and outcome: Trazodone     Working well; bedtime 10 pm - until 6-7am    Only getting up middle of night 1-2 times per week    No side effects     Review of Systems   Constitutional, HEENT, cardiovascular, pulmonary, gi and gu systems are negative, except as otherwise noted.      Objective    /80 (BP Location: Right arm, Patient Position: Chair, Cuff Size: Adult Regular)   Pulse 84   Ht 1.676 m (5' 6\")   Wt 86.2 kg (190 lb)   BMI 30.67 kg/m    Body mass index is 30.67 kg/m .  Physical Exam   GENERAL: alert, " no distress and over weight  NECK: no adenopathy, no asymmetry, masses, or scars and thyroid normal to palpation  RESP: lungs clear to auscultation - no rales, rhonchi or wheezes  CV: regular rate and rhythm, normal S1 S2, no S3 or S4, no murmur, click or rub, no peripheral edema and peripheral pulses strong  MS: no gross musculoskeletal defects noted, no edema  PSYCH: mentation appears normal, affect normal/bright

## 2022-02-07 NOTE — NURSING NOTE
"Chief Complaint   Patient presents with     Hypertension       Initial /80 (BP Location: Right arm, Patient Position: Chair, Cuff Size: Adult Regular)   Pulse 84   Ht 1.676 m (5' 6\")   Wt 86.2 kg (190 lb)   BMI 30.67 kg/m   Estimated body mass index is 30.67 kg/m  as calculated from the following:    Height as of this encounter: 1.676 m (5' 6\").    Weight as of this encounter: 86.2 kg (190 lb).  Medication Reconciliation: complete  Park Osorio LPN  "

## 2022-02-09 ENCOUNTER — INFUSION THERAPY VISIT (OUTPATIENT)
Dept: INFUSION THERAPY | Facility: OTHER | Age: 52
End: 2022-02-09
Attending: NURSE PRACTITIONER
Payer: COMMERCIAL

## 2022-02-09 DIAGNOSIS — E53.8 VITAMIN B 12 DEFICIENCY: ICD-10-CM

## 2022-02-09 DIAGNOSIS — C18.2 CANCER OF ASCENDING COLON (H): Primary | ICD-10-CM

## 2022-02-09 PROCEDURE — 96372 THER/PROPH/DIAG INJ SC/IM: CPT | Performed by: NURSE PRACTITIONER

## 2022-02-09 PROCEDURE — 250N000011 HC RX IP 250 OP 636: Performed by: NURSE PRACTITIONER

## 2022-02-09 RX ORDER — HEPARIN SODIUM (PORCINE) LOCK FLUSH IV SOLN 100 UNIT/ML 100 UNIT/ML
5 SOLUTION INTRAVENOUS
Status: CANCELLED | OUTPATIENT
Start: 2022-02-09

## 2022-02-09 RX ORDER — CYANOCOBALAMIN 1000 UG/ML
1000 INJECTION, SOLUTION INTRAMUSCULAR; SUBCUTANEOUS
Status: DISCONTINUED | OUTPATIENT
Start: 2022-02-09 | End: 2022-02-09 | Stop reason: HOSPADM

## 2022-02-09 RX ORDER — CYANOCOBALAMIN 1000 UG/ML
1000 INJECTION, SOLUTION INTRAMUSCULAR; SUBCUTANEOUS
Status: CANCELLED
Start: 2022-02-09

## 2022-02-09 RX ADMIN — CYANOCOBALAMIN 1000 MCG: 1000 INJECTION, SOLUTION INTRAMUSCULAR at 10:57

## 2022-02-09 NOTE — PROGRESS NOTES
Patient is 51 years old, here today accompanied by self for injection of B12 per order of FRANK Moran .     Patient denies questions nor concerns regarding medication, administration site, side effects, nor aftercare.  Patient identified with two identifiers, order verified, and verbal consent for today's injection obtained from patient.   Patient education provided on s/s of injection site infection, and/or medication specific side effects, and when to call a provider.  Patient instructed to report any adverse effects.     Medication B12 administered per protocol in LT ARM at 90 degree angle.  Site covered with sterile bandage.  Patient tolerated injection well, no verbal nor non-verbal signs of discomfort noted.  No adverse effects noted at this time.     Patient instructed to call with further questions or concerns.  Patient states understanding and is in agreement with this plan.  Copy of appointments, and after visit summary (AVS) given to patient. Patient discharged.

## 2022-02-17 PROBLEM — G43.709 CHRONIC MIGRAINE WITHOUT AURA: Status: ACTIVE | Noted: 2018-01-18

## 2022-02-19 ENCOUNTER — HOSPITAL ENCOUNTER (EMERGENCY)
Facility: HOSPITAL | Age: 52
Discharge: HOME | End: 2022-02-19
Attending: PHYSICIAN ASSISTANT | Admitting: PHYSICIAN ASSISTANT
Payer: OTHER MISCELLANEOUS

## 2022-02-19 ENCOUNTER — APPOINTMENT (OUTPATIENT)
Dept: CT IMAGING | Facility: HOSPITAL | Age: 52
End: 2022-02-19
Attending: PHYSICIAN ASSISTANT
Payer: OTHER MISCELLANEOUS

## 2022-02-19 VITALS
HEART RATE: 72 BPM | SYSTOLIC BLOOD PRESSURE: 163 MMHG | DIASTOLIC BLOOD PRESSURE: 101 MMHG | TEMPERATURE: 98.4 F | OXYGEN SATURATION: 95 % | RESPIRATION RATE: 16 BRPM

## 2022-02-19 DIAGNOSIS — S09.90XA CLOSED HEAD INJURY, INITIAL ENCOUNTER: ICD-10-CM

## 2022-02-19 PROCEDURE — 99284 EMERGENCY DEPT VISIT MOD MDM: CPT | Mod: 25

## 2022-02-19 PROCEDURE — 70450 CT HEAD/BRAIN W/O DYE: CPT

## 2022-02-19 PROCEDURE — 99283 EMERGENCY DEPT VISIT LOW MDM: CPT | Performed by: PHYSICIAN ASSISTANT

## 2022-02-19 PROCEDURE — 250N000013 HC RX MED GY IP 250 OP 250 PS 637: Performed by: PHYSICIAN ASSISTANT

## 2022-02-19 RX ORDER — ACETAMINOPHEN 325 MG/1
975 TABLET ORAL ONCE
Status: COMPLETED | OUTPATIENT
Start: 2022-02-19 | End: 2022-02-19

## 2022-02-19 RX ADMIN — ACETAMINOPHEN 975 MG: 325 TABLET, FILM COATED ORAL at 19:08

## 2022-02-19 ASSESSMENT — ENCOUNTER SYMPTOMS
LIGHT-HEADEDNESS: 1
NECK PAIN: 0
HEADACHES: 1
WEAKNESS: 0
NAUSEA: 1
NECK STIFFNESS: 0
DIZZINESS: 1
VOMITING: 0
NUMBNESS: 0
ABDOMINAL PAIN: 0

## 2022-02-19 ASSESSMENT — VISUAL ACUITY
OS: 20/70;WITH CORRECTIVE LENSES
OD: 20/40;WITH CORRECTIVE LENSES

## 2022-02-20 NOTE — DISCHARGE INSTRUCTIONS
Your symptoms are most consistent with a concussion.    Rest and stay hydrated.    Tylenol as needed for pain.    Return here for any new or worsening symptoms.
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
## 2022-02-20 NOTE — ED NOTES
Ambulatory to exit states has ride home. Sent with discharge summary printed. Steady ambulation to exit.
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
## 2022-02-22 ENCOUNTER — TELEPHONE (OUTPATIENT)
Dept: FAMILY MEDICINE | Facility: OTHER | Age: 52
End: 2022-02-22
Payer: COMMERCIAL

## 2022-02-22 ENCOUNTER — OFFICE VISIT (OUTPATIENT)
Dept: FAMILY MEDICINE | Facility: OTHER | Age: 52
End: 2022-02-22
Attending: NURSE PRACTITIONER
Payer: OTHER MISCELLANEOUS

## 2022-02-22 VITALS
DIASTOLIC BLOOD PRESSURE: 100 MMHG | OXYGEN SATURATION: 97 % | BODY MASS INDEX: 30.18 KG/M2 | TEMPERATURE: 98.7 F | WEIGHT: 187 LBS | SYSTOLIC BLOOD PRESSURE: 156 MMHG | HEART RATE: 72 BPM | RESPIRATION RATE: 18 BRPM

## 2022-02-22 DIAGNOSIS — S06.0X0D CONCUSSION WITHOUT LOSS OF CONSCIOUSNESS, SUBSEQUENT ENCOUNTER: Primary | ICD-10-CM

## 2022-02-22 PROCEDURE — 99213 OFFICE O/P EST LOW 20 MIN: CPT | Performed by: NURSE PRACTITIONER

## 2022-02-22 ASSESSMENT — PAIN SCALES - GENERAL: PAINLEVEL: SEVERE PAIN (7)
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
## 2022-02-22 NOTE — PROGRESS NOTES
Oncology Follow-up Visit    Reason for Visit:  Constantino is a 51 year old woman with a history of Stage III adenocarcinoma of the colon, who presents to the clinic today for routine follow-up.    Nursing Note and documentation reviewed: Yes    Interval History:   In regards to her hx of colon cancer, patient has been doing well. She denies any recent abdominal pain. No melena or hematochezia. Bowels regular. Appetite good. Her colonoscopy was scheduled, but was canceled and waiting to reschedule. She hasn't heard from genetics and never called. Energy is great.     Her biggest concern today is regarding a recent work related injury in which she was hit in the left side of her head with a closed handed fist. She was seen in ED. Head CT negative. Headaches have improved but haven't resolved. Vision changes have resolved. Still significant ringing in left ear. Waiting to hear from PCP about returning to work.     Oncologic History:   11/21/19 to 11/21/2019 Hospitalized with a hemoglobin of 5.7 underwent EGD with no evidence of active bleeding.  12/12/2019 Seen by Dr. Cook and established care with her as her new PCP.  Referral for screening colonoscopy was completed.  2/13/2020 Underwent colonoscopy by Dr. Cleveland where a near obstructing lesion in the right colon was found.  Pathology showed an invasive moderately differentiated adenocarcinoma.  CT the abdomen and pelvis showed a mass in the junction of the cecum and ascending colon highly suspicious for malignancy.  2/24/2020 Underwent laparoscopic-assisted right hemicolectomy by Dr. Cleveland that was converted to open right hemicolectomy when it was noted that the tumor was adherent to the pelvic brim; final pathology showed mildly differentiated adenocarcinoma of the right colon with 1/32 lymph nodes positive; there was perineural invasion; tumor budding and acutely inflamed tumor and reactive lymph nodes with peritonitis; the omentum showed mild peritonitis; margins  were negative; patient was staged [id number]; IIIb adenocarcinoma of the right colon  2/28/2020 Port-A-Cath placement  3/25/2020 PET scan which showed no evidence of distant metastatic disease  3/30/2020 Seen by Dr. Palma with Medical Oncology with recommendation for XELOX; capecitabine 850 mg/m2 p.o. b.i.d. on days 1 through 14 of a 21-day cycle, oxaliplatin 130 mg/m2 to be given on day 1 x4 cycles.  4/10/2020 Seen by Dr. Bryant with Radiation Oncology with no recommendation for radiation therapy at this time related to negative margins on pathology  4/17/2020 initiation of chemotherapy  6/22/2020 Completion of 4 cycles of XELOX  10/26/2020 Colonoscopy completed. Unable to visualize the anastomosis of the small bowel to the transverse colon     MMR proteins intact  CEA prior to surgery was <0.5     Current Chemo Regime/TX:  N/a  Current Cycle:  n/a  # of completed cycles:  n/a     Previous treatment:   Oxaliplatin/Capecitabine x 4 cycles    Past Medical History:   Diagnosis Date     Arthritis 10/13/2020     Cancer (H)     colon     Cancer (H) 2004    uterine     Chronic pain      De Quervain's disease (tenosynovitis) 12/2021     Depressive disorder      Diabetes (H)      History of blood transfusion      Hypertension      Prediabetes      Syncope and collapse      Thyroid disease      Trigger finger 12/2021       Past Surgical History:   Procedure Laterality Date     ARTHROSCOPY SHOULDER Left 11/6/2020    Procedure: LEFT SHOULDER ARTHROSCOPY WITH ROTATOR CUFF REPAIR AND ACROMIUM CLAVICULAR JOINT RESECTION;  Surgeon: Tanner Mujica DO;  Location: [facility]     BACK SURGERY      2008; 2011     COLONOSCOPY N/A 2/13/2020    Procedure: COLONOSCOPY WITH BIOPSY AND TATOOING OF ASCENDING COLON,INCOMEPLETE EXAM;  Surgeon: Kang Cleveland MD;  Location: [facility]     COLONOSCOPY N/A 10/26/2020    Procedure: incomplete colonoscopy ;  Surgeon: Kang Cleveland MD;  Location: [facility]     ESOPHAGOSCOPY, GASTROSCOPY,  DUODENOSCOPY (EGD), COMBINED N/A 7/21/2018    Procedure: COMBINED ESOPHAGOSCOPY, GASTROSCOPY, DUODENOSCOPY (EGD);  UPPER ENDOSCOPY WITH BIOPSIES;  Surgeon: Roger Vazquez MD;  Location: [facility]     ESOPHAGOSCOPY, GASTROSCOPY, DUODENOSCOPY (EGD), COMBINED N/A 11/21/2019    Procedure: UPPER ENDOSCOPY with biopsy;  Surgeon: Kang Cleveland MD;  Location: [facility]     HEAD & NECK SURGERY  2008    c4-c5 fusion     HYSTERECTOMY, CRISTIANE  04/2004    uterine cancer; [facility]     INSERT PORT VASCULAR ACCESS N/A 2/28/2020    Procedure: INSERTION, VASCULAR ACCESS PORT LEFT;  Surgeon: Kang Cleveland MD;  Location: [facility]     LAPAROSCOPIC ASSISTED COLECTOMY Right 2/24/2020    Procedure: RIGHT KEVIN COLECTOMY, LYSIS OF ADHESIONS;  Surgeon: Kang Cleveland MD;  Location: [facility]     LAPAROSCOPIC BYPASS GASTRIC  12/2010       Family History   Problem Relation Age of Onset     Diabetes Mother      Hypertension Mother      Coronary Artery Disease Mother      Myocardial Infarction Mother      Hypertension Father      Diabetes Maternal Grandmother      Hypertension Maternal Grandfather      Coronary Artery Disease Maternal Grandfather      Cancer Paternal Grandfather         unsure of type of cancer     Bladder Cancer Paternal Grandfather      Diabetes Maternal Aunt      Cerebrovascular Disease Maternal Aunt      Diabetes Maternal Aunt      Cerebrovascular Disease Maternal Aunt      Diabetes Maternal Aunt      Cerebrovascular Disease Maternal Aunt      Hyperlipidemia No family hx of      Breast Cancer No family hx of      Colon Cancer No family hx of      Prostate Cancer No family hx of      Thyroid Disease No family hx of      Anesthesia Reaction No family hx of      Asthma No family hx of      Genetic Disorder No family hx of        Social History     Socioeconomic History     Marital status:      Spouse name: Not on file     Number of children: 2     Years of education: Not on file     Highest education  level: Not on file   Occupational History     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 Yes     Comment: Mother   Social History Narrative     Not on file     Social Determinants of Health     Financial Resource Strain: Low Risk      Difficulty of Paying Living Expenses: Not hard at all   Food Insecurity: Not on file   Transportation Needs: Unknown     Lack of Transportation (Medical): Patient refused     Lack of Transportation (Non-Medical): Patient refused   Physical Activity: Not on file   Stress: Not on file   Social Connections: Not on file   Intimate Partner Violence: Not on file   Housing Stability: Not on file       Current Outpatient Medications   Medication     acetaminophen (TYLENOL) 500 MG tablet     atorvastatin (LIPITOR) 40 MG tablet     Cyanocobalamin 1000 MCG/ML KIT     DULoxetine (CYMBALTA) 30 MG capsule     EPINEPHrine (ANY BX GENERIC EQUIV) 0.3 MG/0.3ML injection 2-pack     EQ ASPIRIN ADULT LOW DOSE 81 MG EC tablet     gabapentin (NEURONTIN) 300 MG capsule     levothyroxine (SYNTHROID/LEVOTHROID) 200 MCG tablet     levothyroxine (SYNTHROID/LEVOTHROID) 25 MCG tablet     lisinopril (ZESTRIL) 20 MG tablet     multivitamin, therapeutic with minerals (MULTI-VITAMIN) TABS     omeprazole (PRILOSEC) 20 MG DR capsule     ondansetron (ZOFRAN-ODT) 4 MG ODT tab     tiZANidine (ZANAFLEX) 4 MG tablet     traZODone (DESYREL) 50 MG tablet     No current facility-administered medications for this visit.        Allergies   Allergen Reactions     Aspartame      Other reaction(s): Other - Describe In Comment Field, Seizures  convulsions     Bee Venom Anaphylaxis      "Bupropion Anaphylaxis     Throat gets tight and hives. Denies allergic reaction     Food Anaphylaxis     Peppers-anaphylaxis if eaten, if touched hands swell and reddened     Food Allergy Formula Anaphylaxis     Peppers, patient reports green peppers close off her throat     Ibuprofen Anaphylaxis     No Clinical Screening - See Comments Anaphylaxis     Peppers, patient reports green peppers close off her throat     Piper Anaphylaxis     Peppers--green, black, red, chili, etc. Derm symptoms, hives;  Strawberries--(cooked) Itching, breathing difficulties, GI symptoms. Mushrooms--GI symptoms, Hives.Spinach (cooked) throat irritation  (Listed on [facility] problem list.)     Fentanyl Swelling     Lips and tongue swelled.  Swelling of lips and tongue     Adhesive Tape      blisters     Amitriptyline Other (See Comments)     Respiratory symptoms - denies allergic reaction     Liquid Adhesive Dermatitis     blisters  Blisters  Silk tape and tegaderm is fine     Oxcarbazepine Other (See Comments)     Throat gets tight and hives. Denies allergic reaction  Throat gets tight and hives-       Prozac [Fluoxetine]      Other reaction(s): *Unknown  uncontrolled muscle spasms     Valproic Acid      Respiratory symptoms ([facility]). Denies allergic reaction     Ziprasidone      Throat gets tight and hives. Denies allergic reaction       Review Of Systems:  A complete review of systems is negative except for the above mentioned items in the interval history.     ECOG Performance Status: 0    Physical Exam:  BP (!) 148/80   Pulse 66   Temp 97.2  F (36.2  C) (Tympanic)   Resp 19   Ht 1.702 m (5' 7\")   Wt 84.5 kg (186 lb 4.6 oz)   SpO2 100%   BMI 29.18 kg/m    GENERAL APPEARANCE: Healthy, alert and in no acute distress.  HEENT: Eyes appear normal without scleral icterus. Extraocular movements intact.  NECK:   Supple with normal range of motion. No asymmetry or masses.  LYMPHATICS: No palpable " cervical, supraclavicular nodes.  RESP: Lungs clear to auscultation bilaterally, respirations regular and easy.  CARDIOVASCULAR: Regular rate and rhythm. Normal S1, S2; no murmur, gallop, or rub.  ABDOMEN: Soft, non-tender, non-distended. Bowel sounds auscultated all 4 quadrants. No palpable organomegaly or masses.  MUSCULOSKELETAL: Extremities without gross deformities noted. No edema of bilateral lower extremities.  SKIN: No suspicious lesions or rashes.  NEURO: Alert and oriented x 3.  Gait steady.  PSYCHIATRIC: Mentation and affect appear normal.  Mood appropriate.    Laboratory:  2/23 labs reviewed. CBC satisfactory without anemia. CMP notable for slightly elevated ALT at 55, otherwise normal. CEA satisfactory at 1.2. Iron, TBIC, Ferritin all normal. B12 elevated (had just received injection).     Imaging Studies:      PROCEDURE: CT CHEST/ABDOMEN/PELVIS W CONTRAST 2/23/2022 1:19 PM     HISTORY: surveillance colon cancer; History of colon cancer, stage III     FINDINGS: CT chest: The lungs are clear. No pulmonary masses or  infiltrates are seen. The hilar and mediastinal lymph nodes are normal  in caliber. No pleural abnormalities are seen. The heart is normal in  size. Degenerative changes are present thoracic spine.     CT scan of the abdomen and pelvis: There is fatty infiltration of the  liver. No liver masses are seen. There is no biliary ductal  enlargement. The gallbladder has been removed. Postoperative changes  are seen in the stomach. The spleen and pancreas appear normal. The  adrenal glands are normal. The right and left kidneys are free of  masses or hydronephrosis. The periaortic lymph nodes are normal in  caliber. There is been a right colectomy. No intraperitoneal masses or  inflammatory changes are noted. The bladder and rectum are normal.  Postoperative changes are present in the lumbar spine.       IMPRESSION: No evidence of metastatic disease from the patient's colon cancer.     ZENA  MD JERRY        ASSESSMENT/PLAN:  #1 Colon cancer:  Stage IIIB adenocarcinoma of the ascending colon diagnosed February 2020.  She completed adjuvant chemotherapy with XELOX x 4 cycles on 6/22/2020 and is being followed with surveillance.  Again, discussed repeat colonoscopy that is overdue due to Anastomosis site not visualized well on previous colonoscopy. This was apparently scheduled but needed to be canceled, awaiting a recall from them. Also hasn't heard from genetics, I will provide number. Today, she is doing well from cancer stand point. No signs to suggest recurrence or progression. We will continue with surveillance. I will see her back in 3 months with labs prior.      #2 Iron deficiency: Currently on an oral liquid iron supplement per recommendation of her gastric bypass provider. Iron levels today are appropriate. We will recheck her ferritin/TIBC and ferritin in 3 months.     #3 B12 deficiency: Currently on B12 injections q2w due to previous deficiency. It looks like previous injection was given right before labs were drawn. We will check a RBC folate and B12 again in 3 months.      #4 Neuropathy: Grade 1. Improving.  We will continue with current management of Cymbalta 30 mg twice daily and gabapentin 600 mg at at bedtime. Requesting refills.     Patient in agreement with plan and verbalizes understanding. Agrees to call with any questions or concerns.    60 minutes spent in the patient's encounter today with time spent in review of patient's chart along with chart preparation and review of the treatment plan and signing of treatment plan.  Time was also spent with the patient in obtaining a review of systems and performing a physical exam along with detailed review of all test results. Time was also spent in discussing plan for future follow-up and relating instructions for follow-up and in placing future orders.    JUNIOR [facility]  Medical Oncology

## 2022-02-22 NOTE — NURSING NOTE
"Chief Complaint   Patient presents with     Work Comp       Initial BP (!) 156/100 (BP Location: Right arm, Patient Position: Chair, Cuff Size: Adult Large)   Pulse 72   Temp 98.7  F (37.1  C) (Tympanic)   Resp 18   Wt 84.8 kg (187 lb)   SpO2 97%   BMI 30.18 kg/m   Estimated body mass index is 30.18 kg/m  as calculated from the following:    Height as of 2/7/22: 1.676 m (5' 6\").    Weight as of this encounter: 84.8 kg (187 lb).  Medication Reconciliation: complete  Geeta Marquez LPN    "

## 2022-02-23 ENCOUNTER — LAB (OUTPATIENT)
Dept: INFUSION THERAPY | Facility: OTHER | Age: 52
End: 2022-02-23
Attending: NURSE PRACTITIONER
Payer: COMMERCIAL

## 2022-02-23 ENCOUNTER — HOSPITAL ENCOUNTER (OUTPATIENT)
Dept: CT IMAGING | Facility: HOSPITAL | Age: 52
End: 2022-02-23
Attending: NURSE PRACTITIONER
Payer: COMMERCIAL

## 2022-02-23 ENCOUNTER — HOSPITAL ENCOUNTER (OUTPATIENT)
Facility: HOSPITAL | Age: 52
Discharge: HOME | End: 2022-02-24
Attending: NURSE PRACTITIONER | Admitting: NURSE PRACTITIONER
Payer: COMMERCIAL

## 2022-02-23 DIAGNOSIS — Z98.84 GASTRIC BYPASS STATUS FOR OBESITY: ICD-10-CM

## 2022-02-23 DIAGNOSIS — E61.1 IRON DEFICIENCY: ICD-10-CM

## 2022-02-23 DIAGNOSIS — E53.8 VITAMIN B 12 DEFICIENCY: ICD-10-CM

## 2022-02-23 DIAGNOSIS — C18.2 CANCER OF ASCENDING COLON (H): ICD-10-CM

## 2022-02-23 DIAGNOSIS — E53.8 VITAMIN B 12 DEFICIENCY: Primary | ICD-10-CM

## 2022-02-23 DIAGNOSIS — Z85.038 HISTORY OF COLON CANCER, STAGE III: ICD-10-CM

## 2022-02-23 DIAGNOSIS — Z85.038 HISTORY OF COLON CANCER, STAGE III: Primary | ICD-10-CM

## 2022-02-23 LAB
ALBUMIN SERPL-MCNC: 3.9 G/DL (ref 3.4–5)
ALP SERPL-CCNC: 80 U/L (ref 40–150)
ALT SERPL W P-5'-P-CCNC: 55 U/L (ref 0–50)
ANION GAP SERPL CALCULATED.3IONS-SCNC: 4 MMOL/L (ref 3–14)
AST SERPL W P-5'-P-CCNC: 45 U/L (ref 0–45)
BASOPHILS # BLD AUTO: 0 10E3/UL (ref 0–0.2)
BASOPHILS NFR BLD AUTO: 0 %
BILIRUB SERPL-MCNC: 0.5 MG/DL (ref 0.2–1.3)
BUN SERPL-MCNC: 13 MG/DL (ref 7–30)
CALCIUM SERPL-MCNC: 8.8 MG/DL (ref 8.5–10.1)
CEA SERPL-MCNC: 1.2 UG/L (ref 0–2.5)
CHLORIDE BLD-SCNC: 109 MMOL/L (ref 94–109)
CO2 SERPL-SCNC: 27 MMOL/L (ref 20–32)
CREAT SERPL-MCNC: 0.66 MG/DL (ref 0.52–1.04)
EOSINOPHIL # BLD AUTO: 0 10E3/UL (ref 0–0.7)
EOSINOPHIL NFR BLD AUTO: 0 %
ERYTHROCYTE [DISTWIDTH] IN BLOOD BY AUTOMATED COUNT: 12.1 % (ref 10–15)
FERRITIN SERPL-MCNC: 136 NG/ML (ref 8–252)
GFR SERPL CREATININE-BSD FRML MDRD: >90 ML/MIN/1.73M2
GLUCOSE BLD-MCNC: 90 MG/DL (ref 70–99)
HCT VFR BLD AUTO: 39.4 % (ref 35–47)
HGB BLD-MCNC: 13.4 G/DL (ref 11.7–15.7)
IMM GRANULOCYTES # BLD: 0 10E3/UL
IMM GRANULOCYTES NFR BLD: 0 %
IRON SATN MFR SERPL: 18 % (ref 15–46)
IRON SERPL-MCNC: 58 UG/DL (ref 35–180)
LYMPHOCYTES # BLD AUTO: 1.8 10E3/UL (ref 0.8–5.3)
LYMPHOCYTES NFR BLD AUTO: 27 %
MCH RBC QN AUTO: 29.6 PG (ref 26.5–33)
MCHC RBC AUTO-ENTMCNC: 34 G/DL (ref 31.5–36.5)
MCV RBC AUTO: 87 FL (ref 78–100)
MONOCYTES # BLD AUTO: 0.5 10E3/UL (ref 0–1.3)
MONOCYTES NFR BLD AUTO: 8 %
NEUTROPHILS # BLD AUTO: 4.3 10E3/UL (ref 1.6–8.3)
NEUTROPHILS NFR BLD AUTO: 65 %
NRBC # BLD AUTO: 0 10E3/UL
NRBC BLD AUTO-RTO: 0 /100
PLATELET # BLD AUTO: 176 10E3/UL (ref 150–450)
POTASSIUM BLD-SCNC: 4 MMOL/L (ref 3.4–5.3)
PROT SERPL-MCNC: 7.2 G/DL (ref 6.8–8.8)
RBC # BLD AUTO: 4.53 10E6/UL (ref 3.8–5.2)
SODIUM SERPL-SCNC: 140 MMOL/L (ref 133–144)
TIBC SERPL-MCNC: 324 UG/DL (ref 240–430)
VIT B12 SERPL-MCNC: >6000 PG/ML (ref 193–986)
WBC # BLD AUTO: 6.7 10E3/UL (ref 4–11)

## 2022-02-23 PROCEDURE — 82728 ASSAY OF FERRITIN: CPT

## 2022-02-23 PROCEDURE — 85025 COMPLETE CBC W/AUTO DIFF WBC: CPT

## 2022-02-23 PROCEDURE — 82607 VITAMIN B-12: CPT | Mod: ZL

## 2022-02-23 PROCEDURE — 96372 THER/PROPH/DIAG INJ SC/IM: CPT | Mod: 59 | Performed by: NURSE PRACTITIONER

## 2022-02-23 PROCEDURE — 250N000011 HC RX IP 250 OP 636: Performed by: RADIOLOGY

## 2022-02-23 PROCEDURE — 82747 ASSAY OF FOLIC ACID RBC: CPT | Mod: 90

## 2022-02-23 PROCEDURE — 80053 COMPREHEN METABOLIC PANEL: CPT | Mod: ZL

## 2022-02-23 PROCEDURE — 82728 ASSAY OF FERRITIN: CPT | Mod: ZL

## 2022-02-23 PROCEDURE — 82747 ASSAY OF FOLIC ACID RBC: CPT | Mod: ZL

## 2022-02-23 PROCEDURE — 82607 VITAMIN B-12: CPT

## 2022-02-23 PROCEDURE — 250N000011 HC RX IP 250 OP 636: Performed by: NURSE PRACTITIONER

## 2022-02-23 PROCEDURE — 82378 CARCINOEMBRYONIC ANTIGEN: CPT | Mod: ZL

## 2022-02-23 PROCEDURE — 80053 COMPREHEN METABOLIC PANEL: CPT

## 2022-02-23 PROCEDURE — 74177 CT ABD & PELVIS W/CONTRAST: CPT

## 2022-02-23 PROCEDURE — 85025 COMPLETE CBC W/AUTO DIFF WBC: CPT | Mod: ZL

## 2022-02-23 PROCEDURE — 96523 IRRIG DRUG DELIVERY DEVICE: CPT | Mod: 59

## 2022-02-23 PROCEDURE — 96523 IRRIG DRUG DELIVERY DEVICE: CPT

## 2022-02-23 PROCEDURE — 82378 CARCINOEMBRYONIC ANTIGEN: CPT

## 2022-02-23 PROCEDURE — 83550 IRON BINDING TEST: CPT | Mod: ZL

## 2022-02-23 PROCEDURE — 83550 IRON BINDING TEST: CPT

## 2022-02-23 RX ORDER — IOPAMIDOL 755 MG/ML
92 INJECTION, SOLUTION INTRAVASCULAR ONCE
Status: COMPLETED | OUTPATIENT
Start: 2022-02-23 | End: 2022-02-23

## 2022-02-23 RX ORDER — HEPARIN SODIUM (PORCINE) LOCK FLUSH IV SOLN 100 UNIT/ML 100 UNIT/ML
5 SOLUTION INTRAVENOUS
Status: DISCONTINUED | OUTPATIENT
Start: 2022-02-23 | End: 2022-02-24 | Stop reason: HOSPADM

## 2022-02-23 RX ORDER — CYANOCOBALAMIN 1000 UG/ML
1000 INJECTION, SOLUTION INTRAMUSCULAR; SUBCUTANEOUS
Status: CANCELLED
Start: 2022-02-23

## 2022-02-23 RX ORDER — HEPARIN SODIUM (PORCINE) LOCK FLUSH IV SOLN 100 UNIT/ML 100 UNIT/ML
5 SOLUTION INTRAVENOUS
Status: CANCELLED | OUTPATIENT
Start: 2022-02-23

## 2022-02-23 RX ORDER — CYANOCOBALAMIN 1000 UG/ML
1000 INJECTION, SOLUTION INTRAMUSCULAR; SUBCUTANEOUS
Status: DISCONTINUED | OUTPATIENT
Start: 2022-02-23 | End: 2022-02-23 | Stop reason: HOSPADM

## 2022-02-23 RX ORDER — CYANOCOBALAMIN 1000 UG/ML
1000 INJECTION, SOLUTION INTRAMUSCULAR; SUBCUTANEOUS
Status: DISCONTINUED | OUTPATIENT
Start: 2022-02-23 | End: 2022-02-24 | Stop reason: HOSPADM

## 2022-02-23 RX ORDER — HEPARIN SODIUM (PORCINE) LOCK FLUSH IV SOLN 100 UNIT/ML 100 UNIT/ML
5 SOLUTION INTRAVENOUS
Status: DISCONTINUED | OUTPATIENT
Start: 2022-02-23 | End: 2022-02-23 | Stop reason: HOSPADM

## 2022-02-23 RX ADMIN — Medication 5 ML: at 13:15

## 2022-02-23 RX ADMIN — CYANOCOBALAMIN 1000 MCG: 1000 INJECTION, SOLUTION INTRAMUSCULAR at 11:21

## 2022-02-23 RX ADMIN — IOPAMIDOL 92 ML: 755 INJECTION, SOLUTION INTRAVENOUS at 13:05

## 2022-02-23 NOTE — PROGRESS NOTES
Patient denies questions nor concerns regarding B12, administration site, side effects, nor aftercare.  Patient identified with two identifiers, order verified, and verbal consent for today's injection obtained from patient.   Patient education provided on s/s of injection site infection, and/or medication specific side effects, and when to call a provider.  Patient instructed to report any adverse effects.     Medication administered per protocol in right deltoid at 90 degree angle.  Site covered with sterile bandage.  Patient tolerated injection well, no verbal nor non-verbal signs of discomfort noted.  No adverse effects noted at this time.     Patient instructed to call with further questions or concerns.  Patient states understanding and is in agreement with this plan.

## 2022-02-23 NOTE — PROGRESS NOTES
Patients power port accessed using non-coring, 19 gauge, 3/4 inch needle.Mask donned by caregiver: yes Site prepped with CHG: yes Labs drawn: yes Dressing applied using aseptic technique: yes.     Port accessed per facility protocol. Port flushed easily, blood return noted.  No signs and symptoms of infection or infiltration.  Port flushed with 10mL normal saline, blood return noted, 10 mLs blood discarded. 4 tube(s) taken for ordered labs, port flushed with 20 mLs normal saline.  Port left accessed as patient has appointment with DI for scans. Patient discharged with no complaints.

## 2022-02-25 ENCOUNTER — TELEPHONE (OUTPATIENT)
Dept: FAMILY MEDICINE | Facility: OTHER | Age: 52
End: 2022-02-25

## 2022-02-25 ENCOUNTER — ONCOLOGY VISIT (OUTPATIENT)
Dept: ONCOLOGY | Facility: OTHER | Age: 52
End: 2022-02-25
Attending: NURSE PRACTITIONER
Payer: COMMERCIAL

## 2022-02-25 VITALS
DIASTOLIC BLOOD PRESSURE: 80 MMHG | TEMPERATURE: 97.2 F | OXYGEN SATURATION: 100 % | RESPIRATION RATE: 19 BRPM | WEIGHT: 186.29 LBS | HEIGHT: 67 IN | BODY MASS INDEX: 29.24 KG/M2 | SYSTOLIC BLOOD PRESSURE: 148 MMHG | HEART RATE: 66 BPM

## 2022-02-25 DIAGNOSIS — E61.1 IRON DEFICIENCY: ICD-10-CM

## 2022-02-25 DIAGNOSIS — Z85.038 HISTORY OF COLON CANCER, STAGE III: Primary | ICD-10-CM

## 2022-02-25 DIAGNOSIS — G62.9 NEUROPATHY: ICD-10-CM

## 2022-02-25 DIAGNOSIS — E53.8 VITAMIN B 12 DEFICIENCY: ICD-10-CM

## 2022-02-25 LAB — FOLATE RBC-MCNC: 1139 NG/ML

## 2022-02-25 PROCEDURE — G0463 HOSPITAL OUTPT CLINIC VISIT: HCPCS

## 2022-02-25 PROCEDURE — 99215 OFFICE O/P EST HI 40 MIN: CPT | Performed by: NURSE PRACTITIONER

## 2022-02-25 RX ORDER — GABAPENTIN 300 MG/1
600 CAPSULE ORAL AT BEDTIME
Qty: 60 CAPSULE | Refills: 3 | Status: SHIPPED | OUTPATIENT
Start: 2022-02-25 | End: 2022-07-14

## 2022-02-25 RX ORDER — DULOXETIN HYDROCHLORIDE 30 MG/1
30 CAPSULE, DELAYED RELEASE ORAL 2 TIMES DAILY
Qty: 60 CAPSULE | Refills: 3 | Status: SHIPPED | OUTPATIENT
Start: 2022-02-25 | End: 2022-07-14

## 2022-02-25 ASSESSMENT — PAIN SCALES - GENERAL: PAINLEVEL: NO PAIN (0)

## 2022-02-25 NOTE — TELEPHONE ENCOUNTER
11:50 AM    Reason for Call: OVERBOOK    Patient is having the following symptoms: pt needs apt asap to return to work.  Had concussion last Saturday. Pt is getting better so wants to return to work    The patient is requesting an appointment for next week with Dr Schafer.    Was an appointment offered for this call? No  If yes : Appointment type              Date    Preferred method for responding to this message: Telephone Call  What is your phone number ?[phone]    If we cannot reach you directly, may we leave a detailed response at the number you provided? Yes    Can this message wait until your PCP/provider returns, if unavailable today? Not applicable    Renea Lynch

## 2022-02-25 NOTE — NURSING NOTE
"Oncology Rooming Note    February 25, 2022 11:07 AM   Constantino Warren is a 51 year old female who presents for:    Chief Complaint   Patient presents with     Oncology Clinic Visit     Follow up. History of colon cancer, stage III     Initial Vitals: BP (!) 148/80   Pulse 66   Temp 97.2  F (36.2  C) (Tympanic)   Resp 19   Ht 1.702 m (5' 7\")   Wt 84.5 kg (186 lb 4.6 oz)   SpO2 100%   BMI 29.18 kg/m   Estimated body mass index is 29.18 kg/m  as calculated from the following:    Height as of this encounter: 1.702 m (5' 7\").    Weight as of this encounter: 84.5 kg (186 lb 4.6 oz). Body surface area is 2 meters squared.  No Pain (0) Comment: Data Unavailable   No LMP recorded. Patient has had a hysterectomy.  Allergies reviewed: Yes  Medications reviewed: Yes    Medications: MEDICATION REFILLS NEEDED TODAY. Provider was notified.  Pharmacy name entered into [facility]:    [facility] - [place], [place] - [address] [facility] [address]  [facility] - [place], [place] - [address]  [facility] - [place], [place] - [address]  [facility] - RICHARD Quiroga LPN              "

## 2022-02-28 ENCOUNTER — OFFICE VISIT (OUTPATIENT)
Dept: FAMILY MEDICINE | Facility: OTHER | Age: 52
End: 2022-02-28
Attending: FAMILY MEDICINE
Payer: OTHER MISCELLANEOUS

## 2022-02-28 VITALS
HEART RATE: 78 BPM | WEIGHT: 181.2 LBS | TEMPERATURE: 97.9 F | BODY MASS INDEX: 28.44 KG/M2 | SYSTOLIC BLOOD PRESSURE: 140 MMHG | DIASTOLIC BLOOD PRESSURE: 82 MMHG | HEIGHT: 67 IN

## 2022-02-28 DIAGNOSIS — S06.0X0D CONCUSSION WITHOUT LOSS OF CONSCIOUSNESS, SUBSEQUENT ENCOUNTER: Primary | ICD-10-CM

## 2022-02-28 PROCEDURE — 99213 OFFICE O/P EST LOW 20 MIN: CPT | Performed by: FAMILY MEDICINE

## 2022-02-28 ASSESSMENT — PAIN SCALES - GENERAL: PAINLEVEL: MILD PAIN (2)

## 2022-02-28 NOTE — PROGRESS NOTES
"Occupational Visit     SUBJECTIVE:  Constantino Warren, 51 year old, female is seen for new evaluation and treatment of occupational injury. Date of injury is 2/19/2022.    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   Hand/fist.  No loss of consciousness.  Headache and nausea and tinnitus started 1/2 hour later.  Wall like water on it \"like ocean wave\".    Description (location/character/radiation): was having a headache afterward. Saw Melany Valle on 2/25/2022 due to still having symtpoms. Was told by Melany that she had to have no headaches or be cleared by the [facility]. She has an appointment scheduled for April 15th.    Intensity:  mild    Accompanying signs and symptoms:slight headache and ringing in her ear is her only sx. Would like to return to work if possible.    History (similar episodes/previous evaluation): None    Precipitating or alleviating factors: None    Therapies tried and outcome: None       Remaining symptoms- headache 2/10, mild buzzing in left ear.    Multiple concussions - 5 or so - residential use.    No OTC medications for pain/headache.    Concussion clinic in 4/2022.      Allergies   Allergen Reactions     Aspartame      Other reaction(s): Other - Describe In Comment Field, Seizures  convulsions     Bee Venom Anaphylaxis     Bupropion Anaphylaxis     Throat gets tight and hives. Denies allergic reaction     Food Anaphylaxis     Peppers-anaphylaxis if eaten, if touched hands swell and reddened     Food Allergy Formula Anaphylaxis     Peppers, patient reports green peppers close off her throat     Ibuprofen Anaphylaxis     No Clinical Screening - See Comments Anaphylaxis     Peppers, patient reports green peppers close off " her throat     Piper Anaphylaxis     Peppers--green, black, red, chili, etc. Derm symptoms, hives;  Strawberries--(cooked) Itching, breathing difficulties, GI symptoms. Mushrooms--GI symptoms, Hives.Spinach (cooked) throat irritation  (Listed on [facility] problem list.)     Fentanyl Swelling     Lips and tongue swelled.  Swelling of lips and tongue     Adhesive Tape      blisters     Amitriptyline Other (See Comments)     Respiratory symptoms - denies allergic reaction     Liquid Adhesive Dermatitis     blisters  Blisters  Silk tape and tegaderm is fine     Oxcarbazepine Other (See Comments)     Throat gets tight and hives. Denies allergic reaction  Throat gets tight and hives-       Prozac [Fluoxetine]      Other reaction(s): *Unknown  uncontrolled muscle spasms     Valproic Acid      Respiratory symptoms ([facility]). Denies allergic reaction     Ziprasidone      Throat gets tight and hives. Denies allergic reaction         Review of Systems:  Constitutional, HEENT, cardiovascular, pulmonary, gi and gu systems are negative, except as otherwise noted.      OBJECTIVE:  Vitals:    02/28/22 1302   BP: (!) 140/82   Pulse: 78   Temp: 97.9  F (36.6  C)         Exam:  GENERAL:: healthy, alert and no distress  EYES: Eyes grossly normal to inspection, extraocular movements - intact, and PERRL  RESP: lungs clear to auscultation - no rales, no rhonchi, no wheezes  CV: regular rates and rhythm, normal S1 S2, no S3 or S4 and no murmur, no click or rub -  MS: extremities- no gross deformities noted, no edema  NEURO: strength and tone- normal, sensory exam- grossly normal, mentation- intact, speech- normal, reflexes- symmetric; normal balance; normal tandem gait, finger nose testing; negative Romberg and pronator drift  PSYCH: Alert and oriented      Labs: No results found for this or any previous visit (from the past 24 hour(s)).      ASSESSMENT/PLAN:  (S06.0X0D) Concussion without loss of  consciousness, subsequent encounter  (primary encounter diagnosis)  Comment: see letter; residual mild headache not limiting activity; most of work is sedentary - leading gropus  Plan: return to work without restrictions; see letter; if recurrence or progression then re-evaluate.  Keep concussion consult in 4/2022 given history of multiple concussions.

## 2022-02-28 NOTE — NURSING NOTE
"Chief Complaint   Patient presents with     Work Comp       Initial BP (!) 140/82 (BP Location: Right arm, Patient Position: Sitting, Cuff Size: Adult Regular)   Pulse 78   Temp 97.9  F (36.6  C) (Tympanic)   Ht 1.702 m (5' 7\")   Wt 82.2 kg (181 lb 3.2 oz)   BMI 28.38 kg/m   Estimated body mass index is 28.38 kg/m  as calculated from the following:    Height as of this encounter: 1.702 m (5' 7\").    Weight as of this encounter: 82.2 kg (181 lb 3.2 oz).  Medication Reconciliation: complete  Patrica Hopkins LPN  "

## 2022-02-28 NOTE — LETTER
[facility] - [place]  [address]  [place] [address]  Phone: [phone]    February 28, 2022      Re:  Constantino MEJIA Warren  [address]     [facility] [address]          To whom it may concern:    RE: Constantino Warren    Patient was seen and treated today at our clinic and missed work.  Patient may return to work 3/1/2022 with the following:  No working or lifting restrictions    Please contact me for questions or concerns.      Sincerely,        Coco Schafer MD

## 2022-03-09 ENCOUNTER — INFUSION THERAPY VISIT (OUTPATIENT)
Dept: INFUSION THERAPY | Facility: OTHER | Age: 52
End: 2022-03-09
Attending: NURSE PRACTITIONER
Payer: COMMERCIAL

## 2022-03-09 DIAGNOSIS — C18.2 CANCER OF ASCENDING COLON (H): Primary | ICD-10-CM

## 2022-03-09 DIAGNOSIS — E53.8 VITAMIN B 12 DEFICIENCY: ICD-10-CM

## 2022-03-09 PROCEDURE — 250N000011 HC RX IP 250 OP 636: Performed by: NURSE PRACTITIONER

## 2022-03-09 PROCEDURE — 96372 THER/PROPH/DIAG INJ SC/IM: CPT | Performed by: INTERNAL MEDICINE

## 2022-03-09 PROCEDURE — 96372 THER/PROPH/DIAG INJ SC/IM: CPT | Performed by: NURSE PRACTITIONER

## 2022-03-09 RX ORDER — CYANOCOBALAMIN 1000 UG/ML
1000 INJECTION, SOLUTION INTRAMUSCULAR; SUBCUTANEOUS
Status: DISCONTINUED | OUTPATIENT
Start: 2022-03-09 | End: 2022-03-09 | Stop reason: HOSPADM

## 2022-03-09 RX ORDER — HEPARIN SODIUM (PORCINE) LOCK FLUSH IV SOLN 100 UNIT/ML 100 UNIT/ML
5 SOLUTION INTRAVENOUS
Status: CANCELLED | OUTPATIENT
Start: 2022-03-09

## 2022-03-09 RX ORDER — CYANOCOBALAMIN 1000 UG/ML
1000 INJECTION, SOLUTION INTRAMUSCULAR; SUBCUTANEOUS
Status: CANCELLED
Start: 2022-03-09

## 2022-03-09 RX ADMIN — CYANOCOBALAMIN 1000 MCG: 1000 INJECTION, SOLUTION INTRAMUSCULAR at 10:46

## 2022-03-09 NOTE — PROGRESS NOTES
Patient is 51 years old, here today for injection of B12 per order of Dr Palma .       Patient denies questions nor concerns regarding medication, administration site, side effects, nor aftercare.  Patient identified with two identifiers, order verified, and verbal consent for today's injection obtained from patient.   Patient education provided on s/s of injection site infection, and/or medication specific side effects, and when to call a provider.  Patient instructed to report any adverse effects.     Medication administered per protocol in Lt ARM at 90 degree angle.  Site covered with sterile bandage.  Patient tolerated injection well, no verbal nor non-verbal signs of discomfort noted.  No adverse effects noted at this time.     Patient instructed to call with further questions or concerns.  Patient states understanding and is in agreement with this plan.  Patient discharged.

## 2022-03-22 ENCOUNTER — LAB (OUTPATIENT)
Dept: OCCUPATIONAL MEDICINE | Facility: OTHER | Age: 52
End: 2022-03-22

## 2022-04-20 ENCOUNTER — INFUSION THERAPY VISIT (OUTPATIENT)
Dept: INFUSION THERAPY | Facility: OTHER | Age: 52
End: 2022-04-20
Attending: NURSE PRACTITIONER
Payer: COMMERCIAL

## 2022-04-20 ENCOUNTER — TELEPHONE (OUTPATIENT)
Dept: ONCOLOGY | Facility: OTHER | Age: 52
End: 2022-04-20

## 2022-04-20 DIAGNOSIS — E53.8 VITAMIN B 12 DEFICIENCY: ICD-10-CM

## 2022-04-20 DIAGNOSIS — C18.2 CANCER OF ASCENDING COLON (H): Primary | ICD-10-CM

## 2022-04-20 PROCEDURE — 96372 THER/PROPH/DIAG INJ SC/IM: CPT | Performed by: INTERNAL MEDICINE

## 2022-04-20 RX ORDER — HEPARIN SODIUM (PORCINE) LOCK FLUSH IV SOLN 100 UNIT/ML 100 UNIT/ML
5 SOLUTION INTRAVENOUS
Status: CANCELLED | OUTPATIENT
Start: 2022-04-20

## 2022-04-20 RX ORDER — CYANOCOBALAMIN 1000 UG/ML
1000 INJECTION, SOLUTION INTRAMUSCULAR; SUBCUTANEOUS
Status: CANCELLED
Start: 2022-04-20

## 2022-04-20 RX ORDER — HEPARIN SODIUM (PORCINE) LOCK FLUSH IV SOLN 100 UNIT/ML 100 UNIT/ML
5 SOLUTION INTRAVENOUS
Status: DISCONTINUED | OUTPATIENT
Start: 2022-04-20 | End: 2022-04-20 | Stop reason: HOSPADM

## 2022-04-20 RX ORDER — CYANOCOBALAMIN 1000 UG/ML
1000 INJECTION, SOLUTION INTRAMUSCULAR; SUBCUTANEOUS
Status: DISCONTINUED | OUTPATIENT
Start: 2022-04-20 | End: 2022-04-20 | Stop reason: HOSPADM

## 2022-04-20 RX ADMIN — HEPARIN SODIUM (PORCINE) LOCK FLUSH IV SOLN 100 UNIT/ML 5 ML: 100 SOLUTION at 11:24

## 2022-04-20 RX ADMIN — CYANOCOBALAMIN 1000 MCG: 1000 INJECTION, SOLUTION INTRAMUSCULAR; SUBCUTANEOUS at 11:24

## 2022-04-20 NOTE — PROGRESS NOTES
Patient is 51 years old, here today for injection of B12 per order of Charlene Moran .     Lab values:  n/a         Patient denies questions nor concerns regarding medication, administration site, side effects, nor aftercare.  Patient identified with two identifiers, order verified, and verbal consent for today's injection obtained from patient.   Patient education provided on s/s of injection site infection, and/or medication specific side effects, and when to call a provider.  Patient instructed to report any adverse effects.     Medication administered per protocol in left deltoid at 90 degree angle.  Site covered with sterile bandage.  Patient tolerated injection well, no verbal nor non-verbal signs of discomfort noted.  No adverse effects noted at this time.     Patient instructed to call with further questions or concerns.  Patient states understanding and is in agreement with this plan.  Patient discharged.  Patients port accessed using non-coring, 19 gauge, 3/4 inch needle. Port accessed per facility protocol.   Hand hygiene performed: yes   Mask donned by caregiver: yes   Site prepped with CHG: yes  Dressing applied using aseptic technique: yes   Port flushed easily, blood return noted.  No signs and symptoms of infection or infiltration.  Port flushed per MAR.  Needle removed, small dressing applied.  Patient discharged with no complaints.

## 2022-04-20 NOTE — TELEPHONE ENCOUNTER
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 No other concerns, no fevers.  Please advise

## 2022-04-20 NOTE — TELEPHONE ENCOUNTER
Needs to monitor and if worsens follow up with her PCP.  Could be multiple etiologies-last CT showed no concerns on the abdomen or pelvis.

## 2022-05-08 ENCOUNTER — APPOINTMENT (OUTPATIENT)
Dept: GENERAL RADIOLOGY | Facility: HOSPITAL | Age: 52
End: 2022-05-08
Attending: PHYSICIAN ASSISTANT
Payer: COMMERCIAL

## 2022-05-08 ENCOUNTER — HOSPITAL ENCOUNTER (EMERGENCY)
Facility: HOSPITAL | Age: 52
Discharge: HOME | End: 2022-05-08
Attending: PHYSICIAN ASSISTANT | Admitting: PHYSICIAN ASSISTANT
Payer: COMMERCIAL

## 2022-05-08 VITALS
HEART RATE: 93 BPM | TEMPERATURE: 97.4 F | SYSTOLIC BLOOD PRESSURE: 117 MMHG | DIASTOLIC BLOOD PRESSURE: 82 MMHG | OXYGEN SATURATION: 95 % | RESPIRATION RATE: 18 BRPM

## 2022-05-08 DIAGNOSIS — S09.92XA INJURY OF NOSE, INITIAL ENCOUNTER: ICD-10-CM

## 2022-05-08 DIAGNOSIS — Z98.890 HISTORY OF NASAL SURGERY: ICD-10-CM

## 2022-05-08 PROCEDURE — 99213 OFFICE O/P EST LOW 20 MIN: CPT | Performed by: PHYSICIAN ASSISTANT

## 2022-05-08 PROCEDURE — G0463 HOSPITAL OUTPT CLINIC VISIT: HCPCS

## 2022-05-08 PROCEDURE — 70160 X-RAY EXAM OF NASAL BONES: CPT

## 2022-05-08 PROCEDURE — G0463 HOSPITAL OUTPT CLINIC VISIT: HCPCS | Mod: 25

## 2022-05-08 ASSESSMENT — ENCOUNTER SYMPTOMS
HEADACHES: 1
CONSTITUTIONAL NEGATIVE: 1
CARDIOVASCULAR NEGATIVE: 1
RESPIRATORY NEGATIVE: 1
SEIZURES: 0

## 2022-05-08 NOTE — LETTER
[facility] [address] [place] [id number]  Main: [phone]  Dept: [phone]      May 8, 2022      Re: Constantino Warren      TO WHOM IT MAY CONCERN:    Constantino Warren was evaluated in urgent care today acute injury. She will require today off (5/8/22). No heavy exertional tasks (MICU/codes) for 3 additional days. May fully return to work on May 12th 2022 unless otherwise advised by specialty care.         Sincerely,      ABHIJEET Wells, PA-C   5/8/2022   9:34 AM

## 2022-05-08 NOTE — DISCHARGE INSTRUCTIONS
Ice, tylenol  No fracture, but I did place referral for ENT given your history of prior trauma  Work restrictions as discussed

## 2022-05-08 NOTE — ED PROVIDER NOTES
History     Chief Complaint   Patient presents with     Abrasion     HPI  Constantino Warren is a 51 year old female who presents after facial injury yesterday.  Constantino was at the [employer] with her son yesterday when she struck her nose on a padded ledge.  She reports throbbing pain that is worse today than it was yesterday.  She does have a linear abrasion starting between the brows and running down the nose.  She denies any loss of consciousness.  She denies any nosebleeds.  She denies any difficulty breathing.  Denies any vomiting.  She required 3 nasal surgeries due to trauma in the past, so is concerned about possible fracture/injury.    Allergies:  Allergies   Allergen Reactions     Aspartame      Other reaction(s): Other - Describe In Comment Field, Seizures  convulsions     Bee Venom Anaphylaxis     Bupropion Anaphylaxis     Throat gets tight and hives. Denies allergic reaction     Food Anaphylaxis     Peppers-anaphylaxis if eaten, if touched hands swell and reddened     Food Allergy Formula Anaphylaxis     Peppers, patient reports green peppers close off her throat     Ibuprofen Anaphylaxis     No Clinical Screening - See Comments Anaphylaxis     Peppers, patient reports green peppers close off her throat     Piper Anaphylaxis     Peppers--green, black, red, chili, etc. Derm symptoms, hives;  Strawberries--(cooked) Itching, breathing difficulties, GI symptoms. Mushrooms--GI symptoms, Hives.Spinach (cooked) throat irritation  (Listed on [facility] problem list.)     Fentanyl Swelling     Lips and tongue swelled.  Swelling of lips and tongue     Adhesive Tape      blisters     Amitriptyline Other (See Comments)     Respiratory symptoms - denies allergic reaction     Liquid Adhesive Dermatitis     blisters  Blisters  Silk tape and tegaderm is fine     Oxcarbazepine Other (See Comments)     Throat gets tight and hives. Denies allergic reaction  Throat gets tight and hives-       Prozac  [Fluoxetine]      Other reaction(s): *Unknown  uncontrolled muscle spasms     Valproic Acid      Respiratory symptoms ([facility]). Denies allergic reaction     Ziprasidone      Throat gets tight and hives. Denies allergic reaction       Problem List:    Patient Active Problem List    Diagnosis Date Noted     Iron deficiency 04/29/2021     Priority: Medium     History of colon cancer, stage III 10/20/2020     Priority: Medium     Added automatically from request for surgery [id number]       Rotator cuff tear 10/13/2020     Priority: Medium     Added automatically from request for surgery [id number]       Arthritis 10/13/2020     Priority: Medium     Added automatically from request for surgery [id number]       History of uterine cancer 05/27/2020     Priority: Medium     Palliative care patient 05/27/2020     Priority: Medium     Iron deficiency anemia, unspecified iron deficiency anemia type 05/08/2020     Priority: Medium     S/P partial resection of colon 02/24/2020     Priority: Medium     Prediabetes      Priority: Medium     Colon cancer (H) 02/19/2020     Priority: Medium     Added automatically from request for surgery [id number]       Cancer of ascending colon (H) 02/19/2020     Priority: Medium     Added automatically from request for surgery [id number]       Special screening for malignant neoplasms, colon 01/14/2020     Priority: Medium     Added automatically from request for surgery [id number]       Anastomotic ulcer S/P gastric bypass 07/21/2018     Priority: Medium     Anemia 07/21/2018     Priority: Medium     Chest pain 07/20/2018     Priority: Medium     GI bleed 07/20/2018     Priority: Medium     Abdominal pain, epigastric 07/20/2018     Priority: Medium     Hypochromic microcytic anemia 07/20/2018     Priority: Medium     Chronic migraine 01/18/2018     Priority: Medium     Replacing diagnoses that were inactivated after the 10/1/2021 regulatory import.       Chronic pain disorder 01/18/2018      Priority: Medium     Long term (current) use of opiate analgesic 01/12/2018     Priority: Medium     Overview:   Pain Diagnosis failed back syndrome, annular tear L4-5, history lumbar discectomy, s/p lumbar fusion.    Should be seen in the clinic every twelve weeks. Currently on a taper: yes, trying to wean away from the daytime hydrocodone-acetaminophen..    Plan for flares of chronic pain stretches, occasional use of more hydrocodone-acetaminophen for flare.    ED and UC: Opioid medication will not be given in the ER or Urgent Care unless there is a medical emergency unrelated to chronic pain.  Limit to 3 day supply    Refills: Refills will only be given at a scheduled visit.    [facility] Agreement for Opioid Treatment.   Opioid Agreement Date: 5/21/12  Last UDT (Urine Drug Test) on date:    Pain Dx: chronic low back pain  Last Pain Visit: 6-24-11  [facility]acy: [facility]  Comments:      7/23/14:  Progressing well.  Over the last month, has decreased hydrocodone-acetaminophen from 6/day to 3/day.  Awaiting appointment with [facility].       Adjustment disorder with mixed anxiety and depressed mood 12/15/2017     Priority: Medium     Other insomnia 12/15/2017     Priority: Medium     Syncope and collapse 09/26/2015     Priority: Medium     Hypertensive urgency 09/26/2015     Priority: Medium     Concussion syndrome 09/26/2015     Priority: Medium     Hydronephrosis, right 04/20/2015     Priority: Medium     Pyelonephritis 04/20/2015     Priority: Medium     Urolithiasis 04/20/2015     Priority: Medium     Annular tear of lumbar disc 12/16/2014     Priority: Medium     Overview:   MRI 2/5/13:  Previous right L4-5 hemilaminectomy and partial facetectomy with mild to moderate right L4-5 facet arthropathy and broad-based bulge of disk and end plate osteophyte on the right with partial high signal intensity zone annular tear of the undersurface of the laterally protruding disk/annulus.       Failed back syndrome  of lumbar spine 12/16/2014     Priority: Medium     Overview:   Three back surgeries, including an L3-sacrum AP fusion done at [facility] in 2013.  Has been doing very well in terms of reduction of pain, with minimal requirement of opiate pain medication.       Hx of lumbar discectomy 12/16/2014     Priority: Medium     Overview:   10/2006:   L4-5 Laminectomy and diskectomy L5-S1.   12/12/11:  L5S1 microdiscectomy ([facility])       Vitamin B 12 deficiency 06/13/2011     Priority: Medium     Overview:   IMO Update 10/11       Gastric bypass status for obesity 01/04/2011     Priority: Medium     Overview:   12/14/10:  laparoscopic Prasanna-en-Y gastric bypass Dr. Hassan  IMO Update 10/11       Hyperlipidemia 09/23/2008     Priority: Medium     Overview:   IMO Update 10/11       Hypothyroidism 09/23/2008     Priority: Medium     Overview:   IMO Update 10/11  Overview:   IMO Update 10/11          Past Medical History:    Past Medical History:   Diagnosis Date     Arthritis 10/13/2020     Cancer (H)      Cancer (H) 2004     Chronic pain      De Quervain's disease (tenosynovitis) 12/2021     Depressive disorder      Diabetes (H)      History of blood transfusion      Hypertension      Prediabetes      Syncope and collapse      Thyroid disease      Trigger finger 12/2021       Past Surgical History:    Past Surgical History:   Procedure Laterality Date     ARTHROSCOPY SHOULDER Left 11/6/2020    Procedure: LEFT SHOULDER ARTHROSCOPY WITH ROTATOR CUFF REPAIR AND ACROMIUM CLAVICULAR JOINT RESECTION;  Surgeon: Tanner Mujica DO;  Location: [facility]     BACK SURGERY      2008; 2011     COLONOSCOPY N/A 2/13/2020    Procedure: COLONOSCOPY WITH BIOPSY AND TATOOING OF ASCENDING COLON,INCOMEPLETE EXAM;  Surgeon: Kang Cleveland MD;  Location: [facility]     COLONOSCOPY N/A 10/26/2020    Procedure: incomplete colonoscopy ;  Surgeon: Kang Clevelnad MD;  Location: [facility]     ESOPHAGOSCOPY, GASTROSCOPY, DUODENOSCOPY (EGD), COMBINED N/A  7/21/2018    Procedure: COMBINED ESOPHAGOSCOPY, GASTROSCOPY, DUODENOSCOPY (EGD);  UPPER ENDOSCOPY WITH BIOPSIES;  Surgeon: Roger Vazquez MD;  Location: [facility]     ESOPHAGOSCOPY, GASTROSCOPY, DUODENOSCOPY (EGD), COMBINED N/A 11/21/2019    Procedure: UPPER ENDOSCOPY with biopsy;  Surgeon: Kang Cleveland MD;  Location: [facility]     HEAD & NECK SURGERY  2008    c4-c5 fusion     HYSTERECTOMY, CRISTIANE  04/2004    uterine cancer; [facility]     INSERT PORT VASCULAR ACCESS N/A 2/28/2020    Procedure: INSERTION, VASCULAR ACCESS PORT LEFT;  Surgeon: Kang Cleveland MD;  Location: [facility]     LAPAROSCOPIC ASSISTED COLECTOMY Right 2/24/2020    Procedure: RIGHT KEVIN COLECTOMY, LYSIS OF ADHESIONS;  Surgeon: Kang Cleveland MD;  Location: [facility]     LAPAROSCOPIC BYPASS GASTRIC  12/2010       Family History:    Family History   Problem Relation Age of Onset     Diabetes Mother      Hypertension Mother      Coronary Artery Disease Mother      Myocardial Infarction Mother      Hypertension Father      Diabetes Maternal Grandmother      Hypertension Maternal Grandfather      Coronary Artery Disease Maternal Grandfather      Cancer Paternal Grandfather         unsure of type of cancer     Bladder Cancer Paternal Grandfather      Diabetes Maternal Aunt      Cerebrovascular Disease Maternal Aunt      Diabetes Maternal Aunt      Cerebrovascular Disease Maternal Aunt      Diabetes Maternal Aunt      Cerebrovascular Disease Maternal Aunt      Hyperlipidemia No family hx of      Breast Cancer No family hx of      Colon Cancer No family hx of      Prostate Cancer No family hx of      Thyroid Disease No family hx of      Anesthesia Reaction No family hx of      Asthma No family hx of      Genetic Disorder No family hx of        Social History:  Marital Status:   [5]  Social History     Tobacco Use     Smoking status: Never Smoker     Smokeless tobacco: Never Used   Vaping Use     Vaping Use: Never used   Substance  Use Topics     Alcohol use: No     Comment: sober for 18 years     Drug use: No        Medications:    acetaminophen (TYLENOL) 500 MG tablet  Cyanocobalamin 1000 MCG/ML KIT  atorvastatin (LIPITOR) 40 MG tablet  DULoxetine (CYMBALTA) 30 MG capsule  EPINEPHrine (ANY BX GENERIC EQUIV) 0.3 MG/0.3ML injection 2-pack  EQ ASPIRIN ADULT LOW DOSE 81 MG EC tablet  gabapentin (NEURONTIN) 300 MG capsule  levothyroxine (SYNTHROID/LEVOTHROID) 200 MCG tablet  levothyroxine (SYNTHROID/LEVOTHROID) 25 MCG tablet  lisinopril (ZESTRIL) 20 MG tablet  multivitamin, therapeutic with minerals (MULTI-VITAMIN) TABS  omeprazole (PRILOSEC) 20 MG DR capsule  ondansetron (ZOFRAN-ODT) 4 MG ODT tab  tiZANidine (ZANAFLEX) 4 MG tablet  traZODone (DESYREL) 50 MG tablet          Review of Systems   Constitutional: Negative.    HENT: Negative for dental problem, ear discharge and nosebleeds.         Nasal trauma.   Eyes: Negative for visual disturbance.   Respiratory: Negative.    Cardiovascular: Negative.    Neurological: Positive for headaches. Negative for seizures and syncope.       Physical Exam   BP: 117/82  Pulse: 93  Temp: 97.4  F (36.3  C)  Resp: 18  SpO2: 95 %      Physical Exam  Vitals and nursing note reviewed.   Constitutional:       General: She is not in acute distress.     Appearance: She is not toxic-appearing.   HENT:      Right Ear: Tympanic membrane normal.      Left Ear: Tympanic membrane normal.      Nose: Nasal tenderness present.      Right Nostril: No epistaxis, septal hematoma or occlusion.      Left Nostril: No epistaxis, septal hematoma or occlusion.      Comments: Slight deviation to right.     Mouth/Throat:      Mouth: Mucous membranes are moist.      Pharynx: Oropharynx is clear.   Eyes:      Extraocular Movements: Extraocular movements intact.      Conjunctiva/sclera: Conjunctivae normal.      Pupils: Pupils are equal, round, and reactive to light.   Cardiovascular:      Rate and Rhythm: Normal rate.   Pulmonary:       Effort: Pulmonary effort is normal.   Skin:     General: Skin is warm and dry.   Neurological:      Mental Status: She is alert and oriented to person, place, and time.      Cranial Nerves: No cranial nerve deficit.         ED Course         Results for orders placed or performed during the hospital encounter of 05/08/22 (from the past 24 hour(s))   XR Nasal Bones 3 Views    Narrative    PROCEDURE: XR NASAL BONES 3 VW 5/8/2022 9:44 AM    HISTORY: fall, mild septal deviation to right    COMPARISONS: None.    TECHNIQUE: 2 views    FINDINGS: The nasal bones are intact. The maxillary spine appears  normal. The adjacent paranasal sinuses are clear.         Impression    IMPRESSION: No evidence of nasal bone fracture    ZENA EDWARDS MD         SYSTEM ID:  [id number]       Medications - No data to display    Assessments & Plan (with Medical Decision Making)     I have reviewed the nursing notes.  I have reviewed the findings, diagnosis, plan and need for follow up with the patient.      Discharge Medication List as of 5/8/2022 10:34 AM          Final diagnoses:   Injury of nose, initial encounter   History of nasal surgery   Films obtained and no Fx - confirmed on rad read. I recommended patient rest, tylenol and ice for pain. If she has any concerns after swelling improved, f/u with PCP vs ENT. Will seek care sooner with worsening.     5/8/2022   [facility]     Modesto Childress PA  05/08/22 [id number]

## 2022-05-17 NOTE — PROGRESS NOTES
"Oncology Follow-up Visit    Reason for Visit:  Constantino is a 51 year old woman with a history of Stage III adenocarcinoma of the colon, who presents to the clinic today for routine follow-up.    Nursing Note and documentation reviewed: Yes    Interval History:   Constantino reports that she is doing well in regards to her previous cancer. She has however, noticed that over the last few weeks or so, she has noticed some bilateral lower extremity edema. She notes that this is improved in the morning but gets worse once she is awake and up. States that at times she feels pain that she describes as \"itchy burny\". No warmth or redness. No changes in diet. In addition, she has been more fatigued. Still working but states that she is tired even after having just woke up. Stays active with her 2 year old but has less energy to do so.     Otherwise denies signs of infection. No fever, chills, chest pain, cough, or SOB. No bleeding concerns. Denies nausea or vomiting. Denies diarrhea or constipation. Appetite is good.     Of note, she has continued with her every-other-week B12 injections. She has attempted contacting surgery department about colonoscopy but no one has called her back.     Oncologic History:   11/21/19 to 11/21/2019 Hospitalized with a hemoglobin of 5.7 underwent EGD with no evidence of active bleeding.  12/12/2019 Seen by Dr. Cook and established care with her as her new PCP.  Referral for screening colonoscopy was completed.  2/13/2020 Underwent colonoscopy by Dr. Cleveland where a near obstructing lesion in the right colon was found.  Pathology showed an invasive moderately differentiated adenocarcinoma.  CT the abdomen and pelvis showed a mass in the junction of the cecum and ascending colon highly suspicious for malignancy.  2/24/2020 Underwent laparoscopic-assisted right hemicolectomy by Dr. Cleveland that was converted to open right hemicolectomy when it was noted that the tumor was adherent to the pelvic brim; " final pathology showed mildly differentiated adenocarcinoma of the right colon with 1/32 lymph nodes positive; there was perineural invasion; tumor budding and acutely inflamed tumor and reactive lymph nodes with peritonitis; the omentum showed mild peritonitis; margins were negative; patient was staged [id number]; IIIb adenocarcinoma of the right colon  2/28/2020 Port-A-Cath placement  3/25/2020 PET scan which showed no evidence of distant metastatic disease  3/30/2020 Seen by Dr. Palma with Medical Oncology with recommendation for XELOX; capecitabine 850 mg/m2 p.o. b.i.d. on days 1 through 14 of a 21-day cycle, oxaliplatin 130 mg/m2 to be given on day 1 x4 cycles.  4/10/2020 Seen by Dr. Bryant with Radiation Oncology with no recommendation for radiation therapy at this time related to negative margins on pathology  4/17/2020 initiation of chemotherapy  6/22/2020 Completion of 4 cycles of XELOX  10/26/2020 Colonoscopy completed. Unable to visualize the anastomosis of the small bowel to the transverse colon  02/23/2022 CT CAP without evidence of disease recurrence or progression     MMR proteins intact  CEA prior to surgery was <0.5     Current Chemo Regime/TX:  N/a  Current Cycle:  n/a  # of completed cycles:  n/a     Previous treatment:   Oxaliplatin/Capecitabine x 4 cycles    Past Medical History:   Diagnosis Date     Arthritis 10/13/2020     Cancer (H)     colon     Cancer (H) 2004    uterine     Chronic pain      De Quervain's disease (tenosynovitis) 12/2021     Depressive disorder      Diabetes (H)      History of blood transfusion      Hypertension      Prediabetes      Syncope and collapse      Thyroid disease      Trigger finger 12/2021       Past Surgical History:   Procedure Laterality Date     ARTHROSCOPY SHOULDER Left 11/6/2020    Procedure: LEFT SHOULDER ARTHROSCOPY WITH ROTATOR CUFF REPAIR AND ACROMIUM CLAVICULAR JOINT RESECTION;  Surgeon: Tanner Mujica DO;  Location: [facility]     BACK SURGERY       2008; 2011     COLONOSCOPY N/A 2/13/2020    Procedure: COLONOSCOPY WITH BIOPSY AND TATOOING OF ASCENDING COLON,INCOMEPLETE EXAM;  Surgeon: Kang Cleveland MD;  Location: [facility]     COLONOSCOPY N/A 10/26/2020    Procedure: incomplete colonoscopy ;  Surgeon: Kang Cleveland MD;  Location: [facility]     ESOPHAGOSCOPY, GASTROSCOPY, DUODENOSCOPY (EGD), COMBINED N/A 7/21/2018    Procedure: COMBINED ESOPHAGOSCOPY, GASTROSCOPY, DUODENOSCOPY (EGD);  UPPER ENDOSCOPY WITH BIOPSIES;  Surgeon: Roger Vazquez MD;  Location: [facility]     ESOPHAGOSCOPY, GASTROSCOPY, DUODENOSCOPY (EGD), COMBINED N/A 11/21/2019    Procedure: UPPER ENDOSCOPY with biopsy;  Surgeon: Kang Cleveland MD;  Location: [facility]     HEAD & NECK SURGERY  2008    c4-c5 fusion     HYSTERECTOMY, CRISTIANE  04/2004    uterine cancer; [facility]     INSERT PORT VASCULAR ACCESS N/A 2/28/2020    Procedure: INSERTION, VASCULAR ACCESS PORT LEFT;  Surgeon: Kang Cleveland MD;  Location: [facility]     LAPAROSCOPIC ASSISTED COLECTOMY Right 2/24/2020    Procedure: RIGHT KEVIN COLECTOMY, LYSIS OF ADHESIONS;  Surgeon: Kang Cleveland MD;  Location: [facility]     LAPAROSCOPIC BYPASS GASTRIC  12/2010       Family History   Problem Relation Age of Onset     Diabetes Mother      Hypertension Mother      Coronary Artery Disease Mother      Myocardial Infarction Mother      Hypertension Father      Diabetes Maternal Grandmother      Hypertension Maternal Grandfather      Coronary Artery Disease Maternal Grandfather      Cancer Paternal Grandfather         unsure of type of cancer     Bladder Cancer Paternal Grandfather      Diabetes Maternal Aunt      Cerebrovascular Disease Maternal Aunt      Diabetes Maternal Aunt      Cerebrovascular Disease Maternal Aunt      Diabetes Maternal Aunt      Cerebrovascular Disease Maternal Aunt      Hyperlipidemia No family hx of      Breast Cancer No family hx of      Colon Cancer No family hx of      Prostate Cancer No family hx  of      Thyroid Disease No family hx of      Anesthesia Reaction No family hx of      Asthma No family hx of      Genetic Disorder No family hx of        Social History     Socioeconomic History     Marital status:      Spouse name: Not on file     Number of children: 2     Years of education: Not on file     Highest education level: Not on file   Occupational History     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 Yes     Comment: Mother   Social History Narrative     Not on file     Social Determinants of Health     Financial Resource Strain: Not on file   Food Insecurity: Not on file   Transportation Needs: Not on file   Physical Activity: Not on file   Stress: Not on file   Social Connections: Not on file   Intimate Partner Violence: Not on file   Housing Stability: Not on file       Current Outpatient Medications   Medication     acetaminophen (TYLENOL) 500 MG tablet     atorvastatin (LIPITOR) 40 MG tablet     Cyanocobalamin 1000 MCG/ML KIT     DULoxetine (CYMBALTA) 30 MG capsule     EPINEPHrine (ANY BX GENERIC EQUIV) 0.3 MG/0.3ML injection 2-pack     EQ ASPIRIN ADULT LOW DOSE 81 MG EC tablet     gabapentin (NEURONTIN) 300 MG capsule     levothyroxine (SYNTHROID/LEVOTHROID) 200 MCG tablet     levothyroxine (SYNTHROID/LEVOTHROID) 25 MCG tablet     lisinopril (ZESTRIL) 20 MG tablet     multivitamin w/minerals (THERA-VIT-M) tablet     omeprazole (PRILOSEC) 20 MG DR capsule     ondansetron (ZOFRAN-ODT) 4 MG ODT tab     tiZANidine (ZANAFLEX) 4 MG tablet     traZODone (DESYREL) 50 MG tablet     No current facility-administered medications for this visit.     "    Allergies   Allergen Reactions     Aspartame      Other reaction(s): Other - Describe In Comment Field, Seizures  convulsions     Bee Venom Anaphylaxis     Bupropion Anaphylaxis     Throat gets tight and hives. Denies allergic reaction     Food Anaphylaxis     Peppers-anaphylaxis if eaten, if touched hands swell and reddened     Food Allergy Formula Anaphylaxis     Peppers, patient reports green peppers close off her throat     Ibuprofen Anaphylaxis     No Clinical Screening - See Comments Anaphylaxis     Peppers, patient reports green peppers close off her throat     Piper Anaphylaxis     Peppers--green, black, red, chili, etc. Derm symptoms, hives;  Strawberries--(cooked) Itching, breathing difficulties, GI symptoms. Mushrooms--GI symptoms, Hives.Spinach (cooked) throat irritation  (Listed on [facility] problem list.)     Fentanyl Swelling     Lips and tongue swelled.  Swelling of lips and tongue     Adhesive Tape      blisters     Amitriptyline Other (See Comments)     Respiratory symptoms - denies allergic reaction     Liquid Adhesive Dermatitis     blisters  Blisters  Silk tape and tegaderm is fine     Oxcarbazepine Other (See Comments)     Throat gets tight and hives. Denies allergic reaction  Throat gets tight and hives-       Prozac [Fluoxetine]      Other reaction(s): *Unknown  uncontrolled muscle spasms     Valproic Acid      Respiratory symptoms ([facility]). Denies allergic reaction     Ziprasidone      Throat gets tight and hives. Denies allergic reaction       Review Of Systems:  A complete review of systems is negative except for the above mentioned items in the interval history.     ECOG Performance Status: 0    Physical Exam:  /78   Pulse 83   Temp 97.2  F (36.2  C) (Tympanic)   Resp 18   Ht 1.702 m (5' 7\")   Wt 87.4 kg (192 lb 10.9 oz)   SpO2 95%   BMI 30.18 kg/m    GENERAL APPEARANCE: Healthy, alert and in no acute distress.  HEENT: Eyes appear normal " without scleral icterus. Extraocular movements intact.  NECK:   Supple with normal range of motion. No asymmetry or masses.  LYMPHATICS: No palpable cervical, supraclavicular nodes.  RESP: Lungs clear to auscultation bilaterally, respirations regular and easy.  CARDIOVASCULAR: Regular rate and rhythm. Normal S1, S2; no murmur, gallop, or rub.  ABDOMEN: Soft, non-tender, non-distended. Bowel sounds auscultated all 4 quadrants. No palpable organomegaly or masses.  MUSCULOSKELETAL: Extremities without gross deformities noted. Mild, non-pitting edema in bilateral lower extremities.   SKIN: No suspicious lesions or rashes.  NEURO: Alert and oriented x 3.  Gait steady.  PSYCHIATRIC: Mentation and affect appear normal.  Mood appropriate.    Laboratory:  Component      Latest Ref Rng & Units 5/18/2022   WBC      4.0 - 11.0 10e3/uL 6.8   RBC Count      3.80 - 5.20 10e6/uL 4.20   Hemoglobin      11.7 - 15.7 g/dL 12.2   Hematocrit      35.0 - 47.0 % 37.3   MCV      78 - 100 fL 89   MCH      26.5 - 33.0 pg 29.0   MCHC      31.5 - 36.5 g/dL 32.7   RDW      10.0 - 15.0 % 13.0   Platelet Count      150 - 450 10e3/uL 172   % Neutrophils      % 66   % Lymphocytes      % 25   % Monocytes      % 9   % Eosinophils      % 0   % Basophils      % 0   % Immature Granulocytes      % 0   NRBCs per 100 WBC      <1 /100 0   Absolute Neutrophils      1.6 - 8.3 10e3/uL 4.5   Absolute Lymphocytes      0.8 - 5.3 10e3/uL 1.7   Absolute Monocytes      0.0 - 1.3 10e3/uL 0.6   Absolute Eosinophils      0.0 - 0.7 10e3/uL 0.0   Absolute Basophils      0.0 - 0.2 10e3/uL 0.0   Absolute Immature Granulocytes      <=0.4 10e3/uL 0.0   Absolute NRBCs      10e3/uL 0.0   Sodium      133 - 144 mmol/L 143   Potassium      3.4 - 5.3 mmol/L 4.1   Chloride      94 - 109 mmol/L 112 (H)   Carbon Dioxide      20 - 32 mmol/L 26   Anion Gap      3 - 14 mmol/L 5   Urea Nitrogen      7 - 30 mg/dL 17   Creatinine      0.52 - 1.04 mg/dL 0.67   Calcium      8.5 - 10.1 mg/dL  8.7   Glucose      70 - 99 mg/dL 108 (H)   Alkaline Phosphatase      40 - 150 U/L 73   AST      0 - 45 U/L 23   ALT      0 - 50 U/L 40   Protein Total      6.8 - 8.8 g/dL 7.1   Albumin      3.4 - 5.0 g/dL 3.5   Bilirubin Total      0.2 - 1.3 mg/dL 0.4   GFR Estimate      >60 mL/min/1.73m2 >90   Iron      35 - 180 ug/dL 66   Iron Binding Cap      240 - 430 ug/dL 346   Iron Saturation Index      15 - 46 % 19   Vitamin B12      193 - 986 pg/mL 468   Ferritin      8 - 252 ng/mL 73   CEA      0.0 - 2.5 ug/L 1.4       Imaging Studies:    None this visit       ASSESSMENT/PLAN:  #1 Colon cancer:  Stage IIIB adenocarcinoma of the ascending colon diagnosed February 2020.  She completed adjuvant chemotherapy with XELOX x 4 cycles on 6/22/2020 and is being followed with surveillance.  Again, discussed repeat colonoscopy that is overdue due to Anastomosis site not visualized well on previous colonoscopy. She states she has called but hasn't received a call back. I will place referral today to general surgery. Today, she is doing well from cancer stand point. No signs to suggest recurrence or progression. We will continue with surveillance. I will see her back in 3 months with labs and CT prior. If everything looks good at this appointment, we can go to a 6 month schedule.      #2 Iron deficiency: On an oral liquid iron supplement per recommendation of her gastric bypass provider. Iron levels today are appropriate. We will recheck her ferritin/TIBC and ferritin in 3 months at next visit.     #3 B12 deficiency: Currently on B12 injections q2w due to previous deficiency. We will continue this schedule and I will recheck B12 and folate level in 3 months.     #4 Fatigue Unsure of etiology. Labs drawn today all reassuring. Encouraged patient to remain active. I asked that she follow-up with her PCP.    #5 Lower extremity swelling Notes this is improved in morning and worsens once up. Again, labs reassuring. Limit salt intake, elevate  when able. No evidence of bilateral DVT. Will have her follow-up with PCP if remains an issue.          Patient in agreement with plan and verbalizes understanding. Agrees to call with any questions or concerns.    36 minutes spent in the patient's encounter today with time spent in review of patient's chart along with chart preparation and review of the treatment plan and signing of treatment plan.  Time was also spent with the patient in obtaining a review of systems and performing a physical exam along with detailed review of all test results. Time was also spent in discussing plan for future follow-up and relating instructions for follow-up and in placing future orders.    JUNIOR [facility]  Medical Oncology

## 2022-05-18 ENCOUNTER — LAB (OUTPATIENT)
Dept: INFUSION THERAPY | Facility: OTHER | Age: 52
End: 2022-05-18
Attending: NURSE PRACTITIONER
Payer: COMMERCIAL

## 2022-05-18 DIAGNOSIS — E53.8 VITAMIN B 12 DEFICIENCY: Primary | ICD-10-CM

## 2022-05-18 DIAGNOSIS — C18.2 CANCER OF ASCENDING COLON (H): ICD-10-CM

## 2022-05-18 DIAGNOSIS — E61.1 IRON DEFICIENCY: ICD-10-CM

## 2022-05-18 DIAGNOSIS — Z85.038 HISTORY OF COLON CANCER, STAGE III: ICD-10-CM

## 2022-05-18 LAB
ALBUMIN SERPL-MCNC: 3.5 G/DL (ref 3.4–5)
ALP SERPL-CCNC: 73 U/L (ref 40–150)
ALT SERPL W P-5'-P-CCNC: 40 U/L (ref 0–50)
ANION GAP SERPL CALCULATED.3IONS-SCNC: 5 MMOL/L (ref 3–14)
AST SERPL W P-5'-P-CCNC: 23 U/L (ref 0–45)
BASOPHILS # BLD AUTO: 0 10E3/UL (ref 0–0.2)
BASOPHILS NFR BLD AUTO: 0 %
BILIRUB SERPL-MCNC: 0.4 MG/DL (ref 0.2–1.3)
BUN SERPL-MCNC: 17 MG/DL (ref 7–30)
CALCIUM SERPL-MCNC: 8.7 MG/DL (ref 8.5–10.1)
CEA SERPL-MCNC: 1.4 UG/L (ref 0–2.5)
CHLORIDE BLD-SCNC: 112 MMOL/L (ref 94–109)
CO2 SERPL-SCNC: 26 MMOL/L (ref 20–32)
CREAT SERPL-MCNC: 0.67 MG/DL (ref 0.52–1.04)
EOSINOPHIL # BLD AUTO: 0 10E3/UL (ref 0–0.7)
EOSINOPHIL NFR BLD AUTO: 0 %
ERYTHROCYTE [DISTWIDTH] IN BLOOD BY AUTOMATED COUNT: 13 % (ref 10–15)
FERRITIN SERPL-MCNC: 73 NG/ML (ref 8–252)
GFR SERPL CREATININE-BSD FRML MDRD: >90 ML/MIN/1.73M2
GLUCOSE BLD-MCNC: 108 MG/DL (ref 70–99)
HCT VFR BLD AUTO: 37.3 % (ref 35–47)
HGB BLD-MCNC: 12.2 G/DL (ref 11.7–15.7)
IMM GRANULOCYTES # BLD: 0 10E3/UL
IMM GRANULOCYTES NFR BLD: 0 %
IRON SATN MFR SERPL: 19 % (ref 15–46)
IRON SERPL-MCNC: 66 UG/DL (ref 35–180)
LYMPHOCYTES # BLD AUTO: 1.7 10E3/UL (ref 0.8–5.3)
LYMPHOCYTES NFR BLD AUTO: 25 %
MCH RBC QN AUTO: 29 PG (ref 26.5–33)
MCHC RBC AUTO-ENTMCNC: 32.7 G/DL (ref 31.5–36.5)
MCV RBC AUTO: 89 FL (ref 78–100)
MONOCYTES # BLD AUTO: 0.6 10E3/UL (ref 0–1.3)
MONOCYTES NFR BLD AUTO: 9 %
NEUTROPHILS # BLD AUTO: 4.5 10E3/UL (ref 1.6–8.3)
NEUTROPHILS NFR BLD AUTO: 66 %
NRBC # BLD AUTO: 0 10E3/UL
NRBC BLD AUTO-RTO: 0 /100
PLATELET # BLD AUTO: 172 10E3/UL (ref 150–450)
POTASSIUM BLD-SCNC: 4.1 MMOL/L (ref 3.4–5.3)
PROT SERPL-MCNC: 7.1 G/DL (ref 6.8–8.8)
RBC # BLD AUTO: 4.2 10E6/UL (ref 3.8–5.2)
SODIUM SERPL-SCNC: 143 MMOL/L (ref 133–144)
TIBC SERPL-MCNC: 346 UG/DL (ref 240–430)
VIT B12 SERPL-MCNC: 468 PG/ML (ref 193–986)
WBC # BLD AUTO: 6.8 10E3/UL (ref 4–11)

## 2022-05-18 PROCEDURE — 36591 DRAW BLOOD OFF VENOUS DEVICE: CPT | Performed by: INTERNAL MEDICINE

## 2022-05-18 PROCEDURE — 82747 ASSAY OF FOLIC ACID RBC: CPT | Mod: 90 | Performed by: INTERNAL MEDICINE

## 2022-05-18 PROCEDURE — 83550 IRON BINDING TEST: CPT | Performed by: INTERNAL MEDICINE

## 2022-05-18 PROCEDURE — 82607 VITAMIN B-12: CPT | Performed by: INTERNAL MEDICINE

## 2022-05-18 PROCEDURE — 96372 THER/PROPH/DIAG INJ SC/IM: CPT | Performed by: INTERNAL MEDICINE

## 2022-05-18 PROCEDURE — 82728 ASSAY OF FERRITIN: CPT | Performed by: INTERNAL MEDICINE

## 2022-05-18 PROCEDURE — 82378 CARCINOEMBRYONIC ANTIGEN: CPT | Performed by: INTERNAL MEDICINE

## 2022-05-18 PROCEDURE — 80053 COMPREHEN METABOLIC PANEL: CPT | Performed by: INTERNAL MEDICINE

## 2022-05-18 PROCEDURE — 85025 COMPLETE CBC W/AUTO DIFF WBC: CPT | Performed by: INTERNAL MEDICINE

## 2022-05-18 RX ORDER — HEPARIN SODIUM (PORCINE) LOCK FLUSH IV SOLN 100 UNIT/ML 100 UNIT/ML
5 SOLUTION INTRAVENOUS
Status: CANCELLED | OUTPATIENT
Start: 2022-05-18

## 2022-05-18 RX ORDER — CYANOCOBALAMIN 1000 UG/ML
1000 INJECTION, SOLUTION INTRAMUSCULAR; SUBCUTANEOUS
Status: CANCELLED
Start: 2022-05-18

## 2022-05-18 RX ORDER — HEPARIN SODIUM (PORCINE) LOCK FLUSH IV SOLN 100 UNIT/ML 100 UNIT/ML
5 SOLUTION INTRAVENOUS
Status: DISCONTINUED | OUTPATIENT
Start: 2022-05-18 | End: 2022-05-18 | Stop reason: HOSPADM

## 2022-05-18 RX ORDER — CYANOCOBALAMIN 1000 UG/ML
1000 INJECTION, SOLUTION INTRAMUSCULAR; SUBCUTANEOUS
Status: DISCONTINUED | OUTPATIENT
Start: 2022-05-18 | End: 2022-05-18 | Stop reason: HOSPADM

## 2022-05-18 RX ADMIN — CYANOCOBALAMIN 1000 MCG: 1000 INJECTION, SOLUTION INTRAMUSCULAR; SUBCUTANEOUS at 11:19

## 2022-05-18 RX ADMIN — HEPARIN SODIUM (PORCINE) LOCK FLUSH IV SOLN 100 UNIT/ML 5 ML: 100 SOLUTION at 11:08

## 2022-05-18 NOTE — PROGRESS NOTES
Patient is 51 years old, here today for injection of B12 and labs from port per order of Charlene Moarn NP.     Hand hygiene performed: yes   Mask donned by caregiver: yes   Site prepped with CHG: yes   Labs drawn: yes   Dressing applied using aseptic technique: yes  Patients port accessed using non-coring, 19g 3/4 inch needle. Port accessed per facility protocol. Port flushed easily, blood return noted.  Flushed 10 mLs normal saline, 10 mLs blood wasted, labs drawn per orders (4 tubes).  Port flushed 20 mLs Normal Saline then Heparin 5mLs of 100 unit/mL.  No signs and symptoms of infection or infiltration.  Needle removed, small dressing applied.   Discharged.    Patient denies questions nor concerns regarding medication, administration site, side effects, nor aftercare.  Patient identified with two identifiers, order verified, and verbal consent for today's injection obtained from patient.   Patient education provided on s/s of injection site infection, and/or medication specific side effects, and when to call a provider.  Patient instructed to report any adverse effects.     Medication administered per protocol in right deltoid at 90 degree angle.  Site covered with sterile bandage.  Patient tolerated injection well, no verbal nor non-verbal signs of discomfort noted.  No adverse effects noted at this time.     Patient instructed to call with further questions or concerns.  Patient states understanding and is in agreement with this plan.  Patient discharged.

## 2022-05-20 ENCOUNTER — ONCOLOGY VISIT (OUTPATIENT)
Dept: ONCOLOGY | Facility: OTHER | Age: 52
End: 2022-05-20
Attending: NURSE PRACTITIONER
Payer: COMMERCIAL

## 2022-05-20 VITALS
SYSTOLIC BLOOD PRESSURE: 132 MMHG | OXYGEN SATURATION: 95 % | RESPIRATION RATE: 18 BRPM | TEMPERATURE: 97.2 F | BODY MASS INDEX: 30.24 KG/M2 | HEART RATE: 83 BPM | WEIGHT: 192.68 LBS | HEIGHT: 67 IN | DIASTOLIC BLOOD PRESSURE: 78 MMHG

## 2022-05-20 DIAGNOSIS — R53.83 OTHER FATIGUE: ICD-10-CM

## 2022-05-20 DIAGNOSIS — E53.8 VITAMIN B 12 DEFICIENCY: ICD-10-CM

## 2022-05-20 DIAGNOSIS — Z85.038 HISTORY OF COLON CANCER, STAGE III: Primary | ICD-10-CM

## 2022-05-20 DIAGNOSIS — E61.1 IRON DEFICIENCY: ICD-10-CM

## 2022-05-20 DIAGNOSIS — M79.89 SWELLING OF LOWER EXTREMITY: ICD-10-CM

## 2022-05-20 LAB — FOLATE RBC-MCNC: 838 NG/ML

## 2022-05-20 PROCEDURE — 99214 OFFICE O/P EST MOD 30 MIN: CPT | Performed by: NURSE PRACTITIONER

## 2022-05-20 ASSESSMENT — PAIN SCALES - GENERAL: PAINLEVEL: NO PAIN (0)

## 2022-05-20 NOTE — PATIENT INSTRUCTIONS
Thank you for allowing me to be a part of your care today.    We would like to see you back in 3 months with labs and CT prior, sooner with concerns. I will also place referral back to Dr. Cleveland in Surgery to get your colonoscopy scheduled. In addition, we will continue doing B12 injections every 2 weeks.     If you have any questions please call [phone]    Other instructions:      None

## 2022-05-20 NOTE — NURSING NOTE
"Oncology Rooming Note    May 20, 2022 1:17 PM   Constantino Warren is a 51 year old female who presents for:    Chief Complaint   Patient presents with     Oncology Clinic Visit     Follow up. History of colon cancer, stage III     Initial Vitals: /78   Pulse 83   Temp 97.2  F (36.2  C) (Tympanic)   Resp 18   Ht 1.702 m (5' 7\")   Wt 87.4 kg (192 lb 10.9 oz)   SpO2 95%   BMI 30.18 kg/m   Estimated body mass index is 30.18 kg/m  as calculated from the following:    Height as of this encounter: 1.702 m (5' 7\").    Weight as of this encounter: 87.4 kg (192 lb 10.9 oz). Body surface area is 2.03 meters squared.  No Pain (0) Comment: Data Unavailable   No LMP recorded. Patient has had a hysterectomy.  Allergies reviewed: Yes  Medications reviewed: Yes    Medications: Medication refills not needed today.  Pharmacy name entered into [facility]:    [id number] - [place], [place] - [address]   [facility] - [place], [place] - [address]   [facility] - [place], [place] - [address]  [facility] - [facility] - [place] - [address]    Clinical concerns: extreme fatigue the last week, bilateral lower leg swelling for the last 2 weeks. Martina Jaime was notified.      Noemi Quiroga LPN              "

## 2022-05-22 ENCOUNTER — LAB REQUISITION (OUTPATIENT)
Dept: LAB | Facility: HOSPITAL | Age: 52
End: 2022-05-22

## 2022-05-23 ENCOUNTER — LAB (OUTPATIENT)
Dept: OCCUPATIONAL MEDICINE | Facility: OTHER | Age: 52
End: 2022-05-23

## 2022-05-23 LAB
FLUAV RNA SPEC QL NAA+PROBE: NEGATIVE
FLUBV RNA RESP QL NAA+PROBE: NEGATIVE
RSV RNA SPEC NAA+PROBE: NEGATIVE
SARS-COV-2 RNA RESP QL NAA+PROBE: NEGATIVE

## 2022-05-23 PROCEDURE — 87637 SARSCOV2&INF A&B&RSV AMP PRB: CPT | Performed by: FAMILY MEDICINE

## 2022-06-02 ENCOUNTER — INFUSION THERAPY VISIT (OUTPATIENT)
Dept: INFUSION THERAPY | Facility: OTHER | Age: 52
End: 2022-06-02
Attending: NURSE PRACTITIONER
Payer: COMMERCIAL

## 2022-06-02 DIAGNOSIS — E53.8 VITAMIN B 12 DEFICIENCY: ICD-10-CM

## 2022-06-02 DIAGNOSIS — C18.2 CANCER OF ASCENDING COLON (H): Primary | ICD-10-CM

## 2022-06-02 PROCEDURE — 96372 THER/PROPH/DIAG INJ SC/IM: CPT | Performed by: INTERNAL MEDICINE

## 2022-06-02 RX ORDER — CYANOCOBALAMIN 1000 UG/ML
1000 INJECTION, SOLUTION INTRAMUSCULAR; SUBCUTANEOUS
Status: CANCELLED
Start: 2022-06-02

## 2022-06-02 RX ORDER — HEPARIN SODIUM (PORCINE) LOCK FLUSH IV SOLN 100 UNIT/ML 100 UNIT/ML
5 SOLUTION INTRAVENOUS
Status: CANCELLED | OUTPATIENT
Start: 2022-06-02

## 2022-06-02 RX ORDER — CYANOCOBALAMIN 1000 UG/ML
1000 INJECTION, SOLUTION INTRAMUSCULAR; SUBCUTANEOUS
Status: DISCONTINUED | OUTPATIENT
Start: 2022-06-02 | End: 2022-06-02 | Stop reason: HOSPADM

## 2022-06-02 RX ADMIN — CYANOCOBALAMIN 1000 MCG: 1000 INJECTION, SOLUTION INTRAMUSCULAR; SUBCUTANEOUS at 15:07

## 2022-06-02 NOTE — PROGRESS NOTES
Vitamin B12 1000mcgs/mL injected IM right Deltoid upper arm per protocol.  Tolerated well.  Patient offers no complaints.  Discharged to home.

## 2022-06-09 ENCOUNTER — TELEPHONE (OUTPATIENT)
Dept: FAMILY MEDICINE | Facility: OTHER | Age: 52
End: 2022-06-09

## 2022-06-09 NOTE — TELEPHONE ENCOUNTER
8:46 AM    Reason for Call: OVERBOOK    Patient is having the following symptoms: Patient needs to be seen for legs and feet pain,swellling for 2 weeks. Keeps getting worse. Not chemo related     The patient is requesting an appointment for Overbook with .    Was an appointment offered for this call? Yes  If yes : Appointment type              Date  07/14/2022 930    Preferred method for responding to this message: Telephone Call  What is your phone number ?  [phone]     If we cannot reach you directly, may we leave a detailed response at the number you provided? Yes    Can this message wait until your PCP/provider returns, if unavailable today? YES, Provider is intoday    Nina Bee

## 2022-06-15 ENCOUNTER — INFUSION THERAPY VISIT (OUTPATIENT)
Dept: INFUSION THERAPY | Facility: OTHER | Age: 52
End: 2022-06-15
Attending: INTERNAL MEDICINE
Payer: COMMERCIAL

## 2022-06-15 DIAGNOSIS — E53.8 VITAMIN B 12 DEFICIENCY: ICD-10-CM

## 2022-06-15 DIAGNOSIS — C18.2 CANCER OF ASCENDING COLON (H): Primary | ICD-10-CM

## 2022-06-15 PROCEDURE — 96372 THER/PROPH/DIAG INJ SC/IM: CPT | Performed by: INTERNAL MEDICINE

## 2022-06-15 RX ORDER — HEPARIN SODIUM (PORCINE) LOCK FLUSH IV SOLN 100 UNIT/ML 100 UNIT/ML
5 SOLUTION INTRAVENOUS
Status: DISCONTINUED | OUTPATIENT
Start: 2022-06-15 | End: 2022-06-15 | Stop reason: HOSPADM

## 2022-06-15 RX ORDER — CYANOCOBALAMIN 1000 UG/ML
1000 INJECTION, SOLUTION INTRAMUSCULAR; SUBCUTANEOUS
Status: CANCELLED
Start: 2022-06-15

## 2022-06-15 RX ORDER — HEPARIN SODIUM (PORCINE) LOCK FLUSH IV SOLN 100 UNIT/ML 100 UNIT/ML
5 SOLUTION INTRAVENOUS
Status: CANCELLED | OUTPATIENT
Start: 2022-06-15

## 2022-06-15 RX ORDER — CYANOCOBALAMIN 1000 UG/ML
1000 INJECTION, SOLUTION INTRAMUSCULAR; SUBCUTANEOUS
Status: DISCONTINUED | OUTPATIENT
Start: 2022-06-15 | End: 2022-06-15 | Stop reason: HOSPADM

## 2022-06-15 RX ADMIN — CYANOCOBALAMIN 1000 MCG: 1000 INJECTION, SOLUTION INTRAMUSCULAR; SUBCUTANEOUS at 11:10

## 2022-06-15 RX ADMIN — HEPARIN SODIUM (PORCINE) LOCK FLUSH IV SOLN 100 UNIT/ML 5 ML: 100 SOLUTION at 11:14

## 2022-06-15 NOTE — PROGRESS NOTES
Patient is 51 years old, here today for injection of B 12  per order of Charlene Moran NP  Lab values:  Not required    Patient denies questions nor concerns regarding medication, administration site, side effects, nor aftercare.  Patient identified with two identifiers, order verified, and verbal consent for today's injection obtained from patient.   Patient education provided on s/s of injection site infection, and/or medication specific side effects, and when to call a provider.  Patient instructed to report any adverse effects.     Medication administered per protocol in right deltoidat 90  degree angle.  Site covered with sterile bandage.  Patient tolerated injection well, no verbal nor non-verbal signs of discomfort noted.  No adverse effects noted at this time.     Patient instructed to call with further questions or concerns.  Patient states understanding and is in agreement with this plan.  Patient discharged.

## 2022-06-15 NOTE — PROGRESS NOTES
Patients port accessed using non-coring, 19 gauge, 3/4 needle, per facility protocol.     Hand hygiene performed: yes   Mask donned by caregiver: yes  Site prepped with CHG: yes   Labs drawn: no   Dressing applied using aseptic technique: yes     Port flushed easily, without resistance. Flushed with 10 cc's normal saline.   Immediate blood return noted. 10 cc blood discarded.  Ordered labs obtained and sent to lab.  Port flushed per orders/MAR. Needle removed intact, sterile dressing applied.  Slight pressure applied for 30 seconds.   Patient tolerated port flush well, denies pain nor discomfort at this time. Patient instructed to leave dressing intact for a minimum of one hour, and to call with questions or concerns.  Patient states understanding and is in agreement with this plan. Patient discharged.

## 2022-06-29 ENCOUNTER — APPOINTMENT (OUTPATIENT)
Dept: INFUSION THERAPY | Facility: OTHER | Age: 52
End: 2022-06-29
Attending: NURSE PRACTITIONER
Payer: COMMERCIAL

## 2022-06-29 DIAGNOSIS — C18.2 CANCER OF ASCENDING COLON (H): Primary | ICD-10-CM

## 2022-06-29 DIAGNOSIS — E53.8 VITAMIN B 12 DEFICIENCY: ICD-10-CM

## 2022-06-29 PROCEDURE — 96372 THER/PROPH/DIAG INJ SC/IM: CPT | Performed by: INTERNAL MEDICINE

## 2022-06-29 RX ORDER — CYANOCOBALAMIN 1000 UG/ML
1000 INJECTION, SOLUTION INTRAMUSCULAR; SUBCUTANEOUS
Status: CANCELLED
Start: 2022-06-29

## 2022-06-29 RX ORDER — CYANOCOBALAMIN 1000 UG/ML
1000 INJECTION, SOLUTION INTRAMUSCULAR; SUBCUTANEOUS
Status: DISCONTINUED | OUTPATIENT
Start: 2022-06-29 | End: 2022-06-29 | Stop reason: HOSPADM

## 2022-06-29 RX ORDER — HEPARIN SODIUM (PORCINE) LOCK FLUSH IV SOLN 100 UNIT/ML 100 UNIT/ML
5 SOLUTION INTRAVENOUS
Status: CANCELLED | OUTPATIENT
Start: 2022-06-29

## 2022-06-29 RX ADMIN — CYANOCOBALAMIN 1000 MCG: 1000 INJECTION, SOLUTION INTRAMUSCULAR; SUBCUTANEOUS at 13:29

## 2022-06-29 NOTE — PATIENT INSTRUCTIONS

## 2022-06-29 NOTE — PROGRESS NOTES
Patient is 51 year old, here today for injection of B12 per order of .     Patient denies questions nor concerns regarding medication, administration site, side effects, nor aftercare.  Patient identified with two identifiers, order verified, and verbal consent for today's injection obtained from patient.     Patient education provided on s/s of injection site infection, and/or medication specific side effects, and when to call a provider.  Patient instructed to report any adverse effects.     B12 administered per protocol in left deltoid at 90 degree angle.  Site covered with sterile bandage.  Patient tolerated injection well, no verbal nor non-verbal signs of discomfort noted.  No adverse effects noted at this time.     Patient instructed to call with further questions or concerns.  Patient states understanding and is in agreement with this plan.  Patient discharged.

## 2022-07-10 NOTE — TELEPHONE ENCOUNTER
Homecare calling and wanting verbal order for continuing wkly nurse visits.  [facility] [phone]  
Left msg for deedee ([facility]) to call back to relay approved VO  for continuing wkly nurse visits.  [facility]- [phone]  
Ok to continue  
Orders given  
yes  
Yes

## 2022-07-12 NOTE — PROGRESS NOTES
"  Assessment & Plan     Right foot pain  Suspect arthritis of great toe.  xrays obtained.  Thickened nails - cause discomfort as well.  Podiatry referral - nail cares, possible orthotics.  - Orthopedic  Referral; Future  - XR FOOT RT G/E 3 VW (Clinic Performed); Future    Neuropathy  Stable with neurontin, cymbalta.  Consider dose adjustment once off her zoloft.  - DULoxetine (CYMBALTA) 30 MG capsule; Take 1 capsule (30 mg) by mouth 2 times daily  - gabapentin (NEURONTIN) 300 MG capsule; Take 2 capsules (600 mg) by mouth At Bedtime    Hypothyroidism, unspecified type  Stable.  Refills done.  tsh normal 1/2022.  - levothyroxine (SYNTHROID/LEVOTHROID) 200 MCG tablet; Take 1 tablet (200 mcg) by mouth daily  - levothyroxine (SYNTHROID/LEVOTHROID) 25 MCG tablet; TAKE 1 TABLET BY MOUTH ONCE DAILY (TAKE  WITH  200  MCG  TABLET  FOR  TOTAL  OF  225  MCG)    Edema of both lower extremities  Mild on exam today.  Suspect venous insufficiency.   Script for compression stockings.  - Miscellaneous Order for DME - ONLY FOR DME    Rash  Favor type of vasculitis vs chronic stasis changes, but not typical saritha color of that - and improving.  Obtain inflammatory markers.  If recurs - consider focal biopsy, short course prednisone.  - ESR: Erythrocyte sedimentation rate; Future  - CRP, inflammation; Future  - CRP, inflammation  - ESR: Erythrocyte sedimentation rate    S/P arthroscopy of left shoulder  Prn use Zanaflex at night for ongoing positional discomfrot.  - tiZANidine (ZANAFLEX) 4 MG tablet; Take 1 tablet (4 mg) by mouth At Bedtime     BMI:   Estimated body mass index is 28.98 kg/m  as calculated from the following:    Height as of this encounter: 1.702 m (5' 7\").    Weight as of this encounter: 83.9 kg (185 lb).   Weight management plan: Discussed healthy diet and exercise guidelines    Patient Instructions   Xray foot today - will call with results.  Referral to Dr Kowalski - podiatry.    Reduce Zoloft to 50 mg daily " x 1 week, then stop.  Continue Cymbalta - may need dose adjustment.    Lab today - will call with results.  Start wearing compression stockings - script sent to [facility].  If rash recurs- reassess - might need to do small biopsy - vasculitis?              Coco Schafer MD  [facility] - RICHARD Meeks is a 51 year old, presenting for the following health issues:  Derm Problem, pain right big toe, and lower ankle swelling      HPI     Rash  Onset/Duration:  1 1/2 months ago it started  Description  Location: rash around both ankles; sometimes warm  Character: red bumps  Itching: tender to touch at times  Intensity:  mild  Progression of Symptoms:  improving  Accompanying signs and symptoms:   Fever: No  Body aches or joint pain: No  Sore throat symptoms: No  Recent cold symptoms: No  History:           Previous episodes of similar rash: None  New exposures:  None  Recent travel: No  Exposure to similar rash: No  Precipitating or alleviating factors: none  Therapies tried and outcome: none    Concern - Right big toe - curved lateral  Onset: 3 months   Description: right big toe pain  Intensity: 6/10  Progression of Symptoms:  same  Accompanying Signs & Symptoms: if anybody touches it its 10/10 but walking has little to no pain  Previous history of similar problem: none  Precipitating factors:        Worsened by: none  Alleviating factors:        Improved by: none  Therapies tried and outcome: None  No redness, heat, swelling.  Tender if squeezed, stepped on etc.    Concern - Bilateral leg swelling  Onset: 1 1/2 months  Description: bilateral leg swelling  Intensity: none   Progression of Symptoms:  waxing and waning  Accompanying Signs & Symptoms: same  Previous history of similar problem: none  Precipitating factors:        Worsened by: none  Alleviating factors:        Improved by: none  Therapies tried and outcome: started new job here at [employer] [facility] [place] and thought its due to her being on  "her feet for so long.   No compression stockings.    Juan Carlos - medication and counseling scheduled.  4/2021  - oncology - stopped zoloft and started cymbalta.  Patient didn't realize and has been on both.    Review of Systems   Constitutional, HEENT, cardiovascular, pulmonary, gi and gu systems are negative, except as otherwise noted.      Objective    /70 (BP Location: Right arm, Patient Position: Sitting, Cuff Size: Adult Regular)   Pulse 77   Temp 97.1  F (36.2  C) (Tympanic)   Ht 1.702 m (5' 7\")   Wt 83.9 kg (185 lb)   SpO2 98%   BMI 28.98 kg/m    Body mass index is 28.98 kg/m .  Physical Exam   GENERAL: healthy, alert and no distress  NECK: no adenopathy, no asymmetry, masses, or scars and thyroid normal to palpation  RESP: lungs clear to auscultation - no rales, rhonchi or wheezes  CV: regular rate and rhythm, normal S1 S2, no S3 or S4, no murmur, click or rub, no peripheral edema and peripheral pulses strong  MS: normal muscle tone, trace edema to bilateral ankles and peripheral pulses normal; thick dystrophic nails; right great toe IP joint with curvature laterally; no overlying skin changes  SKIN: lower legs - ankles, lower calves - with faint fine macular rash - non-blanching  PSYCH: mentation appears normal, affect normal/bright    CRP, Sed rate pending.    CBC, CMP - every 3 months per heme/onc - reviewed.                .  ..  "

## 2022-07-13 ENCOUNTER — INFUSION THERAPY VISIT (OUTPATIENT)
Dept: INFUSION THERAPY | Facility: OTHER | Age: 52
End: 2022-07-13
Attending: NURSE PRACTITIONER
Payer: COMMERCIAL

## 2022-07-13 DIAGNOSIS — E53.8 VITAMIN B 12 DEFICIENCY: ICD-10-CM

## 2022-07-13 DIAGNOSIS — C18.2 CANCER OF ASCENDING COLON (H): Primary | ICD-10-CM

## 2022-07-13 PROCEDURE — 96372 THER/PROPH/DIAG INJ SC/IM: CPT | Performed by: INTERNAL MEDICINE

## 2022-07-13 RX ORDER — HEPARIN SODIUM (PORCINE) LOCK FLUSH IV SOLN 100 UNIT/ML 100 UNIT/ML
5 SOLUTION INTRAVENOUS
Status: CANCELLED | OUTPATIENT
Start: 2022-07-13

## 2022-07-13 RX ORDER — HEPARIN SODIUM (PORCINE) LOCK FLUSH IV SOLN 100 UNIT/ML 100 UNIT/ML
5 SOLUTION INTRAVENOUS
Status: DISCONTINUED | OUTPATIENT
Start: 2022-07-13 | End: 2022-07-13 | Stop reason: HOSPADM

## 2022-07-13 RX ORDER — CYANOCOBALAMIN 1000 UG/ML
1000 INJECTION, SOLUTION INTRAMUSCULAR; SUBCUTANEOUS
Status: DISCONTINUED | OUTPATIENT
Start: 2022-07-13 | End: 2022-07-13 | Stop reason: HOSPADM

## 2022-07-13 RX ORDER — CYANOCOBALAMIN 1000 UG/ML
1000 INJECTION, SOLUTION INTRAMUSCULAR; SUBCUTANEOUS
Status: CANCELLED
Start: 2022-07-13

## 2022-07-13 RX ADMIN — CYANOCOBALAMIN 1000 MCG: 1000 INJECTION, SOLUTION INTRAMUSCULAR; SUBCUTANEOUS at 07:42

## 2022-07-13 RX ADMIN — HEPARIN SODIUM (PORCINE) LOCK FLUSH IV SOLN 100 UNIT/ML 5 ML: 100 SOLUTION at 07:31

## 2022-07-13 NOTE — PROGRESS NOTES
Patient is 51 years old, here today for injection of B12 per order of Charlene Moran NP.     Patient denies questions nor concerns regarding medication, administration site, side effects, nor aftercare.  Patient identified with two identifiers, order verified, and verbal consent for today's injection obtained from patient.   Patient education provided on s/s of injection site infection, and/or medication specific side effects, and when to call a provider.  Patient instructed to report any adverse effects.     B12 administered per protocol in left deltoid at a 90 degree angle.  Site covered with sterile bandage.  Patient tolerated injection well, no verbal nor non-verbal signs of discomfort noted.  No adverse effects noted at this time.     Patient instructed to call with further questions or concerns.  Patient states understanding and is in agreement with this plan.  Patient discharged.

## 2022-07-13 NOTE — PROGRESS NOTES
Patients port accessed using non-coring, 19 gauge, 3/4 inch needle, per facility protocol.     Hand hygiene performed: yes   Mask donned by caregiver: yes  Site prepped with CHG: yes   Labs drawn: no   Dressing applied using aseptic technique: yes     Port flushed easily, without resistance and positive for blood return.  Port flushed per orders/MAR. Needle removed intact, sterile dressing applied.  Slight pressure applied for 30 seconds.   Patient tolerated port flush well, denies pain nor discomfort at this time. Patient instructed to leave dressing intact for a minimum of one hour, and to call with questions or concerns.  Patient states understanding and is in agreement with this plan. Patient discharged.

## 2022-07-14 ENCOUNTER — ANCILLARY PROCEDURE (OUTPATIENT)
Dept: GENERAL RADIOLOGY | Facility: OTHER | Age: 52
End: 2022-07-14
Attending: FAMILY MEDICINE
Payer: COMMERCIAL

## 2022-07-14 ENCOUNTER — OFFICE VISIT (OUTPATIENT)
Dept: FAMILY MEDICINE | Facility: OTHER | Age: 52
End: 2022-07-14
Attending: FAMILY MEDICINE
Payer: COMMERCIAL

## 2022-07-14 VITALS
HEART RATE: 77 BPM | HEIGHT: 67 IN | DIASTOLIC BLOOD PRESSURE: 70 MMHG | WEIGHT: 185 LBS | BODY MASS INDEX: 29.03 KG/M2 | TEMPERATURE: 97.1 F | SYSTOLIC BLOOD PRESSURE: 130 MMHG | OXYGEN SATURATION: 98 %

## 2022-07-14 DIAGNOSIS — R21 RASH: ICD-10-CM

## 2022-07-14 DIAGNOSIS — M79.671 RIGHT FOOT PAIN: Primary | ICD-10-CM

## 2022-07-14 DIAGNOSIS — G62.9 NEUROPATHY: ICD-10-CM

## 2022-07-14 DIAGNOSIS — M79.671 RIGHT FOOT PAIN: ICD-10-CM

## 2022-07-14 DIAGNOSIS — E03.9 HYPOTHYROIDISM, UNSPECIFIED TYPE: ICD-10-CM

## 2022-07-14 DIAGNOSIS — R60.0 EDEMA OF BOTH LOWER EXTREMITIES: ICD-10-CM

## 2022-07-14 DIAGNOSIS — Z98.890 S/P ARTHROSCOPY OF LEFT SHOULDER: ICD-10-CM

## 2022-07-14 LAB
CRP SERPL-MCNC: <2.9 MG/L (ref 0–8)
ERYTHROCYTE [SEDIMENTATION RATE] IN BLOOD BY WESTERGREN METHOD: 11 MM/HR (ref 0–30)

## 2022-07-14 PROCEDURE — 86140 C-REACTIVE PROTEIN: CPT | Performed by: FAMILY MEDICINE

## 2022-07-14 PROCEDURE — 99214 OFFICE O/P EST MOD 30 MIN: CPT | Performed by: FAMILY MEDICINE

## 2022-07-14 PROCEDURE — 85652 RBC SED RATE AUTOMATED: CPT | Performed by: FAMILY MEDICINE

## 2022-07-14 PROCEDURE — 73630 X-RAY EXAM OF FOOT: CPT | Mod: TC | Performed by: RADIOLOGY

## 2022-07-14 PROCEDURE — 36415 COLL VENOUS BLD VENIPUNCTURE: CPT | Performed by: FAMILY MEDICINE

## 2022-07-14 RX ORDER — LEVOTHYROXINE SODIUM 200 UG/1
200 TABLET ORAL DAILY
Qty: 90 TABLET | Refills: 1 | Status: SHIPPED | OUTPATIENT
Start: 2022-07-14 | End: 2024-02-02

## 2022-07-14 RX ORDER — GABAPENTIN 300 MG/1
600 CAPSULE ORAL AT BEDTIME
Qty: 60 CAPSULE | Refills: 3 | Status: SHIPPED | OUTPATIENT
Start: 2022-07-14 | End: 2024-02-02

## 2022-07-14 RX ORDER — DULOXETIN HYDROCHLORIDE 30 MG/1
30 CAPSULE, DELAYED RELEASE ORAL 2 TIMES DAILY
Qty: 60 CAPSULE | Refills: 3 | Status: SHIPPED | OUTPATIENT
Start: 2022-07-14 | End: 2022-10-12

## 2022-07-14 RX ORDER — LEVOTHYROXINE SODIUM 25 UG/1
TABLET ORAL
Qty: 90 TABLET | Refills: 0 | Status: SHIPPED | OUTPATIENT
Start: 2022-07-14 | End: 2023-03-02

## 2022-07-14 ASSESSMENT — ANXIETY QUESTIONNAIRES
GAD7 TOTAL SCORE: 0
6. BECOMING EASILY ANNOYED OR IRRITABLE: NOT AT ALL
GAD7 TOTAL SCORE: 0
7. FEELING AFRAID AS IF SOMETHING AWFUL MIGHT HAPPEN: NOT AT ALL
1. FEELING NERVOUS, ANXIOUS, OR ON EDGE: NOT AT ALL
5. BEING SO RESTLESS THAT IT IS HARD TO SIT STILL: NOT AT ALL
2. NOT BEING ABLE TO STOP OR CONTROL WORRYING: NOT AT ALL
4. TROUBLE RELAXING: NOT AT ALL
3. WORRYING TOO MUCH ABOUT DIFFERENT THINGS: NOT AT ALL

## 2022-07-14 ASSESSMENT — PATIENT HEALTH QUESTIONNAIRE - PHQ9: SUM OF ALL RESPONSES TO PHQ QUESTIONS 1-9: 0

## 2022-07-14 ASSESSMENT — PAIN SCALES - GENERAL: PAINLEVEL: SEVERE PAIN (6)

## 2022-07-14 NOTE — PATIENT INSTRUCTIONS
Xray foot today - will call with results.  Referral to Dr Kowalski - podiatry.    Reduce Zoloft to 50 mg daily x 1 week, then stop.  Continue Cymbalta - may need dose adjustment.    Lab today - will call with results.  Start wearing compression stockings - script sent to [facility].  If rash recurs- reassess - might need to do small biopsy - vasculitis?

## 2022-07-14 NOTE — NURSING NOTE
"Chief Complaint   Patient presents with     Derm Problem     pain right big toe     lower ankle swelling       Initial /70 (BP Location: Right arm, Patient Position: Sitting, Cuff Size: Adult Regular)   Pulse 77   Temp 97.1  F (36.2  C) (Tympanic)   Ht 1.702 m (5' 7\")   Wt 83.9 kg (185 lb)   SpO2 98%   BMI 28.98 kg/m   Estimated body mass index is 28.98 kg/m  as calculated from the following:    Height as of this encounter: 1.702 m (5' 7\").    Weight as of this encounter: 83.9 kg (185 lb).  Medication Reconciliation: complete  Patrica Hopkins LPN  "

## 2022-07-27 ENCOUNTER — LAB REQUISITION (OUTPATIENT)
Dept: LAB | Facility: HOSPITAL | Age: 52
End: 2022-07-27

## 2022-07-27 ENCOUNTER — LAB (OUTPATIENT)
Dept: OCCUPATIONAL MEDICINE | Facility: OTHER | Age: 52
End: 2022-07-27

## 2022-07-27 PROCEDURE — 87637 SARSCOV2&INF A&B&RSV AMP PRB: CPT | Performed by: FAMILY MEDICINE

## 2022-08-10 ENCOUNTER — INFUSION THERAPY VISIT (OUTPATIENT)
Dept: INFUSION THERAPY | Facility: OTHER | Age: 52
End: 2022-08-10
Attending: NURSE PRACTITIONER
Payer: COMMERCIAL

## 2022-08-10 DIAGNOSIS — Z85.038 HISTORY OF COLON CANCER, STAGE III: Primary | ICD-10-CM

## 2022-08-10 DIAGNOSIS — E53.8 VITAMIN B 12 DEFICIENCY: ICD-10-CM

## 2022-08-10 DIAGNOSIS — C18.2 CANCER OF ASCENDING COLON (H): ICD-10-CM

## 2022-08-10 LAB
ALBUMIN SERPL-MCNC: 3.5 G/DL (ref 3.4–5)
ALP SERPL-CCNC: 90 U/L (ref 40–150)
ALT SERPL W P-5'-P-CCNC: 54 U/L (ref 0–50)
ANION GAP SERPL CALCULATED.3IONS-SCNC: 5 MMOL/L (ref 3–14)
AST SERPL W P-5'-P-CCNC: 36 U/L (ref 0–45)
BASOPHILS # BLD AUTO: 0 10E3/UL (ref 0–0.2)
BASOPHILS NFR BLD AUTO: 0 %
BILIRUB SERPL-MCNC: 0.4 MG/DL (ref 0.2–1.3)
BUN SERPL-MCNC: 16 MG/DL (ref 7–30)
CALCIUM SERPL-MCNC: 8.7 MG/DL (ref 8.5–10.1)
CEA SERPL-MCNC: 2.6 NG/ML
CHLORIDE BLD-SCNC: 111 MMOL/L (ref 94–109)
CO2 SERPL-SCNC: 24 MMOL/L (ref 20–32)
CREAT SERPL-MCNC: 0.73 MG/DL (ref 0.52–1.04)
EOSINOPHIL # BLD AUTO: 0 10E3/UL (ref 0–0.7)
EOSINOPHIL NFR BLD AUTO: 0 %
ERYTHROCYTE [DISTWIDTH] IN BLOOD BY AUTOMATED COUNT: 12.6 % (ref 10–15)
FERRITIN SERPL-MCNC: 62 NG/ML (ref 8–252)
GFR SERPL CREATININE-BSD FRML MDRD: >90 ML/MIN/1.73M2
GLUCOSE BLD-MCNC: 130 MG/DL (ref 70–99)
HCT VFR BLD AUTO: 37.7 % (ref 35–47)
HGB BLD-MCNC: 12.5 G/DL (ref 11.7–15.7)
IMM GRANULOCYTES # BLD: 0 10E3/UL
IMM GRANULOCYTES NFR BLD: 0 %
IRON SATN MFR SERPL: 19 % (ref 15–46)
IRON SERPL-MCNC: 67 UG/DL (ref 35–180)
LYMPHOCYTES # BLD AUTO: 1.6 10E3/UL (ref 0.8–5.3)
LYMPHOCYTES NFR BLD AUTO: 23 %
MCH RBC QN AUTO: 29.8 PG (ref 26.5–33)
MCHC RBC AUTO-ENTMCNC: 33.2 G/DL (ref 31.5–36.5)
MCV RBC AUTO: 90 FL (ref 78–100)
MONOCYTES # BLD AUTO: 0.5 10E3/UL (ref 0–1.3)
MONOCYTES NFR BLD AUTO: 7 %
NEUTROPHILS # BLD AUTO: 4.9 10E3/UL (ref 1.6–8.3)
NEUTROPHILS NFR BLD AUTO: 70 %
NRBC # BLD AUTO: 0 10E3/UL
NRBC BLD AUTO-RTO: 0 /100
PLATELET # BLD AUTO: 195 10E3/UL (ref 150–450)
POTASSIUM BLD-SCNC: 4 MMOL/L (ref 3.4–5.3)
PROT SERPL-MCNC: 7.1 G/DL (ref 6.8–8.8)
RBC # BLD AUTO: 4.2 10E6/UL (ref 3.8–5.2)
SODIUM SERPL-SCNC: 140 MMOL/L (ref 133–144)
TIBC SERPL-MCNC: 349 UG/DL (ref 240–430)
VIT B12 SERPL-MCNC: 529 PG/ML (ref 232–1245)
WBC # BLD AUTO: 7 10E3/UL (ref 4–11)

## 2022-08-10 PROCEDURE — 85025 COMPLETE CBC W/AUTO DIFF WBC: CPT

## 2022-08-10 PROCEDURE — 82607 VITAMIN B-12: CPT | Performed by: INTERNAL MEDICINE

## 2022-08-10 PROCEDURE — 82378 CARCINOEMBRYONIC ANTIGEN: CPT | Performed by: INTERNAL MEDICINE

## 2022-08-10 PROCEDURE — 83550 IRON BINDING TEST: CPT

## 2022-08-10 PROCEDURE — 80053 COMPREHEN METABOLIC PANEL: CPT

## 2022-08-10 PROCEDURE — 82728 ASSAY OF FERRITIN: CPT | Performed by: INTERNAL MEDICINE

## 2022-08-10 PROCEDURE — 82728 ASSAY OF FERRITIN: CPT

## 2022-08-10 PROCEDURE — 83550 IRON BINDING TEST: CPT | Performed by: INTERNAL MEDICINE

## 2022-08-10 PROCEDURE — 36415 COLL VENOUS BLD VENIPUNCTURE: CPT | Performed by: INTERNAL MEDICINE

## 2022-08-10 PROCEDURE — 80053 COMPREHEN METABOLIC PANEL: CPT | Performed by: INTERNAL MEDICINE

## 2022-08-10 PROCEDURE — 85025 COMPLETE CBC W/AUTO DIFF WBC: CPT | Performed by: INTERNAL MEDICINE

## 2022-08-10 PROCEDURE — 82746 ASSAY OF FOLIC ACID SERUM: CPT | Performed by: INTERNAL MEDICINE

## 2022-08-10 PROCEDURE — 96372 THER/PROPH/DIAG INJ SC/IM: CPT | Performed by: INTERNAL MEDICINE

## 2022-08-10 RX ORDER — HEPARIN SODIUM (PORCINE) LOCK FLUSH IV SOLN 100 UNIT/ML 100 UNIT/ML
5 SOLUTION INTRAVENOUS
Status: CANCELLED | OUTPATIENT
Start: 2022-08-10

## 2022-08-10 RX ORDER — CYANOCOBALAMIN 1000 UG/ML
1000 INJECTION, SOLUTION INTRAMUSCULAR; SUBCUTANEOUS
Status: DISCONTINUED | OUTPATIENT
Start: 2022-08-10 | End: 2022-08-10 | Stop reason: HOSPADM

## 2022-08-10 RX ORDER — CYANOCOBALAMIN 1000 UG/ML
1000 INJECTION, SOLUTION INTRAMUSCULAR; SUBCUTANEOUS
Status: CANCELLED
Start: 2022-08-10

## 2022-08-10 RX ORDER — HEPARIN SODIUM (PORCINE) LOCK FLUSH IV SOLN 100 UNIT/ML 100 UNIT/ML
5 SOLUTION INTRAVENOUS
Status: DISCONTINUED | OUTPATIENT
Start: 2022-08-10 | End: 2022-08-10 | Stop reason: HOSPADM

## 2022-08-10 RX ADMIN — CYANOCOBALAMIN 1000 MCG: 1000 INJECTION, SOLUTION INTRAMUSCULAR; SUBCUTANEOUS at 11:30

## 2022-08-10 RX ADMIN — HEPARIN SODIUM (PORCINE) LOCK FLUSH IV SOLN 100 UNIT/ML 5 ML: 100 SOLUTION at 11:36

## 2022-08-10 NOTE — PROGRESS NOTES
Patient is 52 y/o years old, here today for injection of B12 per order of Charlene Moran NP.      Patient denies questions nor concerns regarding medication, administration site, side effects, nor aftercare.  Patient identified with two identifiers, order verified, and verbal consent for today's injection obtained from patient.   Patient education provided on s/s of injection site infection, and/or medication specific side effects, and when to call a provider.  Patient instructed to report any adverse effects.     Medication administered per protocol in left deltoid at a 90 degree angle.  Site covered with sterile bandage.  Patient tolerated injection well, no verbal nor non-verbal signs of discomfort noted.  No adverse effects noted at this time.     Patients port accessed using non-coring, 19 gauge, 3/4 inch needle, per facility protocol.     Hand hygiene performed: yes   Mask donned by caregiver: yes  Site prepped with CHG: yes   Labs drawn: yes   Dressing applied using aseptic technique: yes     Port flushed easily, without resistance. Flushed with 10 cc's normal saline.   Immediate blood return noted. 10 cc blood discarded.  Ordered labs obtained and sent to lab.  Port flushed per orders/MAR. Needle removed intact, sterile dressing applied.  Slight pressure applied for 30 seconds.   Patient tolerated port flush well, denies pain nor discomfort at this time. Patient instructed to leave dressing intact for a minimum of one hour, and to call with questions or concerns.  Patient states understanding and is in agreement with this plan.  Patient discharged.

## 2022-08-11 LAB — FOLATE SERPL-MCNC: 9.7 NG/ML (ref 4.6–34.8)

## 2022-08-12 ENCOUNTER — HOSPITAL ENCOUNTER (OUTPATIENT)
Dept: CT IMAGING | Facility: HOSPITAL | Age: 52
Discharge: HOME | End: 2022-08-12
Attending: NURSE PRACTITIONER
Payer: COMMERCIAL

## 2022-08-12 ENCOUNTER — ONCOLOGY VISIT (OUTPATIENT)
Dept: ONCOLOGY | Facility: OTHER | Age: 52
End: 2022-08-12
Attending: NURSE PRACTITIONER
Payer: COMMERCIAL

## 2022-08-12 VITALS
OXYGEN SATURATION: 98 % | HEIGHT: 67 IN | SYSTOLIC BLOOD PRESSURE: 128 MMHG | DIASTOLIC BLOOD PRESSURE: 72 MMHG | TEMPERATURE: 97.8 F | HEART RATE: 90 BPM | RESPIRATION RATE: 18 BRPM | WEIGHT: 197.09 LBS | BODY MASS INDEX: 30.93 KG/M2

## 2022-08-12 DIAGNOSIS — E61.1 IRON DEFICIENCY: ICD-10-CM

## 2022-08-12 DIAGNOSIS — Z85.038 HISTORY OF COLON CANCER, STAGE III: Primary | ICD-10-CM

## 2022-08-12 DIAGNOSIS — E53.8 VITAMIN B 12 DEFICIENCY: ICD-10-CM

## 2022-08-12 DIAGNOSIS — Z98.84 GASTRIC BYPASS STATUS FOR OBESITY: ICD-10-CM

## 2022-08-12 PROCEDURE — 74177 CT ABD & PELVIS W/CONTRAST: CPT

## 2022-08-12 PROCEDURE — 99214 OFFICE O/P EST MOD 30 MIN: CPT | Performed by: NURSE PRACTITIONER

## 2022-08-12 PROCEDURE — 250N000011 HC RX IP 250 OP 636: Performed by: RADIOLOGY

## 2022-08-12 RX ORDER — IOPAMIDOL 755 MG/ML
81 INJECTION, SOLUTION INTRAVASCULAR ONCE
Status: COMPLETED | OUTPATIENT
Start: 2022-08-12 | End: 2022-08-12

## 2022-08-12 RX ORDER — IOPAMIDOL 755 MG/ML
17 INJECTION, SOLUTION INTRAVASCULAR ONCE
Status: COMPLETED | OUTPATIENT
Start: 2022-08-12 | End: 2022-08-12

## 2022-08-12 RX ADMIN — IOPAMIDOL 17 ML: 755 INJECTION, SOLUTION INTRAVENOUS at 11:54

## 2022-08-12 RX ADMIN — IOPAMIDOL 81 ML: 755 INJECTION, SOLUTION INTRAVENOUS at 11:55

## 2022-08-12 ASSESSMENT — PAIN SCALES - GENERAL: PAINLEVEL: NO PAIN (0)

## 2022-08-12 NOTE — PATIENT INSTRUCTIONS
Thank you for allowing me to be a part of your care today.    I will see you back in 4 months with labs prior. We will continue with B12 every 2 weeks in infusion for now. I will place referral to general surgery to have your port removed.     If you have any questions please call [phone]    Other instructions:      None

## 2022-08-12 NOTE — NURSING NOTE
"Oncology Rooming Note    August 12, 2022 2:24 PM   Constantino Warren is a 51 year old female who presents for:    Chief Complaint   Patient presents with     Oncology Clinic Visit     Follow up. History of colon cancer, stage III         Initial Vitals: /72   Pulse 90   Temp 97.8  F (36.6  C) (Tympanic)   Resp 18   Ht 1.702 m (5' 7\")   Wt 89.4 kg (197 lb 1.5 oz)   SpO2 98%   BMI 30.87 kg/m   Estimated body mass index is 30.87 kg/m  as calculated from the following:    Height as of this encounter: 1.702 m (5' 7\").    Weight as of this encounter: 89.4 kg (197 lb 1.5 oz). Body surface area is 2.06 meters squared.  No Pain (0) Comment: Data Unavailable   No LMP recorded. Patient has had a hysterectomy.  Allergies reviewed: Yes  Medications reviewed: Yes    Medications: Medication refills not needed today.  Pharmacy name entered into [facility]:    [facility] - [place], [place] - [address]   [facility] - [place], [place] - [address]  [facility] [place] [facility] [address] [facility] - [place] - [address]              "

## 2022-08-12 NOTE — PROGRESS NOTES
Oncology Follow-up Visit    Reason for Visit:  Constantino is a 51 year old woman with a history of Stage III adenocarcinoma of the colon, who presents to the clinic today for routine follow-up.    Nursing Note and documentation reviewed: Yes    Interval History:   Doing well. No obvious concerns today. States that mood and energy have both been down a bit but states this is related to her bipolar and knows she is heading into a depressive episode. Working on with therapist. No recent signs of infection. No fever, chills, chest pain, cough, or SOB. No bleeding concerns. Not that she did discontinue her liquid iron as the company she got it from stopped making it. Hasn't been taking for 3 weeks. Still getting B12 [id number]. Denies nausea or vomiting. Appetite hit or miss. Bowels are regular. No abdominal pain, melana, or hematochezia. In reviewing her chart, looks like she canceled appt for colonoscopy with Dr. Cleveland, patient states she was never notified? Nonetheless, not interested in colonoscopy at this time unless her numbers start to rise. Is interested in getting port out however.     Oncologic History:   11/21/19 to 11/21/2019 Hospitalized with a hemoglobin of 5.7 underwent EGD with no evidence of active bleeding.  12/12/2019 Seen by Dr. Cook and established care with her as her new PCP.  Referral for screening colonoscopy was completed.  2/13/2020 Underwent colonoscopy by Dr. Cleveland where a near obstructing lesion in the right colon was found.  Pathology showed an invasive moderately differentiated adenocarcinoma.  CT the abdomen and pelvis showed a mass in the junction of the cecum and ascending colon highly suspicious for malignancy.  2/24/2020 Underwent laparoscopic-assisted right hemicolectomy by Dr. Cleveland that was converted to open right hemicolectomy when it was noted that the tumor was adherent to the pelvic brim; final pathology showed mildly differentiated adenocarcinoma of the right colon with 1/32 lymph  nodes positive; there was perineural invasion; tumor budding and acutely inflamed tumor and reactive lymph nodes with peritonitis; the omentum showed mild peritonitis; margins were negative; patient was staged [id number]; IIIb adenocarcinoma of the right colon  2/28/2020 Port-A-Cath placement  3/25/2020 PET scan which showed no evidence of distant metastatic disease  3/30/2020 Seen by Dr. Palma with Medical Oncology with recommendation for XELOX; capecitabine 850 mg/m2 p.o. b.i.d. on days 1 through 14 of a 21-day cycle, oxaliplatin 130 mg/m2 to be given on day 1 x4 cycles.  4/10/2020 Seen by Dr. Bryant with Radiation Oncology with no recommendation for radiation therapy at this time related to negative margins on pathology  4/17/2020 initiation of chemotherapy  6/22/2020 Completion of 4 cycles of XELOX  10/26/2020 Colonoscopy completed. Unable to visualize the anastomosis of the small bowel to the transverse colon  02/23/2022 CT CAP without evidence of disease recurrence or progression  8/12/2022 CT CAP without evidence of disease recurrence or progression     MMR proteins intact  CEA prior to surgery was <0.5     Current Chemo Regime/TX:  N/a  Current Cycle:  n/a  # of completed cycles:  n/a     Previous treatment:   Oxaliplatin/Capecitabine x 4 cycles    Past Medical History:   Diagnosis Date     Arthritis 10/13/2020     Cancer (H)     colon     Cancer (H) 2004    uterine     Chronic pain      De Quervain's disease (tenosynovitis) 12/2021     Depressive disorder      Diabetes (H)      History of blood transfusion      Hypertension      Prediabetes      Syncope and collapse      Thyroid disease      Trigger finger 12/2021       Past Surgical History:   Procedure Laterality Date     ARTHROSCOPY SHOULDER Left 11/6/2020    Procedure: LEFT SHOULDER ARTHROSCOPY WITH ROTATOR CUFF REPAIR AND ACROMIUM CLAVICULAR JOINT RESECTION;  Surgeon: Tanner Mujica DO;  Location: [facility]     BACK SURGERY      2008; 2011     COLONOSCOPY  N/A 2/13/2020    Procedure: COLONOSCOPY WITH BIOPSY AND TATOOING OF ASCENDING COLON,INCOMEPLETE EXAM;  Surgeon: Kang Cleveland MD;  Location: [facility]     COLONOSCOPY N/A 10/26/2020    Procedure: incomplete colonoscopy ;  Surgeon: Kang Cleveland MD;  Location: [facility]     ESOPHAGOSCOPY, GASTROSCOPY, DUODENOSCOPY (EGD), COMBINED N/A 7/21/2018    Procedure: COMBINED ESOPHAGOSCOPY, GASTROSCOPY, DUODENOSCOPY (EGD);  UPPER ENDOSCOPY WITH BIOPSIES;  Surgeon: Roger Vazquez MD;  Location: [facility]     ESOPHAGOSCOPY, GASTROSCOPY, DUODENOSCOPY (EGD), COMBINED N/A 11/21/2019    Procedure: UPPER ENDOSCOPY with biopsy;  Surgeon: Kang Cleveland MD;  Location: [facility]     HEAD & NECK SURGERY  2008    c4-c5 fusion     HYSTERECTOMY, CRISTIANE  04/2004    uterine cancer; [facility]     INSERT PORT VASCULAR ACCESS N/A 2/28/2020    Procedure: INSERTION, VASCULAR ACCESS PORT LEFT;  Surgeon: Kang Cleveland MD;  Location: [facility]     LAPAROSCOPIC ASSISTED COLECTOMY Right 2/24/2020    Procedure: RIGHT KEVIN COLECTOMY, LYSIS OF ADHESIONS;  Surgeon: Kang Cleveland MD;  Location: [facility]     LAPAROSCOPIC BYPASS GASTRIC  12/2010       Family History   Problem Relation Age of Onset     Diabetes Mother      Hypertension Mother      Coronary Artery Disease Mother      Myocardial Infarction Mother      Hypertension Father      Diabetes Maternal Grandmother      Hypertension Maternal Grandfather      Coronary Artery Disease Maternal Grandfather      Cancer Paternal Grandfather         unsure of type of cancer     Bladder Cancer Paternal Grandfather      Diabetes Maternal Aunt      Cerebrovascular Disease Maternal Aunt      Diabetes Maternal Aunt      Cerebrovascular Disease Maternal Aunt      Diabetes Maternal Aunt      Cerebrovascular Disease Maternal Aunt      Hyperlipidemia No family hx of      Breast Cancer No family hx of      Colon Cancer No family hx of      Prostate Cancer No family hx of      Thyroid Disease No  family hx of      Anesthesia Reaction No family hx of      Asthma No family hx of      Genetic Disorder No family hx of        Social History     Socioeconomic History     Marital status:      Spouse name: Not on file     Number of children: 2     Years of education: Not on file     Highest education level: Not on file   Occupational History     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 Yes     Comment: Mother   Social History Narrative     Not on file     Social Determinants of Health     Financial Resource Strain: Not on file   Food Insecurity: Not on file   Transportation Needs: Not on file   Physical Activity: Not on file   Stress: Not on file   Social Connections: Not on file   Intimate Partner Violence: Not on file   Housing Stability: Not on file       Current Outpatient Medications   Medication     acetaminophen (TYLENOL) 500 MG tablet     atorvastatin (LIPITOR) 40 MG tablet     Cyanocobalamin 1000 MCG/ML KIT     DULoxetine (CYMBALTA) 30 MG capsule     EPINEPHrine (ANY BX GENERIC EQUIV) 0.3 MG/0.3ML injection 2-pack     EQ ASPIRIN ADULT LOW DOSE 81 MG EC tablet     gabapentin (NEURONTIN) 300 MG capsule     levothyroxine (SYNTHROID/LEVOTHROID) 200 MCG tablet     levothyroxine (SYNTHROID/LEVOTHROID) 25 MCG tablet     lisinopril (ZESTRIL) 20 MG tablet     multivitamin w/minerals (THERA-VIT-M) tablet     omeprazole (PRILOSEC) 20 MG DR capsule     ondansetron (ZOFRAN-ODT) 4 MG ODT tab     tiZANidine (ZANAFLEX) 4 MG tablet     traZODone (DESYREL) 50 MG tablet     No current facility-administered medications for this visit.        Allergies   Allergen  "Reactions     Aspartame      Other reaction(s): Other - Describe In Comment Field, Seizures  convulsions     Bee Venom Anaphylaxis     Bupropion Anaphylaxis     Throat gets tight and hives. Denies allergic reaction     Food Anaphylaxis     Peppers-anaphylaxis if eaten, if touched hands swell and reddened     Food Allergy Formula Anaphylaxis     Peppers, patient reports green peppers close off her throat     Ibuprofen Anaphylaxis     No Clinical Screening - See Comments Anaphylaxis     Peppers, patient reports green peppers close off her throat     Piper Anaphylaxis     Peppers--green, black, red, chili, etc. Derm symptoms, hives;  Strawberries--(cooked) Itching, breathing difficulties, GI symptoms. Mushrooms--GI symptoms, Hives.Spinach (cooked) throat irritation  (Listed on [facility] problem list.)     Fentanyl Swelling     Lips and tongue swelled.  Swelling of lips and tongue     Adhesive Tape      blisters     Amitriptyline Other (See Comments)     Respiratory symptoms - denies allergic reaction     Liquid Adhesive Dermatitis     blisters  Blisters  Silk tape and tegaderm is fine     Oxcarbazepine Other (See Comments)     Throat gets tight and hives. Denies allergic reaction  Throat gets tight and hives-       Prozac [Fluoxetine]      Other reaction(s): *Unknown  uncontrolled muscle spasms     Valproic Acid      Respiratory symptoms ([facility]). Denies allergic reaction     Ziprasidone      Throat gets tight and hives. Denies allergic reaction       Review Of Systems:  A complete review of systems is negative except for the above mentioned items in the interval history.     ECOG Performance Status: 0    Physical Exam:  /72   Pulse 90   Temp 97.8  F (36.6  C) (Tympanic)   Resp 18   Ht 1.702 m (5' 7\")   Wt 89.4 kg (197 lb 1.5 oz)   SpO2 98%   BMI 30.87 kg/m    GENERAL APPEARANCE: Healthy, alert and in no acute distress.  HEENT: Eyes appear normal without scleral icterus. " Extraocular movements intact.  NECK:   Supple with normal range of motion. No asymmetry or masses.  LYMPHATICS: No palpable cervical, supraclavicular nodes.  RESP: Lungs clear to auscultation bilaterally, respirations regular and easy.  CARDIOVASCULAR: Regular rate and rhythm. Normal S1, S2; no murmur, gallop, or rub.  ABDOMEN: Soft, non-tender, non-distended. Bowel sounds auscultated all 4 quadrants. No palpable organomegaly or masses.  MUSCULOSKELETAL: Extremities without gross deformities noted. No edema noted.   SKIN: No suspicious lesions or rashes.  NEURO: Alert and oriented x 3.  Gait steady.  PSYCHIATRIC: Mentation and affect appear normal.  Mood appropriate.    Laboratory:  Component      Latest Ref Rng & Units 8/10/2022   WBC      4.0 - 11.0 10e3/uL 7.0   RBC Count      3.80 - 5.20 10e6/uL 4.20   Hemoglobin      11.7 - 15.7 g/dL 12.5   Hematocrit      35.0 - 47.0 % 37.7   MCV      78 - 100 fL 90   MCH      26.5 - 33.0 pg 29.8   MCHC      31.5 - 36.5 g/dL 33.2   RDW      10.0 - 15.0 % 12.6   Platelet Count      150 - 450 10e3/uL 195   % Neutrophils      % 70   % Lymphocytes      % 23   % Monocytes      % 7   % Eosinophils      % 0   % Basophils      % 0   % Immature Granulocytes      % 0   NRBCs per 100 WBC      <1 /100 0   Absolute Neutrophils      1.6 - 8.3 10e3/uL 4.9   Absolute Lymphocytes      0.8 - 5.3 10e3/uL 1.6   Absolute Monocytes      0.0 - 1.3 10e3/uL 0.5   Absolute Eosinophils      0.0 - 0.7 10e3/uL 0.0   Absolute Basophils      0.0 - 0.2 10e3/uL 0.0   Absolute Immature Granulocytes      <=0.4 10e3/uL 0.0   Absolute NRBCs      10e3/uL 0.0   Sodium      133 - 144 mmol/L 140   Potassium      3.4 - 5.3 mmol/L 4.0   Chloride      94 - 109 mmol/L 111 (H)   Carbon Dioxide      20 - 32 mmol/L 24   Anion Gap      3 - 14 mmol/L 5   Urea Nitrogen      7 - 30 mg/dL 16   Creatinine      0.52 - 1.04 mg/dL 0.73   Calcium      8.5 - 10.1 mg/dL 8.7   Glucose      70 - 99 mg/dL 130 (H)   Alkaline Phosphatase       40 - 150 U/L 90   AST      0 - 45 U/L 36   ALT      0 - 50 U/L 54 (H)   Protein Total      6.8 - 8.8 g/dL 7.1   Albumin      3.4 - 5.0 g/dL 3.5   Bilirubin Total      0.2 - 1.3 mg/dL 0.4   GFR Estimate      >60 mL/min/1.73m2 >90   Iron      35 - 180 ug/dL 67   Iron Binding Cap      240 - 430 ug/dL 349   Iron Saturation Index      15 - 46 % 19   Folate      4.6 - 34.8 ng/mL 9.7   Ferritin      8 - 252 ng/mL 62   Vitamin B12      232 - 1,245 pg/mL 529   CEA      ng/mL 2.6       Imaging Studies:    PROCEDURE: CT CHEST/ABDOMEN/PELVIS W CONTRAST 8/12/2022 12:30 PM     HISTORY: History of colon cancer, stage III                                                                 IMPRESSION: Stable appearance of the chest abdomen and pelvis from  previous examination in February 2022     ZENA EDWARDS MD        ASSESSMENT/PLAN:  #1 Colon cancer:  Stage IIIB adenocarcinoma of the ascending colon diagnosed February 2020.  She completed adjuvant chemotherapy with XELOX x 4 cycles on 6/22/2020 and is being followed with surveillance.  Again, discussed repeat colonoscopy that is overdue due to Anastomosis site not visualized well on previous colonoscopy. Patient refusing colonoscopy at this time. Today, she is doing well from cancer stand point. No signs to suggest recurrence or progression on CT scan. CEA stable. I will plan on seeing her back in 4 months, with labs prior, sooner with concerns. Additionally, I will place referral to general surgery to have her port removed per patient wishes.      #2 Iron deficiency: Has discontinued iron replacement. Labs stable. Would like to remain off replacement at this time and reassess labs at next visit.      #3 B12 deficiency: Currently on B12 injections q2w due to previous deficiency. I would like to continue this, will reassess labs when we see her next.     #4 Fatigue Feels this is attributable to bipolar which she is working through with her therapist.       Patient in agreement  with plan and verbalizes understanding. Agrees to call with any questions or concerns.    37 minutes spent in the patient's encounter today with time spent in review of patient's chart along with chart preparation and review of the treatment plan and signing of treatment plan.  Time was also spent with the patient in obtaining a review of systems and performing a physical exam along with detailed review of all test results. Time was also spent in discussing plan for future follow-up and relating instructions for follow-up and in placing future orders.    JUNIOR [facility]  Medical Oncology

## 2022-08-15 ENCOUNTER — HOSPITAL ENCOUNTER (OUTPATIENT)
Facility: HOSPITAL | Age: 52
End: 2022-08-15
Attending: SURGERY | Admitting: SURGERY
Payer: COMMERCIAL

## 2022-08-15 ENCOUNTER — TELEPHONE (OUTPATIENT)
Dept: SURGERY | Facility: OTHER | Age: 52
End: 2022-08-15

## 2022-08-15 ENCOUNTER — PREP FOR PROCEDURE (OUTPATIENT)
Dept: SURGERY | Facility: OTHER | Age: 52
End: 2022-08-15

## 2022-08-15 DIAGNOSIS — Z95.828 PORT-A-CATH IN PLACE: Primary | ICD-10-CM

## 2022-08-15 NOTE — TELEPHONE ENCOUNTER
Referral received for port-a-cath removal.  Call placed to patient.  Surgery date: 9/6/22 with Dr.Skaja Tovar test to be completed at home.  Instructions and surgical soap will be left at registration for patient to  later this week.    Delicia Ladd LPN

## 2022-08-16 ENCOUNTER — OFFICE VISIT (OUTPATIENT)
Dept: PODIATRY | Facility: OTHER | Age: 52
End: 2022-08-16
Attending: FAMILY MEDICINE
Payer: COMMERCIAL

## 2022-08-16 VITALS
DIASTOLIC BLOOD PRESSURE: 95 MMHG | OXYGEN SATURATION: 97 % | SYSTOLIC BLOOD PRESSURE: 160 MMHG | TEMPERATURE: 96.8 F | HEART RATE: 66 BPM

## 2022-08-16 DIAGNOSIS — M62.462 GASTROCNEMIUS EQUINUS OF LEFT LOWER EXTREMITY: ICD-10-CM

## 2022-08-16 DIAGNOSIS — Q66.70 CONGENITAL PES CAVUS: ICD-10-CM

## 2022-08-16 DIAGNOSIS — L60.3 ONYCHODYSTROPHY: ICD-10-CM

## 2022-08-16 DIAGNOSIS — M20.21 HALLUX RIGIDUS OF RIGHT FOOT: ICD-10-CM

## 2022-08-16 DIAGNOSIS — M79.671 RIGHT FOOT PAIN: ICD-10-CM

## 2022-08-16 DIAGNOSIS — M62.461 GASTROCNEMIUS EQUINUS OF RIGHT LOWER EXTREMITY: ICD-10-CM

## 2022-08-16 DIAGNOSIS — L60.3 ONYCHODYSTROPHY: Primary | ICD-10-CM

## 2022-08-16 PROCEDURE — 99203 OFFICE O/P NEW LOW 30 MIN: CPT | Performed by: PODIATRIST

## 2022-08-16 PROCEDURE — 87107 FUNGI IDENTIFICATION MOLD: CPT

## 2022-08-16 ASSESSMENT — PAIN SCALES - GENERAL: PAINLEVEL: SEVERE PAIN (6)

## 2022-08-16 NOTE — PROGRESS NOTES
Chief complaint: Patient presents with:  Musculoskeletal Problem: Right foot pain      History of Present Illness: This 51 year old female is seen at the request of Dr. Cook for evaluation and suggestions of management of RIGHT toe pain and foot fungus.    Years ago, she saw Dr. Williamson who thought she had foot fungus. She tried prescription topical medication for both the skin and the toenails. She also took an oral medication. She has no itching from the area but it is dry, flaking skin.    Her RIGHT hallux is also painful with an electrical sensation for a couple months. Any time the toe is touched, she has pain. She is wondering how she can have this pain reduced.    No further pedal complaints today.         BP (!) 160/95 (BP Location: Left arm, Patient Position: Sitting, Cuff Size: Adult Regular)   Pulse 66   Temp 96.8  F (36  C) (Tympanic)   SpO2 97%     Patient Active Problem List   Diagnosis     Syncope and collapse     Hypertensive urgency     Concussion syndrome     Hydronephrosis, right     Pyelonephritis     Urolithiasis     Chest pain     GI bleed     Abdominal pain, epigastric     Hypochromic microcytic anemia     Anastomotic ulcer S/P gastric bypass     Anemia     Adjustment disorder with mixed anxiety and depressed mood     Annular tear of lumbar disc     Chronic migraine     Chronic pain disorder     Failed back syndrome of lumbar spine     Gastric bypass status for obesity     Hx of lumbar discectomy     Hyperlipidemia     Hypothyroidism     Long term (current) use of opiate analgesic     Other insomnia     Vitamin B 12 deficiency     Special screening for malignant neoplasms, colon     Colon cancer (H)     Prediabetes     S/P partial resection of colon     Cancer of ascending colon (H)     Iron deficiency anemia, unspecified iron deficiency anemia type     History of uterine cancer     Palliative care patient     Rotator cuff tear     Arthritis     History of colon cancer, stage III     Iron  deficiency       Past Surgical History:   Procedure Laterality Date     ARTHROSCOPY SHOULDER Left 11/6/2020    Procedure: LEFT SHOULDER ARTHROSCOPY WITH ROTATOR CUFF REPAIR AND ACROMIUM CLAVICULAR JOINT RESECTION;  Surgeon: Tanner Mujica DO;  [facility]: [facility]     BACK SURGERY      2008; 2011     COLONOSCOPY N/A 2/13/2020    Procedure: COLONOSCOPY WITH BIOPSY AND TATOOING OF ASCENDING COLON,INCOMEPLETE EXAM;  Surgeon: Kang Cleveland MD;  [facility]     COLONOSCOPY N/A 10/26/2020    Procedure: incomplete colonoscopy ;  Surgeon: Kang Cleveland MD;  [facility]     ESOPHAGOSCOPY, GASTROSCOPY, DUODENOSCOPY (EGD), COMBINED N/A 7/21/2018    Procedure: COMBINED ESOPHAGOSCOPY, GASTROSCOPY, DUODENOSCOPY (EGD);  UPPER ENDOSCOPY WITH BIOPSIES;  Surgeon: Roger Vazquez MD;  [facility]     ESOPHAGOSCOPY, GASTROSCOPY, DUODENOSCOPY (EGD), COMBINED N/A 11/21/2019    Procedure: UPPER ENDOSCOPY with biopsy;  Surgeon: Kang Cleveland MD;  [facility]     HEAD & NECK SURGERY  2008    c4-c5 fusion     HYSTERECTOMY, [facility]  04/2004    uterine cancer; [facility]     INSERT PORT VASCULAR ACCESS N/A 2/28/2020    Procedure: INSERTION, VASCULAR ACCESS PORT LEFT;  Surgeon: Kang Cleveland MD;  [facility]     LAPAROSCOPIC ASSISTED COLECTOMY Right 2/24/2020    Procedure: RIGHT KEVIN COLECTOMY, LYSIS OF ADHESIONS;  Surgeon: Kang Cleveland MD;  [facility]: [facility]     LAPAROSCOPIC BYPASS GASTRIC  12/2010       Current Outpatient Medications   Medication     acetaminophen (TYLENOL) 500 MG tablet     atorvastatin (LIPITOR) 40 MG tablet     Cyanocobalamin 1000 MCG/ML KIT     DULoxetine (CYMBALTA) 30 MG capsule     EPINEPHrine (ANY BX GENERIC EQUIV) 0.3 MG/0.3ML injection 2-pack     EQ ASPIRIN ADULT LOW DOSE 81 MG EC tablet     gabapentin (NEURONTIN) 300 MG capsule     levothyroxine (SYNTHROID/LEVOTHROID) 200 MCG tablet     levothyroxine (SYNTHROID/LEVOTHROID) 25 MCG tablet     lisinopril  (ZESTRIL) 20 MG tablet     multivitamin w/minerals (THERA-VIT-M) tablet     omeprazole (PRILOSEC) 20 MG DR capsule     ondansetron (ZOFRAN-ODT) 4 MG ODT tab     tiZANidine (ZANAFLEX) 4 MG tablet     traZODone (DESYREL) 50 MG tablet     No current facility-administered medications for this visit.          Allergies   Allergen Reactions     Aspartame      Other reaction(s): Other - Describe In Comment Field, Seizures  convulsions     Bee Venom Anaphylaxis     Bupropion Anaphylaxis     Throat gets tight and hives. Denies allergic reaction     Food Anaphylaxis     Peppers-anaphylaxis if eaten, if touched hands swell and reddened     Food Allergy Formula Anaphylaxis     Peppers, patient reports green peppers close off her throat     Ibuprofen Anaphylaxis     No Clinical Screening - See Comments Anaphylaxis     Peppers, patient reports green peppers close off her throat     Piper Anaphylaxis     Peppers--green, black, red, chili, etc. Derm symptoms, hives;  Strawberries--(cooked) Itching, breathing difficulties, GI symptoms. Mushrooms--GI symptoms, Hives.Spinach (cooked) throat irritation  (Listed on [facility] problem list.)     Fentanyl Swelling     Lips and tongue swelled.  Swelling of lips and tongue     Adhesive Tape      blisters     Amitriptyline Other (See Comments)     Respiratory symptoms - denies allergic reaction     Liquid Adhesive Dermatitis     blisters  Blisters  Silk tape and tegaderm is fine     Oxcarbazepine Other (See Comments)     Throat gets tight and hives. Denies allergic reaction  Throat gets tight and hives-       Prozac [Fluoxetine]      Other reaction(s): *Unknown  uncontrolled muscle spasms     Valproic Acid      Respiratory symptoms ([facility]). Denies allergic reaction     Ziprasidone      Throat gets tight and hives. Denies allergic reaction       Family History   Problem Relation Age of Onset     Diabetes Mother      Hypertension Mother      Coronary Artery  Disease Mother      Myocardial Infarction Mother      Hypertension Father      Diabetes Maternal Grandmother      Hypertension Maternal Grandfather      Coronary Artery Disease Maternal Grandfather      Cancer Paternal Grandfather         unsure of type of cancer     Bladder Cancer Paternal Grandfather      Diabetes Maternal Aunt      Cerebrovascular Disease Maternal Aunt      Diabetes Maternal Aunt      Cerebrovascular Disease Maternal Aunt      Diabetes Maternal Aunt      Cerebrovascular Disease Maternal Aunt      Hyperlipidemia No family hx of      Breast Cancer No family hx of      Colon Cancer No family hx of      Prostate Cancer No family hx of      Thyroid Disease No family hx of      Anesthesia Reaction No family hx of      Asthma No family hx of      Genetic Disorder No family hx of        Social History     Socioeconomic History     Marital status:      Number of children: 2   Occupational History     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 Yes     Comment: Mother       ROS: 10 point ROS neg other than the symptoms noted above in the HPI.  EXAM  Constitutional: healthy, alert and no distress    Psychiatric: mentation appears normal and affect normal/bright    VASCULAR:  -Dorsalis pedis pulse +2/4 b/l  -Posterior tibial pulse +2/4 b/l  -Capillary refill time < 3 seconds to b/l hallux  NEURO:  -Light touch sensation intact to b/l plantar forefoot  DERM:  -Skin temperature within normal limits to bilateral foot  -Skin diffusely dry and flaking to bilateral foot    -Toenails elongated, thickened, dystrophic and discolored x 10  ---Significant  thickness to all toenails (RIGHT 2nd toenail the most prominent)  MSK:  -Moderately high arch to bilateral feet while patient is NWB  -Bowstringing of bilateral dorsal extensor tendons to digits 1-5 bilaterally    -Prominent metatarsal heads 1-5 bilaterally     -Muscle strength of ankles +5/5 for dorsiflexion, plantarflexion, ABDUction and ADDuction b/l    -Ankle joint passive ROM within normal limits except for dorsiflexion:    Dorsiflexion, RIGHT Straight knee 0 degrees    Dorsiflexion, LEFT Straight knee 0 degrees    RIGHT FOOT RADIOGRAPH 07/14/2022                                                                   IMPRESSION: Calcaneal spurs       ZENA EDWARDS MD   ============================================================    ASSESSMENT:  (L60.3) Onychodystrophy  (primary encounter diagnosis)    (M20.21) Hallux rigidus of right foot    (Q66.70) Congenital pes cavus    (M79.671) Right foot pain    (M21.6X1) Gastrocnemius equinus of right lower extremity    (M21.6X2) Gastrocnemius equinus of left lower extremity        PLAN:  -Patient evaluated and examined. Treatment options discussed with no educational barriers noted.  Onychodystrophy  -Discussed etiologies and treatment options for onychodystrophy. There may be fungi in the toenail, but it is not known until a nail culture is performed. Treatment options consist of leaving the toenail as is, treating the nail for fungi (culture would be taken today to confirm nail fungi), a nail avulsion and treating the fungi as the nail grows back in, or removing the toenail permanently. The oral medication can sometimes fluctuate liver values so the patient would need to get a liver blood draw before, during and after the 90 day treatment. Topical anti-fungals can be used, but they do not always full cure the toenail fungi and they must be used daily and sometimes for 6-12 months before noticing a difference in the toenail. Recurrence rate of toenail fungi is high.  After reviewing risks and benefits, patient has decided to proceed with oral and topical treatment if the toenail fungal culture is positive. If she exhausts this treatment, then she is considering removal of her toenails.  ---Toenail fungal culture obtained on 08/16/2022. It can take between 24 hours and 30 days for the final results. Patient understands this time frame. The patient will be called with the results once they are positive or at the end of the 30 day period once the final results comes back negative.    -Discussed etiology and treatment options for hallux rigidus:  ---Discussed how this deformity can progress and what can be done to treat the deformity.  ---Conservative treatment options consist of wider shoe gear and orthotics +/- padding/splinting to accommodate the painful toe. There may be pain relief from a lorenzo's extension in an orthotic. This will not correct the deformity, but may help decrease pain. Patient may also benefit from an injection for more acute pain. This is does not usually provide long term pain relief without also addressing the biomechanics, but it may improve his overall pain.   ---Reviewed radioraphs with patient: She has a first ray elevatus which is contributing to the functional hallux rigidus. The AP appears like her joint space   ---Discussed surgical treatment options including risks and benefits and post-op periods. The surgical procedure of choice is dependent on amount of joint space remaining and radiographic angles.  ---At this time, patient would like to try the orthotics. She may consider an injection or surgical treatment if she fails conservative treatment.    -Patient in agreement with the above treatment plan and all of patient's questions were answered.      RTC 2 1/2 months to evaluate RIGHT hallux pain and onychodystrophy        Patricia Kowalski DPM, FRITZ

## 2022-08-16 NOTE — LETTER
8/16/2022         RE: Constantino Warren  [address] [address]        Dear Colleague,    Thank you for referring your patient, Constantino Warren, to the [facility]. Please see a copy of my visit note below.    Chief complaint: Patient presents with:  Musculoskeletal Problem: Right foot pain      History of Present Illness: This 51 year old female is seen at the request of Dr. Cook for evaluation and suggestions of management of RIGHT toe pain and foot fungus.    Years ago, she saw Dr. Williamson who thought she had foot fungus. She tried prescription topical medication for both the skin and the toenails. She also took an oral medication. She has no itching from the area but it is dry, flaking skin.    Her RIGHT hallux is also painful with an electrical sensation for a couple months. Any time the toe is touched, she has pain. She is wondering how she can have this pain reduced.    No further pedal complaints today.         BP (!) 160/95 (BP Location: Left arm, Patient Position: Sitting, Cuff Size: Adult Regular)   Pulse 66   Temp 96.8  F (36  C) (Tympanic)   SpO2 97%     Patient Active Problem List   Diagnosis     Syncope and collapse     Hypertensive urgency     Concussion syndrome     Hydronephrosis, right     Pyelonephritis     Urolithiasis     Chest pain     GI bleed     Abdominal pain, epigastric     Hypochromic microcytic anemia     Anastomotic ulcer S/P gastric bypass     Anemia     Adjustment disorder with mixed anxiety and depressed mood     Annular tear of lumbar disc     Chronic migraine     Chronic pain disorder     Failed back syndrome of lumbar spine     Gastric bypass status for obesity     Hx of lumbar discectomy     Hyperlipidemia     Hypothyroidism     Long term (current) use of opiate analgesic     Other insomnia     Vitamin B 12 deficiency     Special screening for malignant neoplasms, colon     Colon cancer (H)     Prediabetes     S/P partial resection of colon      Cancer of ascending colon (H)     Iron deficiency anemia, unspecified iron deficiency anemia type     History of uterine cancer     Palliative care patient     Rotator cuff tear     Arthritis     History of colon cancer, stage III     Iron deficiency       Past Surgical History:   Procedure Laterality Date     ARTHROSCOPY SHOULDER Left 11/6/2020    Procedure: LEFT SHOULDER ARTHROSCOPY WITH ROTATOR CUFF REPAIR AND ACROMIUM CLAVICULAR JOINT RESECTION;  Surgeon: Tanner Mujica DO;  [facility]: [facility]     BACK SURGERY      2008; 2011     COLONOSCOPY N/A 2/13/2020    Procedure: COLONOSCOPY WITH BIOPSY AND TATOOING OF ASCENDING COLON,INCOMEPLETE EXAM;  Surgeon: Kang Cleveland MD;  [facility]     COLONOSCOPY N/A 10/26/2020    Procedure: incomplete colonoscopy ;  Surgeon: Kang Cleveland MD;  [facility]     ESOPHAGOSCOPY, GASTROSCOPY, DUODENOSCOPY (EGD), COMBINED N/A 7/21/2018    Procedure: COMBINED ESOPHAGOSCOPY, GASTROSCOPY, DUODENOSCOPY (EGD);  UPPER ENDOSCOPY WITH BIOPSIES;  Surgeon: Roger Vazquez MD;  [facility]     ESOPHAGOSCOPY, GASTROSCOPY, DUODENOSCOPY (EGD), COMBINED N/A 11/21/2019    Procedure: UPPER ENDOSCOPY with biopsy;  Surgeon: Kang Cleveland MD;  [facility]: [facility]     HEAD & NECK SURGERY  2008    c4-c5 fusion     HYSTERECTOMY, CRISTIANE  04/2004    uterine cancer; [facility]     INSERT PORT VASCULAR ACCESS N/A 2/28/2020    Procedure: INSERTION, VASCULAR ACCESS PORT LEFT;  Surgeon: Kang Cleveland MD;  [facility]     LAPAROSCOPIC ASSISTED COLECTOMY Right 2/24/2020    Procedure: RIGHT KEVIN COLECTOMY, LYSIS OF ADHESIONS;  Surgeon: Kang Cleveland MD;  [facility]: [facility]     LAPAROSCOPIC BYPASS GASTRIC  12/2010       Current Outpatient Medications   Medication     acetaminophen (TYLENOL) 500 MG tablet     atorvastatin (LIPITOR) 40 MG tablet     Cyanocobalamin 1000 MCG/ML KIT     DULoxetine (CYMBALTA) 30 MG capsule     EPINEPHrine (ANY BX GENERIC EQUIV) 0.3  MG/0.3ML injection 2-pack     EQ ASPIRIN ADULT LOW DOSE 81 MG EC tablet     gabapentin (NEURONTIN) 300 MG capsule     levothyroxine (SYNTHROID/LEVOTHROID) 200 MCG tablet     levothyroxine (SYNTHROID/LEVOTHROID) 25 MCG tablet     lisinopril (ZESTRIL) 20 MG tablet     multivitamin w/minerals (THERA-VIT-M) tablet     omeprazole (PRILOSEC) 20 MG DR capsule     ondansetron (ZOFRAN-ODT) 4 MG ODT tab     tiZANidine (ZANAFLEX) 4 MG tablet     traZODone (DESYREL) 50 MG tablet     No current facility-administered medications for this visit.          Allergies   Allergen Reactions     Aspartame      Other reaction(s): Other - Describe In Comment Field, Seizures  convulsions     Bee Venom Anaphylaxis     Bupropion Anaphylaxis     Throat gets tight and hives. Denies allergic reaction     Food Anaphylaxis     Peppers-anaphylaxis if eaten, if touched hands swell and reddened     Food Allergy Formula Anaphylaxis     Peppers, patient reports green peppers close off her throat     Ibuprofen Anaphylaxis     No Clinical Screening - See Comments Anaphylaxis     Peppers, patient reports green peppers close off her throat     Piper Anaphylaxis     Peppers--green, black, red, chili, etc. Derm symptoms, hives;  Strawberries--(cooked) Itching, breathing difficulties, GI symptoms. Mushrooms--GI symptoms, Hives.Spinach (cooked) throat irritation  (Listed on [facility] problem list.)     Fentanyl Swelling     Lips and tongue swelled.  Swelling of lips and tongue     Adhesive Tape      blisters     Amitriptyline Other (See Comments)     Respiratory symptoms - denies allergic reaction     Liquid Adhesive Dermatitis     blisters  Blisters  Silk tape and tegaderm is fine     Oxcarbazepine Other (See Comments)     Throat gets tight and hives. Denies allergic reaction  Throat gets tight and hives-       Prozac [Fluoxetine]      Other reaction(s): *Unknown  uncontrolled muscle spasms     Valproic Acid      Respiratory symptoms ([facility] Denies allergic reaction     Ziprasidone      Throat gets tight and hives. Denies allergic reaction       Family History   Problem Relation Age of Onset     Diabetes Mother      Hypertension Mother      Coronary Artery Disease Mother      Myocardial Infarction Mother      Hypertension Father      Diabetes Maternal Grandmother      Hypertension Maternal Grandfather      Coronary Artery Disease Maternal Grandfather      Cancer Paternal Grandfather         unsure of type of cancer     Bladder Cancer Paternal Grandfather      Diabetes Maternal Aunt      Cerebrovascular Disease Maternal Aunt      Diabetes Maternal Aunt      Cerebrovascular Disease Maternal Aunt      Diabetes Maternal Aunt      Cerebrovascular Disease Maternal Aunt      Hyperlipidemia No family hx of      Breast Cancer No family hx of      Colon Cancer No family hx of      Prostate Cancer No family hx of      Thyroid Disease No family hx of      Anesthesia Reaction No family hx of      Asthma No family hx of      Genetic Disorder No family hx of        Social History     Socioeconomic History     Marital status:      Number of children: 2   Occupational History     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 Yes     Comment: Mother       ROS: 10 point ROS neg other than the symptoms noted above in the HPI.  EXAM  Constitutional: healthy, alert and no distress    Psychiatric: mentation appears normal and affect normal/bright    VASCULAR:  -Dorsalis pedis pulse +2/4 b/l  -Posterior tibial pulse +2/4 b/l  -Capillary refill time < 3 seconds to b/l hallux  NEURO:  -Light touch sensation  intact to b/l plantar forefoot  DERM:  -Skin temperature within normal limits to bilateral foot  -Skin diffusely dry and flaking to bilateral foot    -Toenails elongated, thickened, dystrophic and discolored x 10  ---Significant thickness to all toenails (RIGHT 2nd toenail the most prominent)  MSK:  -Moderately high arch to bilateral feet while patient is NWB  -Bowstringing of bilateral dorsal extensor tendons to digits 1-5 bilaterally    -Prominent metatarsal heads 1-5 bilaterally     -Muscle strength of ankles +5/5 for dorsiflexion, plantarflexion, ABDUction and ADDuction b/l    -Ankle joint passive ROM within normal limits except for dorsiflexion:    Dorsiflexion, RIGHT Straight knee 0 degrees    Dorsiflexion, LEFT Straight knee 0 degrees    RIGHT FOOT RADIOGRAPH 07/14/2022                                                                   IMPRESSION: Calcaneal spurs       ZENA EDWARDS MD   ============================================================    ASSESSMENT:  (L60.3) Onychodystrophy  (primary encounter diagnosis)    (M20.21) Hallux rigidus of right foot    (Q66.70) Congenital pes cavus    (M79.671) Right foot pain    (M21.6X1) Gastrocnemius equinus of right lower extremity    (M21.6X2) Gastrocnemius equinus of left lower extremity        PLAN:  -Patient evaluated and examined. Treatment options discussed with no educational barriers noted.  Onychodystrophy  -Discussed etiologies and treatment options for onychodystrophy. There may be fungi in the toenail, but it is not known until a nail culture is performed. Treatment options consist of leaving the toenail as is, treating the nail for fungi (culture would be taken today to confirm nail fungi), a nail avulsion and treating the fungi as the nail grows back in, or removing the toenail permanently. The oral medication can sometimes fluctuate liver values so the patient would need to get a liver blood draw before, during and after the 90 day treatment.  Topical anti-fungals can be used, but they do not always full cure the toenail fungi and they must be used daily and sometimes for 6-12 months before noticing a difference in the toenail. Recurrence rate of toenail fungi is high. After reviewing risks and benefits, patient has decided to proceed with oral and topical treatment if the toenail fungal culture is positive. If she exhausts this treatment, then she is considering removal of her toenails.  ---Toenail fungal culture obtained on 08/16/2022. It can take between 24 hours and 30 days for the final results. Patient understands this time frame. The patient will be called with the results once they are positive or at the end of the 30 day period once the final results comes back negative.    -Discussed etiology and treatment options for hallux rigidus:  ---Discussed how this deformity can progress and what can be done to treat the deformity.  ---Conservative treatment options consist of wider shoe gear and orthotics +/- padding/splinting to accommodate the painful toe. There may be pain relief from a lorenzo's extension in an orthotic. This will not correct the deformity, but may help decrease pain. Patient may also benefit from an injection for more acute pain. This is does not usually provide long term pain relief without also addressing the biomechanics, but it may improve his overall pain.   ---Reviewed radioraphs with patient: She has a first ray elevatus which is contributing to the functional hallux rigidus. The AP appears like her joint space   ---Discussed surgical treatment options including risks and benefits and post-op periods. The surgical procedure of choice is dependent on amount of joint space remaining and radiographic angles.  ---At this time, patient would like to try the orthotics. She may consider an injection or surgical treatment if she fails conservative treatment.    -Patient in agreement with the above treatment plan and all of patient's  questions were answered.      RTC 2 1/2 months to evaluate RIGHT hallux pain and onychodystrophy        Patricia Kowalski DPM, FRITZ      Again, thank you for allowing me to participate in the care of your patient.        Sincerely,        Patricia Kowalski DPM

## 2022-08-16 NOTE — NURSING NOTE
"Chief Complaint   Patient presents with     Musculoskeletal Problem     Right foot pain       Initial BP (!) 160/95 (BP Location: Left arm, Patient Position: Sitting, Cuff Size: Adult Regular)   Pulse 66   Temp 96.8  F (36  C) (Tympanic)   SpO2 97%  Estimated body mass index is 30.87 kg/m  as calculated from the following:    Height as of 8/12/22: 1.702 m (5' 7\").    Weight as of 8/12/22: 89.4 kg (197 lb 1.5 oz).  Medication Reconciliation: marciano Thacker  "

## 2022-08-24 ENCOUNTER — INFUSION THERAPY VISIT (OUTPATIENT)
Dept: INFUSION THERAPY | Facility: OTHER | Age: 52
End: 2022-08-24
Attending: NURSE PRACTITIONER
Payer: COMMERCIAL

## 2022-08-24 DIAGNOSIS — E53.8 VITAMIN B 12 DEFICIENCY: ICD-10-CM

## 2022-08-24 DIAGNOSIS — C18.2 CANCER OF ASCENDING COLON (H): Primary | ICD-10-CM

## 2022-08-24 PROCEDURE — 96372 THER/PROPH/DIAG INJ SC/IM: CPT | Performed by: INTERNAL MEDICINE

## 2022-08-24 RX ORDER — CYANOCOBALAMIN 1000 UG/ML
1000 INJECTION, SOLUTION INTRAMUSCULAR; SUBCUTANEOUS
Status: DISCONTINUED | OUTPATIENT
Start: 2022-08-24 | End: 2022-08-24 | Stop reason: HOSPADM

## 2022-08-24 RX ORDER — CYANOCOBALAMIN 1000 UG/ML
1000 INJECTION, SOLUTION INTRAMUSCULAR; SUBCUTANEOUS
Status: CANCELLED
Start: 2022-08-24

## 2022-08-24 RX ORDER — HEPARIN SODIUM (PORCINE) LOCK FLUSH IV SOLN 100 UNIT/ML 100 UNIT/ML
5 SOLUTION INTRAVENOUS
Status: CANCELLED | OUTPATIENT
Start: 2022-08-24

## 2022-08-24 RX ADMIN — CYANOCOBALAMIN 1000 MCG: 1000 INJECTION, SOLUTION INTRAMUSCULAR; SUBCUTANEOUS at 11:10

## 2022-08-24 NOTE — PROGRESS NOTES
Patient is 51 years old, here today for injection of B12 per order of Martina Jaime .     Labs meet  parameters for today's injection.     Patient denies questions nor concerns regarding medication, administration site, side effects, nor aftercare.  Patient identified with two identifiers, order verified, and verbal consent for today's injection obtained from patient.   Patient education provided on s/s of injection site infection, and/or medication specific side effects, and when to call a provider.  Patient instructed to report any adverse effects.     Medication administered per protocol in RT ARM at 90 degree angle.  Site covered with sterile bandage.  Patient tolerated injection well, no verbal nor non-verbal signs of discomfort noted.  No adverse effects noted at this time.     Patient instructed to call with further questions or concerns.  Patient states understanding and is in agreement with this plan.  Patient discharged.

## 2022-08-25 ENCOUNTER — TRANSFERRED RECORDS (OUTPATIENT)
Dept: HEALTH INFORMATION MANAGEMENT | Facility: CLINIC | Age: 52
End: 2022-08-25

## 2022-08-26 ENCOUNTER — TRANSFERRED RECORDS (OUTPATIENT)
Dept: HEALTH INFORMATION MANAGEMENT | Facility: CLINIC | Age: 52
End: 2022-08-26

## 2022-08-28 DIAGNOSIS — B35.1 ONYCHOMYCOSIS: Primary | ICD-10-CM

## 2022-08-29 RX ORDER — LIDOCAINE 40 MG/G
CREAM TOPICAL
Status: CANCELLED | OUTPATIENT
Start: 2022-08-29

## 2022-08-29 RX ORDER — LABETALOL 20 MG/4 ML (5 MG/ML) INTRAVENOUS SYRINGE
10
Status: CANCELLED | OUTPATIENT
Start: 2022-08-29

## 2022-08-29 RX ORDER — SODIUM CHLORIDE, SODIUM LACTATE, POTASSIUM CHLORIDE, CALCIUM CHLORIDE 600; 310; 30; 20 MG/100ML; MG/100ML; MG/100ML; MG/100ML
INJECTION, SOLUTION INTRAVENOUS CONTINUOUS
Status: CANCELLED | OUTPATIENT
Start: 2022-08-29

## 2022-08-29 RX ORDER — HYDRALAZINE HYDROCHLORIDE 20 MG/ML
2.5-5 INJECTION INTRAMUSCULAR; INTRAVENOUS EVERY 10 MIN PRN
Status: CANCELLED | OUTPATIENT
Start: 2022-08-29

## 2022-08-29 NOTE — PROGRESS NOTES
Patient's fungal culture results were positive. Constantino will need to get labs to check her liver function before oral Lamisil will be ordered. Constantino requested both oral and topical nail fungal treatment. The labs were ordered and she will be called regarding the lab test.

## 2022-08-30 LAB
BACTERIA SPEC CULT: ABNORMAL
BACTERIA SPEC CULT: ABNORMAL

## 2022-09-01 ENCOUNTER — TELEPHONE (OUTPATIENT)
Dept: FAMILY MEDICINE | Facility: OTHER | Age: 52
End: 2022-09-01

## 2022-09-01 NOTE — TELEPHONE ENCOUNTER
Received message from Constantino, she is currently at the [facility] and they are wanting to start a new medication. She fell and knocked her self out and she hurt her knee as well. [facility] is wanting to figure out if her fall is from medication or if its medical. Your schedule is full for the most part, but I don't know if you want to see her this week or not.

## 2022-09-01 NOTE — TELEPHONE ENCOUNTER
I spoke with patient and got more information, she fell 2 nights ago at [facility]. Went to [place] [facility] had CT done and was diagnosed with a slight concussion. She fell in her room there. They are looking to start Lamictal, however they need a medical opinion in is okay she start the medication. I did tell the patient she may need to be seen prior to you making a decision. She will be in [facility] at least another week or so.

## 2022-09-01 NOTE — TELEPHONE ENCOUNTER
Patient needs to be seen.    Otherwise, if the provider at [facility] has a specific question or form, please have them send it., along with records from ER visit (care everywhere).

## 2022-09-02 NOTE — OR NURSING
Patient's chart indicates patient fell and sustained slight concussion.  Patient needs follow up with Dr. Cook for anesthesia clearance. Dr. Fishman office notified.

## 2022-09-06 ENCOUNTER — TELEPHONE (OUTPATIENT)
Dept: SURGERY | Facility: OTHER | Age: 52
End: 2022-09-06

## 2022-09-06 NOTE — TELEPHONE ENCOUNTER
nAa Hector RN  P  Surgery  Constantino just called and I explained we need her cleared by Dr. Cook before her procedure and she was okay with that.  She did confirm she is living at [facility] for the next two weeks and would like to reschedule once she is out of [facility].  She only has her phone until two p.m. today and then gets it again tomorrow at 1 p.m.  [phone].  She states if she doesn't hear from you today, then she will call you in two weeks when she gets out.  Thank you.       Left message for patient to return call. Jeannette Grayson LPN

## 2022-09-21 ENCOUNTER — INFUSION THERAPY VISIT (OUTPATIENT)
Dept: INFUSION THERAPY | Facility: OTHER | Age: 52
End: 2022-09-21
Attending: NURSE PRACTITIONER
Payer: COMMERCIAL

## 2022-09-21 DIAGNOSIS — C18.2 CANCER OF ASCENDING COLON (H): Primary | ICD-10-CM

## 2022-09-21 DIAGNOSIS — E53.8 VITAMIN B 12 DEFICIENCY: ICD-10-CM

## 2022-09-21 PROCEDURE — 96372 THER/PROPH/DIAG INJ SC/IM: CPT | Performed by: INTERNAL MEDICINE

## 2022-09-21 RX ORDER — CYANOCOBALAMIN 1000 UG/ML
1000 INJECTION, SOLUTION INTRAMUSCULAR; SUBCUTANEOUS
Status: CANCELLED
Start: 2022-09-21

## 2022-09-21 RX ORDER — HEPARIN SODIUM (PORCINE) LOCK FLUSH IV SOLN 100 UNIT/ML 100 UNIT/ML
5 SOLUTION INTRAVENOUS
Status: DISCONTINUED | OUTPATIENT
Start: 2022-09-21 | End: 2022-09-21 | Stop reason: HOSPADM

## 2022-09-21 RX ORDER — CYANOCOBALAMIN 1000 UG/ML
1000 INJECTION, SOLUTION INTRAMUSCULAR; SUBCUTANEOUS
Status: DISCONTINUED | OUTPATIENT
Start: 2022-09-21 | End: 2022-09-21 | Stop reason: HOSPADM

## 2022-09-21 RX ORDER — HEPARIN SODIUM (PORCINE) LOCK FLUSH IV SOLN 100 UNIT/ML 100 UNIT/ML
5 SOLUTION INTRAVENOUS
Status: CANCELLED | OUTPATIENT
Start: 2022-09-21

## 2022-09-21 RX ADMIN — CYANOCOBALAMIN 1000 MCG: 1000 INJECTION, SOLUTION INTRAMUSCULAR; SUBCUTANEOUS at 10:59

## 2022-09-21 RX ADMIN — HEPARIN SODIUM (PORCINE) LOCK FLUSH IV SOLN 100 UNIT/ML 5 ML: 100 SOLUTION at 11:00

## 2022-09-21 NOTE — PROGRESS NOTES
Patient is 51 years old, here today for injection of B12 and port flush.     Patient made a request re speaking with someone in administration. Call to supervisor Mara, directed to Selena TAYLOR Message left requesting return call.     Patients port accessed using non-coring, 20 gauge, 3/4 inch needle. Port accessed per facility protocol.   Hand hygiene performed: yes   Mask donned by caregiver: yes   Site prepped with CHG: yes  Dressing applied using aseptic technique: yes   Port flushed easily, blood return noted.  No signs and symptoms of infection or infiltration.  Port flushed per MAR.  Needle removed, small dressing applied.    Patient denies questions nor concerns regarding medication, administration site, side effects, nor aftercare.  Patient identified with two identifiers, order verified, and verbal consent for today's injection obtained from patient.   Patient education provided on s/s of injection site infection, and/or medication specific side effects, and when to call a provider.  Patient instructed to report any adverse effects.     B12 administered per protocol in right deltoid at a 90 degree angle.  Site covered with sterile bandage.  Patient tolerated injection well, no verbal nor non-verbal signs of discomfort noted.  No adverse effects noted at this time.     Patient instructed to call with further questions or concerns.  Patient states understanding and is in agreement with this plan.  Patient discharged.

## 2022-10-03 ENCOUNTER — LAB (OUTPATIENT)
Dept: LAB | Facility: OTHER | Age: 52
End: 2022-10-03
Payer: COMMERCIAL

## 2022-10-03 DIAGNOSIS — Z79.899 DRUG THERAPY: Primary | ICD-10-CM

## 2022-10-03 DIAGNOSIS — Z79.899 DRUG THERAPY: ICD-10-CM

## 2022-10-03 LAB
ALBUMIN SERPL-MCNC: 3.7 G/DL (ref 3.4–5)
ALP SERPL-CCNC: 85 U/L (ref 40–150)
ALT SERPL W P-5'-P-CCNC: 49 U/L (ref 0–50)
ANION GAP SERPL CALCULATED.3IONS-SCNC: 4 MMOL/L (ref 3–14)
AST SERPL W P-5'-P-CCNC: 36 U/L (ref 0–45)
BASOPHILS # BLD AUTO: 0 10E3/UL (ref 0–0.2)
BASOPHILS NFR BLD AUTO: 0 %
BILIRUB DIRECT SERPL-MCNC: 0.2 MG/DL (ref 0–0.2)
BILIRUB SERPL-MCNC: 0.4 MG/DL (ref 0.2–1.3)
BUN SERPL-MCNC: 14 MG/DL (ref 7–30)
CALCIUM SERPL-MCNC: 8.7 MG/DL (ref 8.5–10.1)
CHLORIDE BLD-SCNC: 110 MMOL/L (ref 94–109)
CO2 SERPL-SCNC: 27 MMOL/L (ref 20–32)
CREAT SERPL-MCNC: 0.74 MG/DL (ref 0.52–1.04)
EOSINOPHIL # BLD AUTO: 0 10E3/UL (ref 0–0.7)
EOSINOPHIL NFR BLD AUTO: 0 %
ERYTHROCYTE [DISTWIDTH] IN BLOOD BY AUTOMATED COUNT: 12.7 % (ref 10–15)
GFR SERPL CREATININE-BSD FRML MDRD: >90 ML/MIN/1.73M2
GLUCOSE BLD-MCNC: 125 MG/DL (ref 70–99)
HCT VFR BLD AUTO: 40.2 % (ref 35–47)
HGB BLD-MCNC: 13.2 G/DL (ref 11.7–15.7)
IMM GRANULOCYTES # BLD: 0 10E3/UL
IMM GRANULOCYTES NFR BLD: 1 %
LYMPHOCYTES # BLD AUTO: 1.9 10E3/UL (ref 0.8–5.3)
LYMPHOCYTES NFR BLD AUTO: 28 %
MCH RBC QN AUTO: 28.9 PG (ref 26.5–33)
MCHC RBC AUTO-ENTMCNC: 32.8 G/DL (ref 31.5–36.5)
MCV RBC AUTO: 88 FL (ref 78–100)
MONOCYTES # BLD AUTO: 0.5 10E3/UL (ref 0–1.3)
MONOCYTES NFR BLD AUTO: 7 %
NEUTROPHILS # BLD AUTO: 4.3 10E3/UL (ref 1.6–8.3)
NEUTROPHILS NFR BLD AUTO: 64 %
NRBC # BLD AUTO: 0 10E3/UL
NRBC BLD AUTO-RTO: 0 /100
PLATELET # BLD AUTO: 185 10E3/UL (ref 150–450)
POTASSIUM BLD-SCNC: 3.9 MMOL/L (ref 3.4–5.3)
PROT SERPL-MCNC: 7.4 G/DL (ref 6.8–8.8)
RBC # BLD AUTO: 4.57 10E6/UL (ref 3.8–5.2)
SODIUM SERPL-SCNC: 141 MMOL/L (ref 133–144)
WBC # BLD AUTO: 6.6 10E3/UL (ref 4–11)

## 2022-10-03 PROCEDURE — 80053 COMPREHEN METABOLIC PANEL: CPT

## 2022-10-03 PROCEDURE — 36415 COLL VENOUS BLD VENIPUNCTURE: CPT

## 2022-10-03 PROCEDURE — 82248 BILIRUBIN DIRECT: CPT

## 2022-10-03 PROCEDURE — 85025 COMPLETE CBC W/AUTO DIFF WBC: CPT

## 2022-10-05 ENCOUNTER — INFUSION THERAPY VISIT (OUTPATIENT)
Dept: INFUSION THERAPY | Facility: OTHER | Age: 52
End: 2022-10-05
Attending: NURSE PRACTITIONER
Payer: COMMERCIAL

## 2022-10-05 DIAGNOSIS — E53.8 VITAMIN B 12 DEFICIENCY: ICD-10-CM

## 2022-10-05 DIAGNOSIS — C18.2 CANCER OF ASCENDING COLON (H): Primary | ICD-10-CM

## 2022-10-05 PROCEDURE — 96372 THER/PROPH/DIAG INJ SC/IM: CPT | Performed by: INTERNAL MEDICINE

## 2022-10-05 RX ORDER — CYANOCOBALAMIN 1000 UG/ML
1000 INJECTION, SOLUTION INTRAMUSCULAR; SUBCUTANEOUS
Status: DISCONTINUED | OUTPATIENT
Start: 2022-10-05 | End: 2022-10-05 | Stop reason: HOSPADM

## 2022-10-05 RX ORDER — CYANOCOBALAMIN 1000 UG/ML
1000 INJECTION, SOLUTION INTRAMUSCULAR; SUBCUTANEOUS
Status: CANCELLED
Start: 2022-10-05

## 2022-10-05 RX ORDER — HEPARIN SODIUM (PORCINE) LOCK FLUSH IV SOLN 100 UNIT/ML 100 UNIT/ML
5 SOLUTION INTRAVENOUS
Status: CANCELLED | OUTPATIENT
Start: 2022-10-05

## 2022-10-05 RX ORDER — HEPARIN SODIUM (PORCINE) LOCK FLUSH IV SOLN 100 UNIT/ML 100 UNIT/ML
5 SOLUTION INTRAVENOUS
Status: DISCONTINUED | OUTPATIENT
Start: 2022-10-05 | End: 2022-10-05 | Stop reason: HOSPADM

## 2022-10-05 RX ADMIN — HEPARIN SODIUM (PORCINE) LOCK FLUSH IV SOLN 100 UNIT/ML 5 ML: 100 SOLUTION at 11:14

## 2022-10-05 RX ADMIN — CYANOCOBALAMIN 1000 MCG: 1000 INJECTION, SOLUTION INTRAMUSCULAR; SUBCUTANEOUS at 11:15

## 2022-10-05 NOTE — PROGRESS NOTES
Patient is a 50 y/o here today for port flush per order of Charlene Moran NP.  Skin is warm, clean, dry, and intact without redness, swelling, nor other signs of infection noted.  Port accessed via sterile technique per facility protocol with a 19 gauge, 3/4 inch non coring 90 degree bent rodrigez needle. Port flushed easily, without resistance. Immediate blood return noted.  Flushed with 10 cc 0.9% normal saline and 5 cc heparinized saline.  Needle removed intact, sterile dressing applied.  Slight pressure applied for 30 seconds.   Patient tolerated port flush well, denies pain nor discomfort at this time. Patient instructed to leave dressing intact for a minimum of one hour, and to call with questions or concerns.  Patient states understanding and is in agreement with this plan.  Patient discharged ambulatory.

## 2022-10-09 NOTE — PROGRESS NOTES
Name: Constantino Warren    MRN#: [id number]    Reason for Hospitalization: Syncope and collapse [R55]    Discharge Date: 2/2/2019    Patient / Family response to discharge plan: Agreed     Follow-Up Appt: No future appointments.    Other Providers (Care Coordinator, County Services, PCA services etc): No    Discharge Disposition: home, transport by family.    Tawny Minor       Patient slept throughout the night without difficulty

## 2022-10-12 ENCOUNTER — APPOINTMENT (OUTPATIENT)
Dept: GENERAL RADIOLOGY | Facility: HOSPITAL | Age: 52
End: 2022-10-12
Attending: EMERGENCY MEDICINE
Payer: COMMERCIAL

## 2022-10-12 ENCOUNTER — HOSPITAL ENCOUNTER (EMERGENCY)
Facility: HOSPITAL | Age: 52
Discharge: HOME | End: 2022-10-12
Attending: EMERGENCY MEDICINE | Admitting: EMERGENCY MEDICINE
Payer: COMMERCIAL

## 2022-10-12 ENCOUNTER — APPOINTMENT (OUTPATIENT)
Dept: CT IMAGING | Facility: HOSPITAL | Age: 52
End: 2022-10-12
Attending: EMERGENCY MEDICINE
Payer: COMMERCIAL

## 2022-10-12 VITALS
OXYGEN SATURATION: 95 % | RESPIRATION RATE: 16 BRPM | DIASTOLIC BLOOD PRESSURE: 100 MMHG | HEART RATE: 57 BPM | TEMPERATURE: 98.4 F | SYSTOLIC BLOOD PRESSURE: 149 MMHG

## 2022-10-12 DIAGNOSIS — R07.89 CHEST DISCOMFORT: ICD-10-CM

## 2022-10-12 DIAGNOSIS — R42 LIGHTHEADEDNESS: ICD-10-CM

## 2022-10-12 LAB
ALBUMIN SERPL BCG-MCNC: 4.1 G/DL (ref 3.5–5.2)
ALP SERPL-CCNC: 81 U/L (ref 35–104)
ALT SERPL W P-5'-P-CCNC: 41 U/L (ref 10–35)
ANION GAP SERPL CALCULATED.3IONS-SCNC: 10 MMOL/L (ref 7–15)
AST SERPL W P-5'-P-CCNC: 35 U/L (ref 10–35)
BASOPHILS # BLD AUTO: 0 10E3/UL (ref 0–0.2)
BASOPHILS NFR BLD AUTO: 0 %
BILIRUB SERPL-MCNC: 0.5 MG/DL
BUN SERPL-MCNC: 18.2 MG/DL (ref 6–20)
CALCIUM SERPL-MCNC: 9 MG/DL (ref 8.6–10)
CHLORIDE SERPL-SCNC: 105 MMOL/L (ref 98–107)
CREAT SERPL-MCNC: 0.74 MG/DL (ref 0.51–0.95)
DEPRECATED HCO3 PLAS-SCNC: 24 MMOL/L (ref 22–29)
EOSINOPHIL # BLD AUTO: 0 10E3/UL (ref 0–0.7)
EOSINOPHIL NFR BLD AUTO: 0 %
ERYTHROCYTE [DISTWIDTH] IN BLOOD BY AUTOMATED COUNT: 12.7 % (ref 10–15)
GFR SERPL CREATININE-BSD FRML MDRD: >90 ML/MIN/1.73M2
GLUCOSE SERPL-MCNC: 151 MG/DL (ref 70–99)
HCT VFR BLD AUTO: 39.4 % (ref 35–47)
HGB BLD-MCNC: 13.4 G/DL (ref 11.7–15.7)
HOLD SPECIMEN: NORMAL
IMM GRANULOCYTES # BLD: 0 10E3/UL
IMM GRANULOCYTES NFR BLD: 0 %
LYMPHOCYTES # BLD AUTO: 1.1 10E3/UL (ref 0.8–5.3)
LYMPHOCYTES NFR BLD AUTO: 16 %
MCH RBC QN AUTO: 29.6 PG (ref 26.5–33)
MCHC RBC AUTO-ENTMCNC: 34 G/DL (ref 31.5–36.5)
MCV RBC AUTO: 87 FL (ref 78–100)
MONOCYTES # BLD AUTO: 0.5 10E3/UL (ref 0–1.3)
MONOCYTES NFR BLD AUTO: 7 %
NEUTROPHILS # BLD AUTO: 5.3 10E3/UL (ref 1.6–8.3)
NEUTROPHILS NFR BLD AUTO: 77 %
NRBC # BLD AUTO: 0 10E3/UL
NRBC BLD AUTO-RTO: 0 /100
PLATELET # BLD AUTO: 169 10E3/UL (ref 150–450)
POTASSIUM SERPL-SCNC: 4.5 MMOL/L (ref 3.4–5.3)
PROT SERPL-MCNC: 7.3 G/DL (ref 6.4–8.3)
RBC # BLD AUTO: 4.52 10E6/UL (ref 3.8–5.2)
SODIUM SERPL-SCNC: 139 MMOL/L (ref 136–145)
TROPONIN T SERPL HS-MCNC: 8 NG/L
TROPONIN T SERPL HS-MCNC: 9 NG/L
WBC # BLD AUTO: 6.9 10E3/UL (ref 4–11)

## 2022-10-12 PROCEDURE — 85014 HEMATOCRIT: CPT | Performed by: EMERGENCY MEDICINE

## 2022-10-12 PROCEDURE — 36415 COLL VENOUS BLD VENIPUNCTURE: CPT | Performed by: EMERGENCY MEDICINE

## 2022-10-12 PROCEDURE — 85041 AUTOMATED RBC COUNT: CPT | Performed by: EMERGENCY MEDICINE

## 2022-10-12 PROCEDURE — 80053 COMPREHEN METABOLIC PANEL: CPT | Performed by: EMERGENCY MEDICINE

## 2022-10-12 PROCEDURE — 70496 CT ANGIOGRAPHY HEAD: CPT

## 2022-10-12 PROCEDURE — 71046 X-RAY EXAM CHEST 2 VIEWS: CPT

## 2022-10-12 PROCEDURE — 250N000013 HC RX MED GY IP 250 OP 250 PS 637: Performed by: EMERGENCY MEDICINE

## 2022-10-12 PROCEDURE — 250N000011 HC RX IP 250 OP 636: Performed by: EMERGENCY MEDICINE

## 2022-10-12 PROCEDURE — 70498 CT ANGIOGRAPHY NECK: CPT

## 2022-10-12 PROCEDURE — 99285 EMERGENCY DEPT VISIT HI MDM: CPT | Performed by: EMERGENCY MEDICINE

## 2022-10-12 PROCEDURE — 99285 EMERGENCY DEPT VISIT HI MDM: CPT | Mod: 25

## 2022-10-12 PROCEDURE — 93010 ELECTROCARDIOGRAM REPORT: CPT | Performed by: INTERNAL MEDICINE

## 2022-10-12 PROCEDURE — 96374 THER/PROPH/DIAG INJ IV PUSH: CPT | Mod: XU

## 2022-10-12 PROCEDURE — 250N000011 HC RX IP 250 OP 636: Performed by: RADIOLOGY

## 2022-10-12 PROCEDURE — 70450 CT HEAD/BRAIN W/O DYE: CPT

## 2022-10-12 PROCEDURE — 84484 ASSAY OF TROPONIN QUANT: CPT | Performed by: EMERGENCY MEDICINE

## 2022-10-12 PROCEDURE — 93005 ELECTROCARDIOGRAM TRACING: CPT

## 2022-10-12 RX ORDER — IOPAMIDOL 755 MG/ML
50 INJECTION, SOLUTION INTRAVASCULAR ONCE
Status: COMPLETED | OUTPATIENT
Start: 2022-10-12 | End: 2022-10-12

## 2022-10-12 RX ORDER — METOCLOPRAMIDE HYDROCHLORIDE 5 MG/ML
10 INJECTION INTRAMUSCULAR; INTRAVENOUS ONCE
Status: COMPLETED | OUTPATIENT
Start: 2022-10-12 | End: 2022-10-12

## 2022-10-12 RX ORDER — ACETAMINOPHEN 325 MG/1
975 TABLET ORAL ONCE
Status: COMPLETED | OUTPATIENT
Start: 2022-10-12 | End: 2022-10-12

## 2022-10-12 RX ORDER — MECLIZINE HYDROCHLORIDE 25 MG/1
25 TABLET ORAL 3 TIMES DAILY PRN
Qty: 15 TABLET | Refills: 0 | Status: SHIPPED | OUTPATIENT
Start: 2022-10-12 | End: 2022-10-17

## 2022-10-12 RX ORDER — LAMOTRIGINE 25 MG/1
50 TABLET ORAL DAILY
COMMUNITY
Start: 2022-10-04 | End: 2023-07-20 | Stop reason: ALTCHOICE

## 2022-10-12 RX ADMIN — ACETAMINOPHEN 975 MG: 325 TABLET, FILM COATED ORAL at 11:21

## 2022-10-12 RX ADMIN — METOCLOPRAMIDE HYDROCHLORIDE 10 MG: 5 INJECTION INTRAMUSCULAR; INTRAVENOUS at 11:16

## 2022-10-12 RX ADMIN — IOPAMIDOL 50 ML: 755 INJECTION, SOLUTION INTRAVENOUS at 10:18

## 2022-10-12 ASSESSMENT — ACTIVITIES OF DAILY LIVING (ADL)
ADLS_ACUITY_SCORE: 37
ADLS_ACUITY_SCORE: 37

## 2022-10-12 NOTE — ED TRIAGE NOTES
Pt presents with c/o chest pressure that she woke up with this morning and has steadily gotten worse, rates 6/10 at this time.

## 2022-10-12 NOTE — ED PROVIDER NOTES
"  History     Chief Complaint   Patient presents with     Chest Pain     HPI  Constantino Warren is a 51 year old female who presents with chest pain. Pain started as pressure when waking up this morning around 5 am. Initially attributed to lack of sleep. Also was feeling imbalance at the time, was walking like she was \"drunk,\" difficulty walking in a straight line.  Also had mild headache and had nausea with head turning.  Pain in chest is central, more epigastric. Does not radiate. Did not take anything for pain. Headache is posterior, describes as pressure. No recent head injury. No history of heart problems. No recent fever, cough, congestion, no dyspnea, no vomiting, no diarrhea, no dysuria. Has occasional leg swelling that is worse with prolonged standing.     No smoking history, no alcohol use, denies NSAID use.   History of cancer s/p hemicolectomy, no active cancer currently. Also s/p cholecystectomy.  S/p hysterectomy for uterine cancer.     Allergies:  Allergies   Allergen Reactions     Aspartame      Other reaction(s): Other - Describe In Comment Field, Seizures  convulsions     Bee Venom Anaphylaxis     Bupropion Anaphylaxis     Throat gets tight and hives. Denies allergic reaction     Food Anaphylaxis     Peppers-anaphylaxis if eaten, if touched hands swell and reddened     Food Allergy Formula Anaphylaxis     Peppers, patient reports green peppers close off her throat     Ibuprofen Anaphylaxis     No Clinical Screening - See Comments Anaphylaxis     Peppers, patient reports green peppers close off her throat     Piper Anaphylaxis     Peppers--green, black, red, chili, etc. Derm symptoms, hives;  Strawberries--(cooked) Itching, breathing difficulties, GI symptoms. Mushrooms--GI symptoms, Hives.Spinach (cooked) throat irritation  (Listed on [facility] problem list.)     Fentanyl Swelling     Lips and tongue swelled.  Swelling of lips and tongue     Adhesive Tape      blisters     Amitriptyline " Other (See Comments)     Respiratory symptoms - denies allergic reaction     Liquid Adhesive Dermatitis     blisters  Blisters  Silk tape and tegaderm is fine     Oxcarbazepine Other (See Comments)     Throat gets tight and hives. Denies allergic reaction  Throat gets tight and hives-       Prozac [Fluoxetine]      Other reaction(s): *Unknown  uncontrolled muscle spasms     Valproic Acid      Respiratory symptoms ([facility]). Denies allergic reaction     Ziprasidone      Throat gets tight and hives. Denies allergic reaction       Problem List:    Patient Active Problem List    Diagnosis Date Noted     Iron deficiency 04/29/2021     Priority: Medium     History of colon cancer, stage III 10/20/2020     Priority: Medium     Added automatically from request for surgery [id number]       Rotator cuff tear 10/13/2020     Priority: Medium     Added automatically from request for surgery [id number]       Arthritis 10/13/2020     Priority: Medium     Added automatically from request for surgery [id number]       History of uterine cancer 05/27/2020     Priority: Medium     Palliative care patient 05/27/2020     Priority: Medium     Iron deficiency anemia, unspecified iron deficiency anemia type 05/08/2020     Priority: Medium     S/P partial resection of colon 02/24/2020     Priority: Medium     Prediabetes      Priority: Medium     Colon cancer (H) 02/19/2020     Priority: Medium     Added automatically from request for surgery [id number]       Cancer of ascending colon (H) 02/19/2020     Priority: Medium     Added automatically from request for surgery [id number]       Special screening for malignant neoplasms, colon 01/14/2020     Priority: Medium     Added automatically from request for surgery [id number]       Anastomotic ulcer S/P gastric bypass 07/21/2018     Priority: Medium     Anemia 07/21/2018     Priority: Medium     Chest pain 07/20/2018     Priority: Medium     GI bleed 07/20/2018     Priority: Medium     Abdominal  pain, epigastric 07/20/2018     Priority: Medium     Hypochromic microcytic anemia 07/20/2018     Priority: Medium     Chronic migraine 01/18/2018     Priority: Medium     Replacing diagnoses that were inactivated after the 10/1/2021 regulatory import.       Chronic pain disorder 01/18/2018     Priority: Medium     Long term (current) use of opiate analgesic 01/12/2018     Priority: Medium     Overview:   Pain Diagnosis failed back syndrome, annular tear L4-5, history lumbar discectomy, s/p lumbar fusion.    Should be seen in the clinic every twelve weeks. Currently on a taper: yes, trying to wean away from the daytime hydrocodone-acetaminophen..    Plan for flares of chronic pain stretches, occasional use of more hydrocodone-acetaminophen for flare.    ED and UC: Opioid medication will not be given in the ER or Urgent Care unless there is a medical emergency unrelated to chronic pain.  Limit to 3 day supply    Refills: Refills will only be given at a scheduled visit.    [facility] Agreement for Opioid Treatment.   Opioid Agreement Date: 5/21/12  Last UDT (Urine Drug Test) on date:    Pain Dx: chronic low back pain  Last Pain Visit: 6-24-11  Preferred Pharmacy: [facility]s  Comments:      7/23/14:  Progressing well.  Over the last month, has decreased hydrocodone-acetaminophen from 6/day to 3/day.  Awaiting appointment with [facility].       Adjustment disorder with mixed anxiety and depressed mood 12/15/2017     Priority: Medium     Other insomnia 12/15/2017     Priority: Medium     Syncope and collapse 09/26/2015     Priority: Medium     Hypertensive urgency 09/26/2015     Priority: Medium     Concussion syndrome 09/26/2015     Priority: Medium     Hydronephrosis, right 04/20/2015     Priority: Medium     Pyelonephritis 04/20/2015     Priority: Medium     Urolithiasis 04/20/2015     Priority: Medium     Annular tear of lumbar disc 12/16/2014     Priority: Medium     Overview:   MRI 2/5/13:  Previous right L4-5  hemilaminectomy and partial facetectomy with mild to moderate right L4-5 facet arthropathy and broad-based bulge of disk and end plate osteophyte on the right with partial high signal intensity zone annular tear of the undersurface of the laterally protruding disk/annulus.       Failed back syndrome of lumbar spine 12/16/2014     Priority: Medium     Overview:   Three back surgeries, including an L3-sacrum AP fusion [facility].  Has been doing very well in terms of reduction of pain, with minimal requirement of opiate pain medication.       Hx of lumbar discectomy 12/16/2014     Priority: Medium     Overview:   10/2006:   L4-5 Laminectomy and diskectomy L5-S1.   12/12/11:  L5S1 microdiscectomy ([place])       Vitamin B 12 deficiency 06/13/2011     Priority: Medium     Overview:   IMO Update 10/11       Gastric bypass status for obesity 01/04/2011     Priority: Medium     Overview:   12/14/10:  laparoscopic Prasanna-en-Y gastric bypass Dr. Hassan  IMO Update 10/11       Hyperlipidemia 09/23/2008     Priority: Medium     Overview:   IMO Update 10/11       Hypothyroidism 09/23/2008     Priority: Medium     Overview:   IMO Update 10/11  Overview:   IMO Update 10/11          Past Medical History:    Past Medical History:   Diagnosis Date     Arthritis 10/13/2020     Cancer (H)      Cancer (H) 2004     Chronic pain      De Quervain's disease (tenosynovitis) 12/2021     Depressive disorder      Diabetes (H)      History of blood transfusion      Hypertension      Prediabetes      Syncope and collapse      Thyroid disease      Trigger finger 12/2021       Past Surgical History:    Past Surgical History:   Procedure Laterality Date     ARTHROSCOPY SHOULDER Left 11/6/2020    Procedure: LEFT SHOULDER ARTHROSCOPY WITH ROTATOR CUFF REPAIR AND ACROMIUM CLAVICULAR JOINT RESECTION;  Surgeon: Tanner Mujica DO;  Location: [facility]     BACK SURGERY      2008; 2011     COLONOSCOPY N/A 2/13/2020    Procedure: COLONOSCOPY WITH  BIOPSY AND TATOOING OF ASCENDING COLON,INCOMEPLETE EXAM;  Surgeon: Kang Cleveland MD;  Location: [facility]     COLONOSCOPY N/A 10/26/2020    Procedure: incomplete colonoscopy ;  Surgeon: Kang Cleveland MD;  Location: [facility]     ESOPHAGOSCOPY, GASTROSCOPY, DUODENOSCOPY (EGD), COMBINED N/A 7/21/2018    Procedure: COMBINED ESOPHAGOSCOPY, GASTROSCOPY, DUODENOSCOPY (EGD);  UPPER ENDOSCOPY WITH BIOPSIES;  Surgeon: Roger Vazquez MD;  Location: [facility]     ESOPHAGOSCOPY, GASTROSCOPY, DUODENOSCOPY (EGD), COMBINED N/A 11/21/2019    Procedure: UPPER ENDOSCOPY with biopsy;  Surgeon: Kang Cleveland MD;  Location: [facility]     HEAD & NECK SURGERY  2008    c4-c5 fusion     HYSTERECTOMY, CRISTIANE  04/2004    uterine cancer; [facility]     INSERT PORT VASCULAR ACCESS N/A 2/28/2020    Procedure: INSERTION, VASCULAR ACCESS PORT LEFT;  Surgeon: Kang Cleveland MD;  Location: [facility]     LAPAROSCOPIC ASSISTED COLECTOMY Right 2/24/2020    Procedure: RIGHT KEVIN COLECTOMY, LYSIS OF ADHESIONS;  Surgeon: Kang Cleveland MD;  Location: [facility]     LAPAROSCOPIC BYPASS GASTRIC  12/2010       Family History:    Family History   Problem Relation Age of Onset     Diabetes Mother      Hypertension Mother      Coronary Artery Disease Mother      Myocardial Infarction Mother      Hypertension Father      Diabetes Maternal Grandmother      Hypertension Maternal Grandfather      Coronary Artery Disease Maternal Grandfather      Cancer Paternal Grandfather         unsure of type of cancer     Bladder Cancer Paternal Grandfather      Diabetes Maternal Aunt      Cerebrovascular Disease Maternal Aunt      Diabetes Maternal Aunt      Cerebrovascular Disease Maternal Aunt      Diabetes Maternal Aunt      Cerebrovascular Disease Maternal Aunt      Hyperlipidemia No family hx of      Breast Cancer No family hx of      Colon Cancer No family hx of      Prostate Cancer No family hx of      Thyroid Disease No family hx of       Anesthesia Reaction No family hx of      Asthma No family hx of      Genetic Disorder No family hx of        Social History:  Marital Status:   [5]  Social History     Tobacco Use     Smoking status: Never     Smokeless tobacco: Never   Vaping Use     Vaping Use: Never used   Substance Use Topics     Alcohol use: No     Comment: sober for 18 years     Drug use: No        Medications:    acetaminophen (TYLENOL) 500 MG tablet  atorvastatin (LIPITOR) 40 MG tablet  Cyanocobalamin 1000 MCG/ML KIT  EPINEPHrine (ANY BX GENERIC EQUIV) 0.3 MG/0.3ML injection 2-pack  EQ ASPIRIN ADULT LOW DOSE 81 MG EC tablet  gabapentin (NEURONTIN) 300 MG capsule  lamoTRIgine (LAMICTAL) 25 MG tablet  levothyroxine (SYNTHROID/LEVOTHROID) 200 MCG tablet  levothyroxine (SYNTHROID/LEVOTHROID) 25 MCG tablet  lisinopril (ZESTRIL) 20 MG tablet  meclizine (ANTIVERT) 25 MG tablet  multivitamin w/minerals (THERA-VIT-M) tablet  omeprazole (PRILOSEC) 20 MG DR capsule  tiZANidine (ZANAFLEX) 4 MG tablet  traZODone (DESYREL) 50 MG tablet          Review of Systems  Please seen HPI for pertinent positives and negatives. All other systems reviewed and found to be negative.    Physical Exam   BP: (!) 169/128  Pulse: 70  Temp: (!) 96  F (35.6  C)  Resp: 16  SpO2: 97 %      Physical Exam  Constitutional:       General: She is not in acute distress.     Appearance: She is not ill-appearing.   HENT:      Head: Normocephalic and atraumatic.      Nose: Nose normal.      Mouth/Throat:      Mouth: Mucous membranes are moist.      Pharynx: Oropharynx is clear.   Eyes:      Extraocular Movements: Extraocular movements intact.      Conjunctiva/sclera: Conjunctivae normal.      Pupils: Pupils are equal, round, and reactive to light.   Cardiovascular:      Rate and Rhythm: Normal rate and regular rhythm.      Comments: +2 BL radial pulses  Pulmonary:      Effort: Pulmonary effort is normal.      Breath sounds: Normal breath sounds.   Abdominal:      Palpations:  Abdomen is soft.      Tenderness: There is no abdominal tenderness.   Musculoskeletal:      Cervical back: Normal range of motion.   Skin:     General: Skin is warm and dry.   Neurological:      Mental Status: She is alert and oriented to person, place, and time.      Comments: Pupils equal, round and reactive to light and accommodation, extraoccular muscles intact without nystagmus, CN II-XII intact, strength and sensation to all extremities intact, finger-to-nose testing intact, no pronator drift, gait steady.           ED Course              ED Course as of 10/12/22 1219   Wed Oct 12, 2022   1107 Patient reports head spinning and headache. Ordered acetaminophen and metoclopramide. Repeat troponin pending. CTA negative for occlusion, dissection, bleed.   1141 Troponin T, High Sensitivity: 8  negative   1210 Patient still reporting headache, improvement of chest pain, improvement of dizziness.  Cardiac catheterization 6 months ago shows minimal coronary artery disease with 20% stenosis of the LAD, no interventions.  Unclear cause of her symptoms at this time, does not appear to be ACS, stroke, dissection, Wells negative, unable to use PERC rule due to age but no other risk factors, vital signs within normal limits aside from mildly elevated blood pressure, last SBP 140s.  Patient reports she feels safe to go home, will prescribe antivert. Will place referral to cardiology due to family history. Return precautions discussed as detailed in AVS. Patient expressed understanding.        Procedures              EKG Interpretation:      Interpreted by Violet Johsnon MD  Time reviewed: 810  Symptoms at time of EKG: chest pain   Rhythm: normal sinus   Rate: normal  Axis: normal  Ectopy: none  Conduction: normal  ST Segments/ T Waves: No ST-T wave changes  Q Waves: none  Comparison to prior: previous T wave abnormality has resolved V3/V4    Clinical Impression: normal EKG          Critical Care time:  none                Results for orders placed or performed during the hospital encounter of 10/12/22 (from the past 24 hour(s))   CBC with platelets differential    Narrative    The following orders were created for panel order CBC with platelets differential.  Procedure                               Abnormality         Status                     ---------                               -----------         ------                     CBC with platelets and d...[[id number]]                      Final result                 Please view results for these tests on the individual orders.   Troponin T, High Sensitivity   Result Value Ref Range    Troponin T, High Sensitivity 9 <=14 ng/L   Comprehensive metabolic panel   Result Value Ref Range    Sodium 139 136 - 145 mmol/L    Potassium 4.5 3.4 - 5.3 mmol/L    Chloride 105 98 - 107 mmol/L    Carbon Dioxide (CO2) 24 22 - 29 mmol/L    Anion Gap 10 7 - 15 mmol/L    Urea Nitrogen 18.2 6.0 - 20.0 mg/dL    Creatinine 0.74 0.51 - 0.95 mg/dL    Calcium 9.0 8.6 - 10.0 mg/dL    Glucose 151 (H) 70 - 99 mg/dL    Alkaline Phosphatase 81 35 - 104 U/L    AST 35 10 - 35 U/L    ALT 41 (H) 10 - 35 U/L    Protein Total 7.3 6.4 - 8.3 g/dL    Albumin 4.1 3.5 - 5.2 g/dL    Bilirubin Total 0.5 <=1.2 mg/dL    GFR Estimate >90 >60 mL/min/1.73m2   CBC with platelets and differential   Result Value Ref Range    WBC Count 6.9 4.0 - 11.0 10e3/uL    RBC Count 4.52 3.80 - 5.20 10e6/uL    Hemoglobin 13.4 11.7 - 15.7 g/dL    Hematocrit 39.4 35.0 - 47.0 %    MCV 87 78 - 100 fL    MCH 29.6 26.5 - 33.0 pg    MCHC 34.0 31.5 - 36.5 g/dL    RDW 12.7 10.0 - 15.0 %    Platelet Count 169 150 - 450 10e3/uL    % Neutrophils 77 %    % Lymphocytes 16 %    % Monocytes 7 %    % Eosinophils 0 %    % Basophils 0 %    % Immature Granulocytes 0 %    NRBCs per 100 WBC 0 <1 /100    Absolute Neutrophils 5.3 1.6 - 8.3 10e3/uL    Absolute Lymphocytes 1.1 0.8 - 5.3 10e3/uL    Absolute Monocytes 0.5 0.0 - 1.3 10e3/uL    Absolute Eosinophils 0.0 0.0  - 0.7 10e3/uL    Absolute Basophils 0.0 0.0 - 0.2 10e3/uL    Absolute Immature Granulocytes 0.0 <=0.4 10e3/uL    Absolute NRBCs 0.0 10e3/uL   Extra Tube    Narrative    The following orders were created for panel order Extra Tube.  Procedure                               Abnormality         Status                     ---------                               -----------         ------                     Extra Blue Top Tube[[id number]]                              Final result               Extra Red Top Tube[[id number]]                               Final result               Extra Heparinized Syringe[[id number]]                        Final result                 Please view results for these tests on the individual orders.   Extra Red Top Tube   Result Value Ref Range    Hold Specimen JIC    Extra Heparinized Syringe   Result Value Ref Range    Hold Specimen JIC    Extra Blue Top Tube   Result Value Ref Range    Hold Specimen JIC    XR Chest 2 Views    Narrative    PROCEDURE: XR CHEST 2 VIEWS 10/12/2022 9:17 AM    HISTORY: chest pain    COMPARISONS: 12/21/2021.    TECHNIQUE: 2 views.    FINDINGS: Heart is unenlarged. Lungs are clear and no pleural effusion  is seen.    There is a left central venous catheter with the tip in the SVC. There  has been previous lower cervical fusion. Surgical clips are seen in  the right upper quadrant.         Impression    IMPRESSION: No acute infiltrate.    PADMINI WILKINS MD         SYSTEM ID:  [id number]   Head CT w/o contrast    Narrative    Exam: CT HEAD W/O CONTRAST      Exam reason: headache/off balance    Technique:   Axial images of the head obtained without contrast. Coronal and  sagittal reformations were generated. This CT was performed using one  or more of the following dose reduction techniques: automated exposure  control, adjustment of the mA and/or kV according to patient size,  and/or use of iterative reconstruction technique.    Comparison: 2/19/2022, 12/21/2021,  8/16/2021       Findings:      Parenchyma: No evidence of intraparenchymal hemorrhage, mass, acute  cortical infarct or prior infarct in a major vascular territory.      No midline shift. The basilar cisterns are patent.    Extra-axial spaces: No extra-axial fluid collection or hemorrhage.     Ventricles: Normal.  Paranasal sinuses: Clear.   Mastoid air cells: Clear.    Osseous: No acute findings.  Orbits: Normal.    Soft tissues: Unremarkable.       Impression    Impression:  No acute intracranial abnormality.      GLORIA GRAYSON MD         SYSTEM ID:  [id number]   CTA Head Neck with Contrast    Narrative    Exam: CTA HEAD NECK W CONTRAST    Exam reason: dizziness/off balance    Technique:     Using helical CT technique, and IV contrast, thin section arterial  phase axial images of the neck and head ([place], COW) were  performed, down to the level of the aortic arch.  Post-processing 2D  reformatted images of the carotid arteries and cerebral arteries were  performed, including coronal and sagittal reconstructions. MIP  reconstructions of the carotid arteries and cerebral arteries also  performed. This CT was performed using one or more of the following  dose reduction techniques: automated exposure control, adjustment of  the mA and/or kV according to patient size, and/or use of iterative  reconstruction technique.    Meds/Contrast: ISOVUE 370  50mL    Comparison: 2/19/2022, 12/21/2021, 8/16/2021, 5/29/2020    Findings:    CTA of the neck:     Aortic arch: No significant abnormality of the aortic arch and the  origins of its branch vessels.       Right common carotid artery: No significant stenosis.     Right internal carotid artery: No hemodynamically significant  stenosis.  Minimal calcified plaque at the carotid bifurcation.    Left common carotid artery: No significant stenosis.     Left internal carotid artery: No hemodynamically significant stenosis.   No significant plaque.    Vertebral arteries:  No hemodynamically significant stenosis.     * Note: Measurements of stenoses were done in accordance with NASCET  criteria. That is, the stenosis is calculated from the ratio of the  linear luminal diameter of the narrowest segment of the diseased  portion of the artery compared to the diameter of the artery beyond or  distal to any post stenotic dilatation.     CTA of the head ([place], COW):     General: No occlusion, thrombosis, hemodynamically significant  stenosis or aneurysm about the [place].     Anterior communicating artery: Anterior communicating artery is  present.  Posterior communicating arteries: The right posterior communicating  artery is present. The left posterior communicating artery is  hypoplastic or absent.    Additional Findings:    Intracranial contents: No evidence of acute cortical infarct, infarct  in a major vascular territory, mass or enhancing abnormality.     Soft tissues of the neck: No acute findings.    Visualized lung apices: Clear.    Osseous: No acute osseous abnormalities. Prior anterior C5-C6 fusion.      Impression    Impression:  No large vessel occlusion.     No hemodynamically significant carotid or vertebrobasilar stenosis.     No intracranial occlusion, hemodynamically significant stenosis, or  aneurysm.    GLORIA GRAYSON MD         SYSTEM ID:  [id number]   Troponin T, High Sensitivity   Result Value Ref Range    Troponin T, High Sensitivity 8 <=14 ng/L       Medications   sodium chloride (PF) 0.9% PF flush 100 mL (100 mLs Intravenous Given 10/12/22 1018)   iopamidol (ISOVUE-370) solution 50 mL (50 mLs Intravenous Given 10/12/22 1018)   metoclopramide (REGLAN) injection 10 mg (10 mg Intravenous Given 10/12/22 1116)   acetaminophen (TYLENOL) tablet 975 mg (975 mg Oral Given 10/12/22 1121)       Assessments & Plan (with Medical Decision Making)     I have reviewed the nursing notes.    I have reviewed the findings, diagnosis, plan and need for follow up  with the patient.   Ms. Warren is a 51-year-old woman who presents with chest pressure and lightheadedness.  Differential diagnosis includes ACS, vascular emergency such as dissection, stroke, BPPV, Ménière disease, dehydration, electrolyte abnormalities, and others.  She reports imbalance though she is ambulatory at this time.  No past-pointing on finger-nose testing.  No nystagmus.  No distinct signs of central pathology however due to her sensation of difficulty walking will consider CTA.  Will obtain labs including troponin, will also obtain chest x-ray.    New Prescriptions    MECLIZINE (ANTIVERT) 25 MG TABLET    Take 1 tablet (25 mg) by mouth 3 times daily as needed for dizziness       Final diagnoses:   Chest discomfort   Lightheadedness       10/12/2022   [facility]     Violet Johnson MD  10/12/22 1262

## 2022-10-19 ENCOUNTER — INFUSION THERAPY VISIT (OUTPATIENT)
Dept: INFUSION THERAPY | Facility: OTHER | Age: 52
End: 2022-10-19
Attending: NURSE PRACTITIONER
Payer: COMMERCIAL

## 2022-10-19 DIAGNOSIS — C18.2 CANCER OF ASCENDING COLON (H): Primary | ICD-10-CM

## 2022-10-19 DIAGNOSIS — E53.8 VITAMIN B 12 DEFICIENCY: ICD-10-CM

## 2022-10-19 PROCEDURE — 96372 THER/PROPH/DIAG INJ SC/IM: CPT | Performed by: INTERNAL MEDICINE

## 2022-10-19 RX ORDER — HEPARIN SODIUM (PORCINE) LOCK FLUSH IV SOLN 100 UNIT/ML 100 UNIT/ML
5 SOLUTION INTRAVENOUS
Status: CANCELLED | OUTPATIENT
Start: 2022-10-19

## 2022-10-19 RX ORDER — CYANOCOBALAMIN 1000 UG/ML
1000 INJECTION, SOLUTION INTRAMUSCULAR; SUBCUTANEOUS
Status: DISCONTINUED | OUTPATIENT
Start: 2022-10-19 | End: 2022-10-19 | Stop reason: HOSPADM

## 2022-10-19 RX ORDER — CYANOCOBALAMIN 1000 UG/ML
1000 INJECTION, SOLUTION INTRAMUSCULAR; SUBCUTANEOUS
Status: CANCELLED
Start: 2022-10-19

## 2022-10-19 RX ADMIN — CYANOCOBALAMIN 1000 MCG: 1000 INJECTION, SOLUTION INTRAMUSCULAR; SUBCUTANEOUS at 13:09

## 2022-11-02 ENCOUNTER — PATIENT OUTREACH (OUTPATIENT)
Dept: CARE COORDINATION | Facility: OTHER | Age: 52
End: 2022-11-02

## 2022-11-02 DIAGNOSIS — R07.9 CHEST PAIN: ICD-10-CM

## 2022-11-02 DIAGNOSIS — R55 SYNCOPE AND COLLAPSE: ICD-10-CM

## 2022-11-02 DIAGNOSIS — R10.13 ABDOMINAL PAIN, EPIGASTRIC: Primary | ICD-10-CM

## 2022-11-15 ENCOUNTER — LAB (OUTPATIENT)
Dept: OCCUPATIONAL MEDICINE | Facility: OTHER | Age: 52
End: 2022-11-15

## 2022-11-15 ENCOUNTER — LAB REQUISITION (OUTPATIENT)
Dept: LAB | Facility: HOSPITAL | Age: 52
End: 2022-11-15

## 2022-11-15 LAB — SARS-COV-2 RNA RESP QL NAA+PROBE: NEGATIVE

## 2022-11-15 PROCEDURE — U0005 INFEC AGEN DETEC AMPLI PROBE: HCPCS | Performed by: FAMILY MEDICINE

## 2022-11-18 ENCOUNTER — TELEPHONE (OUTPATIENT)
Dept: FAMILY MEDICINE | Facility: OTHER | Age: 52
End: 2022-11-18

## 2022-11-18 NOTE — TELEPHONE ENCOUNTER
Spoke with patient, she needs the letter before she can return to work. She is suppose to work at 2p today. Can we fit her in sometime next week?

## 2022-11-18 NOTE — TELEPHONE ENCOUNTER
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  She has tested Covid negative and the supervisor is requesting a Dr note for her to return to work.  She works today at 2:00.  Please call her and let her know if she can get a note.    [phone]

## 2022-11-20 ENCOUNTER — HOSPITAL ENCOUNTER (EMERGENCY)
Facility: HOSPITAL | Age: 52
Discharge: HOME | End: 2022-11-20
Attending: PHYSICIAN ASSISTANT | Admitting: PHYSICIAN ASSISTANT
Payer: COMMERCIAL

## 2022-11-20 VITALS
SYSTOLIC BLOOD PRESSURE: 154 MMHG | RESPIRATION RATE: 16 BRPM | OXYGEN SATURATION: 98 % | HEART RATE: 65 BPM | TEMPERATURE: 96.6 F | DIASTOLIC BLOOD PRESSURE: 98 MMHG

## 2022-11-20 DIAGNOSIS — Z00.00 PHYSICAL EXAM: ICD-10-CM

## 2022-11-20 PROCEDURE — G0463 HOSPITAL OUTPT CLINIC VISIT: HCPCS

## 2022-11-20 PROCEDURE — 99212 OFFICE O/P EST SF 10 MIN: CPT | Performed by: PHYSICIAN ASSISTANT

## 2022-11-20 NOTE — ED TRIAGE NOTES
Pt presents with c/o needing a work note. States that she was sick from Tuesday to Thursday and was unable to see her primary. States that she works up on 5.  Has covid testing done and it was negative.

## 2022-11-20 NOTE — LETTER
November 20, 2022      To Whom It May Concern:      Constantino Warren was seen in our Emergency Department today, 11/20/22.   This patient missed work from 11/15/2022- 11/18/2022 due to an illness.  She has since been fever free x24 hours.  She has recovered from this illness and is able to return to work tomorrow 11/21/2022 as scheduled.    Sincerely,                    Ezio Lucas, MPA,  PA-C

## 2022-11-20 NOTE — ED NOTES
This patient is here mainly for a return to work note.  She shows no signs of continued infection.  She is fever free.  She does not require a full physical examination.  She is cleared to return to work     Ezio Lucas PA-C  11/20/22 8901

## 2022-11-29 ENCOUNTER — TELEPHONE (OUTPATIENT)
Dept: ONCOLOGY | Facility: OTHER | Age: 52
End: 2022-11-29

## 2022-11-29 NOTE — TELEPHONE ENCOUNTER
LVM advised to call back need to make schedule change per Charlene to see Gayatri possibly 12/23 and change labs also.

## 2022-11-30 ENCOUNTER — INFUSION THERAPY VISIT (OUTPATIENT)
Dept: INFUSION THERAPY | Facility: OTHER | Age: 52
End: 2022-11-30
Attending: INTERNAL MEDICINE
Payer: COMMERCIAL

## 2022-11-30 DIAGNOSIS — E53.8 VITAMIN B 12 DEFICIENCY: ICD-10-CM

## 2022-11-30 DIAGNOSIS — C18.2 CANCER OF ASCENDING COLON (H): Primary | ICD-10-CM

## 2022-11-30 PROCEDURE — 96372 THER/PROPH/DIAG INJ SC/IM: CPT | Performed by: NURSE PRACTITIONER

## 2022-11-30 RX ORDER — CYANOCOBALAMIN 1000 UG/ML
1000 INJECTION, SOLUTION INTRAMUSCULAR; SUBCUTANEOUS
Status: DISCONTINUED | OUTPATIENT
Start: 2022-11-30 | End: 2022-11-30 | Stop reason: HOSPADM

## 2022-11-30 RX ORDER — CYANOCOBALAMIN 1000 UG/ML
1000 INJECTION, SOLUTION INTRAMUSCULAR; SUBCUTANEOUS
Status: CANCELLED
Start: 2022-11-30

## 2022-11-30 RX ORDER — HEPARIN SODIUM (PORCINE) LOCK FLUSH IV SOLN 100 UNIT/ML 100 UNIT/ML
5 SOLUTION INTRAVENOUS
Status: CANCELLED | OUTPATIENT
Start: 2022-11-30

## 2022-11-30 RX ADMIN — CYANOCOBALAMIN 1000 MCG: 1000 INJECTION, SOLUTION INTRAMUSCULAR; SUBCUTANEOUS at 11:09

## 2022-11-30 NOTE — PROGRESS NOTES
Patient is 52 years old, here today for injection of B12 per order of Martina Jaime .     Patient denies questions nor concerns regarding medication, administration site, side effects, nor aftercare.  Patient identified with two identifiers, order verified, and verbal consent for today's injection obtained from patient.   Patient education provided on s/s of injection site infection, and/or medication specific side effects, and when to call a provider.  Patient instructed to report any adverse effects.     Medication administered per protocol in LT ARM at 90 degree angle.  Site covered with sterile bandage.  Patient tolerated injection well, no verbal nor non-verbal signs of discomfort noted.  No adverse effects noted at this time.     Patient instructed to call with further questions or concerns.  Patient states understanding and is in agreement with this plan.  Patient discharged.

## 2022-12-14 ENCOUNTER — TELEPHONE (OUTPATIENT)
Dept: ONCOLOGY | Facility: OTHER | Age: 52
End: 2022-12-14

## 2022-12-14 ENCOUNTER — INFUSION THERAPY VISIT (OUTPATIENT)
Dept: INFUSION THERAPY | Facility: OTHER | Age: 52
End: 2022-12-14
Attending: INTERNAL MEDICINE
Payer: COMMERCIAL

## 2022-12-14 DIAGNOSIS — C18.2 CANCER OF ASCENDING COLON (H): Primary | ICD-10-CM

## 2022-12-14 DIAGNOSIS — E53.8 VITAMIN B 12 DEFICIENCY: ICD-10-CM

## 2022-12-14 PROCEDURE — 96372 THER/PROPH/DIAG INJ SC/IM: CPT | Performed by: INTERNAL MEDICINE

## 2022-12-14 RX ORDER — CYANOCOBALAMIN 1000 UG/ML
1000 INJECTION, SOLUTION INTRAMUSCULAR; SUBCUTANEOUS
Status: DISCONTINUED | OUTPATIENT
Start: 2022-12-14 | End: 2022-12-14 | Stop reason: HOSPADM

## 2022-12-14 RX ORDER — HEPARIN SODIUM (PORCINE) LOCK FLUSH IV SOLN 100 UNIT/ML 100 UNIT/ML
5 SOLUTION INTRAVENOUS
Status: CANCELLED | OUTPATIENT
Start: 2022-12-14

## 2022-12-14 RX ORDER — CYANOCOBALAMIN 1000 UG/ML
1000 INJECTION, SOLUTION INTRAMUSCULAR; SUBCUTANEOUS
Status: CANCELLED
Start: 2022-12-14

## 2022-12-14 RX ORDER — HEPARIN SODIUM (PORCINE) LOCK FLUSH IV SOLN 100 UNIT/ML 100 UNIT/ML
5 SOLUTION INTRAVENOUS
Status: DISCONTINUED | OUTPATIENT
Start: 2022-12-14 | End: 2022-12-14 | Stop reason: HOSPADM

## 2022-12-14 RX ADMIN — CYANOCOBALAMIN 1000 MCG: 1000 INJECTION, SOLUTION INTRAMUSCULAR; SUBCUTANEOUS at 10:36

## 2022-12-14 RX ADMIN — HEPARIN SODIUM (PORCINE) LOCK FLUSH IV SOLN 100 UNIT/ML 5 ML: 100 SOLUTION at 10:37

## 2022-12-14 NOTE — PROGRESS NOTES
Patient is a 52 year old here today for port flush.  Skin is warm, clean, dry, and intact without redness, swelling, nor other signs of infection noted.  Port accessed via sterile technique per facility protocol with a 19 gauge, 3/4 inch non coring 90 degree bent rodrigez needle. Port flushed easily, without resistance. Immediate blood return noted.  Flushed with 20 cc 0.9% normal saline and 5 cc heparinized saline.  Needle removed intact, sterile dressing applied.  Slight pressure applied for 30 seconds.   Patient tolerated port flush well, denies pain nor discomfort at this time. Patient instructed to leave dressing intact for a minimum of one hour, and to call with questions or concerns.  Patient states understanding and is in agreement with this plan.  Patient discharged ambulatory.

## 2022-12-14 NOTE — PATIENT INSTRUCTIONS

## 2022-12-14 NOTE — PROGRESS NOTES
Patient is 52 year old, here today for injection of B12.     Patient denies questions nor concerns regarding medication, administration site, side effects, nor aftercare.  Patient identified with two identifiers, order verified, and verbal consent for today's injection obtained from patient.     Patient education provided on s/s of injection site infection, and/or medication specific side effects, and when to call a provider.  Patient instructed to report any adverse effects.     B12 administered per protocol in right deltoid at 90 degree angle.  Site covered with sterile bandage.  Patient tolerated injection well, no verbal nor non-verbal signs of discomfort noted.  No adverse effects noted at this time.     Patient instructed to call with further questions or concerns.  Patient states understanding and is in agreement with this plan.  Patient discharged.

## 2022-12-15 VITALS — DIASTOLIC BLOOD PRESSURE: 79 MMHG | SYSTOLIC BLOOD PRESSURE: 114 MMHG

## 2023-01-04 NOTE — PROGRESS NOTES
"Oncology Follow-up Visit    Reason for Visit:  Constantino is a 52 year old woman with a history of Stage III adenocarcinoma of the colon, who presents to the clinic today for routine follow-up.    Nursing Note and documentation reviewed: Yes    Interval History:   Constantino reports that, for the most part she has been doing alright. However, she does note that for the last 6 weeks or so, she has had a cough. This follows her having an upper respiratory illness, however, her cough has lingered. She states that it sounds productive but it isn't. She does have pain when she coughs in her mid-back. No pain in her chest. No shortness or breathe or fevers.    Her one other complaint it that of fatigue which has worsened. She does note that she has been working a significant amount (she notes \"120 hours every 2 weeks\"). Also notes that she never went back on iron after stopping it 3 months ago. Iron labs today pending.     She otherwise denies bleeding concerns. No melena, no hematochezia. No nausea or vomiting. Appetite is good. Bowels regular and normal. No rashes.     Of note, she still hasn't had colonoscopy done. She states that she was never contacted regarding scheduling?    Oncologic History:   11/21/19 to 11/21/2019 Hospitalized with a hemoglobin of 5.7 underwent EGD with no evidence of active bleeding.  12/12/2019 Seen by Dr. Cook and established care with her as her new PCP.  Referral for screening colonoscopy was completed.  2/13/2020 Underwent colonoscopy by Dr. Cleveland where a near obstructing lesion in the right colon was found.  Pathology showed an invasive moderately differentiated adenocarcinoma.  CT the abdomen and pelvis showed a mass in the junction of the cecum and ascending colon highly suspicious for malignancy.  2/24/2020 Underwent laparoscopic-assisted right hemicolectomy by Dr. Cleveland that was converted to open right hemicolectomy when it was noted that the tumor was adherent to the pelvic brim; final " pathology showed mildly differentiated adenocarcinoma of the right colon with 1/32 lymph nodes positive; there was perineural invasion; tumor budding and acutely inflamed tumor and reactive lymph nodes with peritonitis; the omentum showed mild peritonitis; margins were negative; patient was staged [id number]; IIIb adenocarcinoma of the right colon  2/28/2020 Port-A-Cath placement  3/25/2020 PET scan which showed no evidence of distant metastatic disease  3/30/2020 Seen by Dr. Palma with Medical Oncology with recommendation for XELOX; capecitabine 850 mg/m2 p.o. b.i.d. on days 1 through 14 of a 21-day cycle, oxaliplatin 130 mg/m2 to be given on day 1 x4 cycles.  4/10/2020 Seen by Dr. Bryant with Radiation Oncology with no recommendation for radiation therapy at this time related to negative margins on pathology  4/17/2020 initiation of chemotherapy  6/22/2020 Completion of 4 cycles of XELOX  10/26/2020 Colonoscopy completed. Unable to visualize the anastomosis of the small bowel to the transverse colon  02/23/2022 CT CAP without evidence of disease recurrence or progression  8/12/2022 CT CAP without evidence of disease recurrence or progression     MMR proteins intact  CEA prior to surgery was <0.5     Current Chemo Regime/TX:  Surveillance     Previous treatment:   Oxaliplatin/Capecitabine x 4 cycles    Past Medical History:   Diagnosis Date     Arthritis 10/13/2020     Cancer (H)     colon     Cancer (H) 2004    uterine     Chronic pain      De Quervain's disease (tenosynovitis) 12/2021     Depressive disorder      Diabetes (H)      History of blood transfusion      Hypertension      Prediabetes      Syncope and collapse      Thyroid disease      Trigger finger 12/2021       Past Surgical History:   Procedure Laterality Date     ARTHROSCOPY SHOULDER Left 11/6/2020    Procedure: LEFT SHOULDER ARTHROSCOPY WITH ROTATOR CUFF REPAIR AND ACROMIUM CLAVICULAR JOINT RESECTION;  Surgeon: Tanner Mujica DO;  Location: [facility]      BACK SURGERY      2008; 2011     COLONOSCOPY N/A 2/13/2020    Procedure: COLONOSCOPY WITH BIOPSY AND TATOOING OF ASCENDING COLON,INCOMEPLETE EXAM;  Surgeon: Kang Cleveland MD;  Location: [facility]     COLONOSCOPY N/A 10/26/2020    Procedure: incomplete colonoscopy ;  Surgeon: Kang Cleveland MD;  Location: [facility]     ESOPHAGOSCOPY, GASTROSCOPY, DUODENOSCOPY (EGD), COMBINED N/A 7/21/2018    Procedure: COMBINED ESOPHAGOSCOPY, GASTROSCOPY, DUODENOSCOPY (EGD);  UPPER ENDOSCOPY WITH BIOPSIES;  Surgeon: Roger Vazquez MD;  Location: [facility]     ESOPHAGOSCOPY, GASTROSCOPY, DUODENOSCOPY (EGD), COMBINED N/A 11/21/2019    Procedure: UPPER ENDOSCOPY with biopsy;  Surgeon: Kang Cleveland MD;  Location: [facility]     HEAD & NECK SURGERY  2008    c4-c5 fusion     HYSTERECTOMY, CRISTIANE  04/2004    uterine cancer; [facility]     INSERT PORT VASCULAR ACCESS N/A 2/28/2020    Procedure: INSERTION, VASCULAR ACCESS PORT LEFT;  Surgeon: Kang Cleveland MD;  Location: [facility]     LAPAROSCOPIC ASSISTED COLECTOMY Right 2/24/2020    Procedure: RIGHT KEVIN COLECTOMY, LYSIS OF ADHESIONS;  Surgeon: Kang Cleveland MD;  Location: [facility]     LAPAROSCOPIC BYPASS GASTRIC  12/2010       Family History   Problem Relation Age of Onset     Diabetes Mother      Hypertension Mother      Coronary Artery Disease Mother      Myocardial Infarction Mother      Hypertension Father      Diabetes Maternal Grandmother      Hypertension Maternal Grandfather      Coronary Artery Disease Maternal Grandfather      Cancer Paternal Grandfather         unsure of type of cancer     Bladder Cancer Paternal Grandfather      Diabetes Maternal Aunt      Cerebrovascular Disease Maternal Aunt      Diabetes Maternal Aunt      Cerebrovascular Disease Maternal Aunt      Diabetes Maternal Aunt      Cerebrovascular Disease Maternal Aunt      Hyperlipidemia No family hx of      Breast Cancer No family hx of      Colon Cancer No family hx of       Prostate Cancer No family hx of      Thyroid Disease No family hx of      Anesthesia Reaction No family hx of      Asthma No family hx of      Genetic Disorder No family hx of        Social History     Socioeconomic History     Marital status:      Spouse name: Not on file     Number of children: 2     Years of education: Not on file     Highest education level: Not on file   Occupational History     Occupation: PCA     Comment: currently not working, unemployment    Tobacco Use     Smoking status: Never     Smokeless tobacco: Never   Vaping Use     Vaping Use: Never used   Substance and Sexual Activity     Alcohol use: No     Comment: sober for 18 years     Drug use: No     Sexual activity: Never   Other Topics Concern     Parent/sibling w/ CABG, MI or angioplasty before 65F 55M? Yes     Comment: Mother   Social History Narrative     Not on file     Social Determinants of Health     Financial Resource Strain: Not on file   Food Insecurity: Not on file   Transportation Needs: Not on file   Physical Activity: Not on file   Stress: Not on file   Social Connections: Not on file   Intimate Partner Violence: Not on file   Housing Stability: Not on file       Current Outpatient Medications   Medication     acetaminophen (TYLENOL) 500 MG tablet     atorvastatin (LIPITOR) 40 MG tablet     Cyanocobalamin 1000 MCG/ML KIT     EPINEPHrine (ANY BX GENERIC EQUIV) 0.3 MG/0.3ML injection 2-pack     EQ ASPIRIN ADULT LOW DOSE 81 MG EC tablet     gabapentin (NEURONTIN) 300 MG capsule     lamoTRIgine (LAMICTAL) 25 MG tablet     levothyroxine (SYNTHROID/LEVOTHROID) 200 MCG tablet     levothyroxine (SYNTHROID/LEVOTHROID) 25 MCG tablet     lisinopril (ZESTRIL) 20 MG tablet     multivitamin w/minerals (THERA-VIT-M) tablet     omeprazole (PRILOSEC) 20 MG DR capsule     tiZANidine (ZANAFLEX) 4 MG tablet     traZODone (DESYREL) 50 MG tablet     No current facility-administered medications for this visit.     Facility-Administered  Medications Ordered in Other Visits   Medication     cyanocobalamin injection 1,000 mcg     heparin 100 UNIT/ML injection 5 mL     sodium chloride (PF) 0.9% PF flush 3-20 mL        Allergies   Allergen Reactions     Aspartame      Other reaction(s): Other - Describe In Comment Field, Seizures  convulsions     Bee Venom Anaphylaxis     Bupropion Anaphylaxis     Throat gets tight and hives. Denies allergic reaction     Food Anaphylaxis     Peppers-anaphylaxis if eaten, if touched hands swell and reddened     Food Allergy Formula Anaphylaxis     Peppers, patient reports green peppers close off her throat     Ibuprofen Anaphylaxis     No Clinical Screening - See Comments Anaphylaxis     Peppers, patient reports green peppers close off her throat     Piper Anaphylaxis     Peppers--green, black, red, chili, etc. Derm symptoms, hives;  Strawberries--(cooked) Itching, breathing difficulties, GI symptoms. Mushrooms--GI symptoms, Hives.Spinach (cooked) throat irritation  (Listed on [facility] problem list.)     Fentanyl Swelling     Lips and tongue swelled.  Swelling of lips and tongue     Adhesive Tape      blisters     Amitriptyline Other (See Comments)     Respiratory symptoms - denies allergic reaction     Cymbalta      Mood cycling leading to elevated mood state.      Liquid Adhesive Dermatitis     blisters  Blisters  Silk tape and tegaderm is fine     Oxcarbazepine Other (See Comments)     Throat gets tight and hives. Denies allergic reaction  Throat gets tight and hives-       Prozac [Fluoxetine]      Other reaction(s): *Unknown  uncontrolled muscle spasms     Valproic Acid      Respiratory symptoms ([facility]). Denies allergic reaction     Ziprasidone      Throat gets tight and hives. Denies allergic reaction       Review Of Systems:  A complete review of systems is negative except for the above mentioned items in the interval history.     ECOG Performance Status: 0    Physical Exam:  BP  "(!) 148/90   Pulse 73   Temp 97  F (36.1  C) (Tympanic)   Resp 17   Ht 1.702 m (5' 7\")   Wt 89 kg (196 lb 3.4 oz)   SpO2 98%   BMI 30.73 kg/m    GENERAL APPEARANCE: Healthy, alert and in no acute distress.  HEENT: Eyes appear normal without scleral icterus. Extraocular movements intact.  NECK:   Supple with normal range of motion. No asymmetry or masses.  LYMPHATICS: No palpable cervical, supraclavicular nodes.  RESP: Lungs clear to auscultation bilaterally, respirations regular and easy.  CARDIOVASCULAR: Regular rate and rhythm. Normal S1, S2; no murmur, gallop, or rub.  ABDOMEN: Soft, non-tender, non-distended. Bowel sounds auscultated all 4 quadrants. No palpable organomegaly or masses.  MUSCULOSKELETAL: Extremities without gross deformities noted. No edema noted.   SKIN: No suspicious lesions or rashes.  NEURO: Alert and oriented x 3.  Gait steady.  PSYCHIATRIC: Mentation and affect appear normal.  Mood appropriate.    Laboratory: CEA, B12, Folate pending.   Component      Latest Ref Rng & Units 1/6/2023   WBC      4.0 - 11.0 10e3/uL 6.9   RBC Count      3.80 - 5.20 10e6/uL 4.25   Hemoglobin      11.7 - 15.7 g/dL 12.4   Hematocrit      35.0 - 47.0 % 37.2   MCV      78 - 100 fL 88   MCH      26.5 - 33.0 pg 29.2   MCHC      31.5 - 36.5 g/dL 33.3   RDW      10.0 - 15.0 % 13.1   Platelet Count      150 - 450 10e3/uL 179   % Neutrophils      % 71   % Lymphocytes      % 22   % Monocytes      % 7   % Eosinophils      % 0   % Basophils      % 0   % Immature Granulocytes      % 0   NRBCs per 100 WBC      <1 /100 0   Absolute Neutrophils      1.6 - 8.3 10e3/uL 4.9   Absolute Lymphocytes      0.8 - 5.3 10e3/uL 1.5   Absolute Monocytes      0.0 - 1.3 10e3/uL 0.5   Absolute Eosinophils      0.0 - 0.7 10e3/uL 0.0   Absolute Basophils      0.0 - 0.2 10e3/uL 0.0   Absolute Immature Granulocytes      <=0.4 10e3/uL 0.0   Absolute NRBCs      10e3/uL 0.0   Sodium      136 - 145 mmol/L 141   Potassium      3.4 - 5.3 mmol/L " 4.0   Chloride      98 - 107 mmol/L 109 (H)   Carbon Dioxide (CO2)      22 - 29 mmol/L 22   Anion Gap      7 - 15 mmol/L 10   Urea Nitrogen      6.0 - 20.0 mg/dL 13.0   Creatinine      0.51 - 0.95 mg/dL 0.69   Calcium      8.6 - 10.0 mg/dL 8.7   Glucose      70 - 99 mg/dL 107 (H)   Alkaline Phosphatase      35 - 104 U/L 85   AST      10 - 35 U/L 48 (H)   ALT      10 - 35 U/L 46 (H)   Protein Total      6.4 - 8.3 g/dL 6.5   Albumin      3.5 - 5.2 g/dL 3.9   Bilirubin Total      <=1.2 mg/dL 0.4   GFR Estimate      >60 mL/min/1.73m2 >90   Iron      37 - 145 ug/dL 51   Iron Binding Capacity      240 - 430 ug/dL 333   Iron Sat Index      15 - 46 % 15   Ferritin      11 - 328 ng/mL 54     Imaging Studies:    None this visit    ASSESSMENT/PLAN:  #1 Colon cancer:  Stage IIIB adenocarcinoma of the ascending colon diagnosed February 2020. She completed adjuvant chemotherapy with XELOX x 4 cycles on 6/22/2020 and is being followed with surveillance. Her tumor marker today is pending but historically has been stable. Her last colonoscopy was completed Oct 2020. Unfortuantely, this was incomplete as the anastomosis site was unable to be visuliazed. Patient has been educated on importance of repeating this procedure as she is overdue. At our last visit, she refused. Today, she states that she wasn't contacted to schedule. I have placed another referral and in the referral, I have asked our surgery department to mail a letter to patient. Patient due for CT CAP next month, but seeing as she has persistent cough, will have that completed here in coming weeks and I will notify her of results. We will plan on following up in 3 months with labs prior, sooner pending results of CT or with concerns.      #2 Hx Iron deficiency:  Labs stable. Does have hx gastric bypass. Will continue to monitor.      #3 B12 deficiency: Currently on B12 injections q2w due to previous deficiency. I would like to continue this. Will continue [id number]. Will  check labs when I see her next. Due for shot today.     #4 Persistent cough Following URI. Stated this could be associated with previous viral illness. Lung clear. Given her hx of colon cancer, will proceed with CT. Will notify of results.     #5 Fatigue Likely multifactorial. No evidence of anemia. B12 pending. Working so much likely contributing. Have asked that she cut back on the hours she picks up if able and focus on her own health as well.         Patient in agreement with plan and verbalizes understanding. Agrees to call with any questions or concerns.    45 minutes spent in the patient's encounter today with time spent in review of patient's chart along with chart preparation and review of the treatment plan and signing of treatment plan.  Time was also spent with the patient in obtaining a review of systems and performing a physical exam along with detailed review of all test results. Time was also spent in discussing plan for future follow-up and relating instructions for follow-up and in placing future orders.    JUNIOR Mcguire [facility]  Medical Oncology

## 2023-01-06 ENCOUNTER — LAB (OUTPATIENT)
Dept: ONCOLOGY | Facility: OTHER | Age: 53
End: 2023-01-06
Attending: NURSE PRACTITIONER
Payer: COMMERCIAL

## 2023-01-06 VITALS
HEART RATE: 73 BPM | RESPIRATION RATE: 17 BRPM | OXYGEN SATURATION: 98 % | TEMPERATURE: 97 F | WEIGHT: 196.21 LBS | HEIGHT: 67 IN | SYSTOLIC BLOOD PRESSURE: 148 MMHG | DIASTOLIC BLOOD PRESSURE: 90 MMHG | BODY MASS INDEX: 30.8 KG/M2

## 2023-01-06 DIAGNOSIS — E53.8 VITAMIN B 12 DEFICIENCY: Primary | ICD-10-CM

## 2023-01-06 DIAGNOSIS — Z85.038 HISTORY OF COLON CANCER, STAGE III: ICD-10-CM

## 2023-01-06 DIAGNOSIS — C18.2 CANCER OF ASCENDING COLON (H): ICD-10-CM

## 2023-01-06 DIAGNOSIS — R05.3 PERSISTENT COUGH: ICD-10-CM

## 2023-01-06 DIAGNOSIS — E53.8 VITAMIN B 12 DEFICIENCY: ICD-10-CM

## 2023-01-06 DIAGNOSIS — E61.1 IRON DEFICIENCY: ICD-10-CM

## 2023-01-06 DIAGNOSIS — R53.82 CHRONIC FATIGUE, UNSPECIFIED: ICD-10-CM

## 2023-01-06 DIAGNOSIS — F31.9 BIPOLAR AFFECTIVE DISORDER, REMISSION STATUS UNSPECIFIED (H): ICD-10-CM

## 2023-01-06 DIAGNOSIS — Z98.84 GASTRIC BYPASS STATUS FOR OBESITY: ICD-10-CM

## 2023-01-06 DIAGNOSIS — Z85.038 HISTORY OF COLON CANCER, STAGE III: Primary | ICD-10-CM

## 2023-01-06 LAB
ALBUMIN SERPL BCG-MCNC: 3.9 G/DL (ref 3.5–5.2)
ALP SERPL-CCNC: 85 U/L (ref 35–104)
ALT SERPL W P-5'-P-CCNC: 46 U/L (ref 10–35)
ANION GAP SERPL CALCULATED.3IONS-SCNC: 10 MMOL/L (ref 7–15)
AST SERPL W P-5'-P-CCNC: 48 U/L (ref 10–35)
BASOPHILS # BLD AUTO: 0 10E3/UL (ref 0–0.2)
BASOPHILS NFR BLD AUTO: 0 %
BILIRUB SERPL-MCNC: 0.4 MG/DL
BUN SERPL-MCNC: 13 MG/DL (ref 6–20)
CALCIUM SERPL-MCNC: 8.7 MG/DL (ref 8.6–10)
CEA SERPL-MCNC: 2.6 NG/ML
CHLORIDE SERPL-SCNC: 109 MMOL/L (ref 98–107)
CREAT SERPL-MCNC: 0.69 MG/DL (ref 0.51–0.95)
DEPRECATED HCO3 PLAS-SCNC: 22 MMOL/L (ref 22–29)
EOSINOPHIL # BLD AUTO: 0 10E3/UL (ref 0–0.7)
EOSINOPHIL NFR BLD AUTO: 0 %
ERYTHROCYTE [DISTWIDTH] IN BLOOD BY AUTOMATED COUNT: 13.1 % (ref 10–15)
FERRITIN SERPL-MCNC: 54 NG/ML (ref 11–328)
FOLATE SERPL-MCNC: 11.7 NG/ML (ref 4.6–34.8)
GFR SERPL CREATININE-BSD FRML MDRD: >90 ML/MIN/1.73M2
GLUCOSE SERPL-MCNC: 107 MG/DL (ref 70–99)
HCT VFR BLD AUTO: 37.2 % (ref 35–47)
HGB BLD-MCNC: 12.4 G/DL (ref 11.7–15.7)
IMM GRANULOCYTES # BLD: 0 10E3/UL
IMM GRANULOCYTES NFR BLD: 0 %
IRON BINDING CAPACITY (ROCHE): 333 UG/DL (ref 240–430)
IRON SATN MFR SERPL: 15 % (ref 15–46)
IRON SERPL-MCNC: 51 UG/DL (ref 37–145)
LYMPHOCYTES # BLD AUTO: 1.5 10E3/UL (ref 0.8–5.3)
LYMPHOCYTES NFR BLD AUTO: 22 %
MCH RBC QN AUTO: 29.2 PG (ref 26.5–33)
MCHC RBC AUTO-ENTMCNC: 33.3 G/DL (ref 31.5–36.5)
MCV RBC AUTO: 88 FL (ref 78–100)
MONOCYTES # BLD AUTO: 0.5 10E3/UL (ref 0–1.3)
MONOCYTES NFR BLD AUTO: 7 %
NEUTROPHILS # BLD AUTO: 4.9 10E3/UL (ref 1.6–8.3)
NEUTROPHILS NFR BLD AUTO: 71 %
NRBC # BLD AUTO: 0 10E3/UL
NRBC BLD AUTO-RTO: 0 /100
PLATELET # BLD AUTO: 179 10E3/UL (ref 150–450)
POTASSIUM SERPL-SCNC: 4 MMOL/L (ref 3.4–5.3)
PROT SERPL-MCNC: 6.5 G/DL (ref 6.4–8.3)
RBC # BLD AUTO: 4.25 10E6/UL (ref 3.8–5.2)
SODIUM SERPL-SCNC: 141 MMOL/L (ref 136–145)
VIT B12 SERPL-MCNC: 546 PG/ML (ref 232–1245)
WBC # BLD AUTO: 6.9 10E3/UL (ref 4–11)

## 2023-01-06 PROCEDURE — 82746 ASSAY OF FOLIC ACID SERUM: CPT | Performed by: INTERNAL MEDICINE

## 2023-01-06 PROCEDURE — 99215 OFFICE O/P EST HI 40 MIN: CPT | Performed by: NURSE PRACTITIONER

## 2023-01-06 PROCEDURE — 96372 THER/PROPH/DIAG INJ SC/IM: CPT | Performed by: INTERNAL MEDICINE

## 2023-01-06 PROCEDURE — 82728 ASSAY OF FERRITIN: CPT | Performed by: INTERNAL MEDICINE

## 2023-01-06 PROCEDURE — 80053 COMPREHEN METABOLIC PANEL: CPT | Performed by: INTERNAL MEDICINE

## 2023-01-06 PROCEDURE — 83540 ASSAY OF IRON: CPT | Performed by: INTERNAL MEDICINE

## 2023-01-06 PROCEDURE — 83550 IRON BINDING TEST: CPT | Performed by: INTERNAL MEDICINE

## 2023-01-06 PROCEDURE — 36415 COLL VENOUS BLD VENIPUNCTURE: CPT | Performed by: INTERNAL MEDICINE

## 2023-01-06 PROCEDURE — 82607 VITAMIN B-12: CPT | Performed by: INTERNAL MEDICINE

## 2023-01-06 PROCEDURE — 82378 CARCINOEMBRYONIC ANTIGEN: CPT | Performed by: INTERNAL MEDICINE

## 2023-01-06 PROCEDURE — 85025 COMPLETE CBC W/AUTO DIFF WBC: CPT | Performed by: INTERNAL MEDICINE

## 2023-01-06 RX ORDER — CYANOCOBALAMIN 1000 UG/ML
1000 INJECTION, SOLUTION INTRAMUSCULAR; SUBCUTANEOUS
Status: CANCELLED
Start: 2023-01-06

## 2023-01-06 RX ORDER — CYANOCOBALAMIN 1000 UG/ML
1000 INJECTION, SOLUTION INTRAMUSCULAR; SUBCUTANEOUS
Status: DISCONTINUED | OUTPATIENT
Start: 2023-01-06 | End: 2023-01-12 | Stop reason: HOSPADM

## 2023-01-06 RX ORDER — HEPARIN SODIUM (PORCINE) LOCK FLUSH IV SOLN 100 UNIT/ML 100 UNIT/ML
5 SOLUTION INTRAVENOUS
Status: CANCELLED | OUTPATIENT
Start: 2023-01-06

## 2023-01-06 RX ORDER — HEPARIN SODIUM (PORCINE) LOCK FLUSH IV SOLN 100 UNIT/ML 100 UNIT/ML
5 SOLUTION INTRAVENOUS
Status: DISCONTINUED | OUTPATIENT
Start: 2023-01-06 | End: 2023-01-12 | Stop reason: HOSPADM

## 2023-01-06 RX ADMIN — HEPARIN SODIUM (PORCINE) LOCK FLUSH IV SOLN 100 UNIT/ML 5 ML: 100 SOLUTION at 09:40

## 2023-01-06 RX ADMIN — CYANOCOBALAMIN 1000 MCG: 1000 INJECTION, SOLUTION INTRAMUSCULAR; SUBCUTANEOUS at 09:40

## 2023-01-06 ASSESSMENT — PAIN SCALES - GENERAL: PAINLEVEL: MODERATE PAIN (4)

## 2023-01-06 NOTE — PROGRESS NOTES
Medication administered per protocol in left at 90 degree angle.  Site covered with sterile bandage.  Patient tolerated injection well, no verbal nor non-verbal signs of discomfort noted.  No adverse effects noted at this time.     Patient instructed to call with further questions or concerns.  Patient states understanding and is in agreement with this plan.  Patient discharged.

## 2023-01-06 NOTE — PATIENT INSTRUCTIONS
Thank you for allowing me to be a part of your care today.    I would like you to have your routine CT scan in the next couple weeks. I will call you with results of that scan. I will plan on seeing you back in 3 months with labs one week prior.     I did place a referral back to our surgical department and ask that they mail you a letter with appointment or regarding scheduling this appt.     If you have any questions please call [phone]    Other instructions:      None

## 2023-01-06 NOTE — NURSING NOTE
"Oncology Rooming Note    January 6, 2023 8:41 AM   Constantino Warren is a 52 year old female who presents for:    Chief Complaint   Patient presents with     Oncology Clinic Visit     Follow up. History of colon cancer, stage III     Initial Vitals: BP (!) 148/90   Pulse 73   Temp 97  F (36.1  C) (Tympanic)   Resp 17   Ht 1.702 m (5' 7\")   Wt 89 kg (196 lb 3.4 oz)   SpO2 98%   BMI 30.73 kg/m   Estimated body mass index is 30.73 kg/m  as calculated from the following:    Height as of this encounter: 1.702 m (5' 7\").    Weight as of this encounter: 89 kg (196 lb 3.4 oz). Body surface area is 2.05 meters squared.  Moderate Pain (4) Comment: Data Unavailable   No LMP recorded. Patient has had a hysterectomy.  Allergies reviewed: Yes  Medications reviewed: Yes    Medications: Medication refills not needed today.  Pharmacy name entered into [facility]:    [id number] - [place], [place] - [address]   [facility] [place] [place] [facility] [address]   [facility] [place], [place] - [address]  [facility] - [facility] - [place], [place] - [address]    Clinical concerns: having a cough over the last month, non productive and pain is present mid back both sides during the coughing fits. Denies fever or chills. States it started as a cold but the cough is still present. More fatigued than normal, does work 60 hours a week and is raising 3 boys but feels way more tired than normal.  Martina Jaime was notified.      Noemi Quiroga LPN              "

## 2023-01-06 NOTE — PROGRESS NOTES
Patients left sided port accessed using non-coring, 19 gauge, 3/4 needle. Port accessed per facility protocol.  Hand hygiene performed: yes   Mask donned by caregiver: yes Site prepped with CHG: yes Labs drawn: yes Dressing applied using aseptic technique: yes Port flushed easily, without resistance. Flushed with 10 cc's normal saline.   Immediate blood return noted. 10 cc blood discarded.  4 vials blood draw and sent to lab for results.  Port flushed with 20 cc 0.9% normal saline and 5 cc heparinized saline.  Needle removed intact, sterile dressing applied.  Slight pressure applied for 30 seconds.   Patient tolerated port flush well, denies pain nor discomfort at this time. Patient instructed to leave dressing intact for a minimum of one hour, and to call with questions or concerns.  Patient states understanding and is in agreement with this plan. Patient discharged ambulatory. Brooklyn Choe RN

## 2023-01-19 ENCOUNTER — HOSPITAL ENCOUNTER (EMERGENCY)
Facility: HOSPITAL | Age: 53
Discharge: HOME | End: 2023-01-19
Attending: STUDENT IN AN ORGANIZED HEALTH CARE EDUCATION/TRAINING PROGRAM | Admitting: STUDENT IN AN ORGANIZED HEALTH CARE EDUCATION/TRAINING PROGRAM
Payer: COMMERCIAL

## 2023-01-19 ENCOUNTER — APPOINTMENT (OUTPATIENT)
Dept: CT IMAGING | Facility: HOSPITAL | Age: 53
End: 2023-01-19
Attending: STUDENT IN AN ORGANIZED HEALTH CARE EDUCATION/TRAINING PROGRAM
Payer: COMMERCIAL

## 2023-01-19 VITALS
SYSTOLIC BLOOD PRESSURE: 132 MMHG | TEMPERATURE: 97.8 F | DIASTOLIC BLOOD PRESSURE: 87 MMHG | RESPIRATION RATE: 16 BRPM | HEART RATE: 67 BPM | OXYGEN SATURATION: 96 %

## 2023-01-19 DIAGNOSIS — R07.89 ATYPICAL CHEST PAIN: ICD-10-CM

## 2023-01-19 LAB
ALBUMIN SERPL BCG-MCNC: 3.9 G/DL (ref 3.5–5.2)
ALP SERPL-CCNC: 77 U/L (ref 35–104)
ALT SERPL W P-5'-P-CCNC: 45 U/L (ref 10–35)
ANION GAP SERPL CALCULATED.3IONS-SCNC: 10 MMOL/L (ref 7–15)
AST SERPL W P-5'-P-CCNC: 46 U/L (ref 10–35)
BASOPHILS # BLD AUTO: 0 10E3/UL (ref 0–0.2)
BASOPHILS NFR BLD AUTO: 0 %
BILIRUB DIRECT SERPL-MCNC: <0.2 MG/DL (ref 0–0.3)
BILIRUB SERPL-MCNC: 0.4 MG/DL
BUN SERPL-MCNC: 13.1 MG/DL (ref 6–20)
CALCIUM SERPL-MCNC: 9.2 MG/DL (ref 8.6–10)
CHLORIDE SERPL-SCNC: 112 MMOL/L (ref 98–107)
CREAT SERPL-MCNC: 0.81 MG/DL (ref 0.51–0.95)
DEPRECATED HCO3 PLAS-SCNC: 18 MMOL/L (ref 22–29)
EOSINOPHIL # BLD AUTO: 0 10E3/UL (ref 0–0.7)
EOSINOPHIL NFR BLD AUTO: 0 %
ERYTHROCYTE [DISTWIDTH] IN BLOOD BY AUTOMATED COUNT: 13.1 % (ref 10–15)
GFR SERPL CREATININE-BSD FRML MDRD: 86 ML/MIN/1.73M2
GLUCOSE SERPL-MCNC: 165 MG/DL (ref 70–99)
HCT VFR BLD AUTO: 37.4 % (ref 35–47)
HGB BLD-MCNC: 12.5 G/DL (ref 11.7–15.7)
HOLD SPECIMEN: NORMAL
IMM GRANULOCYTES # BLD: 0 10E3/UL
IMM GRANULOCYTES NFR BLD: 0 %
LYMPHOCYTES # BLD AUTO: 1.3 10E3/UL (ref 0.8–5.3)
LYMPHOCYTES NFR BLD AUTO: 23 %
MCH RBC QN AUTO: 29.4 PG (ref 26.5–33)
MCHC RBC AUTO-ENTMCNC: 33.4 G/DL (ref 31.5–36.5)
MCV RBC AUTO: 88 FL (ref 78–100)
MONOCYTES # BLD AUTO: 0.4 10E3/UL (ref 0–1.3)
MONOCYTES NFR BLD AUTO: 6 %
NEUTROPHILS # BLD AUTO: 4 10E3/UL (ref 1.6–8.3)
NEUTROPHILS NFR BLD AUTO: 71 %
NRBC # BLD AUTO: 0 10E3/UL
NRBC BLD AUTO-RTO: 0 /100
PLATELET # BLD AUTO: 171 10E3/UL (ref 150–450)
POTASSIUM SERPL-SCNC: 4 MMOL/L (ref 3.4–5.3)
PROT SERPL-MCNC: 6.5 G/DL (ref 6.4–8.3)
RBC # BLD AUTO: 4.25 10E6/UL (ref 3.8–5.2)
SODIUM SERPL-SCNC: 140 MMOL/L (ref 136–145)
TROPONIN T SERPL HS-MCNC: <6 NG/L
WBC # BLD AUTO: 5.7 10E3/UL (ref 4–11)

## 2023-01-19 PROCEDURE — 36415 COLL VENOUS BLD VENIPUNCTURE: CPT | Performed by: STUDENT IN AN ORGANIZED HEALTH CARE EDUCATION/TRAINING PROGRAM

## 2023-01-19 PROCEDURE — 250N000009 HC RX 250: Performed by: STUDENT IN AN ORGANIZED HEALTH CARE EDUCATION/TRAINING PROGRAM

## 2023-01-19 PROCEDURE — 96374 THER/PROPH/DIAG INJ IV PUSH: CPT | Mod: XU

## 2023-01-19 PROCEDURE — 93005 ELECTROCARDIOGRAM TRACING: CPT | Mod: RTG

## 2023-01-19 PROCEDURE — 80053 COMPREHEN METABOLIC PANEL: CPT | Performed by: STUDENT IN AN ORGANIZED HEALTH CARE EDUCATION/TRAINING PROGRAM

## 2023-01-19 PROCEDURE — 250N000011 HC RX IP 250 OP 636: Performed by: STUDENT IN AN ORGANIZED HEALTH CARE EDUCATION/TRAINING PROGRAM

## 2023-01-19 PROCEDURE — 250N000013 HC RX MED GY IP 250 OP 250 PS 637: Performed by: STUDENT IN AN ORGANIZED HEALTH CARE EDUCATION/TRAINING PROGRAM

## 2023-01-19 PROCEDURE — 250N000011 HC RX IP 250 OP 636: Performed by: RADIOLOGY

## 2023-01-19 PROCEDURE — 85025 COMPLETE CBC W/AUTO DIFF WBC: CPT | Performed by: STUDENT IN AN ORGANIZED HEALTH CARE EDUCATION/TRAINING PROGRAM

## 2023-01-19 PROCEDURE — 84484 ASSAY OF TROPONIN QUANT: CPT | Performed by: STUDENT IN AN ORGANIZED HEALTH CARE EDUCATION/TRAINING PROGRAM

## 2023-01-19 PROCEDURE — 99284 EMERGENCY DEPT VISIT MOD MDM: CPT | Performed by: STUDENT IN AN ORGANIZED HEALTH CARE EDUCATION/TRAINING PROGRAM

## 2023-01-19 PROCEDURE — 93010 ELECTROCARDIOGRAM REPORT: CPT | Performed by: INTERNAL MEDICINE

## 2023-01-19 PROCEDURE — 82248 BILIRUBIN DIRECT: CPT | Performed by: STUDENT IN AN ORGANIZED HEALTH CARE EDUCATION/TRAINING PROGRAM

## 2023-01-19 PROCEDURE — 99285 EMERGENCY DEPT VISIT HI MDM: CPT | Mod: 25

## 2023-01-19 PROCEDURE — 74177 CT ABD & PELVIS W/CONTRAST: CPT

## 2023-01-19 RX ORDER — MAGNESIUM HYDROXIDE/ALUMINUM HYDROXICE/SIMETHICONE 120; 1200; 1200 MG/30ML; MG/30ML; MG/30ML
15 SUSPENSION ORAL ONCE
Status: COMPLETED | OUTPATIENT
Start: 2023-01-19 | End: 2023-01-19

## 2023-01-19 RX ORDER — ONDANSETRON 2 MG/ML
4 INJECTION INTRAMUSCULAR; INTRAVENOUS EVERY 30 MIN PRN
Status: DISCONTINUED | OUTPATIENT
Start: 2023-01-19 | End: 2023-01-19 | Stop reason: HOSPADM

## 2023-01-19 RX ORDER — SIMETHICONE 80 MG
160 TABLET,CHEWABLE ORAL ONCE
Status: DISCONTINUED | OUTPATIENT
Start: 2023-01-19 | End: 2023-01-19 | Stop reason: HOSPADM

## 2023-01-19 RX ORDER — DEXAMETHASONE SODIUM PHOSPHATE 10 MG/ML
10 INJECTION, SOLUTION INTRAMUSCULAR; INTRAVENOUS ONCE
Status: COMPLETED | OUTPATIENT
Start: 2023-01-19 | End: 2023-01-19

## 2023-01-19 RX ORDER — IOPAMIDOL 755 MG/ML
74 INJECTION, SOLUTION INTRAVASCULAR ONCE
Status: COMPLETED | OUTPATIENT
Start: 2023-01-19 | End: 2023-01-19

## 2023-01-19 RX ORDER — DIPHENHYDRAMINE HYDROCHLORIDE 50 MG/ML
50 INJECTION INTRAMUSCULAR; INTRAVENOUS ONCE
Status: COMPLETED | OUTPATIENT
Start: 2023-01-19 | End: 2023-01-19

## 2023-01-19 RX ORDER — HYDROMORPHONE HYDROCHLORIDE 1 MG/ML
0.5 INJECTION, SOLUTION INTRAMUSCULAR; INTRAVENOUS; SUBCUTANEOUS
Status: DISCONTINUED | OUTPATIENT
Start: 2023-01-19 | End: 2023-01-19 | Stop reason: HOSPADM

## 2023-01-19 RX ORDER — LIDOCAINE HYDROCHLORIDE 20 MG/ML
15 SOLUTION OROPHARYNGEAL ONCE
Status: COMPLETED | OUTPATIENT
Start: 2023-01-19 | End: 2023-01-19

## 2023-01-19 RX ORDER — NITROGLYCERIN 0.4 MG/1
0.4 TABLET SUBLINGUAL EVERY 5 MIN PRN
Status: DISCONTINUED | OUTPATIENT
Start: 2023-01-19 | End: 2023-01-19 | Stop reason: HOSPADM

## 2023-01-19 RX ADMIN — IOPAMIDOL 74 ML: 755 INJECTION, SOLUTION INTRAVENOUS at 11:57

## 2023-01-19 RX ADMIN — ALUMINUM HYDROXIDE, MAGNESIUM HYDROXIDE, AND SIMETHICONE 15 ML: 200; 200; 20 SUSPENSION ORAL at 09:25

## 2023-01-19 RX ADMIN — LIDOCAINE HYDROCHLORIDE 15 ML: 20 SOLUTION ORAL; TOPICAL at 09:26

## 2023-01-19 RX ADMIN — NITROGLYCERIN 0.4 MG: 0.4 TABLET SUBLINGUAL at 10:22

## 2023-01-19 RX ADMIN — HYDROMORPHONE HYDROCHLORIDE 0.5 MG: 1 INJECTION, SOLUTION INTRAMUSCULAR; INTRAVENOUS; SUBCUTANEOUS at 09:50

## 2023-01-19 ASSESSMENT — ACTIVITIES OF DAILY LIVING (ADL)
ADLS_ACUITY_SCORE: 35
ADLS_ACUITY_SCORE: 37

## 2023-01-19 NOTE — ED NOTES
Updated provider on patient reddened hand and itchiness which has subsided.  Ok to hold the decadron and benadryl

## 2023-01-19 NOTE — ED NOTES
Prior to going to CT, pt's right hand is itchy, slightly red and little swollen.  Pt took ring off and washed hand.  Same arm as where the BP cuff was.  Provider was updated

## 2023-01-19 NOTE — Clinical Note
Constantino Warren was seen and treated in our emergency department on 1/19/2023.  She may return to work on 01/21/2023.  Unless symptoms worsen     If you have any questions or concerns, please don't hesitate to call.      Julien Catalan MD

## 2023-01-19 NOTE — ED PROVIDER NOTES
History     Chief Complaint   Patient presents with     Chest Pain     HPI  Constantino Warren is a 52 year old female who is currently undergoing work-up for evaluation for possible cancer presents to the emergency department today with right-sided chest pain.  It was acute in onset approximately 20 minutes prior to arrival, is sharp and on the right side of the chest.  She feels it a little bit along the rib distribution into the right axilla, but not into the back.  It is not a ripping tearing sensation.  No nausea vomiting or diarrhea.  No other complaints at this time.    Allergies:  Allergies   Allergen Reactions     Aspartame      Other reaction(s): Other - Describe In Comment Field, Seizures  convulsions     Bee Venom Anaphylaxis     Bupropion Anaphylaxis     Throat gets tight and hives. Denies allergic reaction     Food Anaphylaxis     Peppers-anaphylaxis if eaten, if touched hands swell and reddened     Food Allergy Formula Anaphylaxis     Peppers, patient reports green peppers close off her throat     Ibuprofen Anaphylaxis     No Clinical Screening - See Comments Anaphylaxis     Peppers, patient reports green peppers close off her throat     Piper Anaphylaxis     Peppers--green, black, red, chili, etc. Derm symptoms, hives;  Strawberries--(cooked) Itching, breathing difficulties, GI symptoms. Mushrooms--GI symptoms, Hives.Spinach (cooked) throat irritation  (Listed on [facility] problem list.)     Fentanyl Swelling     Lips and tongue swelled.  Swelling of lips and tongue     Adhesive Tape      blisters     Amitriptyline Other (See Comments)     Respiratory symptoms - denies allergic reaction     Cymbalta      Mood cycling leading to elevated mood state.      Liquid Adhesive Dermatitis     blisters  Blisters  Silk tape and tegaderm is fine     Oxcarbazepine Other (See Comments)     Throat gets tight and hives. Denies allergic reaction  Throat gets tight and hives-       Prozac [Fluoxetine]       Other reaction(s): *Unknown  uncontrolled muscle spasms     Valproic Acid      Respiratory symptoms ([facility]). Denies allergic reaction     Ziprasidone      Throat gets tight and hives. Denies allergic reaction       Problem List:    Patient Active Problem List    Diagnosis Date Noted     Bipolar disorder (H) 01/06/2023     Priority: Medium     per previous notes and documentation       Iron deficiency 04/29/2021     Priority: Medium     History of colon cancer, stage III 10/20/2020     Priority: Medium     Added automatically from request for surgery [id number]       Rotator cuff tear 10/13/2020     Priority: Medium     Added automatically from request for surgery [id number]       Arthritis 10/13/2020     Priority: Medium     Added automatically from request for surgery [id number]       History of uterine cancer 05/27/2020     Priority: Medium     Palliative care patient 05/27/2020     Priority: Medium     Iron deficiency anemia, unspecified iron deficiency anemia type 05/08/2020     Priority: Medium     S/P partial resection of colon 02/24/2020     Priority: Medium     Prediabetes      Priority: Medium     Colon cancer (H) 02/19/2020     Priority: Medium     Added automatically from request for surgery [id number]       Cancer of ascending colon (H) 02/19/2020     Priority: Medium     Added automatically from request for surgery [id number]       Special screening for malignant neoplasms, colon 01/14/2020     Priority: Medium     Added automatically from request for surgery [id number]       Anastomotic ulcer S/P gastric bypass 07/21/2018     Priority: Medium     Anemia 07/21/2018     Priority: Medium     Chest pain 07/20/2018     Priority: Medium     GI bleed 07/20/2018     Priority: Medium     Abdominal pain, epigastric 07/20/2018     Priority: Medium     Hypochromic microcytic anemia 07/20/2018     Priority: Medium     Chronic migraine 01/18/2018     Priority: Medium     Replacing diagnoses that were inactivated  after the 10/1/2021 regulatory import.       Chronic pain disorder 01/18/2018     Priority: Medium     Long term (current) use of opiate analgesic 01/12/2018     Priority: Medium     Overview:   Pain Diagnosis failed back syndrome, annular tear L4-5, history lumbar discectomy, s/p lumbar fusion.    Should be seen in the clinic every twelve weeks. Currently on a taper: yes, trying to wean away from the daytime hydrocodone-acetaminophen..    Plan for flares of chronic pain stretches, occasional use of more hydrocodone-acetaminophen for flare.    ED and UC: Opioid medication will not be given in the ER or Urgent Care unless there is a medical emergency unrelated to chronic pain.  Limit to 3 day supply    Refills: Refills will only be given at a scheduled visit.    [facility] Agreement for Opioid Treatment.   Opioid Agreement Date: 5/21/12  Last UDT (Urine Drug Test) on date:    Pain Dx: chronic low back pain  Last Pain Visit: 6-24-11  Preferred Pharmacy: 's  Comments:      7/23/14:  Progressing well.  Over the last month, has decreased hydrocodone-acetaminophen from 6/day to 3/day.  Awaiting appointment with [facility].       Adjustment disorder with mixed anxiety and depressed mood 12/15/2017     Priority: Medium     Other insomnia 12/15/2017     Priority: Medium     Syncope and collapse 09/26/2015     Priority: Medium     Hypertensive urgency 09/26/2015     Priority: Medium     Concussion syndrome 09/26/2015     Priority: Medium     Hydronephrosis, right 04/20/2015     Priority: Medium     Pyelonephritis 04/20/2015     Priority: Medium     Urolithiasis 04/20/2015     Priority: Medium     Annular tear of lumbar disc 12/16/2014     Priority: Medium     Overview:   MRI 2/5/13:  Previous right L4-5 hemilaminectomy and partial facetectomy with mild to moderate right L4-5 facet arthropathy and broad-based bulge of disk and end plate osteophyte on the right with partial high signal intensity zone annular tear of the  undersurface of the laterally protruding disk/annulus.       Failed back syndrome of lumbar spine 12/16/2014     Priority: Medium     Overview:   Three back surgeries, including an L3-sacrum AP fusion done at [facility] in 2013.  Has been doing very well in terms of reduction of pain, with minimal requirement of opiate pain medication.       Hx of lumbar discectomy 12/16/2014     Priority: Medium     Overview:   10/2006:   L4-5 Laminectomy and diskectomy L5-S1.   12/12/11:  L5S1 microdiscectomy ([place])       Vitamin B 12 deficiency 06/13/2011     Priority: Medium     Overview:   IMO Update 10/11       Gastric bypass status for obesity 01/04/2011     Priority: Medium     Overview:   12/14/10:  laparoscopic Prasanna-en-Y gastric bypass Dr. Hassan  IMO Update 10/11       Hyperlipidemia 09/23/2008     Priority: Medium     Overview:   IMO Update 10/11       Hypothyroidism 09/23/2008     Priority: Medium     Overview:   IMO Update 10/11  Overview:   IMO Update 10/11          Past Medical History:    Past Medical History:   Diagnosis Date     Arthritis 10/13/2020     Bipolar disorder (H)      Cancer (H)      Cancer (H) 2004     Chronic pain      De Quervain's disease (tenosynovitis) 12/2021     Depressive disorder      Diabetes (H)      History of blood transfusion      Hypertension      Prediabetes      Syncope and collapse      Thyroid disease      Trigger finger 12/2021       Past Surgical History:    Past Surgical History:   Procedure Laterality Date     ARTHROSCOPY SHOULDER Left 11/6/2020    Procedure: LEFT SHOULDER ARTHROSCOPY WITH ROTATOR CUFF REPAIR AND ACROMIUM CLAVICULAR JOINT RESECTION;  Surgeon: Tanner Mujica DO;  Location: [facility]     BACK SURGERY      2008; 2011     COLONOSCOPY N/A 2/13/2020    Procedure: COLONOSCOPY WITH BIOPSY AND TATOOING OF ASCENDING COLON,INCOMEPLETE EXAM;  Surgeon: Kang Cleveland MD;  Location: [facility] OR     COLONOSCOPY N/A 10/26/2020    Procedure: incomplete colonoscopy ;  Surgeon:  Kang Cleveland MD;  Location: [facility]     ESOPHAGOSCOPY, GASTROSCOPY, DUODENOSCOPY (EGD), COMBINED N/A 7/21/2018    Procedure: COMBINED ESOPHAGOSCOPY, GASTROSCOPY, DUODENOSCOPY (EGD);  UPPER ENDOSCOPY WITH BIOPSIES;  Surgeon: Roger Vazquez MD;  Location: [facility]     ESOPHAGOSCOPY, GASTROSCOPY, DUODENOSCOPY (EGD), COMBINED N/A 11/21/2019    Procedure: UPPER ENDOSCOPY with biopsy;  Surgeon: Kang Cleveland MD;  Location: [facility]     HEAD & NECK SURGERY  2008    c4-c5 fusion     HYSTERECTOMY, CRISTIANE  04/2004    uterine cancer; [facility]     INSERT PORT VASCULAR ACCESS N/A 2/28/2020    Procedure: INSERTION, VASCULAR ACCESS PORT LEFT;  Surgeon: Kang Cleveland MD;  Location: [facility]     LAPAROSCOPIC ASSISTED COLECTOMY Right 2/24/2020    Procedure: RIGHT KEVIN COLECTOMY, LYSIS OF ADHESIONS;  Surgeon: Kang Cleveland MD;  Location: [facility]     LAPAROSCOPIC BYPASS GASTRIC  12/2010       Family History:    Family History   Problem Relation Age of Onset     Diabetes Mother      Hypertension Mother      Coronary Artery Disease Mother      Myocardial Infarction Mother      Hypertension Father      Diabetes Maternal Grandmother      Hypertension Maternal Grandfather      Coronary Artery Disease Maternal Grandfather      Cancer Paternal Grandfather         unsure of type of cancer     Bladder Cancer Paternal Grandfather      Diabetes Maternal Aunt      Cerebrovascular Disease Maternal Aunt      Diabetes Maternal Aunt      Cerebrovascular Disease Maternal Aunt      Diabetes Maternal Aunt      Cerebrovascular Disease Maternal Aunt      Hyperlipidemia No family hx of      Breast Cancer No family hx of      Colon Cancer No family hx of      Prostate Cancer No family hx of      Thyroid Disease No family hx of      Anesthesia Reaction No family hx of      Asthma No family hx of      Genetic Disorder No family hx of        Social History:  Marital Status:   [5]  Social History     Tobacco Use     Smoking  status: Never     Smokeless tobacco: Never   Vaping Use     Vaping Use: Never used   Substance Use Topics     Alcohol use: No     Comment: sober for 18 years     Drug use: No        Medications:    acetaminophen (TYLENOL) 500 MG tablet  atorvastatin (LIPITOR) 40 MG tablet  Cyanocobalamin 1000 MCG/ML KIT  EPINEPHrine (ANY BX GENERIC EQUIV) 0.3 MG/0.3ML injection 2-pack  EQ ASPIRIN ADULT LOW DOSE 81 MG EC tablet  gabapentin (NEURONTIN) 300 MG capsule  lamoTRIgine (LAMICTAL) 25 MG tablet  levothyroxine (SYNTHROID/LEVOTHROID) 200 MCG tablet  levothyroxine (SYNTHROID/LEVOTHROID) 25 MCG tablet  lisinopril (ZESTRIL) 20 MG tablet  multivitamin w/minerals (THERA-VIT-M) tablet  omeprazole (PRILOSEC) 20 MG DR capsule  tiZANidine (ZANAFLEX) 4 MG tablet  traZODone (DESYREL) 50 MG tablet          Review of Systems  A complete review of systems was performed and is otherwise negative.     Physical Exam   BP: 137/90  Pulse: 93  Temp: (!) 96.7  F (35.9  C)  Resp: 16  SpO2: 96 %      Physical Exam  Constitutional: Alert and conversant. NAD   HENT: NCAT   Eyes: Normal pupils   Neck: supple   CV: Normal rate, regular rhythm, no murmur   Pulmonary/Chest: Non-labored respirations, clear to auscultation bilaterally   Abdominal: Soft, non-tender, non-distended   MSK: PHILLIP.   Neuro: Alert and appropriate   Skin: Warm and dry. No diaphoresis. No rashes on exposed skin    Psych: Appropriate mood and affect     ED Course              ED Course as of 01/19/23 1229   Thu Jan 19, 2023   1227 Constantino Warren is a 52 year old female presenting with chest pain.    Differential includes but is not limited to acute coronary syndrome, pulmonary embolism, pneumonia, aortic dissection, pneumothorax, GERD, pericarditis, myocarditis, MSK/costochondritis, shingles.    Vitals essentially within normal limits, initial temperature was a little bit low  Exam reassuring well-appearing, nontoxic  EKG without concerning findings.  No evidence of acute ischemia or  arrhythmia.  CT PE CTA chest abdomen pelvis without concerning findings  Labs essentially within normal limits very small elevation in AST but there is some hemolysis that likely explains this.  No other concerning findings on the hepatic panel.  Negative troponin     History and exam and imaging suggest a low likelihood of pulmonary embolism, aortic dissection, or pulmonary pathology as the etiology of this patient's pain.      Given the patient's few risk factors and atypical history for acute coronary syndrome with studies results suggesting low suspicion of a coronary event, the patient is stable for outpatient management. The etiology of the chest pain is unclear. Will trial therapy prescription for Tylenol. Given the nature of the patient's symptoms a stress echo with cardiology follow up is worth discussing with primary, however, the presentation is quite atypical for cardiac etiology.      Procedures           Results for orders placed or performed during the hospital encounter of 01/19/23 (from the past 24 hour(s))   CBC with platelets differential    Narrative    The following orders were created for panel order CBC with platelets differential.  Procedure                               Abnormality         Status                     ---------                               -----------         ------                     CBC with platelets and d...[[id number]]                      Final result                 Please view results for these tests on the individual orders.   Basic metabolic panel   Result Value Ref [place]    Sodium 140 136 - 145 mmol/L    Potassium 4.0 3.4 - 5.3 mmol/L    Chloride 112 (H) 98 - 107 mmol/L    Carbon Dioxide (CO2) 18 (L) 22 - 29 mmol/L    Anion Gap 10 7 - 15 mmol/L    Urea Nitrogen 13.1 6.0 - 20.0 mg/dL    Creatinine 0.81 0.51 - 0.95 mg/dL    Calcium 9.2 8.6 - 10.0 mg/dL    Glucose 165 (H) 70 - 99 mg/dL    GFR Estimate 86 >60 mL/min/1.73m2   Troponin T, High Sensitivity   Result Value  Ref [place]    Troponin T, High Sensitivity <6 <=14 ng/L   CBC with platelets and differential   Result Value Ref [place]    WBC Count 5.7 4.0 - 11.0 10e3/uL    RBC Count 4.25 3.80 - 5.20 10e6/uL    Hemoglobin 12.5 11.7 - 15.7 g/dL    Hematocrit 37.4 35.0 - 47.0 %    MCV 88 78 - 100 fL    MCH 29.4 26.5 - 33.0 pg    MCHC 33.4 31.5 - 36.5 g/dL    RDW 13.1 10.0 - 15.0 %    Platelet Count 171 150 - 450 10e3/uL    % Neutrophils 71 %    % Lymphocytes 23 %    % Monocytes 6 %    % Eosinophils 0 %    % Basophils 0 %    % Immature Granulocytes 0 %    NRBCs per 100 WBC 0 <1 /100    Absolute Neutrophils 4.0 1.6 - 8.3 10e3/uL    Absolute Lymphocytes 1.3 0.8 - 5.3 10e3/uL    Absolute Monocytes 0.4 0.0 - 1.3 10e3/uL    Absolute Eosinophils 0.0 0.0 - 0.7 10e3/uL    Absolute Basophils 0.0 0.0 - 0.2 10e3/uL    Absolute Immature Granulocytes 0.0 <=0.4 10e3/uL    Absolute NRBCs 0.0 10e3/uL   Hepatic panel   Result Value Ref [place]    Protein Total 6.5 6.4 - 8.3 g/dL    Albumin 3.9 3.5 - 5.2 g/dL    Bilirubin Total 0.4 <=1.2 mg/dL    Alkaline Phosphatase 77 35 - 104 U/L    AST 46 (H) 10 - 35 U/L    ALT 45 (H) 10 - 35 U/L    Bilirubin Direct <0.20 0.00 - 0.30 mg/dL   [place] Draw    Narrative    The following orders were created for panel order [place] Draw.  Procedure                               Abnormality         Status                     ---------                               -----------         ------                     Extra Blue Top Tube[[id number]]                              Final result               Extra Red Top Tube[[id number]]                               Final result               Extra Green Top (Lithium...[[id number]]                      Final result                 Please view results for these tests on the individual orders.   Extra Blue Top Tube   Result Value Ref [place]    Hold Specimen JIC    Extra Red Top Tube   Result Value Ref [place]    Hold Specimen JIC    Extra Green Top (Lithium Heparin) Tube   Result Value Ref  [place]    Hold Specimen [facility]    CT Chest (PE) Abdomen Pelvis w Contrast    Narrative    CT CHEST PE ABDOMEN PELVIS W CONTRAST    CLINICAL HISTORY: Female, age 52 years, right chest pain and CA work  up;    Comparison:  CT scan chest abdomen pelvis 8/12/2022    TECHNIQUE: CT angiogram was performed of the chest, abdomen and pelvis   after the administration of IV  contrast.   Axial; sagittal and coronal MIP images were reviewed.    FINDINGS:  Chest CT:   CTA chest: There is excellent opacification of the pulmonary arteries.  No pulmonary embolus or acute abnormality. Thoracic aorta is intact.  No dissection.    The heart and great vessels demonstrate no acute abnormality.    The lungs demonstrate mosaic attenuation which appears related to  patchy areas of air trapping intermixed with atelectasis. No evidence  of well-defined consolidation. Mild emphysematous changes are again  seen. A 3 mm pleural-based nodule anteriorly in the right upper lobe  is similar in appearance.    Esophagus demonstrates demonstrate no acute abnormality.    Small calcification again seen in the inferior aspect of the right  lobe of the thyroid gland. The right lobe is again mildly enlarged.    Normal size lymph nodes are seen throughout the mediastinum and hilar  regions. Axillary nodes are unremarkable.     Bony structures: No acute abnormality. Thoracic spine degenerative  changes are similar in appearance. Sternum is intact.    Abdomen/Pelvis CT:  Stomach and duodenum: Extensive postoperative changes are again seen.  No acute abnormality.    Liver: Unchanged hepatic steatosis. Vague enhancing foci in the right  lobe are similar in appearance.    Gallbladder: Surgically absent    Spleen: Unremarkable.    Pancreas: Mild atrophy. No acute abnormality.    Adrenal glands: Left adrenal gland myolipoma is unchanged. No acute  abnormality.    Kidneys: Unremarkable.    Ureters: Unremarkable.    Urinary bladder: Mild wall thickening, similar in  appearance when  compared to prior study.    Large and small bowel: Marked gaseous distention of the colon. No  distinct evidence of obstructing mass or lesion. No distinct evidence  of acute vascular abnormality.    The abdominal aorta and inferior vena cava demonstrate no acute  abnormality. There is some rotation of vessels within the mesentery in  the right lower quadrant without evidence of bowel ischemia or  infarction.    Retroperitoneal and mesenteric lymph nodes are normal in size and  number.    The uterus is surgically absent. Postoperative changes seen along the  midline without evidence of acute abnormality.     Bony structures: Postoperative changes of the lumbar spine. No  distinct evidence of acute abnormality.        Impression    IMPRESSION:   Good quality CTA demonstrates no evidence of acute vascular  abnormality within the chest. No pulmonary embolus.    Marked gaseous distention of the colon without distinct evidence of  obstruction. Otherwise, no apparent abnormality that would account for  the patient's history of right-sided pain.    No distinct evidence of malignancy.    Nonacute findings as described above.    This facility minimizes radiation dose by adjusting the mA and/or kV  according to each patient size.      This CT scan was performed using one or more the following dose  reduction techniques:    -Automated exposure control,  -Adjustment of the mA and/or kV according to patient's size, and/or,  -Use of iterative reconstruction technique.    NELSON PERKINS MD         SYSTEM ID:  [id number]       Medications   nitroGLYcerin (NITROSTAT) sublingual tablet 0.4 mg (0.4 mg Sublingual Given 1/19/23 1022)   ondansetron (ZOFRAN) injection 4 mg (has no administration in time range)   HYDROmorphone (PF) (DILAUDID) injection 0.5 mg (0.5 mg Intravenous Given 1/19/23 0950)   simethicone (MYLICON) chewable tablet 160 mg (has no administration in time range)   alum & mag hydroxide-simethicone  (MAALOX) suspension 15 mL (15 mLs Oral Given 1/19/23 0947)     And   lidocaine (viscous) (XYLOCAINE) 2 % solution 15 mL (15 mLs Mouth/Throat Given 1/19/23 0926)   sodium chloride (PF) 0.9% PF flush 100 mL (100 mLs Intravenous Given 1/19/23 1157)   iopamidol (ISOVUE-370) solution 74 mL (74 mLs Intravenous Given 1/19/23 1157)   diphenhydrAMINE (BENADRYL) injection 50 mg (50 mg Intravenous Not Given 1/19/23 1050)   dexamethasone PF (DECADRON) injection 10 mg (10 mg Intravenous Not Given 1/19/23 1050)       Assessments & Plan (with Medical Decision Making)     I have reviewed the nursing notes.    I have reviewed the findings, diagnosis, plan and need for follow up with the patient.      New Prescriptions    No medications on file       Final diagnoses:   Atypical chest pain       1/19/2023   [facility] EMERGENC[facility]     Julien Catalan MD  01/19/23 7258

## 2023-01-19 NOTE — DISCHARGE INSTRUCTIONS
Return the emergency department for worsening symptoms or new concerning symptoms.  Follow-up with your primary care provider within the next week.  Call to schedule an appointment.  Check with your oncologist to see if they still want your CT, I expect they will not as I got an equivalent CT today.  Use Tylenol for pain control, try lidocaine patches.    What to expect when you have contrast    During your exam, we will inject  contrast  into your vein or artery. (Contrast is a clear liquid with iodine in it. It shows up on X-rays.)    You may feel warm or hot. You may have a metal taste in your mouth and a slight upset stomach. You may also feel pressure near the kidneys and bladder. These effects will last about 1 to 3 minutes.    Please tell us if you have:   Sneezing    Itching   Hives    Swelling in the face   A hoarse voice   Breathing problems   Other new symptoms    Serious problems are rare.  They may include:   Irregular heartbeat    Seizures   Kidney failure             Tissue damage   Shock     Death    If you have any problems during the exam, we  will treat them right away.    When you get home    Call your hospital if you have any new symptoms in the next 2 days, like hives or swelling. (Phone numbers are at the bottom of this page.) Or call your family doctor.     If you have wheezing or trouble breathing, call 911.    Self-care  -Drink at least 4 extra glasses of water today.   This reduces the stress on your kidneys.  -Keep taking your regular medicines.    The contrast will pass out of your body in your  Urine(pee). This will happen in the next 24 hours. You  will not feel this. Your urine will not  change color.    If you have kidney problems or take metformin    Drink 4 to 8 large glasses of water for the next  2 days, if you are not on a fluid restriction.    ?If you take metformin (Glucophage or Glucovance) for diabetes, keep taking it.      ?Your kidney function tests are abnormal.  If you  take Metformin, do not take it for 48 hours. Please go to your clinic for a blood test within 3 days after your exam before the restarting this medicine.     (Note to provider:please give patient prescription for lab tests.)    ?Special instructions:       I have read and understand the above information.    Patient Sign Here:______________________________________Date:________Time:______    Staff Sign Here:________________________________________Date:_______Time:______      Radiology Departments:     ?[facility]: [phone] ?[place]: [phone]     ?[place]: [phone] ?[facility]:[phone]      ?[place]: [phone]  ?[facility]: [phone]  ?[place]:[phone]    ?[facility]:[phone]  ?[facility]:[phone]

## 2023-01-23 ENCOUNTER — TELEPHONE (OUTPATIENT)
Dept: ONCOLOGY | Facility: OTHER | Age: 53
End: 2023-01-23

## 2023-01-23 NOTE — TELEPHONE ENCOUNTER
Got a hold of patient. Reviewed ER visit and scan findings. No obvious concerns regarding her lungs and/or cough. Patient still coughing some.     Also reviewed the remainder of her CT. Abdomen and pelvis look stable without concerns of progression or recurrence. Was noted that patient had gaseous distention of the bowels with without mass or lesion.     Patient remains due for colonoscopy. Advised that I would encourage that so we can ensure nothing was missed on CT, in particular at that anastomosis site that was not previously well visualized.     Difficult time getting this scheduled. I gave patient surgery number to call and schedule which she said she will do as she is interested in.     I will plan on following up in 3 months, sooner with concerns or pending colonoscopy results.     JUNIOR Mcguire CNP

## 2023-01-23 NOTE — TELEPHONE ENCOUNTER
Attempted calling patient regarding recent ER visit. No answer. Will try back.     JUNIOR Mcguire CNP

## 2023-01-29 ENCOUNTER — HOSPITAL ENCOUNTER (EMERGENCY)
Facility: HOSPITAL | Age: 53
Discharge: HOME | End: 2023-01-29
Attending: PHYSICIAN ASSISTANT | Admitting: PHYSICIAN ASSISTANT
Payer: COMMERCIAL

## 2023-01-29 VITALS
OXYGEN SATURATION: 96 % | RESPIRATION RATE: 16 BRPM | HEART RATE: 72 BPM | WEIGHT: 191.8 LBS | DIASTOLIC BLOOD PRESSURE: 102 MMHG | SYSTOLIC BLOOD PRESSURE: 151 MMHG | TEMPERATURE: 97.6 F | BODY MASS INDEX: 30.04 KG/M2

## 2023-01-29 DIAGNOSIS — T78.40XA ALLERGIC REACTION, INITIAL ENCOUNTER: ICD-10-CM

## 2023-01-29 PROCEDURE — 250N000013 HC RX MED GY IP 250 OP 250 PS 637: Performed by: PHYSICIAN ASSISTANT

## 2023-01-29 PROCEDURE — 96375 TX/PRO/DX INJ NEW DRUG ADDON: CPT

## 2023-01-29 PROCEDURE — 99284 EMERGENCY DEPT VISIT MOD MDM: CPT | Performed by: PHYSICIAN ASSISTANT

## 2023-01-29 PROCEDURE — 96361 HYDRATE IV INFUSION ADD-ON: CPT

## 2023-01-29 PROCEDURE — 258N000003 HC RX IP 258 OP 636: Performed by: PHYSICIAN ASSISTANT

## 2023-01-29 PROCEDURE — 99284 EMERGENCY DEPT VISIT MOD MDM: CPT | Mod: 25

## 2023-01-29 PROCEDURE — 250N000009 HC RX 250: Performed by: PHYSICIAN ASSISTANT

## 2023-01-29 PROCEDURE — 250N000011 HC RX IP 250 OP 636: Performed by: PHYSICIAN ASSISTANT

## 2023-01-29 PROCEDURE — 96374 THER/PROPH/DIAG INJ IV PUSH: CPT

## 2023-01-29 PROCEDURE — 94640 AIRWAY INHALATION TREATMENT: CPT

## 2023-01-29 PROCEDURE — 999N000157 HC STATISTIC RCP TIME EA 10 MIN

## 2023-01-29 RX ORDER — METHYLPREDNISOLONE SODIUM SUCCINATE 125 MG/2ML
125 INJECTION, POWDER, LYOPHILIZED, FOR SOLUTION INTRAMUSCULAR; INTRAVENOUS ONCE
Status: COMPLETED | OUTPATIENT
Start: 2023-01-29 | End: 2023-01-29

## 2023-01-29 RX ORDER — BUDESONIDE 0.5 MG/2ML
0.5 INHALANT ORAL ONCE
Status: COMPLETED | OUTPATIENT
Start: 2023-01-29 | End: 2023-01-29

## 2023-01-29 RX ORDER — DIPHENHYDRAMINE HYDROCHLORIDE 50 MG/ML
25 INJECTION INTRAMUSCULAR; INTRAVENOUS ONCE
Status: COMPLETED | OUTPATIENT
Start: 2023-01-29 | End: 2023-01-29

## 2023-01-29 RX ORDER — ONDANSETRON 2 MG/ML
4 INJECTION INTRAMUSCULAR; INTRAVENOUS ONCE
Status: COMPLETED | OUTPATIENT
Start: 2023-01-29 | End: 2023-01-29

## 2023-01-29 RX ORDER — SODIUM CHLORIDE 9 MG/ML
INJECTION, SOLUTION INTRAVENOUS CONTINUOUS
Status: DISCONTINUED | OUTPATIENT
Start: 2023-01-29 | End: 2023-01-29 | Stop reason: HOSPADM

## 2023-01-29 RX ORDER — DIPHENHYDRAMINE HCL 25 MG
25 CAPSULE ORAL ONCE
Status: COMPLETED | OUTPATIENT
Start: 2023-01-29 | End: 2023-01-29

## 2023-01-29 RX ORDER — PREDNISONE 20 MG/1
TABLET ORAL
Qty: 10 TABLET | Refills: 0 | Status: SHIPPED | OUTPATIENT
Start: 2023-01-29 | End: 2023-03-22

## 2023-01-29 RX ADMIN — ONDANSETRON 4 MG: 2 INJECTION INTRAMUSCULAR; INTRAVENOUS at 13:30

## 2023-01-29 RX ADMIN — SODIUM CHLORIDE 1000 ML: 9 INJECTION, SOLUTION INTRAVENOUS at 13:02

## 2023-01-29 RX ADMIN — RACEPINEPHRINE HYDROCHLORIDE 0.5 ML: 11.25 SOLUTION RESPIRATORY (INHALATION) at 13:04

## 2023-01-29 RX ADMIN — DIPHENHYDRAMINE HYDROCHLORIDE 25 MG: 50 INJECTION, SOLUTION INTRAMUSCULAR; INTRAVENOUS at 13:22

## 2023-01-29 RX ADMIN — METHYLPREDNISOLONE SODIUM SUCCINATE 125 MG: 125 INJECTION, POWDER, FOR SOLUTION INTRAMUSCULAR; INTRAVENOUS at 13:21

## 2023-01-29 RX ADMIN — BUDESONIDE INHALATION 0.5 MG: 0.5 SUSPENSION RESPIRATORY (INHALATION) at 13:04

## 2023-01-29 RX ADMIN — FAMOTIDINE 20 MG: 10 INJECTION, SOLUTION INTRAVENOUS at 13:24

## 2023-01-29 ASSESSMENT — ENCOUNTER SYMPTOMS
FEVER: 0
SORE THROAT: 1
VOMITING: 0
FACIAL ASYMMETRY: 0
SHORTNESS OF BREATH: 0
NUMBNESS: 1
COUGH: 1
WHEEZING: 0
EYE REDNESS: 0
CHOKING: 0
FACIAL SWELLING: 0
STRIDOR: 0
CONFUSION: 0
SEIZURES: 0
TREMORS: 0

## 2023-01-29 ASSESSMENT — ACTIVITIES OF DAILY LIVING (ADL): ADLS_ACUITY_SCORE: 37

## 2023-01-29 NOTE — ED TRIAGE NOTES
51 y/o female presents with oral numbness, lip numbness and facial tingling. She reports an anaphylactic reaction to green peppers and she ate premade salad 10 minutes which she found green peppers in. She states she has a hx of being intubated due to eating green peppers. She states she left her epi pen at home or she would have used it.  She denies shortness of breath at this time.

## 2023-01-29 NOTE — ED NOTES
Awake and alert, denies SOB. States at a green pepper 20 minutes ago. Intubated for green pepper allergy 11 year ago, has not had eaten green pepper for 9 years.  Does not have epi pen.

## 2023-01-29 NOTE — ED NOTES
Discharge instructions reviewed and sent with pt. Verbalized understanding of discharged instructions.

## 2023-01-29 NOTE — ED PROVIDER NOTES
"  History     Chief Complaint   Patient presents with     Allergic Reaction     HPI  Constantino Warren is a 52 year old female who reports that she is feeling \"overall numbness\".  She states her lips and face are tingling.  She reports having anaphylactic reaction to green peppers in the past.  Today she was eating some premade salad when she noted the tingling in her mouth.  She subsequently found green peppers in the salad.  She reports she has been intubated in the past with reaction to green peppers.  She left her EpiPen at home or she would have used it.  Denies any shortness of breath at this time.  No globus affect.  She does not have a sensation of her airway closing.  She is here for further evaluation does not appear to be significant distress at this time.    Allergies:  Allergies   Allergen Reactions     Aspartame      Other reaction(s): Other - Describe In Comment Field, Seizures  convulsions     Bee Venom Anaphylaxis     Bupropion Anaphylaxis     Throat gets tight and hives. Denies allergic reaction     Food Anaphylaxis     Peppers-anaphylaxis if eaten, if touched hands swell and reddened     Food Allergy Formula Anaphylaxis     Peppers, patient reports green peppers close off her throat     Ibuprofen Anaphylaxis     No Clinical Screening - See Comments Anaphylaxis     Peppers, patient reports green peppers close off her throat     Piper Anaphylaxis     Peppers--green, black, red, chili, etc. Derm symptoms, hives;  Strawberries--(cooked) Itching, breathing difficulties, GI symptoms. Mushrooms--GI symptoms, Hives.Spinach (cooked) throat irritation  (Listed on [facility] problem list.)     Fentanyl Swelling     Lips and tongue swelled.  Swelling of lips and tongue     Adhesive Tape      blisters     Amitriptyline Other (See Comments)     Respiratory symptoms - denies allergic reaction     Cymbalta      Mood cycling leading to elevated mood state.      Liquid Adhesive Dermatitis     " blisters  Blisters  Silk tape and tegaderm is fine     Oxcarbazepine Other (See Comments)     Throat gets tight and hives. Denies allergic reaction  Throat gets tight and hives-       Prozac [Fluoxetine]      Other reaction(s): *Unknown  uncontrolled muscle spasms     Valproic Acid      Respiratory symptoms ([facility]). Denies allergic reaction     Ziprasidone      Throat gets tight and hives. Denies allergic reaction       Problem List:    Patient Active Problem List    Diagnosis Date Noted     Bipolar disorder (H) 01/06/2023     Priority: Medium     per previous notes and documentation       Iron deficiency 04/29/2021     Priority: Medium     History of colon cancer, stage III 10/20/2020     Priority: Medium     Added automatically from request for surgery [id number]       Rotator cuff tear 10/13/2020     Priority: Medium     Added automatically from request for surgery [id number]       Arthritis 10/13/2020     Priority: Medium     Added automatically from request for surgery [id number]       History of uterine cancer 05/27/2020     Priority: Medium     Palliative care patient 05/27/2020     Priority: Medium     Iron deficiency anemia, unspecified iron deficiency anemia type 05/08/2020     Priority: Medium     S/P partial resection of colon 02/24/2020     Priority: Medium     Prediabetes      Priority: Medium     Colon cancer (H) 02/19/2020     Priority: Medium     Added automatically from request for surgery [id number]       Cancer of ascending colon (H) 02/19/2020     Priority: Medium     Added automatically from request for surgery [id number]       Special screening for malignant neoplasms, colon 01/14/2020     Priority: Medium     Added automatically from request for surgery [id number]       Anastomotic ulcer S/P gastric bypass 07/21/2018     Priority: Medium     Anemia 07/21/2018     Priority: Medium     Chest pain 07/20/2018     Priority: Medium     GI bleed 07/20/2018     Priority: Medium     Abdominal pain,  epigastric 07/20/2018     Priority: Medium     Hypochromic microcytic anemia 07/20/2018     Priority: Medium     Chronic migraine 01/18/2018     Priority: Medium     Replacing diagnoses that were inactivated after the 10/1/2021 regulatory import.       Chronic pain disorder 01/18/2018     Priority: Medium     Long term (current) use of opiate analgesic 01/12/2018     Priority: Medium     Overview:   Pain Diagnosis failed back syndrome, annular tear L4-5, history lumbar discectomy, s/p lumbar fusion.    Should be seen in the clinic every twelve weeks. Currently on a taper: yes, trying to wean away from the daytime hydrocodone-acetaminophen..    Plan for flares of chronic pain stretches, occasional use of more hydrocodone-acetaminophen for flare.    ED and UC: Opioid medication will not be given in the ER or Urgent Care unless there is a medical emergency unrelated to chronic pain.  Limit to 3 day supply    Refills: Refills will only be given at a scheduled visit.    [facility] Agreement for Opioid Treatment.   Opioid Agreement Date: 5/21/12  Last UDT (Urine Drug Test) on date:    Pain Dx: chronic low back pain  Last Pain Visit: 6-24-11  Preferred Pharmacy: [facility]  Comments:      7/23/14:  Progressing well.  Over the last month, has decreased hydrocodone-acetaminophen from 6/day to 3/day.  Awaiting appointment with [facility].       Adjustment disorder with mixed anxiety and depressed mood 12/15/2017     Priority: Medium     Other insomnia 12/15/2017     Priority: Medium     Syncope and collapse 09/26/2015     Priority: Medium     Hypertensive urgency 09/26/2015     Priority: Medium     Concussion syndrome 09/26/2015     Priority: Medium     Hydronephrosis, right 04/20/2015     Priority: Medium     Pyelonephritis 04/20/2015     Priority: Medium     Urolithiasis 04/20/2015     Priority: Medium     Annular tear of lumbar disc 12/16/2014     Priority: Medium     Overview:   MRI 2/5/13:  Previous right L4-5  hemilaminectomy and partial facetectomy with mild to moderate right L4-5 facet arthropathy and broad-based bulge of disk and end plate osteophyte on the right with partial high signal intensity zone annular tear of the undersurface of the laterally protruding disk/annulus.       Failed back syndrome of lumbar spine 12/16/2014     Priority: Medium     Overview:   Three back surgeries, including an L3-sacrum AP fusion done [facility].  Has been doing very well in terms of reduction of pain, with minimal requirement of opiate pain medication.       Hx of lumbar discectomy 12/16/2014     Priority: Medium     Overview:   10/2006:   L4-5 Laminectomy and diskectomy L5-S1.   12/12/11:  L5S1 microdiscectomy ([facility])       Vitamin B 12 deficiency 06/13/2011     Priority: Medium     Overview:   IMO Update 10/11       Gastric bypass status for obesity 01/04/2011     Priority: Medium     Overview:   12/14/10:  laparoscopic Prasanna-en-Y gastric bypass Dr. Hassan  IMO Update 10/11       Hyperlipidemia 09/23/2008     Priority: Medium     Overview:   IMO Update 10/11       Hypothyroidism 09/23/2008     Priority: Medium     Overview:   IMO Update 10/11  Overview:   IMO Update 10/11          Past Medical History:    Past Medical History:   Diagnosis Date     Arthritis 10/13/2020     Bipolar disorder (H)      Cancer (H)      Cancer (H) 2004     Chronic pain      De Quervain's disease (tenosynovitis) 12/2021     Depressive disorder      Diabetes (H)      History of blood transfusion      Hypertension      Prediabetes      Syncope and collapse      Thyroid disease      Trigger finger 12/2021       Past Surgical History:    Past Surgical History:   Procedure Laterality Date     ARTHROSCOPY SHOULDER Left 11/6/2020    Procedure: LEFT SHOULDER ARTHROSCOPY WITH ROTATOR CUFF REPAIR AND ACROMIUM CLAVICULAR JOINT RESECTION;  Surgeon: Tanner Mujica DO;  Location: [facility]     BACK SURGERY      2008; 2011     COLONOSCOPY N/A 2/13/2020     Procedure: COLONOSCOPY WITH BIOPSY AND TATOOING OF ASCENDING COLON,INCOMEPLETE EXAM;  Surgeon: Kang Cleveland MD;  Location: [facility]     COLONOSCOPY N/A 10/26/2020    Procedure: incomplete colonoscopy ;  Surgeon: Kang Cleveland MD;  Location: [facility]     ESOPHAGOSCOPY, GASTROSCOPY, DUODENOSCOPY (EGD), COMBINED N/A 7/21/2018    Procedure: COMBINED ESOPHAGOSCOPY, GASTROSCOPY, DUODENOSCOPY (EGD);  UPPER ENDOSCOPY WITH BIOPSIES;  Surgeon: Roger Vazquez MD;  Location: [facility]     ESOPHAGOSCOPY, GASTROSCOPY, DUODENOSCOPY (EGD), COMBINED N/A 11/21/2019    Procedure: UPPER ENDOSCOPY with biopsy;  Surgeon: Kang Cleveland MD;  Location: [facility]     HEAD & NECK SURGERY  2008    c4-c5 fusion     HYSTERECTOMY, CRISTIANE  04/2004    uterine cancer; [facility]     INSERT PORT VASCULAR ACCESS N/A 2/28/2020    Procedure: INSERTION, VASCULAR ACCESS PORT LEFT;  Surgeon: Kang Cleveland MD;  [facility]: [facility]     LAPAROSCOPIC ASSISTED COLECTOMY Right 2/24/2020    Procedure: RIGHT KEVIN COLECTOMY, LYSIS OF ADHESIONS;  Surgeon: Kang Cleveland MD;  [facility]: [facility]     LAPAROSCOPIC BYPASS GASTRIC  12/2010       Family History:    Family History   Problem Relation Age of Onset     Diabetes Mother      Hypertension Mother      Coronary Artery Disease Mother      Myocardial Infarction Mother      Hypertension Father      Diabetes Maternal Grandmother      Hypertension Maternal Grandfather      Coronary Artery Disease Maternal Grandfather      Cancer Paternal Grandfather         unsure of type of cancer     Bladder Cancer Paternal Grandfather      Diabetes Maternal Aunt      Cerebrovascular Disease Maternal Aunt      Diabetes Maternal Aunt      Cerebrovascular Disease Maternal Aunt      Diabetes Maternal Aunt      Cerebrovascular Disease Maternal Aunt      Hyperlipidemia No family hx of      Breast Cancer No family hx of      Colon Cancer No family hx of      Prostate Cancer No family hx of      Thyroid Disease  No family hx of      Anesthesia Reaction No family hx of      Asthma No family hx of      Genetic Disorder No family hx of        Social History:  Marital Status:   [5]  Social History     Tobacco Use     Smoking status: Never     Smokeless tobacco: Never   Vaping Use     Vaping Use: Never used   Substance Use Topics     Alcohol use: No     Comment: sober for 18 years     Drug use: No        Medications:    acetaminophen (TYLENOL) 500 MG tablet  atorvastatin (LIPITOR) 40 MG tablet  Cyanocobalamin 1000 MCG/ML KIT  EPINEPHrine (ANY BX GENERIC EQUIV) 0.3 MG/0.3ML injection 2-pack  EQ ASPIRIN ADULT LOW DOSE 81 MG EC tablet  gabapentin (NEURONTIN) 300 MG capsule  lamoTRIgine (LAMICTAL) 25 MG tablet  levothyroxine (SYNTHROID/LEVOTHROID) 200 MCG tablet  levothyroxine (SYNTHROID/LEVOTHROID) 25 MCG tablet  lisinopril (ZESTRIL) 20 MG tablet  multivitamin w/minerals (THERA-VIT-M) tablet  omeprazole (PRILOSEC) 20 MG DR capsule  tiZANidine (ZANAFLEX) 4 MG tablet  traZODone (DESYREL) 50 MG tablet          Review of Systems   Constitutional: Negative for fever.   HENT: Positive for sore throat. Negative for drooling and facial swelling.    Eyes: Negative for redness.   Respiratory: Positive for cough. Negative for choking, shortness of breath, wheezing and stridor.    Cardiovascular: Negative for chest pain.   Gastrointestinal: Negative for vomiting.   Skin: Negative for pallor.   Neurological: Positive for numbness. Negative for tremors, seizures and facial asymmetry.        Oropharynx and facial numbness and tingling   Psychiatric/Behavioral: Negative for confusion.   All other systems reviewed and are negative.      Physical Exam   BP: (!) 164/102  Pulse: 78  Temp: 97.6  F (36.4  C)  Resp: 16  Weight: 87 kg (191 lb 12.8 oz)  SpO2: 98 %      Physical Exam  Vitals and nursing note reviewed.   Constitutional:       General: She is not in acute distress.     Appearance: Normal appearance. She is not ill-appearing or  toxic-appearing.   HENT:      Head: Normocephalic.      Jaw: No swelling.      Nose: Nose normal.      Mouth/Throat:      Mouth: No angioedema.      Tongue: Tongue does not deviate from midline.      Pharynx: Oropharynx is clear. Uvula midline. No pharyngeal swelling, oropharyngeal exudate, posterior oropharyngeal erythema or uvula swelling.      Tonsils: No tonsillar abscesses.   Eyes:      General: No scleral icterus.     Extraocular Movements: Extraocular movements intact.   Neck:      Trachea: No tracheal deviation.   Cardiovascular:      Rate and Rhythm: Normal rate.   Pulmonary:      Effort: Pulmonary effort is normal. No respiratory distress.      Comments: Lung sounds are clear.  SaO2 is 98% on room air.  She does not appear to be in any respiratory distress.  No tachypnea.  No stridor.  Musculoskeletal:         General: Normal range of motion.      Cervical back: Normal range of motion.   Skin:     General: Skin is warm and dry.      Coloration: Skin is not jaundiced or pale.   Neurological:      General: No focal deficit present.      Mental Status: She is alert and oriented to person, place, and time.   Psychiatric:         Attention and Perception: Attention normal.         Mood and Affect: Mood normal.         ED Course       No results found for this or any previous visit (from the past 24 hour(s)).    Medications   sodium chloride 0.9% infusion (has no administration in time range)   methylPREDNISolone sodium succinate (solu-MEDROL) injection 125 mg (has no administration in time range)   diphenhydrAMINE (BENADRYL) capsule 25 mg (has no administration in time range)     Or   diphenhydrAMINE (BENADRYL) injection 25 mg (has no administration in time range)   famotidine (PEPCID) injection 20 mg (has no administration in time range)   racEPINEPHrine neb solution 0.5 mL (has no administration in time range)   budesonide (PULMICORT) neb solution 0.5 mg (has no administration in time range)       Assessments  "& Plan (with Medical Decision Making)     I have reviewed the nursing notes.    I have reviewed the findings, diagnosis, plan and need for follow up with the patient.        Discharge Medication List as of 1/29/2023  2:11 PM      START taking these medications    Details   predniSONE (DELTASONE) 20 MG tablet Take two tablets (= 40mg) each day for 5 (five) days, Disp-10 tablet, R-0, Local Print             Final diagnoses:   Allergic reaction, initial encounter     Constantino Warren is a 52 year old female who reports that she is feeling \"overall numbness\".  She states her lips and face are tingling.  She reports having anaphylactic reaction to green peppers in the past.  Today she was eating some premade salad when she noted the tingling in her mouth.  She subsequently found green peppers in the salad.  She reports she has been intubated in the past with reaction to green peppers.  She left her EpiPen at home or she would have used it.  Denies any shortness of breath at this time.  No globus affect.  She does not have a sensation of her airway closing.  She is here for further evaluation does not appear to be significant distress at this time.  Physical exam shows no acute findings.  Lung sounds are clear.  SaO2 is 98% on room air.  She does not appear to be in any respiratory distress.  No tachypnea.  No stridor.  No globus affect.  Given her history with green peppers IV was established and she was given fluids, Benadryl, Solu-Medrol, and Pepcid she was also given a racemic epi nebulizer with Pulmicort in it.  She was observed over an extended timeframe with cardiac telemetry and pulse oximetry with no signs of deterioration.  She is feeling much better with the above treatment.  She has EpiPen's at home.  She also has Benadryl.  Rx for a 5-day course of prednisone.  Continue to monitor symptoms return if there is any concerns for further evaluation as needed.    1/29/2023   [facility]     Shayy, " Ezio HOOD PA-C  01/29/23 150

## 2023-02-01 ENCOUNTER — TELEPHONE (OUTPATIENT)
Dept: ONCOLOGY | Facility: OTHER | Age: 53
End: 2023-02-01

## 2023-02-01 ENCOUNTER — HOSPITAL ENCOUNTER (OUTPATIENT)
Facility: HOSPITAL | Age: 53
End: 2023-02-01
Attending: SURGERY | Admitting: SURGERY
Payer: COMMERCIAL

## 2023-02-01 DIAGNOSIS — Z12.11 ENCOUNTER FOR SCREENING COLONOSCOPY: Primary | ICD-10-CM

## 2023-02-01 RX ORDER — BISACODYL 5 MG
10 TABLET, DELAYED RELEASE (ENTERIC COATED) ORAL ONCE
Qty: 2 TABLET | Refills: 0 | Status: SHIPPED | OUTPATIENT
Start: 2023-02-01 | End: 2023-02-01

## 2023-02-01 NOTE — TELEPHONE ENCOUNTER
Screening Questions for the Scheduling of Screening Colonoscopies     (If Colonoscopy is diagnostic, Provider should review the chart before scheduling.)    Are you younger than 50 or older than 80?  Non    Do you take aspirin or fish oil?  No (if yes, tell patient to stop 1 week prior to Colonoscopy)n    Do you take warfarin (Coumadin), clopidogrel (Plavix), apixaban (Eliquis), dabigatram (Pradaxa), rivaroxaban (Xarelto) or any blood thinner? No    Do you use oxygen at home?  No    Do you have kidney disease? No    Are you on dialysis? No    Have you had a stroke or heart attack in the last year? No    Have you had a stent in your heart or any blood vessel in the last year? No    Have you had a transplant of any organ?  No    Have you had a colonoscopy or upper endoscopy (EGD) before?  YES. Date-.         Date of scheduled Colonoscopy:2/21/23    Provider: Dr Fishman     [facility]

## 2023-02-01 NOTE — TELEPHONE ENCOUNTER
Screening Colonoscopy - Meet & Greet scheduled on 2/21/2023 with Dr Fishman.  DOES need preop for anesthesia - Message left for patient to schedule preop with her PCP.  Colonoscopy prep instruction Booklet sent by mail today.

## 2023-02-13 RX ORDER — SODIUM CHLORIDE, SODIUM LACTATE, POTASSIUM CHLORIDE, CALCIUM CHLORIDE 600; 310; 30; 20 MG/100ML; MG/100ML; MG/100ML; MG/100ML
INJECTION, SOLUTION INTRAVENOUS CONTINUOUS
Status: CANCELLED | OUTPATIENT
Start: 2023-02-13

## 2023-02-13 RX ORDER — HYDRALAZINE HYDROCHLORIDE 20 MG/ML
2.5-5 INJECTION INTRAMUSCULAR; INTRAVENOUS EVERY 10 MIN PRN
Status: CANCELLED | OUTPATIENT
Start: 2023-02-13

## 2023-02-13 RX ORDER — LIDOCAINE 40 MG/G
CREAM TOPICAL
Status: CANCELLED | OUTPATIENT
Start: 2023-02-13

## 2023-02-13 RX ORDER — ONDANSETRON 2 MG/ML
4 INJECTION INTRAMUSCULAR; INTRAVENOUS EVERY 30 MIN PRN
Status: CANCELLED | OUTPATIENT
Start: 2023-02-13

## 2023-02-13 RX ORDER — LABETALOL 20 MG/4 ML (5 MG/ML) INTRAVENOUS SYRINGE
10
Status: CANCELLED | OUTPATIENT
Start: 2023-02-13

## 2023-02-13 RX ORDER — FENTANYL CITRATE 50 UG/ML
50 INJECTION, SOLUTION INTRAMUSCULAR; INTRAVENOUS EVERY 5 MIN PRN
Status: CANCELLED | OUTPATIENT
Start: 2023-02-13

## 2023-02-13 RX ORDER — ONDANSETRON 4 MG/1
4 TABLET, ORALLY DISINTEGRATING ORAL EVERY 30 MIN PRN
Status: CANCELLED | OUTPATIENT
Start: 2023-02-13

## 2023-02-24 DIAGNOSIS — E03.9 HYPOTHYROIDISM, UNSPECIFIED TYPE: ICD-10-CM

## 2023-02-27 NOTE — TELEPHONE ENCOUNTER
Synthroid 25      Last Written Prescription Date:  7/14/22  Last Fill Quantity: 90,   # refills: 0  Last Office Visit: 1/6/23  Future Office visit:    Next 5 appointments (look out 90 days)    Apr 07, 2023  9:10 AM  (Arrive by 8:55 AM)  Return Visit with JUNIOR Diaz [facility] ([facility] ) [address] [place] [address] [facility] [address]  [phone]

## 2023-03-02 RX ORDER — LEVOTHYROXINE SODIUM 25 UG/1
TABLET ORAL
Qty: 90 TABLET | Refills: 0 | Status: SHIPPED | OUTPATIENT
Start: 2023-03-02 | End: 2023-03-22

## 2023-03-02 NOTE — TELEPHONE ENCOUNTER
LOV with Dr. Cook on 7/14/22,  2/28/22    TSH   Date Value Ref Range Status   01/05/2022 0.94 0.40 - 4.00 mU/L Final   04/29/2021 8.25 (H) 0.40 - 4.00 mU/L Final

## 2023-03-02 NOTE — TELEPHONE ENCOUNTER
3/2/2023 11:14 AM  Writer left patient VM. Lab needed. Call back number provided.  Katlyn Vera RN

## 2023-03-21 ENCOUNTER — NURSE TRIAGE (OUTPATIENT)
Dept: FAMILY MEDICINE | Facility: OTHER | Age: 53
End: 2023-03-21

## 2023-03-21 NOTE — PROGRESS NOTES
"  Assessment & Plan     Hives  Unclear cause; no new known exposures.  Discussed multiple potential causes - infection, allergy, heat/cold, pressure, stress, etc.  Labs to evaluate underlying/associated factors.  Antihistamine and H2 blocker - scripts done.  Steroid script to fill and start if worsening.  Topical steroid and/or benadryl.  UC/ER if anaphylaxis.      - CBC with platelets and differential; Future  - TSH with free T4 reflex; Future  - Comprehensive metabolic panel (BMP + Alb, Alk Phos, ALT, AST, Total. Bili, TP); Future  - CRP, inflammation; Future  - loratadine (CLARITIN) 10 MG tablet; Take 1 tablet (10 mg) by mouth daily  - famotidine (PEPCID) 20 MG tablet; Take 1 tablet (20 mg) by mouth 2 times daily  - methylPREDNISolone (MEDROL DOSEPAK) 4 MG tablet therapy pack; Follow Package Directions       BMI:   Estimated body mass index is 30.04 kg/m  as calculated from the following:    Height as of this encounter: 1.702 m (5' 7\").    Weight as of this encounter: 87 kg (191 lb 12.8 oz).       See Patient Instructions    Return if symptoms worsen or fail to improve.    Coco Schafer MD  [facility] - RICHARD Meeks is a 52 year old, presenting for the following health issues:  Hives      HPI     Concern - Hives     Onset: no active hives today. About a month ago she noticed a welt on her leg and thought nothing of it and it went away after she showered and went to bed that evening. Over the course of the last 3 weeks the welts seem to appear randomly in the morning or during office time.  Description:when the welts have been present, they have been on her back, legs, arms, stomach  Intensity: find today, no hives  Progression of Symptoms: no hives today, but has been having daily hives for the course of the last month  Accompanying Signs & Symptoms: hives  Previous history of similar problem: yes  Precipitating factors:        Worsened by: no  Alleviating factors:        " "Improved by: no  Therapies tried and outcome: none; Benadryl prn; cortisone cream  Itches, burns.    Showed photos on phone - back, neck, extremities - different places different times.  Everywhere except facial.  Large welts on lower back.    No change in medications since Lamictal added several months ago.   No changes in dyes/pills.  No change in diet.  No travel.  No recent illness/virus.  No other house hold members affected.  2 dogs - not new.  Lived in same house past year.   No new lotion, detergent, etc.  No excess sweating.    No oral swelling.  No dyspnea.  No abdominal pain or vomiting.    No daily antihistamines  No H2 blockers.  Prn PPI.    Review of Systems   Constitutional, HEENT, cardiovascular, pulmonary, gi and gu systems are negative, except as otherwise noted.      Objective    /82 (BP Location: Right arm, Patient Position: Sitting, Cuff Size: Adult Regular)   Pulse 73   Temp 97  F (36.1  C) (Tympanic)   Ht 1.702 m (5' 7\")   Wt 87 kg (191 lb 12.8 oz)   SpO2 98%   BMI 30.04 kg/m    Body mass index is 30.04 kg/m .  Physical Exam   GENERAL: healthy, alert and no distress  EYES: Eyes grossly normal to inspection, PERRL and conjunctivae and sclerae normal  HENT: nose and mouth without ulcers or lesions, oropharynx clear and oral mucous membranes moist  NECK: no adenopathy, no asymmetry, masses, or scars and thyroid normal to palpation  RESP: lungs clear to auscultation - no rales, rhonchi or wheezes  CV: regular rate and rhythm, normal S1 S2, no S3 or S4, no murmur, click or rub, no peripheral edema and peripheral pulses strong  MS: no gross musculoskeletal defects noted, no edema  SKIN: right antecubital fossa with small, faint, urticaria  PSYCH: mentation appears normal, affect normal/bright    Labs pending.                "

## 2023-03-21 NOTE — TELEPHONE ENCOUNTER
"    Reason for Disposition    Patient wants to be seen    MODERATE-SEVERE hives persist (i.e., hives interfere with normal activities or work) and taking antihistamine (e.g., Benadryl, Claritin) > 24 hours    Answer Assessment - Initial Assessment Questions  1. APPEARANCE: \"What does the rash look like?\"       Big and small welts red   2. LOCATION: \"Where is the rash located?\"       Right hip area but has been in different spots the past 2 weeks  3. NUMBER: \"How many hives are there?\"       several  4. SIZE: \"How big are the hives?\" (inches, cm, compare to coins) \"Do they all look the same or is there lots of variation in shape and size?\"       Several sizes  5. ONSET: \"When did the hives begin?\" (Hours or days ago)       2 weeks ago  6. ITCHING: \"Does it itch?\" If Yes, ask: \"How bad is the itch?\"     - MILD: doesn't interfere with normal activities    - MODERATE-SEVERE: interferes with work, school, sleep, or other activities       Yes itches  7. RECURRENT PROBLEM: \"Have you had hives before?\" If Yes, ask: \"When was the last time?\" and \"What happened that time?\"       no  8. TRIGGERS: \"Were you exposed to any new food, plant, cosmetic product or animal just before the hives began?\"      unknown  9. OTHER SYMPTOMS: \"Do you have any other symptoms?\" (e.g., fever, tongue swelling, difficulty breathing, abdominal pain)      no  10. PREGNANCY: \"Is there any chance you are pregnant?\" \"When was your last menstrual period?\"        no    Protocols used: HIVES-A-OH      "

## 2023-03-22 ENCOUNTER — OFFICE VISIT (OUTPATIENT)
Dept: FAMILY MEDICINE | Facility: OTHER | Age: 53
End: 2023-03-22
Attending: FAMILY MEDICINE
Payer: COMMERCIAL

## 2023-03-22 VITALS
DIASTOLIC BLOOD PRESSURE: 82 MMHG | WEIGHT: 191.8 LBS | OXYGEN SATURATION: 98 % | HEIGHT: 67 IN | SYSTOLIC BLOOD PRESSURE: 138 MMHG | BODY MASS INDEX: 30.1 KG/M2 | HEART RATE: 73 BPM | TEMPERATURE: 97 F

## 2023-03-22 DIAGNOSIS — E03.9 HYPOTHYROIDISM, UNSPECIFIED TYPE: ICD-10-CM

## 2023-03-22 DIAGNOSIS — L50.9 HIVES: Primary | ICD-10-CM

## 2023-03-22 LAB
ALBUMIN SERPL BCG-MCNC: 3.9 G/DL (ref 3.5–5.2)
ALP SERPL-CCNC: 80 U/L (ref 35–104)
ALT SERPL W P-5'-P-CCNC: 39 U/L (ref 10–35)
ANION GAP SERPL CALCULATED.3IONS-SCNC: 11 MMOL/L (ref 7–15)
AST SERPL W P-5'-P-CCNC: 45 U/L (ref 10–35)
BASOPHILS # BLD AUTO: 0 10E3/UL (ref 0–0.2)
BASOPHILS NFR BLD AUTO: 0 %
BILIRUB SERPL-MCNC: 0.4 MG/DL
BUN SERPL-MCNC: 11.1 MG/DL (ref 6–20)
CALCIUM SERPL-MCNC: 8.9 MG/DL (ref 8.6–10)
CHLORIDE SERPL-SCNC: 109 MMOL/L (ref 98–107)
CREAT SERPL-MCNC: 0.77 MG/DL (ref 0.51–0.95)
CRP SERPL-MCNC: <3 MG/L
DEPRECATED HCO3 PLAS-SCNC: 22 MMOL/L (ref 22–29)
EOSINOPHIL # BLD AUTO: 0 10E3/UL (ref 0–0.7)
EOSINOPHIL NFR BLD AUTO: 0 %
ERYTHROCYTE [DISTWIDTH] IN BLOOD BY AUTOMATED COUNT: 12.8 % (ref 10–15)
GFR SERPL CREATININE-BSD FRML MDRD: >90 ML/MIN/1.73M2
GLUCOSE SERPL-MCNC: 119 MG/DL (ref 70–99)
HCT VFR BLD AUTO: 39.2 % (ref 35–47)
HGB BLD-MCNC: 13.1 G/DL (ref 11.7–15.7)
IMM GRANULOCYTES # BLD: 0 10E3/UL
IMM GRANULOCYTES NFR BLD: 0 %
LYMPHOCYTES # BLD AUTO: 1.5 10E3/UL (ref 0.8–5.3)
LYMPHOCYTES NFR BLD AUTO: 27 %
MCH RBC QN AUTO: 29.2 PG (ref 26.5–33)
MCHC RBC AUTO-ENTMCNC: 33.4 G/DL (ref 31.5–36.5)
MCV RBC AUTO: 87 FL (ref 78–100)
MONOCYTES # BLD AUTO: 0.4 10E3/UL (ref 0–1.3)
MONOCYTES NFR BLD AUTO: 7 %
NEUTROPHILS # BLD AUTO: 3.6 10E3/UL (ref 1.6–8.3)
NEUTROPHILS NFR BLD AUTO: 66 %
NRBC # BLD AUTO: 0 10E3/UL
NRBC BLD AUTO-RTO: 0 /100
PLATELET # BLD AUTO: 198 10E3/UL (ref 150–450)
POTASSIUM SERPL-SCNC: 4.1 MMOL/L (ref 3.4–5.3)
PROT SERPL-MCNC: 6.5 G/DL (ref 6.4–8.3)
RBC # BLD AUTO: 4.49 10E6/UL (ref 3.8–5.2)
SODIUM SERPL-SCNC: 142 MMOL/L (ref 136–145)
T4 FREE SERPL-MCNC: 0.75 NG/DL (ref 0.9–1.7)
TSH SERPL DL<=0.005 MIU/L-ACNC: 11.17 UIU/ML (ref 0.3–4.2)
WBC # BLD AUTO: 5.5 10E3/UL (ref 4–11)

## 2023-03-22 PROCEDURE — 36415 COLL VENOUS BLD VENIPUNCTURE: CPT | Performed by: FAMILY MEDICINE

## 2023-03-22 PROCEDURE — 99214 OFFICE O/P EST MOD 30 MIN: CPT | Performed by: FAMILY MEDICINE

## 2023-03-22 PROCEDURE — 80050 GENERAL HEALTH PANEL: CPT | Performed by: FAMILY MEDICINE

## 2023-03-22 PROCEDURE — 86140 C-REACTIVE PROTEIN: CPT | Performed by: FAMILY MEDICINE

## 2023-03-22 PROCEDURE — 84439 ASSAY OF FREE THYROXINE: CPT | Performed by: FAMILY MEDICINE

## 2023-03-22 RX ORDER — GABAPENTIN 100 MG/1
100 CAPSULE ORAL AT BEDTIME
COMMUNITY
Start: 2023-01-26 | End: 2024-01-07

## 2023-03-22 RX ORDER — METHYLPREDNISOLONE 4 MG
TABLET, DOSE PACK ORAL
Qty: 21 TABLET | Refills: 0 | Status: SHIPPED | OUTPATIENT
Start: 2023-03-22 | End: 2023-04-07

## 2023-03-22 RX ORDER — LEVOTHYROXINE SODIUM 50 UG/1
TABLET ORAL
Qty: 90 TABLET | Refills: 1 | Status: SHIPPED | OUTPATIENT
Start: 2023-03-22 | End: 2024-02-02

## 2023-03-22 RX ORDER — FAMOTIDINE 20 MG/1
20 TABLET, FILM COATED ORAL 2 TIMES DAILY
Qty: 60 TABLET | Refills: 1 | Status: SHIPPED | OUTPATIENT
Start: 2023-03-22

## 2023-03-22 RX ORDER — LORATADINE 10 MG/1
10 TABLET ORAL DAILY
Qty: 30 TABLET | Refills: 1 | Status: SHIPPED | OUTPATIENT
Start: 2023-03-22

## 2023-03-22 ASSESSMENT — PAIN SCALES - GENERAL: PAINLEVEL: MODERATE PAIN (5)

## 2023-03-22 NOTE — PATIENT INSTRUCTIONS
Take Claritin/Loratadine 10 mg nightly - antihistamine.  Take Pepcid 20 mg twice daily (AM/PM) - histamine receptor blocker.    Script for prednisone taper - fill script - only start if flares up of hives.  If started, complete course.    Ok to continue topical steroid or topical benadryl as well.  Ok to use as needed Benadryl dose as well if needed.    Labs today - will call with results.

## 2023-03-27 NOTE — PROGRESS NOTES
Oncology Follow-up Visit    Reason for Visit:  Constantino is a 52 year old woman with a history of Stage III adenocarcinoma of the colon, who presents to the clinic today for routine follow-up.    Nursing Note and documentation reviewed: Yes    Interval History:   Constantino, for the most part, seems to be doing well. Her one new complaint today is something that she has already seen her PCP for, that of intermittent hives. She was started on various medications as well as a steroid dose pack but notes no improvements. These hives present, are terribly itching, and resolved within about an hour. Hydrocortisone helps. No other skin concerns.     She otherwise denies signs of infection. No fever, chills, chest pain, cough, or SOB. No bleeding concerns including melana or hematochezia. No bowel changes. Her weight has fluctuated some but she has lost about 5 lbs. Notes that she is trying to loose weight. Continues to work with [employer] for her mental health concerns, feels in a bit of a depressive funk lately so medications are being tweaked. Energy has improved working less.     Of note, overdue for colonoscopy which she now has schedule.     Oncologic History:   11/21/19 to 11/21/2019 Hospitalized with a hemoglobin of 5.7 underwent EGD with no evidence of active bleeding.  12/12/2019 Seen by Dr. Cook and established care with her as her new PCP.  Referral for screening colonoscopy was completed.  2/13/2020 Underwent colonoscopy by Dr. Cleveland where a near obstructing lesion in the right colon was found. Pathology showed an invasive moderately differentiated adenocarcinoma.  CT the abdomen and pelvis showed a mass in the junction of the cecum and ascending colon highly suspicious for malignancy.  2/24/2020 Underwent laparoscopic-assisted right hemicolectomy by Dr. Cleveland that was converted to open right hemicolectomy when it was noted that the tumor was adherent to the pelvic brim; final pathology showed mildly  differentiated adenocarcinoma of the right colon with 1/32 lymph nodes positive; there was perineural invasion; tumor budding and acutely inflamed tumor and reactive lymph nodes with peritonitis; the omentum showed mild peritonitis; margins were negative; patient was staged [id number]; IIIb adenocarcinoma of the right colon  2/28/2020 Port-A-Cath placement  3/25/2020 PET scan which showed no evidence of distant metastatic disease  3/30/2020 Seen by Dr. Palma with Medical Oncology with recommendation for XELOX; capecitabine 850 mg/m2 p.o. b.i.d. on days 1 through 14 of a 21-day cycle, oxaliplatin 130 mg/m2 to be given on day 1 x4 cycles.  4/10/2020 Seen by Dr. Bryant with Radiation Oncology with no recommendation for radiation therapy at this time related to negative margins on pathology  4/17/2020 initiation of chemotherapy  6/22/2020 Completion of 4 cycles of XELOX  10/26/2020 Colonoscopy completed. Unable to visualize the anastomosis of the small bowel to the transverse colon  02/23/2022 CT CAP without evidence of disease recurrence or progression  8/12/2022 CT CAP without evidence of disease recurrence or progression  04/21/2023 Planned colonoscopy with Dr. Fishman     MMR proteins intact  CEA prior to surgery was <0.5     Current Chemo Regime/TX:  Surveillance     Previous treatment: Oxaliplatin/Capecitabine x 4 cycles    Past Medical History:   Diagnosis Date     Arthritis 10/13/2020     Bipolar disorder (H)     per previous notes and documentation     Cancer (H)     colon     Cancer (H) 2004    uterine     Chronic pain      De Quervain's disease (tenosynovitis) 12/2021     Depressive disorder      Diabetes (H)      History of blood transfusion      Hypertension      Prediabetes      Syncope and collapse      Thyroid disease      Trigger finger 12/2021       Past Surgical History:   Procedure Laterality Date     ARTHROSCOPY SHOULDER Left 11/6/2020    Procedure: LEFT SHOULDER ARTHROSCOPY WITH ROTATOR CUFF REPAIR AND  ACROMIUM CLAVICULAR JOINT RESECTION;  Surgeon: Tanner Mujica DO;  Location: [facility]     BACK SURGERY      2008; 2011     COLONOSCOPY N/A 2/13/2020    Procedure: COLONOSCOPY WITH BIOPSY AND TATOOING OF ASCENDING COLON,INCOMEPLETE EXAM;  Surgeon: Kang Cleveland MD;  Location: [facility]     COLONOSCOPY N/A 10/26/2020    Procedure: incomplete colonoscopy ;  Surgeon: Kang Cleveland MD;  Location: [facility]     ESOPHAGOSCOPY, GASTROSCOPY, DUODENOSCOPY (EGD), COMBINED N/A 7/21/2018    Procedure: COMBINED ESOPHAGOSCOPY, GASTROSCOPY, DUODENOSCOPY (EGD);  UPPER ENDOSCOPY WITH BIOPSIES;  Surgeon: Roger Vazquez MD;  Location: [facility]     ESOPHAGOSCOPY, GASTROSCOPY, DUODENOSCOPY (EGD), COMBINED N/A 11/21/2019    Procedure: UPPER ENDOSCOPY with biopsy;  Surgeon: Kang Cleveland MD;  Location: [facility]     HEAD & NECK SURGERY  2008    c4-c5 fusion     HYSTERECTOMY, CRISTIANE  04/2004    uterine cancer; [facility]     INSERT PORT VASCULAR ACCESS N/A 2/28/2020    Procedure: INSERTION, VASCULAR ACCESS PORT LEFT;  Surgeon: Kang Cleveland MD;  Location: [facility]     LAPAROSCOPIC ASSISTED COLECTOMY Right 2/24/2020    Procedure: RIGHT KEVIN COLECTOMY, LYSIS OF ADHESIONS;  Surgeon: Kang Cleveland MD;  Location: [facility]     LAPAROSCOPIC BYPASS GASTRIC  12/2010       Family History   Problem Relation Age of Onset     Diabetes Mother      Hypertension Mother      Coronary Artery Disease Mother      Myocardial Infarction Mother      Hypertension Father      Diabetes Maternal Grandmother      Hypertension Maternal Grandfather      Coronary Artery Disease Maternal Grandfather      Cancer Paternal Grandfather         unsure of type of cancer     Bladder Cancer Paternal Grandfather      Diabetes Maternal Aunt      Cerebrovascular Disease Maternal Aunt      Diabetes Maternal Aunt      Cerebrovascular Disease Maternal Aunt      Diabetes Maternal Aunt      Cerebrovascular Disease Maternal Aunt      Hyperlipidemia No family  hx of      Breast Cancer No family hx of      Colon Cancer No family hx of      Prostate Cancer No family hx of      Thyroid Disease No family hx of      Anesthesia Reaction No family hx of      Asthma No family hx of      Genetic Disorder No family hx of        Social History     Socioeconomic History     Marital status:      Spouse name: Not on file     Number of children: 2     Years of education: Not on file     Highest education level: Not on file   Occupational History     Occupation: PCA     Comment: currently not working, unemployment    Tobacco Use     Smoking status: Never     Smokeless tobacco: Never   Vaping Use     Vaping status: Never Used   Substance and Sexual Activity     Alcohol use: No     Comment: sober for 18 years     Drug use: No     Sexual activity: Never   Other Topics Concern     Parent/sibling w/ CABG, MI or angioplasty before 65F 55M? Yes     Comment: Mother   Social History Narrative     Not on file     Social Determinants of Health     Financial Resource Strain: Low Risk  (4/9/2020)    Overall Financial Resource Strain (CARDIA)      Difficulty of Paying Living Expenses: Not hard at all   Food Insecurity: Not on file   Transportation Needs: Unknown (4/9/2020)    PRAPARE - Transportation      Lack of Transportation (Medical): Patient refused      Lack of Transportation (Non-Medical): Patient refused   Physical Activity: Not on file   Stress: Not on file   Social Connections: Not on file   Intimate Partner Violence: Not on file   Housing Stability: Not on file       Current Outpatient Medications   Medication     atorvastatin (LIPITOR) 40 MG tablet     Cyanocobalamin 1000 MCG/ML KIT     famotidine (PEPCID) 20 MG tablet     gabapentin (NEURONTIN) 100 MG capsule     gabapentin (NEURONTIN) 300 MG capsule     lamoTRIgine (LAMICTAL) 25 MG tablet     levothyroxine (SYNTHROID/LEVOTHROID) 200 MCG tablet     levothyroxine (SYNTHROID/LEVOTHROID) 50 MCG tablet     lisinopril (ZESTRIL) 20 MG  tablet     loratadine (CLARITIN) 10 MG tablet     multivitamin w/minerals (THERA-VIT-M) tablet     tiZANidine (ZANAFLEX) 4 MG tablet     traZODone (DESYREL) 50 MG tablet     acetaminophen (TYLENOL) 500 MG tablet     EPINEPHrine (ANY BX GENERIC EQUIV) 0.3 MG/0.3ML injection 2-pack     EQ ASPIRIN ADULT LOW DOSE 81 MG EC tablet     omeprazole (PRILOSEC) 20 MG DR capsule     No current facility-administered medications for this visit.        Allergies   Allergen Reactions     Aspartame      Other reaction(s): Other - Describe In Comment Field, Seizures  convulsions     Bee Venom Anaphylaxis     Bupropion Anaphylaxis     Throat gets tight and hives. Denies allergic reaction     Food Anaphylaxis     Peppers-anaphylaxis if eaten, if touched hands swell and reddened     Food Allergy Formula Anaphylaxis     Peppers, patient reports green peppers close off her throat     Ibuprofen Anaphylaxis     No Clinical Screening - See Comments Anaphylaxis     Peppers, patient reports green peppers close off her throat     Piper Anaphylaxis     Peppers--green, black, red, chili, etc. Derm symptoms, hives;  Strawberries--(cooked) Itching, breathing difficulties, GI symptoms. Mushrooms--GI symptoms, Hives.Spinach (cooked) throat irritation  (Listed on [facility] problem list.)     Fentanyl Swelling     Lips and tongue swelled.  Swelling of lips and tongue     Adhesive Tape      blisters     Amitriptyline Other (See Comments)     Respiratory symptoms - denies allergic reaction     Cymbalta      Mood cycling leading to elevated mood state.      Liquid Adhesive Dermatitis     blisters  Blisters  Silk tape and tegaderm is fine     Oxcarbazepine Other (See Comments)     Throat gets tight and hives. Denies allergic reaction  Throat gets tight and hives-       Prozac [Fluoxetine]      Other reaction(s): *Unknown  uncontrolled muscle spasms     Valproic Acid      Respiratory symptoms ([facility]). Denies allergic  "reaction     Ziprasidone      Throat gets tight and hives. Denies allergic reaction       Review Of Systems:  A complete review of systems is negative except for the above mentioned items in the interval history.     ECOG Performance Status: 0    Physical Exam:  /60   Pulse 64   Temp (!) 96.4  F (35.8  C) (Tympanic)   Resp 19   Ht 1.702 m (5' 7\")   Wt 87.5 kg (192 lb 14.4 oz)   SpO2 98%   BMI 30.21 kg/m    GENERAL APPEARANCE: Healthy, alert and in no acute distress.  HEENT: Eyes appear normal without scleral icterus. Extraocular movements intact.  NECK:   Supple with normal range of motion. No asymmetry or masses.  LYMPHATICS: No palpable cervical, supraclavicular nodes.  RESP: Lungs clear to auscultation bilaterally, respirations regular and easy.  CARDIOVASCULAR: Regular rate and rhythm. Normal S1, S2; no murmur, gallop, or rub.  ABDOMEN: Soft, non-tender, non-distended. Bowel sounds auscultated all 4 quadrants. No palpable organomegaly or masses.  MUSCULOSKELETAL: Extremities without gross deformities noted. No edema noted.   SKIN: No suspicious lesions or rashes.  NEURO: Alert and oriented x 3.  Gait steady.  PSYCHIATRIC: Mentation and affect appear normal.  Mood appropriate.    Laboratory:   Component      Latest Ref Rn 3/29/2023   WBC      4.0 - 11.0 10e3/uL 5.5    RBC Count      3.80 - 5.20 10e6/uL 4.02    Hemoglobin      11.7 - 15.7 g/dL 11.7    Hematocrit      35.0 - 47.0 % 35.4    MCV      78 - 100 fL 88    MCH      26.5 - 33.0 pg 29.1    MCHC      31.5 - 36.5 g/dL 33.1    RDW      10.0 - 15.0 % 12.6    Platelet Count      150 - 450 10e3/uL 181    % Neutrophils      % 62    % Lymphocytes      % 29    % Monocytes      % 9    % Eosinophils      % 0    % Basophils      % 0    % Immature Granulocytes      % 0    NRBCs per 100 WBC      <1 /100 0    Absolute Neutrophils      1.6 - 8.3 10e3/uL 3.4    Absolute Lymphocytes      0.8 - 5.3 10e3/uL 1.6    Absolute Monocytes      0.0 - 1.3 10e3/uL 0.5  "   Absolute Eosinophils      0.0 - 0.7 10e3/uL 0.0    Absolute Basophils      0.0 - 0.2 10e3/uL 0.0    Absolute Immature Granulocytes      <=0.4 10e3/uL 0.0    Absolute NRBCs      10e3/uL 0.0    Sodium      136 - 145 mmol/L 140    Potassium      3.4 - 5.3 mmol/L 4.2    Chloride      98 - 107 mmol/L 106    Carbon Dioxide (CO2)      22 - 29 mmol/L 25    Anion Gap      7 - 15 mmol/L 9    Urea Nitrogen      6.0 - 20.0 mg/dL 13.0    Creatinine      0.51 - 0.95 mg/dL 0.75    Calcium      8.6 - 10.0 mg/dL 8.4 (L)    Glucose      70 - 99 mg/dL 102 (H)    Alkaline Phosphatase      35 - 104 U/L 68    AST      10 - 35 U/L 34    ALT      10 - 35 U/L 30    Protein Total      6.4 - 8.3 g/dL 6.2 (L)    Albumin      3.5 - 5.2 g/dL 3.6    Bilirubin Total      <=1.2 mg/dL 0.5    GFR Estimate      >60 mL/min/1.73m2 >90    Iron      37 - 145 ug/dL 65    Iron Binding Capacity      240 - 430 ug/dL 332    Iron Sat Index      15 - 46 % 20    Ferritin      11 - 328 ng/mL 50    CEA      ng/mL 1.6    Vitamin B12      232 - 1,245 pg/mL 396    Folate      4.6 - 34.8 ng/mL 11.7       Legend:  (L) Low  (H) High      Imaging Studies:    None this visit    ASSESSMENT/PLAN:  #1 Colon cancer:  Stage IIIB adenocarcinoma of the ascending colon diagnosed February 2020. She completed adjuvant chemotherapy with XELOX x 4 cycles on 6/22/2020 and is being followed with surveillance. Her tumor marker today is pending but historically has been stable. Her last colonoscopy was completed Oct 2020. Unfortuantely, this was incomplete as the anastomosis site was unable to be visuliazed. Patient has been educated on importance of repeating this procedure as she is overdue. This was finally scheduled with Dr. Fishman, to be completed in about a month. No evidence of recurrence. CEA stable. I will see her back in about 3 months with labs and scans prior. We will of course see her sooner pending colonoscopy results or given new or concerning symptoms.      #2 Hx Iron  deficiency: Labs stable. Does have hx gastric bypass. Will continue to monitor.     #3 B12 deficiency: Currently on B12 injections q2w due to previous deficiency. Last level obtained after missing a couple injections so that could be why her levels are down a bit. We will continue injections every 2 weeks. Will check labs when I see her next.a    #4 Hives I believe unrelated to her hx colon cancer. Continue follow-up with PCP.     Patient in agreement with plan and verbalizes understanding. Agrees to call with any questions or concerns.    40 minutes spent in the patient's encounter today with time spent in review of patient's chart along with chart preparation and review of the treatment plan and signing of treatment plan.  Time was also spent with the patient in obtaining a review of systems and performing a physical exam along with detailed review of all test results. Time was also spent in discussing plan for future follow-up and relating instructions for follow-up and in placing future orders.    JUNIOR [facility]  Medical Oncology

## 2023-03-29 ENCOUNTER — INFUSION THERAPY VISIT (OUTPATIENT)
Dept: INFUSION THERAPY | Facility: OTHER | Age: 53
End: 2023-03-29
Attending: INTERNAL MEDICINE
Payer: COMMERCIAL

## 2023-03-29 DIAGNOSIS — C18.2 CANCER OF ASCENDING COLON (H): ICD-10-CM

## 2023-03-29 DIAGNOSIS — E61.1 IRON DEFICIENCY: ICD-10-CM

## 2023-03-29 DIAGNOSIS — Z98.84 GASTRIC BYPASS STATUS FOR OBESITY: ICD-10-CM

## 2023-03-29 DIAGNOSIS — Z85.038 HISTORY OF COLON CANCER, STAGE III: Primary | ICD-10-CM

## 2023-03-29 DIAGNOSIS — E53.8 VITAMIN B 12 DEFICIENCY: ICD-10-CM

## 2023-03-29 LAB
ALBUMIN SERPL BCG-MCNC: 3.6 G/DL (ref 3.5–5.2)
ALP SERPL-CCNC: 68 U/L (ref 35–104)
ALT SERPL W P-5'-P-CCNC: 30 U/L (ref 10–35)
ANION GAP SERPL CALCULATED.3IONS-SCNC: 9 MMOL/L (ref 7–15)
AST SERPL W P-5'-P-CCNC: 34 U/L (ref 10–35)
BASOPHILS # BLD AUTO: 0 10E3/UL (ref 0–0.2)
BASOPHILS NFR BLD AUTO: 0 %
BILIRUB SERPL-MCNC: 0.5 MG/DL
BUN SERPL-MCNC: 13 MG/DL (ref 6–20)
CALCIUM SERPL-MCNC: 8.4 MG/DL (ref 8.6–10)
CEA SERPL-MCNC: 1.6 NG/ML
CHLORIDE SERPL-SCNC: 106 MMOL/L (ref 98–107)
CREAT SERPL-MCNC: 0.75 MG/DL (ref 0.51–0.95)
DEPRECATED HCO3 PLAS-SCNC: 25 MMOL/L (ref 22–29)
EOSINOPHIL # BLD AUTO: 0 10E3/UL (ref 0–0.7)
EOSINOPHIL NFR BLD AUTO: 0 %
ERYTHROCYTE [DISTWIDTH] IN BLOOD BY AUTOMATED COUNT: 12.6 % (ref 10–15)
FERRITIN SERPL-MCNC: 50 NG/ML (ref 11–328)
FOLATE SERPL-MCNC: 11.7 NG/ML (ref 4.6–34.8)
GFR SERPL CREATININE-BSD FRML MDRD: >90 ML/MIN/1.73M2
GLUCOSE SERPL-MCNC: 102 MG/DL (ref 70–99)
HCT VFR BLD AUTO: 35.4 % (ref 35–47)
HGB BLD-MCNC: 11.7 G/DL (ref 11.7–15.7)
IMM GRANULOCYTES # BLD: 0 10E3/UL
IMM GRANULOCYTES NFR BLD: 0 %
IRON BINDING CAPACITY (ROCHE): 332 UG/DL (ref 240–430)
IRON SATN MFR SERPL: 20 % (ref 15–46)
IRON SERPL-MCNC: 65 UG/DL (ref 37–145)
LYMPHOCYTES # BLD AUTO: 1.6 10E3/UL (ref 0.8–5.3)
LYMPHOCYTES NFR BLD AUTO: 29 %
MCH RBC QN AUTO: 29.1 PG (ref 26.5–33)
MCHC RBC AUTO-ENTMCNC: 33.1 G/DL (ref 31.5–36.5)
MCV RBC AUTO: 88 FL (ref 78–100)
MONOCYTES # BLD AUTO: 0.5 10E3/UL (ref 0–1.3)
MONOCYTES NFR BLD AUTO: 9 %
NEUTROPHILS # BLD AUTO: 3.4 10E3/UL (ref 1.6–8.3)
NEUTROPHILS NFR BLD AUTO: 62 %
NRBC # BLD AUTO: 0 10E3/UL
NRBC BLD AUTO-RTO: 0 /100
PLATELET # BLD AUTO: 181 10E3/UL (ref 150–450)
POTASSIUM SERPL-SCNC: 4.2 MMOL/L (ref 3.4–5.3)
PROT SERPL-MCNC: 6.2 G/DL (ref 6.4–8.3)
RBC # BLD AUTO: 4.02 10E6/UL (ref 3.8–5.2)
SODIUM SERPL-SCNC: 140 MMOL/L (ref 136–145)
VIT B12 SERPL-MCNC: 396 PG/ML (ref 232–1245)
WBC # BLD AUTO: 5.5 10E3/UL (ref 4–11)

## 2023-03-29 PROCEDURE — 82746 ASSAY OF FOLIC ACID SERUM: CPT

## 2023-03-29 PROCEDURE — 80053 COMPREHEN METABOLIC PANEL: CPT

## 2023-03-29 PROCEDURE — 82728 ASSAY OF FERRITIN: CPT

## 2023-03-29 PROCEDURE — 83540 ASSAY OF IRON: CPT

## 2023-03-29 PROCEDURE — 82378 CARCINOEMBRYONIC ANTIGEN: CPT

## 2023-03-29 PROCEDURE — 36591 DRAW BLOOD OFF VENOUS DEVICE: CPT

## 2023-03-29 PROCEDURE — 96372 THER/PROPH/DIAG INJ SC/IM: CPT | Performed by: INTERNAL MEDICINE

## 2023-03-29 PROCEDURE — 82607 VITAMIN B-12: CPT

## 2023-03-29 PROCEDURE — 85025 COMPLETE CBC W/AUTO DIFF WBC: CPT

## 2023-03-29 PROCEDURE — 83550 IRON BINDING TEST: CPT

## 2023-03-29 RX ORDER — HEPARIN SODIUM (PORCINE) LOCK FLUSH IV SOLN 100 UNIT/ML 100 UNIT/ML
5 SOLUTION INTRAVENOUS
Status: CANCELLED | OUTPATIENT
Start: 2023-03-29

## 2023-03-29 RX ORDER — CYANOCOBALAMIN 1000 UG/ML
1000 INJECTION, SOLUTION INTRAMUSCULAR; SUBCUTANEOUS
Status: DISCONTINUED | OUTPATIENT
Start: 2023-03-29 | End: 2023-03-29 | Stop reason: HOSPADM

## 2023-03-29 RX ORDER — CYANOCOBALAMIN 1000 UG/ML
1000 INJECTION, SOLUTION INTRAMUSCULAR; SUBCUTANEOUS
Status: CANCELLED
Start: 2023-03-29

## 2023-03-29 RX ORDER — HEPARIN SODIUM (PORCINE) LOCK FLUSH IV SOLN 100 UNIT/ML 100 UNIT/ML
5 SOLUTION INTRAVENOUS
Status: DISCONTINUED | OUTPATIENT
Start: 2023-03-29 | End: 2023-03-29 | Stop reason: HOSPADM

## 2023-03-29 RX ADMIN — CYANOCOBALAMIN 1000 MCG: 1000 INJECTION, SOLUTION INTRAMUSCULAR; SUBCUTANEOUS at 11:34

## 2023-03-29 RX ADMIN — HEPARIN SODIUM (PORCINE) LOCK FLUSH IV SOLN 100 UNIT/ML 5 ML: 100 SOLUTION at 11:18

## 2023-03-29 NOTE — PROGRESS NOTES
Patients port accessed using non-coring, 19 gauge, 3/4 needle, per facility protocol.     Hand hygiene performed: yes   Mask donned by caregiver: yes  Site prepped with CHG: yes   Labs drawn: yes   Dressing applied using aseptic technique: yes     Port flushed easily, without resistance. Flushed with 10 cc's normal saline.   Immediate blood return noted. 10 cc blood discarded.  Ordered labs obtained and sent to lab.  Port flushed per orders/MAR. Needle removed intact, sterile dressing applied.  Slight pressure applied for 30 seconds.   Patient tolerated port flush well, denies pain nor discomfort at this time. Patient instructed to leave dressing intact for a minimum of one hour, and to call with questions or concerns.  Patient states understanding and is in agreement with this plan.      Patient denies questions nor concerns regarding medication, administration site, side effects, nor aftercare.  Patient identified with two identifiers, order verified, and verbal consent for today's injection obtained from patient.     Patient education provided on s/s of injection site infection, and/or medication specific side effects, and when to call a provider.  Patient instructed to report any adverse effects.     1134 Medication administered per protocol in right deltoid at 90 degree angle.  Site covered with sterile bandage.  Patient tolerated injection well, no verbal nor non-verbal signs of discomfort noted.  No adverse effects noted at this time.     Patient instructed to call with further questions or concerns.  Patient states understanding and is in agreement with this plan.  Patient discharged.

## 2023-04-07 ENCOUNTER — ONCOLOGY VISIT (OUTPATIENT)
Dept: ONCOLOGY | Facility: OTHER | Age: 53
End: 2023-04-07
Attending: NURSE PRACTITIONER
Payer: COMMERCIAL

## 2023-04-07 VITALS
RESPIRATION RATE: 19 BRPM | BODY MASS INDEX: 30.28 KG/M2 | WEIGHT: 192.9 LBS | DIASTOLIC BLOOD PRESSURE: 60 MMHG | HEIGHT: 67 IN | TEMPERATURE: 96.4 F | HEART RATE: 64 BPM | OXYGEN SATURATION: 98 % | SYSTOLIC BLOOD PRESSURE: 124 MMHG

## 2023-04-07 DIAGNOSIS — L50.9 HIVES: ICD-10-CM

## 2023-04-07 DIAGNOSIS — E61.1 IRON DEFICIENCY: ICD-10-CM

## 2023-04-07 DIAGNOSIS — E53.8 VITAMIN B 12 DEFICIENCY: ICD-10-CM

## 2023-04-07 DIAGNOSIS — Z85.038 HISTORY OF COLON CANCER, STAGE III: Primary | ICD-10-CM

## 2023-04-07 PROCEDURE — 99215 OFFICE O/P EST HI 40 MIN: CPT | Performed by: NURSE PRACTITIONER

## 2023-04-07 ASSESSMENT — PAIN SCALES - GENERAL: PAINLEVEL: NO PAIN (0)

## 2023-04-07 NOTE — NURSING NOTE
"Oncology Rooming Note    April 7, 2023 9:15 AM   Constantino Warren is a 52 year old female who presents for:    Chief Complaint   Patient presents with     Oncology Clinic Visit     Follow up. History of colon cancer, stage III     Initial Vitals: /60   Pulse 64   Temp (!) 96.4  F (35.8  C) (Tympanic)   Resp 19   Ht 1.702 m (5' 7\")   Wt 87.5 kg (192 lb 14.4 oz)   SpO2 98%   BMI 30.21 kg/m   Estimated body mass index is 30.21 kg/m  as calculated from the following:    Height as of this encounter: 1.702 m (5' 7\").    Weight as of this encounter: 87.5 kg (192 lb 14.4 oz). Body surface area is 2.03 meters squared.  No Pain (0) Comment: Data Unavailable   No LMP recorded. Patient has had a hysterectomy.  Allergies reviewed: Yes  Medications reviewed: Yes    Medications: Medication refills not needed today.  Pharmacy name entered into [facility]:    [id number] - [place], [place] - [address]   [facility] - [place], [place] - [address]   [facility] - [place], [place] - [address]  [facility] - [place], [place] - [address]    Clinical concerns: has been getting hives for the last month or so, very random and appear out of nowhere. Notes a large area has appeared on her left thigh, low to mid back, stomach area and arms. Hasn't occurred on her face. Is using Cortisone cream prn for itching. Has seen her PCP for this issue as well.     Noemi Quiroga, KRAIG              "

## 2023-04-07 NOTE — PATIENT INSTRUCTIONS
Thank you for allowing me to be a part of your care today.    I would like to see you back in 3 months with labs and a CT prior. Please follow-up with Dr. Fishman as scheduled for your colonoscopy. If we need to see you back sooner, we can do that depending on your results.     We will continue doing B12 shots every 2 weeks, the next expected 4/12/2023.     If you have any questions please call [phone]    Other instructions:      Please continue to work with Dr. Cook for your skin issues.

## 2023-04-12 ENCOUNTER — HOSPITAL ENCOUNTER (EMERGENCY)
Facility: HOSPITAL | Age: 53
Discharge: HOME | End: 2023-04-12
Attending: NURSE PRACTITIONER | Admitting: NURSE PRACTITIONER
Payer: COMMERCIAL

## 2023-04-12 ENCOUNTER — INFUSION THERAPY VISIT (OUTPATIENT)
Dept: INFUSION THERAPY | Facility: OTHER | Age: 53
End: 2023-04-12
Attending: NURSE PRACTITIONER
Payer: COMMERCIAL

## 2023-04-12 VITALS
DIASTOLIC BLOOD PRESSURE: 98 MMHG | OXYGEN SATURATION: 98 % | TEMPERATURE: 98.1 F | RESPIRATION RATE: 20 BRPM | SYSTOLIC BLOOD PRESSURE: 157 MMHG | HEART RATE: 75 BPM

## 2023-04-12 DIAGNOSIS — T78.40XA ALLERGIC REACTION, INITIAL ENCOUNTER: ICD-10-CM

## 2023-04-12 DIAGNOSIS — E53.8 VITAMIN B 12 DEFICIENCY: ICD-10-CM

## 2023-04-12 DIAGNOSIS — C18.2 CANCER OF ASCENDING COLON (H): Primary | ICD-10-CM

## 2023-04-12 PROCEDURE — 250N000011 HC RX IP 250 OP 636: Performed by: NURSE PRACTITIONER

## 2023-04-12 PROCEDURE — 96374 THER/PROPH/DIAG INJ IV PUSH: CPT | Performed by: NURSE PRACTITIONER

## 2023-04-12 PROCEDURE — 96375 TX/PRO/DX INJ NEW DRUG ADDON: CPT | Performed by: NURSE PRACTITIONER

## 2023-04-12 PROCEDURE — 96372 THER/PROPH/DIAG INJ SC/IM: CPT | Performed by: INTERNAL MEDICINE

## 2023-04-12 PROCEDURE — 99283 EMERGENCY DEPT VISIT LOW MDM: CPT | Performed by: NURSE PRACTITIONER

## 2023-04-12 PROCEDURE — 99284 EMERGENCY DEPT VISIT MOD MDM: CPT | Mod: 25 | Performed by: NURSE PRACTITIONER

## 2023-04-12 RX ORDER — DIPHENHYDRAMINE HYDROCHLORIDE 50 MG/ML
50 INJECTION INTRAMUSCULAR; INTRAVENOUS ONCE
Status: COMPLETED | OUTPATIENT
Start: 2023-04-12 | End: 2023-04-12

## 2023-04-12 RX ORDER — METHYLPREDNISOLONE SODIUM SUCCINATE 125 MG/2ML
125 INJECTION, POWDER, LYOPHILIZED, FOR SOLUTION INTRAMUSCULAR; INTRAVENOUS ONCE
Status: COMPLETED | OUTPATIENT
Start: 2023-04-12 | End: 2023-04-12

## 2023-04-12 RX ORDER — HEPARIN SODIUM (PORCINE) LOCK FLUSH IV SOLN 100 UNIT/ML 100 UNIT/ML
5 SOLUTION INTRAVENOUS
Status: CANCELLED | OUTPATIENT
Start: 2023-04-12

## 2023-04-12 RX ORDER — CYANOCOBALAMIN 1000 UG/ML
1000 INJECTION, SOLUTION INTRAMUSCULAR; SUBCUTANEOUS
Status: DISCONTINUED | OUTPATIENT
Start: 2023-04-12 | End: 2023-04-12 | Stop reason: HOSPADM

## 2023-04-12 RX ORDER — PREDNISONE 20 MG/1
TABLET ORAL
Qty: 10 TABLET | Refills: 0 | Status: SHIPPED | OUTPATIENT
Start: 2023-04-12 | End: 2024-01-07

## 2023-04-12 RX ORDER — CYANOCOBALAMIN 1000 UG/ML
1000 INJECTION, SOLUTION INTRAMUSCULAR; SUBCUTANEOUS
Status: CANCELLED
Start: 2023-04-12

## 2023-04-12 RX ADMIN — DIPHENHYDRAMINE HYDROCHLORIDE 50 MG: 50 INJECTION, SOLUTION INTRAMUSCULAR; INTRAVENOUS at 19:38

## 2023-04-12 RX ADMIN — CYANOCOBALAMIN 1000 MCG: 1000 INJECTION, SOLUTION INTRAMUSCULAR; SUBCUTANEOUS at 12:55

## 2023-04-12 RX ADMIN — METHYLPREDNISOLONE SODIUM SUCCINATE 125 MG: 125 INJECTION, POWDER, FOR SOLUTION INTRAMUSCULAR; INTRAVENOUS at 19:37

## 2023-04-12 RX ADMIN — FAMOTIDINE 40 MG: 10 INJECTION, SOLUTION INTRAVENOUS at 19:38

## 2023-04-12 ASSESSMENT — ACTIVITIES OF DAILY LIVING (ADL)
ADLS_ACUITY_SCORE: 35
ADLS_ACUITY_SCORE: 37

## 2023-04-12 NOTE — PROGRESS NOTES
Patient is a52 year old, here today for injection of B12.     Patient denies questions nor concerns regarding medication, administration site, side effects, nor aftercare.  Patient identified with two identifiers, order verified, and verbal consent for today's injection obtained from patient.     Patient education provided on s/s of injection site infection, and/or medication specific side effects, and when to call a provider.  Patient instructed to report any adverse effects.     B12 administered per protocol in left deltoid at 90 degree angle.  Site covered with sterile bandage.  Patient tolerated injection well, no verbal nor non-verbal signs of discomfort noted.  No adverse effects noted at this time.     Patient instructed to call with further questions or concerns.  Patient states understanding and is in agreement with this plan.  Patient discharged.

## 2023-04-12 NOTE — ED TRIAGE NOTES
Pt reports lip and chin swelling while on lunch break. Pt reports she has randomly been getting hives and the doctors are trying to figure out why. Pt denies any breathing difficulty, no throat swelling, no SOB.

## 2023-04-12 NOTE — NURSING NOTE
The study was explained to the participant by an approved staff person appropriately trained and knowledgeable about the study. Participant was given sufficient time to read (or have the consent read to them) and to consider whether or not to participate. Participant had the opportunity to ask questions and all questions were answered to the participant's satisfaction.  The participant demonstrated an understanding of the nature of the study, including, but not limited to, the study's purpose, procedures, anticipated benefits, and the potential risks and discomforts study participation might entail.  Participant was given a photocopy of the signed and dated consent and HIPPA forms. Participant voluntarily signed the informed consent and HIPPA forms.   No study related procedures were conducted prior to informed consent.    Time of consent 13:46  CJ Cabral  Research

## 2023-04-13 RX ORDER — LABETALOL 20 MG/4 ML (5 MG/ML) INTRAVENOUS SYRINGE
10
Status: CANCELLED | OUTPATIENT
Start: 2023-04-13

## 2023-04-13 RX ORDER — ONDANSETRON 4 MG/1
4 TABLET, ORALLY DISINTEGRATING ORAL EVERY 30 MIN PRN
Status: CANCELLED | OUTPATIENT
Start: 2023-04-13

## 2023-04-13 RX ORDER — SODIUM CHLORIDE, SODIUM LACTATE, POTASSIUM CHLORIDE, CALCIUM CHLORIDE 600; 310; 30; 20 MG/100ML; MG/100ML; MG/100ML; MG/100ML
INJECTION, SOLUTION INTRAVENOUS CONTINUOUS
Status: CANCELLED | OUTPATIENT
Start: 2023-04-13

## 2023-04-13 RX ORDER — LIDOCAINE 40 MG/G
CREAM TOPICAL
Status: CANCELLED | OUTPATIENT
Start: 2023-04-13

## 2023-04-13 RX ORDER — HYDRALAZINE HYDROCHLORIDE 20 MG/ML
2.5-5 INJECTION INTRAMUSCULAR; INTRAVENOUS EVERY 10 MIN PRN
Status: CANCELLED | OUTPATIENT
Start: 2023-04-13

## 2023-04-13 RX ORDER — ONDANSETRON 2 MG/ML
4 INJECTION INTRAMUSCULAR; INTRAVENOUS EVERY 30 MIN PRN
Status: CANCELLED | OUTPATIENT
Start: 2023-04-13

## 2023-04-13 RX ORDER — FENTANYL CITRATE 50 UG/ML
50 INJECTION, SOLUTION INTRAMUSCULAR; INTRAVENOUS EVERY 5 MIN PRN
Status: CANCELLED | OUTPATIENT
Start: 2023-04-13

## 2023-04-13 NOTE — ED NOTES
Patient reports that she is less itchy, hives have gone away, and that her lip/chin feel less swollen and sore.

## 2023-04-13 NOTE — DISCHARGE INSTRUCTIONS
Allergic reactions:   Do not take hot showers, saunas, or use hot tubs until this clears.  Only clean with cool water.  Using warm/hot water dilates your arteries and veins, allowing the allergic reaction mediators to go to more places in your body, ultimately making your allergic reaction worse.     Steroids can be used to help reduce the swelling and inflammation caused by the allergic reaction.  These can be given by mouth or with the use of a cream.  If it is a cream, do not place in the eyes, nose, or ears.  Only apply to the affected area and use as prescribed.     You can use Benadryl for itching if needed.  You may be prescribed hydroxyzine for itching.  These medications are nearly the same so do not use together (use one or the other).  These medications can make you sedated so no using these during times you need to be alert (such as driving).       Follow up in 1 week if there is no improvement in your symptoms or sooner if they worsen.  If you develop difficulty breathing, wheezing, swelling of the throat/tongue, call 911 or go to the ER immediately.        Follow-up with your primary care provider for reevaluation.  Contact your primary care provider if you have any questions or concerns.  Do not hesitate to return to the ER if any new or worsening symptoms.     Please read the attached instructions (if any).  They highlight more specific treatments and interventions for you at home.              Thank you for letting me participate in your care and wish you a fast and uneventful recovery,    Suleiman PACHECO CNP    Do not hesitate to contact me with questions or concerns.  [email or web address]  [email or web address]

## 2023-04-13 NOTE — ED PROVIDER NOTES
EMERGENCY MEDICINE NOTE - GERARD PACHECO, CNP    ID:   Constantino Warren    CC:  Chief Complaint   Patient presents with     Allergic Reaction        MEANS OF ARRIVAL:  private car    PCP:   Coco Cook    SUBJECTIVE:   HPI:   Constantino Warren is a 52 year old individual with history of adjustment disorder, chronic migraines, chronic pain syndrome, gastric bypass, hypothyroidism, B12 deficiency, bipolar disorder, comes in today for allergic reaction.   Patient states that she has history of allergic reactions with hives.  States she sporadically gets it scattered out over her body.  Today at 1830 developed hives over bilateral legs.  Also has sensation of a lump in her throat.  No respiratory abnormalities otherwise noted such as shortness of breath, wheezes, stridor.  Patient denies any new medications, foods, detergents, medications.    Review of Systems:  Significant positives/negatives were reviewed and mentioned in HPI.  All remaining body systems are negative or non-contributory.    I have reviewed the PMH, Meds, Allergies, and SH, including:  ALLERGIES:  Allergies   Allergen Reactions     Aspartame      Other reaction(s): Other - Describe In Comment Field, Seizures  convulsions     Bee Venom Anaphylaxis     Bupropion Anaphylaxis     Throat gets tight and hives. Denies allergic reaction     Food Anaphylaxis     Peppers-anaphylaxis if eaten, if touched hands swell and reddened     Food Allergy Formula Anaphylaxis     Peppers, patient reports green peppers close off her throat     Ibuprofen Anaphylaxis     No Clinical Screening - See Comments Anaphylaxis     Peppers, patient reports green peppers close off her throat     Piper Anaphylaxis     Peppers--green, black, red, chili, etc. Derm symptoms, hives;  Strawberries--(cooked) Itching, breathing difficulties, GI symptoms. Mushrooms--GI symptoms, Hives.Spinach (cooked) throat irritation  (Listed on [facility] problem list.)     Fentanyl  Swelling     Lips and tongue swelled.  Swelling of lips and tongue     Adhesive Tape      blisters     Amitriptyline Other (See Comments)     Respiratory symptoms - denies allergic reaction     Cymbalta      Mood cycling leading to elevated mood state.      Liquid Adhesive Dermatitis     blisters  Blisters  Silk tape and tegaderm is fine     Oxcarbazepine Other (See Comments)     Throat gets tight and hives. Denies allergic reaction  Throat gets tight and hives-       Prozac [Fluoxetine]      Other reaction(s): *Unknown  uncontrolled muscle spasms     Valproic Acid      Respiratory symptoms ([facility]). Denies allergic reaction     Ziprasidone      Throat gets tight and hives. Denies allergic reaction       CURRENT MEDICATIONS:  Current Outpatient Rx   Medication Sig Dispense Refill     acetaminophen (TYLENOL) 500 MG tablet Take 1,000 mg by mouth every 6 hours as needed Max acetaminophen dose: 4000 mg in 24 hours       atorvastatin (LIPITOR) 40 MG tablet Take 40 mg by mouth At Bedtime       Cyanocobalamin 1000 MCG/ML KIT Inject 1,000 mcg as directed once a week 1000mcg weekly x 4 then 1000mcg every 2 weeks x 4 then monthly 8 kit 0     EPINEPHrine (ANY BX GENERIC EQUIV) 0.3 MG/0.3ML injection 2-pack Inject 0.3 mLs (0.3 mg) into the muscle as needed for anaphylaxis (Patient not taking: Reported on 4/7/2023) 2 each 1     EQ ASPIRIN ADULT LOW DOSE 81 MG EC tablet Take 81 mg by mouth daily       famotidine (PEPCID) 20 MG tablet Take 1 tablet (20 mg) by mouth 2 times daily 60 tablet 1     gabapentin (NEURONTIN) 100 MG capsule Take 100 mg by mouth At Bedtime       gabapentin (NEURONTIN) 300 MG capsule Take 2 capsules (600 mg) by mouth At Bedtime 60 capsule 3     lamoTRIgine (LAMICTAL) 25 MG tablet 50 mg daily       levothyroxine (SYNTHROID/LEVOTHROID) 200 MCG tablet Take 1 tablet (200 mcg) by mouth daily 90 tablet 1     levothyroxine (SYNTHROID/LEVOTHROID) 50 MCG tablet TAKE 1 TABLET BY MOUTH ONCE DAILY  (TAKE WITH 200MCG TABLET FOR TOTAL OF 250MCG) 90 tablet 1     lisinopril (ZESTRIL) 20 MG tablet Take 1 tablet (20 mg) by mouth daily 90 tablet 1     loratadine (CLARITIN) 10 MG tablet Take 1 tablet (10 mg) by mouth daily 30 tablet 1     multivitamin w/minerals (THERA-VIT-M) tablet Take 1 tablet by mouth daily       omeprazole (PRILOSEC) 20 MG DR capsule Take 1 capsule (20 mg) by mouth daily 90 capsule 3     tiZANidine (ZANAFLEX) 4 MG tablet Take 1 tablet (4 mg) by mouth At Bedtime 90 tablet 3     traZODone (DESYREL) 50 MG tablet TAKE 1 TABLET BY MOUTH ONCE DAILY AT BEDTIME 90 tablet 3        PMH:  Patient Active Problem List   Diagnosis     Syncope and collapse     Hypertensive urgency     Concussion syndrome     Hydronephrosis, right     Pyelonephritis     Urolithiasis     Chest pain     GI bleed     Abdominal pain, epigastric     Hypochromic microcytic anemia     Anastomotic ulcer S/P gastric bypass     Anemia     Adjustment disorder with mixed anxiety and depressed mood     Annular tear of lumbar disc     Chronic migraine     Chronic pain disorder     Failed back syndrome of lumbar spine     Gastric bypass status for obesity     Hx of lumbar discectomy     Hyperlipidemia     Hypothyroidism     Long term (current) use of opiate analgesic     Other insomnia     Vitamin B 12 deficiency     Special screening for malignant neoplasms, colon     Colon cancer (H)     Prediabetes     S/P partial resection of colon     Cancer of ascending colon (H)     Iron deficiency anemia, unspecified iron deficiency anemia type     History of uterine cancer     Palliative care patient     Rotator cuff tear     Arthritis     History of colon cancer, stage III     Iron deficiency     Bipolar disorder (H)     Past Surgical History:   Procedure Laterality Date     ARTHROSCOPY SHOULDER Left 11/6/2020    Procedure: LEFT SHOULDER ARTHROSCOPY WITH ROTATOR CUFF REPAIR AND ACROMIUM CLAVICULAR JOINT RESECTION;  Surgeon: Tanner Mujica DO;   Location: [facility]     BACK SURGERY      2008; 2011     COLONOSCOPY N/A 2/13/2020    Procedure: COLONOSCOPY WITH BIOPSY AND TATOOING OF ASCENDING COLON,INCOMEPLETE EXAM;  Surgeon: Kang Cleveland MD;  Location: [facility] OR     COLONOSCOPY N/A 10/26/2020    Procedure: incomplete colonoscopy ;  Surgeon: Kang Cleveland MD;  Location: [facility]     ESOPHAGOSCOPY, GASTROSCOPY, DUODENOSCOPY (EGD), COMBINED N/A 7/21/2018    Procedure: COMBINED ESOPHAGOSCOPY, GASTROSCOPY, DUODENOSCOPY (EGD);  UPPER ENDOSCOPY WITH BIOPSIES;  Surgeon: Roger Vazquez MD;  Location: [facility]     ESOPHAGOSCOPY, GASTROSCOPY, DUODENOSCOPY (EGD), COMBINED N/A 11/21/2019    Procedure: UPPER ENDOSCOPY with biopsy;  Surgeon: Kang Cleveland MD;  Location: [facility]     HEAD & NECK SURGERY  2008    c4-c5 fusion     HYSTERECTOMY, [facility]  04/2004    uterine cancer; [facility]     INSERT PORT VASCULAR ACCESS N/A 2/28/2020    Procedure: INSERTION, VASCULAR ACCESS PORT LEFT;  Surgeon: Kang Cleveland MD;  Location: [facility] OR     LAPAROSCOPIC ASSISTED COLECTOMY Right 2/24/2020    Procedure: RIGHT KEVIN COLECTOMY, LYSIS OF ADHESIONS;  Surgeon: Kang Cleveland MD;  Location: [facility]     LAPAROSCOPIC BYPASS GASTRIC  12/2010     Social History     Tobacco Use     Smoking status: Never     Smokeless tobacco: Never   Vaping Use     Vaping status: Never Used   Substance Use Topics     Alcohol use: No     Comment: sober for 18 years     Drug use: No       OBJECTIVE:     Vitals:    04/12/23 1930 04/12/23 1945 04/12/23 2000 04/12/23 2015   BP: 137/88 148/94 (!) 145/102    Pulse: 78 82 79    Resp:       Temp:       TempSrc:       SpO2:  96% 96% 99%     There is no height or weight on file to calculate BMI.  GENERAL APPEARANCE:  The patient is a 52 year old well-developed, well-nourished individual in no acute distress that appears as stated age.  HET:  Normocephalic and atraumatic.  Pupils are equal, round, and reactive to light.  Oropharynx is clear.   Uvula is midline and without swelling.  Mouth revealed no lesions.  Voice is clear and without muffling.  No stridor present.   NECK:  Supple.  Trachea is midline.  No evidence of thyroid enlargement, masses, or nodules.  No lymphadenopathy or tenderness.    LUNGS:  Breathing is easy.  Breath sounds are equal and clear bilaterally.  No wheezes, rhonchi, or rales.  HEART:  Regular rate and rhythm with normal S1 and S2.  No murmurs, gallops, or rubs.  NEUROLOGIC:  No focal sensory or motor deficits are noted.    PSYCHIATRIC:  The patient is awake, alert, and oriented x4.  Recent and remote memory is intact.  Appropriate mood and affect.  Calm and cooperative with history and physical exam.  SKIN:  Warm, dry, and well perfused.  Good turgor.  Hives over bilateral thighs.    Comment: Discrepancies between my note and notes on behalf of the nursing team or other care providers are secondary to my findings reflecting my physical examination and questioning of the patient.  Any conflicting information provided is not in line with my examination of the patient.    ED COURSE/ MDM/ ASSESSMENT/ PLAN:   Assessment:   Patient presents to the ER today for allergic reaction.  Upon arrival, vitals signs show blood pressure 165/109 with a pulse of 82.  Temperature 36.3  C.  Respirations 20 with oxygenation of 97% on room air.  Examination was completed.  The patient is alert and oriented no distress.  No tongue swelling or posterior pharynx swelling noted.  No stridor present.  No respiratory distress.  Lung sounds clear.  Does have hives over bilateral thighs noted.  No other rash present.    Potential diagnosis which have been considered and evaluated include anaphylaxis, allergic reaction, as well as others. Many of these have been excluded using the various modalities and assessment as noted on the chart. At the present time, the diagnosis given seems to be the most likely allergic reaction.    ED Course/MDM/Plan:   Patient  comes in with hives to the lower extremities and sensation of lump in the throat.  Patient in no distress upon arrival.  No airway abnormalities noted on examination.  No wheezes or stridor.  No respiratory distress.  Oxygenation normal.  Does have hives to bilateral lower extremities.  According to patient this has been occurring frequently.  IV initiated and Solu-Medrol 125 mg, diphenhydramine 50 mg IV, and famotidine 40 mg IV given.  Patient had improvement of his symptoms.  Monitored patient for 1 hour and had no reoccurrence.  Patient was requesting to leave.  We will discharge patient home on steroid burst consisting of prednisone 40 mg daily x5 days.  Patient is currently on famotidine.  Diphenhydramine to use as needed for any pruritus.  Allergic reaction instructions given in discharge handout.  Advised patient to follow-up with PCP.  Return to ER for any new or worsening symptoms.  Patient verbalized understand agrees with plan of care.  Patient discharged home.    DIAGNOSIS:   (T78.40XA) Allergic reaction, initial encounter      Critical Care Time: None    Impression and plan discussed with patient. Questions answered, concerns addressed, indications for urgent re-evaluation reviewed, and  given. Patient/Parent/Caregiver agree with treatment plan and have no further questions at this time.  AVS provided at discharge.    This note was created by the Dragon Voice Dictation System. Inadvertent typographical errors, due to software recognition problems, may still exist.      Suleiman PACHECO, CNP  [facility]  EMERGENCY DEPARTMENT  [address] [address]  [phone]       Suleiman Gibbs APRN CNP  04/12/23 2024

## 2023-04-13 NOTE — ED NOTES
Patient reports that she now has hives down legs. Denies any difficulty swallowing or breathing, no swelling on tongue, mouth, or throat.     Lips and chin appear swollen.

## 2023-04-15 ENCOUNTER — HOSPITAL ENCOUNTER (EMERGENCY)
Facility: HOSPITAL | Age: 53
Discharge: HOME | End: 2023-04-15
Attending: NURSE PRACTITIONER | Admitting: NURSE PRACTITIONER
Payer: COMMERCIAL

## 2023-04-15 VITALS
OXYGEN SATURATION: 96 % | SYSTOLIC BLOOD PRESSURE: 123 MMHG | TEMPERATURE: 97.4 F | RESPIRATION RATE: 18 BRPM | HEART RATE: 90 BPM | DIASTOLIC BLOOD PRESSURE: 86 MMHG

## 2023-04-15 DIAGNOSIS — L50.8 RECURRENT URTICARIA: ICD-10-CM

## 2023-04-15 LAB
ANION GAP SERPL CALCULATED.3IONS-SCNC: 13 MMOL/L (ref 7–15)
BASOPHILS # BLD AUTO: 0 10E3/UL (ref 0–0.2)
BASOPHILS NFR BLD AUTO: 0 %
BUN SERPL-MCNC: 14.8 MG/DL (ref 6–20)
CALCIUM SERPL-MCNC: 8.5 MG/DL (ref 8.6–10)
CHLORIDE SERPL-SCNC: 104 MMOL/L (ref 98–107)
CREAT SERPL-MCNC: 0.72 MG/DL (ref 0.51–0.95)
CRP SERPL-MCNC: 19.23 MG/L
DEPRECATED HCO3 PLAS-SCNC: 20 MMOL/L (ref 22–29)
EOSINOPHIL # BLD AUTO: 0 10E3/UL (ref 0–0.7)
EOSINOPHIL NFR BLD AUTO: 0 %
ERYTHROCYTE [DISTWIDTH] IN BLOOD BY AUTOMATED COUNT: 13.2 % (ref 10–15)
ERYTHROCYTE [SEDIMENTATION RATE] IN BLOOD BY WESTERGREN METHOD: 9 MM/HR (ref 0–30)
GFR SERPL CREATININE-BSD FRML MDRD: >90 ML/MIN/1.73M2
GLUCOSE SERPL-MCNC: 160 MG/DL (ref 70–99)
HCT VFR BLD AUTO: 40.6 % (ref 35–47)
HGB BLD-MCNC: 13.5 G/DL (ref 11.7–15.7)
HOLD SPECIMEN: NORMAL
IMM GRANULOCYTES # BLD: 0 10E3/UL
IMM GRANULOCYTES NFR BLD: 0 %
LYMPHOCYTES # BLD AUTO: 0.8 10E3/UL (ref 0.8–5.3)
LYMPHOCYTES NFR BLD AUTO: 9 %
MCH RBC QN AUTO: 29.6 PG (ref 26.5–33)
MCHC RBC AUTO-ENTMCNC: 33.3 G/DL (ref 31.5–36.5)
MCV RBC AUTO: 89 FL (ref 78–100)
MONOCYTES # BLD AUTO: 0.4 10E3/UL (ref 0–1.3)
MONOCYTES NFR BLD AUTO: 4 %
NEUTROPHILS # BLD AUTO: 8 10E3/UL (ref 1.6–8.3)
NEUTROPHILS NFR BLD AUTO: 87 %
NRBC # BLD AUTO: 0 10E3/UL
NRBC BLD AUTO-RTO: 0 /100
PLATELET # BLD AUTO: 170 10E3/UL (ref 150–450)
POTASSIUM SERPL-SCNC: 3.9 MMOL/L (ref 3.4–5.3)
RBC # BLD AUTO: 4.56 10E6/UL (ref 3.8–5.2)
SODIUM SERPL-SCNC: 137 MMOL/L (ref 136–145)
WBC # BLD AUTO: 9.3 10E3/UL (ref 4–11)

## 2023-04-15 PROCEDURE — 96374 THER/PROPH/DIAG INJ IV PUSH: CPT

## 2023-04-15 PROCEDURE — 250N000013 HC RX MED GY IP 250 OP 250 PS 637: Performed by: NURSE PRACTITIONER

## 2023-04-15 PROCEDURE — 85014 HEMATOCRIT: CPT | Performed by: NURSE PRACTITIONER

## 2023-04-15 PROCEDURE — 96375 TX/PRO/DX INJ NEW DRUG ADDON: CPT

## 2023-04-15 PROCEDURE — 99284 EMERGENCY DEPT VISIT MOD MDM: CPT | Mod: 25

## 2023-04-15 PROCEDURE — 99284 EMERGENCY DEPT VISIT MOD MDM: CPT | Performed by: NURSE PRACTITIONER

## 2023-04-15 PROCEDURE — 36415 COLL VENOUS BLD VENIPUNCTURE: CPT | Performed by: NURSE PRACTITIONER

## 2023-04-15 PROCEDURE — 85652 RBC SED RATE AUTOMATED: CPT | Performed by: NURSE PRACTITIONER

## 2023-04-15 PROCEDURE — 86140 C-REACTIVE PROTEIN: CPT | Performed by: NURSE PRACTITIONER

## 2023-04-15 PROCEDURE — 80048 BASIC METABOLIC PNL TOTAL CA: CPT | Performed by: NURSE PRACTITIONER

## 2023-04-15 PROCEDURE — 250N000011 HC RX IP 250 OP 636: Performed by: NURSE PRACTITIONER

## 2023-04-15 RX ORDER — METHYLPREDNISOLONE SODIUM SUCCINATE 125 MG/2ML
125 INJECTION, POWDER, LYOPHILIZED, FOR SOLUTION INTRAMUSCULAR; INTRAVENOUS ONCE
Status: COMPLETED | OUTPATIENT
Start: 2023-04-15 | End: 2023-04-15

## 2023-04-15 RX ORDER — MONTELUKAST SODIUM 10 MG/1
10 TABLET ORAL ONCE
Status: COMPLETED | OUTPATIENT
Start: 2023-04-15 | End: 2023-04-15

## 2023-04-15 RX ORDER — MONTELUKAST SODIUM 10 MG/1
10 TABLET ORAL AT BEDTIME
Qty: 30 TABLET | Refills: 0 | Status: SHIPPED | OUTPATIENT
Start: 2023-04-16 | End: 2024-02-02

## 2023-04-15 RX ADMIN — FAMOTIDINE 40 MG: 10 INJECTION, SOLUTION INTRAVENOUS at 12:30

## 2023-04-15 RX ADMIN — METHYLPREDNISOLONE SODIUM SUCCINATE 125 MG: 125 INJECTION, POWDER, FOR SOLUTION INTRAMUSCULAR; INTRAVENOUS at 12:24

## 2023-04-15 RX ADMIN — MONTELUKAST 10 MG: 10 TABLET, FILM COATED ORAL at 15:21

## 2023-04-15 ASSESSMENT — ACTIVITIES OF DAILY LIVING (ADL)
ADLS_ACUITY_SCORE: 37

## 2023-04-15 NOTE — ED TRIAGE NOTES
"Patient presents to emergency room via [facility] with complaints of hives and throat swelling. Over the past 2.5 months pt has been experiencing hives. She was seen Wednesday in ED for hives. Today the hives are worse. \"I can breathe, I just cannot take a deep breath.\" \"I feel like like have two marbles stuck in my throat.\" Hives noted on anterior chest, abdomen and upper thighs. Pt is not sure what is causing her symptoms. She is currently on prednisone for the hives. EMS administered Benadryl 50mg IV en route to ED.       "

## 2023-04-15 NOTE — ED NOTES
Patient given verbal and written discharge instructions. Patient verbalized understanding of discharge instructions. Rx sent to [facility] pharmacy. Hives are gone on her chest. Hives are less red on abdomen and upper thighs.

## 2023-04-15 NOTE — ED NOTES
Patient reports the burning and itching remains to bilateral upper thighs. The hives are improving on chest and less red on her abdomen.

## 2023-04-15 NOTE — DISCHARGE INSTRUCTIONS
Follow-up with your primary care provider.  Discuss longer period of steroids.  Discuss  follow-up with allergist as you are having recurrent hives.  As you are having so r reoccurrence of hives, it is a possibility you have mast cell activation syndrome.  It is also a possibility you have autoimmune issues that could be the stem of your recurrence.    Start montelukast on 4/16/2023 at bedtime.  Take it every night before bed.  Continue all your other medications as prescribed.         Follow-up with your primary care provider for reevaluation.  Contact your primary care provider if you have any questions or concerns.  Do not hesitate to return to the ER if any new or worsening symptoms.     Please read the attached instructions (if any).  They highlight more specific treatments and interventions for you at home.              Thank you for letting me participate in your care and wish you a fast and uneventful recovery,    Suleiman PACHECO CNP    Do not hesitate to contact me with questions or concerns.  [email or web address]  [email or web address]

## 2023-04-15 NOTE — ED PROVIDER NOTES
"EMERGENCY MEDICINE NOTE - GERARD PACHECO, CNP    ID:   Constantino Warren    CC:  Chief Complaint   Patient presents with     Hives     Dysphagia        MEANS OF ARRIVAL:  ambulance    PCP:   Coco Cook    SUBJECTIVE:   HPI:     Constantino Warren is a 52 year old individual with history of adjustment disorder, chronic migraines, chronic pain syndrome, gastric bypass, hypothyroidism, B12 deficiency, bipolar disorder, comes in today for hives.   Patient states that she noticed hives over her chest, abdomen, and lower extremities.  Was just seen on 4/12/2023 for hives on the lower extremities.  Was treated with diphenhydramine, famotidine, and steroids.  Patient is currently on steroid burst but states she did not take any of the medications this morning.  Comes in via EMS for this reason.  Patient denies any shortness of breath but does have a \"sharp pain with breathing on the right anterior chest wall\".  Patient was given diphenhydramine prior to arrival via EMS.   Patient states that she has no new medications, detergents, foods.    Review of Systems:  Significant positives/negatives were reviewed and mentioned in HPI.  All remaining body systems are negative or non-contributory.    I have reviewed the PMH, Meds, Allergies, and SH, including:  ALLERGIES:  Allergies   Allergen Reactions     Aspartame      Other reaction(s): Other - Describe In Comment Field, Seizures  convulsions     Bee Venom Anaphylaxis     Bupropion Anaphylaxis     Throat gets tight and hives. Denies allergic reaction     Food Anaphylaxis     Peppers-anaphylaxis if eaten, if touched hands swell and reddened     Food Allergy Formula Anaphylaxis     Peppers, patient reports green peppers close off her throat     Ibuprofen Anaphylaxis     No Clinical Screening - See Comments Anaphylaxis     Peppers, patient reports green peppers close off her throat     Piper Anaphylaxis     Peppers--green, black, red, chili, etc. Derm symptoms, " hives;  Strawberries--(cooked) Itching, breathing difficulties, GI symptoms. Mushrooms--GI symptoms, Hives.Spinach (cooked) throat irritation  (Listed on [facility] problem list.)     Fentanyl Swelling     Lips and tongue swelled.  Swelling of lips and tongue     Adhesive Tape      blisters     Amitriptyline Other (See Comments)     Respiratory symptoms - denies allergic reaction     Cymbalta      Mood cycling leading to elevated mood state.      Liquid Adhesive Dermatitis     blisters  Blisters  Silk tape and tegaderm is fine     Oxcarbazepine Other (See Comments)     Throat gets tight and hives. Denies allergic reaction  Throat gets tight and hives-       Prozac [Fluoxetine]      Other reaction(s): *Unknown  uncontrolled muscle spasms     Valproic Acid      Respiratory symptoms ([facility]). Denies allergic reaction     Ziprasidone      Throat gets tight and hives. Denies allergic reaction       CURRENT MEDICATIONS:  Current Outpatient Rx   Medication Sig Dispense Refill     atorvastatin (LIPITOR) 40 MG tablet Take 40 mg by mouth At Bedtime       EPINEPHrine (ANY BX GENERIC EQUIV) 0.3 MG/0.3ML injection 2-pack Inject 0.3 mLs (0.3 mg) into the muscle as needed for anaphylaxis 2 each 1     EQ ASPIRIN ADULT LOW DOSE 81 MG EC tablet Take 81 mg by mouth daily       famotidine (PEPCID) 20 MG tablet Take 1 tablet (20 mg) by mouth 2 times daily 60 tablet 1     gabapentin (NEURONTIN) 100 MG capsule Take 100 mg by mouth At Bedtime       lamoTRIgine (LAMICTAL) 25 MG tablet 50 mg daily       levothyroxine (SYNTHROID/LEVOTHROID) 200 MCG tablet Take 1 tablet (200 mcg) by mouth daily 90 tablet 1     levothyroxine (SYNTHROID/LEVOTHROID) 50 MCG tablet TAKE 1 TABLET BY MOUTH ONCE DAILY (TAKE WITH 200MCG TABLET FOR TOTAL OF 250MCG) 90 tablet 1     lisinopril (ZESTRIL) 20 MG tablet Take 1 tablet (20 mg) by mouth daily 90 tablet 1     loratadine (CLARITIN) 10 MG tablet Take 1 tablet (10 mg) by mouth daily  30 tablet 1     multivitamin w/minerals (THERA-VIT-M) tablet Take 1 tablet by mouth daily       omeprazole (PRILOSEC) 20 MG DR capsule Take 1 capsule (20 mg) by mouth daily 90 capsule 3     predniSONE (DELTASONE) 20 MG tablet Take two tablets (= 40mg) each day for 5 (five) days 10 tablet 0     traZODone (DESYREL) 50 MG tablet TAKE 1 TABLET BY MOUTH ONCE DAILY AT BEDTIME 90 tablet 3     acetaminophen (TYLENOL) 500 MG tablet Take 1,000 mg by mouth every 6 hours as needed Max acetaminophen dose: 4000 mg in 24 hours       Cyanocobalamin 1000 MCG/ML KIT Inject 1,000 mcg as directed once a week 1000mcg weekly x 4 then 1000mcg every 2 weeks x 4 then monthly 8 kit 0     gabapentin (NEURONTIN) 300 MG capsule Take 2 capsules (600 mg) by mouth At Bedtime 60 capsule 3     tiZANidine (ZANAFLEX) 4 MG tablet Take 1 tablet (4 mg) by mouth At Bedtime 90 tablet 3        PMH:  Patient Active Problem List   Diagnosis     Syncope and collapse     Hypertensive urgency     Concussion syndrome     Hydronephrosis, right     Pyelonephritis     Urolithiasis     Chest pain     GI bleed     Abdominal pain, epigastric     Hypochromic microcytic anemia     Anastomotic ulcer S/P gastric bypass     Anemia     Adjustment disorder with mixed anxiety and depressed mood     Annular tear of lumbar disc     Chronic migraine     Chronic pain disorder     Failed back syndrome of lumbar spine     Gastric bypass status for obesity     Hx of lumbar discectomy     Hyperlipidemia     Hypothyroidism     Long term (current) use of opiate analgesic     Other insomnia     Vitamin B 12 deficiency     Special screening for malignant neoplasms, colon     Colon cancer (H)     Prediabetes     S/P partial resection of colon     Cancer of ascending colon (H)     Iron deficiency anemia, unspecified iron deficiency anemia type     History of uterine cancer     Palliative care patient     Rotator cuff tear     Arthritis     History of colon cancer, stage III     Iron  deficiency     Bipolar disorder (H)     Past Surgical History:   Procedure Laterality Date     ARTHROSCOPY SHOULDER Left 11/6/2020    Procedure: LEFT SHOULDER ARTHROSCOPY WITH ROTATOR CUFF REPAIR AND ACROMIUM CLAVICULAR JOINT RESECTION;  Surgeon: Tanner Mujica DO;  Location: [facility]     BACK SURGERY      2008; 2011     COLONOSCOPY N/A 2/13/2020    Procedure: COLONOSCOPY WITH BIOPSY AND TATOOING OF ASCENDING COLON,INCOMEPLETE EXAM;  Surgeon: Kang Cleveland MD;  Location: [facility]     COLONOSCOPY N/A 10/26/2020    Procedure: incomplete colonoscopy ;  Surgeon: Kang Cleveland MD;  Location: [facility]     ESOPHAGOSCOPY, GASTROSCOPY, DUODENOSCOPY (EGD), COMBINED N/A 7/21/2018    Procedure: COMBINED ESOPHAGOSCOPY, GASTROSCOPY, DUODENOSCOPY (EGD);  UPPER ENDOSCOPY WITH BIOPSIES;  Surgeon: Roger Vazquez MD;  Location: [facility]     ESOPHAGOSCOPY, GASTROSCOPY, DUODENOSCOPY (EGD), COMBINED N/A 11/21/2019    Procedure: UPPER ENDOSCOPY with biopsy;  Surgeon: Kang Cleveland MD;  Location: [facility]     HEAD & NECK SURGERY  2008    c4-c5 fusion     HYSTERECTOMY, [facility]  04/2004    uterine cancer; [facility]     INSERT PORT VASCULAR ACCESS N/A 2/28/2020    Procedure: INSERTION, VASCULAR ACCESS PORT LEFT;  Surgeon: Kang Cleveland MD;  Location: [facility]     LAPAROSCOPIC ASSISTED COLECTOMY Right 2/24/2020    Procedure: RIGHT KEVIN COLECTOMY, LYSIS OF ADHESIONS;  Surgeon: Kang Cleveland MD;  Location: [facility]     LAPAROSCOPIC BYPASS GASTRIC  12/2010     Social History     Tobacco Use     Smoking status: Never     Smokeless tobacco: Never   Vaping Use     Vaping status: Never Used   Substance Use Topics     Alcohol use: No     Comment: sober for 18 years     Drug use: No       OBJECTIVE:     Vitals:    04/15/23 1534 04/15/23 1549 04/15/23 1604 04/15/23 1611   BP: 119/82  135/81    Pulse: 86 89 87 89   Resp:       Temp:       TempSrc:       SpO2: 94% 93% 94% 94%     There is no height or weight on file to  calculate BMI.  GENERAL APPEARANCE:  The patient is a 52 year old well-developed, well-nourished individual in no acute distress that appears as stated age.  THROAT:  Oropharynx is clear.  Uvula is midline and without swelling.  Mouth revealed no lesions.  Voice is clear and without muffling.  No stridor present.   NECK:  Supple.  Trachea is midline.    CHEST:  Symmetric.  Non-tender to palpation.  No crepitus or deformity.  LUNGS:  Breathing is easy.  Breath sounds are equal and clear bilaterally.  No wheezes, rhonchi, or rales.  HEART:  Regular rate and rhythm with normal S1 and S2.  No murmurs, gallops, or rubs.  NEUROLOGIC:  No focal sensory or motor deficits are noted.    PSYCHIATRIC:  The patient is awake, alert, and oriented x4.  Recent and remote memory is intact.  Appropriate mood and affect.  Calm and cooperative with history and physical exam.  SKIN:  Warm, dry, and well perfused.  Good turgor.  Hives scattered over anterior chest, abdomen, lower extremities.    Comment: Discrepancies between my note and notes on behalf of the nursing team or other care providers are secondary to my findings reflecting my physical examination and questioning of the patient.  Any conflicting information provided is not in line with my examination of the patient.    ED COURSE/ MDM/ ASSESSMENT/ PLAN:   Assessment:   Patient presents to the ER today for hives.  Upon arrival, vitals signs show blood pressure 146/105 with a pulse of 113.  Temperature 37.5  C.  Respirations 18 with saturation of 96% on room air.  Examination was completed.  The patient is alert and oriented no distress.  No airway abnormality noted.  No throat or tongue swelling present.  No stridor present.  Does have hives scattered over chest, abdomen, lower extremities.    Potential diagnosis which have been considered and evaluated include chronic urticaria, anaphylaxis, hypersensitivity, as well as others. Many of these have been excluded using the various  modalities and assessment as noted on the chart. At the present time, the diagnosis given seems to be the most likely recurrent raised urticaria.    ED Course/MDM/Plan:   Patient comes in with recurrent/possible persistent raised urticaria.  Patient is currently on famotidine 20 mg daily in addition to prednisone 40 mg daily for previous hives outbreak.  Patient developed more hives over chest, abdomen, legs with sensations of difficulty swallowing so came in by ambulance.  Patient did receive diphenhydramine prior to arrival.  Patient in no distress upon arrival.  No wheezes or stridor present.  Does have hives over chest, abdomen, lower extremities.  Famotidine 40 mg IV given in addition to Solu-Medrol 125 mg.  Patient has been having this issue for over 2-1/2 months.  Despite having current hives outbreak and being on steroid burst, did do lab work.  This showed BBC of 9.3 with hemoglobin of 13.5.  Electrolytes, renal functions benign.  Sed rate 9 with a CRP of 19.23.  Patient had improvement of feeling of dysphagia but continues to have pruritic feeling of the abdomen and lower extremities.  Hives do remain to the abdomen and lower extremities.  Patient in no respiratory distress so epinephrine not required.  Did give patient montelukast 10 mg to help with recurrent hives.  Patient did have reduction of hives off the chest.  Redness is dissipating from legs and abdomen.  Patient is feeling a lot better.  Discharge patient home to continue previously prescribed prednisone and famotidine.  Continue diphenhydramine as needed for pruritus.  Continue other allergy medications that were previously prescribed by her PCP.  We will send patient home on montelukast 10 mg nightly in addition to her current medications.  Advised patient to follow-up with PCP for reevaluation.  Recommend following up with allergist as is having recurrent raised urticaria.  Possibilities include mast cell activation syndrome versus an  autoimmune etiology.  Advised patient to return to ER if new or significant worsening of symptoms.  Patient verbalized understand agrees with plan of care.  Patient discharged home with .      DIAGNOSIS:   (L50.8) Recurrent urticaria      Critical Care Time: None    Impression and plan discussed with patient. Questions answered, concerns addressed, indications for urgent re-evaluation reviewed, and  given. Patient/Parent/Caregiver agree with treatment plan and have no further questions at this time.  AVS provided at discharge.    This note was created by the Dragon Voice Dictation System. Inadvertent typographical errors, due to software recognition problems, may still exist.      Suleiman PACHECO, CNP  [facility]  EMERGENCY DEPARTMENT  [address] [place] [id number]  [phone]       Suleiman Gibbs APRN CNP  04/15/23 2802

## 2023-04-17 ENCOUNTER — OFFICE VISIT (OUTPATIENT)
Dept: FAMILY MEDICINE | Facility: OTHER | Age: 53
End: 2023-04-17
Attending: FAMILY MEDICINE
Payer: COMMERCIAL

## 2023-04-17 ENCOUNTER — TELEPHONE (OUTPATIENT)
Dept: FAMILY MEDICINE | Facility: OTHER | Age: 53
End: 2023-04-17

## 2023-04-17 VITALS
TEMPERATURE: 97.4 F | OXYGEN SATURATION: 98 % | DIASTOLIC BLOOD PRESSURE: 84 MMHG | SYSTOLIC BLOOD PRESSURE: 124 MMHG | HEART RATE: 96 BPM | WEIGHT: 190 LBS | HEIGHT: 67 IN | BODY MASS INDEX: 29.82 KG/M2

## 2023-04-17 DIAGNOSIS — I10 ESSENTIAL HYPERTENSION: ICD-10-CM

## 2023-04-17 DIAGNOSIS — L50.8 RECURRENT URTICARIA: Primary | ICD-10-CM

## 2023-04-17 PROCEDURE — 99214 OFFICE O/P EST MOD 30 MIN: CPT | Performed by: FAMILY MEDICINE

## 2023-04-17 RX ORDER — AMLODIPINE BESYLATE 5 MG/1
5 TABLET ORAL DAILY
Qty: 90 TABLET | Refills: 0 | Status: SHIPPED | OUTPATIENT
Start: 2023-04-17 | End: 2024-02-02

## 2023-04-17 RX ORDER — PREDNISONE 20 MG/1
TABLET ORAL
Qty: 20 TABLET | Refills: 0 | Status: SHIPPED | OUTPATIENT
Start: 2023-04-17 | End: 2024-01-07

## 2023-04-17 ASSESSMENT — PAIN SCALES - GENERAL: PAINLEVEL: NO PAIN (0)

## 2023-04-17 NOTE — PROGRESS NOTES
"  Assessment & Plan     Recurrent urticaria  X 3 months.  On steroids, antihistamines, H2 blockers intermittently.  This last time reported swelling of face/tongue.  Was seen in ER.  Concern for angioedema from her Lisinopril that she has been on for few years.  Plan to stop Lisinopril.  Consult our [facility] pharmacist.  Referral placed to allergist.  Consider additional testing - autoimmune, etc.  - predniSONE (DELTASONE) 20 MG tablet; Take 3 tabs by mouth daily x 3 days, then 2 tabs daily x 3 days, then 1 tab daily x 3 days, then 1/2 tab daily x 3 days.  - Med Therapy Management Referral  - Adult Allergy/Asthma Referral    Essential hypertension  Controlled with Lisinopril, but suspect it is cause of hives/angioedema.  Stop Lisinopril.  Start Norvasc.  Recheck in office 2-3 weeks.  - amLODIPine (NORVASC) 5 MG tablet; Take 1 tablet (5 mg) by mouth daily     BMI:   Estimated body mass index is 29.76 kg/m  as calculated from the following:    Height as of this encounter: 1.702 m (5' 7\").    Weight as of this encounter: 86.2 kg (190 lb).   Weight management plan: Discussed healthy diet and exercise guidelines    See Patient Instructions    Return in about 2 weeks (around 5/1/2023) for BP Recheck and hives.    Coco Schafer MD  [facility] - RICHARD Meeks is a 52 year old, presenting for the following health issues:  ER follow up    HPI     ED/UC Followup:    [place]  [facility] [place] of visit: 4/12/2023, 4/15/2023  Reason for visit: Recurrent urticaria - since 1/2023  Current Status: Hives on back, both arms and thighs    Facial swelling this past week.  Seen in ER.Treated with benadryl, famotidine, steroids.  ER added Singulair - script sent - just picked up today - but not yet started.    Mast cell activation?   Brought up be ER provider.    Is on Lisinopril since 2020.    No respiratory distress.  Did have itchy throat, facial swelling.        Review of Systems   Constitutional, HEENT, " "cardiovascular, pulmonary, gi and gu systems are negative, except as otherwise noted.      Objective    /84 (BP Location: Right arm, Patient Position: Sitting, Cuff Size: Adult Regular)   Pulse 96   Temp 97.4  F (36.3  C) (Tympanic)   Ht 1.702 m (5' 7\")   Wt 86.2 kg (190 lb)   SpO2 98%   BMI 29.76 kg/m    Body mass index is 29.76 kg/m .  Physical Exam   GENERAL: healthy, alert and no distress  EYES: Eyes grossly normal to inspection, PERRL and conjunctivae and sclerae normal  HENT: ear canals and TM's normal, nose and mouth without ulcers or lesions  NECK: no adenopathy, no asymmetry, masses, or scars and thyroid normal to palpation  RESP: lungs clear to auscultation - no rales, rhonchi or wheezes  CV: regular rate and rhythm, normal S1 S2, no S3 or S4, no murmur, click or rub, no peripheral edema and peripheral pulses strong  ABDOMEN: soft, nontender, no hepatosplenomegaly, no masses and bowel sounds normal  MS: no gross musculoskeletal defects noted, no edema  SKIN: hives diffuse on trunk - back more than chest - minimal on extremities - see images below  PSYCH: mentation appears normal, affect normal/bright              ER and prior records reviewed.                "

## 2023-04-17 NOTE — TELEPHONE ENCOUNTER
Emergency Department and Urgent Care Follow-up Visit    Appointment scheduled:  .patient scheduled with PCP.                  OR  Pended to Provider to review & advise for [employer] katherine't ?      Reason for ER/UC visit: hives    Date seen: 4/12 and 4/15    New or Worsening symptoms:  Hives return after benadryl wears off. Itching 9/10.   Hives on stomach, bilateral legs, back and bilateral arms and chest area.   Denies difficulty breathing or swollen tongue.   Reports trouble taking in deep breath.   Itchy throat.   Hives are pink and raised.   Prescription Received/Picked up from Pharmacy? Montelukast, prednisone, loratadine, famotidine and benadryl.  Reports upset stomach, diarrhea.  Medications picked up and also bought OTC.    Follow-up Results or Labs that are pending: no          Recommended ED/UC with any acute/rapid decline in condition.  Call back to the clinic with any further questions/concerns  Patient relayed understanding  No further concerns at calls end.

## 2023-04-17 NOTE — PATIENT INSTRUCTIONS
Stop Lisinopril.  Start Norvasc/Amlodipine 5 mg daily.    Continue steroid.  New script sent.  Start Singulair - new from ER.  Continue Pepcid.  Continue antihistamine.    Referrals -  1 . Allergist  2. [facility] pharmacist - Romeo Boyd    Follow up 2-3 weeks - recheck BP, etc.

## 2023-04-17 NOTE — LETTER
April 17, 2023      Constantino Warren  [address] [facility] [address]        To Whom It May Concern:    Constantino Warren  was seen on 4/17/2023.  Please excuse her today and tomorrow due to illness.        Sincerely,        Coco Schafer MD

## 2023-04-18 ENCOUNTER — APPOINTMENT (OUTPATIENT)
Dept: ULTRASOUND IMAGING | Facility: HOSPITAL | Age: 53
End: 2023-04-18
Attending: STUDENT IN AN ORGANIZED HEALTH CARE EDUCATION/TRAINING PROGRAM
Payer: COMMERCIAL

## 2023-04-18 ENCOUNTER — HOSPITAL ENCOUNTER (EMERGENCY)
Facility: HOSPITAL | Age: 53
Discharge: HOME | End: 2023-04-18
Attending: PHYSICIAN ASSISTANT | Admitting: PHYSICIAN ASSISTANT
Payer: COMMERCIAL

## 2023-04-18 VITALS
SYSTOLIC BLOOD PRESSURE: 133 MMHG | HEART RATE: 81 BPM | TEMPERATURE: 97.2 F | OXYGEN SATURATION: 94 % | DIASTOLIC BLOOD PRESSURE: 87 MMHG | RESPIRATION RATE: 20 BRPM

## 2023-04-18 DIAGNOSIS — L50.9 URTICARIA: ICD-10-CM

## 2023-04-18 DIAGNOSIS — H53.9 VISION CHANGES: ICD-10-CM

## 2023-04-18 LAB
HOLD SPECIMEN: NORMAL

## 2023-04-18 PROCEDURE — 99284 EMERGENCY DEPT VISIT MOD MDM: CPT | Mod: 25 | Performed by: PHYSICIAN ASSISTANT

## 2023-04-18 PROCEDURE — 99284 EMERGENCY DEPT VISIT MOD MDM: CPT | Mod: 25

## 2023-04-18 PROCEDURE — 76512 OPH US DX B-SCAN: CPT | Mod: 26 | Performed by: STUDENT IN AN ORGANIZED HEALTH CARE EDUCATION/TRAINING PROGRAM

## 2023-04-18 PROCEDURE — 76512 OPH US DX B-SCAN: CPT | Mod: TC | Performed by: STUDENT IN AN ORGANIZED HEALTH CARE EDUCATION/TRAINING PROGRAM

## 2023-04-18 PROCEDURE — 250N000012 HC RX MED GY IP 250 OP 636 PS 637: Performed by: PHYSICIAN ASSISTANT

## 2023-04-18 PROCEDURE — 250N000009 HC RX 250: Performed by: PHYSICIAN ASSISTANT

## 2023-04-18 PROCEDURE — 86160 COMPLEMENT ANTIGEN: CPT | Performed by: PHYSICIAN ASSISTANT

## 2023-04-18 PROCEDURE — 36415 COLL VENOUS BLD VENIPUNCTURE: CPT | Performed by: PHYSICIAN ASSISTANT

## 2023-04-18 RX ORDER — TETRACAINE HYDROCHLORIDE 5 MG/ML
1-2 SOLUTION OPHTHALMIC ONCE
Status: COMPLETED | OUTPATIENT
Start: 2023-04-18 | End: 2023-04-18

## 2023-04-18 RX ORDER — PREDNISONE 20 MG/1
40 TABLET ORAL ONCE
Status: COMPLETED | OUTPATIENT
Start: 2023-04-18 | End: 2023-04-18

## 2023-04-18 RX ADMIN — PREDNISONE 40 MG: 20 TABLET ORAL at 18:47

## 2023-04-18 RX ADMIN — TETRACAINE HYDROCHLORIDE 2 DROP: 5 SOLUTION OPHTHALMIC at 18:00

## 2023-04-18 ASSESSMENT — ACTIVITIES OF DAILY LIVING (ADL): ADLS_ACUITY_SCORE: 37

## 2023-04-18 NOTE — ED PROVIDER NOTES
History     Chief Complaint   Patient presents with     Facial Swelling     HPI  Constantino Warren is a 52 year old female who presents to the ER for evaluation of swelling of the face along with rash.  Symptoms of been ongoing since January.  Patient does note that she started Mictoryl at the end of November beginning of December.  Patient was also on lisinopril for over a year they did stop her on this recently.  She has been on chronic prednisone and Benadryl since.  She is getting frustrated at this point of her symptoms not being alleviated she denies any shortness of breath or difficulty breathing she does note she has some left I blurred vision today started last night patient denies any pain she has no headache no facial droop no weakness.  Patient denies any fevers or chills no nausea vomiting or diarrhea.  She had no numbness or tingling.    Patient has done illumination diet she does have a referral to allergy however she has not heard as to when this will be.    Allergies:  Allergies   Allergen Reactions     Aspartame      Other reaction(s): Other - Describe In Comment Field, Seizures  convulsions     Bee Venom Anaphylaxis     Bupropion Anaphylaxis     Throat gets tight and hives. Denies allergic reaction     Food Anaphylaxis     Peppers-anaphylaxis if eaten, if touched hands swell and reddened     Food Allergy Formula Anaphylaxis     Peppers, patient reports green peppers close off her throat     Ibuprofen Anaphylaxis     No Clinical Screening - See Comments Anaphylaxis     Peppers, patient reports green peppers close off her throat     Piper Anaphylaxis     Peppers--green, black, red, chili, etc. Derm symptoms, hives;  Strawberries--(cooked) Itching, breathing difficulties, GI symptoms. Mushrooms--GI symptoms, Hives.Spinach (cooked) throat irritation  (Listed on [facility] problem list.)     Fentanyl Swelling     Lips and tongue swelled.  Swelling of lips and tongue     Adhesive Tape       blisters     Amitriptyline Other (See Comments)     Respiratory symptoms - denies allergic reaction     Cymbalta      Mood cycling leading to elevated mood state.      Liquid Adhesive Dermatitis     blisters  Blisters  Silk tape and tegaderm is fine     Oxcarbazepine Other (See Comments)     Throat gets tight and hives. Denies allergic reaction  Throat gets tight and hives-       Prozac [Fluoxetine]      Other reaction(s): *Unknown  uncontrolled muscle spasms     Valproic Acid      Respiratory symptoms ([facility]). Denies allergic reaction     Ziprasidone      Throat gets tight and hives. Denies allergic reaction       Problem List:    Patient Active Problem List    Diagnosis Date Noted     Bipolar disorder (H) 01/06/2023     Priority: Medium     per previous notes and documentation       Iron deficiency 04/29/2021     Priority: Medium     History of colon cancer, stage III 10/20/2020     Priority: Medium     Added automatically from request for surgery [id number]       Rotator cuff tear 10/13/2020     Priority: Medium     Added automatically from request for surgery [id number]       Arthritis 10/13/2020     Priority: Medium     Added automatically from request for surgery [id number]       History of uterine cancer 05/27/2020     Priority: Medium     Palliative care patient 05/27/2020     Priority: Medium     Iron deficiency anemia, unspecified iron deficiency anemia type 05/08/2020     Priority: Medium     S/P partial resection of colon 02/24/2020     Priority: Medium     Prediabetes      Priority: Medium     Colon cancer (H) 02/19/2020     Priority: Medium     Added automatically from request for surgery [id number]       Cancer of ascending colon (H) 02/19/2020     Priority: Medium     Added automatically from request for surgery [id number]       Special screening for malignant neoplasms, colon 01/14/2020     Priority: Medium     Added automatically from request for surgery [id number]       Anastomotic ulcer S/P  gastric bypass 07/21/2018     Priority: Medium     Anemia 07/21/2018     Priority: Medium     Chest pain 07/20/2018     Priority: Medium     GI bleed 07/20/2018     Priority: Medium     Abdominal pain, epigastric 07/20/2018     Priority: Medium     Hypochromic microcytic anemia 07/20/2018     Priority: Medium     Chronic migraine 01/18/2018     Priority: Medium     Replacing diagnoses that were inactivated after the 10/1/2021 regulatory import.       Chronic pain disorder 01/18/2018     Priority: Medium     Long term (current) use of opiate analgesic 01/12/2018     Priority: Medium     Overview:   Pain Diagnosis failed back syndrome, annular tear L4-5, history lumbar discectomy, s/p lumbar fusion.    Should be seen in the clinic every twelve weeks. Currently on a taper: yes, trying to wean away from the daytime hydrocodone-acetaminophen..    Plan for flares of chronic pain stretches, occasional use of more hydrocodone-acetaminophen for flare.    ED and UC: Opioid medication will not be given in the ER or Urgent Care unless there is a medical emergency unrelated to chronic pain.  Limit to 3 day supply    Refills: Refills will only be given at a scheduled visit.    [facility] Agreement for Opioid Treatment.   Opioid Agreement Date: 5/21/12  Last UDT (Urine Drug Test) on date:    Pain Dx: chronic low back pain  Last Pain Visit: 6-24-11  Preferred Pharmacy: 's  Comments:      7/23/14:  Progressing well.  Over the last month, has decreased hydrocodone-acetaminophen from 6/day to 3/day.  Awaiting appointment with [facility].       Adjustment disorder with mixed anxiety and depressed mood 12/15/2017     Priority: Medium     Other insomnia 12/15/2017     Priority: Medium     Syncope and collapse 09/26/2015     Priority: Medium     Hypertensive urgency 09/26/2015     Priority: Medium     Concussion syndrome 09/26/2015     Priority: Medium     Hydronephrosis, right 04/20/2015     Priority: Medium     Pyelonephritis  04/20/2015     Priority: Medium     Urolithiasis 04/20/2015     Priority: Medium     Annular tear of lumbar disc 12/16/2014     Priority: Medium     Overview:   MRI 2/5/13:  Previous right L4-5 hemilaminectomy and partial facetectomy with mild to moderate right L4-5 facet arthropathy and broad-based bulge of disk and end plate osteophyte on the right with partial high signal intensity zone annular tear of the undersurface of the laterally protruding disk/annulus.       Failed back syndrome of lumbar spine 12/16/2014     Priority: Medium     Overview:   Three back surgeries, including an L3-sacrum AP fusion done at [facility] in 2013.  Has been doing very well in terms of reduction of pain, with minimal requirement of opiate pain medication.       Hx of lumbar discectomy 12/16/2014     Priority: Medium     Overview:   10/2006:   L4-5 Laminectomy and diskectomy L5-S1.   12/12/11:  L5S1 microdiscectomy ([place])       Vitamin B 12 deficiency 06/13/2011     Priority: Medium     Overview:   IMO Update 10/11       Gastric bypass status for obesity 01/04/2011     Priority: Medium     Overview:   12/14/10:  laparoscopic Prasanna-en-Y gastric bypass Dr. Hassan  IMO Update 10/11       Hyperlipidemia 09/23/2008     Priority: Medium     Overview:   IMO Update 10/11       Hypothyroidism 09/23/2008     Priority: Medium     Overview:   IMO Update 10/11  Overview:   IMO Update 10/11          Past Medical History:    Past Medical History:   Diagnosis Date     Arthritis 10/13/2020     Bipolar disorder (H)      Cancer (H)      Cancer (H) 2004     Chronic pain      De Quervain's disease (tenosynovitis) 12/2021     Depressive disorder      Diabetes (H)      History of blood transfusion      Hypertension      Prediabetes      Syncope and collapse      Thyroid disease      Trigger finger 12/2021       Past Surgical History:    Past Surgical History:   Procedure Laterality Date     ARTHROSCOPY SHOULDER Left 11/6/2020    Procedure: LEFT SHOULDER  ARTHROSCOPY WITH ROTATOR CUFF REPAIR AND ACROMIUM CLAVICULAR JOINT RESECTION;  Surgeon: Tanner Mujica DO;  Location: [facility] OR     BACK SURGERY      2008; 2011     COLONOSCOPY N/A 2/13/2020    Procedure: COLONOSCOPY WITH BIOPSY AND TATOOING OF ASCENDING COLON,INCOMEPLETE EXAM;  Surgeon: Kang Cleveland MD;  Location: [facility]     COLONOSCOPY N/A 10/26/2020    Procedure: incomplete colonoscopy ;  Surgeon: Kang Cleveland MD;  Location: [facility]     ESOPHAGOSCOPY, GASTROSCOPY, DUODENOSCOPY (EGD), COMBINED N/A 7/21/2018    Procedure: COMBINED ESOPHAGOSCOPY, GASTROSCOPY, DUODENOSCOPY (EGD);  UPPER ENDOSCOPY WITH BIOPSIES;  Surgeon: Roger Vazquez MD;  Location: [facility] OR     ESOPHAGOSCOPY, GASTROSCOPY, DUODENOSCOPY (EGD), COMBINED N/A 11/21/2019    Procedure: UPPER ENDOSCOPY with biopsy;  Surgeon: Kang Cleveland MD;  Location: [facility]     HEAD & NECK SURGERY  2008    c4-c5 fusion     HYSTERECTOMY, CRISTIANE  04/2004    uterine cancer; [facility]     INSERT PORT VASCULAR ACCESS N/A 2/28/2020    Procedure: INSERTION, VASCULAR ACCESS PORT LEFT;  Surgeon: Kang Cleveland MD;  Location: [facility] OR     LAPAROSCOPIC ASSISTED COLECTOMY Right 2/24/2020    Procedure: RIGHT KEVIN COLECTOMY, LYSIS OF ADHESIONS;  Surgeon: Kang Cleveland MD;  Location: [facility]     LAPAROSCOPIC BYPASS GASTRIC  12/2010       Family History:    Family History   Problem Relation Age of Onset     Diabetes Mother      Hypertension Mother      Coronary Artery Disease Mother      Myocardial Infarction Mother      Hypertension Father      Diabetes Maternal Grandmother      Hypertension Maternal Grandfather      Coronary Artery Disease Maternal Grandfather      Cancer Paternal Grandfather         unsure of type of cancer     Bladder Cancer Paternal Grandfather      Diabetes Maternal Aunt      Cerebrovascular Disease Maternal Aunt      Diabetes Maternal Aunt      Cerebrovascular Disease Maternal Aunt      Diabetes Maternal Aunt       Cerebrovascular Disease Maternal Aunt      Hyperlipidemia No family hx of      Breast Cancer No family hx of      Colon Cancer No family hx of      Prostate Cancer No family hx of      Thyroid Disease No family hx of      Anesthesia Reaction No family hx of      Asthma No family hx of      Genetic Disorder No family hx of        Social History:  Marital Status:   [5]  Social History     Tobacco Use     Smoking status: Never     Smokeless tobacco: Never   Vaping Use     Vaping status: Never Used   Substance Use Topics     Alcohol use: No     Comment: sober for 18 years     Drug use: No        Medications:    acetaminophen (TYLENOL) 500 MG tablet  amLODIPine (NORVASC) 5 MG tablet  atorvastatin (LIPITOR) 40 MG tablet  Cyanocobalamin 1000 MCG/ML KIT  EPINEPHrine (ANY BX GENERIC EQUIV) 0.3 MG/0.3ML injection 2-pack  EQ ASPIRIN ADULT LOW DOSE 81 MG EC tablet  famotidine (PEPCID) 20 MG tablet  gabapentin (NEURONTIN) 100 MG capsule  gabapentin (NEURONTIN) 300 MG capsule  lamoTRIgine (LAMICTAL) 25 MG tablet  levothyroxine (SYNTHROID/LEVOTHROID) 200 MCG tablet  levothyroxine (SYNTHROID/LEVOTHROID) 50 MCG tablet  loratadine (CLARITIN) 10 MG tablet  montelukast (SINGULAIR) 10 MG tablet  multivitamin w/minerals (THERA-VIT-M) tablet  omeprazole (PRILOSEC) 20 MG DR capsule  predniSONE (DELTASONE) 20 MG tablet  predniSONE (DELTASONE) 20 MG tablet  tiZANidine (ZANAFLEX) 4 MG tablet  traZODone (DESYREL) 50 MG tablet          Review of Systems   All other systems reviewed and are negative.      Physical Exam   BP: (!) 178/103  Pulse: 94  Temp: 97.2  F (36.2  C)  Resp: 20  SpO2: 95 %      Physical Exam  Constitutional:       General: She is not in acute distress.     Appearance: Normal appearance. She is normal weight. She is not ill-appearing, toxic-appearing or diaphoretic.   HENT:      Head: Normocephalic and atraumatic.      Right Ear: External ear normal.      Left Ear: External ear normal.   Eyes:      General: Lids are  normal.      Intraocular pressure: Left eye pressure is 15 mmHg.      Extraocular Movements: Extraocular movements intact.      Right eye: Normal extraocular motion.      Left eye: Normal extraocular motion and no nystagmus.      Conjunctiva/sclera: Conjunctivae normal.      Right eye: Right conjunctiva is not injected. No chemosis, exudate or hemorrhage.     Left eye: Left conjunctiva is not injected. No chemosis, exudate or hemorrhage.     Pupils: Pupils are equal, round, and reactive to light.      Left eye: Pupil is round, reactive and not sluggish. No corneal abrasion or fluorescein uptake.   Cardiovascular:      Rate and Rhythm: Normal rate.   Pulmonary:      Effort: Pulmonary effort is normal. No respiratory distress.   Musculoskeletal:         General: Normal range of motion.   Skin:     General: Skin is warm and dry.      Coloration: Skin is not jaundiced or pale.      Findings: Rash present. Rash is macular.   Neurological:      Mental Status: She is alert and oriented to person, place, and time. Mental status is at baseline.      Cranial Nerves: No cranial nerve deficit.   Psychiatric:         Mood and Affect: Mood normal.         ED Course   Patient has quite a macular rash throughout urticarial nature she has little bit of excoriation from scratching.  Patient eye exam is normal pressure is 15 Dr. Catalan did do a POCUS ultrasound of the eye she has normal afferent pupillary response Alcott I was able to do a decent funduscopic exam notes no abnormality I did obtain a C4 to evaluate that can have an outpatient follow-up.       I spoke with Dr. Koch of [facility]. She did not feel the patient required any emergent examination. Did recommend a 24-48 hour evaluation.     I gave the patient 40 of prednisone here at this time I like the patient to follow-up with ophthalmology and her primary I want her to stop her Lamictal as I think the Lamictal is a causative agent here.  Discussed that she  needs to discuss this with her behavioral medicine team before she stops that.  Patient is agreeable to this plan she is thankful for our work-up here today.    ED Course as of 04/22/23 1324   Tue Apr 18, 2023   1816 I did examine the patient.  No cherry-red macula noted on my limited funduscopic exam so doubt CRAO.  I do not detect relative afferent pupillary defect, largely suspect that the optic nerve is functioning in fact there is very normal dilation and constriction of the pupils with response to changes in light.  I did ultrasound the eye and I do not see evidence of vitreous detachment, vitreous hemorrhage, or retinal detachment.  The lenses appear to be in the correct place.  The morphology of the lens seems abnormal, however, this is likely due to cataract surgery and lens replacements with synthetic lenses.  Hossein     Procedures             No results found for this or any previous visit (from the past 24 hour(s)).    Medications   tetracaine (PONTOCAINE) 0.5 % ophthalmic solution 1-2 drop (2 drops Left Eye $Given 4/18/23 1800)   predniSONE (DELTASONE) tablet 40 mg (40 mg Oral $Given 4/18/23 1847)       Assessments & Plan (with Medical Decision Making)     I have reviewed the nursing notes.    I have reviewed the findings, diagnosis, plan and need for follow up with the patient.        Discharge Medication List as of 4/18/2023  7:34 PM          Final diagnoses:   Vision changes   Urticaria       4/18/2023   [facility]     Rory Almanza PA-C  04/22/23 [id number]

## 2023-04-18 NOTE — ED NOTES
"Swelling of left side of lip and jaw has spread to right side of lip and right lower jaw. Reports it feels like skin in tight and \"numb\". Reports upper back itching is worsening and hives present. PA notified. Denies any increased swelling of the tongue or SOB.  "

## 2023-04-18 NOTE — ED TRIAGE NOTES
"Patient reports being seen multiple times for these symptoms of swelling of the left face and left lip, generalized body pains for the last several months. Reports she is also seeing \"only spots and shadows\" from the left eye. Patient short and angry with RN and not cooperative with answering some questions when asked. Explained to patient that all triage questions are asked with all patients.      Triage Assessment       Row Name 04/18/23 5830       Triage Assessment (Adult)    Airway WDL airway symptoms                  "

## 2023-04-19 NOTE — ED NOTES
"Patient reports she felt she had trouble swallowing her prednisone. Denies feeling like throat is swollen, although states \"It does feel weird\". PA notified and aware.   "

## 2023-04-19 NOTE — DISCHARGE INSTRUCTIONS
Whoever prescribes her Lamictal I would like you to talk to them tomorrow about discontinuing this to evaluate for improvement in your symptoms.  Same time tomorrow call and find out where there is referral for allergy has gone through try to have your clinic pushes through as emergent as possible based on the severity of her symptoms.  Start back on your prednisone.  I realize this is a temporary fix however it will help you with your symptoms we getting the eye changes need to call the [facility] for a 24 to 48-hour ER follow-up.  If unable to get in the [facility] you can check with the [facility] ophthalmology clinic.

## 2023-04-20 LAB — C4 SERPL-MCNC: 25 MG/DL (ref 13–39)

## 2023-04-20 NOTE — OR NURSING
Called patient and she reports she has a cold and needs to cancel her procedure for tomorrow.  Offered to transfer her to reschedule but she declined and states she will call back when she is ready to reschedule.  OR  notified and Dr. Fishman office.

## 2023-04-21 ENCOUNTER — TELEPHONE (OUTPATIENT)
Dept: FAMILY MEDICINE | Facility: OTHER | Age: 53
End: 2023-04-21

## 2023-04-21 NOTE — TELEPHONE ENCOUNTER
MTM referral from: [facility] visit (referral by provider)    MTM referral outreach attempt #2 on April 21, 2023 at 12:39 PM      Outcome: Patient not reachable after several attempts, will route to MTM Pharmacist/Provider as an FYI.  [facility] scheduling number is [phone].  Thank you for the referral.    Mary Malone - [facility]

## 2023-04-22 ASSESSMENT — TONOMETRY: OS_IOP_MMHG: 15

## 2023-04-26 ENCOUNTER — TELEPHONE (OUTPATIENT)
Dept: ONCOLOGY | Facility: OTHER | Age: 53
End: 2023-04-26

## 2023-04-26 ENCOUNTER — INFUSION THERAPY VISIT (OUTPATIENT)
Dept: INFUSION THERAPY | Facility: OTHER | Age: 53
End: 2023-04-26
Attending: NURSE PRACTITIONER
Payer: COMMERCIAL

## 2023-04-26 DIAGNOSIS — C18.2 CANCER OF ASCENDING COLON (H): Primary | ICD-10-CM

## 2023-04-26 DIAGNOSIS — E53.8 VITAMIN B 12 DEFICIENCY: ICD-10-CM

## 2023-04-26 PROCEDURE — 96372 THER/PROPH/DIAG INJ SC/IM: CPT | Performed by: INTERNAL MEDICINE

## 2023-04-26 RX ORDER — HEPARIN SODIUM (PORCINE) LOCK FLUSH IV SOLN 100 UNIT/ML 100 UNIT/ML
5 SOLUTION INTRAVENOUS
Status: CANCELLED | OUTPATIENT
Start: 2023-04-26

## 2023-04-26 RX ORDER — HEPARIN SODIUM (PORCINE) LOCK FLUSH IV SOLN 100 UNIT/ML 100 UNIT/ML
5 SOLUTION INTRAVENOUS
Status: DISCONTINUED | OUTPATIENT
Start: 2023-04-26 | End: 2023-04-26 | Stop reason: HOSPADM

## 2023-04-26 RX ORDER — CYANOCOBALAMIN 1000 UG/ML
1000 INJECTION, SOLUTION INTRAMUSCULAR; SUBCUTANEOUS
Status: CANCELLED
Start: 2023-04-26

## 2023-04-26 RX ORDER — CYANOCOBALAMIN 1000 UG/ML
1000 INJECTION, SOLUTION INTRAMUSCULAR; SUBCUTANEOUS
Status: DISCONTINUED | OUTPATIENT
Start: 2023-04-26 | End: 2023-04-26 | Stop reason: HOSPADM

## 2023-04-26 RX ADMIN — HEPARIN SODIUM (PORCINE) LOCK FLUSH IV SOLN 100 UNIT/ML 5 ML: 100 SOLUTION at 15:37

## 2023-04-26 RX ADMIN — CYANOCOBALAMIN 1000 MCG: 1000 INJECTION, SOLUTION INTRAMUSCULAR; SUBCUTANEOUS at 15:35

## 2023-04-26 NOTE — PROGRESS NOTES
See telephone encounter routed to Dr Palma re flush trial this date. Note to scheduling pool re need for more B12's and to update schedule re plan for Q12 week port flush. Patient aware that early July labs would then need to be obtained from peripheral access. Scheduling updated re this change needed as well.

## 2023-04-26 NOTE — PROGRESS NOTES
Patient denies questions nor concerns regarding medication, administration site, side effects, nor aftercare.  Patient identified with two identifiers, order verified, and verbal consent for today's injection obtained from patient.   Patient education provided on s/s of injection site infection, and/or medication specific side effects, and when to call a provider.  Patient instructed to report any adverse effects.     Medication administered per protocol in LT DELTOID at 90 degree angle.  Site covered with sterile bandage.  Patient tolerated injection well, no verbal nor non-verbal signs of discomfort noted.  No adverse effects noted at this time.     Patient instructed to call with further questions or concerns.  Patient states understanding and is in agreement with this plan.  Patient discharged.

## 2023-04-26 NOTE — PROGRESS NOTES
Met with patient face to face to re-confirm eligibility and agreement to participate in the Port flush study.  Patient is still willing to participate in resear study and denies any changes, issues or concerns with port since consenting on 04/12/2023.  Patient has been confirmed to be eligible and meets inclusion criteria.  Patient was registered,randomized and assigned to ARM-B which is the 12 week port flush schedule.Next port flush to be scheduled on approx. July 19, 2023. Patient is aware and agrees to avoid using port for anything other than 12 week routine flushes only unless absolutely necessary.  Orders were placed by RN to note this change to 12 week port flush schedule for Dr. Palma to sign.    CJ Cabral  [employer]

## 2023-04-26 NOTE — TELEPHONE ENCOUNTER
Patient enrolled in port flush clinical trial today, and per rep Byers she randomized for every 12 week port flushes. Patient is in agreement with this. Can we have your approval to add a note to her port flush plan to allow for every 12 week flushes (currently ordered as every 28 days)?

## 2023-04-27 RX ORDER — HEPARIN SODIUM (PORCINE) LOCK FLUSH IV SOLN 100 UNIT/ML 100 UNIT/ML
5 SOLUTION INTRAVENOUS
Status: CANCELLED | OUTPATIENT
Start: 2023-04-27

## 2023-05-10 ENCOUNTER — INFUSION THERAPY VISIT (OUTPATIENT)
Dept: INFUSION THERAPY | Facility: OTHER | Age: 53
End: 2023-05-10
Attending: NURSE PRACTITIONER
Payer: COMMERCIAL

## 2023-05-10 DIAGNOSIS — E53.8 VITAMIN B 12 DEFICIENCY: ICD-10-CM

## 2023-05-10 DIAGNOSIS — C18.2 CANCER OF ASCENDING COLON (H): Primary | ICD-10-CM

## 2023-05-10 PROCEDURE — 96372 THER/PROPH/DIAG INJ SC/IM: CPT | Performed by: INTERNAL MEDICINE

## 2023-05-10 RX ORDER — CYANOCOBALAMIN 1000 UG/ML
1000 INJECTION, SOLUTION INTRAMUSCULAR; SUBCUTANEOUS
Status: CANCELLED
Start: 2023-05-10

## 2023-05-10 RX ORDER — HEPARIN SODIUM (PORCINE) LOCK FLUSH IV SOLN 100 UNIT/ML 100 UNIT/ML
5 SOLUTION INTRAVENOUS
Status: CANCELLED | OUTPATIENT
Start: 2023-05-10

## 2023-05-10 RX ORDER — CYANOCOBALAMIN 1000 UG/ML
1000 INJECTION, SOLUTION INTRAMUSCULAR; SUBCUTANEOUS
Status: DISCONTINUED | OUTPATIENT
Start: 2023-05-10 | End: 2023-05-10 | Stop reason: HOSPADM

## 2023-05-10 RX ADMIN — CYANOCOBALAMIN 1000 MCG: 1000 INJECTION, SOLUTION INTRAMUSCULAR; SUBCUTANEOUS at 12:50

## 2023-06-07 ENCOUNTER — INFUSION THERAPY VISIT (OUTPATIENT)
Dept: INFUSION THERAPY | Facility: OTHER | Age: 53
End: 2023-06-07
Attending: NURSE PRACTITIONER
Payer: COMMERCIAL

## 2023-06-07 DIAGNOSIS — E53.8 VITAMIN B 12 DEFICIENCY: ICD-10-CM

## 2023-06-07 DIAGNOSIS — C18.2 CANCER OF ASCENDING COLON (H): Primary | ICD-10-CM

## 2023-06-07 PROCEDURE — 96372 THER/PROPH/DIAG INJ SC/IM: CPT | Performed by: NURSE PRACTITIONER

## 2023-06-07 RX ORDER — HEPARIN SODIUM (PORCINE) LOCK FLUSH IV SOLN 100 UNIT/ML 100 UNIT/ML
5 SOLUTION INTRAVENOUS
Status: CANCELLED | OUTPATIENT
Start: 2023-06-07

## 2023-06-07 RX ORDER — CYANOCOBALAMIN 1000 UG/ML
1000 INJECTION, SOLUTION INTRAMUSCULAR; SUBCUTANEOUS
Status: DISCONTINUED | OUTPATIENT
Start: 2023-06-07 | End: 2023-06-07 | Stop reason: HOSPADM

## 2023-06-07 RX ORDER — CYANOCOBALAMIN 1000 UG/ML
1000 INJECTION, SOLUTION INTRAMUSCULAR; SUBCUTANEOUS
Status: CANCELLED
Start: 2023-06-07

## 2023-06-07 RX ADMIN — CYANOCOBALAMIN 1000 MCG: 1000 INJECTION, SOLUTION INTRAMUSCULAR; SUBCUTANEOUS at 12:56

## 2023-06-21 ENCOUNTER — INFUSION THERAPY VISIT (OUTPATIENT)
Dept: INFUSION THERAPY | Facility: OTHER | Age: 53
End: 2023-06-21
Attending: NURSE PRACTITIONER
Payer: COMMERCIAL

## 2023-06-21 DIAGNOSIS — C18.2 CANCER OF ASCENDING COLON (H): Primary | ICD-10-CM

## 2023-06-21 DIAGNOSIS — E53.8 VITAMIN B 12 DEFICIENCY: ICD-10-CM

## 2023-06-21 RX ORDER — CYANOCOBALAMIN 1000 UG/ML
1000 INJECTION, SOLUTION INTRAMUSCULAR; SUBCUTANEOUS
Status: DISCONTINUED | OUTPATIENT
Start: 2023-06-21 | End: 2023-06-21 | Stop reason: HOSPADM

## 2023-06-21 RX ORDER — CYANOCOBALAMIN 1000 UG/ML
1000 INJECTION, SOLUTION INTRAMUSCULAR; SUBCUTANEOUS
Status: CANCELLED
Start: 2023-06-21

## 2023-06-21 RX ORDER — HEPARIN SODIUM (PORCINE) LOCK FLUSH IV SOLN 100 UNIT/ML 100 UNIT/ML
5 SOLUTION INTRAVENOUS
Status: CANCELLED | OUTPATIENT
Start: 2023-06-21

## 2023-06-21 RX ADMIN — CYANOCOBALAMIN 1000 MCG: 1000 INJECTION, SOLUTION INTRAMUSCULAR; SUBCUTANEOUS at 12:48

## 2023-06-21 NOTE — PROGRESS NOTES
Infusion Nursing Note:  Constantino JACKIE Warren presents today for B 12 .    Patient seen by provider today: NO    present during visit today: Not Applicable.    Note: Patient reports not changes .    Intravenous Access: no access needed     Treatment Conditions:  Not Applicable.  Confirmed patient received the Fact Sheet for Patients, Parents and Caregivers prior to receiving medication.     Post Infusion Assessment:  Patient tolerated injection without incident.   *If a hypersensitivity, medication error, or an adverse event occurred, pharmacy was notified in addition to the provider for FDA Reporting.        Discharge Plan:   Patient discharged in stable condition accompanied by: self.    Jaye Louie RN

## 2023-07-11 ENCOUNTER — TELEPHONE (OUTPATIENT)
Dept: ONCOLOGY | Facility: OTHER | Age: 53
End: 2023-07-11

## 2023-07-11 NOTE — TELEPHONE ENCOUNTER
Per Gayatri needs to reschedule labs/scans and Onc visit that was no showed for all. LVM to advise

## 2023-07-11 NOTE — PROGRESS NOTES
Oncology Follow-up Visit    Reason for Visit:  Constantino is a 52 year old woman with a history of Stage III adenocarcinoma of the colon, who presents to the clinic today for routine follow-up.    Nursing Note and documentation reviewed: Yes    Interval History:   Constantino notes that she is really feeling quite well and denies concerns today that she wanted to address. A little tired but feels that is from working too much (60+ hours per week). She denies signs of infection. No fever, chills, chest pain, cough, or SOB. No bleeding concerns. No nausea or vomiting. Appetite is good. Really trying to eat more healthy and avoid all cafeteria food. No bowel concerns. No melena or hematochezia. Still having hives which she continues to follow with PCP for. Denies abdominal pain or discomfort. Trying to also add in more exercise.     Oncologic History:   11/21/19 to 11/21/2019 Hospitalized with a hemoglobin of 5.7 underwent EGD with no evidence of active bleeding.  12/12/2019 Seen by Dr. Cook and established care with her as her new PCP.  Referral for screening colonoscopy was completed.  2/13/2020 Underwent colonoscopy by Dr. Cleveland where a near obstructing lesion in the right colon was found. Pathology showed an invasive moderately differentiated adenocarcinoma.  CT the abdomen and pelvis showed a mass in the junction of the cecum and ascending colon highly suspicious for malignancy.  2/24/2020 Underwent laparoscopic-assisted right hemicolectomy by Dr. Cleveland that was converted to open right hemicolectomy when it was noted that the tumor was adherent to the pelvic brim; final pathology showed mildly differentiated adenocarcinoma of the right colon with 1/32 lymph nodes positive; there was perineural invasion; tumor budding and acutely inflamed tumor and reactive lymph nodes with peritonitis; the omentum showed mild peritonitis; margins were negative; patient was staged [id number]; IIIb adenocarcinoma of the right  colon  2/28/2020 Port-A-Cath placement  3/25/2020 PET scan which showed no evidence of distant metastatic disease  3/30/2020 Seen by Dr. Palma with Medical Oncology with recommendation for XELOX; capecitabine 850 mg/m2 p.o. b.i.d. on days 1 through 14 of a 21-day cycle, oxaliplatin 130 mg/m2 to be given on day 1 x4 cycles.  4/10/2020 Seen by Dr. Bryant with Radiation Oncology with no recommendation for radiation therapy at this time related to negative margins on pathology  4/17/2020 initiation of chemotherapy  6/22/2020 Completion of 4 cycles of XELOX  10/26/2020 Colonoscopy completed. Unable to visualize the anastomosis of the small bowel to the transverse colon  02/23/2022 CT CAP without evidence of disease recurrence or progression  8/12/2022 CT CAP without evidence of disease recurrence or progression  04/21/2023 Planned colonoscopy with Dr. Fishman which patient canceled herself     MMR proteins intact  CEA prior to surgery was <0.5     Current Chemo Regime/TX:  Surveillance     Previous treatment: Oxaliplatin/Capecitabine x 4 cycles    Past Medical History:   Diagnosis Date     Arthritis 10/13/2020     Bipolar disorder (H)     per previous notes and documentation     Cancer (H)     colon     Cancer (H) 2004    uterine     Chronic pain      De Quervain's disease (tenosynovitis) 12/2021     Depressive disorder      Diabetes (H)      History of blood transfusion      Hypertension      Prediabetes      Syncope and collapse      Thyroid disease      Trigger finger 12/2021       Past Surgical History:   Procedure Laterality Date     ARTHROSCOPY SHOULDER Left 11/6/2020    Procedure: LEFT SHOULDER ARTHROSCOPY WITH ROTATOR CUFF REPAIR AND ACROMIUM CLAVICULAR JOINT RESECTION;  Surgeon: Tanner Mujica DO;  Location: [facility]     BACK SURGERY      2008; 2011     COLONOSCOPY N/A 2/13/2020    Procedure: COLONOSCOPY WITH BIOPSY AND TATOOING OF ASCENDING COLON,INCOMEPLETE EXAM;  Surgeon: Kang Cleveland MD;  Location: [facility]  OR     COLONOSCOPY N/A 10/26/2020    Procedure: incomplete colonoscopy ;  Surgeon: Kang Cleveland MD;  Location: [facility] OR     ESOPHAGOSCOPY, GASTROSCOPY, DUODENOSCOPY (EGD), COMBINED N/A 7/21/2018    Procedure: COMBINED ESOPHAGOSCOPY, GASTROSCOPY, DUODENOSCOPY (EGD);  UPPER ENDOSCOPY WITH BIOPSIES;  Surgeon: Roger Vazquez MD;  Location: [facility] OR     ESOPHAGOSCOPY, GASTROSCOPY, DUODENOSCOPY (EGD), COMBINED N/A 11/21/2019    Procedure: UPPER ENDOSCOPY with biopsy;  Surgeon: Kang Cleveland MD;  Location: [facility]     HEAD & NECK SURGERY  2008    c4-c5 fusion     HYSTERECTOMY, CRISTIANE  04/2004    uterine cancer; [facility]     INSERT PORT VASCULAR ACCESS N/A 2/28/2020    Procedure: INSERTION, VASCULAR ACCESS PORT LEFT;  Surgeon: Kang Cleveland MD;  Location: [facility] OR     LAPAROSCOPIC ASSISTED COLECTOMY Right 2/24/2020    Procedure: RIGHT KEVIN COLECTOMY, LYSIS OF ADHESIONS;  Surgeon: Kang Cleveland MD;  Location: [facility] OR     LAPAROSCOPIC BYPASS GASTRIC  12/2010       Family History   Problem Relation Age of Onset     Diabetes Mother      Hypertension Mother      Coronary Artery Disease Mother      Myocardial Infarction Mother      Hypertension Father      Diabetes Maternal Grandmother      Hypertension Maternal Grandfather      Coronary Artery Disease Maternal Grandfather      Cancer Paternal Grandfather         unsure of type of cancer     Bladder Cancer Paternal Grandfather      Diabetes Maternal Aunt      Cerebrovascular Disease Maternal Aunt      Diabetes Maternal Aunt      Cerebrovascular Disease Maternal Aunt      Diabetes Maternal Aunt      Cerebrovascular Disease Maternal Aunt      Hyperlipidemia No family hx of      Breast Cancer No family hx of      Colon Cancer No family hx of      Prostate Cancer No family hx of      Thyroid Disease No family hx of      Anesthesia Reaction No family hx of      Asthma No family hx of      Genetic Disorder No family hx of        Social History      Socioeconomic History     Marital status:      Spouse name: Not on file     Number of children: 2     Years of education: Not on file     Highest education level: Not on file   Occupational History     Occupation: PCA     Comment: currently not working, unemployment    Tobacco Use     Smoking status: Never     Smokeless tobacco: Never   Vaping Use     Vaping Use: Never used   Substance and Sexual Activity     Alcohol use: No     Comment: sober for 18 years     Drug use: No     Sexual activity: Never   Other Topics Concern     Parent/sibling w/ CABG, MI or angioplasty before 65F 55M? Yes     Comment: Mother   Social History Narrative     Not on file     Social Determinants of Health     Financial Resource Strain: Low Risk  (4/9/2020)    Overall Financial Resource Strain (CARDIA)      Difficulty of Paying Living Expenses: Not hard at all   Food Insecurity: Not on file   Transportation Needs: Unknown (4/9/2020)    PRAPARE - Transportation      Lack of Transportation (Medical): Patient refused      Lack of Transportation (Non-Medical): Patient refused   Physical Activity: Not on file   Stress: Not on file   Social Connections: Not on file   Intimate Partner Violence: Not on file   Housing Stability: Not on file       Current Outpatient Medications   Medication     Cyanocobalamin 1000 MCG/ML KIT     diphenhydrAMINE (BENADRYL) 25 MG tablet     multivitamin w/minerals (THERA-VIT-M) tablet     tiZANidine (ZANAFLEX) 4 MG tablet     acetaminophen (TYLENOL) 500 MG tablet     amLODIPine (NORVASC) 5 MG tablet     atorvastatin (LIPITOR) 40 MG tablet     EPINEPHrine (ANY BX GENERIC EQUIV) 0.3 MG/0.3ML injection 2-pack     EQ ASPIRIN ADULT LOW DOSE 81 MG EC tablet     famotidine (PEPCID) 20 MG tablet     gabapentin (NEURONTIN) 100 MG capsule     gabapentin (NEURONTIN) 300 MG capsule     levothyroxine (SYNTHROID/LEVOTHROID) 200 MCG tablet     levothyroxine (SYNTHROID/LEVOTHROID) 50 MCG tablet     loratadine (CLARITIN) 10  MG tablet     montelukast (SINGULAIR) 10 MG tablet     omeprazole (PRILOSEC) 20 MG DR capsule     predniSONE (DELTASONE) 20 MG tablet     predniSONE (DELTASONE) 20 MG tablet     traZODone (DESYREL) 50 MG tablet     No current facility-administered medications for this visit.        Allergies   Allergen Reactions     Aspartame      Other reaction(s): Other - Describe In Comment Field, Seizures  convulsions     Bee Venom Anaphylaxis     Bupropion Anaphylaxis     Throat gets tight and hives. Denies allergic reaction     Food Anaphylaxis     Peppers-anaphylaxis if eaten, if touched hands swell and reddened     Food Allergy Formula Anaphylaxis     Peppers, patient reports green peppers close off her throat     Ibuprofen Anaphylaxis     No Clinical Screening - See Comments Anaphylaxis     Peppers, patient reports green peppers close off her throat     Piper Anaphylaxis     Peppers--green, black, red, chili, etc. Derm symptoms, hives;  Strawberries--(cooked) Itching, breathing difficulties, GI symptoms. Mushrooms--GI symptoms, Hives.Spinach (cooked) throat irritation  (Listed on [facility] problem list.)     Fentanyl Swelling     Lips and tongue swelled.  Swelling of lips and tongue     Adhesive Tape      blisters     Amitriptyline Other (See Comments)     Respiratory symptoms - denies allergic reaction     Duloxetine Hcl      Mood cycling leading to elevated mood state.      Liquid Adhesive Dermatitis     blisters  Blisters  Silk tape and tegaderm is fine     Oxcarbazepine Other (See Comments)     Throat gets tight and hives. Denies allergic reaction  Throat gets tight and hives-       Prozac [Fluoxetine]      Other reaction(s): *Unknown  uncontrolled muscle spasms     Valproic Acid      Respiratory symptoms ([facility]). Denies allergic reaction     Ziprasidone      Throat gets tight and hives. Denies allergic reaction       Review Of Systems:  A complete review of systems is negative except  "for the above mentioned items in the interval history.     ECOG Performance Status: 0    Physical Exam:  /64   Pulse 90   Temp (!) 96.3  F (35.7  C) (Tympanic)   Ht 1.702 m (5' 7\")   Wt 89 kg (196 lb 3.4 oz)   SpO2 97%   BMI 30.73 kg/m    GENERAL APPEARANCE: Healthy, alert and in no acute distress.  HEENT: Eyes appear normal without scleral icterus. Extraocular movements intact.  NECK:   Supple with normal range of motion. No asymmetry or masses.  LYMPHATICS: No palpable cervical, supraclavicular nodes.  RESP: Lungs clear to auscultation bilaterally, respirations regular and easy.  CARDIOVASCULAR: Regular rate and rhythm. Normal S1, S2; no murmur, gallop, or rub.  ABDOMEN: Soft, non-tender, non-distended. Bowel sounds auscultated all 4 quadrants. No palpable organomegaly or masses.  MUSCULOSKELETAL: Extremities without gross deformities noted. No edema noted.   SKIN: No suspicious lesions or rashes.  NEURO: Alert and oriented x 3.  Gait steady.  PSYCHIATRIC: Mentation and affect appear normal.  Mood appropriate.    Laboratory:   Component      Latest Ref Rn 7/19/2023  10:04 AM   WBC      4.0 - 11.0 10e3/uL 5.5    RBC Count      3.80 - 5.20 10e6/uL 4.62    Hemoglobin      11.7 - 15.7 g/dL 13.4    Hematocrit      35.0 - 47.0 % 40.8    MCV      78 - 100 fL 88    MCH      26.5 - 33.0 pg 29.0    MCHC      31.5 - 36.5 g/dL 32.8    RDW      10.0 - 15.0 % 12.6    Platelet Count      150 - 450 10e3/uL 195    % Neutrophils      % 65    % Lymphocytes      % 27    % Monocytes      % 8    % Eosinophils      % 0    % Basophils      % 0    % Immature Granulocytes      % 0    NRBCs per 100 WBC      <1 /100 0    Absolute Neutrophils      1.6 - 8.3 10e3/uL 3.6    Absolute Lymphocytes      0.8 - 5.3 10e3/uL 1.5    Absolute Monocytes      0.0 - 1.3 10e3/uL 0.4    Absolute Eosinophils      0.0 - 0.7 10e3/uL 0.0    Absolute Basophils      0.0 - 0.2 10e3/uL 0.0    Absolute Immature Granulocytes      <=0.4 10e3/uL 0.0  "   Absolute NRBCs      10e3/uL 0.0    Sodium      136 - 145 mmol/L 142    Potassium      3.4 - 5.3 mmol/L 4.2    Chloride      98 - 107 mmol/L 108 (H)    Carbon Dioxide (CO2)      22 - 29 mmol/L 22    Anion Gap      7 - 15 mmol/L 12    Urea Nitrogen      6.0 - 20.0 mg/dL 16.4    Creatinine      0.51 - 0.95 mg/dL 0.74    Calcium      8.6 - 10.0 mg/dL 9.3    Glucose      70 - 99 mg/dL 108 (H)    Alkaline Phosphatase      35 - 104 U/L 76    AST      0 - 45 U/L 55 (H)    ALT      0 - 50 U/L 45    Protein Total      6.4 - 8.3 g/dL 7.0    Albumin      3.5 - 5.2 g/dL 4.1    Bilirubin Total      <=1.2 mg/dL 0.4    GFR Estimate      >60 mL/min/1.73m2 >90    Iron      37 - 145 ug/dL 60    Iron Binding Capacity      240 - 430 ug/dL 388    Iron Sat Index      15 - 46 % 15    CEA      ng/mL 3.1    Vitamin B12      232 - 1,245 pg/mL 512    Ferritin      11 - 328 ng/mL 37    Folate      4.6 - 34.8 ng/mL 11.6       Legend:  (H) High      Imaging Studies:    PROCEDURE: CT CHEST/ABDOMEN/PELVIS W CONTRAST 7/19/2023 12:19 PM     HISTORY: hx of colon cancer, gastric bypass, surveillance scans;  History of colon cancer, stage III     COMPARISONS: 1/19/2023     Meds/Dose Given: Isovue 370 90ml Given     TECHNIQUE: CT scan of the chest abdomen and pelvis with IV contrast  sagittal coronal reconstructions were obtained.     FINDINGS: CT chest: There is a 2 mm in diameter subpleural nodule seen  in the right upper lobe stable and unchanged from previous  examination. The lungs are otherwise clear. The hilar and mediastinal  lymph nodes appear normal. The heart is normal in size. Degenerative  changes are present in the thoracic spine.     CT scan of the abdomen and pelvis: There is fatty infiltration of the  liver. There are no liver masses. There is no biliary ductal  enlargement. The gallbladder has been removed. The spleen and pancreas  are normal. The adrenal glands are normal. The right and left kidneys  are free of masses or  hydronephrosis. The periaortic lymph nodes are  normal in caliber. No intraperitoneal masses or inflammatory changes  are noted. Postoperative changes are seen in the stomach and right  colon. The bladder and rectum are normal. Postfusion changes are noted  in the lower lumbar spine.                                                                        IMPRESSION: No evidence of metastatic disease from the patient's colon  malignancy.     ZENA EDWARDS MD    ASSESSMENT/PLAN:  #1 Colon cancer:  Stage IIIB adenocarcinoma of the ascending colon diagnosed February 2020. She completed adjuvant chemotherapy with XELOX x 4 cycles on 6/22/2020 and is being followed with surveillance. Her tumor marker today is pending but historically has been stable. Her last colonoscopy was completed Oct 2020. Unfortuantely, this was incomplete as the anastomosis site was unable to be visuliazed. Patient has been educated on importance of repeating this procedure as she is overdue. This was finally scheduled with Dr. Fishman but was canceled by patient prior to having it completed.    Overall, doing and feeling well. No concerns for disease recurrence or progression. Again reiterated the importance of having colonoscopy. Seeing as we are over 2 years out from diagnosis, I will see patient back in 6 months with labs and CT prior. Sooner with any concerns.      #2 Hx Iron deficiency: Labs normal but slight reduction. I will recheck labs in 3 months and notify of results. If replacement is needed, patient has previously faily oral therapy likely due to her hx of gastric bypass so will need to be replaced with IV iron.      #3 B12 deficiency: Was previously on monthly B12 but levels dropped so up'ed to q2w. Most recently, missed a couple appts and her levels stayed pretty stable. Will therefore go back to monthly dosing of B12. Will recheck labs in 3 months and notify patient if we need to change back.        Patient in agreement with plan  and verbalizes understanding. Agrees to call with any questions or concerns.    45 minutes spent in the patient's encounter today with time spent in review of patient's chart along with chart preparation and review of the treatment plan and signing of treatment plan.  Time was also spent with the patient in obtaining a review of systems and performing a physical exam along with detailed review of all test results. Time was also spent in discussing plan for future follow-up and relating instructions for follow-up and in placing future orders.    JUNIOR [facility]  Medical Oncology

## 2023-07-19 ENCOUNTER — HOSPITAL ENCOUNTER (OUTPATIENT)
Dept: CT IMAGING | Facility: HOSPITAL | Age: 53
Discharge: HOME | End: 2023-07-19
Attending: NURSE PRACTITIONER
Payer: COMMERCIAL

## 2023-07-19 ENCOUNTER — INFUSION THERAPY VISIT (OUTPATIENT)
Dept: INFUSION THERAPY | Facility: OTHER | Age: 53
End: 2023-07-19
Attending: NURSE PRACTITIONER
Payer: COMMERCIAL

## 2023-07-19 DIAGNOSIS — E53.8 VITAMIN B 12 DEFICIENCY: ICD-10-CM

## 2023-07-19 DIAGNOSIS — C18.2 CANCER OF ASCENDING COLON (H): ICD-10-CM

## 2023-07-19 DIAGNOSIS — Z85.038 HISTORY OF COLON CANCER, STAGE III: Primary | ICD-10-CM

## 2023-07-19 DIAGNOSIS — Z85.038 HISTORY OF COLON CANCER, STAGE III: ICD-10-CM

## 2023-07-19 LAB
ALBUMIN SERPL BCG-MCNC: 4.1 G/DL (ref 3.5–5.2)
ALP SERPL-CCNC: 76 U/L (ref 35–104)
ALT SERPL W P-5'-P-CCNC: 45 U/L (ref 0–50)
ANION GAP SERPL CALCULATED.3IONS-SCNC: 12 MMOL/L (ref 7–15)
AST SERPL W P-5'-P-CCNC: 55 U/L (ref 0–45)
BASOPHILS # BLD AUTO: 0 10E3/UL (ref 0–0.2)
BASOPHILS NFR BLD AUTO: 0 %
BILIRUB SERPL-MCNC: 0.4 MG/DL
BUN SERPL-MCNC: 16.4 MG/DL (ref 6–20)
CALCIUM SERPL-MCNC: 9.3 MG/DL (ref 8.6–10)
CEA SERPL-MCNC: 3.1 NG/ML
CHLORIDE SERPL-SCNC: 108 MMOL/L (ref 98–107)
CREAT SERPL-MCNC: 0.74 MG/DL (ref 0.51–0.95)
DEPRECATED HCO3 PLAS-SCNC: 22 MMOL/L (ref 22–29)
EOSINOPHIL # BLD AUTO: 0 10E3/UL (ref 0–0.7)
EOSINOPHIL NFR BLD AUTO: 0 %
ERYTHROCYTE [DISTWIDTH] IN BLOOD BY AUTOMATED COUNT: 12.6 % (ref 10–15)
FERRITIN SERPL-MCNC: 37 NG/ML (ref 11–328)
FOLATE SERPL-MCNC: 11.6 NG/ML (ref 4.6–34.8)
GFR SERPL CREATININE-BSD FRML MDRD: >90 ML/MIN/1.73M2
GLUCOSE SERPL-MCNC: 108 MG/DL (ref 70–99)
HCT VFR BLD AUTO: 40.8 % (ref 35–47)
HGB BLD-MCNC: 13.4 G/DL (ref 11.7–15.7)
IMM GRANULOCYTES # BLD: 0 10E3/UL
IMM GRANULOCYTES NFR BLD: 0 %
IRON BINDING CAPACITY (ROCHE): 388 UG/DL (ref 240–430)
IRON SATN MFR SERPL: 15 % (ref 15–46)
IRON SERPL-MCNC: 60 UG/DL (ref 37–145)
LYMPHOCYTES # BLD AUTO: 1.5 10E3/UL (ref 0.8–5.3)
LYMPHOCYTES NFR BLD AUTO: 27 %
MCH RBC QN AUTO: 29 PG (ref 26.5–33)
MCHC RBC AUTO-ENTMCNC: 32.8 G/DL (ref 31.5–36.5)
MCV RBC AUTO: 88 FL (ref 78–100)
MONOCYTES # BLD AUTO: 0.4 10E3/UL (ref 0–1.3)
MONOCYTES NFR BLD AUTO: 8 %
NEUTROPHILS # BLD AUTO: 3.6 10E3/UL (ref 1.6–8.3)
NEUTROPHILS NFR BLD AUTO: 65 %
NRBC # BLD AUTO: 0 10E3/UL
NRBC BLD AUTO-RTO: 0 /100
PLATELET # BLD AUTO: 195 10E3/UL (ref 150–450)
POTASSIUM SERPL-SCNC: 4.2 MMOL/L (ref 3.4–5.3)
PROT SERPL-MCNC: 7 G/DL (ref 6.4–8.3)
RBC # BLD AUTO: 4.62 10E6/UL (ref 3.8–5.2)
SODIUM SERPL-SCNC: 142 MMOL/L (ref 136–145)
VIT B12 SERPL-MCNC: 512 PG/ML (ref 232–1245)
WBC # BLD AUTO: 5.5 10E3/UL (ref 4–11)

## 2023-07-19 PROCEDURE — 250N000011 HC RX IP 250 OP 636: Performed by: RADIOLOGY

## 2023-07-19 PROCEDURE — 74177 CT ABD & PELVIS W/CONTRAST: CPT

## 2023-07-19 PROCEDURE — 82378 CARCINOEMBRYONIC ANTIGEN: CPT | Performed by: INTERNAL MEDICINE

## 2023-07-19 PROCEDURE — 82728 ASSAY OF FERRITIN: CPT | Performed by: INTERNAL MEDICINE

## 2023-07-19 PROCEDURE — 96372 THER/PROPH/DIAG INJ SC/IM: CPT | Performed by: NURSE PRACTITIONER

## 2023-07-19 PROCEDURE — 82607 VITAMIN B-12: CPT | Performed by: INTERNAL MEDICINE

## 2023-07-19 PROCEDURE — 82746 ASSAY OF FOLIC ACID SERUM: CPT | Performed by: INTERNAL MEDICINE

## 2023-07-19 PROCEDURE — 85025 COMPLETE CBC W/AUTO DIFF WBC: CPT | Performed by: INTERNAL MEDICINE

## 2023-07-19 PROCEDURE — 83540 ASSAY OF IRON: CPT | Performed by: INTERNAL MEDICINE

## 2023-07-19 PROCEDURE — 80053 COMPREHEN METABOLIC PANEL: CPT | Performed by: INTERNAL MEDICINE

## 2023-07-19 PROCEDURE — 83550 IRON BINDING TEST: CPT | Performed by: INTERNAL MEDICINE

## 2023-07-19 PROCEDURE — 36415 COLL VENOUS BLD VENIPUNCTURE: CPT | Performed by: INTERNAL MEDICINE

## 2023-07-19 RX ORDER — HEPARIN SODIUM (PORCINE) LOCK FLUSH IV SOLN 100 UNIT/ML 100 UNIT/ML
5 SOLUTION INTRAVENOUS
Status: DISCONTINUED | OUTPATIENT
Start: 2023-07-19 | End: 2023-07-19 | Stop reason: HOSPADM

## 2023-07-19 RX ORDER — CYANOCOBALAMIN 1000 UG/ML
1000 INJECTION, SOLUTION INTRAMUSCULAR; SUBCUTANEOUS
Status: CANCELLED
Start: 2023-07-19

## 2023-07-19 RX ORDER — IOPAMIDOL 755 MG/ML
17 INJECTION, SOLUTION INTRAVASCULAR ONCE
Status: COMPLETED | OUTPATIENT
Start: 2023-07-19 | End: 2023-07-19

## 2023-07-19 RX ORDER — CYANOCOBALAMIN 1000 UG/ML
1000 INJECTION, SOLUTION INTRAMUSCULAR; SUBCUTANEOUS
Status: DISCONTINUED | OUTPATIENT
Start: 2023-07-19 | End: 2023-07-19 | Stop reason: HOSPADM

## 2023-07-19 RX ORDER — HEPARIN SODIUM (PORCINE) LOCK FLUSH IV SOLN 100 UNIT/ML 100 UNIT/ML
5 SOLUTION INTRAVENOUS
Status: CANCELLED | OUTPATIENT
Start: 2023-07-19

## 2023-07-19 RX ORDER — IOPAMIDOL 755 MG/ML
90 INJECTION, SOLUTION INTRAVASCULAR ONCE
Status: COMPLETED | OUTPATIENT
Start: 2023-07-19 | End: 2023-07-19

## 2023-07-19 RX ADMIN — IOPAMIDOL 17 ML: 755 INJECTION, SOLUTION INTRAVENOUS at 12:04

## 2023-07-19 RX ADMIN — HEPARIN SODIUM (PORCINE) LOCK FLUSH IV SOLN 100 UNIT/ML 5 ML: 100 SOLUTION at 10:15

## 2023-07-19 RX ADMIN — CYANOCOBALAMIN 1000 MCG: 1000 INJECTION, SOLUTION INTRAMUSCULAR; SUBCUTANEOUS at 10:22

## 2023-07-19 RX ADMIN — IOPAMIDOL 90 ML: 755 INJECTION, SOLUTION INTRAVENOUS at 12:03

## 2023-07-19 NOTE — Clinical Note
Please disregard my previous message on Constantino, being that she is in a clinical study for her port flushes her next appt needs to be scheduled around 10/22/23  Thank you Barry

## 2023-07-19 NOTE — PROGRESS NOTES
Infusion Nursing Note:  Constantino Warren presents today for port flush, peripheral lab draw, and B12 injection..      Intravenous Access:Lab draw site RIGHT arm, Needle type BD Vacutainer, Gauge 23.  Labs drawn without difficulty.    Port flushed per protocol, see flow sheet for details.    The Following medication was given:  Medication: B12  Route: IM  Site: Right Deltoid     Discharge Plan:   Patient discharged in stable condition accompanied by: self.  Departure Mode: Ambulatory.

## 2023-07-19 NOTE — PROGRESS NOTES
Met with patient today in infusion for 12 week follow up for the Port Flush Study. Patient completed all appropriate questionnaires. Patient states no new port issues, no use of port outside of study schedule, and no new adverse events since last port flush on 4/26/2023. Port assessment was completed and documented on research form by RN, Lesia Daniel and found no concerns.  Patient wishes to continue with study participation for another 12 week cycle.  Next port flush should be scheduled on Oct.11th, 2023 plus or minus 7 days to remain compliant. Patient understands that port is not to be used for blood draws or infusions of any kind unless absolutely necessary and will inform medical staff as needed.    CJ Cabral  Research Program [place]

## 2023-07-20 ENCOUNTER — ONCOLOGY VISIT (OUTPATIENT)
Dept: ONCOLOGY | Facility: OTHER | Age: 53
End: 2023-07-20
Attending: NURSE PRACTITIONER
Payer: COMMERCIAL

## 2023-07-20 VITALS
TEMPERATURE: 96.3 F | HEART RATE: 90 BPM | DIASTOLIC BLOOD PRESSURE: 64 MMHG | HEIGHT: 67 IN | WEIGHT: 196.21 LBS | OXYGEN SATURATION: 97 % | SYSTOLIC BLOOD PRESSURE: 122 MMHG | BODY MASS INDEX: 30.8 KG/M2

## 2023-07-20 DIAGNOSIS — E53.8 VITAMIN B 12 DEFICIENCY: ICD-10-CM

## 2023-07-20 DIAGNOSIS — Z85.038 HISTORY OF COLON CANCER, STAGE III: Primary | ICD-10-CM

## 2023-07-20 DIAGNOSIS — E61.1 IRON DEFICIENCY: ICD-10-CM

## 2023-07-20 PROCEDURE — 99215 OFFICE O/P EST HI 40 MIN: CPT | Performed by: NURSE PRACTITIONER

## 2023-07-20 RX ORDER — DIPHENHYDRAMINE HCL 25 MG
25 TABLET ORAL 4 TIMES DAILY
COMMUNITY

## 2023-07-20 ASSESSMENT — PAIN SCALES - GENERAL: PAINLEVEL: NO PAIN (0)

## 2023-07-20 NOTE — NURSING NOTE
"Oncology Rooming Note    July 20, 2023 12:57 PM   Constantino Warren is a 52 year old female who presents for:    Chief Complaint   Patient presents with     Oncology Clinic Visit     Follow up. Colon cancer     Initial Vitals: /64   Pulse 90   Temp (!) 96.3  F (35.7  C) (Tympanic)   Ht 1.702 m (5' 7\")   Wt 89 kg (196 lb 3.4 oz)   SpO2 97%   BMI 30.73 kg/m   Estimated body mass index is 30.73 kg/m  as calculated from the following:    Height as of this encounter: 1.702 m (5' 7\").    Weight as of this encounter: 89 kg (196 lb 3.4 oz). Body surface area is 2.05 meters squared.  No Pain (0) Comment: Data Unavailable   No LMP recorded. Patient has had a hysterectomy.  Allergies reviewed: Yes  Medications reviewed: Yes    Medications: Medication refills not needed today.  Pharmacy name entered into [facility]:    [id number] - [place] - [address]   [facility] - [place], [place] - [address]   [facility] [address] [facility] [place]  [facility] - [place] - [address]    Clinical concerns: none       Noemi Quiroga LPN              "

## 2023-07-20 NOTE — PATIENT INSTRUCTIONS
Thank you for allowing me to be a part of your care today.    I would like to see you back in 6 months with labs and ct scan the week prior. I would like you to have labs peripherally only in 3 months and I will contact you with results. In the meantime, B12 injection monthly (due in 4 weeks).    If you have any questions please call [phone]    Other instructions:      Follow-up sooner with concerns.

## 2023-08-10 RX ORDER — CYANOCOBALAMIN 1000 UG/ML
1000 INJECTION, SOLUTION INTRAMUSCULAR; SUBCUTANEOUS
Status: CANCELLED
Start: 2023-08-10

## 2023-08-10 NOTE — PROGRESS NOTES
Plan updated to reflect monthly B12, TC to clarify the frequency with patient. No answer left message and requested a return call if needed.

## 2023-09-05 NOTE — DISCHARGE INSTRUCTIONS
Make sure you are drinking plenty of water.  Most adults need 1 to 2 L/day.  Urine should be light/clear  Tylenol 500-1000 mg every 6 hours as needed for pain.  Do not take more than 4000 mg per day   Return to the emergency department for worsening pain, vomiting, loss of consciousness, numbness/weakness, difficulty breathing, or if you have any new or changing symptoms or concerns.    What to expect when you have contrast    During your exam, we will inject  contrast  into your vein or artery. (Contrast is a clear liquid with iodine in it. It shows up on X-rays.)    You may feel warm or hot. You may have a metal taste in your mouth and a slight upset stomach. You may also feel pressure near the kidneys and bladder. These effects will last about 1 to 3 minutes.    Please tell us if you have:   Sneezing    Itching   Hives    Swelling in the face   A hoarse voice   Breathing problems   Other new symptoms    Serious problems are rare.  They may include:   Irregular heartbeat    Seizures   Kidney failure             Tissue damage   Shock     Death    If you have any problems during the exam, we  will treat them right away.    When you get home    Call your hospital if you have any new symptoms in the next 2 days, like hives or swelling. (Phone numbers are at the bottom of this page.) Or call your family doctor.     If you have wheezing or trouble breathing, call 911.    Self-care  -Drink at least 4 extra glasses of water today.   This reduces the stress on your kidneys.  -Keep taking your regular medicines.    The contrast will pass out of your body in your  Urine(pee). This will happen in the next 24 hours. You  will not feel this. Your urine will not  change color.    If you have kidney problems or take metformin    Drink 4 to 8 large glasses of water for the next  2 days, if you are not on a fluid restriction.    ?If you take metformin (Glucophage or Glucovance) for diabetes, keep taking it.      ?Your kidney  function tests are abnormal.  If you take Metformin, do not take it for 48 hours. Please go to your clinic for a blood test within 3 days after your exam before the restarting this medicine.     (Note to provider:please give patient prescription for lab tests.)    ?Special instructions: -    I have read and understand the above information.    Patient Sign Here:______________________________________Date:________Time:______    Staff Sign Here:________________________________________Date:_______Time:______      Radiology Departments:     ?[facility]: [phone] ?[place]: [phone]     ?[place]: [phone] ?[facility]:[phone]      ?[place]: [phone]  ?[facility]: [phone]  ?[place]:[phone]    ?[facility]:[phone]  ?[facility]:[phone]   Retention Suture Text: Retention sutures were placed to support the closure and prevent dehiscence.

## 2023-10-11 ENCOUNTER — INFUSION THERAPY VISIT (OUTPATIENT)
Dept: INFUSION THERAPY | Facility: OTHER | Age: 53
End: 2023-10-11
Attending: NURSE PRACTITIONER
Payer: COMMERCIAL

## 2023-10-11 DIAGNOSIS — E53.8 VITAMIN B 12 DEFICIENCY: Primary | ICD-10-CM

## 2023-10-11 DIAGNOSIS — C18.2 CANCER OF ASCENDING COLON (H): ICD-10-CM

## 2023-10-11 PROCEDURE — 96372 THER/PROPH/DIAG INJ SC/IM: CPT | Performed by: NURSE PRACTITIONER

## 2023-10-11 RX ORDER — HEPARIN SODIUM (PORCINE) LOCK FLUSH IV SOLN 100 UNIT/ML 100 UNIT/ML
5 SOLUTION INTRAVENOUS
Status: CANCELLED | OUTPATIENT
Start: 2023-10-11

## 2023-10-11 RX ORDER — HEPARIN SODIUM (PORCINE) LOCK FLUSH IV SOLN 100 UNIT/ML 100 UNIT/ML
5 SOLUTION INTRAVENOUS
Status: DISCONTINUED | OUTPATIENT
Start: 2023-10-11 | End: 2023-10-11 | Stop reason: HOSPADM

## 2023-10-11 RX ORDER — CYANOCOBALAMIN 1000 UG/ML
1000 INJECTION, SOLUTION INTRAMUSCULAR; SUBCUTANEOUS
Status: DISCONTINUED | OUTPATIENT
Start: 2023-10-11 | End: 2023-10-11 | Stop reason: HOSPADM

## 2023-10-11 RX ORDER — CYANOCOBALAMIN 1000 UG/ML
1000 INJECTION, SOLUTION INTRAMUSCULAR; SUBCUTANEOUS
Status: CANCELLED
Start: 2023-10-11

## 2023-10-11 RX ADMIN — CYANOCOBALAMIN 1000 MCG: 1000 INJECTION, SOLUTION INTRAMUSCULAR; SUBCUTANEOUS at 12:58

## 2023-10-11 RX ADMIN — HEPARIN SODIUM (PORCINE) LOCK FLUSH IV SOLN 100 UNIT/ML 5 ML: 100 SOLUTION at 12:57

## 2023-10-11 NOTE — PROGRESS NOTES
Port accessed/flushed per orders/MAR.    The following medication was given:     MEDICATION: Vitamin B12  ROUTE: IM  SITE: Deltoid - Right

## 2023-10-11 NOTE — PROGRESS NOTES
Met with patient today in infusion for 24 week follow up for the Port Flush Study. Patient completed all appropriate questionnaires. Patient states no new port issues, no use of port outside of study schedule, and no new adverse events since last port flush on 07/19/2023. Port assessment was completed and documented on research form by RN, Pattie Briones  Patient wishes to continue with study participation for an additional 12 week cycle.  Next port flush is scheduled for Jan. 3rd, 2024 which will be the 36 week interval, to maintain study compliance. Patient understands that port is not to be used for blood draws or infusions of any kind unless absolutely necessary and will inform medical staff as needed.     CJ Cabral  Research Program [place]

## 2023-11-10 ENCOUNTER — TRANSFERRED RECORDS (OUTPATIENT)
Dept: MULTI SPECIALTY CLINIC | Facility: CLINIC | Age: 53
End: 2023-11-10

## 2023-11-10 LAB — RETINOPATHY: NORMAL

## 2024-01-02 ENCOUNTER — HOSPITAL ENCOUNTER (EMERGENCY)
Facility: HOSPITAL | Age: 54
Discharge: HOME | End: 2024-01-02
Attending: PHYSICIAN ASSISTANT | Admitting: PHYSICIAN ASSISTANT
Payer: COMMERCIAL

## 2024-01-02 VITALS
WEIGHT: 205 LBS | RESPIRATION RATE: 16 BRPM | OXYGEN SATURATION: 97 % | HEIGHT: 67 IN | SYSTOLIC BLOOD PRESSURE: 146 MMHG | HEART RATE: 77 BPM | TEMPERATURE: 97.3 F | BODY MASS INDEX: 32.18 KG/M2 | DIASTOLIC BLOOD PRESSURE: 91 MMHG

## 2024-01-02 DIAGNOSIS — H10.32 ACUTE BACTERIAL CONJUNCTIVITIS OF LEFT EYE: ICD-10-CM

## 2024-01-02 PROCEDURE — G0463 HOSPITAL OUTPT CLINIC VISIT: HCPCS

## 2024-01-02 PROCEDURE — 99213 OFFICE O/P EST LOW 20 MIN: CPT | Performed by: PHYSICIAN ASSISTANT

## 2024-01-02 RX ORDER — SULFACETAMIDE SODIUM 100 MG/ML
1-2 SOLUTION/ DROPS OPHTHALMIC
Qty: 5 ML | Refills: 0 | Status: SHIPPED | OUTPATIENT
Start: 2024-01-02 | End: 2024-01-07

## 2024-01-02 NOTE — ED TRIAGE NOTES
Patient presents to urgent care for possible pink eye. Patient woke up this morning with drainage, redness and goopy in the corner of left eye.

## 2024-01-02 NOTE — ED PROVIDER NOTES
History     Chief Complaint   Patient presents with    Eye Problem     HPI  Constantino Warren is a 53 year old female who presents to the urgent care today for evaluation of left eye redness.  Symptoms started this morning she noted to be purulent discharge this morning she delayed away.  She notes a grainy sensation but denies any foreign body or any pain in the eye.  She has no pressure to the eye.  Does not wear contacts.    Allergies:  Allergies   Allergen Reactions    Aspartame      Other reaction(s): Other - Describe In Comment Field, Seizures  convulsions    Bee Venom Anaphylaxis    Bupropion Anaphylaxis     Throat gets tight and hives. Denies allergic reaction    Food Anaphylaxis     Peppers-anaphylaxis if eaten, if touched hands swell and reddened    Food Allergy Formula Anaphylaxis     Peppers, patient reports green peppers close off her throat    Ibuprofen Anaphylaxis    No Clinical Screening - See Comments Anaphylaxis     Peppers, patient reports green peppers close off her throat    Piper Anaphylaxis     Peppers--green, black, red, chili, etc. Derm symptoms, hives;  Strawberries--(cooked) Itching, breathing difficulties, GI symptoms. Mushrooms--GI symptoms, Hives.Spinach (cooked) throat irritation  (Listed on [facility] problem list.)    Fentanyl Swelling     Lips and tongue swelled.  Swelling of lips and tongue    Adhesive Tape      blisters    Amitriptyline Other (See Comments)     Respiratory symptoms - denies allergic reaction    Duloxetine Hcl      Mood cycling leading to elevated mood state.     Liquid Adhesive Dermatitis     blisters  Blisters  Silk tape and tegaderm is fine    Oxcarbazepine Other (See Comments)     Throat gets tight and hives. Denies allergic reaction  Throat gets tight and hives-      Prozac [Fluoxetine]      Other reaction(s): *Unknown  uncontrolled muscle spasms    Valproic Acid      Respiratory symptoms ([facility] Denies allergic reaction     Ziprasidone      Throat gets tight and hives. Denies allergic reaction       Problem List:    Patient Active Problem List    Diagnosis Date Noted    Bipolar disorder (H) 01/06/2023     Priority: Medium     per previous notes and documentation      Iron deficiency 04/29/2021     Priority: Medium    History of colon cancer, stage III 10/20/2020     Priority: Medium     Added automatically from request for surgery [id number]      Rotator cuff tear 10/13/2020     Priority: Medium     Added automatically from request for surgery [id number]      Arthritis 10/13/2020     Priority: Medium     Added automatically from request for surgery [id number]      History of uterine cancer 05/27/2020     Priority: Medium    Palliative care patient 05/27/2020     Priority: Medium    Iron deficiency anemia, unspecified iron deficiency anemia type 05/08/2020     Priority: Medium    S/P partial resection of colon 02/24/2020     Priority: Medium    Prediabetes      Priority: Medium    Colon cancer (H) 02/19/2020     Priority: Medium     Added automatically from request for surgery [id number]      Cancer of ascending colon (H) 02/19/2020     Priority: Medium     Added automatically from request for surgery [id number]      Special screening for malignant neoplasms, colon 01/14/2020     Priority: Medium     Added automatically from request for surgery [id number]      Anastomotic ulcer S/P gastric bypass 07/21/2018     Priority: Medium    Anemia 07/21/2018     Priority: Medium    Chest pain 07/20/2018     Priority: Medium    GI bleed 07/20/2018     Priority: Medium    Abdominal pain, epigastric 07/20/2018     Priority: Medium    Hypochromic microcytic anemia 07/20/2018     Priority: Medium    Chronic migraine 01/18/2018     Priority: Medium     Replacing diagnoses that were inactivated after the 10/1/2021 regulatory import.      Chronic pain disorder 01/18/2018     Priority: Medium    Long term (current) use of opiate analgesic 01/12/2018     Priority: Medium      Overview:   Pain Diagnosis failed back syndrome, annular tear L4-5, history lumbar discectomy, s/p lumbar fusion.    Should be seen in the clinic every twelve weeks. Currently on a taper: yes, trying to wean away from the daytime hydrocodone-acetaminophen..    Plan for flares of chronic pain stretches, occasional use of more hydrocodone-acetaminophen for flare.    ED and UC: Opioid medication will not be given in the ER or Urgent Care unless there is a medical emergency unrelated to chronic pain.  Limit to 3 day supply    Refills: Refills will only be given at a scheduled visit.    [facility] Agreement for Opioid Treatment.   Opioid Agreement Date: 5/21/12  Last UDT (Urine Drug Test) on date:    Pain Dx: chronic low back pain  Last Pain Visit: 6-24-11  [facility]acy: [facility]  Comments:      7/23/14:  Progressing well.  Over the last month, has decreased hydrocodone-acetaminophen from 6/day to 3/day.  Awaiting appointment with [facility].      Adjustment disorder with mixed anxiety and depressed mood 12/15/2017     Priority: Medium    Other insomnia 12/15/2017     Priority: Medium    Syncope and collapse 09/26/2015     Priority: Medium    Hypertensive urgency 09/26/2015     Priority: Medium    Concussion syndrome 09/26/2015     Priority: Medium    Hydronephrosis, right 04/20/2015     Priority: Medium    Pyelonephritis 04/20/2015     Priority: Medium    Urolithiasis 04/20/2015     Priority: Medium    Annular tear of lumbar disc 12/16/2014     Priority: Medium     Overview:   MRI 2/5/13:  Previous right L4-5 hemilaminectomy and partial facetectomy with mild to moderate right L4-5 facet arthropathy and broad-based bulge of disk and end plate osteophyte on the right with partial high signal intensity zone annular tear of the undersurface of the laterally protruding disk/annulus.      Failed back syndrome of lumbar spine 12/16/2014     Priority: Medium     Overview:   Three back surgeries, including an L3-sacrum AP  fusion [facility].  Has been doing very well in terms of reduction of pain, with minimal requirement of opiate pain medication.      Hx of lumbar discectomy 12/16/2014     Priority: Medium     Overview:   10/2006:   L4-5 Laminectomy and diskectomy L5-S1.   12/12/11:  L5S1 microdiscectomy ([facility])      Vitamin B 12 deficiency 06/13/2011     Priority: Medium     Overview:   IMO Update 10/11      Gastric bypass status for obesity 01/04/2011     Priority: Medium     Overview:   12/14/10:  laparoscopic Prasanna-en-Y gastric bypass Dr. Hassan  IMO Update 10/11      Hyperlipidemia 09/23/2008     Priority: Medium     Overview:   IMO Update 10/11      Hypothyroidism 09/23/2008     Priority: Medium     Overview:   IMO Update 10/11  Overview:   IMO Update 10/11          Past Medical History:    Past Medical History:   Diagnosis Date    Arthritis 10/13/2020    Bipolar disorder (H)     Cancer (H)     Cancer (H) 2004    Chronic pain     De Quervain's disease (tenosynovitis) 12/2021    Depressive disorder     Diabetes (H)     History of blood transfusion     Hypertension     Prediabetes     Syncope and collapse     Thyroid disease     Trigger finger 12/2021       Past Surgical History:    Past Surgical History:   Procedure Laterality Date    ARTHROSCOPY SHOULDER Left 11/6/2020    Procedure: LEFT SHOULDER ARTHROSCOPY WITH ROTATOR CUFF REPAIR AND ACROMIUM CLAVICULAR JOINT RESECTION;  Surgeon: Tanner Mujica DO;  Location: [facility]    BACK SURGERY      2008; 2011    COLONOSCOPY N/A 2/13/2020    Procedure: COLONOSCOPY WITH BIOPSY AND TATOOING OF ASCENDING COLON,INCOMEPLETE EXAM;  Surgeon: Kang Cleveland MD;  Location: [facility]    COLONOSCOPY N/A 10/26/2020    Procedure: incomplete colonoscopy ;  Surgeon: Kang Cleveland MD;  Location: [facility]    ESOPHAGOSCOPY, GASTROSCOPY, DUODENOSCOPY (EGD), COMBINED N/A 7/21/2018    Procedure: COMBINED ESOPHAGOSCOPY, GASTROSCOPY, DUODENOSCOPY (EGD);  UPPER ENDOSCOPY WITH BIOPSIES;   Surgeon: Roger Vazquez MD;  Location: [facility]    ESOPHAGOSCOPY, GASTROSCOPY, DUODENOSCOPY (EGD), COMBINED N/A 11/21/2019    Procedure: UPPER ENDOSCOPY with biopsy;  Surgeon: Kang Cleveland MD;  Location: [facility]    HEAD & NECK SURGERY  2008    c4-c5 fusion    HYSTERECTOMY, CRISTIANE  04/2004    uterine cancer; [facility]    INSERT PORT VASCULAR ACCESS N/A 2/28/2020    Procedure: INSERTION, VASCULAR ACCESS PORT LEFT;  Surgeon: Kang Cleveland MD;  Location: [facility]    LAPAROSCOPIC ASSISTED COLECTOMY Right 2/24/2020    Procedure: RIGHT KEVIN COLECTOMY, LYSIS OF ADHESIONS;  Surgeon: Kang Cleveland MD;  Location: [facility]    LAPAROSCOPIC BYPASS GASTRIC  12/2010       Family History:    Family History   Problem Relation Age of Onset    Diabetes Mother     Hypertension Mother     Coronary Artery Disease Mother     Myocardial Infarction Mother     Hypertension Father     Diabetes Maternal Grandmother     Hypertension Maternal Grandfather     Coronary Artery Disease Maternal Grandfather     Cancer Paternal Grandfather         unsure of type of cancer    Bladder Cancer Paternal Grandfather     Diabetes Maternal Aunt     Cerebrovascular Disease Maternal Aunt     Diabetes Maternal Aunt     Cerebrovascular Disease Maternal Aunt     Diabetes Maternal Aunt     Cerebrovascular Disease Maternal Aunt     Hyperlipidemia No family hx of     Breast Cancer No family hx of     Colon Cancer No family hx of     Prostate Cancer No family hx of     Thyroid Disease No family hx of     Anesthesia Reaction No family hx of     Asthma No family hx of     Genetic Disorder No family hx of        Social History:  Marital Status:   [5]  Social History     Tobacco Use    Smoking status: Never    Smokeless tobacco: Never   Vaping Use    Vaping Use: Never used   Substance Use Topics    Alcohol use: No     Comment: sober for 18 years    Drug use: No        Medications:    acetaminophen (TYLENOL) 500 MG tablet  amLODIPine (NORVASC)  "5 MG tablet  atorvastatin (LIPITOR) 40 MG tablet  diphenhydrAMINE (BENADRYL) 25 MG tablet  EQ ASPIRIN ADULT LOW DOSE 81 MG EC tablet  famotidine (PEPCID) 20 MG tablet  gabapentin (NEURONTIN) 300 MG capsule  levothyroxine (SYNTHROID/LEVOTHROID) 200 MCG tablet  levothyroxine (SYNTHROID/LEVOTHROID) 50 MCG tablet  montelukast (SINGULAIR) 10 MG tablet  multivitamin w/minerals (THERA-VIT-M) tablet  omeprazole (PRILOSEC) 20 MG DR capsule  sulfacetamide (BLEPH-10) 10 % ophthalmic solution  tiZANidine (ZANAFLEX) 4 MG tablet  traZODone (DESYREL) 50 MG tablet  Cyanocobalamin 1000 MCG/ML KIT  EPINEPHrine (ANY BX GENERIC EQUIV) 0.3 MG/0.3ML injection 2-pack  gabapentin (NEURONTIN) 100 MG capsule  loratadine (CLARITIN) 10 MG tablet  predniSONE (DELTASONE) 20 MG tablet  predniSONE (DELTASONE) 20 MG tablet          Review of Systems   All other systems reviewed and are negative.      Physical Exam   BP: 146/91  Pulse: 77  Temp: 97.3  F (36.3  C)  Resp: 16  Height: 170.2 cm (5' 7\")  Weight: 93 kg (205 lb)  SpO2: 97 %      Physical Exam  Constitutional:       General: She is not in acute distress.     Appearance: Normal appearance. She is normal weight. She is not ill-appearing, toxic-appearing or diaphoretic.   HENT:      Head: Normocephalic and atraumatic.      Right Ear: External ear normal.      Left Ear: External ear normal.   Eyes:      Extraocular Movements: Extraocular movements intact.      Conjunctiva/sclera:      Left eye: Left conjunctiva is injected.      Pupils: Pupils are equal, round, and reactive to light.   Cardiovascular:      Rate and Rhythm: Normal rate.   Pulmonary:      Effort: Pulmonary effort is normal. No respiratory distress.   Musculoskeletal:         General: Normal range of motion.   Skin:     General: Skin is warm and dry.      Coloration: Skin is not jaundiced or pale.   Neurological:      Mental Status: She is alert and oriented to person, place, and time. Mental status is at baseline.      Cranial " Nerves: No cranial nerve deficit.   Psychiatric:         Mood and Affect: Mood normal.         ED Course        I have reviewed the epic chart, the nurses note and triage note. vital signs were reviewed.  Symptoms consistent with conjunctivitis that she was having purulent discharge we will treat her for bacterial.         Procedures             No results found for this or any previous visit (from the past 24 hour(s)).    Medications - No data to display    Assessments & Plan (with Medical Decision Making)     I have reviewed the nursing notes.    I have reviewed the findings, diagnosis, plan and need for follow up with the patient.          New Prescriptions    SULFACETAMIDE (BLEPH-10) 10 % OPHTHALMIC SOLUTION    Apply 1-2 drops to eye every 2 hours (while awake) for 7 days       Final diagnoses:   Acute bacterial conjunctivitis of left eye       1/2/2024   [facility]       Rory Almanza PA-C  01/02/24 0838

## 2024-01-02 NOTE — Clinical Note
Constantino Warren was seen and treated in our emergency department on 1/2/2024.  She may return to work on 01/04/2024.       If you have any questions or concerns, please don't hesitate to call.      Rory Almanza PA-C

## 2024-01-03 ENCOUNTER — INFUSION THERAPY VISIT (OUTPATIENT)
Dept: INFUSION THERAPY | Facility: OTHER | Age: 54
End: 2024-01-03
Attending: NURSE PRACTITIONER
Payer: COMMERCIAL

## 2024-01-03 ENCOUNTER — ALLIED HEALTH/NURSE VISIT (OUTPATIENT)
Dept: INFUSION THERAPY | Facility: OTHER | Age: 54
End: 2024-01-03

## 2024-01-03 DIAGNOSIS — Z85.038 HISTORY OF COLON CANCER: Primary | ICD-10-CM

## 2024-01-03 DIAGNOSIS — E53.8 VITAMIN B 12 DEFICIENCY: Primary | ICD-10-CM

## 2024-01-03 DIAGNOSIS — Z85.038 HISTORY OF COLON CANCER: ICD-10-CM

## 2024-01-03 DIAGNOSIS — C18.2 CANCER OF ASCENDING COLON (H): ICD-10-CM

## 2024-01-03 PROCEDURE — 96372 THER/PROPH/DIAG INJ SC/IM: CPT | Performed by: INTERNAL MEDICINE

## 2024-01-03 RX ORDER — CYANOCOBALAMIN 1000 UG/ML
1000 INJECTION, SOLUTION INTRAMUSCULAR; SUBCUTANEOUS
Status: DISCONTINUED | OUTPATIENT
Start: 2024-01-03 | End: 2024-01-03 | Stop reason: HOSPADM

## 2024-01-03 RX ORDER — HEPARIN SODIUM (PORCINE) LOCK FLUSH IV SOLN 100 UNIT/ML 100 UNIT/ML
5 SOLUTION INTRAVENOUS
Status: CANCELLED | OUTPATIENT
Start: 2024-01-03

## 2024-01-03 RX ORDER — CYANOCOBALAMIN 1000 UG/ML
1000 INJECTION, SOLUTION INTRAMUSCULAR; SUBCUTANEOUS
Status: CANCELLED
Start: 2024-01-03

## 2024-01-03 RX ORDER — HEPARIN SODIUM (PORCINE) LOCK FLUSH IV SOLN 100 UNIT/ML 100 UNIT/ML
5 SOLUTION INTRAVENOUS
Status: DISCONTINUED | OUTPATIENT
Start: 2024-01-03 | End: 2024-01-03 | Stop reason: HOSPADM

## 2024-01-03 RX ADMIN — HEPARIN SODIUM (PORCINE) LOCK FLUSH IV SOLN 100 UNIT/ML 5 ML: 100 SOLUTION at 13:12

## 2024-01-03 RX ADMIN — CYANOCOBALAMIN 1000 MCG: 1000 INJECTION, SOLUTION INTRAMUSCULAR; SUBCUTANEOUS at 13:17

## 2024-01-03 NOTE — PROGRESS NOTES
Patient is 53 years old, here today for injection of b12.     Lab values:  n/a         Patient denies questions nor concerns regarding medication, administration site, side effects, nor aftercare.  Patient identified with two identifiers, order verified, and verbal consent for today's injection obtained from patient.   Patient education provided on s/s of injection site infection, and/or medication specific side effects, and when to call a provider.  Patient instructed to report any adverse effects.     Medication administered per protocol in Right shoulder at 90 degree angle.  Site covered with sterile bandage.  Patient tolerated injection well, no verbal nor non-verbal signs of discomfort noted.  No adverse effects noted at this time.     Patient instructed to call with further questions or concerns.  Patient states understanding and is in agreement with this plan.  Patient discharged.      Patients port accessed using non-coring, 189 gauge, 3/4 inch needle. Port accessed per facility protocol.   Hand hygiene performed: yes   Mask donned by caregiver: yes   Site prepped with CHG: yes  Dressing applied using aseptic technique: yes   Port flushed easily, blood return noted.  No signs and symptoms of infection or infiltration.  Port flushed per MAR.  Needle removed, small dressing applied.  Patient discharged with no complaints.

## 2024-01-03 NOTE — NURSING NOTE
Met with patient today in infusion for the Port Flush Study, week  Flush 36 visit.  Patient is on ARM B which is a 12 week flush routine.  Port was successfully flushed and port assessment was completed by Carrie Galdamez RN, port assessment form completed and signed.Patient denies any port related issues, has not had any unscheduled health visits or unscheduled use of port. No Adverse Events noted or reported. Patient agrees to continue on the Port Flush Study and no new consent since last visit, therefore no re-consenting needed to continue. Will see patient back in 12 more weeks for final 48 week visit and port flush. Will see Patient at March 27, 2024 port flush visit in infusion department.     IRMA CabralRS

## 2024-01-07 ENCOUNTER — HOSPITAL ENCOUNTER (EMERGENCY)
Facility: HOSPITAL | Age: 54
Discharge: HOME | End: 2024-01-07
Attending: NURSE PRACTITIONER | Admitting: NURSE PRACTITIONER
Payer: COMMERCIAL

## 2024-01-07 VITALS
HEART RATE: 97 BPM | SYSTOLIC BLOOD PRESSURE: 164 MMHG | OXYGEN SATURATION: 98 % | DIASTOLIC BLOOD PRESSURE: 96 MMHG | RESPIRATION RATE: 18 BRPM | TEMPERATURE: 97 F

## 2024-01-07 DIAGNOSIS — H10.32 ACUTE CONJUNCTIVITIS OF LEFT EYE: Primary | ICD-10-CM

## 2024-01-07 PROCEDURE — G0463 HOSPITAL OUTPT CLINIC VISIT: HCPCS

## 2024-01-07 PROCEDURE — 99213 OFFICE O/P EST LOW 20 MIN: CPT | Performed by: NURSE PRACTITIONER

## 2024-01-07 PROCEDURE — 250N000009 HC RX 250: Performed by: NURSE PRACTITIONER

## 2024-01-07 RX ORDER — TETRACAINE HYDROCHLORIDE 5 MG/ML
1-2 SOLUTION OPHTHALMIC ONCE
Status: COMPLETED | OUTPATIENT
Start: 2024-01-07 | End: 2024-01-07

## 2024-01-07 RX ORDER — TOBRAMYCIN 3 MG/ML
1-2 SOLUTION/ DROPS OPHTHALMIC
Qty: 5 ML | Refills: 0 | Status: SHIPPED | OUTPATIENT
Start: 2024-01-07 | End: 2024-02-02

## 2024-01-07 RX ADMIN — FLUORESCEIN SODIUM 1 STRIP: 1 STRIP OPHTHALMIC at 18:37

## 2024-01-07 RX ADMIN — TETRACAINE HYDROCHLORIDE 2 DROP: 5 SOLUTION OPHTHALMIC at 18:37

## 2024-01-07 ASSESSMENT — VISUAL ACUITY
OU: 1
OD: 20/40;WITH CORRECTIVE LENSES
OS: 20/50
OU: WITH CORRECTIVE LENSES
OU: NORMAL

## 2024-01-07 ASSESSMENT — ENCOUNTER SYMPTOMS
EYE DISCHARGE: 1
EYE REDNESS: 1
EYE PAIN: 1

## 2024-01-07 NOTE — Clinical Note
Constantino Warren was seen and treated in our emergency department on 1/7/2024.  She may return to work on 01/09/2024.       If you have any questions or concerns, please don't hesitate to call.      Mpofu, Prudence, CNP

## 2024-01-08 NOTE — ED PROVIDER NOTES
"  History     Chief Complaint   Patient presents with    Eye Problem     HPI  Constantino Warren is a 53 year old female who presents to urgent care for evaluation of persistent left eye redness, pain and discharge.  Patient was seen in this department 5 days ago for similar symptoms.  She was prescribed sulfacetamide sodium 10% eyedrops.  She states that she use them every 2 hours as instructed and none of her symptoms have improved.  She continues to get purulent discharge to her eye.  Every morning she is waking up with her eyes stuck shut due to the discharge and she has to pry it open.  She states that her eye does not feel \"grainy\" anymore.  Redness has not changed in intensity.  Her vision is now slightly blurry.  She does not wear contact lenses.  She does wear eyeglasses.  She does not believe she got anything in her eye prior to symptoms starting.    Allergies:  Allergies   Allergen Reactions    Aspartame      Other reaction(s): Other - Describe In Comment Field, Seizures  convulsions    Bee Venom Anaphylaxis    Bupropion Anaphylaxis     Throat gets tight and hives. Denies allergic reaction    Food Anaphylaxis     Peppers-anaphylaxis if eaten, if touched hands swell and reddened    Food Allergy Formula Anaphylaxis     Peppers, patient reports green peppers close off her throat    Ibuprofen Anaphylaxis    No Clinical Screening - See Comments Anaphylaxis     Peppers, patient reports green peppers close off her throat    Piper Anaphylaxis     Peppers--green, black, red, chili, etc. Derm symptoms, hives;  Strawberries--(cooked) Itching, breathing difficulties, GI symptoms. Mushrooms--GI symptoms, Hives.Spinach (cooked) throat irritation  (Listed on [facility] problem list.)    Fentanyl Swelling     Lips and tongue swelled.  Swelling of lips and tongue    Adhesive Tape      blisters    Amitriptyline Other (See Comments)     Respiratory symptoms - denies allergic reaction    Duloxetine Hcl      Mood " cycling leading to elevated mood state.     Liquid Adhesive Dermatitis     blisters  Blisters  Silk tape and tegaderm is fine    Oxcarbazepine Other (See Comments)     Throat gets tight and hives. Denies allergic reaction  Throat gets tight and hives-      Prozac [Fluoxetine]      Other reaction(s): *Unknown  uncontrolled muscle spasms    Valproic Acid      Respiratory symptoms ([facility]). Denies allergic reaction    Ziprasidone      Throat gets tight and hives. Denies allergic reaction       Problem List:    Patient Active Problem List    Diagnosis Date Noted    Bipolar disorder (H) 01/06/2023     Priority: Medium     per previous notes and documentation      Iron deficiency 04/29/2021     Priority: Medium    History of colon cancer, stage III 10/20/2020     Priority: Medium     Added automatically from request for surgery [id number]      Rotator cuff tear 10/13/2020     Priority: Medium     Added automatically from request for surgery [id number]      Arthritis 10/13/2020     Priority: Medium     Added automatically from request for surgery [id number]      History of uterine cancer 05/27/2020     Priority: Medium    Palliative care patient 05/27/2020     Priority: Medium    Iron deficiency anemia, unspecified iron deficiency anemia type 05/08/2020     Priority: Medium    S/P partial resection of colon 02/24/2020     Priority: Medium    Prediabetes      Priority: Medium    Colon cancer (H) 02/19/2020     Priority: Medium     Added automatically from request for surgery [id number]      Cancer of ascending colon (H) 02/19/2020     Priority: Medium     Added automatically from request for surgery [id number]      Special screening for malignant neoplasms, colon 01/14/2020     Priority: Medium     Added automatically from request for surgery [id number]      Anastomotic ulcer S/P gastric bypass 07/21/2018     Priority: Medium    Anemia 07/21/2018     Priority: Medium    Chest pain 07/20/2018     Priority: Medium    GI bleed  07/20/2018     Priority: Medium    Abdominal pain, epigastric 07/20/2018     Priority: Medium    Hypochromic microcytic anemia 07/20/2018     Priority: Medium    Chronic migraine 01/18/2018     Priority: Medium     Replacing diagnoses that were inactivated after the 10/1/2021 regulatory import.      Chronic pain disorder 01/18/2018     Priority: Medium    Long term (current) use of opiate analgesic 01/12/2018     Priority: Medium     Overview:   Pain Diagnosis failed back syndrome, annular tear L4-5, history lumbar discectomy, s/p lumbar fusion.    Should be seen in the clinic every twelve weeks. Currently on a taper: yes, trying to wean away from the daytime hydrocodone-acetaminophen..    Plan for flares of chronic pain stretches, occasional use of more hydrocodone-acetaminophen for flare.    ED and UC: Opioid medication will not be given in the ER or Urgent Care unless there is a medical emergency unrelated to chronic pain.  Limit to 3 day supply    Refills: Refills will only be given at a scheduled visit.    [facility] Agreement for Opioid Treatment.   Opioid Agreement Date: 5/21/12  Last UDT (Urine Drug Test) on date:    Pain Dx: chronic low back pain  Last Pain Visit: 6-24-11  Preferred Pharmacy: [facility]s  Comments:      7/23/14:  Progressing well.  Over the last month, has decreased hydrocodone-acetaminophen from 6/day to 3/day.  Awaiting appointment with [facility].      Adjustment disorder with mixed anxiety and depressed mood 12/15/2017     Priority: Medium    Other insomnia 12/15/2017     Priority: Medium    Syncope and collapse 09/26/2015     Priority: Medium    Hypertensive urgency 09/26/2015     Priority: Medium    Concussion syndrome 09/26/2015     Priority: Medium    Hydronephrosis, right 04/20/2015     Priority: Medium    Pyelonephritis 04/20/2015     Priority: Medium    Urolithiasis 04/20/2015     Priority: Medium    Annular tear of lumbar disc 12/16/2014     Priority: Medium     Overview:   MRI  2/5/13:  Previous right L4-5 hemilaminectomy and partial facetectomy with mild to moderate right L4-5 facet arthropathy and broad-based bulge of disk and end plate osteophyte on the right with partial high signal intensity zone annular tear of the undersurface of the laterally protruding disk/annulus.      Failed back syndrome of lumbar spine 12/16/2014     Priority: Medium     Overview:   Three back surgeries, including an L3-sacrum AP fusion done at [facility] in 2013.  Has been doing very well in terms of reduction of pain, with minimal requirement of opiate pain medication.      Hx of lumbar discectomy 12/16/2014     Priority: Medium     Overview:   10/2006:   L4-5 Laminectomy and diskectomy L5-S1.   12/12/11:  L5S1 microdiscectomy ([place])      Vitamin B 12 deficiency 06/13/2011     Priority: Medium     Overview:   IMO Update 10/11      Gastric bypass status for obesity 01/04/2011     Priority: Medium     Overview:   12/14/10:  laparoscopic Prasanna-en-Y gastric bypass Dr. Hassan  IMO Update 10/11      Hyperlipidemia 09/23/2008     Priority: Medium     Overview:   IMO Update 10/11      Hypothyroidism 09/23/2008     Priority: Medium     Overview:   IMO Update 10/11  Overview:   IMO Update 10/11          Past Medical History:    Past Medical History:   Diagnosis Date    Arthritis 10/13/2020    Bipolar disorder (H)     Cancer (H)     Cancer (H) 2004    Chronic pain     De Quervain's disease (tenosynovitis) 12/2021    Depressive disorder     Diabetes (H)     History of blood transfusion     Hypertension     Prediabetes     Syncope and collapse     Thyroid disease     Trigger finger 12/2021       Past Surgical History:    Past Surgical History:   Procedure Laterality Date    ARTHROSCOPY SHOULDER Left 11/6/2020    Procedure: LEFT SHOULDER ARTHROSCOPY WITH ROTATOR CUFF REPAIR AND ACROMIUM CLAVICULAR JOINT RESECTION;  Surgeon: Tanner Mujica DO;  Location: [facility]    BACK SURGERY      2008; 2011    COLONOSCOPY N/A 2/13/2020     Procedure: COLONOSCOPY WITH BIOPSY AND TATOOING OF ASCENDING COLON,INCOMEPLETE EXAM;  Surgeon: Kang Cleveland MD;  Location: [facility]    COLONOSCOPY N/A 10/26/2020    Procedure: incomplete colonoscopy ;  Surgeon: Kang Cleveland MD;  Location: [facility]    ESOPHAGOSCOPY, GASTROSCOPY, DUODENOSCOPY (EGD), COMBINED N/A 7/21/2018    Procedure: COMBINED ESOPHAGOSCOPY, GASTROSCOPY, DUODENOSCOPY (EGD);  UPPER ENDOSCOPY WITH BIOPSIES;  Surgeon: Roger Vazquez MD;  Location: [facility]    ESOPHAGOSCOPY, GASTROSCOPY, DUODENOSCOPY (EGD), COMBINED N/A 11/21/2019    Procedure: UPPER ENDOSCOPY with biopsy;  Surgeon: Kang Cleveland MD;  Location: [facility]    HEAD & NECK SURGERY  2008    c4-c5 fusion    HYSTERECTOMY, CRISTIANE  04/2004    uterine cancer; [facility]    INSERT PORT VASCULAR ACCESS N/A 2/28/2020    Procedure: INSERTION, VASCULAR ACCESS PORT LEFT;  Surgeon: Kang Cleveland MD;  Location: [facility]    LAPAROSCOPIC ASSISTED COLECTOMY Right 2/24/2020    Procedure: RIGHT KEVIN COLECTOMY, LYSIS OF ADHESIONS;  Surgeon: Kang Cleveland MD;  Location: [facility]    LAPAROSCOPIC BYPASS GASTRIC  12/2010       Family History:    Family History   Problem Relation Age of Onset    Diabetes Mother     Hypertension Mother     Coronary Artery Disease Mother     Myocardial Infarction Mother     Hypertension Father     Diabetes Maternal Grandmother     Hypertension Maternal Grandfather     Coronary Artery Disease Maternal Grandfather     Cancer Paternal Grandfather         unsure of type of cancer    Bladder Cancer Paternal Grandfather     Diabetes Maternal Aunt     Cerebrovascular Disease Maternal Aunt     Diabetes Maternal Aunt     Cerebrovascular Disease Maternal Aunt     Diabetes Maternal Aunt     Cerebrovascular Disease Maternal Aunt     Hyperlipidemia No family hx of     Breast Cancer No family hx of     Colon Cancer No family hx of     Prostate Cancer No family hx of     Thyroid Disease No family hx of      Anesthesia Reaction No family hx of     Asthma No family hx of     Genetic Disorder No family hx of        Social History:  Marital Status:   [5]  Social History     Tobacco Use    Smoking status: Never    Smokeless tobacco: Never   Vaping Use    Vaping Use: Never used   Substance Use Topics    Alcohol use: No     Comment: sober for 18 years    Drug use: No        Medications:    acetaminophen (TYLENOL) 500 MG tablet  amLODIPine (NORVASC) 5 MG tablet  atorvastatin (LIPITOR) 40 MG tablet  Cyanocobalamin 1000 MCG/ML KIT  diphenhydrAMINE (BENADRYL) 25 MG tablet  EPINEPHrine (ANY BX GENERIC EQUIV) 0.3 MG/0.3ML injection 2-pack  EQ ASPIRIN ADULT LOW DOSE 81 MG EC tablet  famotidine (PEPCID) 20 MG tablet  gabapentin (NEURONTIN) 300 MG capsule  levothyroxine (SYNTHROID/LEVOTHROID) 200 MCG tablet  levothyroxine (SYNTHROID/LEVOTHROID) 50 MCG tablet  montelukast (SINGULAIR) 10 MG tablet  omeprazole (PRILOSEC) 20 MG DR capsule  tiZANidine (ZANAFLEX) 4 MG tablet  tobramycin (TOBREX) 0.3 % ophthalmic solution  traZODone (DESYREL) 50 MG tablet  loratadine (CLARITIN) 10 MG tablet          Review of Systems   Eyes:  Positive for pain, discharge, redness and visual disturbance.   All other systems reviewed and are negative.      Physical Exam   BP: 164/96  Pulse: 97  Temp: 97  F (36.1  C)  Resp: 18  SpO2: 98 %      Physical Exam  Vitals and nursing note reviewed.   Constitutional:       General: She is not in acute distress.     Appearance: Normal appearance. She is well-developed. She is not ill-appearing or toxic-appearing.   HENT:      Head: Normocephalic and atraumatic.   Eyes:      General: Lids are normal. Lids are everted, no foreign bodies appreciated. Vision grossly intact. Gaze aligned appropriately. No visual field deficit.        Left eye: Discharge present.No foreign body or hordeolum.      Extraocular Movements: Extraocular movements intact.      Right eye: Normal extraocular motion and no nystagmus.      Left  eye: Normal extraocular motion and no nystagmus.      Conjunctiva/sclera:      Right eye: No chemosis, exudate or hemorrhage.     Left eye: Left conjunctiva is injected. No chemosis, exudate or hemorrhage.     Pupils: Pupils are equal, round, and reactive to light.      Comments: Erythema noted to left conjunctive.  No purulent discharge noted at this time.  PERRLA.  EOMs intact.  Right conjunctiva mildly injected.   Cardiovascular:      Rate and Rhythm: Normal rate.   Pulmonary:      Effort: Pulmonary effort is normal. No respiratory distress.   Abdominal:      General: Abdomen is flat.   Musculoskeletal:      Cervical back: Normal range of motion and neck supple.   Skin:     General: Skin is warm and dry.      Coloration: Skin is not pale.   Neurological:      Mental Status: She is alert and oriented to person, place, and time.         ED Course                 Procedures              No results found for this or any previous visit (from the past 24 hour(s)).    Medications   tetracaine (PONTOCAINE) 0.5 % ophthalmic solution 1-2 drop (2 drops Right Eye $Given 1/7/24 1837)   fluorescein (FUL-CLIFTON) ophthalmic strip 1 strip (1 strip Left Eye $Given 1/7/24 1837)       Assessments & Plan (with Medical Decision Making)   53-year-old female that presented for evaluation of persistent purulent left eye drainage, eye redness and pain.  She was treated with sulfacetamide sodium 10% and her symptoms have not improved.  Left eye was examined with fluorescein stain today.  No obvious corneal abrasion or foreign bodies appreciated.  No fluorescein uptake.  PERRLA.  EOMs intact.  Visual acuity is 20/50 to the left eye and 20/40 to the right eye with corrective lenses.    Consider the possibility of viral versus bacterial conjunctivitis.  However, patient reports purulent drainage coming from her eye.  Will switch her eyedrops to tobramycin.  I advised patient to schedule an appointment with her primary doctor or eye doctor this  coming week for reevaluation.  She will return to urgent care or emergency department for any worsening or concerning symptoms.    I have reviewed the nursing notes.    I have reviewed the findings, diagnosis, plan and need for follow up with the patient.  This document was prepared using a combination of typing and voice generated software.  While every attempt was made for accuracy, spelling and grammatical errors may exist.         New Prescriptions    TOBRAMYCIN (TOBREX) 0.3 % OPHTHALMIC SOLUTION    Place 1-2 drops Into the left eye every 4 hours (while awake)       Final diagnoses:   Acute conjunctivitis of left eye       1/7/2024   [facility]       Mpofu, Gabyudence, CNP  01/07/24 1934

## 2024-01-08 NOTE — ED TRIAGE NOTES
Pt presents with c/o left eye redness and irritation.  Pt reports was diagnosised with pink eye on Tuesday and given drops   Pt reports is out of drops and eye is still painful and red   No otc meds taken today

## 2024-01-08 NOTE — DISCHARGE INSTRUCTIONS
I did not see a scratch or any foreign body in the eye.  I switched your antibiotics to something different.  Start using this new drops.  Tylenol or ibuprofen as needed for pain.    Schedule an appointment with your primary doctor or an eye doctor in 5 to 7 days for reevaluation.    Return to urgent care or emergency department for any worsening or concerning symptoms.

## 2024-01-22 ENCOUNTER — HOSPITAL ENCOUNTER (OUTPATIENT)
Dept: CT IMAGING | Facility: HOSPITAL | Age: 54
Discharge: HOME | End: 2024-01-22
Attending: NURSE PRACTITIONER | Admitting: NURSE PRACTITIONER
Payer: COMMERCIAL

## 2024-01-22 DIAGNOSIS — Z85.038 HISTORY OF COLON CANCER, STAGE III: ICD-10-CM

## 2024-01-22 PROCEDURE — 71260 CT THORAX DX C+: CPT

## 2024-01-22 PROCEDURE — 250N000011 HC RX IP 250 OP 636: Performed by: STUDENT IN AN ORGANIZED HEALTH CARE EDUCATION/TRAINING PROGRAM

## 2024-01-22 RX ORDER — IOPAMIDOL 755 MG/ML
124 INJECTION, SOLUTION INTRAVASCULAR ONCE
Status: COMPLETED | OUTPATIENT
Start: 2024-01-22 | End: 2024-01-22

## 2024-01-22 RX ADMIN — IOPAMIDOL 124 ML: 755 INJECTION, SOLUTION INTRAVENOUS at 10:24

## 2024-01-24 NOTE — PROGRESS NOTES
Oncology Follow-up Visit    Reason for Visit:  Constantino is a 53 year old woman with a history of Stage III adenocarcinoma of the colon, who presents to the clinic today for routine follow-up.    Nursing Note and documentation reviewed: Yes    Interval History:   Constantino notes that she is doing alright. As always, she has been working significant hours, about 60 hours per week. Her energy levels are reflective of this, tired at times but stays busy. She denies recent signs of infection. Continues to work with allergist for intermittent hives. Denies bleeding concerns. No melana or hematochezia. Not taking any iron currently. Missed a couple B12 doses but gets almost monthly. Has developed in the last week or so nausea after she eats. No particular foods. If she doesn't develop nausea, she develops diarrhea shortly after she eats. No abdominal pain or bloating which is persistent.     Oncologic History:   11/21/19 to 11/21/2019 Hospitalized with a hemoglobin of 5.7 underwent EGD with no evidence of active bleeding.  12/12/2019 Seen by Dr. Cook and established care with her as her new PCP.  Referral for screening colonoscopy was completed.  2/13/2020 Underwent colonoscopy by Dr. Cleveland where a near obstructing lesion in the right colon was found. Pathology showed an invasive moderately differentiated adenocarcinoma.  CT the abdomen and pelvis showed a mass in the junction of the cecum and ascending colon highly suspicious for malignancy.  2/24/2020 Underwent laparoscopic-assisted right hemicolectomy by Dr. Cleveland that was converted to open right hemicolectomy when it was noted that the tumor was adherent to the pelvic brim; final pathology showed mildly differentiated adenocarcinoma of the right colon with 1/32 lymph nodes positive; there was perineural invasion; tumor budding and acutely inflamed tumor and reactive lymph nodes with peritonitis; the omentum showed mild peritonitis; margins were negative; patient was  staged [id number]; IIIb adenocarcinoma of the right colon  2/28/2020 Port-A-Cath placement  3/25/2020 PET scan which showed no evidence of distant metastatic disease  3/30/2020 Seen by Dr. Palma with Medical Oncology with recommendation for XELOX; capecitabine 850 mg/m2 p.o. b.i.d. on days 1 through 14 of a 21-day cycle, oxaliplatin 130 mg/m2 to be given on day 1 x4 cycles.  4/10/2020 Seen by Dr. Bryant with Radiation Oncology with no recommendation for radiation therapy at this time related to negative margins on pathology  4/17/2020 initiation of chemotherapy  6/22/2020 Completion of 4 cycles of XELOX  10/26/2020 Colonoscopy completed. Unable to visualize the anastomosis of the small bowel to the transverse colon  02/23/2022 CT CAP without evidence of disease recurrence or progression  8/12/2022 CT CAP without evidence of disease recurrence or progression  04/21/2023 Planned colonoscopy with Dr. Fishman which patient canceled herself     MMR proteins intact  CEA prior to surgery was <0.5     Current Chemo Regime/TX:  Surveillance     Previous treatment: Oxaliplatin/Capecitabine x 4 cycles    Past Medical History:   Diagnosis Date    Arthritis 10/13/2020    Bipolar disorder (H)     per previous notes and documentation    Cancer (H)     colon    Cancer (H) 2004    uterine    Chronic pain     De Quervain's disease (tenosynovitis) 12/2021    Depressive disorder     Diabetes (H)     History of blood transfusion     Hypertension     Prediabetes     Syncope and collapse     Thyroid disease     Trigger finger 12/2021       Past Surgical History:   Procedure Laterality Date    ARTHROSCOPY SHOULDER Left 11/6/2020    Procedure: LEFT SHOULDER ARTHROSCOPY WITH ROTATOR CUFF REPAIR AND ACROMIUM CLAVICULAR JOINT RESECTION;  Surgeon: Tanner Mujica DO;  Location: [facility]    BACK SURGERY      2008; 2011    COLONOSCOPY N/A 2/13/2020    Procedure: COLONOSCOPY WITH BIOPSY AND TATOOING OF ASCENDING COLON,INCOMEPLETE EXAM;  Surgeon: Cristofer  Kang Carrasco MD;  Location: [facility]    COLONOSCOPY N/A 10/26/2020    Procedure: incomplete colonoscopy ;  Surgeon: Kang Cleveland MD;  Location: [facility]    ESOPHAGOSCOPY, GASTROSCOPY, DUODENOSCOPY (EGD), COMBINED N/A 7/21/2018    Procedure: COMBINED ESOPHAGOSCOPY, GASTROSCOPY, DUODENOSCOPY (EGD);  UPPER ENDOSCOPY WITH BIOPSIES;  Surgeon: Roger Vazquez MD;  Location: [facility]    ESOPHAGOSCOPY, GASTROSCOPY, DUODENOSCOPY (EGD), COMBINED N/A 11/21/2019    Procedure: UPPER ENDOSCOPY with biopsy;  Surgeon: Kang Cleveland MD;  Location: [facility]    HEAD & NECK SURGERY  2008    c4-c5 fusion    HYSTERECTOMY, CRISTIANE  04/2004    uterine cancer; [facility]    INSERT PORT VASCULAR ACCESS N/A 2/28/2020    Procedure: INSERTION, VASCULAR ACCESS PORT LEFT;  Surgeon: Kang Cleveland MD;  Location: [facility]    LAPAROSCOPIC ASSISTED COLECTOMY Right 2/24/2020    Procedure: RIGHT KEVIN COLECTOMY, LYSIS OF ADHESIONS;  Surgeon: Kang Cleveland MD;  Location: [facility]    LAPAROSCOPIC BYPASS GASTRIC  12/2010       Family History   Problem Relation Age of Onset    Diabetes Mother     Hypertension Mother     Coronary Artery Disease Mother     Myocardial Infarction Mother     Hypertension Father     Diabetes Maternal Grandmother     Hypertension Maternal Grandfather     Coronary Artery Disease Maternal Grandfather     Cancer Paternal Grandfather         unsure of type of cancer    Bladder Cancer Paternal Grandfather     Diabetes Maternal Aunt     Cerebrovascular Disease Maternal Aunt     Diabetes Maternal Aunt     Cerebrovascular Disease Maternal Aunt     Diabetes Maternal Aunt     Cerebrovascular Disease Maternal Aunt     Hyperlipidemia No family hx of     Breast Cancer No family hx of     Colon Cancer No family hx of     Prostate Cancer No family hx of     Thyroid Disease No family hx of     Anesthesia Reaction No family hx of     Asthma No family hx of     Genetic Disorder No family hx of        Social History      Socioeconomic History    Marital status:      Spouse name: Not on file    Number of children: 2    Years of education: Not on file    Highest education level: Not on file   Occupational History    Occupation: PCA     Comment: currently not working, unemployment    Tobacco Use    Smoking status: Never    Smokeless tobacco: Never   Vaping Use    Vaping Use: Never used   Substance and Sexual Activity    Alcohol use: No     Comment: sober for 18 years    Drug use: No    Sexual activity: Never   Other Topics Concern    Parent/sibling w/ CABG, MI or angioplasty before 65F 55M? Yes     Comment: Mother   Social History Narrative    Not on file     Social Determinants of Health     Financial Resource Strain: Low Risk  (4/9/2020)    Overall Financial Resource Strain (CARDIA)     Difficulty of Paying Living Expenses: Not hard at all   Food Insecurity: Not on file   Transportation Needs: Unknown (4/9/2020)    PRAPARE - Transportation     Lack of Transportation (Medical): Patient declined     Lack of Transportation (Non-Medical): Patient declined   Physical Activity: Not on file   Stress: Not on file   Social Connections: Not on file   Interpersonal Safety: Not on file   Housing Stability: Not on file       Current Outpatient Medications   Medication    acetaminophen (TYLENOL) 500 MG tablet    atorvastatin (LIPITOR) 40 MG tablet    Cyanocobalamin 1000 MCG/ML KIT    diphenhydrAMINE (BENADRYL) 25 MG tablet    EQ ASPIRIN ADULT LOW DOSE 81 MG EC tablet    famotidine (PEPCID) 20 MG tablet    gabapentin (NEURONTIN) 300 MG capsule    levothyroxine (SYNTHROID/LEVOTHROID) 200 MCG tablet    levothyroxine (SYNTHROID/LEVOTHROID) 50 MCG tablet    loratadine (CLARITIN) 10 MG tablet    omeprazole (PRILOSEC) 20 MG DR capsule    tiZANidine (ZANAFLEX) 4 MG tablet    amLODIPine (NORVASC) 5 MG tablet    EPINEPHrine (ANY BX GENERIC EQUIV) 0.3 MG/0.3ML injection 2-pack    montelukast (SINGULAIR) 10 MG tablet    tobramycin (TOBREX) 0.3 %  ophthalmic solution    traZODone (DESYREL) 50 MG tablet     No current facility-administered medications for this visit.        Allergies   Allergen Reactions    Aspartame      Other reaction(s): Other - Describe In Comment Field, Seizures  convulsions    Bee Venom Anaphylaxis    Bupropion Anaphylaxis     Throat gets tight and hives. Denies allergic reaction    Food Anaphylaxis     Peppers-anaphylaxis if eaten, if touched hands swell and reddened    Food Allergy Formula Anaphylaxis     Peppers, patient reports green peppers close off her throat    Ibuprofen Anaphylaxis    No Clinical Screening - See Comments Anaphylaxis     Peppers, patient reports green peppers close off her throat    Piper Anaphylaxis     Peppers--green, black, red, chili, etc. Derm symptoms, hives;  Strawberries--(cooked) Itching, breathing difficulties, GI symptoms. Mushrooms--GI symptoms, Hives.Spinach (cooked) throat irritation  (Listed on [facility] problem list.)    Fentanyl Swelling     Lips and tongue swelled.  Swelling of lips and tongue    Adhesive Tape      blisters    Amitriptyline Other (See Comments)     Respiratory symptoms - denies allergic reaction    Duloxetine Hcl      Mood cycling leading to elevated mood state.     Liquid Adhesive Dermatitis     blisters  Blisters  Silk tape and tegaderm is fine    Oxcarbazepine Other (See Comments)     Throat gets tight and hives. Denies allergic reaction  Throat gets tight and hives-      Prozac [Fluoxetine]      Other reaction(s): *Unknown  uncontrolled muscle spasms    Valproic Acid      Respiratory symptoms ([facility]). Denies allergic reaction    Ziprasidone      Throat gets tight and hives. Denies allergic reaction       Review Of Systems:  A complete review of systems is negative except for the above mentioned items in the interval history.     ECOG Performance Status: 0    Physical Exam:  /70   Pulse 82   Temp 96.9  F (36.1  C) (Tympanic)   Resp  "20   Ht 1.702 m (5' 7\")   Wt 87.9 kg (193 lb 12.6 oz)   SpO2 98%   BMI 30.35 kg/m    GENERAL APPEARANCE: Healthy, alert and in no acute distress.  HEENT: Eyes appear normal without scleral icterus. Extraocular movements intact.  NECK:   Supple with normal range of motion. No asymmetry or masses.  LYMPHATICS: No palpable cervical, supraclavicular nodes.  RESP: Lungs clear to auscultation bilaterally, respirations regular and easy.  CARDIOVASCULAR: Regular rate and rhythm. Normal S1, S2; no murmur, gallop, or rub.  ABDOMEN: Soft, non-tender, non-distended. Bowel sounds auscultated all 4 quadrants. No palpable organomegaly or masses.  MUSCULOSKELETAL: Extremities without gross deformities noted. No edema noted.   SKIN: No suspicious lesions or rashes.  NEURO: Alert and oriented x 3.  Gait steady.  PSYCHIATRIC: Mentation and affect appear normal.  Mood appropriate.    Laboratory: B12 and CEA pending.     Component      Latest Ref Rng 1/26/2024  8:04 AM   WBC      4.0 - 11.0 10e3/uL 5.1    RBC Count      3.80 - 5.20 10e6/uL 3.89    Hemoglobin      11.7 - 15.7 g/dL 11.4 (L)    Hematocrit      35.0 - 47.0 % 34.6 (L)    MCV      78 - 100 fL 89    MCH      26.5 - 33.0 pg 29.3    MCHC      31.5 - 36.5 g/dL 32.9    RDW      10.0 - 15.0 % 13.5    Platelet Count      150 - 450 10e3/uL 181    % Neutrophils      % 58    % Lymphocytes      % 32    % Monocytes      % 10    % Eosinophils      % 0    % Basophils      % 0    % Immature Granulocytes      % 0    NRBCs per 100 WBC      <1 /100 0    Absolute Neutrophils      1.6 - 8.3 10e3/uL 2.9    Absolute Lymphocytes      0.8 - 5.3 10e3/uL 1.6    Absolute Monocytes      0.0 - 1.3 10e3/uL 0.5    Absolute Eosinophils      0.0 - 0.7 10e3/uL 0.0    Absolute Basophils      0.0 - 0.2 10e3/uL 0.0    Absolute Immature Granulocytes      <=0.4 10e3/uL 0.0    Absolute NRBCs      10e3/uL 0.0    Sodium      135 - 145 mmol/L 142    Potassium      3.4 - 5.3 mmol/L 3.9    Carbon Dioxide (CO2)      " 22 - 29 mmol/L 25    Anion Gap      7 - 15 mmol/L 8    Urea Nitrogen      6.0 - 20.0 mg/dL 13.9    Creatinine      0.51 - 0.95 mg/dL 0.78    GFR Estimate      >60 mL/min/1.73m2 90    Calcium      8.6 - 10.0 mg/dL 8.6    Chloride      98 - 107 mmol/L 109 (H)    Glucose      70 - 99 mg/dL 105 (H)    Alkaline Phosphatase      40 - 150 U/L 71    AST      0 - 45 U/L 42    ALT      0 - 50 U/L 42    Protein Total      6.4 - 8.3 g/dL 6.5    Albumin      3.5 - 5.2 g/dL 4.0    Bilirubin Total      <=1.2 mg/dL 0.5    Iron      37 - 145 ug/dL 66    Iron Binding Capacity      240 - 430 ug/dL 398    Iron Sat Index      15 - 46 % 17    Ferritin      11 - 328 ng/mL 37       Legend:  (L) Low  (H) High    Imaging Studies:      CT CHEST/ABDOMEN/PELVIS W CONTRAST     HISTORY: 53 yearsFemale hx Stage III colon cancer, surveillance;  History of colon cancer, stage III     TECHNIQUE: Axial CT imaging of the chest abdomen and pelvis was  performed with contrast. Coronal and sagittal reconstructions were  obtained.  This exam was performed using one or more of the following dose  reduction techniques:  Automated exposure control, adjustment of the RON and/or KV according  to patient's size, and/or use of iterative reconstruction technique.     COMPARISON: 7/19/2023     FINDINGS:     CT CHEST:  There is no mediastinal or hilar or axillary lymphadenopathy.        The lungs are clear. No consolidating airspace opacities are present.  No concerning pulmonary nodules or masses are present. There is an  unchanged small subpleural granuloma in the right upper lobe series 4  image 107.         No concerning osseous lesions are identified     IMPRESSION CHEST: No evidence of metastatic disease. Clear chest.        CT ABDOMEN AND PELVIS:     Liver: There is normal enhancement of the hepatic parenchyma.     Gallbladder: Surgically absent. There is no biliary dilatation.     Spleen: Unremarkable     Pancreas: Unremarkable     Adrenals: Unremarkable      Kidneys: Unremarkable     Bowel: There are surgical staples at the gastroesophageal junction. No  abnormally distended or thickened loops of bowel are present. There  are surgical staples in the right abdomen, suspect surgical change of  right hemicolectomy.     Lymph nodes: There are surgical staples in the retroperitoneum,  periaortic region. There is no lymphadenopathy.     PELVIS: There is no evidence of mass lymphadenopathy or abnormal fluid  collection.     IMPRESSION ABDOMEN AND PELVIS: No evidence of metastatic or recurrent  disease at this time. No concerning interval change.     LULY KNOWLES MD     ASSESSMENT/PLAN:  #1 Colon cancer:  Stage IIIB adenocarcinoma of the ascending colon diagnosed February 2020. She completed adjuvant chemotherapy with XELOX x 4 cycles on 6/22/2020 and is being followed with surveillance. Her tumor marker today is pending but historically has been stable. Her last colonoscopy was completed Oct 2020. Unfortuantely, this was incomplete as the anastomosis site was unable to be visuliazed. Patient has been educated on importance of repeating this procedure as she is overdue. This was finally scheduled with Dr. Fishman but was canceled by patient prior to having it completed.    Overall, she continues to feel alright. She has developed some nausea and diarrhea in the last week or so after eating. Likewise she has developed a new anemia. Her B12 is pending. Seeing as we are overdue for her colonoscopy, and she now has a drop in her hemoglobin, patient agreeable to proceed with colonoscopy. Referral placed. I will follow-up after that. Likewise, labs only in 1 month to see if hemoglobin continuing to drop. We will continue B12 monthly, depending on what labs show. Plan for CT in 6 months with labs prior, sooner depending on results of more recent labs and colonoscopy.      #2 Hx Iron deficiency: Labs stable in last 3 months, low normal. If replacement is needed, patient has  previously failed oral therapy likely due to her hx of gastric bypass so will need to be replaced with IV iron.      #3 B12 deficiency: Continue monthly replacement.     #4 Nausea Occurring after food for last week or so. No acute pain. Will proceed with colonoscopy given anemia and her hx colon cancer. Advised this could be a viral illness however, to notify us if this becomes more persistent and fails to improve.        Patient in agreement with plan and verbalizes understanding. Agrees to call with any questions or concerns.    41 minutes spent in the patient's encounter today with time spent in review of patient's chart along with chart preparation and review of the treatment plan and signing of treatment plan.  Time was also spent with the patient in obtaining a review of systems and performing a physical exam along with detailed review of all test results. Time was also spent in discussing plan for future follow-up and relating instructions for follow-up and in placing future orders.    JUNIOR [facility]  Medical Oncology

## 2024-01-26 ENCOUNTER — ONCOLOGY VISIT (OUTPATIENT)
Dept: ONCOLOGY | Facility: OTHER | Age: 54
End: 2024-01-26
Attending: NURSE PRACTITIONER
Payer: COMMERCIAL

## 2024-01-26 ENCOUNTER — LAB (OUTPATIENT)
Dept: LAB | Facility: OTHER | Age: 54
End: 2024-01-26
Payer: COMMERCIAL

## 2024-01-26 VITALS
TEMPERATURE: 96.9 F | DIASTOLIC BLOOD PRESSURE: 70 MMHG | BODY MASS INDEX: 30.42 KG/M2 | RESPIRATION RATE: 20 BRPM | OXYGEN SATURATION: 98 % | HEART RATE: 82 BPM | HEIGHT: 67 IN | SYSTOLIC BLOOD PRESSURE: 118 MMHG | WEIGHT: 193.78 LBS

## 2024-01-26 DIAGNOSIS — Z85.038 HISTORY OF COLON CANCER, STAGE III: ICD-10-CM

## 2024-01-26 DIAGNOSIS — E53.8 VITAMIN B 12 DEFICIENCY: ICD-10-CM

## 2024-01-26 DIAGNOSIS — E61.1 IRON DEFICIENCY: ICD-10-CM

## 2024-01-26 DIAGNOSIS — D63.0 ANEMIA IN NEOPLASTIC DISEASE: ICD-10-CM

## 2024-01-26 DIAGNOSIS — Z85.038 HISTORY OF COLON CANCER, STAGE III: Primary | ICD-10-CM

## 2024-01-26 DIAGNOSIS — R11.0 NAUSEA: ICD-10-CM

## 2024-01-26 LAB
ALBUMIN SERPL BCG-MCNC: 4 G/DL (ref 3.5–5.2)
ALP SERPL-CCNC: 71 U/L (ref 40–150)
ALT SERPL W P-5'-P-CCNC: 42 U/L (ref 0–50)
ANION GAP SERPL CALCULATED.3IONS-SCNC: 8 MMOL/L (ref 7–15)
AST SERPL W P-5'-P-CCNC: 42 U/L (ref 0–45)
BASOPHILS # BLD AUTO: 0 10E3/UL (ref 0–0.2)
BASOPHILS NFR BLD AUTO: 0 %
BILIRUB SERPL-MCNC: 0.5 MG/DL
BUN SERPL-MCNC: 13.9 MG/DL (ref 6–20)
CALCIUM SERPL-MCNC: 8.6 MG/DL (ref 8.6–10)
CHLORIDE SERPL-SCNC: 109 MMOL/L (ref 98–107)
CREAT SERPL-MCNC: 0.78 MG/DL (ref 0.51–0.95)
DEPRECATED HCO3 PLAS-SCNC: 25 MMOL/L (ref 22–29)
EGFRCR SERPLBLD CKD-EPI 2021: 90 ML/MIN/1.73M2
EOSINOPHIL # BLD AUTO: 0 10E3/UL (ref 0–0.7)
EOSINOPHIL NFR BLD AUTO: 0 %
ERYTHROCYTE [DISTWIDTH] IN BLOOD BY AUTOMATED COUNT: 13.5 % (ref 10–15)
FERRITIN SERPL-MCNC: 37 NG/ML (ref 11–328)
GLUCOSE SERPL-MCNC: 105 MG/DL (ref 70–99)
HCT VFR BLD AUTO: 34.6 % (ref 35–47)
HGB BLD-MCNC: 11.4 G/DL (ref 11.7–15.7)
IMM GRANULOCYTES # BLD: 0 10E3/UL
IMM GRANULOCYTES NFR BLD: 0 %
IRON BINDING CAPACITY (ROCHE): 398 UG/DL (ref 240–430)
IRON SATN MFR SERPL: 17 % (ref 15–46)
IRON SERPL-MCNC: 66 UG/DL (ref 37–145)
LYMPHOCYTES # BLD AUTO: 1.6 10E3/UL (ref 0.8–5.3)
LYMPHOCYTES NFR BLD AUTO: 32 %
MCH RBC QN AUTO: 29.3 PG (ref 26.5–33)
MCHC RBC AUTO-ENTMCNC: 32.9 G/DL (ref 31.5–36.5)
MCV RBC AUTO: 89 FL (ref 78–100)
MONOCYTES # BLD AUTO: 0.5 10E3/UL (ref 0–1.3)
MONOCYTES NFR BLD AUTO: 10 %
NEUTROPHILS # BLD AUTO: 2.9 10E3/UL (ref 1.6–8.3)
NEUTROPHILS NFR BLD AUTO: 58 %
NRBC # BLD AUTO: 0 10E3/UL
NRBC BLD AUTO-RTO: 0 /100
PLATELET # BLD AUTO: 181 10E3/UL (ref 150–450)
POTASSIUM SERPL-SCNC: 3.9 MMOL/L (ref 3.4–5.3)
PROT SERPL-MCNC: 6.5 G/DL (ref 6.4–8.3)
RBC # BLD AUTO: 3.89 10E6/UL (ref 3.8–5.2)
SODIUM SERPL-SCNC: 142 MMOL/L (ref 135–145)
WBC # BLD AUTO: 5.1 10E3/UL (ref 4–11)

## 2024-01-26 PROCEDURE — 82607 VITAMIN B-12: CPT

## 2024-01-26 PROCEDURE — 83550 IRON BINDING TEST: CPT

## 2024-01-26 PROCEDURE — 36415 COLL VENOUS BLD VENIPUNCTURE: CPT

## 2024-01-26 PROCEDURE — 85025 COMPLETE CBC W/AUTO DIFF WBC: CPT

## 2024-01-26 PROCEDURE — 82378 CARCINOEMBRYONIC ANTIGEN: CPT

## 2024-01-26 PROCEDURE — 83540 ASSAY OF IRON: CPT

## 2024-01-26 PROCEDURE — 99215 OFFICE O/P EST HI 40 MIN: CPT | Performed by: NURSE PRACTITIONER

## 2024-01-26 PROCEDURE — 82728 ASSAY OF FERRITIN: CPT

## 2024-01-26 PROCEDURE — 80053 COMPREHEN METABOLIC PANEL: CPT

## 2024-01-26 ASSESSMENT — PAIN SCALES - GENERAL: PAINLEVEL: NO PAIN (0)

## 2024-01-26 NOTE — NURSING NOTE
"Oncology Rooming Note    January 26, 2024 8:23 AM   Constantino Warren is a 53 year old female who presents for:    Chief Complaint   Patient presents with    Oncology Clinic Visit     Follow up History of colon cancer, stage III       Initial Vitals: /70   Pulse 82   Temp 96.9  F (36.1  C) (Tympanic)   Resp 20   Ht 1.702 m (5' 7\")   Wt 87.9 kg (193 lb 12.6 oz)   SpO2 98%   BMI 30.35 kg/m   Estimated body mass index is 30.35 kg/m  as calculated from the following:    Height as of this encounter: 1.702 m (5' 7\").    Weight as of this encounter: 87.9 kg (193 lb 12.6 oz). Body surface area is 2.04 meters squared.  No Pain (0) Comment: Data Unavailable   No LMP recorded. Patient has had a hysterectomy.  Allergies reviewed: Yes  Medications reviewed: Yes    Medications: Medication refills not needed today.  Pharmacy name entered into [facility]:    [id number] - [place], [place] - [address]   [facility] - [place] [place] [facility] [address]  [facility] [place] - [address]  [facility]  [facility] - [place] [facility] [address] [facility]    Frailty Screening:   Is the patient here for a new oncology consult visit in cancer care? 2. No            Juana Mo LPN             "

## 2024-01-27 LAB
CEA SERPL-MCNC: 3 NG/ML
VIT B12 SERPL-MCNC: 405 PG/ML (ref 232–1245)

## 2024-01-31 ENCOUNTER — HOSPITAL ENCOUNTER (EMERGENCY)
Facility: HOSPITAL | Age: 54
Discharge: HOME | End: 2024-01-31
Attending: INTERNAL MEDICINE | Admitting: INTERNAL MEDICINE
Payer: COMMERCIAL

## 2024-01-31 ENCOUNTER — APPOINTMENT (OUTPATIENT)
Dept: GENERAL RADIOLOGY | Facility: HOSPITAL | Age: 54
End: 2024-01-31
Attending: INTERNAL MEDICINE
Payer: COMMERCIAL

## 2024-01-31 ENCOUNTER — APPOINTMENT (OUTPATIENT)
Dept: CT IMAGING | Facility: HOSPITAL | Age: 54
End: 2024-01-31
Attending: INTERNAL MEDICINE
Payer: COMMERCIAL

## 2024-01-31 VITALS
HEART RATE: 55 BPM | OXYGEN SATURATION: 97 % | TEMPERATURE: 96.2 F | SYSTOLIC BLOOD PRESSURE: 105 MMHG | RESPIRATION RATE: 18 BRPM | DIASTOLIC BLOOD PRESSURE: 67 MMHG

## 2024-01-31 DIAGNOSIS — D64.9 ANEMIA, UNSPECIFIED TYPE: ICD-10-CM

## 2024-01-31 DIAGNOSIS — I95.9 HYPOTENSION, UNSPECIFIED HYPOTENSION TYPE: ICD-10-CM

## 2024-01-31 DIAGNOSIS — E03.9 HYPOTHYROIDISM, UNSPECIFIED TYPE: ICD-10-CM

## 2024-01-31 LAB
ALBUMIN SERPL BCG-MCNC: 3.6 G/DL (ref 3.5–5.2)
ALBUMIN UR-MCNC: 20 MG/DL
ALP SERPL-CCNC: 61 U/L (ref 40–150)
ALT SERPL W P-5'-P-CCNC: 31 U/L (ref 0–50)
ANION GAP SERPL CALCULATED.3IONS-SCNC: 11 MMOL/L (ref 7–15)
APPEARANCE UR: CLEAR
AST SERPL W P-5'-P-CCNC: 31 U/L (ref 0–45)
ATRIAL RATE - MUSE: 56 BPM
BACTERIA #/AREA URNS HPF: ABNORMAL /HPF
BASOPHILS # BLD AUTO: 0 10E3/UL (ref 0–0.2)
BASOPHILS NFR BLD AUTO: 0 %
BILIRUB SERPL-MCNC: 0.4 MG/DL
BILIRUB UR QL STRIP: NEGATIVE
BUN SERPL-MCNC: 15.3 MG/DL (ref 6–20)
CALCIUM SERPL-MCNC: 8.1 MG/DL (ref 8.6–10)
CHLORIDE SERPL-SCNC: 108 MMOL/L (ref 98–107)
COLOR UR AUTO: YELLOW
CREAT SERPL-MCNC: 0.73 MG/DL (ref 0.51–0.95)
CRP SERPL-MCNC: <3 MG/L
DEPRECATED HCO3 PLAS-SCNC: 19 MMOL/L (ref 22–29)
DIASTOLIC BLOOD PRESSURE - MUSE: NORMAL MMHG
EGFRCR SERPLBLD CKD-EPI 2021: >90 ML/MIN/1.73M2
EOSINOPHIL # BLD AUTO: 0 10E3/UL (ref 0–0.7)
EOSINOPHIL NFR BLD AUTO: 0 %
ERYTHROCYTE [DISTWIDTH] IN BLOOD BY AUTOMATED COUNT: 13.6 % (ref 10–15)
GLUCOSE SERPL-MCNC: 150 MG/DL (ref 70–99)
GLUCOSE UR STRIP-MCNC: 50 MG/DL
HCT VFR BLD AUTO: 30.7 % (ref 35–47)
HGB BLD-MCNC: 10 G/DL (ref 11.7–15.7)
HGB UR QL STRIP: NEGATIVE
HYALINE CASTS: 9 /LPF
IMM GRANULOCYTES # BLD: 0 10E3/UL
IMM GRANULOCYTES NFR BLD: 0 %
INTERPRETATION ECG - MUSE: NORMAL
KETONES UR STRIP-MCNC: NEGATIVE MG/DL
LACTATE SERPL-SCNC: 1.4 MMOL/L (ref 0.7–2)
LEUKOCYTE ESTERASE UR QL STRIP: ABNORMAL
LYMPHOCYTES # BLD AUTO: 1.6 10E3/UL (ref 0.8–5.3)
LYMPHOCYTES NFR BLD AUTO: 29 %
MCH RBC QN AUTO: 29 PG (ref 26.5–33)
MCHC RBC AUTO-ENTMCNC: 32.6 G/DL (ref 31.5–36.5)
MCV RBC AUTO: 89 FL (ref 78–100)
MONOCYTES # BLD AUTO: 0.5 10E3/UL (ref 0–1.3)
MONOCYTES NFR BLD AUTO: 10 %
MUCOUS THREADS #/AREA URNS LPF: PRESENT /LPF
NEUTROPHILS # BLD AUTO: 3.2 10E3/UL (ref 1.6–8.3)
NEUTROPHILS NFR BLD AUTO: 61 %
NITRATE UR QL: NEGATIVE
NRBC # BLD AUTO: 0 10E3/UL
NRBC BLD AUTO-RTO: 0 /100
P AXIS - MUSE: 38 DEGREES
PH UR STRIP: 5.5 [PH] (ref 4.7–8)
PLATELET # BLD AUTO: 151 10E3/UL (ref 150–450)
POTASSIUM SERPL-SCNC: 3.7 MMOL/L (ref 3.4–5.3)
PR INTERVAL - MUSE: 188 MS
PROT SERPL-MCNC: 5.8 G/DL (ref 6.4–8.3)
QRS DURATION - MUSE: 80 MS
QT - MUSE: 458 MS
QTC - MUSE: 441 MS
R AXIS - MUSE: 1 DEGREES
RBC # BLD AUTO: 3.45 10E6/UL (ref 3.8–5.2)
RBC URINE: 1 /HPF
SODIUM SERPL-SCNC: 138 MMOL/L (ref 135–145)
SP GR UR STRIP: 1.03 (ref 1–1.03)
SQUAMOUS EPITHELIAL: <1 /HPF
SYSTOLIC BLOOD PRESSURE - MUSE: NORMAL MMHG
T AXIS - MUSE: 9 DEGREES
T4 FREE SERPL-MCNC: 0.79 NG/DL (ref 0.9–1.7)
TROPONIN T SERPL HS-MCNC: 10 NG/L
TSH SERPL DL<=0.005 MIU/L-ACNC: 41.99 UIU/ML (ref 0.3–4.2)
UROBILINOGEN UR STRIP-MCNC: 4 MG/DL
VENTRICULAR RATE- MUSE: 56 BPM
WBC # BLD AUTO: 5.3 10E3/UL (ref 4–11)
WBC URINE: 3 /HPF

## 2024-01-31 PROCEDURE — 71045 X-RAY EXAM CHEST 1 VIEW: CPT

## 2024-01-31 PROCEDURE — 93010 ELECTROCARDIOGRAM REPORT: CPT | Performed by: INTERNAL MEDICINE

## 2024-01-31 PROCEDURE — 84484 ASSAY OF TROPONIN QUANT: CPT | Performed by: INTERNAL MEDICINE

## 2024-01-31 PROCEDURE — 258N000003 HC RX IP 258 OP 636: Performed by: INTERNAL MEDICINE

## 2024-01-31 PROCEDURE — 93005 ELECTROCARDIOGRAM TRACING: CPT

## 2024-01-31 PROCEDURE — 83605 ASSAY OF LACTIC ACID: CPT | Performed by: INTERNAL MEDICINE

## 2024-01-31 PROCEDURE — 70450 CT HEAD/BRAIN W/O DYE: CPT

## 2024-01-31 PROCEDURE — 96374 THER/PROPH/DIAG INJ IV PUSH: CPT

## 2024-01-31 PROCEDURE — 82040 ASSAY OF SERUM ALBUMIN: CPT | Performed by: INTERNAL MEDICINE

## 2024-01-31 PROCEDURE — 87086 URINE CULTURE/COLONY COUNT: CPT | Performed by: INTERNAL MEDICINE

## 2024-01-31 PROCEDURE — 99284 EMERGENCY DEPT VISIT MOD MDM: CPT | Performed by: INTERNAL MEDICINE

## 2024-01-31 PROCEDURE — 85025 COMPLETE CBC W/AUTO DIFF WBC: CPT | Performed by: INTERNAL MEDICINE

## 2024-01-31 PROCEDURE — 96361 HYDRATE IV INFUSION ADD-ON: CPT

## 2024-01-31 PROCEDURE — 86140 C-REACTIVE PROTEIN: CPT | Performed by: INTERNAL MEDICINE

## 2024-01-31 PROCEDURE — 99285 EMERGENCY DEPT VISIT HI MDM: CPT | Mod: 25

## 2024-01-31 PROCEDURE — 96375 TX/PRO/DX INJ NEW DRUG ADDON: CPT

## 2024-01-31 PROCEDURE — 36415 COLL VENOUS BLD VENIPUNCTURE: CPT | Performed by: INTERNAL MEDICINE

## 2024-01-31 PROCEDURE — 84439 ASSAY OF FREE THYROXINE: CPT | Performed by: INTERNAL MEDICINE

## 2024-01-31 PROCEDURE — 81001 URINALYSIS AUTO W/SCOPE: CPT | Performed by: INTERNAL MEDICINE

## 2024-01-31 PROCEDURE — 84443 ASSAY THYROID STIM HORMONE: CPT | Performed by: INTERNAL MEDICINE

## 2024-01-31 PROCEDURE — 250N000011 HC RX IP 250 OP 636: Mod: JZ | Performed by: INTERNAL MEDICINE

## 2024-01-31 RX ORDER — DIPHENHYDRAMINE HYDROCHLORIDE 50 MG/ML
25 INJECTION INTRAMUSCULAR; INTRAVENOUS ONCE
Status: COMPLETED | OUTPATIENT
Start: 2024-01-31 | End: 2024-01-31

## 2024-01-31 RX ORDER — METOCLOPRAMIDE HYDROCHLORIDE 5 MG/ML
10 INJECTION INTRAMUSCULAR; INTRAVENOUS ONCE
Status: COMPLETED | OUTPATIENT
Start: 2024-01-31 | End: 2024-01-31

## 2024-01-31 RX ADMIN — METOCLOPRAMIDE HYDROCHLORIDE 10 MG: 5 INJECTION INTRAMUSCULAR; INTRAVENOUS at 03:25

## 2024-01-31 RX ADMIN — SODIUM CHLORIDE 1000 ML: 9 INJECTION, SOLUTION INTRAVENOUS at 01:26

## 2024-01-31 RX ADMIN — DIPHENHYDRAMINE HYDROCHLORIDE 25 MG: 50 INJECTION, SOLUTION INTRAMUSCULAR; INTRAVENOUS at 03:25

## 2024-01-31 ASSESSMENT — ENCOUNTER SYMPTOMS
NECK PAIN: 0
SHORTNESS OF BREATH: 0
COLOR CHANGE: 0
HEMATURIA: 0
FATIGUE: 1
CHEST TIGHTNESS: 0
DIZZINESS: 1
CONFUSION: 0
FEVER: 0
COUGH: 0
LIGHT-HEADEDNESS: 1
ANAL BLEEDING: 0
VOICE CHANGE: 0
MYALGIAS: 0
FLANK PAIN: 0
BACK PAIN: 0
ABDOMINAL DISTENTION: 0
WOUND: 0
HEADACHES: 1
BLOOD IN STOOL: 0
VOMITING: 0
WHEEZING: 0
ARTHRALGIAS: 0
CHILLS: 0
PALPITATIONS: 0
NAUSEA: 0
NECK STIFFNESS: 0
NUMBNESS: 0
DIAPHORESIS: 0
ABDOMINAL PAIN: 0
DYSURIA: 0

## 2024-01-31 ASSESSMENT — ACTIVITIES OF DAILY LIVING (ADL)
ADLS_ACUITY_SCORE: 37
ADLS_ACUITY_SCORE: 37

## 2024-01-31 NOTE — ED NOTES
Patient resting in a position of comfort, rating her headache at an 8 out of 10 still.  Lights dimmed, call light within reach.   Will update MD.

## 2024-01-31 NOTE — ED NOTES
"Patient brought in tonight by [facility] after she took her Tizanidine and \"felt off\", she has been on this for +5 years.  She is reporting a headache at this time with \"fuzzy vision\".  8 out of 10 headache that is a squeezing pressure from the middle of her forehead radiates to the base of her neck.  A&Ox4.  "

## 2024-01-31 NOTE — DISCHARGE INSTRUCTIONS
HOLD on Blood pressure mediation , take thyroid medication regularly and follow-up with PCP in 2 day for adjusting medication,

## 2024-02-01 ENCOUNTER — TELEPHONE (OUTPATIENT)
Dept: FAMILY MEDICINE | Facility: OTHER | Age: 54
End: 2024-02-01

## 2024-02-01 LAB — BACTERIA UR CULT: NORMAL

## 2024-02-01 NOTE — ED PROVIDER NOTES
History     Chief Complaint   Patient presents with    Headache     Dizziness       The history is provided by the patient.     Constantino Warren is a 53 year old female who about 10 min after taking tizadinine , felt lightheaded, headache, right sided chest pain.   Hypotensive in ER.     Allergies:  Allergies   Allergen Reactions    Aspartame      Other reaction(s): Other - Describe In Comment Field, Seizures  convulsions    Bee Venom Anaphylaxis    Bupropion Anaphylaxis     Throat gets tight and hives. Denies allergic reaction    Food Anaphylaxis     Peppers-anaphylaxis if eaten, if touched hands swell and reddened    Food Allergy Formula Anaphylaxis     Peppers, patient reports green peppers close off her throat    Ibuprofen Anaphylaxis    No Clinical Screening - See Comments Anaphylaxis     Peppers, patient reports green peppers close off her throat    Piper Anaphylaxis     Peppers--green, black, red, chili, etc. Derm symptoms, hives;  Strawberries--(cooked) Itching, breathing difficulties, GI symptoms. Mushrooms--GI symptoms, Hives.Spinach (cooked) throat irritation  (Listed on [facility] problem list.)    Fentanyl Swelling     Lips and tongue swelled.  Swelling of lips and tongue    Adhesive Tape      blisters    Amitriptyline Other (See Comments)     Respiratory symptoms - denies allergic reaction    Duloxetine Hcl      Mood cycling leading to elevated mood state.     Liquid Adhesive Dermatitis     blisters  Blisters  Silk tape and tegaderm is fine    Oxcarbazepine Other (See Comments)     Throat gets tight and hives. Denies allergic reaction  Throat gets tight and hives-      Prozac [Fluoxetine]      Other reaction(s): *Unknown  uncontrolled muscle spasms    Valproic Acid      Respiratory symptoms ([facility]). Denies allergic reaction    Ziprasidone      Throat gets tight and hives. Denies allergic reaction       Problem List:    Patient Active Problem List    Diagnosis Date Noted     Bipolar disorder (H) 01/06/2023     Priority: Medium     per previous notes and documentation      Iron deficiency 04/29/2021     Priority: Medium    History of colon cancer, stage III 10/20/2020     Priority: Medium     Added automatically from request for surgery [id number]      Rotator cuff tear 10/13/2020     Priority: Medium     Added automatically from request for surgery [id number]      Arthritis 10/13/2020     Priority: Medium     Added automatically from request for surgery [id number]      History of uterine cancer 05/27/2020     Priority: Medium    Palliative care patient 05/27/2020     Priority: Medium    Iron deficiency anemia, unspecified iron deficiency anemia type 05/08/2020     Priority: Medium    S/P partial resection of colon 02/24/2020     Priority: Medium    Prediabetes      Priority: Medium    Colon cancer (H) 02/19/2020     Priority: Medium     Added automatically from request for surgery [id number]      Cancer of ascending colon (H) 02/19/2020     Priority: Medium     Added automatically from request for surgery [id number]      Special screening for malignant neoplasms, colon 01/14/2020     Priority: Medium     Added automatically from request for surgery [id number]      Anastomotic ulcer S/P gastric bypass 07/21/2018     Priority: Medium    Anemia 07/21/2018     Priority: Medium    Chest pain 07/20/2018     Priority: Medium    GI bleed 07/20/2018     Priority: Medium    Abdominal pain, epigastric 07/20/2018     Priority: Medium    Hypochromic microcytic anemia 07/20/2018     Priority: Medium    Chronic migraine 01/18/2018     Priority: Medium     Replacing diagnoses that were inactivated after the 10/1/2021 regulatory import.      Chronic pain disorder 01/18/2018     Priority: Medium    Long term (current) use of opiate analgesic 01/12/2018     Priority: Medium     Overview:   Pain Diagnosis failed back syndrome, annular tear L4-5, history lumbar discectomy, s/p lumbar fusion.    Should be seen in the  clinic every twelve weeks. Currently on a taper: yes, trying to wean away from the daytime hydrocodone-acetaminophen..    Plan for flares of chronic pain stretches, occasional use of more hydrocodone-acetaminophen for flare.    ED and UC: Opioid medication will not be given in the ER or Urgent Care unless there is a medical emergency unrelated to chronic pain.  Limit to 3 day supply    Refills: Refills will only be given at a scheduled visit.    [facility] Agreement for Opioid Treatment.   Opioid Agreement Date: 5/21/12  Last UDT (Urine Drug Test) on date:    Pain Dx: chronic low back pain  Last Pain Visit: 6-24-11  [facility]acy[facility]  Comments:      7/23/14:  Progressing well.  Over the last month, has decreased hydrocodone-acetaminophen from 6/day to 3/day.  Awaiting appointment with [facility].      Adjustment disorder with mixed anxiety and depressed mood 12/15/2017     Priority: Medium    Other insomnia 12/15/2017     Priority: Medium    Syncope and collapse 09/26/2015     Priority: Medium    Hypertensive urgency 09/26/2015     Priority: Medium    Concussion syndrome 09/26/2015     Priority: Medium    Hydronephrosis, right 04/20/2015     Priority: Medium    Pyelonephritis 04/20/2015     Priority: Medium    Urolithiasis 04/20/2015     Priority: Medium    Annular tear of lumbar disc 12/16/2014     Priority: Medium     Overview:   MRI 2/5/13:  Previous right L4-5 hemilaminectomy and partial facetectomy with mild to moderate right L4-5 facet arthropathy and broad-based bulge of disk and end plate osteophyte on the right with partial high signal intensity zone annular tear of the undersurface of the laterally protruding disk/annulus.      Failed back syndrome of lumbar spine 12/16/2014     Priority: Medium     Overview:   Three back surgeries, including an L3-sacrum AP fusion done at [facility] in 2013.  Has been doing very well in terms of reduction of pain, with minimal requirement of opiate pain  medication.      Hx of lumbar discectomy 12/16/2014     Priority: Medium     Overview:   10/2006:   L4-5 Laminectomy and diskectomy L5-S1.   12/12/11:  L5S1 microdiscectomy ([place])      Vitamin B 12 deficiency 06/13/2011     Priority: Medium     Overview:   IMO Update 10/11      Gastric bypass status for obesity 01/04/2011     Priority: Medium     Overview:   12/14/10:  laparoscopic Prasanna-en-Y gastric bypass Dr. Hassan  IMO Update 10/11      Hyperlipidemia 09/23/2008     Priority: Medium     Overview:   IMO Update 10/11      Hypothyroidism 09/23/2008     Priority: Medium     Overview:   IMO Update 10/11  Overview:   IMO Update 10/11          Past Medical History:    Past Medical History:   Diagnosis Date    Arthritis 10/13/2020    Bipolar disorder (H)     Cancer (H)     Cancer (H) 2004    Chronic pain     De Quervain's disease (tenosynovitis) 12/2021    Depressive disorder     Diabetes (H)     History of blood transfusion     Hypertension     Prediabetes     Syncope and collapse     Thyroid disease     Trigger finger 12/2021       Past Surgical History:    Past Surgical History:   Procedure Laterality Date    ARTHROSCOPY SHOULDER Left 11/6/2020    Procedure: LEFT SHOULDER ARTHROSCOPY WITH ROTATOR CUFF REPAIR AND ACROMIUM CLAVICULAR JOINT RESECTION;  Surgeon: Tanner Mujica DO;  Location: [facility] OR    BACK SURGERY      2008; 2011    COLONOSCOPY N/A 2/13/2020    Procedure: COLONOSCOPY WITH BIOPSY AND TATOOING OF ASCENDING COLON,INCOMEPLETE EXAM;  Surgeon: Kang Cleveland MD;  Location: [facility]    COLONOSCOPY N/A 10/26/2020    Procedure: incomplete colonoscopy ;  Surgeon: Kang Cleveland MD;  Location: [facility]    ESOPHAGOSCOPY, GASTROSCOPY, DUODENOSCOPY (EGD), COMBINED N/A 7/21/2018    Procedure: COMBINED ESOPHAGOSCOPY, GASTROSCOPY, DUODENOSCOPY (EGD);  UPPER ENDOSCOPY WITH BIOPSIES;  Surgeon: Roger Vazquez MD;  Location: [facility]    ESOPHAGOSCOPY, GASTROSCOPY, DUODENOSCOPY (EGD), COMBINED N/A 11/21/2019     Procedure: UPPER ENDOSCOPY with biopsy;  Surgeon: Kang Cleveland MD;  Location: [facility]    HEAD & NECK SURGERY  2008    c4-c5 fusion    HYSTERECTOMY, CRISTIANE  04/2004    uterine cancer; [facility]    INSERT PORT VASCULAR ACCESS N/A 2/28/2020    Procedure: INSERTION, VASCULAR ACCESS PORT LEFT;  Surgeon: Kang Cleveland MD;  Location: [facility]    LAPAROSCOPIC ASSISTED COLECTOMY Right 2/24/2020    Procedure: RIGHT KEVIN COLECTOMY, LYSIS OF ADHESIONS;  Surgeon: Kang Cleveland MD;  Location: [facility]    LAPAROSCOPIC BYPASS GASTRIC  12/2010       Family History:    Family History   Problem Relation Age of Onset    Diabetes Mother     Hypertension Mother     Coronary Artery Disease Mother     Myocardial Infarction Mother     Hypertension Father     Diabetes Maternal Grandmother     Hypertension Maternal Grandfather     Coronary Artery Disease Maternal Grandfather     Cancer Paternal Grandfather         unsure of type of cancer    Bladder Cancer Paternal Grandfather     Diabetes Maternal Aunt     Cerebrovascular Disease Maternal Aunt     Diabetes Maternal Aunt     Cerebrovascular Disease Maternal Aunt     Diabetes Maternal Aunt     Cerebrovascular Disease Maternal Aunt     Hyperlipidemia No family hx of     Breast Cancer No family hx of     Colon Cancer No family hx of     Prostate Cancer No family hx of     Thyroid Disease No family hx of     Anesthesia Reaction No family hx of     Asthma No family hx of     Genetic Disorder No family hx of        Social History:  Marital Status:   [5]  Social History     Tobacco Use    Smoking status: Never    Smokeless tobacco: Never   Vaping Use    Vaping Use: Never used   Substance Use Topics    Alcohol use: No     Comment: sober for 18 years    Drug use: No        Medications:    acetaminophen (TYLENOL) 500 MG tablet  amLODIPine (NORVASC) 5 MG tablet  atorvastatin (LIPITOR) 40 MG tablet  Cyanocobalamin 1000 MCG/ML KIT  diphenhydrAMINE (BENADRYL) 25 MG  tablet  EPINEPHrine (ANY BX GENERIC EQUIV) 0.3 MG/0.3ML injection 2-pack  EQ ASPIRIN ADULT LOW DOSE 81 MG EC tablet  famotidine (PEPCID) 20 MG tablet  gabapentin (NEURONTIN) 300 MG capsule  levothyroxine (SYNTHROID/LEVOTHROID) 200 MCG tablet  levothyroxine (SYNTHROID/LEVOTHROID) 50 MCG tablet  loratadine (CLARITIN) 10 MG tablet  montelukast (SINGULAIR) 10 MG tablet  omeprazole (PRILOSEC) 20 MG DR capsule  tiZANidine (ZANAFLEX) 4 MG tablet  tobramycin (TOBREX) 0.3 % ophthalmic solution  traZODone (DESYREL) 50 MG tablet          Review of Systems   Constitutional:  Positive for fatigue. Negative for chills, diaphoresis and fever.   HENT:  Negative for voice change.    Eyes:  Negative for visual disturbance.   Respiratory:  Negative for cough, chest tightness, shortness of breath and wheezing.    Cardiovascular:  Positive for chest pain. Negative for palpitations and leg swelling.   Gastrointestinal:  Negative for abdominal distention, abdominal pain, anal bleeding, blood in stool, nausea and vomiting.   Genitourinary:  Negative for decreased urine volume, dysuria, flank pain and hematuria.   Musculoskeletal:  Negative for arthralgias, back pain, gait problem, myalgias, neck pain and neck stiffness.   Skin:  Negative for color change, pallor, rash and wound.   Neurological:  Positive for dizziness, light-headedness and headaches. Negative for syncope and numbness.   Psychiatric/Behavioral:  Negative for confusion and suicidal ideas.        Physical Exam   BP: (!) 82/56  Pulse: 60  Temp: (!) 96.2  F (35.7  C)  Resp: 20  SpO2: 97 %      Physical Exam  Vitals and nursing note reviewed.   Constitutional:       Appearance: She is well-developed.   HENT:      Head: Normocephalic and atraumatic.      Mouth/Throat:      Pharynx: No oropharyngeal exudate.   Eyes:      Conjunctiva/sclera: Conjunctivae normal.      Pupils: Pupils are equal, round, and reactive to light.   Neck:      Thyroid: No thyromegaly.      Vascular: No JVD.       Trachea: No tracheal deviation.   Cardiovascular:      Rate and Rhythm: Normal rate and regular rhythm.      Heart sounds: Normal heart sounds. No murmur heard.     No friction rub. No gallop.   Pulmonary:      Effort: Pulmonary effort is normal. No respiratory distress.      Breath sounds: Normal breath sounds. No stridor. No wheezing or rales.   Chest:      Chest wall: No tenderness.   Abdominal:      General: Bowel sounds are normal. There is no distension.      Palpations: Abdomen is soft. There is no mass.      Tenderness: There is no abdominal tenderness. There is no guarding or rebound.   Musculoskeletal:         General: No tenderness. Normal range of motion.      Cervical back: Normal range of motion and neck supple.   Lymphadenopathy:      Cervical: No cervical adenopathy.   Skin:     General: Skin is warm and dry.      Coloration: Skin is not pale.      Findings: No erythema or rash.   Neurological:      Mental Status: She is alert and oriented to person, place, and time.   Psychiatric:         Behavior: Behavior normal.         ED Course                 Procedures                Results for orders placed or performed during the hospital encounter of 01/31/24 (from the past 24 hour(s))   Comprehensive metabolic panel   Result Value Ref Range    Sodium 138 135 - 145 mmol/L    Potassium 3.7 3.4 - 5.3 mmol/L    Carbon Dioxide (CO2) 19 (L) 22 - 29 mmol/L    Anion Gap 11 7 - 15 mmol/L    Urea Nitrogen 15.3 6.0 - 20.0 mg/dL    Creatinine 0.73 0.51 - 0.95 mg/dL    GFR Estimate >90 >60 mL/min/1.73m2    Calcium 8.1 (L) 8.6 - 10.0 mg/dL    Chloride 108 (H) 98 - 107 mmol/L    Glucose 150 (H) 70 - 99 mg/dL    Alkaline Phosphatase 61 40 - 150 U/L    AST 31 0 - 45 U/L    ALT 31 0 - 50 U/L    Protein Total 5.8 (L) 6.4 - 8.3 g/dL    Albumin 3.6 3.5 - 5.2 g/dL    Bilirubin Total 0.4 <=1.2 mg/dL   CBC with Platelets & Differential    Narrative    The following orders were created for panel order CBC with Platelets &  Differential.  Procedure                               Abnormality         Status                     ---------                               -----------         ------                     CBC with platelets and d...[[id number]]  Abnormal            Final result                 Please view results for these tests on the individual orders.   CRP inflammation   Result Value Ref Range    CRP Inflammation <3.00 <5.00 mg/L   Lactic acid whole blood   Result Value Ref Range    Lactic Acid 1.4 0.7 - 2.0 mmol/L   Troponin T, High Sensitivity   Result Value Ref Range    Troponin T, High Sensitivity 10 <=14 ng/L   CBC with platelets and differential   Result Value Ref Range    WBC Count 5.3 4.0 - 11.0 10e3/uL    RBC Count 3.45 (L) 3.80 - 5.20 10e6/uL    Hemoglobin 10.0 (L) 11.7 - 15.7 g/dL    Hematocrit 30.7 (L) 35.0 - 47.0 %    MCV 89 78 - 100 fL    MCH 29.0 26.5 - 33.0 pg    MCHC 32.6 31.5 - 36.5 g/dL    RDW 13.6 10.0 - 15.0 %    Platelet Count 151 150 - 450 10e3/uL    % Neutrophils 61 %    % Lymphocytes 29 %    % Monocytes 10 %    % Eosinophils 0 %    % Basophils 0 %    % Immature Granulocytes 0 %    NRBCs per 100 WBC 0 <1 /100    Absolute Neutrophils 3.2 1.6 - 8.3 10e3/uL    Absolute Lymphocytes 1.6 0.8 - 5.3 10e3/uL    Absolute Monocytes 0.5 0.0 - 1.3 10e3/uL    Absolute Eosinophils 0.0 0.0 - 0.7 10e3/uL    Absolute Basophils 0.0 0.0 - 0.2 10e3/uL    Absolute Immature Granulocytes 0.0 <=0.4 10e3/uL    Absolute NRBCs 0.0 10e3/uL   TSH   Result Value Ref Range    TSH 41.99 (H) 0.30 - 4.20 uIU/mL   Thyroxin T4 free   Result Value Ref Range    Free T4 0.79 (L) 0.90 - 1.70 ng/dL   EKG 12 lead   Result Value Ref Range    Systolic Blood Pressure  mmHg    Diastolic Blood Pressure  mmHg    Ventricular Rate 56 BPM    Atrial Rate 56 BPM    UT Interval 188 ms    QRS Duration 80 ms     ms    QTc 441 ms    P Axis 38 degrees    R AXIS 1 degrees    T Axis 9 degrees    Interpretation ECG       Sinus bradycardia  Otherwise normal  ECG  When compared with ECG of 28-APR-2003 07:31,  No significant change was found  Confirmed by MD Mcneil Anthony ([id number]) on 1/31/2024 9:12:35 AM     XR Chest Port 1 View    Narrative    PROCEDURE:  XR CHEST PORT 1 VIEW    HISTORY: chest pain. .    COMPARISON:  1/22/2024    FINDINGS:    The cardiomediastinal contours are stable. The left chest port is in  position.   No focal consolidation, effusion or pneumothorax.      Impression    IMPRESSION:  Stable chest.      KONSTANTIN STROUD MD         SYSTEM ID:  [id number]   UA Macroscopic with reflex to Microscopic and Culture    Specimen: Urine, Midstream   Result Value Ref Range    Color Urine Yellow Colorless, Straw, Light Yellow, Yellow    Appearance Urine Clear Clear    Glucose Urine 50 (A) Negative mg/dL    Bilirubin Urine Negative Negative    Ketones Urine Negative Negative mg/dL    Specific Gravity Urine 1.032 1.003 - 1.035    Blood Urine Negative Negative    pH Urine 5.5 4.7 - 8.0    Protein Albumin Urine 20 (A) Negative mg/dL    Urobilinogen Urine 4.0 (A) Normal, 2.0 mg/dL    Nitrite Urine Negative Negative    Leukocyte Esterase Urine Moderate (A) Negative    Bacteria Urine Few (A) None Seen /HPF    Mucus Urine Present (A) None Seen /LPF    RBC Urine 1 <=2 /HPF    WBC Urine 3 <=5 /HPF    Squamous Epithelials Urine <1 <=1 /HPF    Hyaline Casts Urine 9 (H) <=2 /LPF    Narrative    Urine Culture ordered based on laboratory criteria   Urine Culture    Specimen: Urine, Midstream   Result Value Ref Range    Culture Culture in progress    CT Head w/o Contrast    Narrative    PROCEDURE: CT HEAD W/O CONTRAST     HISTORY: headache.    COMPARISON: 10/12/2022    TECHNIQUE:  Helical images of the head from the foramen magnum to the  vertex were obtained without contrast. This CT exam was performed  using one or more the following dose reduction techniques: automated  exposure control, adjustment of the mA and/or kV according to patient  size, and/or iterative  reconstruction technique.    FINDINGS: The ventricles and sulci are normal in volume. No acute  intracranial hemorrhage, mass effect, midline shift, hydrocephalus or  basilar cystern effacement are present.    The grey-white matter interface is preserved.    The calvarium is intact. The mastoid air cells are clear.  The  visualized paranasal sinuses are clear.      Impression    IMPRESSION: No evidence of an acute intracranial process.      KONSTANTIN STROUD MD         SYSTEM ID:  [id number]       Medications   sodium chloride 0.9% BOLUS 1,000 mL (0 mLs Intravenous Stopped 1/31/24 0206)   metoclopramide (REGLAN) injection 10 mg (10 mg Intravenous $Given 1/31/24 0325)   diphenhydrAMINE (BENADRYL) injection 25 mg (25 mg Intravenous $Given 1/31/24 0325)       Assessments & Plan (with Medical Decision Making)   Hypotensive after taking tizanidine    EKG NSR  Labs reviewed  Hypothroidism, likely due to non compliance , she stated that she skipped some daily dosage,     After receiving IVF all her symptoms resolved excepted headache, CT head ordered that was negative  Her headache also resolved after observation in ER    I advised hold her BP medicaion , follow-up with PCP for reeval and adjusting medication   She already following up her anemia and planning to reschedule for GI workkup      I have reviewed the nursing notes.    I have reviewed the findings, diagnosis, plan and need for follow up with the patient.          Discharge Medication List as of 1/31/2024  4:08 AM          Final diagnoses:   Hypothyroidism, unspecified type   Anemia, unspecified type   Hypotension, unspecified hypotension type       1/31/2024   [facility]       Osmany Obrien MD  01/31/24 1931

## 2024-02-02 ENCOUNTER — PATIENT OUTREACH (OUTPATIENT)
Dept: CARE COORDINATION | Facility: OTHER | Age: 54
End: 2024-02-02

## 2024-02-02 ENCOUNTER — OFFICE VISIT (OUTPATIENT)
Dept: FAMILY MEDICINE | Facility: OTHER | Age: 54
End: 2024-02-02
Attending: FAMILY MEDICINE
Payer: COMMERCIAL

## 2024-02-02 VITALS
SYSTOLIC BLOOD PRESSURE: 150 MMHG | DIASTOLIC BLOOD PRESSURE: 90 MMHG | OXYGEN SATURATION: 97 % | TEMPERATURE: 98.2 F | HEIGHT: 67 IN | WEIGHT: 193 LBS | HEART RATE: 98 BPM | BODY MASS INDEX: 30.29 KG/M2

## 2024-02-02 DIAGNOSIS — Z13.1 SCREENING FOR DIABETES MELLITUS: ICD-10-CM

## 2024-02-02 DIAGNOSIS — D64.9 ANEMIA, UNSPECIFIED TYPE: ICD-10-CM

## 2024-02-02 DIAGNOSIS — E78.5 HYPERLIPIDEMIA, UNSPECIFIED HYPERLIPIDEMIA TYPE: ICD-10-CM

## 2024-02-02 DIAGNOSIS — R42 DIZZINESS: ICD-10-CM

## 2024-02-02 DIAGNOSIS — R06.09 DYSPNEA ON EXERTION: ICD-10-CM

## 2024-02-02 DIAGNOSIS — R07.9 CHEST PAIN, UNSPECIFIED TYPE: ICD-10-CM

## 2024-02-02 DIAGNOSIS — E03.9 HYPOTHYROIDISM, UNSPECIFIED TYPE: ICD-10-CM

## 2024-02-02 DIAGNOSIS — Z98.890 S/P ARTHROSCOPY OF LEFT SHOULDER: ICD-10-CM

## 2024-02-02 DIAGNOSIS — I10 ESSENTIAL HYPERTENSION: Primary | ICD-10-CM

## 2024-02-02 LAB
ATRIAL RATE - MUSE: 84 BPM
DIASTOLIC BLOOD PRESSURE - MUSE: NORMAL MMHG
INTERPRETATION ECG - MUSE: NORMAL
P AXIS - MUSE: 57 DEGREES
PR INTERVAL - MUSE: 164 MS
QRS DURATION - MUSE: 92 MS
QT - MUSE: 384 MS
QTC - MUSE: 453 MS
R AXIS - MUSE: 3 DEGREES
SYSTOLIC BLOOD PRESSURE - MUSE: NORMAL MMHG
T AXIS - MUSE: 40 DEGREES
VENTRICULAR RATE- MUSE: 84 BPM

## 2024-02-02 PROCEDURE — 93000 ELECTROCARDIOGRAM COMPLETE: CPT | Mod: 77 | Performed by: INTERNAL MEDICINE

## 2024-02-02 PROCEDURE — 99214 OFFICE O/P EST MOD 30 MIN: CPT | Performed by: FAMILY MEDICINE

## 2024-02-02 RX ORDER — CHLORTHALIDONE 25 MG/1
25 TABLET ORAL DAILY
Qty: 90 TABLET | Refills: 0 | Status: SHIPPED | OUTPATIENT
Start: 2024-02-02 | End: 2024-06-19

## 2024-02-02 RX ORDER — ATORVASTATIN CALCIUM 40 MG/1
40 TABLET, FILM COATED ORAL AT BEDTIME
Qty: 90 TABLET | Refills: 3 | Status: SHIPPED | OUTPATIENT
Start: 2024-02-02

## 2024-02-02 RX ORDER — LEVOTHYROXINE SODIUM 50 UG/1
TABLET ORAL
Qty: 90 TABLET | Refills: 1 | Status: SHIPPED | OUTPATIENT
Start: 2024-02-02 | End: 2024-06-19

## 2024-02-02 RX ORDER — LEVOTHYROXINE SODIUM 200 UG/1
200 TABLET ORAL DAILY
Qty: 90 TABLET | Refills: 1 | Status: SHIPPED | OUTPATIENT
Start: 2024-02-02 | End: 2024-06-19

## 2024-02-02 ASSESSMENT — PAIN SCALES - GENERAL: PAINLEVEL: NO PAIN (0)

## 2024-02-02 NOTE — PATIENT INSTRUCTIONS
Start Chlorthalidone 25 mg daily - take in AM.  Restart Lipitor 40 mg nightly.  Update me in 2 weeks via mychart with home readings.  Follow up in office 1 month.  Come fasting for labs.  Stress test ordered.

## 2024-02-02 NOTE — LETTER
February 2, 2024      Constantino Warren  [address] [facility] [address]        To Whom It May Concern:    Constantino Warren  was seen on 2/2/2024.  Please excuse her today due to illness.        Sincerely,        Coco Schafer MD

## 2024-02-02 NOTE — PROGRESS NOTES
"  Assessment & Plan     Essential hypertension  Had 1 day of significant hypotension, resulting in ER visit.  Norvasc held.  She did not feel it was controlling BP in general otherwise.  Prefers to try something else.  Start diuretic - counseled on use.  Recheck 1 month office visit and lab.  - chlorthalidone (HYGROTON) 25 MG tablet; Take 1 tablet (25 mg) by mouth daily  - Comprehensive metabolic panel (BMP + Alb, Alk Phos, ALT, AST, Total. Bili, TP); Future    Dizziness  Resolved.    Hypothyroidism, unspecified type  TSH/T4 were off - taking consistently now - recheck next month.  - levothyroxine (SYNTHROID/LEVOTHROID) 200 MCG tablet; Take 1 tablet (200 mcg) by mouth daily  - levothyroxine (SYNTHROID/LEVOTHROID) 50 MCG tablet; TAKE 1 TABLET BY MOUTH ONCE DAILY (TAKE WITH 200MCG TABLET FOR TOTAL OF 250MCG)  - TSH with free T4 reflex; Future    Anemia, unspecified type  Has consult for colonoscopy this month.    S/P arthroscopy of left shoulder  - tiZANidine (ZANAFLEX) 4 MG tablet; Take 1 tablet (4 mg) by mouth at bedtime    Chest pain, unspecified type  Past month intermittent CHEN, chest tightness.  EKG today normal.  Stress test ordered.  - EKG 12-lead complete w/read - (Clinic Performed)  - NM Lexiscan stress test; Future    Dyspnea on exertion  As above.  - NM Lexiscan stress test; Future    Hyperlipidemia, unspecified hyperlipidemia type  Ran out of statin?   Last lipid panel 2021?  Come fasting to follow up next month for recheck.  New script sent today.  - atorvastatin (LIPITOR) 40 MG tablet; Take 1 tablet (40 mg) by mouth at bedtime  - Lipid Profile (Chol, Trig, HDL, LDL calc); Future    Screening for diabetes mellitus  - Hemoglobin A1c; Future          BMI  Estimated body mass index is 30.23 kg/m  as calculated from the following:    Height as of this encounter: 1.702 m (5' 7\").    Weight as of this encounter: 87.5 kg (193 lb).   Weight management plan: Discussed healthy diet and exercise guidelines      See " "Patient Instructions    Return in about 1 month (around 3/2/2024) for hypertension, hyperlipidemia and fasting labs.    Javon Meeks is a 53 year old, presenting for the following health issues:  ER follow up    HPI       ED/UC Followup:    [facility]  Date of visit: 1/31/24  Reason for visit: dizziness; BP 82/57; P 60  Had tizanadine - but that is per routine.  States had passed to  Ambulance 60s/40s.    TSH high, T4 low  HGB 10 -   Heme/onc - iron stores, etc fine.  Dr Fishman consult scheduled 2/13/24 with Jennifer MEJIA visit.    Had missed some synthroid doses.  Norvasc held.  CT head negative.  IVF helped.        Current Status: having headaches, has no pain but feels like a squeezing sensation in her chest    Hypertension Follow-up  Do you check your blood pressure regularly outside of the clinic? Yes   Are you following a low salt diet? Yes  Are your blood pressures ever more than 140 on the top number (systolic) OR more   than 90 on the bottom number (diastolic), for example 140/90? Yes  Norvasc 5 mg held in ER  Was well tolerated.  No side effects.  Reports was still running on higher side of normal.  Lisinopril caused hives/angioedema, so changed 4/2023.  Significant improvement off the Lisinopril.    Last night 189/105.    No edema.  No bladder concerns.    Last stress test 3/2021 - negative.      Reports some chest pain, squeezing sensation at times.  Gets winded more over past month.      Hypothyroidism Follow-up  Since last visit, patient describes the following symptoms: Weight stable, no hair loss, no skin changes, no constipation    Review of Systems  Constitutional, HEENT, cardiovascular, pulmonary, gi and gu systems are negative, except as otherwise noted.      Objective    BP (!) 150/90 (BP Location: Left arm, Patient Position: Sitting, Cuff Size: Adult Regular)   Pulse 98   Temp 98.2  F (36.8  C) (Tympanic)   Ht 1.702 m (5' 7\")   Wt 87.5 kg (193 lb)   SpO2 97%   BMI 30.23 kg/m  "   Body mass index is 30.23 kg/m .  Physical Exam   GENERAL: alert and no distress  NECK: no adenopathy, no asymmetry, masses, or scars  RESP: lungs clear to auscultation - no rales, rhonchi or wheezes  CV: regular rate and rhythm, normal S1 S2, no S3 or S4, no murmur, click or rub, no peripheral edema  ABDOMEN: soft, nontender, no hepatosplenomegaly, no masses and bowel sounds normal  MS: no gross musculoskeletal defects noted, no edema  NEURO: Normal strength and tone, mentation intact and speech normal  PSYCH: mentation appears normal, affect normal/bright    ER records reviewed.        Signed Electronically by: Coco Schafer MD

## 2024-02-02 NOTE — PROGRESS NOTES
Received call from patient requesting to be seen by PCP. Reports she was seen at the ER on 1/31 with low blood pressures of 60/42 and 82/56, her son called EMS to bring her in. In the ER, they told her to hold her Amlodipine 5 mg until her PCP could advise. Stopped Amlodipine and scheduled follow up for 2/9. Yesterday at 6pm, her BP was 198/116 and today at 9:05 am, her blood pressure is 189/105. She has a headache with no other symptoms.   Okay from PCP to place on schedule now. Patient able to come and be seen now and will be here shortly.     Sebastian REYNOSO RN, Care Coordinator  [facility]

## 2024-02-05 ENCOUNTER — INFUSION THERAPY VISIT (OUTPATIENT)
Dept: INFUSION THERAPY | Facility: OTHER | Age: 54
End: 2024-02-05
Attending: NURSE PRACTITIONER
Payer: COMMERCIAL

## 2024-02-05 DIAGNOSIS — C18.2 CANCER OF ASCENDING COLON (H): ICD-10-CM

## 2024-02-05 DIAGNOSIS — E53.8 VITAMIN B 12 DEFICIENCY: Primary | ICD-10-CM

## 2024-02-05 PROCEDURE — 96372 THER/PROPH/DIAG INJ SC/IM: CPT | Performed by: NURSE PRACTITIONER

## 2024-02-05 RX ORDER — CYANOCOBALAMIN 1000 UG/ML
1000 INJECTION, SOLUTION INTRAMUSCULAR; SUBCUTANEOUS
Status: CANCELLED
Start: 2024-02-05

## 2024-02-05 RX ORDER — HEPARIN SODIUM (PORCINE) LOCK FLUSH IV SOLN 100 UNIT/ML 100 UNIT/ML
5 SOLUTION INTRAVENOUS
Status: CANCELLED | OUTPATIENT
Start: 2024-02-05

## 2024-02-05 RX ORDER — CYANOCOBALAMIN 1000 UG/ML
1000 INJECTION, SOLUTION INTRAMUSCULAR; SUBCUTANEOUS
Status: DISCONTINUED | OUTPATIENT
Start: 2024-02-05 | End: 2024-02-05 | Stop reason: HOSPADM

## 2024-02-05 RX ADMIN — CYANOCOBALAMIN 1000 MCG: 1000 INJECTION, SOLUTION INTRAMUSCULAR; SUBCUTANEOUS at 15:36

## 2024-02-05 NOTE — PROGRESS NOTES
Infusion Nursing Note:  Constantino Warren presents today for B 12.    Patient seen by provider today: No   present during visit today: Not Applicable.    Note: Clinic Administered Medication Documentation      Injectable Medication Documentation    Is there an active order (written within the past 365 days, with administrations remaining, not ) in the chart? Yes.     Patient was given Cyanocobalamin (B-12). Prior to medication administration, verified patient's identity using patient s name and date of birth. Please see MAR and medication order for additional information. Patient instructed to  report any concerns to provider .    Vial/Syringe: Single dose vial  Was this medication supplied by the patient? No  Is this a medication the patient will need to receive again? Yes. Verified that the patient has refills remaining in their prescription.       Intravenous Access:  No Intravenous access/labs at this visit.    Treatment Conditions:  Not Applicable.      Post Infusion Assessment:         Discharge Plan:   Patient and/or family verbalized understanding of discharge instructions and all questions answered.  AVS to patient via [employer].  Patient will return monthly for next appointment.   Departure Mode: Ambulatory.      Jaye Louie RN

## 2024-02-08 ENCOUNTER — TELEPHONE (OUTPATIENT)
Dept: NUCLEAR MEDICINE | Facility: HOSPITAL | Age: 54
End: 2024-02-08

## 2024-02-08 NOTE — TELEPHONE ENCOUNTER
Made an appointment reminder call regarding NM Lexiscan stress test scheduled at 730 on 2/9.  Pt did not answer phone so left a brief message that included department and scheduling phone numbers.

## 2024-02-09 ENCOUNTER — TELEPHONE (OUTPATIENT)
Dept: FAMILY MEDICINE | Facility: OTHER | Age: 54
End: 2024-02-09

## 2024-02-09 NOTE — TELEPHONE ENCOUNTER
Good Morning Dr. Cook,    This patient did show for her Nuclear Medicine Lexiscan Stress test but did not follow exam prep (coke last night).  We were not able to proceed with the exam.   Patient stated she was not informed of this.  I do know this is discussed during appointment scheduling.   Nuclear Medicine also attempted a reminder call yesterday but was not able to reach the patient.    We discussed the exam in detail and discussed exam prep in detail.  Patient has an exam guide paper to take with her with the instructions.    Patient will be rescheduled to 2-19-24 once a new order is placed and signed.   Patient unable to come earlier due to her work schedule.    I will place the new order and send to you for signature.    Thank you,  Patrica Nina

## 2024-02-16 ENCOUNTER — TELEPHONE (OUTPATIENT)
Dept: NUCLEAR MEDICINE | Facility: HOSPITAL | Age: 54
End: 2024-02-16

## 2024-02-16 NOTE — TELEPHONE ENCOUNTER
Appointment reminder call    General voicemail left with [facility] and scheduling hours and phone numbers.

## 2024-02-19 ENCOUNTER — TELEPHONE (OUTPATIENT)
Dept: NUCLEAR MEDICINE | Facility: HOSPITAL | Age: 54
End: 2024-02-19

## 2024-02-19 NOTE — TELEPHONE ENCOUNTER
Patient was a no show for her NM Lexiscan stress test scheduled 2/19/24 at 700. Staff made a reminder call on 2/16 , they did not reach the patient but left a voicemail with the department and scheduling phone numbers if they had any questions.     The patient did come for the test on 2/9 but was not properly prepped for the test (caffeine).     We will try one more time to get this test performed. We will pull back the order and have scheduling reach out to the patient to get the test rescheduled.    2 isotopes wasted     Please reach out to me if you have any question.

## 2024-02-26 ENCOUNTER — LAB (OUTPATIENT)
Dept: LAB | Facility: OTHER | Age: 54
End: 2024-02-26
Payer: COMMERCIAL

## 2024-02-26 ENCOUNTER — TELEPHONE (OUTPATIENT)
Dept: NUCLEAR MEDICINE | Facility: HOSPITAL | Age: 54
End: 2024-02-26

## 2024-02-26 DIAGNOSIS — E78.5 HYPERLIPIDEMIA, UNSPECIFIED HYPERLIPIDEMIA TYPE: ICD-10-CM

## 2024-02-26 DIAGNOSIS — I10 ESSENTIAL HYPERTENSION: ICD-10-CM

## 2024-02-26 DIAGNOSIS — E03.9 HYPOTHYROIDISM, UNSPECIFIED TYPE: ICD-10-CM

## 2024-02-26 DIAGNOSIS — Z13.1 SCREENING FOR DIABETES MELLITUS: ICD-10-CM

## 2024-02-26 LAB
ALBUMIN SERPL BCG-MCNC: 4.2 G/DL (ref 3.5–5.2)
ALP SERPL-CCNC: 69 U/L (ref 40–150)
ALT SERPL W P-5'-P-CCNC: 47 U/L (ref 0–50)
ANION GAP SERPL CALCULATED.3IONS-SCNC: 12 MMOL/L (ref 7–15)
AST SERPL W P-5'-P-CCNC: 59 U/L (ref 0–45)
BILIRUB SERPL-MCNC: 0.7 MG/DL
BUN SERPL-MCNC: 17 MG/DL (ref 6–20)
CALCIUM SERPL-MCNC: 9.2 MG/DL (ref 8.6–10)
CHLORIDE SERPL-SCNC: 106 MMOL/L (ref 98–107)
CHOLEST SERPL-MCNC: 80 MG/DL
CREAT SERPL-MCNC: 0.79 MG/DL (ref 0.51–0.95)
DEPRECATED HCO3 PLAS-SCNC: 23 MMOL/L (ref 22–29)
EGFRCR SERPLBLD CKD-EPI 2021: 88 ML/MIN/1.73M2
EST. AVERAGE GLUCOSE BLD GHB EST-MCNC: 143 MG/DL
FASTING STATUS PATIENT QL REPORTED: NO
GLUCOSE SERPL-MCNC: 136 MG/DL (ref 70–99)
HBA1C MFR BLD: 6.6 %
HDLC SERPL-MCNC: 31 MG/DL
LDLC SERPL CALC-MCNC: 10 MG/DL
NONHDLC SERPL-MCNC: 49 MG/DL
POTASSIUM SERPL-SCNC: 4 MMOL/L (ref 3.4–5.3)
PROT SERPL-MCNC: 7.5 G/DL (ref 6.4–8.3)
SODIUM SERPL-SCNC: 141 MMOL/L (ref 135–145)
T4 FREE SERPL-MCNC: 1.61 NG/DL (ref 0.9–1.7)
TRIGL SERPL-MCNC: 193 MG/DL
TSH SERPL DL<=0.005 MIU/L-ACNC: 0.25 UIU/ML (ref 0.3–4.2)

## 2024-02-26 PROCEDURE — 80061 LIPID PANEL: CPT

## 2024-02-26 PROCEDURE — 80053 COMPREHEN METABOLIC PANEL: CPT

## 2024-02-26 PROCEDURE — 36415 COLL VENOUS BLD VENIPUNCTURE: CPT

## 2024-02-26 PROCEDURE — 84439 ASSAY OF FREE THYROXINE: CPT

## 2024-02-26 PROCEDURE — 83036 HEMOGLOBIN GLYCOSYLATED A1C: CPT

## 2024-02-26 PROCEDURE — 84443 ASSAY THYROID STIM HORMONE: CPT

## 2024-02-26 NOTE — TELEPHONE ENCOUNTER
Made an appointment reminder call regarding NM lexiscan stress test scheduled on 2/27 at 630. Reminded patient no caffeine, that's coffee, chocolate tea ect. No food after midnight. Test duration is about 4 hours.

## 2024-02-27 ENCOUNTER — HOSPITAL ENCOUNTER (OUTPATIENT)
Dept: NUCLEAR MEDICINE | Facility: HOSPITAL | Age: 54
Setting detail: NUCLEAR MEDICINE
Discharge: HOME | End: 2024-02-27
Attending: FAMILY MEDICINE
Payer: COMMERCIAL

## 2024-02-27 ENCOUNTER — HOSPITAL ENCOUNTER (OUTPATIENT)
Dept: CARDIOLOGY | Facility: HOSPITAL | Age: 54
Setting detail: NUCLEAR MEDICINE
Discharge: HOME | End: 2024-02-27
Attending: FAMILY MEDICINE
Payer: COMMERCIAL

## 2024-02-27 DIAGNOSIS — R06.09 DYSPNEA ON EXERTION: ICD-10-CM

## 2024-02-27 DIAGNOSIS — R07.9 CHEST PAIN, UNSPECIFIED TYPE: ICD-10-CM

## 2024-02-27 LAB
CV BLOOD PRESSURE: 85 MMHG
CV STRESS MAX HR HE: 100
NUC STRESS EJECTION FRACTION: 95 %
RATE PRESSURE PRODUCT: NORMAL
STRESS ECHO BASELINE DIASTOLIC HE: 78
STRESS ECHO BASELINE HR: 69 BPM
STRESS ECHO BASELINE SYSTOLIC BP: 120
STRESS ECHO CALCULATED PERCENT HR: 60 %
STRESS ECHO LAST STRESS DIASTOLIC BP: 78
STRESS ECHO LAST STRESS SYSTOLIC BP: 100
STRESS ECHO TARGET HR: 166

## 2024-02-27 PROCEDURE — 78452 HT MUSCLE IMAGE SPECT MULT: CPT

## 2024-02-27 PROCEDURE — 93017 CV STRESS TEST TRACING ONLY: CPT

## 2024-02-27 PROCEDURE — 93018 CV STRESS TEST I&R ONLY: CPT | Performed by: INTERNAL MEDICINE

## 2024-02-27 PROCEDURE — 343N000001 HC RX 343: Performed by: RADIOLOGY

## 2024-02-27 PROCEDURE — A9500 TC99M SESTAMIBI: HCPCS | Performed by: RADIOLOGY

## 2024-02-27 PROCEDURE — 250N000011 HC RX IP 250 OP 636: Mod: JZ | Performed by: INTERNAL MEDICINE

## 2024-02-27 PROCEDURE — 93016 CV STRESS TEST SUPVJ ONLY: CPT | Performed by: INTERNAL MEDICINE

## 2024-02-27 RX ORDER — AMINOPHYLLINE 25 MG/ML
INJECTION, SOLUTION INTRAVENOUS
Status: DISCONTINUED
Start: 2024-02-27 | End: 2024-02-27 | Stop reason: WASHOUT

## 2024-02-27 RX ORDER — REGADENOSON 0.08 MG/ML
0.4 INJECTION, SOLUTION INTRAVENOUS ONCE
Status: COMPLETED | OUTPATIENT
Start: 2024-02-27 | End: 2024-02-27

## 2024-02-27 RX ADMIN — Medication 32.5 MILLICURIE: at 08:35

## 2024-02-27 RX ADMIN — Medication 10.6 MILLICURIE: at 06:43

## 2024-02-27 RX ADMIN — REGADENOSON 0.4 MG: 0.08 INJECTION, SOLUTION INTRAVENOUS at 08:22

## 2024-03-04 NOTE — PROGRESS NOTES
Assessment & Plan     Type 2 diabetes mellitus without complication, without long-term current use of insulin (H)  Prior diagnosis - insulin dependent.  Resolved s/p gastric bypass 2010.  Now A1c 6.6%.  Foot exam complete.  Eye referral placed.  On high intensity statin.  Consider ACEI/ARB - urine microalbumin today  Referral to DRC.  Consider metformin again vs GLP1.  - Adult Eye  Referral; Future  - Albumin Random Urine Quantitative with Creat Ratio; Future  - Diabetes Education Referral ([facility])    Hyperlipidemia, unspecified hyperlipidemia type  LDL at goal.  Continue Lipitor 40 mg.s    Essential hypertension  Stable.  Continue diuretic.  Consider low dose ARB/ACEI.    Hypothyroidism, unspecified type  Stable.  Continue synthroid.    Adjustment disorder with mixed anxiety and depressed mood  Stable.    Encounter for screening mammogram for breast cancer  - MA Screen Bilateral w/Nick; Future    Special screening for malignant neoplasms, colon  History of colon cancer.  Overdue for colonoscopy.  Note sent to surgical staff.  No showed Jennifer M - but per patient they cancelled?    Bee allergy status  - EPINEPHrine (ANY BX GENERIC EQUIV) 0.3 MG/0.3ML injection 2-pack; Inject 0.3 mLs (0.3 mg) into the muscle as needed for anaphylaxis    Dry skin  Feet.  Add topical cream.  Daily foot checks.  - ammonium lactate (AMLACTIN) 12 % external cream; Apply topically 2 times daily    Poor dentition  Will be having full extractions soon.  Then plates/dentures.      31 minutes spent by me on the date of the encounter doing chart review, history and exam, documentation and further activities per the note        See Patient Instructions    Return in about 3 months (around 6/13/2024) for diabetes.    Javon Meeks is a 53 year old, presenting for the following health issues:  Hypertension and Hyperlipidemia        3/13/2024     9:23 AM   Additional Questions   Roomed by Stevo Tucker   Accompanied by Melisa  "        3/13/2024     9:23 AM   Patient Reported Additional Medications   Patient reports taking the following new medications None     HPI     Mammo due - agreeable    Colon screen - managed by oncology - history colon screen.  Needs to be reschedule -   2/20/24  no show Jennifer - per patient they cancelled    Vaccines -   Agreeable to Pneumonia today -    Diabetes Follow-up- new diagnosis 6.6%  How often are you checking your blood sugar? Not at all  What concerns do you have today about your diabetes? None   Do you have any of these symptoms? (Select all that apply)  Weight loss  Foot exam -  no symptoms  Eye exam -  [place]  On statin high intensity and LDL at goal?  Consider ace or ARB -     Prior insulin with Dr Alba - along with metformin, avandia. Tolerated well  Then had gastric bypass 2010 - resolved  Strong family history of diabetes; mom \"brittle\"    No prior pancreatitis.  No thyroid cancer.    BP Readings from Last 2 Encounters:   03/13/24 117/82   02/02/24 (!) 150/90     Hemoglobin A1C (%)   Date Value   02/26/2024 6.6 (H)   06/30/2021 5.1   03/04/2021 5.0     LDL Cholesterol Calculated (mg/dL)   Date Value   02/26/2024 10   12/21/2021 70   03/04/2021 69   02/20/2020 46         Hypertension Follow-up  Do you check your blood pressure regularly outside of the clinic? Yes   Are you following a low salt diet? Yes  Are your blood pressures ever more than 140 on the top number (systolic) OR more   than 90 on the bottom number (diastolic), for example 140/90? Yes    Hyperlipidemia Follow-Up and prediabetes  Are you regularly taking any medication or supplement to lower your cholesterol?   Yes- Lipitor   Are you having muscle aches or other side effects that you think could be caused by your cholesterol lowering medication?  No    Depression and Anxiety Follow-Up  How are you doing with your depression since your last visit? Improved   How are you doing with your anxiety since your last visit?  Improved   Are " you having other symptoms that might be associated with depression or anxiety? No  Have you had a significant life event? No   Do you have any concerns with your use of alcohol or other drugs? No    Social History     Tobacco Use    Smoking status: Never     Passive exposure: Never    Smokeless tobacco: Never   Vaping Use    Vaping Use: Never used   Substance Use Topics    Alcohol use: No     Comment: sober for 18 years    Drug use: No         1/16/2020     3:00 PM 3/30/2020    11:00 AM 7/14/2022     9:00 AM   PHQ   PHQ-9 Total Score 9 8 0   Q9: Thoughts of better off dead/self-harm past 2 weeks Not at all Not at all Not at all         1/16/2020     3:00 PM 7/14/2022     9:00 AM   LETTY-7 SCORE   Total Score 3 0         Suicide Assessment Five-step Evaluation and Treatment (SAFE-T)    Migraine   Since your last clinic visit, how have your headaches changed?  Worsened due to teeth   How often are you getting headaches or migraines? Headaches- 2x a week   Are you able to do normal daily activities when you have a migraine? Yes  Are you taking rescue/relief medications? (Select all that apply) Tylenol  How helpful is your rescue/relief medication?  I get some relief  Are you taking any medications to prevent migraines? (Select all that apply)  No  In the past 4 weeks, how often have you gone to urgent care or the emergency room because of your headaches?  0  Seeing oral surgeon consult 3/27/24 - will need all teeth removed  Dental Dr Giorgi Doll  History of chemo/cancer - medically complicated  Then will have plates  Will be out of work 3 weeks for healing post op    Hypothyroidism Follow-up  Since last visit, patient describes the following symptoms: weight loss of 7 lbs      Review of Systems  Constitutional, HEENT, cardiovascular, pulmonary, gi and gu systems are negative, except as otherwise noted.      Objective    /82 (BP Location: Right arm, Patient Position: Sitting, Cuff Size: Adult Regular)   Pulse  69   Temp 96.9  F (36.1  C) (Tympanic)   Resp 16   Wt 84.4 kg (186 lb)   SpO2 99%   BMI 29.13 kg/m    Body mass index is 29.13 kg/m .  Physical Exam   GENERAL: alert and no distress  EYES: Eyes grossly normal to inspection, PERRL and conjunctivae and sclerae normal  HENT: normal cephalic/atraumatic, nose and mouth without ulcers or lesions, oropharynx clear, oral mucous membranes moist, and poor dentition; cracked teeth  NECK: no adenopathy, no asymmetry, masses, or scars  RESP: lungs clear to auscultation - no rales, rhonchi or wheezes  CV: regular rate and rhythm, normal S1 S2, no S3 or S4, no murmur, click or rub, no peripheral edema  ABDOMEN: soft, nontender, no hepatosplenomegaly, no masses and bowel sounds normal  MS: no edema  SKIN: dry skin; bilateral feet - slight discoloration - macules - along bottoms of feet and toes; no ulceration; no blister  NEURO: Normal strength and tone, mentation intact and speech normal  PSYCH: mentation appears normal, affect normal/bright  Diabetic foot exam: normal DP and PT pulses, normal sensory exam, normal monofilament exam, and dry skin; thickened discolored nails    Urine microalbumin pending        Signed Electronically by: Coco Schafer MD

## 2024-03-05 ENCOUNTER — INFUSION THERAPY VISIT (OUTPATIENT)
Dept: INFUSION THERAPY | Facility: OTHER | Age: 54
End: 2024-03-05
Attending: NURSE PRACTITIONER
Payer: COMMERCIAL

## 2024-03-05 DIAGNOSIS — E53.8 VITAMIN B 12 DEFICIENCY: Primary | ICD-10-CM

## 2024-03-05 DIAGNOSIS — C18.2 CANCER OF ASCENDING COLON (H): ICD-10-CM

## 2024-03-05 PROCEDURE — 96372 THER/PROPH/DIAG INJ SC/IM: CPT | Performed by: NURSE PRACTITIONER

## 2024-03-05 RX ORDER — CYANOCOBALAMIN 1000 UG/ML
1000 INJECTION, SOLUTION INTRAMUSCULAR; SUBCUTANEOUS
Status: CANCELLED
Start: 2024-03-05

## 2024-03-05 RX ORDER — HEPARIN SODIUM (PORCINE) LOCK FLUSH IV SOLN 100 UNIT/ML 100 UNIT/ML
5 SOLUTION INTRAVENOUS
Status: CANCELLED | OUTPATIENT
Start: 2024-03-05

## 2024-03-05 RX ORDER — CYANOCOBALAMIN 1000 UG/ML
1000 INJECTION, SOLUTION INTRAMUSCULAR; SUBCUTANEOUS
Status: DISCONTINUED | OUTPATIENT
Start: 2024-03-05 | End: 2024-03-05 | Stop reason: HOSPADM

## 2024-03-05 RX ADMIN — CYANOCOBALAMIN 1000 MCG: 1000 INJECTION, SOLUTION INTRAMUSCULAR; SUBCUTANEOUS at 14:43

## 2024-03-05 NOTE — PROGRESS NOTES
Infusion Nursing Note:  Constantino Warren presents today for B 12.    Patient seen by provider today: No   present during visit today: Not Applicable.    Note: Patient tolerated injection today.      Intravenous Access:  No Intravenous access/labs at this visit.    Treatment Conditions:  Lab Results   Component Value Date    HGB 10.0 (L) 01/31/2024    WBC 5.3 01/31/2024    ANEU 5.8 04/29/2021    ANEUTAUTO 3.2 01/31/2024     01/31/2024        Lab Results   Component Value Date     02/26/2024    POTASSIUM 4.0 02/26/2024    MAG 1.9 11/06/2020    CR 0.79 02/26/2024    LOKESH 9.2 02/26/2024    BILITOTAL 0.7 02/26/2024    ALBUMIN 4.2 02/26/2024    ALT 47 02/26/2024    AST 59 (H) 02/26/2024       Results reviewed, labs MET treatment parameters, ok to proceed with treatment.      Post Infusion Assessment:  Patient tolerated injection without incident.       Discharge Plan:   Discharge instructions reviewed with: Patient.  AVS to patient via [employer].  Patient will return monthly for next appointment.   Departure Mode: Ambulatory.      Jaye Louie RN

## 2024-03-12 PROBLEM — I10 ESSENTIAL HYPERTENSION: Status: ACTIVE | Noted: 2024-03-12

## 2024-03-12 PROBLEM — E11.9 DIABETES MELLITUS, TYPE 2 (H): Status: ACTIVE | Noted: 2024-03-12

## 2024-03-13 ENCOUNTER — APPOINTMENT (OUTPATIENT)
Dept: LAB | Facility: OTHER | Age: 54
End: 2024-03-13
Attending: FAMILY MEDICINE
Payer: COMMERCIAL

## 2024-03-13 ENCOUNTER — OFFICE VISIT (OUTPATIENT)
Dept: FAMILY MEDICINE | Facility: OTHER | Age: 54
End: 2024-03-13
Attending: FAMILY MEDICINE
Payer: COMMERCIAL

## 2024-03-13 ENCOUNTER — TELEPHONE (OUTPATIENT)
Dept: SURGERY | Facility: OTHER | Age: 54
End: 2024-03-13

## 2024-03-13 VITALS
WEIGHT: 186 LBS | TEMPERATURE: 96.9 F | BODY MASS INDEX: 29.13 KG/M2 | DIASTOLIC BLOOD PRESSURE: 82 MMHG | SYSTOLIC BLOOD PRESSURE: 117 MMHG | OXYGEN SATURATION: 99 % | HEART RATE: 69 BPM | RESPIRATION RATE: 16 BRPM

## 2024-03-13 DIAGNOSIS — L85.3 DRY SKIN: ICD-10-CM

## 2024-03-13 DIAGNOSIS — E03.9 HYPOTHYROIDISM, UNSPECIFIED TYPE: ICD-10-CM

## 2024-03-13 DIAGNOSIS — E78.5 HYPERLIPIDEMIA, UNSPECIFIED HYPERLIPIDEMIA TYPE: ICD-10-CM

## 2024-03-13 DIAGNOSIS — K08.9 POOR DENTITION: ICD-10-CM

## 2024-03-13 DIAGNOSIS — Z91.030 BEE ALLERGY STATUS: ICD-10-CM

## 2024-03-13 DIAGNOSIS — E11.9 TYPE 2 DIABETES MELLITUS WITHOUT COMPLICATION, WITHOUT LONG-TERM CURRENT USE OF INSULIN (H): Primary | ICD-10-CM

## 2024-03-13 DIAGNOSIS — Z12.31 ENCOUNTER FOR SCREENING MAMMOGRAM FOR BREAST CANCER: ICD-10-CM

## 2024-03-13 DIAGNOSIS — Z12.11 SPECIAL SCREENING FOR MALIGNANT NEOPLASMS, COLON: ICD-10-CM

## 2024-03-13 DIAGNOSIS — F43.23 ADJUSTMENT DISORDER WITH MIXED ANXIETY AND DEPRESSED MOOD: ICD-10-CM

## 2024-03-13 DIAGNOSIS — I10 ESSENTIAL HYPERTENSION: ICD-10-CM

## 2024-03-13 LAB
CREAT UR-MCNC: 140.9 MG/DL
MICROALBUMIN UR-MCNC: <12 MG/L
MICROALBUMIN/CREAT UR: NORMAL MG/G{CREAT}

## 2024-03-13 PROCEDURE — 90471 IMMUNIZATION ADMIN: CPT | Performed by: FAMILY MEDICINE

## 2024-03-13 PROCEDURE — 99214 OFFICE O/P EST MOD 30 MIN: CPT | Mod: 25 | Performed by: FAMILY MEDICINE

## 2024-03-13 PROCEDURE — 82570 ASSAY OF URINE CREATININE: CPT | Performed by: FAMILY MEDICINE

## 2024-03-13 PROCEDURE — 82043 UR ALBUMIN QUANTITATIVE: CPT | Performed by: FAMILY MEDICINE

## 2024-03-13 PROCEDURE — 90677 PCV20 VACCINE IM: CPT | Performed by: FAMILY MEDICINE

## 2024-03-13 RX ORDER — EPINEPHRINE 0.3 MG/.3ML
0.3 INJECTION SUBCUTANEOUS PRN
Qty: 2 EACH | Refills: 1 | Status: SHIPPED | OUTPATIENT
Start: 2024-03-13

## 2024-03-13 RX ORDER — AMMONIUM LACTATE 12 G/100G
CREAM TOPICAL 2 TIMES DAILY
Qty: 140 G | Refills: 3 | Status: SHIPPED | OUTPATIENT
Start: 2024-03-13

## 2024-03-13 ASSESSMENT — PAIN SCALES - GENERAL: PAINLEVEL: EXTREME PAIN (8)

## 2024-03-13 NOTE — CONFIDENTIAL NOTE
March 13, 2024    Left message for patient to return call to schedule colonoscopy consult with General Surgery Dept per nurse.    -Pattie Stanton on 3/13/2024 at 3:53 PM

## 2024-03-13 NOTE — Clinical Note
Colon cancer history; overdue for scope; no showed you 2/20?  Per patient it was cancelled? Can your staff reschedule? thanks

## 2024-03-13 NOTE — PATIENT INSTRUCTIONS
Urine today - for protein/kidney function  Referral to DRC - nurse/dietician.    Consider Metformin vs GLP1 agent such as Ozempic.    Eye referral placed.  Follow up 3 months.    Add lac hydrin for dry skin on feet.    Prevnar 20 vaccine today.    Mammogram ordered.  Colonoscopy overdue - needs to be rescheduled.

## 2024-03-14 ENCOUNTER — TELEPHONE (OUTPATIENT)
Dept: SURGERY | Facility: OTHER | Age: 54
End: 2024-03-14

## 2024-03-14 NOTE — CONFIDENTIAL NOTE
March 14, 2024    Left message for patient to return call to schedule colonoscopy consult with General Surgery Dept per nurse.    -Pattie Stanton on 3/14/2024 at 2:29 PM

## 2024-03-27 ENCOUNTER — INFUSION THERAPY VISIT (OUTPATIENT)
Dept: INFUSION THERAPY | Facility: OTHER | Age: 54
End: 2024-03-27
Attending: INTERNAL MEDICINE
Payer: COMMERCIAL

## 2024-03-27 ENCOUNTER — ALLIED HEALTH/NURSE VISIT (OUTPATIENT)
Dept: INFUSION THERAPY | Facility: HOSPITAL | Age: 54
End: 2024-03-27

## 2024-03-27 DIAGNOSIS — Z85.038 HISTORY OF COLON CANCER: Primary | ICD-10-CM

## 2024-03-27 DIAGNOSIS — E53.8 VITAMIN B 12 DEFICIENCY: Primary | ICD-10-CM

## 2024-03-27 DIAGNOSIS — C18.2 CANCER OF ASCENDING COLON (H): ICD-10-CM

## 2024-03-27 PROCEDURE — 96374 THER/PROPH/DIAG INJ IV PUSH: CPT | Performed by: NURSE PRACTITIONER

## 2024-03-27 RX ORDER — CYANOCOBALAMIN 1000 UG/ML
1000 INJECTION, SOLUTION INTRAMUSCULAR; SUBCUTANEOUS
Status: CANCELLED
Start: 2024-03-27

## 2024-03-27 RX ORDER — HEPARIN SODIUM (PORCINE) LOCK FLUSH IV SOLN 100 UNIT/ML 100 UNIT/ML
5 SOLUTION INTRAVENOUS
Status: DISCONTINUED | OUTPATIENT
Start: 2024-03-27 | End: 2024-03-27 | Stop reason: HOSPADM

## 2024-03-27 RX ORDER — HEPARIN SODIUM (PORCINE) LOCK FLUSH IV SOLN 100 UNIT/ML 100 UNIT/ML
5 SOLUTION INTRAVENOUS
Status: CANCELLED | OUTPATIENT
Start: 2024-03-27

## 2024-03-27 RX ADMIN — HEPARIN SODIUM (PORCINE) LOCK FLUSH IV SOLN 100 UNIT/ML 5 ML: 100 SOLUTION at 13:06

## 2024-03-27 NOTE — PROGRESS NOTES
Patients port accessed using non-coring, 19 gauge, 3/4 needle, per facility protocol.     Hand hygiene performed: yes   Mask donned by caregiver: yes  Site prepped with CHG: yes   Labs drawn: no   Dressing applied using aseptic technique: yes     Port flushed easily, without resistance. Flushed with 10 cc's normal saline.   Immediate blood return noted.  Port flushed per orders/MAR. Needle removed intact, sterile dressing applied.  Slight pressure applied for 30 seconds.   Patient tolerated port flush well, denies pain nor discomfort at this time. Patient instructed to leave dressing intact for a minimum of one hour, and to call with questions or concerns.  Patient states understanding and is in agreement with this plan. Patient discharged.

## 2024-03-27 NOTE — PROGRESS NOTES
Met with patient today in infusion for the Port Flush Study, week 48 Flush visit.  Patient is on ARM B  which is a 12 week flush routine.  Port was successfully flushed and port assessment was completed by Jaye OLEARY, port assessment form completed and signed.Patient denies any port related issues, has not had any unscheduled health visits or unscheduled use of port. No Adverse Events noted or reported. No re-consenting needed as this is patient's final flush for the Port Flush Research study. No More research visits are needed or scheduled for this study. Patient may resume routine port flush schedule at the discretion of the medical provider.  Patient has completed all study activity requirements, is considered off study as of 03/27/2024 and may resume use of port for all flushes, infusions, medications and blood draws from this point forward.    IRMA CabralRS

## 2024-04-05 ENCOUNTER — ANCILLARY PROCEDURE (OUTPATIENT)
Dept: MAMMOGRAPHY | Facility: OTHER | Age: 54
End: 2024-04-05
Attending: FAMILY MEDICINE
Payer: COMMERCIAL

## 2024-04-05 ENCOUNTER — TELEPHONE (OUTPATIENT)
Dept: ONCOLOGY | Facility: OTHER | Age: 54
End: 2024-04-05

## 2024-04-05 ENCOUNTER — INFUSION THERAPY VISIT (OUTPATIENT)
Dept: INFUSION THERAPY | Facility: OTHER | Age: 54
End: 2024-04-05
Attending: NURSE PRACTITIONER
Payer: COMMERCIAL

## 2024-04-05 DIAGNOSIS — Z12.31 ENCOUNTER FOR SCREENING MAMMOGRAM FOR BREAST CANCER: ICD-10-CM

## 2024-04-05 DIAGNOSIS — E53.8 VITAMIN B 12 DEFICIENCY: Primary | ICD-10-CM

## 2024-04-05 DIAGNOSIS — C18.2 CANCER OF ASCENDING COLON (H): ICD-10-CM

## 2024-04-05 PROCEDURE — 77067 SCR MAMMO BI INCL CAD: CPT | Mod: TC | Performed by: STUDENT IN AN ORGANIZED HEALTH CARE EDUCATION/TRAINING PROGRAM

## 2024-04-05 PROCEDURE — 96372 THER/PROPH/DIAG INJ SC/IM: CPT | Performed by: INTERNAL MEDICINE

## 2024-04-05 PROCEDURE — 77063 BREAST TOMOSYNTHESIS BI: CPT | Mod: TC | Performed by: STUDENT IN AN ORGANIZED HEALTH CARE EDUCATION/TRAINING PROGRAM

## 2024-04-05 RX ORDER — CYANOCOBALAMIN 1000 UG/ML
1000 INJECTION, SOLUTION INTRAMUSCULAR; SUBCUTANEOUS
Status: CANCELLED
Start: 2024-04-05

## 2024-04-05 RX ORDER — HEPARIN SODIUM (PORCINE) LOCK FLUSH IV SOLN 100 UNIT/ML 100 UNIT/ML
5 SOLUTION INTRAVENOUS
Status: CANCELLED | OUTPATIENT
Start: 2024-04-05

## 2024-04-05 RX ORDER — CYANOCOBALAMIN 1000 UG/ML
1000 INJECTION, SOLUTION INTRAMUSCULAR; SUBCUTANEOUS
Status: DISCONTINUED | OUTPATIENT
Start: 2024-04-05 | End: 2024-04-05 | Stop reason: HOSPADM

## 2024-04-05 RX ADMIN — CYANOCOBALAMIN 1000 MCG: 1000 INJECTION, SOLUTION INTRAMUSCULAR; SUBCUTANEOUS at 14:49

## 2024-04-05 NOTE — PROGRESS NOTES
Infusion Nursing Note:  Constantino Warren presents today for B12.    Patient seen by provider today: No   present during visit today: Not Applicable.    Note: NA.      Intravenous Access:  No Intravenous access/labs at this visit.    Treatment Conditions:  Not Applicable.      Post Infusion Assessment:  Patient tolerated injection without incident.   The following medication was given:     MEDICATION: Vitamin B12    ROUTE: IM  SITE: Deltoid - Right    Patient noting that despite being done with the port flush study, she would like to continue the [id number] port flushes. Per orders, she would return to the Q28 day schedule. Encounter routed to Martina Jaime NP ONC asking if ok to update orders to reflect patient wishes.         Discharge Plan:   Patient discharged in stable condition accompanied by: self.  Departure Mode: Ambulatory.        
None

## 2024-04-05 NOTE — TELEPHONE ENCOUNTER
Patient is no longer part of the port flush study, so she would return to the Q28 day flush orders on file. However, she would like to continue the Q3 month flush schedule she has been doing throughout the study. Can we update her orders allow for this?

## 2024-04-09 ENCOUNTER — TELEPHONE (OUTPATIENT)
Dept: MAMMOGRAPHY | Facility: OTHER | Age: 54
End: 2024-04-09

## 2024-05-06 ENCOUNTER — INFUSION THERAPY VISIT (OUTPATIENT)
Dept: INFUSION THERAPY | Facility: OTHER | Age: 54
End: 2024-05-06
Attending: NURSE PRACTITIONER
Payer: COMMERCIAL

## 2024-05-06 DIAGNOSIS — C18.2 CANCER OF ASCENDING COLON (H): ICD-10-CM

## 2024-05-06 DIAGNOSIS — E53.8 VITAMIN B 12 DEFICIENCY: Primary | ICD-10-CM

## 2024-05-06 PROCEDURE — 96372 THER/PROPH/DIAG INJ SC/IM: CPT | Performed by: NURSE PRACTITIONER

## 2024-05-06 RX ORDER — HEPARIN SODIUM (PORCINE) LOCK FLUSH IV SOLN 100 UNIT/ML 100 UNIT/ML
5 SOLUTION INTRAVENOUS
Status: CANCELLED | OUTPATIENT
Start: 2024-05-06

## 2024-05-06 RX ORDER — CYANOCOBALAMIN 1000 UG/ML
1000 INJECTION, SOLUTION INTRAMUSCULAR; SUBCUTANEOUS
Status: DISCONTINUED | OUTPATIENT
Start: 2024-05-06 | End: 2024-05-06 | Stop reason: HOSPADM

## 2024-05-06 RX ORDER — CYANOCOBALAMIN 1000 UG/ML
1000 INJECTION, SOLUTION INTRAMUSCULAR; SUBCUTANEOUS
Status: CANCELLED
Start: 2024-05-06

## 2024-05-06 RX ADMIN — CYANOCOBALAMIN 1000 MCG: 1000 INJECTION, SOLUTION INTRAMUSCULAR; SUBCUTANEOUS at 12:57

## 2024-05-06 NOTE — PROGRESS NOTES
Infusion Nursing Note:  Constantino Warren presents today for B12.    Patient seen by provider today: No   present during visit today: Not Applicable.    Note: N/A.      Intravenous Access:  No Intravenous access/labs at this visit.    Treatment Conditions:  Not Applicable.      Post Infusion Assessment:  Patient tolerated injection without incident.     The following medication was given:     MEDICATION: Vitamin B12    ROUTE: IM  SITE: Deltoid - Right  DOSE: 1000 mcg    Discharge Plan:   AVS to patient via MYCHART.  Patient will return as scheduled for next appointment.   Patient discharged in stable condition accompanied by: self.  Departure Mode: Ambulatory.

## 2024-05-28 ENCOUNTER — TRANSFERRED RECORDS (OUTPATIENT)
Dept: HEALTH INFORMATION MANAGEMENT | Facility: CLINIC | Age: 54
End: 2024-05-28

## 2024-05-28 LAB — RETINOPATHY: POSITIVE

## 2024-06-13 ENCOUNTER — APPOINTMENT (OUTPATIENT)
Dept: GENERAL RADIOLOGY | Facility: HOSPITAL | Age: 54
End: 2024-06-13
Attending: NURSE PRACTITIONER
Payer: COMMERCIAL

## 2024-06-13 ENCOUNTER — HOSPITAL ENCOUNTER (EMERGENCY)
Facility: HOSPITAL | Age: 54
Discharge: HOME | End: 2024-06-13
Attending: NURSE PRACTITIONER | Admitting: NURSE PRACTITIONER
Payer: COMMERCIAL

## 2024-06-13 VITALS
TEMPERATURE: 97.3 F | RESPIRATION RATE: 16 BRPM | OXYGEN SATURATION: 98 % | HEIGHT: 67 IN | DIASTOLIC BLOOD PRESSURE: 87 MMHG | WEIGHT: 179 LBS | BODY MASS INDEX: 28.09 KG/M2 | SYSTOLIC BLOOD PRESSURE: 130 MMHG

## 2024-06-13 DIAGNOSIS — S59.902A ELBOW INJURY, LEFT, INITIAL ENCOUNTER: ICD-10-CM

## 2024-06-13 DIAGNOSIS — G56.22 ULNAR NEUROPATHY OF LEFT UPPER EXTREMITY: ICD-10-CM
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
## 2024-06-13 DIAGNOSIS — S63.502A WRIST SPRAIN, LEFT, INITIAL ENCOUNTER: ICD-10-CM

## 2024-06-13 DIAGNOSIS — S80.211A ABRASION, RIGHT KNEE, INITIAL ENCOUNTER: ICD-10-CM

## 2024-06-13 DIAGNOSIS — S49.92XA SHOULDER INJURY, LEFT, INITIAL ENCOUNTER: ICD-10-CM

## 2024-06-13 PROCEDURE — 73110 X-RAY EXAM OF WRIST: CPT | Mod: LT

## 2024-06-13 PROCEDURE — 73070 X-RAY EXAM OF ELBOW: CPT | Mod: LT

## 2024-06-13 PROCEDURE — 99213 OFFICE O/P EST LOW 20 MIN: CPT | Performed by: NURSE PRACTITIONER

## 2024-06-13 PROCEDURE — G0463 HOSPITAL OUTPT CLINIC VISIT: HCPCS

## 2024-06-13 PROCEDURE — 73030 X-RAY EXAM OF SHOULDER: CPT | Mod: LT

## 2024-06-13 ASSESSMENT — ENCOUNTER SYMPTOMS
ARTHRALGIAS: 1
WOUND: 1

## 2024-06-13 ASSESSMENT — ACTIVITIES OF DAILY LIVING (ADL): ADLS_ACUITY_SCORE: 39

## 2024-06-13 NOTE — DISCHARGE INSTRUCTIONS
X-rays of your shoulder, wrist and elbow did not show any fractures.  Some findings on your left shoulder x-ray is suspicious for a chronic injury.    I recommend icing the areas of your body that you hurt.  Keep the wounds to your body clean and dry, apply Neosporin over the.  Continue taking Tylenol as needed for pain.    Follow-up with orthopedics for reevaluation.    Return to urgent care or emergency room for any worsening or concerning symptoms.

## 2024-06-13 NOTE — ED PROVIDER NOTES
History     Chief Complaint   Patient presents with    Arm Pain    Leg Pain    Shoulder Pain     HPI  Constantino Warren is a 53 year old female who presents ambulatory to urgent care for evaluation of injury sustained during a fall.  Patient recently took a trip to [place] and was at the [facility] yesterday on her way back to [place] when she slipped on some mud and wet floor landing directly onto her left elbow.  She currently reports pain and decreased range of motion to the left elbow and left shoulder.  She has some numbness to her left little and ring fingers along with left wrist pain.  She also notes that she has an abrasion to her anterior right knee and has some pain to her knee.  She is still moving her knee with minimal difficulty as well as ambulating with minimal difficulty.  Denies hitting her head or LOC.  Has been taking Tylenol for the pain.    Allergies:  Allergies   Allergen Reactions    Aspartame      Other reaction(s): Other - Describe In Comment Field, Seizures  convulsions    Bee Venom Anaphylaxis    Bupropion Anaphylaxis     Throat gets tight and hives. Denies allergic reaction    Food Anaphylaxis     Peppers-anaphylaxis if eaten, if touched hands swell and reddened    Food Allergy Formula Anaphylaxis     Peppers, patient reports green peppers close off her throat    Ibuprofen Anaphylaxis    No Clinical Screening - See Comments Anaphylaxis     Peppers, patient reports green peppers close off her throat    Piper Anaphylaxis     Peppers--green, black, red, chili, etc. Derm symptoms, hives;  Strawberries--(cooked) Itching, breathing difficulties, GI symptoms. Mushrooms--GI symptoms, Hives.Spinach (cooked) throat irritation  (Listed on [facility] problem list.)    Fentanyl Swelling     Lips and tongue swelled.  Swelling of lips and tongue    Adhesive Tape      blisters    Amitriptyline Other (See Comments)     Respiratory symptoms - denies allergic reaction     Duloxetine Hcl      Mood cycling leading to elevated mood state.     Liquid Adhesive Dermatitis     blisters  Blisters  Silk tape and tegaderm is fine    Oxcarbazepine Other (See Comments)     Throat gets tight and hives. Denies allergic reaction  Throat gets tight and hives-      Prozac [Fluoxetine]      Other reaction(s): *Unknown  uncontrolled muscle spasms    Valproic Acid      Respiratory symptoms ([facility] Denies allergic reaction    Ziprasidone      Throat gets tight and hives. Denies allergic reaction       Problem List:    Patient Active Problem List    Diagnosis Date Noted    Diabetes mellitus, type 2 (H) 03/12/2024     Priority: Medium    Essential hypertension 03/12/2024     Priority: Medium    Bipolar disorder (H) 01/06/2023     Priority: Medium     per previous notes and documentation      Iron deficiency 04/29/2021     Priority: Medium    History of colon cancer, stage III 10/20/2020     Priority: Medium     Added automatically from request for surgery [id number]      Rotator cuff tear 10/13/2020     Priority: Medium     Added automatically from request for surgery [id number]      Arthritis 10/13/2020     Priority: Medium     Added automatically from request for surgery [id number]      History of uterine cancer 05/27/2020     Priority: Medium    Palliative care patient 05/27/2020     Priority: Medium    Iron deficiency anemia, unspecified iron deficiency anemia type 05/08/2020     Priority: Medium    S/P partial resection of colon 02/24/2020     Priority: Medium    Prediabetes      Priority: Medium    Colon cancer (H) 02/19/2020     Priority: Medium     Added automatically from request for surgery [id number]      Cancer of ascending colon (H) 02/19/2020     Priority: Medium     Added automatically from request for surgery [id number]      Special screening for malignant neoplasms, colon 01/14/2020     Priority: Medium     Added automatically from request for surgery [id number]      Anastomotic ulcer  S/P gastric bypass 07/21/2018     Priority: Medium    Anemia 07/21/2018     Priority: Medium    Chest pain 07/20/2018     Priority: Medium    GI bleed 07/20/2018     Priority: Medium    Abdominal pain, epigastric 07/20/2018     Priority: Medium    Hypochromic microcytic anemia 07/20/2018     Priority: Medium    Chronic migraine 01/18/2018     Priority: Medium     Replacing diagnoses that were inactivated after the 10/1/2021 regulatory import.      Chronic pain disorder 01/18/2018     Priority: Medium    Long term (current) use of opiate analgesic 01/12/2018     Priority: Medium     Overview:   Pain Diagnosis failed back syndrome, annular tear L4-5, history lumbar discectomy, s/p lumbar fusion.    Should be seen in the clinic every twelve weeks. Currently on a taper: yes, trying to wean away from the daytime hydrocodone-acetaminophen..    Plan for flares of chronic pain stretches, occasional use of more hydrocodone-acetaminophen for flare.    ED and UC: Opioid medication will not be given in the ER or Urgent Care unless there is a medical emergency unrelated to chronic pain.  Limit to 3 day supply    Refills: Refills will only be given at a scheduled visit.    [facility] Agreement for Opioid Treatment.   Opioid Agreement Date: 5/21/12  Last UDT (Urine Drug Test) on date:    Pain Dx: chronic low back pain  Last Pain Visit: 6-24-11  Preferred Pharmacy: 's  Comments:      7/23/14:  Progressing well.  Over the last month, has decreased hydrocodone-acetaminophen from 6/day to 3/day.  Awaiting appointment with [facility].      Adjustment disorder with mixed anxiety and depressed mood 12/15/2017     Priority: Medium    Other insomnia 12/15/2017     Priority: Medium    Syncope and collapse 09/26/2015     Priority: Medium    Hypertensive urgency 09/26/2015     Priority: Medium    Concussion syndrome 09/26/2015     Priority: Medium    Hydronephrosis, right 04/20/2015     Priority: Medium    Pyelonephritis 04/20/2015      Priority: Medium    Urolithiasis 04/20/2015     Priority: Medium    Annular tear of lumbar disc 12/16/2014     Priority: Medium     Overview:   MRI 2/5/13:  Previous right L4-5 hemilaminectomy and partial facetectomy with mild to moderate right L4-5 facet arthropathy and broad-based bulge of disk and end plate osteophyte on the right with partial high signal intensity zone annular tear of the undersurface of the laterally protruding disk/annulus.      Failed back syndrome of lumbar spine 12/16/2014     Priority: Medium     Overview:   Three back surgeries, including an L3-sacrum AP fusion done at [facility] in 2013.  Has been doing very well in terms of reduction of pain, with minimal requirement of opiate pain medication.      Hx of lumbar discectomy 12/16/2014     Priority: Medium     Overview:   10/2006:   L4-5 Laminectomy and diskectomy L5-S1.   12/12/11:  L5S1 microdiscectomy ([facility])      Vitamin B 12 deficiency 06/13/2011     Priority: Medium     Overview:   IMO Update 10/11      Gastric bypass status for obesity 01/04/2011     Priority: Medium     Overview:   12/14/10:  laparoscopic Prasanna-en-Y gastric bypass Dr. Hassan  IMO Update 10/11      Hyperlipidemia 09/23/2008     Priority: Medium     Overview:   IMO Update 10/11      Hypothyroidism 09/23/2008     Priority: Medium     Overview:   IMO Update 10/11  Overview:   IMO Update 10/11          Past Medical History:    Past Medical History:   Diagnosis Date    Arthritis 10/13/2020    Bipolar disorder (H)     Cancer (H)     Cancer (H) 2004    Chronic pain     De Quervain's disease (tenosynovitis) 12/2021    Depressive disorder     Diabetes (H)     History of blood transfusion     Hypertension     Prediabetes     Syncope and collapse     Thyroid disease     Trigger finger 12/2021       Past Surgical History:    Past Surgical History:   Procedure Laterality Date    ARTHROSCOPY SHOULDER Left 11/06/2020    Procedure: LEFT SHOULDER ARTHROSCOPY WITH ROTATOR CUFF REPAIR AND  ACROMIUM CLAVICULAR JOINT RESECTION;  Surgeon: Tanner Mujica DO;  Location: [facility]    BACK SURGERY      2008; 2011    COLONOSCOPY N/A 02/13/2020    Procedure: COLONOSCOPY WITH BIOPSY AND TATOOING OF ASCENDING COLON,INCOMEPLETE EXAM;  Surgeon: Kang Cleveland MD;  Location: [facility]    COLONOSCOPY N/A 10/26/2020    Procedure: incomplete colonoscopy ;  Surgeon: Kang Cleveland MD;  Location: [facility]    ESOPHAGOSCOPY, GASTROSCOPY, DUODENOSCOPY (EGD), COMBINED N/A 07/21/2018    Procedure: COMBINED ESOPHAGOSCOPY, GASTROSCOPY, DUODENOSCOPY (EGD);  UPPER ENDOSCOPY WITH BIOPSIES;  Surgeon: Roger Vazquez MD;  Location: [facility]    ESOPHAGOSCOPY, GASTROSCOPY, DUODENOSCOPY (EGD), COMBINED N/A 11/21/2019    Procedure: UPPER ENDOSCOPY with biopsy;  Surgeon: Kang Cleveland MD;  Location: [facility]    HEAD & NECK SURGERY  2008    c4-c5 fusion    HYSTERECTOMY, CRISTIANE  04/2004    uterine cancer; [facility]    INSERT PORT VASCULAR ACCESS N/A 02/28/2020    Procedure: INSERTION, VASCULAR ACCESS PORT LEFT;  Surgeon: Kang Cleveland MD;  [facility]: [facility]    LAPAROSCOPIC ASSISTED COLECTOMY Right 02/24/2020    Procedure: RIGHT KEVIN COLECTOMY, LYSIS OF ADHESIONS;  Surgeon: Kang Cleveland MD;  Location: [facility]    LAPAROSCOPIC BYPASS GASTRIC  12/2010       Family History:    Family History   Problem Relation Age of Onset    Diabetes Mother     Hypertension Mother     Coronary Artery Disease Mother     Myocardial Infarction Mother     Hypertension Father     Diabetes Maternal Grandmother     Hypertension Maternal Grandfather     Coronary Artery Disease Maternal Grandfather     Cancer Paternal Grandfather         unsure of type of cancer    Bladder Cancer Paternal Grandfather     Diabetes Maternal Aunt     Cerebrovascular Disease Maternal Aunt     Diabetes Maternal Aunt     Cerebrovascular Disease Maternal Aunt     Diabetes Maternal Aunt     Cerebrovascular Disease Maternal Aunt     Hyperlipidemia No family hx of  "    Breast Cancer No family hx of     Colon Cancer No family hx of     Prostate Cancer No family hx of     Thyroid Disease No family hx of     Anesthesia Reaction No family hx of     Asthma No family hx of     Genetic Disorder No family hx of        Social History:  Marital Status:   [5]  Social History     Tobacco Use    Smoking status: Never     Passive exposure: Never    Smokeless tobacco: Never   Vaping Use    Vaping status: Never Used   Substance Use Topics    Alcohol use: No     Comment: sober for 18 years    Drug use: No        Medications:    acetaminophen (TYLENOL) 500 MG tablet  ammonium lactate (AMLACTIN) 12 % external cream  atorvastatin (LIPITOR) 40 MG tablet  chlorthalidone (HYGROTON) 25 MG tablet  Cyanocobalamin 1000 MCG/ML KIT  diphenhydrAMINE (BENADRYL) 25 MG tablet  levothyroxine (SYNTHROID/LEVOTHROID) 200 MCG tablet  levothyroxine (SYNTHROID/LEVOTHROID) 50 MCG tablet  tiZANidine (ZANAFLEX) 4 MG tablet  EPINEPHrine (ANY BX GENERIC EQUIV) 0.3 MG/0.3ML injection 2-pack  EQ ASPIRIN ADULT LOW DOSE 81 MG EC tablet  famotidine (PEPCID) 20 MG tablet  loratadine (CLARITIN) 10 MG tablet  omeprazole (PRILOSEC) 20 MG DR capsule  traZODone (DESYREL) 50 MG tablet          Review of Systems   Musculoskeletal:  Positive for arthralgias. Negative for gait problem.   Skin:  Positive for wound.   All other systems reviewed and are negative.      Physical Exam   BP: 130/87  Temp: 97.3  F (36.3  C)  Resp: 16  Height: 170.2 cm (5' 7\")  Weight: 81.2 kg (179 lb)  SpO2: 98 %      Physical Exam  Vitals and nursing note reviewed.   Constitutional:       Appearance: Normal appearance. She is not ill-appearing or toxic-appearing.   Eyes:      Conjunctiva/sclera: Conjunctivae normal.      Pupils: Pupils are equal, round, and reactive to light.   Cardiovascular:      Rate and Rhythm: Normal rate.   Pulmonary:      Effort: Pulmonary effort is normal. No respiratory distress.   Musculoskeletal:         General: Signs of " injury present.      Cervical back: Normal range of motion and neck supple.      Comments: Tenderness to palpation over olecranon process as well as medial and lateral left elbow.  Abrasion noted over the olecranon process.  Extension of left elbow about 1 and 20 degrees.  Left elbow flexion about 45 degrees.    Tenderness to palpation to entire left shoulder joint.  No tenderness over the clavicle.  Decreased range of motion with about 45 degrees to the left shoulder.  No obvious deformity.    Significant tenderness appreciated to the ventral aspect of left wrist.  Some tenderness also appreciated to the radial aspect of the left wrist.  No tenderness to palpation to the left hand including fingers.  Fingers feel warm to the touch.  Cap refills less than 2 seconds.  Moving fingers with minimal difficulty.    Abrasion noted to anterior right knee.  Tenderness to palpation over this area.  No tenderness appreciated to the rest of the knee.  Full range of motion to the knee.   Skin:     General: Skin is warm and dry.      Capillary Refill: Capillary refill takes less than 2 seconds.   Neurological:      Mental Status: She is alert and oriented to person, place, and time.         ED Course        Procedures         Results for orders placed or performed during the hospital encounter of 06/13/24 (from the past 24 hour(s))   XR Wrist Left G/E 3 Views    Narrative    Exam: XR WRIST LEFT G/E 3 VIEWS    Technique: Left wrist, 4 views    Comparison: 8/28/2021    Exam reason: tenderness to ulnar aspect of wrist with numbness to  little and ring fingers; no tenderness to left hand; post fall  yesterday    Findings:  No acute fracture or dislocation. Mild first CMC degenerative change.     Soft tissues appear normal.      Impression    Impression:  No acute fracture or dislocation.    GLORIA GRAYSON MD         SYSTEM ID:  [id number]   XR Shoulder Left G/E 3 Views    Narrative    Exam: XR SHOULDER LEFT G/E 3 VIEWS    Technique:  Left shoulder, 3 Views    Comparison: 12/6/2020    Exam reason: decreased ROM and tenderness to entire shoulder post fall  landing on elbow    Findings:  No acute fracture or dislocation. There is slight elevation of the  distal clavicle in relation to the acromion, new since 2020, possibly  due to chronic clavicular joint injury.     Soft tissues appear normal.      Impression    Impression:  Slight elevation of the distal clavicle in relation to the acromion,  new since 2020, possibly due to acromioclavicular joint injury.    GLORIA GRAYSON MD         SYSTEM ID:  [id number]   Elbow XR,  2 views, left    Narrative    Exam: XR ELBOW LEFT 2 VIEWS    Technique: Left elbow, 2 views    Comparison: None.    Exam reason: tenderness to medial and over olecranon process post fall  where she landed on her elbow; decreased ROM    Findings:  No acute fracture or dislocation. Joint spaces are well maintained.      No elbow joint effusion.      Impression    Impression:  No acute fracture or dislocation.    GLORIA GRAYSON MD         SYSTEM ID:  [id number]       Medications - No data to display    Assessments & Plan (with Medical Decision Making)  53-year-old female that presented for evaluation of injury sustained from a fall that occurred yesterday while she was in [place] on her way back to [place].  She does have some decreased range of motion to the left shoulder, elbow and left wrist.  Complaining of numbness feeling to her left ring and little finger.  She is however moving the fingers pretty good.  Cap refill less than 2 seconds.  Fingers feel warm to the touch with no significant pain appreciated.  X-rays were taken of her elbow, shoulder and wrist today.  Her elbow and wrist were negative for acute findings.  Her shoulder x-ray showed findings consistent with an AC injury likely chronic versus acute the radiologist reading.  No images were taken of her right knee today as she had full range of motion and tenderness was  localized over the abrasion that she has to this knee.  Ambulating with minimal difficulty.  Wrist brace applied to the left wrist and a sling given for comfort.  Recommended icing areas that hurt and continuing with Tylenol as needed for pain.  Referral placed to orthopedics for follow-up regarding her wrist as well as her shoulder.  Return to urgent care or emergency room for any worsening or concerning symptoms.     I have reviewed the nursing notes.    I have reviewed the findings, diagnosis, plan and need for follow up with the patient.  This document was prepared using a combination of typing and voice generated software.  While every attempt was made for accuracy, spelling and grammatical errors may exist.         New Prescriptions    No medications on file       Final diagnoses:   Fall from slip, trip, or stumble, initial encounter   Elbow injury, left, initial encounter   Wrist sprain, left, initial encounter   Ulnar neuropathy of left upper extremity   Abrasion, right knee, initial encounter   Shoulder injury, left, initial encounter       6/13/2024   [facility]       Mpofu, Prudence, CNP  06/13/24 [id number]

## 2024-06-13 NOTE — Clinical Note
Constantino Warren was seen and treated in our emergency department on 6/13/2024.  She may return to work on 06/17/2024.       If you have any questions or concerns, please don't hesitate to call.      Mpofu, Prudence, CNP

## 2024-06-13 NOTE — ED TRIAGE NOTES
Patient presents to urgent care for left shoulder, arm  and left knee that happened in [place] last night. [place] aircraft jet[place] Patient states she landed directly on her left elbow.   Patient reports has tingling and numbness in her left hand and fingers. Also, pain in her right knee that radiates half down the shin  Patient took tylenol and ibuprofen which has not helped.

## 2024-06-14 ENCOUNTER — TELEPHONE (OUTPATIENT)
Dept: FAMILY MEDICINE | Facility: OTHER | Age: 54
End: 2024-06-14

## 2024-06-14 NOTE — TELEPHONE ENCOUNTER
Symptom or reason needing to speak to RN: Urgent Care 6/12/24     Best number to return call: [phone]     Best time to return call: anytime

## 2024-06-17 NOTE — PROGRESS NOTES
Assessment & Plan     Type 2 diabetes mellitus without complication, without long-term current use of insulin (H)  Reviewed diet, exercise.  Labs updated.  A1c improved.  Diet controlled.  DRC referral placed 3/2024 - no showed 4/9/24 - Lucy Howard.  - Hemoglobin A1c; Future  - TSH with free T4 reflex; Future  - Basic metabolic panel; Future  - Hemoglobin A1c  - TSH with free T4 reflex  - Basic metabolic panel    Hyperlipidemia, unspecified hyperlipidemia type  Stable.  Continue high intensity statin - Lipitor 40 mg.    Essential hypertension  At goal.  Continue Chlorthalidone.   Consider ACE inhibitor/ARB.  Urine microalbumin negative 3/2024.  - chlorthalidone (HYGROTON) 25 MG tablet; Take 1 tablet (25 mg) by mouth daily    Hypothyroidism, unspecified type  Variable TSH.   Lab updated.  Now TSH is high. T4 pending.  Was on vacation - did not take medication consistently.  Reviewed taking it on empty stomach, separate from other medications, and doing so consistently.  Continue Synthroid current dose and recheck in a couple months.  - TSH with free T4 reflex; Future  - levothyroxine (SYNTHROID/LEVOTHROID) 200 MCG tablet; Take 1 tablet (200 mcg) by mouth daily  - levothyroxine (SYNTHROID/LEVOTHROID) 50 MCG tablet; TAKE 1 TABLET BY MOUTH ONCE DAILY (TAKE WITH 200MCG TABLET FOR TOTAL OF 250MCG)  - TSH with free T4 reflex    Adjustment disorder with mixed anxiety and depressed mood  Stable.    S/P arthroscopy of left shoulder  - tiZANidine (ZANAFLEX) 4 MG tablet; Take 1 tablet (4 mg) by mouth at bedtime    History of colon cancer  Overdue for follow up scope.  Needs to be rescheduled.  New referral placed.  - Adult Gen Surg  Referral    Acute pain of left shoulder  S/p fall.  Xrays negative - shoulder, elbow, wrist.  In sling.  Scheduled with ortho - but not until 7/9/24.  PT referral placed today as well.  Note for work.  - ibuprofen (ADVIL/MOTRIN) 800 MG tablet; Take 1 tablet (800 mg) by mouth every 8  hours as needed for moderate pain  - Physical Therapy  Referral; Future    Left wrist pain  As above.  Some ulnar neuropathy with 4th/5th fingers.  Trauma - direct fall on elbow.  Conservative care.  If persists, may need EMG.  - Physical Therapy  Referral; Future        MED REC REQUIRED  Post Medication Reconciliation Status: discharge medications reconciled and changed, per note/orders    The longitudinal plan of care for the diagnosis(es)/condition(s) as documented were addressed during this visit. Due to the added complexity in care, I will continue to support Constantino in the subsequent management and with ongoing continuity of care.    See Patient Instructions    Return in about 6 months (around 12/19/2024) for diabetes; with fasting labs.    Subjective   Constantino is a 53 year old, presenting for the following health issues:  Diabetes and ER F/U        6/19/2024     9:03 AM   Additional Questions   Roomed by Stevo Tucker   Accompanied by None         6/19/2024     9:03 AM   Patient Reported Additional Medications   Patient reports taking the following new medications None     HPI     Labs first - Patient went to lab    Colon screen - overdue - Dr Cleveland - prior colon cancer 2020 discovered on routine colonoscopy screening.  2/20/24 - marked as no show.  Not rescheduled.  Per patient was not called to remind of appointment.        ED/UC Followup:    Facility:  [facility]   Date of visit: 6/13/2024  Reason for visit: Fall  - at airport;left elbow injury, sprained wrist, ulnar neuropathy of left upper extremity, right knee abrasion and left shoulder injury    Current Status: Patient states that she shoulder is getting worse with pain and range of motion has decreased      Returning for mission trip - [place]    Wrist brace  Sling  Ortho referral - 7/9/24 - Dr Beyer    Left 4th/5th fingers - completely numb x 2 days, now tingling/asleep; some shooting pains elbow to wrist and hand    Xrays - left elbow,  shoulder, wrist    Tylenol/Ibuprofen every 4 hours.  400 mg Ibupofen every 4.  Not sleeping.    Diabetes Follow-up  How often are you checking your blood sugar? Not at all  What concerns do you have today about your diabetes? Other: If she is still diabetic she would like to do what she needs to do to stay as healthy as she can    Do you have any of these symptoms? (Select all that apply)  Redness, sores, or blisters on feet  Last A1c - 2/2024 6.6%  3 meals; snack HS  No specific exercise; 3 kids; active - on her feet      Hyperlipidemia Follow-Up  Are you regularly taking any medication or supplement to lower your cholesterol?   Yes- Lipitor   Are you having muscle aches or other side effects that you think could be caused by your cholesterol lowering medication?  No    Hypertension Follow-up  Do you check your blood pressure regularly outside of the clinic? No   Are you following a low salt diet? Yes  Are your blood pressures ever more than 140 on the top number (systolic) OR more   than 90 on the bottom number (diastolic), for example 140/90? Patient does not check BP outside of the clinic     BP Readings from Last 2 Encounters:   06/19/24 115/77   06/13/24 130/87     Hemoglobin A1C (%)   Date Value   06/19/2024 5.8 (H)   02/26/2024 6.6 (H)   06/30/2021 5.1   03/04/2021 5.0     LDL Cholesterol Calculated (mg/dL)   Date Value   02/26/2024 10   12/21/2021 70   03/04/2021 69   02/20/2020 46         Depression and Anxiety   How are you doing with your depression since your last visit? Improved   How are you doing with your anxiety since your last visit?  Improved   Are you having other symptoms that might be associated with depression or anxiety? No  Have you had a significant life event? No   Do you have any concerns with your use of alcohol or other drugs? 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  Little interest or pleasure in doing things 0   2.  Feeling down, depressed, or hopeless 0   3.  Trouble falling or staying asleep, or sleeping too much 0   4.  Feeling tired or having little energy 0   5.  Poor appetite or overeating 0   6.  Feeling bad about yourself 0   7.  Trouble concentrating 0   8.  Moving slowly or restless 0   Q9: Thoughts of better off dead/self-harm past 2 weeks 0   PHQ-9 Total Score 0         6/19/2024     9:03 AM   LETTY-7    1. Feeling nervous, anxious, or on edge 0   2. Not being able to stop or control worrying 0   3. Worrying too much about different things 0   4. Trouble relaxing 0   5. Being so restless that it is hard to sit still 0   6. Becoming easily annoyed or irritable 0   7. Feeling afraid, as if something awful might happen 0   LETTY-7 Total Score 0   If you checked any problems, how difficult have they made it for you to do your work, take care of things at home, or get along with other people? Not difficult at all       Suicide Assessment Five-step Evaluation and Treatment (SAFE-T)  {Provider  Link to Depression Care Package SmartSet :206338      Hypothyroidism Follow-up  Since last visit, patient describes the following symptoms: Weight stable, no hair loss, no skin changes, no constipation, no loose stools      Review of Systems  Constitutional, HEENT, cardiovascular, pulmonary, gi and gu systems are negative, except as otherwise noted.      Objective    /77 (BP Location: Right arm, Patient  Position: Sitting, Cuff Size: Adult Regular)   Pulse 77   Temp 97.1  F (36.2  C) (Tympanic)   Resp 16   Wt 84 kg (185 lb 1.6 oz)   SpO2 98%   BMI 28.99 kg/m    Body mass index is 28.99 kg/m .  Physical Exam   GENERAL: alert and no distress  EYES: Eyes grossly normal to inspection, PERRL and conjunctivae and sclerae normal  HENT: edentulous  NECK: no adenopathy, no asymmetry, masses, or scars  RESP: lungs clear to auscultation - no rales, rhonchi or wheezes  CV: regular rate and rhythm, normal S1 S2, no S3 or S4, no murmur, click or rub, no peripheral edema  MS: normal muscle tone, no edema, and tenderness to palpation left shoulder diffusely; limited shoulder flexion and abduction to 90 degrees due to pain; negative cross arm test; elbow with full AROM with minimal tenderness of olecranon; left wrist with painful flexion/extension; tender to palpation overlying distal radius, scaphoid  PSYCH: mentation appears normal, affect normal/bright    Results for orders placed or performed in visit on 06/19/24 (from the past 24 hour(s))   Hemoglobin A1c   Result Value Ref Range    Estimated Average Glucose 120 mg/dL    Hemoglobin A1C 5.8 (H) <5.7 %   TSH with free T4 reflex   Result Value Ref Range    TSH 10.10 (H) 0.30 - 4.20 uIU/mL   Basic metabolic panel   Result Value Ref Range    Sodium 140 135 - 145 mmol/L    Potassium 4.0 3.4 - 5.3 mmol/L    Chloride 108 (H) 98 - 107 mmol/L    Carbon Dioxide (CO2) 21 (L) 22 - 29 mmol/L    Anion Gap 11 7 - 15 mmol/L    Urea Nitrogen 19.5 6.0 - 20.0 mg/dL    Creatinine 0.80 0.51 - 0.95 mg/dL    GFR Estimate 87 >60 mL/min/1.73m2    Calcium 9.0 8.6 - 10.0 mg/dL    Glucose 149 (H) 70 - 99 mg/dL           Signed Electronically by: Coco Schafer MD    Answers submitted by the patient for this visit:  Patient Health Questionnaire (Submitted on 6/19/2024)  If you checked off any problems, how difficult have these problems made it for you to do your work, take care of things at home,  or get along with other people?: Not difficult at all  PHQ9 TOTAL SCORE: 0  LETTY-7 (Submitted on 6/19/2024)  LETTY 7 TOTAL SCORE: 0

## 2024-06-19 ENCOUNTER — OFFICE VISIT (OUTPATIENT)
Dept: FAMILY MEDICINE | Facility: OTHER | Age: 54
End: 2024-06-19
Attending: FAMILY MEDICINE
Payer: COMMERCIAL

## 2024-06-19 VITALS
RESPIRATION RATE: 16 BRPM | TEMPERATURE: 97.1 F | BODY MASS INDEX: 28.99 KG/M2 | OXYGEN SATURATION: 98 % | SYSTOLIC BLOOD PRESSURE: 115 MMHG | HEART RATE: 77 BPM | DIASTOLIC BLOOD PRESSURE: 77 MMHG | WEIGHT: 185.1 LBS

## 2024-06-19 DIAGNOSIS — I10 ESSENTIAL HYPERTENSION: ICD-10-CM

## 2024-06-19 DIAGNOSIS — E11.9 TYPE 2 DIABETES MELLITUS WITHOUT COMPLICATION, WITHOUT LONG-TERM CURRENT USE OF INSULIN (H): Primary | ICD-10-CM

## 2024-06-19 DIAGNOSIS — E78.5 HYPERLIPIDEMIA, UNSPECIFIED HYPERLIPIDEMIA TYPE: ICD-10-CM

## 2024-06-19 DIAGNOSIS — E03.9 HYPOTHYROIDISM, UNSPECIFIED TYPE: ICD-10-CM

## 2024-06-19 DIAGNOSIS — M25.532 LEFT WRIST PAIN: ICD-10-CM

## 2024-06-19 DIAGNOSIS — Z98.890 S/P ARTHROSCOPY OF LEFT SHOULDER: ICD-10-CM

## 2024-06-19 DIAGNOSIS — M25.512 ACUTE PAIN OF LEFT SHOULDER: ICD-10-CM

## 2024-06-19 DIAGNOSIS — Z85.038 HISTORY OF COLON CANCER: ICD-10-CM

## 2024-06-19 DIAGNOSIS — F43.23 ADJUSTMENT DISORDER WITH MIXED ANXIETY AND DEPRESSED MOOD: ICD-10-CM

## 2024-06-19 LAB
ANION GAP SERPL CALCULATED.3IONS-SCNC: 11 MMOL/L (ref 7–15)
BUN SERPL-MCNC: 19.5 MG/DL (ref 6–20)
CALCIUM SERPL-MCNC: 9 MG/DL (ref 8.6–10)
CHLORIDE SERPL-SCNC: 108 MMOL/L (ref 98–107)
CREAT SERPL-MCNC: 0.8 MG/DL (ref 0.51–0.95)
DEPRECATED HCO3 PLAS-SCNC: 21 MMOL/L (ref 22–29)
EGFRCR SERPLBLD CKD-EPI 2021: 87 ML/MIN/1.73M2
EST. AVERAGE GLUCOSE BLD GHB EST-MCNC: 120 MG/DL
GLUCOSE SERPL-MCNC: 149 MG/DL (ref 70–99)
HBA1C MFR BLD: 5.8 %
POTASSIUM SERPL-SCNC: 4 MMOL/L (ref 3.4–5.3)
SODIUM SERPL-SCNC: 140 MMOL/L (ref 135–145)
T4 FREE SERPL-MCNC: 0.63 NG/DL (ref 0.9–1.7)
TSH SERPL DL<=0.005 MIU/L-ACNC: 10.1 UIU/ML (ref 0.3–4.2)

## 2024-06-19 PROCEDURE — G2211 COMPLEX E/M VISIT ADD ON: HCPCS | Performed by: FAMILY MEDICINE

## 2024-06-19 PROCEDURE — 84439 ASSAY OF FREE THYROXINE: CPT | Performed by: FAMILY MEDICINE

## 2024-06-19 PROCEDURE — 36415 COLL VENOUS BLD VENIPUNCTURE: CPT | Performed by: FAMILY MEDICINE

## 2024-06-19 PROCEDURE — 83036 HEMOGLOBIN GLYCOSYLATED A1C: CPT | Performed by: FAMILY MEDICINE

## 2024-06-19 PROCEDURE — 99214 OFFICE O/P EST MOD 30 MIN: CPT | Performed by: FAMILY MEDICINE

## 2024-06-19 PROCEDURE — 84443 ASSAY THYROID STIM HORMONE: CPT | Performed by: FAMILY MEDICINE

## 2024-06-19 PROCEDURE — 80048 BASIC METABOLIC PNL TOTAL CA: CPT | Performed by: FAMILY MEDICINE

## 2024-06-19 RX ORDER — IBUPROFEN 800 MG/1
800 TABLET, FILM COATED ORAL EVERY 8 HOURS PRN
Qty: 60 TABLET | Refills: 0 | Status: SHIPPED | OUTPATIENT
Start: 2024-06-19

## 2024-06-19 RX ORDER — LEVOTHYROXINE SODIUM 50 UG/1
TABLET ORAL
Qty: 90 TABLET | Refills: 1 | Status: SHIPPED | OUTPATIENT
Start: 2024-06-19

## 2024-06-19 RX ORDER — CHLORTHALIDONE 25 MG/1
25 TABLET ORAL DAILY
Qty: 90 TABLET | Refills: 0 | Status: SHIPPED | OUTPATIENT
Start: 2024-06-19

## 2024-06-19 RX ORDER — LEVOTHYROXINE SODIUM 200 UG/1
200 TABLET ORAL DAILY
Qty: 90 TABLET | Refills: 1 | Status: SHIPPED | OUTPATIENT
Start: 2024-06-19

## 2024-06-19 ASSESSMENT — ANXIETY QUESTIONNAIRES
IF YOU CHECKED OFF ANY PROBLEMS ON THIS QUESTIONNAIRE, HOW DIFFICULT HAVE THESE PROBLEMS MADE IT FOR YOU TO DO YOUR WORK, TAKE CARE OF THINGS AT HOME, OR GET ALONG WITH OTHER PEOPLE: NOT DIFFICULT AT ALL
GAD7 TOTAL SCORE: 0
2. NOT BEING ABLE TO STOP OR CONTROL WORRYING: NOT AT ALL
5. BEING SO RESTLESS THAT IT IS HARD TO SIT STILL: NOT AT ALL
GAD7 TOTAL SCORE: 0
3. WORRYING TOO MUCH ABOUT DIFFERENT THINGS: NOT AT ALL
4. TROUBLE RELAXING: NOT AT ALL
7. FEELING AFRAID AS IF SOMETHING AWFUL MIGHT HAPPEN: NOT AT ALL
GAD7 TOTAL SCORE: 0
1. FEELING NERVOUS, ANXIOUS, OR ON EDGE: NOT AT ALL
6. BECOMING EASILY ANNOYED OR IRRITABLE: NOT AT ALL
7. FEELING AFRAID AS IF SOMETHING AWFUL MIGHT HAPPEN: NOT AT ALL
8. IF YOU CHECKED OFF ANY PROBLEMS, HOW DIFFICULT HAVE THESE MADE IT FOR YOU TO DO YOUR WORK, TAKE CARE OF THINGS AT HOME, OR GET ALONG WITH OTHER PEOPLE?: NOT DIFFICULT AT ALL

## 2024-06-19 ASSESSMENT — PATIENT HEALTH QUESTIONNAIRE - PHQ9
10. IF YOU CHECKED OFF ANY PROBLEMS, HOW DIFFICULT HAVE THESE PROBLEMS MADE IT FOR YOU TO DO YOUR WORK, TAKE CARE OF THINGS AT HOME, OR GET ALONG WITH OTHER PEOPLE: NOT DIFFICULT AT ALL
SUM OF ALL RESPONSES TO PHQ QUESTIONS 1-9: 0
SUM OF ALL RESPONSES TO PHQ QUESTIONS 1-9: 0

## 2024-06-19 ASSESSMENT — PAIN SCALES - GENERAL: PAINLEVEL: EXTREME PAIN (8)

## 2024-06-19 NOTE — PATIENT INSTRUCTIONS
Progress Notes by Domonique Frances MD at 09/19/18 03:54 PM     Author:  Domonique Frances MD Service:  (none) Author Type:  Physician     Filed:  09/20/18 12:30 PM Encounter Date:  2018 Status:  Signed     :  Domonique Frances MD (Physician)            Written/documented by Matt Campo, acting as a scribe in Dr. Frances's presence.[NO1.1T]    6 Month Well Child Visit  Roomed by: RODRIGUEZ Grey 3:54 PM     Accompanied by:[JL1.1T] Mother Nemo Jiménez and Father and Grandmother [phone] (cell) ok to leave detailed message[JL1.1M]  DEVELOPMENTAL LANDMARKS  Bears weight?[JL1.1T] Yes[JL1.1M]  Reaches out & obtains objects?[JL1.1T] Yes[JL1.1M]  Focuses on small objects?[JL1.1T] Yes[JL1.1M]  Sits briefly, leans forward?[JL1.1T] Yes[JL1.1M]  Rolls over both ways?[JL1.1T] NO[JL1.1M]  No head lag when pulled to sitting?[JL1.1T] Yes[JL1.1M]  Babbles?[JL1.1T] Yes[JL1.1M]  Smiles at toys?[JL1.1T] Yes[JL1.1M]  Total number of \"No\" responses to developmental landmark questions:[JL1.1T] 1[JL1.1M]    Lead History[JL1.1T]  [facility] - No[JL1.1M] - Does this child reside in a high-risk ZIP code area?[JL1.1T]  No[JL1.1M] - Is this child eligible for or enrolled in Medicaid, Head Start, All Kids or WIC?[JL1.1T]  No[JL1.1M] - Does this child have a sibling with a blood lead level of10 mcg/dL or higher?[JL1.1T]   No[JL1.1M] - Does this child live in or regularly visit a home built before 1978?[JL1.1T]  No[JL1.1M] - In the past year, has this child been exposed to repairs, repainting or renovation of a home built before 1978?[JL1.1T]  No[JL1.1M] - Is this child a refugee or an adoptee from any foreign country?[JL1.1T]  No[JL1.1M] - Has this child ever been to [place], [place] or [place],  countries (i.e., [place] or [place]), or any country where exposure to lead from certain items could have occurred (for example, cosmetics, home remedies, folk medicines or glazed  Referral back to general surgery for overdue colonoscopy.    Medications refilled.  No show diabetes center visit 4/9/24.  Please call to reschedule.  A1c improved.   Reminder- take Synthroid on empty stomach, without food or other medications.  Follow up 6 months - due for fasting labs at that time.    Add Ibuprofen 800 mg 3 times per day with food.  Referral to physical therapy.  Keep ortho appointment 7/9/24 as scheduled.  Work letter completed.   pottery)?[JL1.1T]   No[JL1.1M] - Does this child live with someone who has a job or a hobby that may involve lead (for example, jewelry making, building renovation or repair, bridge construction, plumbing, furniture refinishing, or work with automobile batteries or radiators, lead solder, leaded glass, lead shots, bullets or lead fishing sinkers)?[JL1.1T]   No[JL1.1M] - At any time, has this child lived near a factory where lead is used (for example, a lead smelter or a paint factory)?     Lead screening indicated:[JL1.1T] Not at this time[JL1.1M]    SUBJECTIVE:   Present health:[JL1.1T] Concerns: Red patch on left inner elbow and stool[NO1.1M]  Diet:[JL1.1T] Normal infant feeding pattern[NO1.1M].  Started solids:[JL1.1T] yes[NO1.1M]  Elimination:   BM’s:[JL1.1T] Normal, streaked with mucous[NO1.1M]   Urine:[JL1.1T] Normal[NO1.1M]  Sleep:[JL1.1T] Normal[NO1.1M]    Janel Crespo[NO1.2T] is a[NO1.1M] 5 month old male[NO1.2T] who is accompanied by her mother and father for a 6 month wellness visit. Mother reports that his skin has improved but notes dry patch in left inner elbow. Mother adds that she has applied vaseline to affected area.  Patient is eating cerelac and mague oat cereal. Patient is rolling on right side only. Patient is babbling a lot. Father notes that for the last week his stool seem constipated. Father denies blood in stool. Mother states that patient is sounding when he stools. Father states that stools have changed to black color after switching to Poly-visol with iron. Mother reports that patient is drinking little milk when feeding. Otherwise, mother and father have no other concerns.[NO1.1M]     ROS  All other systems reviewed were negative     Allergies:  Review of patient's allergies indicates no known allergies.     No current outpatient prescriptions on file.       Family history:[JL1.1T] No change in family history[NO1.1M]    Social history:[JL1.1T] Not in  or school[NO1.1M]      OBJECTIVE:   Physical exam[JL1.1T]  Pulse 140, temperature 98.8 °F (37.1 °C), resp. rate 24, height 2' 2.5\" (0.673 m), weight 16 lb 9.6 oz (7.53 kg), head circumference 16\" (40.6 cm).[JL1.2T]    GENERAL: Evaluation of appearance.  Findings:[JL1.1T] Normal- alert and no distress noted[NO1.1M]    HEAD & SCALP: Evaluation for lesions, swelling, tenderness and abnormalities.  Findings:[JL1.1T] Normal - normocephalic with no lesions present[NO1.1M]      [place]:[JL1.1T] Anterior [place] - open[NO1.1M]    EYES: Evaluation for redness, swelling, drainage and abnormalities.  Findings:[JL1.1T] Both eyes normal - red reflex present, no redness or drainage, MELISSA[NO1.1M]    EARS: Evaluation of  external ears, canals, and tm's  Findings:[JL1.1T] Normal bilateral ext. ears, canals, and tm's[NO1.1M]    NOSE: Evaluation for swelling and drainage  Findings:[JL1.1T] Normal - patent with no swelling or discharge present[NO1.1M]    MOUTH: Evaluation of tongue and mucosal membranes  Findings:[JL1.1T] Normal[NO1.1M]    THROAT: Evaluation for redness and lesions  Findings:[JL1.1T] Normal[NO1.1M]    NECK: Evaluation for masses, swelling and soreness  Findings:[JL1.1T] Supple, no adenopathy or masses[NO1.1M]    CHEST: Evaluation - auscultation and observation  Findings:[JL1.1T] Normal - clear to auscultation, breath sounds normal, no rhonchi - wheezes - or rales[NO1.1M]. BREAST:[JL1.1T] Normal[NO1.1M]    CARDIO: Evaluation by auscultation   Findings:[JL1.1T] Normal - RRR, normal S1&S2, no murmurs or extra sounds noted[NO1.1M]    ABDOMEN: Evaluation for bowel sounds, organomegaly, masses and tenderness  Findings:[JL1.1T] Normal - soft, no tenderness, no masses, no organomegaly[NO1.1M]    : Evaluation by inspections.  Findings:[JL1.1T] normal male - testes descended bilaterally[NO1.1M]    MUS-SKEL: Evaluation for swelling, edema, range of motion or other abnormalities.  Findings:[JL1.1T] Normal, no scoliosis or other  abnormalities[NO1.1M].  Hip Exam:[JL1.1T] Normal[NO1.1M]    SKIN: Evaluation for rashes, acne and lesions  Findings:[JL1.1T] Normal[NO1.1M]    NEURO: Evaluation by observation.  Findings:[JL1.1T] Normal[NO1.1M]      ASSESSMENT:[JL1.1T]   Normal Health Maintenance Visit    1. Mild Infantile Eczema  PLAN: Continue current management    2. Vaccinations  PLAN: Administered Pediarix and Prevnar in office today    3. Peanut Introduction  PLAN: Introduce peanuts to diet[NO1.1M]     4. Travel  PLAN: patient traveling to [facility] for extended stay 4-5 months.  I discussed with the parents that all vaccines given there would need to be documented in english so that when they return we can continue care.[NK1.1M]      PLAN:  Medication changes: No  Immunizations given today? Yes.  Vaccine counseling including benefits, risks and adverse reactions were provided by myself during the visit.  See Orders  Reviewed Discussion and Guidance Topics: accident prevention: home,car,stairs, pool as appropriate, feeding:  cup, finger foods, no juice from bottle and acetaminophen dose (10-15 mg/kg)      Make Appointment for 9 Month Well Child Checkup  Call if questions or problems.[NO1.1M]    Scribe: Electronically signed: Matt Campo has scribed for Dr. Frances  2018  I have reviewed and edited the progress note and agree with what has been scribed.[NO1.1T] Electronically signed by:[NK1.1M] Domonique Frances MD, MD 2018[NK1.2T]        Revision History        User Key Date/Time User Provider Type Action    > NK1.2 09/20/18 12:30 PM Domonique Frances MD Physician Sign     NK1.1 09/20/18 12:29 PM Domonique Frances MD Physician      NO1.2 09/19/18 04:23 PM Matt Campo Scribe Sign at close encounter     NO1.1 09/19/18 04:02 PM Matt Campoe      JL1.2 09/19/18 04:01 PM Criselda Sanchez NCMA Certified Medical Assistant Sign at close encounter     JL1.1 09/19/18 03:54 PM Criselda Sanchez, [facility] Certified Medical  Assistant     M - Manual, T - Template

## 2024-06-19 NOTE — LETTER
June 19, 2024      Constantino Warren  [address] [facility] [address]        To Whom It May Concern:    Constantino Warren was seen in our clinic. She may return to work with the following: limited to light duty - no use of left arm.   Restrictions in place through 7/9/24 - when patient sees orthopedics.      Sincerely,      Coco Schafer

## 2024-06-22 ENCOUNTER — HEALTH MAINTENANCE LETTER (OUTPATIENT)
Age: 54
End: 2024-06-22

## 2024-06-24 ENCOUNTER — OFFICE VISIT (OUTPATIENT)
Dept: FAMILY MEDICINE | Facility: OTHER | Age: 54
End: 2024-06-24
Attending: FAMILY MEDICINE
Payer: COMMERCIAL

## 2024-06-24 VITALS
OXYGEN SATURATION: 98 % | WEIGHT: 184.1 LBS | HEART RATE: 80 BPM | BODY MASS INDEX: 28.9 KG/M2 | HEIGHT: 67 IN | SYSTOLIC BLOOD PRESSURE: 128 MMHG | DIASTOLIC BLOOD PRESSURE: 80 MMHG | TEMPERATURE: 97.4 F

## 2024-06-24 DIAGNOSIS — M25.512 ACUTE PAIN OF LEFT SHOULDER: Primary | ICD-10-CM

## 2024-06-24 DIAGNOSIS — M25.532 LEFT WRIST PAIN: ICD-10-CM

## 2024-06-24 DIAGNOSIS — R20.2 PARESTHESIAS: ICD-10-CM

## 2024-06-24 PROCEDURE — 99213 OFFICE O/P EST LOW 20 MIN: CPT | Performed by: FAMILY MEDICINE

## 2024-06-24 ASSESSMENT — PAIN SCALES - GENERAL: PAINLEVEL: MODERATE PAIN (4)

## 2024-06-24 NOTE — PROGRESS NOTES
"  Assessment & Plan     Acute pain of left shoulder  Left wrist pain  Paresthesias  Symptoms significantly improved. Normal AROM, strength.  Minimal pain.  Some residual tingling in left hand, 4th, 5th fingers. Possible traumatic ulnar neuropathy.    Feels ready to return to work without restrictions.  Work letter completed.  Scheduled this Thursday 6/27/24.            See Patient Instructions    Return if symptoms worsen or fail to improve.    Javon Meeks is a 53 year old, presenting for the following health issues:  Shoulder Injury        6/24/2024     1:15 PM   Additional Questions   Roomed by celso tran         6/24/2024   Forms   Any forms needing to be completed Yes      History of Present Illness       Reason for visit:  Arm check    She eats 2-3 servings of fruits and vegetables daily.She consumes 1 sweetened beverage(s) daily.She exercises with enough effort to increase her heart rate 30 to 60 minutes per day.  She exercises with enough effort to increase her heart rate 4 days per week. She is missing 2 dose(s) of medications per week.       Musculoskeletal problem/pain  Duration: 6/12/2024  Description  Location: left shoulder   Intensity:  mild in shoulder hand is moderate   Accompanying signs and symptoms: numbness in fingers   History  Previous similar problem: no   Previous evaluation:  x-ray  Precipitating or alleviating factors:  Trauma or overuse: YES- fell   Aggravating factors include: none  Therapies tried and outcome: ice and Ibuprofen  Can move arm up overhead   Still some tingling  Improved movement base of thumb  No longer needs sling or brace  7/9/24 - ortho follow up        Review of Systems  Constitutional, HEENT, cardiovascular, pulmonary, gi and gu systems are negative, except as otherwise noted.      Objective    /80 (BP Location: Right arm, Patient Position: Sitting, Cuff Size: Adult Regular)   Pulse 80   Temp 97.4  F (36.3  C) (Tympanic)   Ht 1.702 m (5' 7\")   Wt 83.5 " kg (184 lb 1.6 oz)   SpO2 98%   BMI 28.83 kg/m    Body mass index is 28.83 kg/m .  Physical Exam   GENERAL: alert and no distress  MS: normal muscle tone, no edema, peripheral pulses normal, and tenderness to palpation mildly left superior shoulder AC joint and base of thumb, radius; normal AROM left shoulder, elbow, wrist, hand  SKIN: no suspicious lesions or rashes  NEURO: Normal strength and tone, mentation intact and speech normal  PSYCH: mentation appears normal, affect normal/bright            Signed Electronically by: Coco Schafer MD

## 2024-06-24 NOTE — LETTER
June 24, 2024      Constantino Warren  [address] [facility] [address]        To Whom It May Concern:    Constantino Warren was seen in our clinic 6/24/2024. She may return to work without restrictions on 6/27/24.      Sincerely,        Coco Schafer MD

## 2024-06-27 ENCOUNTER — TELEPHONE (OUTPATIENT)
Dept: FAMILY MEDICINE | Facility: OTHER | Age: 54
End: 2024-06-27

## 2024-06-27 NOTE — TELEPHONE ENCOUNTER
Form received Leave of Absence Forms ,placed in provider's wall bin.   After form is completed patient would like form to be brought up to registration for pickup.

## 2024-06-28 ENCOUNTER — INFUSION THERAPY VISIT (OUTPATIENT)
Dept: INFUSION THERAPY | Facility: OTHER | Age: 54
End: 2024-06-28
Attending: NURSE PRACTITIONER
Payer: COMMERCIAL

## 2024-06-28 DIAGNOSIS — E53.8 VITAMIN B 12 DEFICIENCY: Primary | ICD-10-CM

## 2024-06-28 DIAGNOSIS — C18.2 CANCER OF ASCENDING COLON (H): ICD-10-CM

## 2024-06-28 PROCEDURE — 96372 THER/PROPH/DIAG INJ SC/IM: CPT | Performed by: NURSE PRACTITIONER

## 2024-06-28 RX ORDER — HEPARIN SODIUM (PORCINE) LOCK FLUSH IV SOLN 100 UNIT/ML 100 UNIT/ML
5 SOLUTION INTRAVENOUS
Status: DISCONTINUED | OUTPATIENT
Start: 2024-06-28 | End: 2024-06-28 | Stop reason: HOSPADM

## 2024-06-28 RX ORDER — CYANOCOBALAMIN 1000 UG/ML
1000 INJECTION, SOLUTION INTRAMUSCULAR; SUBCUTANEOUS
Status: DISCONTINUED | OUTPATIENT
Start: 2024-06-28 | End: 2024-06-28 | Stop reason: HOSPADM

## 2024-06-28 RX ORDER — HEPARIN SODIUM (PORCINE) LOCK FLUSH IV SOLN 100 UNIT/ML 100 UNIT/ML
5 SOLUTION INTRAVENOUS
OUTPATIENT
Start: 2024-06-28

## 2024-06-28 RX ORDER — CYANOCOBALAMIN 1000 UG/ML
1000 INJECTION, SOLUTION INTRAMUSCULAR; SUBCUTANEOUS
Start: 2024-06-28

## 2024-06-28 RX ADMIN — CYANOCOBALAMIN 1000 MCG: 1000 INJECTION, SOLUTION INTRAMUSCULAR; SUBCUTANEOUS at 12:25

## 2024-06-28 RX ADMIN — HEPARIN SODIUM (PORCINE) LOCK FLUSH IV SOLN 100 UNIT/ML 5 ML: 100 SOLUTION at 12:26

## 2024-06-28 NOTE — PROGRESS NOTES
Infusion Nursing Note:  Constantino Warren presents today for Port Flush / B12.    Patient seen by provider today: No   present during visit today: Not Applicable.    Note: N/A.      Intravenous Access:  Implanted Port.    Treatment Conditions:  Not Applicable.      Post Infusion Assessment:  Patient tolerated injection without incident.  The following medication was given:     MEDICATION: B12  ROUTE: IM  SITE: Arm - Right    Discharge Plan:   Patient discharged in stable condition accompanied by: self.  Departure Mode: Ambulatory.

## 2024-07-09 ENCOUNTER — OFFICE VISIT (OUTPATIENT)
Dept: ORTHOPEDICS | Facility: OTHER | Age: 54
End: 2024-07-09
Attending: ORTHOPAEDIC SURGERY
Payer: COMMERCIAL

## 2024-07-09 VITALS
SYSTOLIC BLOOD PRESSURE: 118 MMHG | DIASTOLIC BLOOD PRESSURE: 78 MMHG | OXYGEN SATURATION: 96 % | BODY MASS INDEX: 28.25 KG/M2 | HEIGHT: 67 IN | TEMPERATURE: 97.7 F | HEART RATE: 68 BPM | WEIGHT: 180 LBS

## 2024-07-09 DIAGNOSIS — S59.902A ELBOW INJURY, LEFT, INITIAL ENCOUNTER: ICD-10-CM

## 2024-07-09 DIAGNOSIS — G56.22 ULNAR NEUROPATHY OF LEFT UPPER EXTREMITY: Primary | ICD-10-CM

## 2024-07-09 DIAGNOSIS — S63.502A WRIST SPRAIN, LEFT, INITIAL ENCOUNTER: ICD-10-CM

## 2024-07-09 DIAGNOSIS — S49.92XA SHOULDER INJURY, LEFT, INITIAL ENCOUNTER: ICD-10-CM

## 2024-07-09 PROCEDURE — 99213 OFFICE O/P EST LOW 20 MIN: CPT | Performed by: ORTHOPAEDIC SURGERY

## 2024-07-09 ASSESSMENT — PAIN SCALES - GENERAL: PAINLEVEL: SEVERE PAIN (6)

## 2024-07-09 NOTE — PROGRESS NOTES
ORTHOPEDIC CLINIC CONSULT    Referred by:     Chief Complaint: Constantino Warren is a 53 year old female who is being seen for   Chief Complaint   Patient presents with    Consult     Left elbow, wrist and shoulder        History of Present Illness:   Patient is a 53-year-old right-hand-dominant female who sustained a injury about 3 weeks ago while traveling to the [facility].  She slipped and fell on the [place] from the plane landing on her left elbow.  She had numbness and tingling and pain both in her short shoulder elbow and hand.  The elbows and shoulder been doing as far as pain and been doing well but now she is continues to have more of a numbness tingling intermittent shooting pain down into her small and ring finger its left over.  Occasional little bit of soreness in her thumb CMC area but the majority of her complaints are in her left small and ring finger.  Is now 3 weeks out and has not resolved.  She presents for evaluation.    Patient's past medical, surgical, social and family histories reviewed.     Past Medical History:   Diagnosis Date    Arthritis 10/13/2020    Bipolar disorder (H)     per previous notes and documentation    Cancer (H)     colon    Cancer (H) 2004    uterine    Chronic pain     De Quervain's disease (tenosynovitis) 12/2021    Depressive disorder     Diabetes (H)     History of blood transfusion     Hypertension     Prediabetes     Syncope and collapse     Thyroid disease     Trigger finger 12/2021       Past Surgical History:   Procedure Laterality Date    ARTHROSCOPY SHOULDER Left 11/06/2020    Procedure: LEFT SHOULDER ARTHROSCOPY WITH ROTATOR CUFF REPAIR AND ACROMIUM CLAVICULAR JOINT RESECTION;  Surgeon: Tanner Mujica DO;  Location: [facility]    BACK SURGERY      2008; 2011    COLONOSCOPY N/A 02/13/2020    Procedure: COLONOSCOPY WITH BIOPSY AND TATOOING OF ASCENDING COLON,INCOMEPLETE EXAM;  Surgeon: Kang Cleveland MD;  Location: [facility]    COLONOSCOPY N/A 10/26/2020     Procedure: incomplete colonoscopy ;  Surgeon: Kang Cleveland MD;  Location: [facility]    ESOPHAGOSCOPY, GASTROSCOPY, DUODENOSCOPY (EGD), COMBINED N/A 07/21/2018    Procedure: COMBINED ESOPHAGOSCOPY, GASTROSCOPY, DUODENOSCOPY (EGD);  UPPER ENDOSCOPY WITH BIOPSIES;  Surgeon: Roger Vazquez MD;  Location: [facility]    ESOPHAGOSCOPY, GASTROSCOPY, DUODENOSCOPY (EGD), COMBINED N/A 11/21/2019    Procedure: UPPER ENDOSCOPY with biopsy;  Surgeon: Kang Cleveland MD;  Location: [facility]    HEAD & NECK SURGERY  2008    c4-c5 fusion    HYSTERECTOMY, CRISTIANE  04/2004    uterine cancer; [facility]    INSERT PORT VASCULAR ACCESS N/A 02/28/2020    Procedure: INSERTION, VASCULAR ACCESS PORT LEFT;  Surgeon: Kang Cleveland MD;  Location: [facility]    LAPAROSCOPIC ASSISTED COLECTOMY Right 02/24/2020    Procedure: RIGHT KEVIN COLECTOMY, LYSIS OF ADHESIONS;  Surgeon: Kang Cleveland MD;  Location: [facility]    LAPAROSCOPIC BYPASS GASTRIC  12/2010       Home Medications:  Prior to Admission medications    Medication Sig Start Date End Date Taking? Authorizing Provider   acetaminophen (TYLENOL) 500 MG tablet Take 1,000 mg by mouth every 6 hours as needed Max acetaminophen dose: 4000 mg in 24 hours   Yes Reported, Patient   ammonium lactate (AMLACTIN) 12 % external cream Apply topically 2 times daily 3/13/24  Yes Coco Cook MD   atorvastatin (LIPITOR) 40 MG tablet Take 1 tablet (40 mg) by mouth at bedtime 2/2/24  Yes Coco Cook MD   chlorthalidone (HYGROTON) 25 MG tablet Take 1 tablet (25 mg) by mouth daily 6/19/24  Yes Coco Cook MD   Cyanocobalamin 1000 MCG/ML KIT Inject 1,000 mcg as directed once a week 1000mcg weekly x 4 then 1000mcg every 2 weeks x 4 then monthly 10/20/20  Yes Izzy Moran, NP   diphenhydrAMINE (BENADRYL) 25 MG tablet Take 25 mg by mouth 4 times daily   Yes Reported, Patient   EPINEPHrine (ANY BX GENERIC EQUIV) 0.3 MG/0.3ML injection 2-pack Inject 0.3 mLs (0.3 mg)  into the muscle as needed for anaphylaxis 3/13/24  Yes Coco Cook MD   EQ ASPIRIN ADULT LOW DOSE 81 MG EC tablet Take 81 mg by mouth daily 12/23/21  Yes Reported, Patient   famotidine (PEPCID) 20 MG tablet Take 1 tablet (20 mg) by mouth 2 times daily 3/22/23  Yes Coco Cook MD   ibuprofen (ADVIL/MOTRIN) 800 MG tablet Take 1 tablet (800 mg) by mouth every 8 hours as needed for moderate pain 6/19/24  Yes Coco Cook MD   levothyroxine (SYNTHROID/LEVOTHROID) 200 MCG tablet Take 1 tablet (200 mcg) by mouth daily 6/19/24  Yes Coco Cook MD   levothyroxine (SYNTHROID/LEVOTHROID) 50 MCG tablet TAKE 1 TABLET BY MOUTH ONCE DAILY (TAKE WITH 200MCG TABLET FOR TOTAL OF 250MCG) 6/19/24  Yes Coco Cook MD   loratadine (CLARITIN) 10 MG tablet Take 1 tablet (10 mg) by mouth daily 3/22/23  Yes Coco Cook MD   omeprazole (PRILOSEC) 20 MG DR capsule Take 1 capsule (20 mg) by mouth daily 1/5/22  Yes Coco Cook MD   tiZANidine (ZANAFLEX) 4 MG tablet Take 1 tablet (4 mg) by mouth at bedtime 6/19/24  Yes Coco Cook MD   traZODone (DESYREL) 50 MG tablet TAKE 1 TABLET BY MOUTH ONCE DAILY AT BEDTIME 1/5/22  Yes Coco Cook MD       Allergies   Allergen Reactions    Aspartame      Other reaction(s): Other - Describe In Comment Field, Seizures  convulsions    Bee Venom Anaphylaxis    Bupropion Anaphylaxis     Throat gets tight and hives. Denies allergic reaction    Food Anaphylaxis     Peppers-anaphylaxis if eaten, if touched hands swell and reddened    Food Allergy Formula Anaphylaxis     Peppers, patient reports green peppers close off her throat    Ibuprofen Anaphylaxis    No Clinical Screening - See Comments Anaphylaxis     Peppers, patient reports green peppers close off her throat    Piper Anaphylaxis     Peppers--green, black, red, chili, etc. Derm symptoms, hives;  Strawberries--(cooked) Itching, breathing difficulties, GI symptoms. Mushrooms--GI symptoms,  Hives.Spinach (cooked) throat irritation  (Listed on [facility] problem list.)    Fentanyl Swelling     Lips and tongue swelled.  Swelling of lips and tongue    Adhesive Tape      blisters    Amitriptyline Other (See Comments)     Respiratory symptoms - denies allergic reaction    Duloxetine Hcl      Mood cycling leading to elevated mood state.     Liquid Adhesive Dermatitis     blisters  Blisters  Silk tape and tegaderm is fine    Oxcarbazepine Other (See Comments)     Throat gets tight and hives. Denies allergic reaction  Throat gets tight and hives-      Prozac [Fluoxetine]      Other reaction(s): *Unknown  uncontrolled muscle spasms    Valproic Acid      Respiratory symptoms ([facility]). Denies allergic reaction    Ziprasidone      Throat gets tight and hives. 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, unemployment    Tobacco Use    Smoking status: Never     Passive exposure: Never    Smokeless tobacco: Never   Vaping Use    Vaping status: Never Used   Substance and Sexual Activity    Alcohol use: No     Comment: sober for 18 years    Drug use: No    Sexual activity: Never       Family History   Problem Relation Age of Onset    Diabetes Mother     Hypertension Mother     Coronary Artery Disease Mother     Myocardial Infarction Mother     Hypertension Father     Diabetes Maternal Grandmother     Hypertension Maternal Grandfather     Coronary Artery Disease Maternal Grandfather     Cancer Paternal Grandfather         unsure of type of cancer    Bladder Cancer Paternal Grandfather     Diabetes Maternal Aunt     Cerebrovascular Disease Maternal Aunt     Diabetes Maternal Aunt     Cerebrovascular Disease Maternal Aunt     Diabetes Maternal Aunt     Cerebrovascular Disease Maternal Aunt     Hyperlipidemia No family hx of     Breast Cancer No family hx of     Colon Cancer No family hx of     Prostate Cancer  "No family hx of     Thyroid Disease No family hx of     Anesthesia Reaction No family hx of     Asthma No family hx of     Genetic Disorder No family hx of        REVIEW OF SYSTEMS            Physical Exam:    Vitals: /78 (BP Location: Left arm, Patient Position: Sitting, Cuff Size: Adult Regular)   Pulse 68   Temp 97.7  F (36.5  C) (Tympanic)   Ht 1.702 m (5' 7\")   Wt 81.6 kg (180 lb)   SpO2 96%   BMI 28.19 kg/m    BMI= Body mass index is 28.19 kg/m .  On examination she is awake alert and oriented cooperative no apparent distress.  She has full range of motion of the shoulder elbow wrist and hand.  No swelling no erythema no hyperemia.  She has a positive Tinel as well as Phalen's at the elbow with increased symptoms shooting down into the small and ring finger.  Negative at the wrist for carpal tunnel.  She has no atrophy.  Contralateral side is full range of motion and neurovascular intact.  Radiographs: Radiographs of the elbow AP lateral oblique show no fracture subluxations or dislocations unremarkable radiograph.     Independent visualization of the films was made.         Impression:      ICD-10-CM    1. Ulnar neuropathy of left upper extremity  G56.22 Orthopedic  Referral      2. Elbow injury, left, initial encounter  S59.902A Orthopedic  Referral      3. Wrist sprain, left, initial encounter  S63.502A Orthopedic  Referral      4. Shoulder injury, left, initial encounter  S49.92XA Orthopedic  Referral          Plan: Impression and plan left ulnar nerve neuropathy post injury.  At this time we will have her get an EMG nerve conduction study to see if she is going have any more long-term changes within her nerve she is getting a little bit better she is through plus weeks out was still having significant tingling and shooting pain.  Will get an EMG to see if there is any other changes with the nerve or ongoing compressive neuropathy at the elbow secondary to " the injury.  Will have her follow-up post EMG nerve conduction study discussed the findings and recommendations and she is comfortable this plan.  Will set her up with scheduling with Dr. Joseph Begum neurology.    All of the above pertinent physical exam and imaging modalities findings was reviewed with Constantino.    Return to clinic in post EMG nerve conduction with Dr. Joseph juarez weeks.    Further imaging required being an EMG nerve conduction study    Time spent with evaluation: 30 minutes    Berny Beyer MD  7/9/2024  8:51 AM

## 2024-07-16 ENCOUNTER — THERAPY VISIT (OUTPATIENT)
Dept: PHYSICAL THERAPY | Facility: HOSPITAL | Age: 54
End: 2024-07-16
Attending: FAMILY MEDICINE
Payer: COMMERCIAL

## 2024-07-16 DIAGNOSIS — G89.11 ACUTE PAIN OF LEFT SHOULDER DUE TO TRAUMA: Primary | ICD-10-CM

## 2024-07-16 DIAGNOSIS — M25.512 ACUTE PAIN OF LEFT SHOULDER DUE TO TRAUMA: Primary | ICD-10-CM

## 2024-07-16 PROCEDURE — 97162 PT EVAL MOD COMPLEX 30 MIN: CPT | Mod: GP

## 2024-07-16 PROCEDURE — 97033 APP MDLTY 1+IONTPHRSIS EA 15: CPT | Mod: GP

## 2024-07-16 NOTE — PROGRESS NOTES
PHYSICAL THERAPY EVALUATION  Type of Visit: Evaluation       Fall Risk Screen:  Fall screen completed by: PT  Have you fallen 2 or more times in the past year?: No  Have you fallen and had an injury in the past year?: Yes  Is patient a fall risk?: Yes; Department fall risk interventions implemented    Subjective       Pt presents to PT with c/o ongoing L shoulder pain that began as a result of [place] airport [place] where she landed on her L elbow and 'jammed' her L shoulder. She was subsequently diagnosed with a L AC joint separation at the urgent care upon returning home and has had oigoing pain since, although is reporting improvement since the injury occurred. She identifies pain with overhead lifting is the worst, but denies pain at rest. She also states she has been diagnosed with an ulner nerve injury and does have pain and numbness down her arm to the last 2 fingers on her L hand, but this is improving as well.     Presenting condition or subjective complaint: left shoulder and hand/wrist pain due to fall  Date of onset: 06/12/24    Relevant medical history:     Dates & types of surgery: rt rotator cuff surgery, back fusion 2013, neck fusion 2008    Prior diagnostic imaging/testing results: X-ray     Prior therapy history for the same diagnosis, illness or injury: No      Prior Level of Function  Transfers: Independent  Ambulation: Independent  ADL: Independent  IADL: Childcare, Driving, Finances, Housekeeping, Laundry, Meal preparation, Medication management, Work, Yard work    Living Environment  Social support: With family members   Type of home: Apartment/condo   Stairs to enter the home:         Ramp: No   Stairs inside the home: Yes 15 Is there a railing: Yes     Help at home: None  Equipment owned:       Employment: Yes Mental Health Practitioner  Hobbies/Interests: aristides art,nature walks and playing with my boys    Patient goals for therapy: range of motion    Pain assessment: Pain  present  Location: L shoulder/Ratin/10 at rest, moderate pain with movement     Objective   SHOULDER EVALUATION  INTEGUMENTARY (edema, incisions): WFL  POSTURE: Sitting Posture: Rounded shoulders, significantly protracted scapula  ROM: PROM WFL  AROM slightly limited secondary to pain  STRENGTH:  R shoulder WNL; L shoulder grossly 3+/5 limited secondary to pain  FLEXIBILITY:  Decreased L shoulder flexibility secondary to pain  SPECIAL TESTS: Positive painful arc on L  PALPATION:  Pain with palpation over AC joint on L and generally surrounding L shoulder complex due to nature of fall; Grade I AC joint separation appears likely with mild AC joint instability noted   JOINT MOBILITY:  L shoulder joint mobility decreased secondary to pain    Assessment & Plan   CLINICAL IMPRESSIONS  Medical Diagnosis: Acute pain of L shoulder    Treatment Diagnosis: L shoulder pain   Impression/Assessment: Patient is a 53 year old female with L shoulder pain complaints.  The following significant findings have been identified: Pain, Decreased ROM/flexibility, Decreased joint mobility, Decreased strength, Impaired muscle performance, Decreased activity tolerance, Impaired posture, and Instability. These impairments interfere with their ability to perform self care tasks, work tasks, recreational activities, and household chores as compared to previous level of function.  Pt will benefit from skilled PT intervention to address deficits and promote resumption of previous activity level without difficulty.    Clinical Decision Making (Complexity):  Clinical Presentation: Stable/Uncomplicated  Clinical Presentation Rationale: based on medical and personal factors listed in PT evaluation  Clinical Decision Making (Complexity): Moderate complexity    PLAN OF CARE  Treatment Interventions:  Modalities: Cryotherapy, Iontophoresis, Ultrasound  Interventions: Manual Therapy, Therapeutic Activity, Therapeutic Exercise    Long Term Goals     PT  Goal 1  Goal Identifier: STG 1  Goal Description: Pt will tolerate daily HEP for shoulder stability, strength and mobility without increased shoulder pain on L.  Rationale: to maximize safety and independence with performance of ADLs and functional tasks  Target Date: 08/12/24  PT Goal 2  Goal Identifier: LTG 1  Goal Description: Pt will demonstrate full L shoulder AROM and strength without pain in order to resume all previous activities without difficulty.  Rationale: to maximize safety and independence with performance of ADLs and functional tasks  Target Date: 09/09/24  PT Goal 3  Goal Identifier: LTG 2  Goal Description: Pt will resume all previous activities at home and at work without increased L shoulder pain or discomfort.  Rationale: to maximize safety and independence with performance of ADLs and functional tasks  Target Date: 09/09/24      Frequency of Treatment: 2x/week  Duration of Treatment: 8 weeks    Recommended Referrals to Other Professionals: Occupational Therapy, possibly, to address L elbow and wrist pain if not resolving  Education Assessment:   Learner/Method: Patient;Listening;Demonstration;No Barriers to Learning  Education Comments: Pt instructed in scapular squeezes to be performed 3-4 x daily, 5 sec hold each.  She demonstrated and v/u.    Risks and benefits of evaluation/treatment have been explained.   Patient/Family/caregiver agrees with Plan of Care.     Evaluation Time:     PT Eval, Moderate Complexity Minutes (01828): 35       Signing Clinician: Natasha Louie PT

## 2024-07-17 PROBLEM — G89.11 ACUTE PAIN OF LEFT SHOULDER DUE TO TRAUMA: Status: ACTIVE | Noted: 2024-07-17

## 2024-07-17 PROBLEM — M25.512 ACUTE PAIN OF LEFT SHOULDER DUE TO TRAUMA: Status: ACTIVE | Noted: 2024-07-17

## 2024-07-19 ENCOUNTER — THERAPY VISIT (OUTPATIENT)
Dept: PHYSICAL THERAPY | Facility: HOSPITAL | Age: 54
End: 2024-07-19
Attending: FAMILY MEDICINE
Payer: COMMERCIAL

## 2024-07-19 DIAGNOSIS — M25.512 ACUTE PAIN OF LEFT SHOULDER DUE TO TRAUMA: Primary | ICD-10-CM

## 2024-07-19 DIAGNOSIS — G89.11 ACUTE PAIN OF LEFT SHOULDER DUE TO TRAUMA: Primary | ICD-10-CM

## 2024-07-19 PROCEDURE — 97140 MANUAL THERAPY 1/> REGIONS: CPT | Mod: GP

## 2024-07-19 PROCEDURE — 97110 THERAPEUTIC EXERCISES: CPT | Mod: GP

## 2024-07-23 ENCOUNTER — THERAPY VISIT (OUTPATIENT)
Dept: PHYSICAL THERAPY | Facility: HOSPITAL | Age: 54
End: 2024-07-23
Attending: FAMILY MEDICINE
Payer: COMMERCIAL

## 2024-07-23 DIAGNOSIS — M25.512 ACUTE PAIN OF LEFT SHOULDER DUE TO TRAUMA: Primary | ICD-10-CM

## 2024-07-23 DIAGNOSIS — G89.11 ACUTE PAIN OF LEFT SHOULDER DUE TO TRAUMA: Primary | ICD-10-CM

## 2024-07-23 PROCEDURE — 97110 THERAPEUTIC EXERCISES: CPT | Mod: GP

## 2024-07-25 ENCOUNTER — TELEPHONE (OUTPATIENT)
Dept: ONCOLOGY | Facility: OTHER | Age: 54
End: 2024-07-25

## 2024-07-25 NOTE — TELEPHONE ENCOUNTER
My chart message sent to patient to remind her if she wants to reschedule appointment with labs and CT prior to give our  a call. Patient cancelled today's visit the other day.

## 2024-07-31 ENCOUNTER — THERAPY VISIT (OUTPATIENT)
Dept: PHYSICAL THERAPY | Facility: HOSPITAL | Age: 54
End: 2024-07-31
Attending: FAMILY MEDICINE
Payer: COMMERCIAL

## 2024-07-31 DIAGNOSIS — M25.512 ACUTE PAIN OF LEFT SHOULDER DUE TO TRAUMA: Primary | ICD-10-CM

## 2024-07-31 DIAGNOSIS — M25.532 LEFT WRIST PAIN: ICD-10-CM

## 2024-07-31 DIAGNOSIS — G89.11 ACUTE PAIN OF LEFT SHOULDER DUE TO TRAUMA: Primary | ICD-10-CM

## 2024-07-31 DIAGNOSIS — M25.512 ACUTE PAIN OF LEFT SHOULDER: ICD-10-CM

## 2024-07-31 PROCEDURE — 97110 THERAPEUTIC EXERCISES: CPT | Mod: GP

## 2024-08-06 ENCOUNTER — THERAPY VISIT (OUTPATIENT)
Dept: PHYSICAL THERAPY | Facility: HOSPITAL | Age: 54
End: 2024-08-06
Attending: FAMILY MEDICINE
Payer: COMMERCIAL

## 2024-08-06 DIAGNOSIS — G89.11 ACUTE PAIN OF LEFT SHOULDER DUE TO TRAUMA: Primary | ICD-10-CM

## 2024-08-06 DIAGNOSIS — M25.512 ACUTE PAIN OF LEFT SHOULDER DUE TO TRAUMA: Primary | ICD-10-CM

## 2024-08-06 PROCEDURE — 97110 THERAPEUTIC EXERCISES: CPT | Mod: GP

## 2024-08-13 ENCOUNTER — THERAPY VISIT (OUTPATIENT)
Dept: PHYSICAL THERAPY | Facility: HOSPITAL | Age: 54
End: 2024-08-13
Attending: FAMILY MEDICINE
Payer: COMMERCIAL

## 2024-08-13 DIAGNOSIS — M25.512 ACUTE PAIN OF LEFT SHOULDER DUE TO TRAUMA: Primary | ICD-10-CM

## 2024-08-13 DIAGNOSIS — G89.11 ACUTE PAIN OF LEFT SHOULDER DUE TO TRAUMA: Primary | ICD-10-CM

## 2024-08-13 PROCEDURE — 97110 THERAPEUTIC EXERCISES: CPT | Mod: GP

## 2024-08-14 ENCOUNTER — OFFICE VISIT (OUTPATIENT)
Dept: ORTHOPEDICS | Facility: OTHER | Age: 54
End: 2024-08-14
Attending: ORTHOPAEDIC SURGERY
Payer: COMMERCIAL

## 2024-08-14 VITALS — HEART RATE: 72 BPM | DIASTOLIC BLOOD PRESSURE: 80 MMHG | SYSTOLIC BLOOD PRESSURE: 128 MMHG | OXYGEN SATURATION: 97 %

## 2024-08-14 DIAGNOSIS — M25.512 PAIN IN JOINT OF LEFT SHOULDER: Primary | ICD-10-CM

## 2024-08-14 PROCEDURE — 99213 OFFICE O/P EST LOW 20 MIN: CPT | Mod: 25 | Performed by: ORTHOPAEDIC SURGERY

## 2024-08-14 PROCEDURE — 20610 DRAIN/INJ JOINT/BURSA W/O US: CPT | Mod: LT | Performed by: ORTHOPAEDIC SURGERY

## 2024-08-14 RX ORDER — BETAMETHASONE SODIUM PHOSPHATE AND BETAMETHASONE ACETATE 3; 3 MG/ML; MG/ML
6 INJECTION, SUSPENSION INTRA-ARTICULAR; INTRALESIONAL; INTRAMUSCULAR; SOFT TISSUE ONCE
Status: COMPLETED | OUTPATIENT
Start: 2024-08-14 | End: 2024-08-14

## 2024-08-14 RX ORDER — LIDOCAINE HYDROCHLORIDE 10 MG/ML
1 INJECTION, SOLUTION INFILTRATION; PERINEURAL ONCE
Status: COMPLETED | OUTPATIENT
Start: 2024-08-14 | End: 2024-08-14

## 2024-08-14 RX ADMIN — LIDOCAINE HYDROCHLORIDE 2 ML: 10 INJECTION, SOLUTION INFILTRATION; PERINEURAL at 13:58

## 2024-08-14 RX ADMIN — BETAMETHASONE SODIUM PHOSPHATE AND BETAMETHASONE ACETATE 30 MG: 3; 3 INJECTION, SUSPENSION INTRA-ARTICULAR; INTRALESIONAL; INTRAMUSCULAR; SOFT TISSUE at 13:57

## 2024-08-14 ASSESSMENT — PAIN SCALES - GENERAL: PAINLEVEL: SEVERE PAIN (7)

## 2024-08-14 NOTE — PROGRESS NOTES
ORTHOPEDIC CLINIC CONSULT    Referred by:     Chief Complaint: Constantino Warren is a 53 year old female who is being seen for   Chief Complaint   Patient presents with    RECHECK     L wrist and L shoulder       History of Present Illness:   Patient is a 53-year-old female presents back for evaluation with a history of the left hand and left shoulder pain post fall.  She notes she did not go and obtain her EMG nerve conduction study as her symptoms had significantly improved in the hand.  She continued to have however issues even with therapy monitor shoulder and area of possible AC separation.  She still having some tenderness of the area tender with overhead and cross body activities.  She presents back for evaluation of the shoulder at this time.    Patient's past medical, surgical, social and family histories reviewed.     Past Medical History:   Diagnosis Date    Arthritis 10/13/2020    Bipolar disorder (H)     per previous notes and documentation    Cancer (H)     colon    Cancer (H) 2004    uterine    Chronic pain     De Quervain's disease (tenosynovitis) 12/2021    Depressive disorder     Diabetes (H)     History of blood transfusion     Hypertension     Prediabetes     Syncope and collapse     Thyroid disease     Trigger finger 12/2021       Past Surgical History:   Procedure Laterality Date    ARTHROSCOPY SHOULDER Left 11/06/2020    Procedure: LEFT SHOULDER ARTHROSCOPY WITH ROTATOR CUFF REPAIR AND ACROMIUM CLAVICULAR JOINT RESECTION;  Surgeon: Tanner Mujica DO;  [facility]: [facility]    BACK SURGERY      2008; 2011    COLONOSCOPY N/A 02/13/2020    Procedure: COLONOSCOPY WITH BIOPSY AND TATOOING OF ASCENDING COLON,INCOMEPLETE EXAM;  Surgeon: Kang Cleveland MD;  [facility]    COLONOSCOPY N/A 10/26/2020    Procedure: incomplete colonoscopy ;  Surgeon: Kang Cleveland MD;  [facility]    ESOPHAGOSCOPY, GASTROSCOPY, DUODENOSCOPY (EGD), COMBINED N/A 07/21/2018    Procedure: COMBINED  ESOPHAGOSCOPY, GASTROSCOPY, DUODENOSCOPY (EGD);  UPPER ENDOSCOPY WITH BIOPSIES;  Surgeon: Roger Vazquez MD;  [facility]: [facility]    ESOPHAGOSCOPY, GASTROSCOPY, DUODENOSCOPY (EGD), COMBINED N/A 11/21/2019    Procedure: UPPER ENDOSCOPY with biopsy;  Surgeon: Kang Cleveland MD;  [facility]: [facility]    HEAD & NECK SURGERY  2008    c4-c5 fusion    HYSTERECTOMY, CRISTIANE  04/2004    uterine cancer; [facility]    INSERT PORT VASCULAR ACCESS N/A 02/28/2020    Procedure: INSERTION, VASCULAR ACCESS PORT LEFT;  Surgeon: Kang Cleveland MD;  [facility]: [facility]    LAPAROSCOPIC ASSISTED COLECTOMY Right 02/24/2020    Procedure: RIGHT KEVIN COLECTOMY, LYSIS OF ADHESIONS;  Surgeon: Kang Cleveland MD;  [facility]: [facility]    LAPAROSCOPIC BYPASS GASTRIC  12/2010       Home Medications:  Prior to Admission medications    Medication Sig Start Date End Date Taking? Authorizing Provider   acetaminophen (TYLENOL) 500 MG tablet Take 1,000 mg by mouth every 6 hours as needed Max acetaminophen dose: 4000 mg in 24 hours   Yes Reported, Patient   ammonium lactate (AMLACTIN) 12 % external cream Apply topically 2 times daily 3/13/24  Yes Coco Cook MD   atorvastatin (LIPITOR) 40 MG tablet Take 1 tablet (40 mg) by mouth at bedtime 2/2/24  Yes Coco Cook MD   chlorthalidone (HYGROTON) 25 MG tablet Take 1 tablet (25 mg) by mouth daily 6/19/24  Yes Cooc Cook MD   Cyanocobalamin 1000 MCG/ML KIT Inject 1,000 mcg as directed once a week 1000mcg weekly x 4 then 1000mcg every 2 weeks x 4 then monthly 10/20/20  Yes Izzy Moran, NP   diphenhydrAMINE (BENADRYL) 25 MG tablet Take 25 mg by mouth 4 times daily   Yes Reported, Patient   EPINEPHrine (ANY BX GENERIC EQUIV) 0.3 MG/0.3ML injection 2-pack Inject 0.3 mLs (0.3 mg) into the muscle as needed for anaphylaxis 3/13/24  Yes Coco Cook MD   EQ ASPIRIN ADULT LOW DOSE 81 MG EC tablet Take 81 mg by mouth daily 12/23/21  Yes Reported, Patient    famotidine (PEPCID) 20 MG tablet Take 1 tablet (20 mg) by mouth 2 times daily 3/22/23  Yes Coco Cook MD   ibuprofen (ADVIL/MOTRIN) 800 MG tablet Take 1 tablet (800 mg) by mouth every 8 hours as needed for moderate pain 6/19/24  Yes Coco Cook MD   levothyroxine (SYNTHROID/LEVOTHROID) 200 MCG tablet Take 1 tablet (200 mcg) by mouth daily 6/19/24  Yes Coco Cook MD   levothyroxine (SYNTHROID/LEVOTHROID) 50 MCG tablet TAKE 1 TABLET BY MOUTH ONCE DAILY (TAKE WITH 200MCG TABLET FOR TOTAL OF 250MCG) 6/19/24  Yes Coco Cook MD   loratadine (CLARITIN) 10 MG tablet Take 1 tablet (10 mg) by mouth daily 3/22/23  Yes Coco Cook MD   omeprazole (PRILOSEC) 20 MG DR capsule Take 1 capsule (20 mg) by mouth daily 1/5/22  Yes Coco Cook MD   tiZANidine (ZANAFLEX) 4 MG tablet Take 1 tablet (4 mg) by mouth at bedtime 6/19/24  Yes Coco Cook MD   traZODone (DESYREL) 50 MG tablet TAKE 1 TABLET BY MOUTH ONCE DAILY AT BEDTIME 1/5/22  Yes Coco Cook MD       Allergies   Allergen Reactions    Aspartame      Other reaction(s): Other - Describe In Comment Field, Seizures  convulsions    Bee Venom Anaphylaxis    Bupropion Anaphylaxis     Throat gets tight and hives. Denies allergic reaction    Food Anaphylaxis     Peppers-anaphylaxis if eaten, if touched hands swell and reddened    Food Allergy Formula Anaphylaxis     Peppers, patient reports green peppers close off her throat    Ibuprofen Anaphylaxis    No Clinical Screening - See Comments Anaphylaxis     Peppers, patient reports green peppers close off her throat    Piper Anaphylaxis     Peppers--green, black, red, chili, etc. Derm symptoms, hives;  Strawberries--(cooked) Itching, breathing difficulties, GI symptoms. Mushrooms--GI symptoms, Hives.Spinach (cooked) throat irritation  (Listed on [facility] problem list.)    Fentanyl Swelling     Lips and tongue swelled.  Swelling of lips and tongue     Adhesive Tape      blisters    Amitriptyline Other (See Comments)     Respiratory symptoms - denies allergic reaction    Duloxetine Hcl      Mood cycling leading to elevated mood state.     Liquid Adhesive Dermatitis     blisters  Blisters  Silk tape and tegaderm is fine    Oxcarbazepine Other (See Comments)     Throat gets tight and hives. Denies allergic reaction  Throat gets tight and hives-      Prozac [Fluoxetine]      Other reaction(s): *Unknown  uncontrolled muscle spasms    Valproic Acid      Respiratory symptoms ([facility]). Denies allergic reaction    Ziprasidone      Throat gets tight and hives. 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, unemployment    Tobacco Use    Smoking status: Never     Passive exposure: Never    Smokeless tobacco: Never   Vaping Use    Vaping status: Never Used   Substance and Sexual Activity    Alcohol use: No     Comment: sober for 18 years    Drug use: No    Sexual activity: Never       Family History   Problem Relation Age of Onset    Diabetes Mother     Hypertension Mother     Coronary Artery Disease Mother     Myocardial Infarction Mother     Hypertension Father     Diabetes Maternal Grandmother     Hypertension Maternal Grandfather     Coronary Artery Disease Maternal Grandfather     Cancer Paternal Grandfather         unsure of type of cancer    Bladder Cancer Paternal Grandfather     Diabetes Maternal Aunt     Cerebrovascular Disease Maternal Aunt     Diabetes Maternal Aunt     Cerebrovascular Disease Maternal Aunt     Diabetes Maternal Aunt     Cerebrovascular Disease Maternal Aunt     Hyperlipidemia No family hx of     Breast Cancer No family hx of     Colon Cancer No family hx of     Prostate Cancer No family hx of     Thyroid Disease No family hx of     Anesthesia Reaction No family hx of     Asthma No family hx of     Genetic Disorder No family hx of        REVIEW OF  SYSTEMS            Physical Exam:    Vitals: /80   Pulse 72   SpO2 97%   BMI= There is no height or weight on file to calculate BMI.  On examination she is awake alert and oriented cooperative no apparent distress.  She does have some prominence over the AC joint as noted previously and on previous x-rays that suggest at least a grade probably small grade 2 AC separation from her fall.  She does have some tenderness over the AC joint directly over there has a positive cross body discomfort and tenderness directly over the AC joint and getting to be manually manipulated and to improve position.  Consistent with a AC separation.  No skin breakdown erythema hyperemia or rashes over the area and otherwise no vas intact distally.  Radiographs: No new radiographs obtained.  Previous radiographs that have been obtained showed slight elevation of the distal clavicle in relation to the acromion consistent with a AC joint injury separation.     Independent visualization of the films was made.         Impression:      ICD-10-CM    1. Pain in joint of left shoulder  M25.512 Large Joint/Bursa injection and/or drainage (Shoulder, Knee)     betamethasone acet & sod phos (CELESTONE) injection 6 mg     lidocaine 1 % injection 1 mL          Plan: Impression plan AC joint pain some grade 2 separation still having significant lateral discomfort.  Has been undergoing physical therapy but still having issues with the discomfort tolerating some of the therapy.  At this time I discussed further options and wanted to try an injection AC joint to calm down some the inflammation and pain.  She is comfortable with that plan would like to proceed with a AC joint injection.    Procedure: Left AC joint injection.  The top of the shoulder over the AC joint was then prepped with alcohol and ChloraPrep for sterile technique injection.  A solution of 1 cc of 1% lidocaine and 1 cc of 6 mix per mL Celestone was drawn up under sterile technique.   The area is then numbed with ethyl chloride and with sterile technique injection of the AC joint.  Tolerated well without any previous complications.  Band-Aid was placed over the injection site.    All of the above pertinent physical exam and imaging modalities findings was reviewed with Constantino.    Return to clinic in 4 to 6 weeks weeks.    Further imaging required none    Time spent with evaluation: 20 minutes    Berny Beyer MD  8/14/2024  2:01 PM

## 2024-08-16 ENCOUNTER — THERAPY VISIT (OUTPATIENT)
Dept: PHYSICAL THERAPY | Facility: HOSPITAL | Age: 54
End: 2024-08-16
Attending: FAMILY MEDICINE
Payer: COMMERCIAL

## 2024-08-16 DIAGNOSIS — G89.11 ACUTE PAIN OF LEFT SHOULDER DUE TO TRAUMA: ICD-10-CM

## 2024-08-16 DIAGNOSIS — M25.512 ACUTE PAIN OF LEFT SHOULDER DUE TO TRAUMA: ICD-10-CM

## 2024-08-16 DIAGNOSIS — M25.512 ACUTE PAIN OF LEFT SHOULDER: ICD-10-CM

## 2024-08-16 DIAGNOSIS — M25.532 LEFT WRIST PAIN: Primary | ICD-10-CM

## 2024-08-16 PROCEDURE — 97110 THERAPEUTIC EXERCISES: CPT | Mod: GP,CQ

## 2024-08-20 ENCOUNTER — THERAPY VISIT (OUTPATIENT)
Dept: PHYSICAL THERAPY | Facility: HOSPITAL | Age: 54
End: 2024-08-20
Attending: FAMILY MEDICINE
Payer: COMMERCIAL

## 2024-08-20 DIAGNOSIS — M25.512 ACUTE PAIN OF LEFT SHOULDER DUE TO TRAUMA: Primary | ICD-10-CM

## 2024-08-20 DIAGNOSIS — G89.11 ACUTE PAIN OF LEFT SHOULDER DUE TO TRAUMA: Primary | ICD-10-CM

## 2024-08-20 PROCEDURE — 97110 THERAPEUTIC EXERCISES: CPT | Mod: GP

## 2024-08-20 PROCEDURE — 97010 HOT OR COLD PACKS THERAPY: CPT | Mod: GP

## 2024-08-23 ENCOUNTER — THERAPY VISIT (OUTPATIENT)
Dept: PHYSICAL THERAPY | Facility: HOSPITAL | Age: 54
End: 2024-08-23
Attending: FAMILY MEDICINE
Payer: COMMERCIAL

## 2024-08-23 DIAGNOSIS — M25.512 ACUTE PAIN OF LEFT SHOULDER DUE TO TRAUMA: Primary | ICD-10-CM

## 2024-08-23 DIAGNOSIS — G89.11 ACUTE PAIN OF LEFT SHOULDER DUE TO TRAUMA: Primary | ICD-10-CM

## 2024-08-23 PROCEDURE — 97110 THERAPEUTIC EXERCISES: CPT | Mod: GP,CQ

## 2024-08-27 ENCOUNTER — OFFICE VISIT (OUTPATIENT)
Dept: ORTHOPEDICS | Facility: OTHER | Age: 54
End: 2024-08-27
Attending: ORTHOPAEDIC SURGERY
Payer: COMMERCIAL

## 2024-08-27 ENCOUNTER — TELEPHONE (OUTPATIENT)
Dept: ORTHOPEDICS | Facility: OTHER | Age: 54
End: 2024-08-27

## 2024-08-27 VITALS
RESPIRATION RATE: 18 BRPM | WEIGHT: 180 LBS | DIASTOLIC BLOOD PRESSURE: 72 MMHG | HEIGHT: 66 IN | BODY MASS INDEX: 28.93 KG/M2 | TEMPERATURE: 97.2 F | SYSTOLIC BLOOD PRESSURE: 118 MMHG | HEART RATE: 72 BPM | OXYGEN SATURATION: 97 %

## 2024-08-27 DIAGNOSIS — S46.002D ROTATOR CUFF INJURY, LEFT, SUBSEQUENT ENCOUNTER: Primary | ICD-10-CM

## 2024-08-27 PROCEDURE — 99213 OFFICE O/P EST LOW 20 MIN: CPT | Performed by: ORTHOPAEDIC SURGERY

## 2024-08-27 ASSESSMENT — PAIN SCALES - GENERAL: PAINLEVEL: SEVERE PAIN (7)

## 2024-08-27 NOTE — PATIENT INSTRUCTIONS
Thank you for allowing Dr. Berny Beyer and his team to participate in your care. Please call our health unit coordinator at [phone] with scheduling questions or the nurse at [phone] with any other questions or concerns.    You may call the Orthopedic nurse at [phone] with any questions.

## 2024-08-27 NOTE — Clinical Note
Hi Dr. Beyer, I added this injection back.  Would you be able to sign the order?  I didn't realize the arthrogram panel is needed in order to place the order for the MR and injection.

## 2024-08-30 ENCOUNTER — TELEPHONE (OUTPATIENT)
Dept: ORTHOPEDICS | Facility: OTHER | Age: 54
End: 2024-08-30

## 2024-09-17 ENCOUNTER — TELEPHONE (OUTPATIENT)
Dept: INTERVENTIONAL RADIOLOGY/VASCULAR | Facility: HOSPITAL | Age: 54
End: 2024-09-17

## 2024-09-20 ENCOUNTER — HOSPITAL ENCOUNTER (OUTPATIENT)
Dept: GENERAL RADIOLOGY | Facility: HOSPITAL | Age: 54
Discharge: HOME | End: 2024-09-20
Attending: ORTHOPAEDIC SURGERY
Payer: COMMERCIAL

## 2024-09-20 ENCOUNTER — HOSPITAL ENCOUNTER (OUTPATIENT)
Dept: MRI IMAGING | Facility: HOSPITAL | Age: 54
Discharge: HOME | End: 2024-09-20
Attending: ORTHOPAEDIC SURGERY
Payer: COMMERCIAL

## 2024-09-20 DIAGNOSIS — S46.002D ROTATOR CUFF INJURY, LEFT, SUBSEQUENT ENCOUNTER: ICD-10-CM

## 2024-09-20 PROCEDURE — 255N000002 HC RX 255 OP 636: Performed by: RADIOLOGY

## 2024-09-20 PROCEDURE — 250N000009 HC RX 250: Performed by: RADIOLOGY

## 2024-09-20 PROCEDURE — 73222 MRI JOINT UPR EXTREM W/DYE: CPT | Mod: LT

## 2024-09-20 PROCEDURE — A9585 GADOBUTROL INJECTION: HCPCS | Performed by: RADIOLOGY

## 2024-09-20 PROCEDURE — 23350 INJECTION FOR SHOULDER X-RAY: CPT

## 2024-09-20 PROCEDURE — 77002 NEEDLE LOCALIZATION BY XRAY: CPT

## 2024-09-20 RX ORDER — GADOBUTROL 604.72 MG/ML
0.1 INJECTION INTRAVENOUS ONCE
Status: COMPLETED | OUTPATIENT
Start: 2024-09-20 | End: 2024-09-20

## 2024-09-20 RX ORDER — IOPAMIDOL 612 MG/ML
50 INJECTION, SOLUTION INTRAVASCULAR ONCE
Status: COMPLETED | OUTPATIENT
Start: 2024-09-20 | End: 2024-09-20

## 2024-09-20 RX ORDER — LIDOCAINE HYDROCHLORIDE 10 MG/ML
5 INJECTION, SOLUTION EPIDURAL; INFILTRATION; INTRACAUDAL; PERINEURAL ONCE
Status: COMPLETED | OUTPATIENT
Start: 2024-09-20 | End: 2024-09-20

## 2024-09-20 RX ADMIN — GADOBUTROL 0.1 ML: 604.72 INJECTION INTRAVENOUS at 14:35

## 2024-09-20 RX ADMIN — IOPAMIDOL 5 ML: 612 INJECTION, SOLUTION INTRAVENOUS at 14:35

## 2024-09-20 RX ADMIN — LIDOCAINE HYDROCHLORIDE 5 ML: 10 INJECTION, SOLUTION EPIDURAL; INFILTRATION; INTRACAUDAL; PERINEURAL at 14:34

## 2024-09-24 ENCOUNTER — OFFICE VISIT (OUTPATIENT)
Dept: ORTHOPEDICS | Facility: OTHER | Age: 54
End: 2024-09-24
Attending: ORTHOPAEDIC SURGERY
Payer: COMMERCIAL

## 2024-09-24 VITALS — HEART RATE: 67 BPM | DIASTOLIC BLOOD PRESSURE: 88 MMHG | SYSTOLIC BLOOD PRESSURE: 128 MMHG | OXYGEN SATURATION: 96 %

## 2024-09-24 DIAGNOSIS — S46.002D ROTATOR CUFF INJURY, LEFT, SUBSEQUENT ENCOUNTER: Primary | ICD-10-CM

## 2024-09-24 DIAGNOSIS — M25.512 PAIN IN JOINT OF LEFT SHOULDER: ICD-10-CM

## 2024-09-24 PROCEDURE — 99213 OFFICE O/P EST LOW 20 MIN: CPT | Performed by: ORTHOPAEDIC SURGERY

## 2024-09-24 ASSESSMENT — PAIN SCALES - GENERAL: PAINLEVEL: SEVERE PAIN (7)

## 2024-09-24 NOTE — PROGRESS NOTES
ORTHOPEDIC CLINIC CONSULT    Referred by:     Chief Complaint: Constantino Warren is a 53 year old female who is being seen for   Chief Complaint   Patient presents with    Follow up/ MRI result for wrist       History of Present Illness:   53-year-old female presents back to discuss the findings on MRI.  She had underwent previous injections physical therapy modifying activities and still having shoulder pain and not getting enough improvement she now underwent MRI examination and follows up discuss findings    Patient's past medical, surgical, social and family histories reviewed.     Past Medical History:   Diagnosis Date    Arthritis 10/13/2020    Bipolar disorder (H)     per previous notes and documentation    Cancer (H)     colon    Cancer (H) 2004    uterine    Chronic pain     De Quervain's disease (tenosynovitis) 12/2021    Depressive disorder     Diabetes (H)     History of blood transfusion     Hypertension     Prediabetes     Syncope and collapse     Thyroid disease     Trigger finger 12/2021       Past Surgical History:   Procedure Laterality Date    ARTHROSCOPY SHOULDER Left 11/06/2020    Procedure: LEFT SHOULDER ARTHROSCOPY WITH ROTATOR CUFF REPAIR AND ACROMIUM CLAVICULAR JOINT RESECTION;  Surgeon: Tanner Mujica DO;  [facility]: [facility]    BACK SURGERY      2008; 2011    COLONOSCOPY N/A 02/13/2020    Procedure: COLONOSCOPY WITH BIOPSY AND TATOOING OF ASCENDING COLON,INCOMEPLETE EXAM;  Surgeon: Kang Cleveland MD;  [facility]    COLONOSCOPY N/A 10/26/2020    Procedure: incomplete colonoscopy ;  Surgeon: Kang Cleveland MD;  [facility]    ESOPHAGOSCOPY, GASTROSCOPY, DUODENOSCOPY (EGD), COMBINED N/A 07/21/2018    Procedure: COMBINED ESOPHAGOSCOPY, GASTROSCOPY, DUODENOSCOPY (EGD);  UPPER ENDOSCOPY WITH BIOPSIES;  Surgeon: Roger Vazquez MD;  [facility]    ESOPHAGOSCOPY, GASTROSCOPY, DUODENOSCOPY (EGD), COMBINED N/A 11/21/2019    Procedure: UPPER ENDOSCOPY with biopsy;  Surgeon:  Kang Cleveland MD;  [facility]: [facility]    HEAD & NECK SURGERY  2008    c4-c5 fusion    HYSTERECTOMY, CRISTIANE  04/2004    uterine cancer; [facility]    INSERT PORT VASCULAR ACCESS N/A 02/28/2020    Procedure: INSERTION, VASCULAR ACCESS PORT LEFT;  Surgeon: Kang Cleveland MD;  [facility]    LAPAROSCOPIC ASSISTED COLECTOMY Right 02/24/2020    Procedure: RIGHT KEVIN COLECTOMY, LYSIS OF ADHESIONS;  Surgeon: Kang Cleveland MD;  [facility]    LAPAROSCOPIC BYPASS GASTRIC  12/2010       Home Medications:  Prior to Admission medications    Medication Sig Start Date End Date Taking? Authorizing Provider   acetaminophen (TYLENOL) 500 MG tablet Take 1,000 mg by mouth every 6 hours as needed Max acetaminophen dose: 4000 mg in 24 hours   Yes Reported, Patient   chlorthalidone (HYGROTON) 25 MG tablet Take 1 tablet (25 mg) by mouth daily 6/19/24  Yes Coco Cook MD   Cyanocobalamin 1000 MCG/ML KIT Inject 1,000 mcg as directed once a week 1000mcg weekly x 4 then 1000mcg every 2 weeks x 4 then monthly 10/20/20  Yes Izzy Moran, NP   diphenhydrAMINE (BENADRYL) 25 MG tablet Take 25 mg by mouth 4 times daily   Yes Reported, Patient   EPINEPHrine (ANY BX GENERIC EQUIV) 0.3 MG/0.3ML injection 2-pack Inject 0.3 mLs (0.3 mg) into the muscle as needed for anaphylaxis 3/13/24  Yes Coco Cook MD   EQ ASPIRIN ADULT LOW DOSE 81 MG EC tablet Take 81 mg by mouth daily 12/23/21  Yes Reported, Patient   famotidine (PEPCID) 20 MG tablet Take 1 tablet (20 mg) by mouth 2 times daily 3/22/23  Yes Coco Cook MD   ibuprofen (ADVIL/MOTRIN) 800 MG tablet Take 1 tablet (800 mg) by mouth every 8 hours as needed for moderate pain 6/19/24  Yes Coco Cook MD   levothyroxine (SYNTHROID/LEVOTHROID) 200 MCG tablet Take 1 tablet (200 mcg) by mouth daily 6/19/24  Yes Coco Cook MD   levothyroxine (SYNTHROID/LEVOTHROID) 50 MCG tablet TAKE 1 TABLET BY MOUTH ONCE DAILY (TAKE WITH  200MCG TABLET FOR TOTAL OF 250MCG) 6/19/24  Yes Coco Cook MD   loratadine (CLARITIN) 10 MG tablet Take 1 tablet (10 mg) by mouth daily 3/22/23  Yes Coco Cook MD   omeprazole (PRILOSEC) 20 MG DR capsule Take 1 capsule (20 mg) by mouth daily 1/5/22  Yes Coco Cook MD   tiZANidine (ZANAFLEX) 4 MG tablet Take 1 tablet (4 mg) by mouth at bedtime 6/19/24  Yes Cooc Cook MD   ammonium lactate (AMLACTIN) 12 % external cream Apply topically 2 times daily  Patient not taking: Reported on 9/24/2024 3/13/24   Coco Cook MD   atorvastatin (LIPITOR) 40 MG tablet Take 1 tablet (40 mg) by mouth at bedtime  Patient not taking: Reported on 9/24/2024 2/2/24   Coco Cook MD   traZODone (DESYREL) 50 MG tablet TAKE 1 TABLET BY MOUTH ONCE DAILY AT BEDTIME  Patient not taking: Reported on 9/24/2024 1/5/22   Coco Cook MD       Allergies   Allergen Reactions    Aspartame      Other reaction(s): Other - Describe In Comment Field, Seizures  convulsions    Bee Venom Anaphylaxis    Bupropion Anaphylaxis     Throat gets tight and hives. Denies allergic reaction    Food Anaphylaxis     Peppers-anaphylaxis if eaten, if touched hands swell and reddened    Food Allergy Formula Anaphylaxis     Peppers, patient reports green peppers close off her throat    Ibuprofen Anaphylaxis    No Clinical Screening - See Comments Anaphylaxis     Peppers, patient reports green peppers close off her throat    Piper Anaphylaxis     Peppers--green, black, red, chili, etc. Derm symptoms, hives;  Strawberries--(cooked) Itching, breathing difficulties, GI symptoms. Mushrooms--GI symptoms, Hives.Spinach (cooked) throat irritation  (Listed on [facility] problem list.)    Fentanyl Swelling     Lips and tongue swelled.  Swelling of lips and tongue    Adhesive Tape      blisters    Amitriptyline Other (See Comments)     Respiratory symptoms - denies allergic reaction    Duloxetine Hcl      Mood  cycling leading to elevated mood state.     Liquid Adhesive Dermatitis     blisters  Blisters  Silk tape and tegaderm is fine    Oxcarbazepine Other (See Comments)     Throat gets tight and hives. Denies allergic reaction  Throat gets tight and hives-      Prozac [Fluoxetine]      Other reaction(s): *Unknown  uncontrolled muscle spasms    Valproic Acid      Respiratory symptoms ([facility]). Denies allergic reaction    Ziprasidone      Throat gets tight and hives. 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, unemployment    Tobacco Use    Smoking status: Never     Passive exposure: Never    Smokeless tobacco: Never   Vaping Use    Vaping status: Never Used   Substance and Sexual Activity    Alcohol use: No     Comment: sober for 18 years    Drug use: No    Sexual activity: Never       Family History   Problem Relation Age of Onset    Diabetes Mother     Hypertension Mother     Coronary Artery Disease Mother     Myocardial Infarction Mother     Hypertension Father     Diabetes Maternal Grandmother     Hypertension Maternal Grandfather     Coronary Artery Disease Maternal Grandfather     Cancer Paternal Grandfather         unsure of type of cancer    Bladder Cancer Paternal Grandfather     Diabetes Maternal Aunt     Cerebrovascular Disease Maternal Aunt     Diabetes Maternal Aunt     Cerebrovascular Disease Maternal Aunt     Diabetes Maternal Aunt     Cerebrovascular Disease Maternal Aunt     Hyperlipidemia No family hx of     Breast Cancer No family hx of     Colon Cancer No family hx of     Prostate Cancer No family hx of     Thyroid Disease No family hx of     Anesthesia Reaction No family hx of     Asthma No family hx of     Genetic Disorder No family hx of        REVIEW OF SYSTEMS            Physical Exam:    Vitals: /88 (BP Location: Left arm, Patient Position: Sitting, Cuff Size: Adult Regular)   Pulse 67    SpO2 96%   BMI= There is no height or weight on file to calculate BMI.  Examination she is awake alert and oriented cooperative no apparent distress.  Still has a discomfort with forward flexion abduction extremes of internal ex rotation.  And decreased overall in degree of motion.  She is getting about 20 degrees at 15 to 20 degrees of external rotation and then guarded internal Tatian to about the SI joint today forward flexion and abduction also reduced with about 120 forward flexion and then rotates to the scapula about 100 110 abduction and then rotation of the scapula and guarding.  Pain still around the area of the shoulder consistent with likely impingement or tendinitis and picture.  Radiographs: He did have MRI examination which notes no rotator cuff looks like previous rotator cuff repair with anchor but no recurrent tear looks intact as well as a ring structures there is some lucency or irregularity around the surgical neck with concern is that a fracture but seat and type I or II cuts and seems to disappear on the MRI.  Which is does seem a bit odd since the initial injury was back in June and now it is September.     Independent visualization of the films was made.         Impression:      ICD-10-CM    1. Rotator cuff injury, left, subsequent encounter  S46.002D       2. Pain in joint of left shoulder  M25.512           Plan: Impression plan persistent left shoulder pain post previous fall back in June is failed to get enough or adequate change or improvement with modifying activities physical therapy injection.  She is now underwent MRI examination she does have decreased motion both active and passive which make you think potentially adhesive capsulitis versus is it more guarding during examination and resisting continued motion.  However she does have decreased active and some passive motion which would make you think adhesive capsulitis or frozen shoulder progressing.  There is changes or  irregularity on the MRI that has some?  Both when I am looking at and also from the read from the radiology as to the they are not sure if it is a remnant or unhealed area versus some other aberrant MRI aberration.  I would like to sit down and talk with the radiology look at the MRI together and see if we need some other cuts or studies to verify the irregularity along the surgical neck before proceeding with other potential treatment modalities.  She is comfortable with this plan we will discuss with them the hopefully this week and give her a call back this week discuss progress of plans.    All of the above pertinent physical exam and imaging modalities findings was reviewed with Constantino.    Return to clinic in pending discussion with radiology weeks.    Further imaging required pending discussion with radiology    Time spent with evaluation: 20 minutes    Berny Beyer MD  9/24/2024  4:12 PM

## 2024-11-09 ENCOUNTER — HEALTH MAINTENANCE LETTER (OUTPATIENT)
Age: 54
End: 2024-11-09

## 2024-12-31 ENCOUNTER — TELEPHONE (OUTPATIENT)
Dept: SURGERY | Facility: OTHER | Age: 54
End: 2024-12-31

## 2024-12-31 ENCOUNTER — PREP FOR PROCEDURE (OUTPATIENT)
Dept: SURGERY | Facility: OTHER | Age: 54
End: 2024-12-31

## 2024-12-31 DIAGNOSIS — Z95.828 PORT-A-CATH IN PLACE: Primary | ICD-10-CM

## 2024-12-31 NOTE — TELEPHONE ENCOUNTER
We received a referral for a port removal.  Patient was scheduled with Dr. Cleveland on 1/20/25.  She was scheduled for a pre-op as well.

## 2025-01-13 ENCOUNTER — ANESTHESIA EVENT (OUTPATIENT)
Dept: SURGERY | Facility: HOSPITAL | Age: 55
End: 2025-01-13
Payer: COMMERCIAL

## 2025-01-13 NOTE — ANESTHESIA PREPROCEDURE EVALUATION
Anesthesia Pre-Procedure Evaluation    Patient: Constantino Warren   MRN: [id number] : 1970        Procedure : Procedure(s):  Port-A-Cath removal          Past Medical History:   Diagnosis Date     Arthritis 10/13/2020     Bipolar disorder (H)     per previous notes and documentation     Cancer (H)     colon     Cancer (H)     uterine     Chronic pain      De Quervain's disease (tenosynovitis) 2021     Depressive disorder      Diabetes (H)      History of blood transfusion      Hypertension      Prediabetes      Syncope and collapse      Thyroid disease      Trigger finger 2021      Past Surgical History:   Procedure Laterality Date     ARTHROSCOPY SHOULDER Left 2020    Procedure: LEFT SHOULDER ARTHROSCOPY WITH ROTATOR CUFF REPAIR AND ACROMIUM CLAVICULAR JOINT RESECTION;  Surgeon: Tanner Mujica DO;  Location: [facility]     BACK SURGERY      ;      COLONOSCOPY N/A 2020    Procedure: COLONOSCOPY WITH BIOPSY AND TATOOING OF ASCENDING COLON,INCOMEPLETE EXAM;  Surgeon: Kang Cleveland MD;  Location: [facility]     COLONOSCOPY N/A 10/26/2020    Procedure: incomplete colonoscopy ;  Surgeon: Kang Cleveland MD;  Location: [facility]     ESOPHAGOSCOPY, GASTROSCOPY, DUODENOSCOPY (EGD), COMBINED N/A 2018    Procedure: COMBINED ESOPHAGOSCOPY, GASTROSCOPY, DUODENOSCOPY (EGD);  UPPER ENDOSCOPY WITH BIOPSIES;  Surgeon: Roger Vazquez MD;  Location: [facility]     ESOPHAGOSCOPY, GASTROSCOPY, DUODENOSCOPY (EGD), COMBINED N/A 2019    Procedure: UPPER ENDOSCOPY with biopsy;  Surgeon: Kang Cleveland MD;  Location: [facility]     HEAD & NECK SURGERY      c4-c5 fusion     HYSTERECTOMY, CRISTIANE  2004    uterine cancer; [facility]     INSERT PORT VASCULAR ACCESS N/A 2020    Procedure: INSERTION, VASCULAR ACCESS PORT LEFT;  Surgeon: Kang Cleveland MD;  Location: [facility]     LAPAROSCOPIC ASSISTED COLECTOMY Right 2020    Procedure: RIGHT KEVIN COLECTOMY, LYSIS OF  ADHESIONS;  Surgeon: Kang Cleveland MD;  Location: [facility]     LAPAROSCOPIC BYPASS GASTRIC  12/2010      Allergies   Allergen Reactions     Aspartame      Other reaction(s): Other - Describe In Comment Field, Seizures  convulsions     Bee Venom Anaphylaxis     Bupropion Anaphylaxis     Throat gets tight and hives. Denies allergic reaction     Food Anaphylaxis     Peppers-anaphylaxis if eaten, if touched hands swell and reddened     Food Allergy Formula Anaphylaxis     Peppers, patient reports green peppers close off her throat     Ibuprofen Anaphylaxis     No Clinical Screening - See Comments Anaphylaxis     Peppers, patient reports green peppers close off her throat     Piper Anaphylaxis     Peppers--green, black, red, chili, etc. Derm symptoms, hives;  Strawberries--(cooked) Itching, breathing difficulties, GI symptoms. Mushrooms--GI symptoms, Hives.Spinach (cooked) throat irritation  (Listed on [facility] problem list.)     Fentanyl Swelling     Lips and tongue swelled.  Swelling of lips and tongue     Adhesive Tape      blisters     Amitriptyline Other (See Comments)     Respiratory symptoms - denies allergic reaction     Duloxetine Hcl      Mood cycling leading to elevated mood state.      Liquid Adhesive Dermatitis     blisters  Blisters  Silk tape and tegaderm is fine     Oxcarbazepine Other (See Comments)     Throat gets tight and hives. Denies allergic reaction  Throat gets tight and hives-       Prozac [Fluoxetine]      Other reaction(s): *Unknown  uncontrolled muscle spasms     Valproic Acid      Respiratory symptoms ([facility] Denies allergic reaction     Ziprasidone      Throat gets tight and hives. Denies allergic reaction      Social History     Tobacco Use     Smoking status: Never     Passive exposure: Never     Smokeless tobacco: Never   Substance Use Topics     Alcohol use: No     Comment: sober for 18 years      Wt Readings from Last 1 Encounters:   08/27/24  "81.6 kg (180 lb)        Anesthesia Evaluation   Pt has had prior anesthetic. Type: MAC and General.    No history of anesthetic complications       ROS/MED HX  ENT/Pulmonary:  - neg pulmonary ROS     Neurologic: Comment: Concussion syndrome  Insomnia  \"Back surgery\"  Lumbar discectomy     (+)      migraines,                          Cardiovascular:     (+) Dyslipidemia hypertension- -   -  - -           CHEN.  fainting (syncope).                    Previous cardiac testing   Echo: Date: Results:    Stress Test:  Date: 2/2024 Results:     The nuclear stress test is negative for inducible myocardial ischemia   or infarction.      The left ventricular ejection fraction at rest is 85%.  The left   ventricular ejection fraction at stress is 95%.      A prior study was conducted on 3/12/2021.     ECG Reviewed:  Date: 2/2024 Results:  Sinus rhythm rate 84  Normal ECG   When compared with ECG of 31-JAN-2024 01:30,   Vent. rate has increased BY  28 BPM   Confirmed by MD Mcneil Anthony ([id number]) on 2/2/2024 11:25:55 AM     Cath:  Date: Results:      METS/Exercise Tolerance:     Hematologic:     (+)      anemia, history of blood transfusion,         Musculoskeletal:   (+)  arthritis,             GI/Hepatic: Comment: H/o GI bleed  S/p gastric bypass  S/P laparoscopic assisted colectomy       Renal/Genitourinary: Comment: hydronephrosis    (+) renal disease, type: CRI,            Endo:     (+)  type II DM,        thyroid problem, hypothyroidism,    Obesity,       Psychiatric/Substance Use:     (+) psychiatric history anxiety and depression alcohol abuse H/O chronic opiod use .     Infectious Disease:       Malignancy:   (+) Malignancy, History of GI and Other.GI CA  status post Surgery and Chemo.  Other CA uterine status post.    Other:  - neg other ROS    (+)  , H/O Chronic Pain,         Physical Exam    Airway  airway exam normal      Mallampati: III   TM distance: > 3 FB   Neck ROM: full   Mouth opening: > 3 " cm    Respiratory Devices and Support         Dental       (+) Edentulous      Cardiovascular   cardiovascular exam normal       Rhythm and rate: regular and normal     Pulmonary           breath sounds clear to auscultation       OUTSIDE LABS:  CBC:   Lab Results   Component Value Date    WBC 5.3 01/31/2024    WBC 5.1 01/26/2024    HGB 10.0 (L) 01/31/2024    HGB 11.4 (L) 01/26/2024    HCT 30.7 (L) 01/31/2024    HCT 34.6 (L) 01/26/2024     01/31/2024     01/26/2024     BMP:   Lab Results   Component Value Date     06/19/2024     02/26/2024    POTASSIUM 4.0 06/19/2024    POTASSIUM 4.0 02/26/2024    CHLORIDE 108 (H) 06/19/2024    CHLORIDE 106 02/26/2024    CO2 21 (L) 06/19/2024    CO2 23 02/26/2024    BUN 19.5 06/19/2024    BUN 17.0 02/26/2024    CR 0.80 06/19/2024    CR 0.79 02/26/2024     (H) 06/19/2024     (H) 02/26/2024     COAGS:   Lab Results   Component Value Date    PTT 28 05/29/2020    INR 1.1 12/22/2021     POC:   Lab Results   Component Value Date     (H) 05/29/2020    HCG Negative 09/26/2015     HEPATIC:   Lab Results   Component Value Date    ALBUMIN 4.2 02/26/2024    PROTTOTAL 7.5 02/26/2024    ALT 47 02/26/2024    AST 59 (H) 02/26/2024    ALKPHOS 69 02/26/2024    BILITOTAL 0.7 02/26/2024     OTHER:   Lab Results   Component Value Date    LACT 1.4 01/31/2024    A1C 5.8 (H) 06/19/2024    LOKESH 9.0 06/19/2024    MAG 1.9 11/06/2020    LIPASE 110 12/21/2021    AMYLASE 31 07/18/2015    TSH 10.10 (H) 06/19/2024    T4 0.63 (L) 06/19/2024    CRP <2.9 07/14/2022    SED 9 04/15/2023       Anesthesia Plan    ASA Status:  3    NPO Status:  NPO Appropriate    Anesthesia Type: MAC.     - Reason for MAC: straight local not clinically adequate              Consents    Anesthesia Plan(s) and associated risks, benefits, and realistic alternatives discussed. Questions answered and patient/representative(s) expressed understanding.     - Discussed: Risks, Benefits and  Alternatives for BOTH SEDATION and the PROCEDURE were discussed     - Discussed with:  Patient      - Extended Intubation/Ventilatory Support Discussed: No.      - Patient is DNR/DNI Status: No     Use of blood products discussed: No .     Postoperative Care            Comments:    Other Comments: Noted allergy fentanyl causes lip/tongue swelling                 JUNIOR BRUNO CRNA    I have reviewed the pertinent notes and labs in the chart from the past 30 days and (re)examined the patient.  Any updates or changes from those notes are reflected in this note.               # Hypertension: Noted on problem list

## 2025-01-15 ENCOUNTER — OFFICE VISIT (OUTPATIENT)
Dept: FAMILY MEDICINE | Facility: OTHER | Age: 55
End: 2025-01-15
Attending: NURSE PRACTITIONER
Payer: COMMERCIAL

## 2025-01-15 ENCOUNTER — LAB (OUTPATIENT)
Dept: LAB | Facility: OTHER | Age: 55
End: 2025-01-15
Attending: NURSE PRACTITIONER
Payer: COMMERCIAL

## 2025-01-15 VITALS
HEART RATE: 78 BPM | WEIGHT: 185 LBS | DIASTOLIC BLOOD PRESSURE: 80 MMHG | BODY MASS INDEX: 29.86 KG/M2 | RESPIRATION RATE: 18 BRPM | OXYGEN SATURATION: 98 % | TEMPERATURE: 97 F | SYSTOLIC BLOOD PRESSURE: 124 MMHG

## 2025-01-15 DIAGNOSIS — E03.9 HYPOTHYROIDISM, UNSPECIFIED TYPE: ICD-10-CM

## 2025-01-15 DIAGNOSIS — Z01.818 PREOP GENERAL PHYSICAL EXAM: ICD-10-CM

## 2025-01-15 DIAGNOSIS — E11.9 TYPE 2 DIABETES MELLITUS WITHOUT COMPLICATION, WITHOUT LONG-TERM CURRENT USE OF INSULIN (H): ICD-10-CM

## 2025-01-15 DIAGNOSIS — I10 ESSENTIAL HYPERTENSION: ICD-10-CM

## 2025-01-15 DIAGNOSIS — E78.5 HYPERLIPIDEMIA, UNSPECIFIED HYPERLIPIDEMIA TYPE: ICD-10-CM

## 2025-01-15 DIAGNOSIS — Z95.828 PORT-A-CATH IN PLACE: ICD-10-CM

## 2025-01-15 DIAGNOSIS — Z01.818 PREOP GENERAL PHYSICAL EXAM: Primary | ICD-10-CM

## 2025-01-15 LAB
ANION GAP SERPL CALCULATED.3IONS-SCNC: 10 MMOL/L (ref 7–15)
BASOPHILS # BLD AUTO: 0 10E3/UL (ref 0–0.2)
BASOPHILS NFR BLD AUTO: 0 %
BUN SERPL-MCNC: 14.5 MG/DL (ref 6–20)
CALCIUM SERPL-MCNC: 8.8 MG/DL (ref 8.8–10.4)
CHLORIDE SERPL-SCNC: 106 MMOL/L (ref 98–107)
CREAT SERPL-MCNC: 0.79 MG/DL (ref 0.51–0.95)
EGFRCR SERPLBLD CKD-EPI 2021: 88 ML/MIN/1.73M2
EOSINOPHIL # BLD AUTO: 0 10E3/UL (ref 0–0.7)
EOSINOPHIL NFR BLD AUTO: 0 %
ERYTHROCYTE [DISTWIDTH] IN BLOOD BY AUTOMATED COUNT: 13.3 % (ref 10–15)
EST. AVERAGE GLUCOSE BLD GHB EST-MCNC: 120 MG/DL
GLUCOSE SERPL-MCNC: 90 MG/DL (ref 70–99)
HBA1C MFR BLD: 5.8 %
HCO3 SERPL-SCNC: 22 MMOL/L (ref 22–29)
HCT VFR BLD AUTO: 34.8 % (ref 35–47)
HGB BLD-MCNC: 11.4 G/DL (ref 11.7–15.7)
IMM GRANULOCYTES # BLD: 0 10E3/UL
IMM GRANULOCYTES NFR BLD: 1 %
LYMPHOCYTES # BLD AUTO: 1.7 10E3/UL (ref 0.8–5.3)
LYMPHOCYTES NFR BLD AUTO: 29 %
MCH RBC QN AUTO: 27.9 PG (ref 26.5–33)
MCHC RBC AUTO-ENTMCNC: 32.8 G/DL (ref 31.5–36.5)
MCV RBC AUTO: 85 FL (ref 78–100)
MONOCYTES # BLD AUTO: 0.5 10E3/UL (ref 0–1.3)
MONOCYTES NFR BLD AUTO: 9 %
NEUTROPHILS # BLD AUTO: 3.5 10E3/UL (ref 1.6–8.3)
NEUTROPHILS NFR BLD AUTO: 61 %
NRBC # BLD AUTO: 0 10E3/UL
NRBC BLD AUTO-RTO: 0 /100
PLATELET # BLD AUTO: 219 10E3/UL (ref 150–450)
POTASSIUM SERPL-SCNC: 4.2 MMOL/L (ref 3.4–5.3)
RBC # BLD AUTO: 4.09 10E6/UL (ref 3.8–5.2)
SODIUM SERPL-SCNC: 138 MMOL/L (ref 135–145)
T4 FREE SERPL-MCNC: 0.76 NG/DL (ref 0.9–1.7)
TSH SERPL DL<=0.005 MIU/L-ACNC: 10.17 UIU/ML (ref 0.3–4.2)
WBC # BLD AUTO: 5.8 10E3/UL (ref 4–11)

## 2025-01-15 PROCEDURE — 84443 ASSAY THYROID STIM HORMONE: CPT

## 2025-01-15 PROCEDURE — 36415 COLL VENOUS BLD VENIPUNCTURE: CPT

## 2025-01-15 PROCEDURE — 84439 ASSAY OF FREE THYROXINE: CPT

## 2025-01-15 PROCEDURE — 85025 COMPLETE CBC W/AUTO DIFF WBC: CPT

## 2025-01-15 PROCEDURE — 80048 BASIC METABOLIC PNL TOTAL CA: CPT

## 2025-01-15 PROCEDURE — 83036 HEMOGLOBIN GLYCOSYLATED A1C: CPT

## 2025-01-15 RX ORDER — LEVOTHYROXINE SODIUM 75 UG/1
TABLET ORAL
Qty: 90 TABLET | Refills: 0 | Status: SHIPPED | OUTPATIENT
Start: 2025-01-15

## 2025-01-15 ASSESSMENT — PAIN SCALES - GENERAL: PAINLEVEL_OUTOF10: NO PAIN (0)

## 2025-01-15 NOTE — PATIENT INSTRUCTIONS
How to Take Your Medication Before Surgery  Preoperative Medication Instructions   Antiplatelet or Anticoagulation Medication Instructions   - aspirin: Discontinue aspirin 7 days prior to procedure to reduce bleeding risk. It should be resumed postoperatively.     Additional Medication Instructions  Take all scheduled medications on the day of surgery EXCEPT for modifications listed below:   - Herbal medications and vitamins: DO NOT TAKE 14 days prior to surgery.   - Diuretics (furosemide, hydrochlorothiazide, chlorothalidone): DO NOT TAKE on the day of surgery.      Hold Chlorthalidone   No Ibuprofen 3-4 days before procedure   No aspirin for 7 days before procedure   Patient Education   Preparing for Your Surgery  For Adults  Getting started  In most cases, a nurse will call to review your health history and instructions. They will give you an arrival time based on your scheduled surgery time. Please be ready to share:  Your doctor's clinic name and phone number  Your medical, surgical, and anesthesia history  A list of allergies and sensitivities  A list of medicines, including herbal treatments and over-the-counter drugs  Whether the patient has a legal guardian (ask how to send us the papers in advance)  Note: You may not receive a call if you were seen at our PAC (Preoperative Assessment Center).  Please tell us if you're pregnant--or if there's any chance you might be pregnant. Some surgeries may injure a fetus (unborn baby), so they require a pregnancy test. Surgeries that are safe for a fetus don't always need a test, and you can choose whether to have one.   Preparing for surgery  Within 10 to 30 days of surgery: Have a pre-op exam (sometimes called an H&P, or History and Physical). This can be done at a clinic or pre-operative center.  If you're having a , you may not need this exam. Talk to your care team.  At your pre-op exam, talk to your care team about all medicines you take. (This includes  CBD oil and any drugs, such as THC, marijuana, and other forms of cannabis.) If you need to stop any medicine before surgery, ask when to start taking it again.  This is for your safety. Many medicines and drugs can make you bleed too much during surgery. Some change how well surgery (anesthesia) drugs work.  Call your insurance company to let them know you're having surgery. (If you don't have insurance, call [phone].)  Call your clinic if there's any change in your health. This includes a scrape or scratch near the surgery site, or any signs of a cold (sore throat, runny nose, cough, rash, fever).  Eating and drinking guidelines  For your safety: Unless your surgeon tells you otherwise, follow the guidelines below.  Eat and drink as normal until 8 hours before you arrive for surgery. After that, no food or milk. You can spit out gum when you arrive.  Drink clear liquids until 2 hours before you arrive. These are liquids you can see through, like water, Gatorade, and Propel Water. They also include plain black coffee and tea (no cream or milk).  No alcohol for 24 hours before you arrive. The night before surgery, stop any drinks that contain THC.  If your care team tells you to take medicine on the morning of surgery, it's okay to take it with a sip of water. No other medicines or drugs are allowed (including CBD oil)--follow your care team's instructions.  If you have questions the day of surgery, call your hospital or surgery center.   Preventing infection  Shower or bathe the night before and the morning of surgery. Follow the instructions your clinic gave you. (If no instructions, use regular soap.)  Don't shave or clip hair near your surgery site. We'll remove the hair if needed.  Don't smoke or vape the morning of surgery. No chewing tobacco for 6 hours before you arrive. A nicotine patch is okay. You may spit out nicotine gum when you arrive.  For some surgeries, the surgeon will tell you to fully quit  smoking and nicotine.  We will make every effort to keep you safe from infection. We will:  Clean our hands often with soap and water (or an alcohol-based hand rub).  Clean the skin at your surgery site with a special soap that kills germs.  Give you a special gown to keep you warm. (Cold raises the risk of infection.)  Wear hair covers, masks, gowns, and gloves during surgery.  Give antibiotic medicine, if prescribed. Not all surgeries need this medicine.  What to bring on the day of surgery  Photo ID and insurance card  Copy of your health care directive, if you have one  Glasses and hearing aids (bring cases)  You can't wear contacts during surgery  Inhaler and eye drops, if you use them (tell us about these when you arrive)  CPAP machine or breathing device, if you use them  A few personal items, if spending the night  If you have . . .  A pacemaker, ICD (cardiac defibrillator), or other implant: Bring the ID card.  An implanted stimulator: Bring the remote control.  A legal guardian: Bring a copy of the certified (court-stamped) guardianship papers.  Please remove any jewelry, including body piercings. Leave jewelry and other valuables at home.  If you're going home the day of surgery  You must have a responsible adult drive you home. They should stay with you overnight as well.  If you don't have someone to stay with you, and you aren't safe to go home alone, we may keep you overnight. Insurance often won't pay for this.  After surgery  If it's hard to control your pain or you need more pain medicine, please call your surgeon's office.  Questions?   If you have any questions for your care team, list them here:   ____________________________________________________________________________________________________________________________________________________________________________________________________________________________________________________________  For informational purposes only. Not to replace  the advice of your health care provider. Copyright   2003, 2019 [facility]. All rights reserved. Clinically reviewed by Tenzin Parker MD. [place] [id number] - REV 08/24.

## 2025-01-15 NOTE — PROGRESS NOTES
Preoperative Evaluation  [facility] [place]  [address]  [place] [address]  Phone: [phone]  Primary Provider: Coco Schafer MD  Pre-op Performing Provider: JUNIOR Burns CNP  Madhu 15, 2025             1/15/2025   Surgical Information   What procedure is being done? port removal   Facility or Hospital where procedure/surgery will be performed: [facility]   Who is doing the procedure / surgery? dr herrera   Date of surgery / procedure: january 20 2025   Time of surgery / procedure: unknown   Where do you plan to recover after surgery? at home with family     Fax number for surgical facility: Note does not need to be faxed, will be available electronically in Epic.    Assessment & Plan     The proposed surgical procedure is considered INTERMEDIATE risk.    Preop general physical exam  Port-A-Cath in place  Cleared for procedure   - CBC with platelets and differential; Future  - Basic metabolic panel; Future  - EKG 12-lead complete w/read - (Clinic Performed)  - TSH with free T4 reflex; Future  - Hemoglobin A1c; Future    Type 2 diabetes mellitus without complication, without long-term current use of insulin (H)  A1c stable at 5.8 - no current medication   - Hemoglobin A1c; Future    Hypothyroidism, unspecified type  TSH elevated and T4 low states she maybe missed a couple doses but not many  Plan to increase levothyroxine to 275 mcg daily   - TSH with free T4 reflex; Future    Essential hypertension  Blood pressure controlled   Hold chlorthalidone the morning of the procedure     Hyperlipidemia, unspecified hyperlipidemia type  Continue with lipitor - tolerating dose without any side effects     Ordering of each unique test  Prescription drug management  I spent a total of 28 minutes on the day of the visit.   Time spent by me today doing chart review, history and exam, documentation and further activities per the note        - No identified additional risk factors other  than previously addressed    Preoperative Medication Instructions  Antiplatelet or Anticoagulation Medication Instructions   - aspirin: Discontinue aspirin 7 days prior to procedure to reduce bleeding risk. It should be resumed postoperatively.     Additional Medication Instructions  Take all scheduled medications on the day of surgery EXCEPT for modifications listed below:   - Herbal medications and vitamins: DO NOT TAKE 14 days prior to surgery.   - Diuretics (furosemide, hydrochlorothiazide, chlorothalidone): DO NOT TAKE on the day of surgery.    Hold Chlorthalidone   No Ibuprofen 3-4 days before procedure   No aspirin for 7 days before procedure     Recommendation  Approval given to proceed with proposed procedure, without further diagnostic evaluation.    Javon Meeks is a 54 year old, presenting for the following:  Pre-Op Exam          1/15/2025    10:51 AM   Additional Questions   Roomed by NISHI Samayoa CMA   Accompanied by Self     HPI related to upcoming procedure: removal of port a cath         1/15/2025   Pre-Op Questionnaire   Have you ever had a heart attack or stroke? No   Have you ever had surgery on your heart or blood vessels, such as a stent placement, a coronary artery bypass, or surgery on an artery in your head, neck, heart, or legs? No   Do you have chest pain with activity? No   Do you have a history of heart failure? No   Do you currently have a cold, bronchitis or symptoms of other infection? No   Do you have a cough, shortness of breath, or wheezing? No   Do you or anyone in your family have previous history of blood clots? No   Do you or does anyone in your family have a serious bleeding problem such as prolonged bleeding following surgeries or cuts? No   Have you ever had problems with anemia or been told to take iron pills? Iron deficiency anemia    Have you had any abnormal blood loss such as black, tarry or bloody stools, or abnormal vaginal bleeding? No   Have you ever had a  blood transfusion? (!) YES   Have you ever had a transfusion reaction? No   Are you willing to have a blood transfusion if it is medically needed before, during, or after your surgery? Yes   Have you or any of your relatives ever had problems with anesthesia? (!) YES BP stays high and hard to wake   Do you have sleep apnea, excessive snoring or daytime drowsiness? No   Do you have any artifical heart valves or other implanted medical devices like a pacemaker, defibrillator, or continuous glucose monitor? No   Do you have artificial joints? No   Are you allergic to latex? No     Health Care Directive  Patient has a Health Care Directive on file      Preoperative Review of    reviewed - no record of controlled substances prescribed.      Status of Chronic Conditions:  See problem list for active medical problems.  Problems all longstanding and stable, except as noted/documented.  See ROS for pertinent symptoms related to these conditions.    HYPERLIPIDEMIA - Patient has a long history of significant Hyperlipidemia requiring medication for treatment with recent fair control. Patient reports no problems or side effects with the medication.     HYPERTENSION - Patient has longstanding history of HTN , currently denies any symptoms referable to elevated blood pressure. Specifically denies chest pain, palpitations, dyspnea, orthopnea, PND or peripheral edema. Blood pressure readings have been in normal range. Current medication regimen is as listed below. Patient denies any side effects of medication.     HYPOTHYROIDISM - Patient has a longstanding history of chronic Hypothyroidism. Patient has been doing well, noting no tremor, insomnia, hair loss or changes in skin texture. Continues to take medications as directed, without adverse reactions or side effects. Last TSH   Lab Results   Component Value Date    TSH 10.10 (H) 06/19/2024   .      Patient Active Problem List    Diagnosis Date Noted    Rotator cuff injury,  left, subsequent encounter 09/24/2024     Priority: Medium    Pain in joint of left shoulder 09/24/2024     Priority: Medium    Acute pain of left shoulder due to trauma 07/17/2024     Priority: Medium    Diabetes mellitus, type 2 (H) 03/12/2024     Priority: Medium    Essential hypertension 03/12/2024     Priority: Medium    Bipolar disorder (H) 01/06/2023     Priority: Medium     per previous notes and documentation      Iron deficiency 04/29/2021     Priority: Medium    History of colon cancer, stage III 10/20/2020     Priority: Medium     Added automatically from request for surgery [id number]      Rotator cuff tear 10/13/2020     Priority: Medium     Added automatically from request for surgery [id number]      Arthritis 10/13/2020     Priority: Medium     Added automatically from request for surgery [id number]      History of uterine cancer 05/27/2020     Priority: Medium    Palliative care patient 05/27/2020     Priority: Medium    Iron deficiency anemia, unspecified iron deficiency anemia type 05/08/2020     Priority: Medium    S/P partial resection of colon 02/24/2020     Priority: Medium    Prediabetes      Priority: Medium    Colon cancer (H) 02/19/2020     Priority: Medium     Added automatically from request for surgery [id number]      Cancer of ascending colon (H) 02/19/2020     Priority: Medium     Added automatically from request for surgery [id number]      Special screening for malignant neoplasms, colon 01/14/2020     Priority: Medium     Added automatically from request for surgery [id number]      Anastomotic ulcer S/P gastric bypass 07/21/2018     Priority: Medium    Anemia 07/21/2018     Priority: Medium    Chest pain 07/20/2018     Priority: Medium    GI bleed 07/20/2018     Priority: Medium    Abdominal pain, epigastric 07/20/2018     Priority: Medium    Hypochromic microcytic anemia 07/20/2018     Priority: Medium    Chronic migraine 01/18/2018     Priority: Medium     Replacing diagnoses that were inactivated  after the 10/1/2021 regulatory import.      Chronic pain disorder 01/18/2018     Priority: Medium    Long term (current) use of opiate analgesic 01/12/2018     Priority: Medium     Overview:   Pain Diagnosis failed back syndrome, annular tear L4-5, history lumbar discectomy, s/p lumbar fusion.    Should be seen in the clinic every twelve weeks. Currently on a taper: yes, trying to wean away from the daytime hydrocodone-acetaminophen..    Plan for flares of chronic pain stretches, occasional use of more hydrocodone-acetaminophen for flare.    ED and UC: Opioid medication will not be given in the ER or Urgent Care unless there is a medical emergency unrelated to chronic pain.  Limit to 3 day supply    Refills: Refills will only be given at a scheduled visit.    [facility] Agreement for Opioid Treatment.   Opioid Agreement Date: 5/21/12  Last UDT (Urine Drug Test) on date:    Pain Dx: chronic low back pain  Last Pain Visit: 6-24-11  Preferred Pharmacy: [facility]  Comments:      7/23/14:  Progressing well.  Over the last month, has decreased hydrocodone-acetaminophen from 6/day to 3/day.  Awaiting appointment with [facility].      Adjustment disorder with mixed anxiety and depressed mood 12/15/2017     Priority: Medium    Other insomnia 12/15/2017     Priority: Medium    Syncope and collapse 09/26/2015     Priority: Medium    Hypertensive urgency 09/26/2015     Priority: Medium    Concussion syndrome 09/26/2015     Priority: Medium    Hydronephrosis, right 04/20/2015     Priority: Medium    Pyelonephritis 04/20/2015     Priority: Medium    Urolithiasis 04/20/2015     Priority: Medium    Annular tear of lumbar disc 12/16/2014     Priority: Medium     Overview:   MRI 2/5/13:  Previous right L4-5 hemilaminectomy and partial facetectomy with mild to moderate right L4-5 facet arthropathy and broad-based bulge of disk and end plate osteophyte on the right with partial high signal intensity zone annular tear of the undersurface  of the laterally protruding disk/annulus.      Failed back syndrome of lumbar spine 12/16/2014     Priority: Medium     Overview:   Three back surgeries, including an L3-sacrum AP fusion done at [facility] ER in 2013.  Has been doing very well in terms of reduction of pain, with minimal requirement of opiate pain medication.      Hx of lumbar discectomy 12/16/2014     Priority: Medium     Overview:   10/2006:   L4-5 Laminectomy and diskectomy L5-S1.   12/12/11:  L5S1 microdiscectomy ([facility])      Vitamin B 12 deficiency 06/13/2011     Priority: Medium     Overview:   IMO Update 10/11      Gastric bypass status for obesity 01/04/2011     Priority: Medium     Overview:   12/14/10:  laparoscopic Prasanna-en-Y gastric bypass Dr. Hassan  IMO Update 10/11      Hyperlipidemia 09/23/2008     Priority: Medium     Overview:   IMO Update 10/11      Hypothyroidism 09/23/2008     Priority: Medium     Overview:   IMO Update 10/11  Overview:   IMO Update 10/11        Past Medical History:   Diagnosis Date    Arthritis 10/13/2020    Bipolar disorder (H)     per previous notes and documentation    Cancer (H)     colon    Cancer (H) 2004    uterine    Chronic pain     De Quervain's disease (tenosynovitis) 12/2021    Depressive disorder     Diabetes (H)     History of blood transfusion     Hypertension     Prediabetes     Syncope and collapse     Thyroid disease     Trigger finger 12/2021     Past Surgical History:   Procedure Laterality Date    ARTHROSCOPY SHOULDER Left 11/06/2020    Procedure: LEFT SHOULDER ARTHROSCOPY WITH ROTATOR CUFF REPAIR AND ACROMIUM CLAVICULAR JOINT RESECTION;  Surgeon: Tanner Mujica DO;  Location: [facility]    BACK SURGERY      2008; 2011    COLONOSCOPY N/A 02/13/2020    Procedure: COLONOSCOPY WITH BIOPSY AND TATOOING OF ASCENDING COLON,INCOMEPLETE EXAM;  Surgeon: Kang Cleveland MD;  Location: [facility]    COLONOSCOPY N/A 10/26/2020    Procedure: incomplete colonoscopy ;  Surgeon: Kang Cleveland MD;   Location: [facility]    ESOPHAGOSCOPY, GASTROSCOPY, DUODENOSCOPY (EGD), COMBINED N/A 07/21/2018    Procedure: COMBINED ESOPHAGOSCOPY, GASTROSCOPY, DUODENOSCOPY (EGD);  UPPER ENDOSCOPY WITH BIOPSIES;  Surgeon: Roger Vazquez MD;  Location: [facility]    ESOPHAGOSCOPY, GASTROSCOPY, DUODENOSCOPY (EGD), COMBINED N/A 11/21/2019    Procedure: UPPER ENDOSCOPY with biopsy;  Surgeon: Kang Cleveland MD;  Location: [facility]    HEAD & NECK SURGERY  2008    c4-c5 fusion    HYSTERECTOMY, CRISTIANE  04/2004    uterine cancer; [facility]    INSERT PORT VASCULAR ACCESS N/A 02/28/2020    Procedure: INSERTION, VASCULAR ACCESS PORT LEFT;  Surgeon: Kang Cleveland MD;  Location: [facility]    LAPAROSCOPIC ASSISTED COLECTOMY Right 02/24/2020    Procedure: RIGHT KEVIN COLECTOMY, LYSIS OF ADHESIONS;  Surgeon: Kang Cleveland MD;  Location: [facility]    LAPAROSCOPIC BYPASS GASTRIC  12/2010     Current Outpatient Medications   Medication Sig Dispense Refill    acetaminophen (TYLENOL) 500 MG tablet Take 1,000 mg by mouth every 6 hours as needed Max acetaminophen dose: 4000 mg in 24 hours      ammonium lactate (AMLACTIN) 12 % external cream Apply topically 2 times daily 140 g 3    atorvastatin (LIPITOR) 40 MG tablet Take 1 tablet (40 mg) by mouth at bedtime 90 tablet 3    chlorthalidone (HYGROTON) 25 MG tablet Take 1 tablet (25 mg) by mouth daily 90 tablet 0    Cyanocobalamin 1000 MCG/ML KIT Inject 1,000 mcg as directed once a week 1000mcg weekly x 4 then 1000mcg every 2 weeks x 4 then monthly 8 kit 0    diphenhydrAMINE (BENADRYL) 25 MG tablet Take 25 mg by mouth 4 times daily      EPINEPHrine (ANY BX GENERIC EQUIV) 0.3 MG/0.3ML injection 2-pack Inject 0.3 mLs (0.3 mg) into the muscle as needed for anaphylaxis 2 each 1    EQ ASPIRIN ADULT LOW DOSE 81 MG EC tablet Take 81 mg by mouth daily      famotidine (PEPCID) 20 MG tablet Take 1 tablet (20 mg) by mouth 2 times daily 60 tablet 1    ibuprofen (ADVIL/MOTRIN) 800 MG tablet Take 1 tablet (800  mg) by mouth every 8 hours as needed for moderate pain 60 tablet 0    levothyroxine (SYNTHROID/LEVOTHROID) 200 MCG tablet Take 1 tablet (200 mcg) by mouth daily 90 tablet 1    levothyroxine (SYNTHROID/LEVOTHROID) 50 MCG tablet TAKE 1 TABLET BY MOUTH ONCE DAILY (TAKE WITH 200MCG TABLET FOR TOTAL OF 250MCG) 90 tablet 1    loratadine (CLARITIN) 10 MG tablet Take 1 tablet (10 mg) by mouth daily 30 tablet 1    omeprazole (PRILOSEC) 20 MG DR capsule Take 1 capsule (20 mg) by mouth daily 90 capsule 3    tiZANidine (ZANAFLEX) 4 MG tablet Take 1 tablet (4 mg) by mouth at bedtime 90 tablet 1    traZODone (DESYREL) 50 MG tablet TAKE 1 TABLET BY MOUTH ONCE DAILY AT BEDTIME 90 tablet 3       Allergies   Allergen Reactions    Aspartame      Other reaction(s): Other - Describe In Comment Field, Seizures  convulsions    Bee Venom Anaphylaxis    Bupropion Anaphylaxis     Throat gets tight and hives. Denies allergic reaction    Food Anaphylaxis     Peppers-anaphylaxis if eaten, if touched hands swell and reddened    Food Allergy Formula Anaphylaxis     Peppers, patient reports green peppers close off her throat    Ibuprofen Anaphylaxis    No Clinical Screening - See Comments Anaphylaxis     Peppers, patient reports green peppers close off her throat    Piper Anaphylaxis     Peppers--green, black, red, chili, etc. Derm symptoms, hives;  Strawberries--(cooked) Itching, breathing difficulties, GI symptoms. Mushrooms--GI symptoms, Hives.Spinach (cooked) throat irritation  (Listed on [facility] problem list.)    Fentanyl Swelling     Lips and tongue swelled.  Swelling of lips and tongue    Adhesive Tape      blisters    Amitriptyline Other (See Comments)     Respiratory symptoms - denies allergic reaction    Duloxetine Hcl      Mood cycling leading to elevated mood state.     Liquid Adhesive Dermatitis     blisters  Blisters  Silk tape and tegaderm is fine    Oxcarbazepine Other (See Comments)     Throat gets tight and  hives. Denies allergic reaction  Throat gets tight and hives-      Prozac [Fluoxetine]      Other reaction(s): *Unknown  uncontrolled muscle spasms    Valproic Acid      Respiratory symptoms ([facility]). Denies allergic reaction    Ziprasidone      Throat gets tight and hives. Denies allergic reaction        Social History     Tobacco Use    Smoking status: Never     Passive exposure: Never    Smokeless tobacco: Never   Substance Use Topics    Alcohol use: No     Comment: sober for 18 years     Family History   Problem Relation Age of Onset    Diabetes Mother     Hypertension Mother     Coronary Artery Disease Mother     Myocardial Infarction Mother     Hypertension Father     Diabetes Maternal Grandmother     Hypertension Maternal Grandfather     Coronary Artery Disease Maternal Grandfather     Cancer Paternal Grandfather         unsure of type of cancer    Bladder Cancer Paternal Grandfather     Diabetes Maternal Aunt     Cerebrovascular Disease Maternal Aunt     Diabetes Maternal Aunt     Cerebrovascular Disease Maternal Aunt     Diabetes Maternal Aunt     Cerebrovascular Disease Maternal Aunt     Hyperlipidemia No family hx of     Breast Cancer No family hx of     Colon Cancer No family hx of     Prostate Cancer No family hx of     Thyroid Disease No family hx of     Anesthesia Reaction No family hx of     Asthma No family hx of     Genetic Disorder No family hx of      History   Drug Use No             Review of Systems  CONSTITUTIONAL: NEGATIVE for fever, chills, change in weight  INTEGUMENTARY/SKIN: NEGATIVE for worrisome rashes, moles or lesions  EYES: NEGATIVE for vision changes or irritation  ENT/MOUTH: NEGATIVE for ear, mouth and throat problems  RESP: NEGATIVE for significant cough or SOB  CV: NEGATIVE for chest pain, palpitations or peripheral edema  GI: NEGATIVE for nausea, abdominal pain, heartburn, or change in bowel habits  : denies dysuria   MUSCULOSKELETAL: NEGATIVE for  "significant arthralgias or myalgia  NEURO: NEGATIVE for weakness, dizziness or paresthesias  ENDOCRINE: Hx diabetes and Hx thyroid disease  PSYCHIATRIC: NEGATIVE for changes in mood or affect    Objective    /80   Pulse 78   Temp 97  F (36.1  C) (Tympanic)   Resp 18   Wt 83.9 kg (185 lb)   SpO2 98%   BMI 29.86 kg/m     Estimated body mass index is 29.86 kg/m  as calculated from the following:    Height as of 8/27/24: 1.676 m (5' 6\").    Weight as of this encounter: 83.9 kg (185 lb).  Physical Exam  GENERAL: alert and no distress  EYES: Eyes grossly normal to inspection, PERRL and conjunctivae and sclerae normal  HENT: ear canals and TM's normal, nose and mouth without ulcers or lesions  NECK: no adenopathy, no asymmetry, masses, or scars  RESP: lungs clear to auscultation - no rales, rhonchi or wheezes  CV: regular rate and rhythm, normal S1 S2, no S3 or S4, no murmur, click or rub, no peripheral edema  ABDOMEN: soft, nontender, no hepatosplenomegaly, no masses and bowel sounds normal  MS: no gross musculoskeletal defects noted, no edema  SKIN: no suspicious lesions or rashes  NEURO: Normal strength and tone, mentation intact and speech normal  PSYCH: mentation appears normal, affect normal/bright  LYMPH: normal ant/post cervical, supraclavicular nodes    Recent Labs   Lab Test 06/19/24  0921 02/26/24  1202 01/31/24  0127 01/26/24  0804   HGB  --   --  10.0* 11.4*   PLT  --   --  151 181    141 138 142   POTASSIUM 4.0 4.0 3.7 3.9   CR 0.80 0.79 0.73 0.78   A1C 5.8* 6.6*  --   --         Diagnostics  Recent Results (from the past 24 hours)   Basic metabolic panel    Collection Time: 01/15/25 10:46 AM   Result Value Ref Range    Sodium 138 135 - 145 mmol/L    Potassium 4.2 3.4 - 5.3 mmol/L    Chloride 106 98 - 107 mmol/L    Carbon Dioxide (CO2) 22 22 - 29 mmol/L    Anion Gap 10 7 - 15 mmol/L    Urea Nitrogen 14.5 6.0 - 20.0 mg/dL    Creatinine 0.79 0.51 - 0.95 mg/dL    GFR Estimate 88 >60 " mL/min/1.73m2    Calcium 8.8 8.8 - 10.4 mg/dL    Glucose 90 70 - 99 mg/dL   TSH with free T4 reflex    Collection Time: 01/15/25 10:46 AM   Result Value Ref Range    TSH 10.17 (H) 0.30 - 4.20 uIU/mL   Hemoglobin A1c    Collection Time: 01/15/25 10:46 AM   Result Value Ref Range    Estimated Average Glucose 120 (H) <117 mg/dL    Hemoglobin A1C 5.8 (H) <5.7 %   CBC with platelets and differential    Collection Time: 01/15/25 10:46 AM   Result Value Ref Range    WBC Count 5.8 4.0 - 11.0 10e3/uL    RBC Count 4.09 3.80 - 5.20 10e6/uL    Hemoglobin 11.4 (L) 11.7 - 15.7 g/dL    Hematocrit 34.8 (L) 35.0 - 47.0 %    MCV 85 78 - 100 fL    MCH 27.9 26.5 - 33.0 pg    MCHC 32.8 31.5 - 36.5 g/dL    RDW 13.3 10.0 - 15.0 %    Platelet Count 219 150 - 450 10e3/uL    % Neutrophils 61 %    % Lymphocytes 29 %    % Monocytes 9 %    % Eosinophils 0 %    % Basophils 0 %    % Immature Granulocytes 1 %    NRBCs per 100 WBC 0 <1 /100    Absolute Neutrophils 3.5 1.6 - 8.3 10e3/uL    Absolute Lymphocytes 1.7 0.8 - 5.3 10e3/uL    Absolute Monocytes 0.5 0.0 - 1.3 10e3/uL    Absolute Eosinophils 0.0 0.0 - 0.7 10e3/uL    Absolute Basophils 0.0 0.0 - 0.2 10e3/uL    Absolute Immature Granulocytes 0.0 <=0.4 10e3/uL    Absolute NRBCs 0.0 10e3/uL   T4 free    Collection Time: 01/15/25 10:46 AM   Result Value Ref Range    Free T4 0.76 (L) 0.90 - 1.70 ng/dL   EKG 12-lead complete w/read - (Clinic Performed)    Collection Time: 01/15/25 11:01 AM   Result Value Ref Range    Systolic Blood Pressure  mmHg    Diastolic Blood Pressure  mmHg    Ventricular Rate 72 BPM    Atrial Rate 72 BPM    MD Interval 152 ms    QRS Duration 82 ms     ms    QTc 418 ms    P Axis 63 degrees    R AXIS 28 degrees    T Axis 41 degrees    Interpretation ECG       Sinus rhythm with sinus arrhythmia  Normal ECG  When compared with ECG of 02-Feb-2024 10:27,  No significant change was found        EKG: appears normal, NSR, normal axis, normal intervals, no acute ST/T changes c/w  ischemia, no LVH by voltage criteria, with sinus arrhythmia - normal EKG    Revised Cardiac Risk Index (RCRI)  The patient has the following serious cardiovascular risks for perioperative complications:   - No serious cardiac risks = 0 points     RCRI Interpretation: 0 points: Class I (very low risk - 0.4% complication rate)         Signed Electronically by: JUNIOR Burns CNP  A copy of this evaluation report is provided to the requesting physician.

## 2025-01-16 LAB
ATRIAL RATE - MUSE: 72 BPM
DIASTOLIC BLOOD PRESSURE - MUSE: NORMAL MMHG
INTERPRETATION ECG - MUSE: NORMAL
P AXIS - MUSE: 63 DEGREES
PR INTERVAL - MUSE: 152 MS
QRS DURATION - MUSE: 82 MS
QT - MUSE: 382 MS
QTC - MUSE: 418 MS
R AXIS - MUSE: 28 DEGREES
SYSTOLIC BLOOD PRESSURE - MUSE: NORMAL MMHG
T AXIS - MUSE: 41 DEGREES
VENTRICULAR RATE- MUSE: 72 BPM

## 2025-01-20 ENCOUNTER — ANESTHESIA (OUTPATIENT)
Dept: SURGERY | Facility: HOSPITAL | Age: 55
End: 2025-01-20
Payer: COMMERCIAL

## 2025-01-20 ENCOUNTER — HOSPITAL ENCOUNTER (OUTPATIENT)
Facility: HOSPITAL | Age: 55
Discharge: HOME | End: 2025-01-20
Attending: SURGERY | Admitting: SURGERY
Payer: COMMERCIAL

## 2025-01-20 VITALS
HEART RATE: 70 BPM | RESPIRATION RATE: 16 BRPM | TEMPERATURE: 97.2 F | WEIGHT: 184 LBS | SYSTOLIC BLOOD PRESSURE: 92 MMHG | BODY MASS INDEX: 29.57 KG/M2 | HEIGHT: 66 IN | OXYGEN SATURATION: 98 % | DIASTOLIC BLOOD PRESSURE: 63 MMHG

## 2025-01-20 LAB — GLUCOSE BLDC GLUCOMTR-MCNC: 86 MG/DL (ref 70–99)

## 2025-01-20 PROCEDURE — 82962 GLUCOSE BLOOD TEST: CPT

## 2025-01-20 PROCEDURE — 360N000075 HC SURGERY LEVEL 2, PER MIN: Performed by: SURGERY

## 2025-01-20 PROCEDURE — 370N000017 HC ANESTHESIA TECHNICAL FEE, PER MIN: Performed by: SURGERY

## 2025-01-20 PROCEDURE — 36590 REMOVAL TUNNELED CV CATH: CPT | Performed by: SURGERY

## 2025-01-20 PROCEDURE — 258N000003 HC RX IP 258 OP 636: Performed by: NURSE ANESTHETIST, CERTIFIED REGISTERED

## 2025-01-20 PROCEDURE — 710N000012 HC RECOVERY PHASE 2, PER MINUTE: Performed by: SURGERY

## 2025-01-20 PROCEDURE — 272N000001 HC OR GENERAL SUPPLY STERILE: Performed by: SURGERY

## 2025-01-20 PROCEDURE — 250N000009 HC RX 250: Performed by: NURSE ANESTHETIST, CERTIFIED REGISTERED

## 2025-01-20 PROCEDURE — 250N000011 HC RX IP 250 OP 636: Performed by: NURSE ANESTHETIST, CERTIFIED REGISTERED

## 2025-01-20 PROCEDURE — 250N000011 HC RX IP 250 OP 636: Performed by: SURGERY

## 2025-01-20 PROCEDURE — 250N000009 HC RX 250: Performed by: SURGERY

## 2025-01-20 PROCEDURE — 999N000141 HC STATISTIC PRE-PROCEDURE NURSING ASSESSMENT: Performed by: SURGERY

## 2025-01-20 RX ORDER — BUPIVACAINE HYDROCHLORIDE AND EPINEPHRINE 2.5; 5 MG/ML; UG/ML
INJECTION, SOLUTION EPIDURAL; INFILTRATION; INTRACAUDAL; PERINEURAL
Status: DISCONTINUED
Start: 2025-01-20 | End: 2025-01-20 | Stop reason: HOSPADM

## 2025-01-20 RX ORDER — CEFAZOLIN SODIUM/WATER 2 G/20 ML
2 SYRINGE (ML) INTRAVENOUS
Status: COMPLETED | OUTPATIENT
Start: 2025-01-20 | End: 2025-01-20

## 2025-01-20 RX ORDER — LIDOCAINE 40 MG/G
CREAM TOPICAL
Status: DISCONTINUED | OUTPATIENT
Start: 2025-01-20 | End: 2025-01-20 | Stop reason: HOSPADM

## 2025-01-20 RX ORDER — PROPOFOL 10 MG/ML
INJECTION, EMULSION INTRAVENOUS CONTINUOUS PRN
Status: DISCONTINUED | OUTPATIENT
Start: 2025-01-20 | End: 2025-01-20

## 2025-01-20 RX ORDER — LIDOCAINE HYDROCHLORIDE 20 MG/ML
INJECTION, SOLUTION INFILTRATION; PERINEURAL PRN
Status: DISCONTINUED | OUTPATIENT
Start: 2025-01-20 | End: 2025-01-20

## 2025-01-20 RX ORDER — NALOXONE HYDROCHLORIDE 0.4 MG/ML
0.1 INJECTION, SOLUTION INTRAMUSCULAR; INTRAVENOUS; SUBCUTANEOUS
Status: DISCONTINUED | OUTPATIENT
Start: 2025-01-20 | End: 2025-01-20 | Stop reason: HOSPADM

## 2025-01-20 RX ORDER — DEXAMETHASONE SODIUM PHOSPHATE 10 MG/ML
4 INJECTION, SOLUTION INTRAMUSCULAR; INTRAVENOUS
Status: DISCONTINUED | OUTPATIENT
Start: 2025-01-20 | End: 2025-01-20 | Stop reason: HOSPADM

## 2025-01-20 RX ORDER — LIDOCAINE HYDROCHLORIDE 10 MG/ML
INJECTION, SOLUTION EPIDURAL; INFILTRATION; INTRACAUDAL; PERINEURAL
Status: DISCONTINUED
Start: 2025-01-20 | End: 2025-01-20 | Stop reason: HOSPADM

## 2025-01-20 RX ORDER — CEFAZOLIN SODIUM/WATER 2 G/20 ML
2 SYRINGE (ML) INTRAVENOUS SEE ADMIN INSTRUCTIONS
Status: DISCONTINUED | OUTPATIENT
Start: 2025-01-20 | End: 2025-01-20 | Stop reason: HOSPADM

## 2025-01-20 RX ORDER — DEXMEDETOMIDINE HYDROCHLORIDE 4 UG/ML
INJECTION, SOLUTION INTRAVENOUS PRN
Status: DISCONTINUED | OUTPATIENT
Start: 2025-01-20 | End: 2025-01-20

## 2025-01-20 RX ORDER — ONDANSETRON 4 MG/1
4 TABLET, ORALLY DISINTEGRATING ORAL EVERY 30 MIN PRN
Status: DISCONTINUED | OUTPATIENT
Start: 2025-01-20 | End: 2025-01-20 | Stop reason: HOSPADM

## 2025-01-20 RX ORDER — OXYCODONE HYDROCHLORIDE 5 MG/1
5 TABLET ORAL
Status: DISCONTINUED | OUTPATIENT
Start: 2025-01-20 | End: 2025-01-20 | Stop reason: HOSPADM

## 2025-01-20 RX ORDER — ONDANSETRON 2 MG/ML
4 INJECTION INTRAMUSCULAR; INTRAVENOUS EVERY 30 MIN PRN
Status: DISCONTINUED | OUTPATIENT
Start: 2025-01-20 | End: 2025-01-20 | Stop reason: HOSPADM

## 2025-01-20 RX ORDER — SODIUM CHLORIDE, SODIUM LACTATE, POTASSIUM CHLORIDE, CALCIUM CHLORIDE 600; 310; 30; 20 MG/100ML; MG/100ML; MG/100ML; MG/100ML
INJECTION, SOLUTION INTRAVENOUS CONTINUOUS
Status: DISCONTINUED | OUTPATIENT
Start: 2025-01-20 | End: 2025-01-20 | Stop reason: HOSPADM

## 2025-01-20 RX ADMIN — PROPOFOL 100 MCG/KG/MIN: 10 INJECTION, EMULSION INTRAVENOUS at 07:31

## 2025-01-20 RX ADMIN — DEXMEDETOMIDINE HYDROCHLORIDE 12 MCG: 4 INJECTION, SOLUTION INTRAVENOUS at 07:25

## 2025-01-20 RX ADMIN — LIDOCAINE HYDROCHLORIDE 40 MG: 20 INJECTION, SOLUTION INFILTRATION; PERINEURAL at 07:30

## 2025-01-20 RX ADMIN — SODIUM CHLORIDE, POTASSIUM CHLORIDE, SODIUM LACTATE AND CALCIUM CHLORIDE: 600; 310; 30; 20 INJECTION, SOLUTION INTRAVENOUS at 07:07

## 2025-01-20 RX ADMIN — DEXMEDETOMIDINE HYDROCHLORIDE 8 MCG: 4 INJECTION, SOLUTION INTRAVENOUS at 07:31

## 2025-01-20 RX ADMIN — CEFAZOLIN 2 G: 10 INJECTION, POWDER, FOR SOLUTION INTRAVENOUS at 07:23

## 2025-01-20 RX ADMIN — MIDAZOLAM 2 MG: 1 INJECTION INTRAMUSCULAR; INTRAVENOUS at 07:25

## 2025-01-20 ASSESSMENT — ACTIVITIES OF DAILY LIVING (ADL)
ADLS_ACUITY_SCORE: 25

## 2025-01-20 NOTE — ANESTHESIA POSTPROCEDURE EVALUATION
Patient: Constantino Warren    Procedure: Procedure(s):  Port-A-Cath removal       Anesthesia Type:  MAC    Note:  Disposition: Outpatient   Postop Pain Control: Uneventful            Sign Out: Well controlled pain   PONV: No   Neuro/Psych: Uneventful            Sign Out: Acceptable/Baseline neuro status   Airway/Respiratory: Uneventful            Sign Out: Acceptable/Baseline resp. status   CV/Hemodynamics: Uneventful            Sign Out: Acceptable CV status; No obvious hypovolemia; No obvious fluid overload   Other NRE: NONE   DID A NON-ROUTINE EVENT OCCUR? No       Last vitals:  Vitals Value Taken Time   BP 92/66 01/20/25 0911   Temp 97.2  F (36.2  C) 01/20/25 0810   Pulse 68 01/20/25 0911   Resp 16 01/20/25 0910   SpO2 98 % 01/20/25 0910   Vitals shown include unfiled device data.    Electronically Signed By: JUNIOR Quevedo CRNA  January 20, 2025  10:22 AM

## 2025-01-20 NOTE — ANESTHESIA CARE TRANSFER NOTE
Patient: Constantino Warren    Procedure: Procedure(s):  Port-A-Cath removal       Diagnosis: Port-A-Cath in place [Z95.828]  Diagnosis Additional Information: No value filed.    Anesthesia Type:   MAC     Note:    Oropharynx: oropharynx clear of all foreign objects and spontaneously breathing  Level of Consciousness: drowsy  Oxygen Supplementation: room air    Independent Airway: airway patency satisfactory and stable  Dentition: dentition unchanged  Vital Signs Stable: post-procedure vital signs reviewed and stable  Report to RN Given: handoff report given  Patient transferred to: Phase II    Handoff Report: Identifed the Patient, Identified the Reponsible Provider, Reviewed the pertinent medical history, Discussed the surgical course, Reviewed Intra-OP anesthesia mangement and issues during anesthesia, Set expectations for post-procedure period and Allowed opportunity for questions and acknowledgement of understanding  Vitals:  Vitals Value Taken Time   BP 93/56 01/20/25 0807   Temp     Pulse 68 01/20/25 0807   Resp     SpO2 93 % 01/20/25 0808   Vitals shown include unfiled device data.    Electronically Signed By: JUNIOR Peñaloza CRNA  January 20, 2025  8:10 AM

## 2025-01-20 NOTE — OR NURSING
Patient and responsible adult given discharge instructions with no questions regarding instructions. Kaylan score 20/20. Pain level 0/10.  Discharged from unit via walking. Patient discharged to home with friend. Tolerating PO intake.

## 2025-01-20 NOTE — OP NOTE
REPORT OF OPERATION  DATE OF PROCEDURE: 1/20/2025    PATIENT: Constantino Warren    SURGERY PERFORMED: Removal of tunneled central venous catheter with subcutaneous port    PREOPERATIVE DIAGNOSIS: History of colon cancer    POSTOPERATIVE DIAGNOSIS: Same    SURGEON: Kang Cleveland MD    ASSISTANTS: Jennifer Robertson CNP, Her assistance was required because of the technical aspects of the case    ANESTHESIA: Monitored Anesthesia Care    COMPLICATIONS: None apparent    ESTIMATED BLOOD LOSS: 10 ml    TRANSFUSIONS: None    TISSUE TO PATHOLOGY: None    FINDINGS: Catheter removed intact    INDICATIONS: This is a 54 year old female with history of colon cancer.  She has a tunneled central venous catheter with subcutaneous port that she is no longer using.  She desires removal of the catheter.  The patient will take the operating room for removal of a tunneled central venous catheter with subcutaneous port.    DESCRIPTIONS OF PROCEDURE IN DETAIL: After consent was obtained the patient was taken to the operative suite and rosana in the supine position.  The patient was identified and the correct patient was confirmed.  Monitored Anesthesia Care was administered by anesthesia.  The patient was sterilely prepped and draped in the usual fashion.  A time out was performed verifying the correct patient and the correct procedure.  The entire operative team was in agreement.  All necessary equipment and supplies were in the room.    After identification of the location of the catheter which was in the left upper chest the area was anesthetized with local anesthetic.  The skin was then sharply entered using the scalpel and dissection carried down to isolation of the port.  The port was removed in its entirety.  Hemostasis was assured.  The wound was then closed with 3-0 Monocryl closing the subcutaneous tissue and the skin was closed use of particular 4-0 Monocryl.  Sterile dressings were applied.    All needle,  instrument and sponge  counts were correct x2.  The patient was awakened in the operating room and taken to the recovery room in stable condition tolerated procedure well.

## 2025-02-26 NOTE — ED TRIAGE NOTES
C/o nose pain and abrasion on nose after hitting face on side of trampoline at [place] yesterday. Also c/o right brow and left under eye pain. No noted redness, swelling, or bruising to brow or under eye. Abrasion noted down length of nose. Rates pain 9/10. Reports taking tylenol 1000 mg at 0410 this morning.      MODERATE

## 2025-03-27 NOTE — PROGRESS NOTES
Name: Constantino Warren    MRN#: [id number]    Reason for Hospitalization: Abdominal pain, epigastric [R10.13]  Occult blood positive stool [R19.5]  Anemia, unspecified type [D64.9]    Discharge Date: 7/24/2018    Patient / Family response to discharge plan: Agree    Follow-Up Appt: No future appointments.    Other Providers (Care Coordinator, [facility], PCA services etc): No    Discharge Disposition: home   Constantino has her car in the hospital parking lot and will drive her self home.   Constantino has completed the application for [employer] with the assistance of ABHILASH Salazar. She does not qualify for the HPE program per Marie.  She was given the cost of her Carafate medication as she will be paying out of pocket. She plans to get this Prescription through [employer] as it only costs $30.49 as compare to $75 at [facility]. She stated the Ignacio's  mitchell was very workable for her. She will only be taking this med for 1 month.     Melany Mendez     [FreeTextEntry1] : ORIGINAL PRESENTATION: This is a 51-year-old female here to establish care for lower back pain with radicular features into the left lower extremity. The pain travels down into the shin. It is described as a pulling sensation. Pain initially started about 3 years ago and has gotten progressive worse. She has difficulty sitting and standing secondary to pain. She reports stiffness in the lower back.   PATIENT PRESENTS FOR FOLLOW UP: She is under our care for complaints of lower back pain with radicular features into the bilateral lower extremities. Pain travels into the lateral thighs and is described as a tight, pulling sensation. The MRI of the lumbar spine was reviewed with the patient and is documented below. There's evidence of a disc extrusion and moderate to severe canal stenosis at L4-5. She has begun to have symptoms on the right side as well. She has not started physical therapy and is eager to proceed with interventional treatment as soon as possible.

## 2025-04-03 ENCOUNTER — OFFICE VISIT (OUTPATIENT)
Dept: FAMILY MEDICINE | Facility: OTHER | Age: 55
End: 2025-04-03
Attending: FAMILY MEDICINE
Payer: COMMERCIAL

## 2025-04-03 VITALS
TEMPERATURE: 97 F | HEIGHT: 64 IN | HEART RATE: 78 BPM | RESPIRATION RATE: 20 BRPM | OXYGEN SATURATION: 100 % | SYSTOLIC BLOOD PRESSURE: 110 MMHG | BODY MASS INDEX: 31.19 KG/M2 | WEIGHT: 182.7 LBS | DIASTOLIC BLOOD PRESSURE: 60 MMHG

## 2025-04-03 DIAGNOSIS — Z98.84 S/P GASTRIC BYPASS: ICD-10-CM

## 2025-04-03 DIAGNOSIS — E03.9 HYPOTHYROIDISM, UNSPECIFIED TYPE: ICD-10-CM

## 2025-04-03 DIAGNOSIS — I10 ESSENTIAL HYPERTENSION: ICD-10-CM

## 2025-04-03 DIAGNOSIS — F43.23 ADJUSTMENT DISORDER WITH MIXED ANXIETY AND DEPRESSED MOOD: ICD-10-CM

## 2025-04-03 DIAGNOSIS — D64.9 ANEMIA, UNSPECIFIED TYPE: ICD-10-CM

## 2025-04-03 DIAGNOSIS — E78.5 HYPERLIPIDEMIA, UNSPECIFIED HYPERLIPIDEMIA TYPE: ICD-10-CM

## 2025-04-03 DIAGNOSIS — E11.9 TYPE 2 DIABETES MELLITUS WITHOUT COMPLICATION, WITHOUT LONG-TERM CURRENT USE OF INSULIN (H): Primary | ICD-10-CM

## 2025-04-03 DIAGNOSIS — Z85.038 HISTORY OF COLON CANCER: ICD-10-CM

## 2025-04-03 DIAGNOSIS — E53.8 B12 DEFICIENCY: ICD-10-CM

## 2025-04-03 LAB
ALBUMIN SERPL BCG-MCNC: 4 G/DL (ref 3.5–5.2)
ALP SERPL-CCNC: 103 U/L (ref 40–150)
ALT SERPL W P-5'-P-CCNC: 51 U/L (ref 0–50)
ANION GAP SERPL CALCULATED.3IONS-SCNC: 8 MMOL/L (ref 7–15)
AST SERPL W P-5'-P-CCNC: 52 U/L (ref 0–45)
BASOPHILS # BLD AUTO: 0 10E3/UL (ref 0–0.2)
BASOPHILS NFR BLD AUTO: 0 %
BILIRUB SERPL-MCNC: 0.5 MG/DL
BUN SERPL-MCNC: 14.2 MG/DL (ref 6–20)
CALCIUM SERPL-MCNC: 8.8 MG/DL (ref 8.8–10.4)
CHLORIDE SERPL-SCNC: 106 MMOL/L (ref 98–107)
CHOLEST SERPL-MCNC: 128 MG/DL
CREAT SERPL-MCNC: 0.72 MG/DL (ref 0.51–0.95)
CREAT UR-MCNC: 131.1 MG/DL
EGFRCR SERPLBLD CKD-EPI 2021: >90 ML/MIN/1.73M2
EOSINOPHIL # BLD AUTO: 0.2 10E3/UL (ref 0–0.7)
EOSINOPHIL NFR BLD AUTO: 3 %
ERYTHROCYTE [DISTWIDTH] IN BLOOD BY AUTOMATED COUNT: 14.2 % (ref 10–15)
FASTING STATUS PATIENT QL REPORTED: NO
FASTING STATUS PATIENT QL REPORTED: NO
FERRITIN SERPL-MCNC: 16 NG/ML (ref 11–328)
GLUCOSE SERPL-MCNC: 69 MG/DL (ref 70–99)
HCO3 SERPL-SCNC: 24 MMOL/L (ref 22–29)
HCT VFR BLD AUTO: 37 % (ref 35–47)
HDLC SERPL-MCNC: 32 MG/DL
HGB BLD-MCNC: 11.8 G/DL (ref 11.7–15.7)
IMM GRANULOCYTES # BLD: 0 10E3/UL
IMM GRANULOCYTES NFR BLD: 0 %
IRON BINDING CAPACITY (ROCHE): 484 UG/DL (ref 240–430)
IRON SATN MFR SERPL: 12 % (ref 15–46)
IRON SERPL-MCNC: 57 UG/DL (ref 37–145)
LDLC SERPL CALC-MCNC: 77 MG/DL
LYMPHOCYTES # BLD AUTO: 2.3 10E3/UL (ref 0.8–5.3)
LYMPHOCYTES NFR BLD AUTO: 31 %
MCH RBC QN AUTO: 27.1 PG (ref 26.5–33)
MCHC RBC AUTO-ENTMCNC: 31.9 G/DL (ref 31.5–36.5)
MCV RBC AUTO: 85 FL (ref 78–100)
MICROALBUMIN UR-MCNC: <12 MG/L
MICROALBUMIN/CREAT UR: NORMAL MG/G{CREAT}
MONOCYTES # BLD AUTO: 0.7 10E3/UL (ref 0–1.3)
MONOCYTES NFR BLD AUTO: 9 %
NEUTROPHILS # BLD AUTO: 4.3 10E3/UL (ref 1.6–8.3)
NEUTROPHILS NFR BLD AUTO: 57 %
NONHDLC SERPL-MCNC: 96 MG/DL
NRBC # BLD AUTO: 0 10E3/UL
NRBC BLD AUTO-RTO: 0 /100
PLATELET # BLD AUTO: 244 10E3/UL (ref 150–450)
POTASSIUM SERPL-SCNC: 3.7 MMOL/L (ref 3.4–5.3)
PROT SERPL-MCNC: 7.5 G/DL (ref 6.4–8.3)
RBC # BLD AUTO: 4.36 10E6/UL (ref 3.8–5.2)
SODIUM SERPL-SCNC: 138 MMOL/L (ref 135–145)
T4 FREE SERPL-MCNC: 0.63 NG/DL (ref 0.9–1.7)
TRIGL SERPL-MCNC: 97 MG/DL
TSH SERPL DL<=0.005 MIU/L-ACNC: 15.58 UIU/ML (ref 0.3–4.2)
WBC # BLD AUTO: 7.5 10E3/UL (ref 4–11)

## 2025-04-03 RX ORDER — ATORVASTATIN CALCIUM 40 MG/1
40 TABLET, FILM COATED ORAL AT BEDTIME
Qty: 90 TABLET | Refills: 3 | Status: SHIPPED | OUTPATIENT
Start: 2025-04-03

## 2025-04-03 RX ORDER — CYANOCOBALAMIN 1000 UG/ML
1000 INJECTION, SOLUTION INTRAMUSCULAR; SUBCUTANEOUS
Status: DISPENSED | OUTPATIENT
Start: 2025-04-03 | End: 2026-03-29

## 2025-04-03 RX ORDER — CHLORTHALIDONE 25 MG/1
25 TABLET ORAL DAILY
Qty: 90 TABLET | Refills: 3 | Status: SHIPPED | OUTPATIENT
Start: 2025-04-03

## 2025-04-03 RX ADMIN — CYANOCOBALAMIN 1000 MCG: 1000 INJECTION, SOLUTION INTRAMUSCULAR; SUBCUTANEOUS at 16:22

## 2025-04-03 ASSESSMENT — PAIN SCALES - GENERAL: PAINLEVEL_OUTOF10: NO PAIN (0)

## 2025-04-03 ASSESSMENT — ANXIETY QUESTIONNAIRES
6. BECOMING EASILY ANNOYED OR IRRITABLE: NOT AT ALL
1. FEELING NERVOUS, ANXIOUS, OR ON EDGE: NOT AT ALL
GAD7 TOTAL SCORE: 0
7. FEELING AFRAID AS IF SOMETHING AWFUL MIGHT HAPPEN: NOT AT ALL
3. WORRYING TOO MUCH ABOUT DIFFERENT THINGS: NOT AT ALL
2. NOT BEING ABLE TO STOP OR CONTROL WORRYING: NOT AT ALL
GAD7 TOTAL SCORE: 0
5. BEING SO RESTLESS THAT IT IS HARD TO SIT STILL: NOT AT ALL

## 2025-04-03 ASSESSMENT — PATIENT HEALTH QUESTIONNAIRE - PHQ9
SUM OF ALL RESPONSES TO PHQ QUESTIONS 1-9: 1
5. POOR APPETITE OR OVEREATING: NOT AT ALL

## 2025-04-03 NOTE — PATIENT INSTRUCTIONS
Glucose goals -   Fasting   2 hours after meal <180    Referral to DRC.  Foot exam updated.  Annual eye exam when due.    MTM pharmacist reach out to review meds.    Will notify of labs.  Then will send thyroid refills.  Lipitor and Hygroton refilled.    B12 and Tdap updated.    Follow up 3 months.

## 2025-04-03 NOTE — PROGRESS NOTES
Assessment & Plan     Type 2 diabetes mellitus without complication, without long-term current use of insulin (H)  Recently noting increase in glucose readings.  A1c within 3 months at goal.  Referral to [facility] for monitoring.  Foot exam today.  Eye exam up to date.  Restart statin.  Unsure why she stopped??  Labs updating including urine.  Follow up every 3 months.  Encouraged compliance.  - TSH with free T4 reflex; Future  - CBC with platelets and differential; Future  - Albumin Random Urine Quantitative with Creat Ratio; Future  - Lipid Profile (Chol, Trig, HDL, LDL calc); Future  - Diabetes Education Referral ([place])  - Comprehensive metabolic panel (BMP + Alb, Alk Phos, ALT, AST, Total. Bili, TP); Future  - Med Therapy Management Referral  - Comprehensive metabolic panel (BMP + Alb, Alk Phos, ALT, AST, Total. Bili, TP)  - Lipid Profile (Chol, Trig, HDL, LDL calc)  - Albumin Random Urine Quantitative with Creat Ratio  - CBC with platelets and differential  - TSH with free T4 reflex    Hyperlipidemia, unspecified hyperlipidemia type  As above - restart statin.  Lipid panel - non fasting - pending.  - Lipid Profile (Chol, Trig, HDL, LDL calc); Future  - atorvastatin (LIPITOR) 40 MG tablet; Take 1 tablet (40 mg) by mouth at bedtime.  - Med Therapy Management Referral  - Lipid Profile (Chol, Trig, HDL, LDL calc)    Essential hypertension  At goal.  Was out of diuretic for few days.  - chlorthalidone (HYGROTON) 25 MG tablet; Take 1 tablet (25 mg) by mouth daily.  - Med Therapy Management Referral    Hypothyroidism, unspecified type  Confusion around dosing.  Per last note from covering provider - was to increase from 250 mcg to 275 mc.  However, sounds like she was only taking 200 mcg.    Then has been without for past 5 days.  Update labs and reassess doing.  [facility] pharmacist to help with compliance, understanding.  - TSH with free T4 reflex; Future  - Med Therapy Management Referral  - TSH with free T4  "reflex    History of colon cancer  S/p port removal.  - Med Therapy Management Referral    Adjustment disorder with mixed anxiety and depressed mood  stable  - Med Therapy Management Referral    Anemia, unspecified type  History of gastric bypass.  Update B12 and iron studies and replace  - Med Therapy Management Referral    B12 deficiency  B12 injection monthly - given today - but has been without  - Vitamin B12; Future  - Med Therapy Management Referral  - Vitamin B12    S/P gastric bypass  As above.  - cyanocobalamin injection 1,000 mcg  - Iron and iron binding capacity; Future  - Ferritin; Future  - Med Therapy Management Referral  - Ferritin  - Iron and iron binding capacity          BMI  Estimated body mass index is 30.97 kg/m  as calculated from the following:    Height as of this encounter: 1.636 m (5' 4.4\").    Weight as of this encounter: 82.9 kg (182 lb 11.2 oz).   Weight management plan: Discussed healthy diet and exercise guidelines    The longitudinal plan of care for the diagnosis(es)/condition(s) as documented were addressed during this visit. Due to the added complexity in care, I will continue to support Constantino in the subsequent management and with ongoing continuity of care.The longitudinal plan of care for the diagnosis(es)/condition(s) as documented were addressed during this visit. Due to the added complexity in care, I will continue to support Constantino in the subsequent management and with ongoing continuity of care.  See Patient Instructions    Return in about 3 months (around 7/3/2025) for diabetes.    Javon Meeks is a 54 year old, presenting for the following health issues:  Diabetes, Lipids, Hypertension, Thyroid Problem, Depression, and Anxiety    History of Present Illness       Reason for visit:  Med review She is missing 3 dose(s) of medications per week.        Last seen 6/24/24.    Vaccines - tdap   Colon screen - wait until she   Stop taking all of her medications on saturday; " she is not sure what she should be taking   B12 injection-usually gets it in infusion would like to get one today     Moved locally.    Ran to of [place] Saturday.  Variable dose of synthroid.  Nothing since Saturday - 200 mcg prior - x past 2 months.    Off Lipitor x months?  90 days with 3 refills 2/2/2024    MTM referral -     Last b12 injection - months ago - use to do when got port flushed; port now removed    Prior iron tablets.  Infusion - prior     Diabetes Follow-up  How often are you checking your blood sugar? One time daily when she remembers to check   What time of day are you checking your blood sugars (select all that apply)?  Before meals  Have you had any blood sugars above 200?  No  Have you had any blood sugars below 70?  No  What symptoms do you notice when your blood sugar is low?  None  What concerns do you have today about your diabetes? None   Do you have any of these symptoms? (Select all that apply)  Numbness in feet and Burning in feet  160-180s - sometimes fasting  Prior diabetic - then gastric bypass - resolved; then prediabetes  Diabetic again 6.6% 2/2024 - then 5.8%    Hyperlipidemia Follow-Up  Are you regularly taking any medication or supplement to lower your cholesterol?   No  Are you having muscle aches or other side effects that you think could be caused by your cholesterol lowering medication?  No    Hypertension Follow-up  Do you check your blood pressure regularly outside of the clinic? No   Are you following a low salt diet? No  Are your blood pressures ever more than 140 on the top number (systolic) OR more   than 90 on the bottom number (diastolic), for example 140/90? N/A    BP Readings from Last 2 Encounters:   04/03/25 110/60   01/20/25 92/63     Hemoglobin A1C (%)   Date Value   01/15/2025 5.8 (H)   06/19/2024 5.8 (H)   06/30/2021 5.1   03/04/2021 5.0     LDL Cholesterol Calculated (mg/dL)   Date Value   02/26/2024 10   12/21/2021 70   03/04/2021 69   02/20/2020 46          Depression and Anxiety   How are you doing with your depression since your last visit? Improved   How are you doing with your anxiety since your last visit?  Improved   Are you having other symptoms that might be associated with depression or anxiety? No  Have you had a significant life event? No   Do you have any concerns with your use of alcohol or other drugs? No    Social History     Tobacco Use    Smoking status: Never     Passive exposure: Never    Smokeless tobacco: Never   Vaping Use    Vaping status: Never Used   Substance Use Topics    Alcohol use: No     Comment: sober for 18 years    Drug use: No         7/14/2022     9:00 AM 6/19/2024     9:03 AM 4/3/2025     3:44 PM   PHQ   PHQ-9 Total Score 0 0 1   Q9: Thoughts of better off dead/self-harm past 2 weeks Not at all Not at all Not at all         7/14/2022     9:00 AM 6/19/2024     9:03 AM 4/3/2025     3:44 PM   LETTY-7 SCORE   Total Score  0 (minimal anxiety)    Total Score 0 0 0         4/3/2025     3:44 PM   Last PHQ-9   1.  Little interest or pleasure in doing things 0   2.  Feeling down, depressed, or hopeless 0   3.  Trouble falling or staying asleep, or sleeping too much 1   4.  Feeling tired or having little energy 0   5.  Poor appetite or overeating 0   6.  Feeling bad about yourself 0   7.  Trouble concentrating 0   8.  Moving slowly or restless 0   Q9: Thoughts of better off dead/self-harm past 2 weeks 0   PHQ-9 Total Score 1         4/3/2025     3:44 PM   LETTY-7    1. Feeling nervous, anxious, or on edge 0   2. Not being able to stop or control worrying 0   3. Worrying too much about different things 0   4. Trouble relaxing 0   5. Being so restless that it is hard to sit still 0   6. Becoming easily annoyed or irritable 0   7. Feeling afraid, as if something awful might happen 0   LETTY-7 Total Score 0       Suicide Assessment Five-step Evaluation and Treatment (SAFE-T)    Hypothyroidism Follow-up - synthroid increased to 275 mcg 1/2025  "when TSH 10  Since last visit, patient describes the following symptoms: dry skin, loose stools, tremors, and fatigue      Review of Systems  Constitutional, HEENT, cardiovascular, pulmonary, gi and gu systems are negative, except as otherwise noted.      Objective    /60 (BP Location: Left arm, Patient Position: Sitting, Cuff Size: Adult Regular)   Pulse 78   Temp 97  F (36.1  C) (Tympanic)   Resp 20   Ht 1.636 m (5' 4.4\")   Wt 82.9 kg (182 lb 11.2 oz)   SpO2 100%   BMI 30.97 kg/m    Body mass index is 30.97 kg/m .  Physical Exam   GENERAL: alert and no distress  EYES: Eyes grossly normal to inspection, PERRL and conjunctivae and sclerae normal  NECK: no adenopathy, no asymmetry, masses, or scars  RESP: lungs clear to auscultation - no rales, rhonchi or wheezes  CV: regular rate and rhythm, normal S1 S2, no S3 or S4, no murmur, click or rub, no peripheral edema  ABDOMEN: soft, nontender, no hepatosplenomegaly, no masses and bowel sounds normal  MS: no gross musculoskeletal defects noted, no edema  PSYCH: mentation appears normal, affect normal/bright  Diabetic foot exam: normal DP and PT pulses, no trophic changes or ulcerative lesions, normal sensory exam, normal monofilament exam, and dry skin    Full labs pending.        Signed Electronically by: Coco Schafer MD    "

## 2025-04-07 ENCOUNTER — TELEPHONE (OUTPATIENT)
Dept: FAMILY MEDICINE | Facility: OTHER | Age: 55
End: 2025-04-07

## 2025-04-07 NOTE — TELEPHONE ENCOUNTER
MTM referral from: [place] clinic visit (referral by provider)    MTM referral outreach attempt #2 on April 7, 2025 at 1:16 PM      Outcome: Left Message    Use Private Pay for the carrier/Plan on the flowsheet      [employer] Message Sent    BELGICA Wolfe

## 2025-04-14 ENCOUNTER — INFUSION THERAPY VISIT (OUTPATIENT)
Dept: INFUSION THERAPY | Facility: OTHER | Age: 55
End: 2025-04-14
Attending: FAMILY MEDICINE
Payer: COMMERCIAL

## 2025-04-14 VITALS
HEART RATE: 82 BPM | DIASTOLIC BLOOD PRESSURE: 81 MMHG | RESPIRATION RATE: 18 BRPM | SYSTOLIC BLOOD PRESSURE: 113 MMHG | OXYGEN SATURATION: 95 % | TEMPERATURE: 96.8 F

## 2025-04-14 DIAGNOSIS — E53.8 VITAMIN B 12 DEFICIENCY: Primary | ICD-10-CM

## 2025-04-14 DIAGNOSIS — E61.1 IRON DEFICIENCY: ICD-10-CM

## 2025-04-14 DIAGNOSIS — Z98.84 GASTRIC BYPASS STATUS FOR OBESITY: ICD-10-CM

## 2025-04-14 RX ORDER — DIPHENHYDRAMINE HYDROCHLORIDE 50 MG/ML
25 INJECTION, SOLUTION INTRAMUSCULAR; INTRAVENOUS
Status: CANCELLED
Start: 2025-04-15

## 2025-04-14 RX ORDER — METHYLPREDNISOLONE SODIUM SUCCINATE 40 MG/ML
40 INJECTION INTRAMUSCULAR; INTRAVENOUS
Status: CANCELLED
Start: 2025-04-15

## 2025-04-14 RX ORDER — DIPHENHYDRAMINE HYDROCHLORIDE 50 MG/ML
50 INJECTION, SOLUTION INTRAMUSCULAR; INTRAVENOUS
Status: CANCELLED
Start: 2025-04-15

## 2025-04-14 RX ORDER — HEPARIN SODIUM (PORCINE) LOCK FLUSH IV SOLN 100 UNIT/ML 100 UNIT/ML
5 SOLUTION INTRAVENOUS
Status: CANCELLED | OUTPATIENT
Start: 2025-04-15

## 2025-04-14 RX ORDER — ALBUTEROL SULFATE 0.83 MG/ML
2.5 SOLUTION RESPIRATORY (INHALATION)
Status: CANCELLED | OUTPATIENT
Start: 2025-04-15

## 2025-04-14 RX ORDER — MEPERIDINE HYDROCHLORIDE 25 MG/ML
25 INJECTION INTRAMUSCULAR; INTRAVENOUS; SUBCUTANEOUS
Status: CANCELLED | OUTPATIENT
Start: 2025-04-15

## 2025-04-14 RX ORDER — HEPARIN SODIUM,PORCINE 10 UNIT/ML
5-20 VIAL (ML) INTRAVENOUS DAILY PRN
Status: CANCELLED | OUTPATIENT
Start: 2025-04-15

## 2025-04-14 RX ORDER — ALBUTEROL SULFATE 90 UG/1
1-2 INHALANT RESPIRATORY (INHALATION)
Status: CANCELLED
Start: 2025-04-15

## 2025-04-14 RX ORDER — EPINEPHRINE 1 MG/ML
0.3 INJECTION, SOLUTION, CONCENTRATE INTRAVENOUS EVERY 5 MIN PRN
Status: CANCELLED | OUTPATIENT
Start: 2025-04-15

## 2025-04-14 RX ADMIN — Medication 250 ML: at 08:28

## 2025-04-14 ASSESSMENT — PAIN SCALES - GENERAL: PAINLEVEL_OUTOF10: NO PAIN (0)

## 2025-04-14 NOTE — PROGRESS NOTES
Infusion Nursing Note:  Constantino Warren presents today for Venofer.    Patient seen by provider today: No   present during visit today: Not Applicable.    Note: N/A.      Intravenous Access:  Peripheral IV placed.    Treatment Conditions:  Not Applicable.      Post Infusion Assessment:  Patient tolerated infusion without incident.  Site patent and intact, free from redness, edema or discomfort.  No evidence of extravasations.  Access discontinued per protocol.       Discharge Plan:   Patient discharged in stable condition accompanied by: self.  Departure Mode: Ambulatory.

## 2025-04-16 ENCOUNTER — INFUSION THERAPY VISIT (OUTPATIENT)
Dept: INFUSION THERAPY | Facility: OTHER | Age: 55
End: 2025-04-16
Attending: FAMILY MEDICINE
Payer: COMMERCIAL

## 2025-04-16 VITALS
OXYGEN SATURATION: 95 % | SYSTOLIC BLOOD PRESSURE: 111 MMHG | HEART RATE: 100 BPM | DIASTOLIC BLOOD PRESSURE: 60 MMHG | RESPIRATION RATE: 18 BRPM | TEMPERATURE: 97.4 F

## 2025-04-16 DIAGNOSIS — Z98.84 GASTRIC BYPASS STATUS FOR OBESITY: ICD-10-CM

## 2025-04-16 DIAGNOSIS — E61.1 IRON DEFICIENCY: ICD-10-CM

## 2025-04-16 DIAGNOSIS — E53.8 VITAMIN B 12 DEFICIENCY: Primary | ICD-10-CM

## 2025-04-16 RX ORDER — DIPHENHYDRAMINE HYDROCHLORIDE 50 MG/ML
50 INJECTION, SOLUTION INTRAMUSCULAR; INTRAVENOUS
Start: 2025-04-18

## 2025-04-16 RX ORDER — MEPERIDINE HYDROCHLORIDE 25 MG/ML
25 INJECTION INTRAMUSCULAR; INTRAVENOUS; SUBCUTANEOUS
OUTPATIENT
Start: 2025-04-18

## 2025-04-16 RX ORDER — HEPARIN SODIUM,PORCINE 10 UNIT/ML
5-20 VIAL (ML) INTRAVENOUS DAILY PRN
OUTPATIENT
Start: 2025-04-18

## 2025-04-16 RX ORDER — HEPARIN SODIUM (PORCINE) LOCK FLUSH IV SOLN 100 UNIT/ML 100 UNIT/ML
5 SOLUTION INTRAVENOUS
OUTPATIENT
Start: 2025-04-18

## 2025-04-16 RX ORDER — ALBUTEROL SULFATE 90 UG/1
1-2 INHALANT RESPIRATORY (INHALATION)
Start: 2025-04-18

## 2025-04-16 RX ORDER — ALBUTEROL SULFATE 0.83 MG/ML
2.5 SOLUTION RESPIRATORY (INHALATION)
OUTPATIENT
Start: 2025-04-18

## 2025-04-16 RX ORDER — DIPHENHYDRAMINE HYDROCHLORIDE 50 MG/ML
25 INJECTION, SOLUTION INTRAMUSCULAR; INTRAVENOUS
Start: 2025-04-18

## 2025-04-16 RX ORDER — EPINEPHRINE 1 MG/ML
0.3 INJECTION, SOLUTION, CONCENTRATE INTRAVENOUS EVERY 5 MIN PRN
OUTPATIENT
Start: 2025-04-18

## 2025-04-16 RX ORDER — METHYLPREDNISOLONE SODIUM SUCCINATE 40 MG/ML
40 INJECTION INTRAMUSCULAR; INTRAVENOUS
Start: 2025-04-18

## 2025-04-16 RX ADMIN — Medication 250 ML: at 14:23

## 2025-04-19 ENCOUNTER — HEALTH MAINTENANCE LETTER (OUTPATIENT)
Age: 55
End: 2025-04-19

## 2025-04-21 ENCOUNTER — INFUSION THERAPY VISIT (OUTPATIENT)
Dept: INFUSION THERAPY | Facility: OTHER | Age: 55
End: 2025-04-21
Attending: FAMILY MEDICINE
Payer: COMMERCIAL

## 2025-04-21 VITALS
TEMPERATURE: 97.9 F | OXYGEN SATURATION: 98 % | DIASTOLIC BLOOD PRESSURE: 73 MMHG | SYSTOLIC BLOOD PRESSURE: 119 MMHG | HEART RATE: 84 BPM | RESPIRATION RATE: 16 BRPM

## 2025-04-21 DIAGNOSIS — E61.1 IRON DEFICIENCY: ICD-10-CM

## 2025-04-21 DIAGNOSIS — Z98.84 GASTRIC BYPASS STATUS FOR OBESITY: ICD-10-CM

## 2025-04-21 DIAGNOSIS — E53.8 VITAMIN B 12 DEFICIENCY: Primary | ICD-10-CM

## 2025-04-21 RX ORDER — MEPERIDINE HYDROCHLORIDE 25 MG/ML
25 INJECTION INTRAMUSCULAR; INTRAVENOUS; SUBCUTANEOUS
OUTPATIENT
Start: 2025-04-22

## 2025-04-21 RX ORDER — CYANOCOBALAMIN 1000 UG/ML
1000 INJECTION, SOLUTION INTRAMUSCULAR; SUBCUTANEOUS
Start: 2025-04-21

## 2025-04-21 RX ORDER — DIPHENHYDRAMINE HYDROCHLORIDE 50 MG/ML
50 INJECTION, SOLUTION INTRAMUSCULAR; INTRAVENOUS
Start: 2025-04-22

## 2025-04-21 RX ORDER — DIPHENHYDRAMINE HYDROCHLORIDE 50 MG/ML
25 INJECTION, SOLUTION INTRAMUSCULAR; INTRAVENOUS
Start: 2025-04-22

## 2025-04-21 RX ORDER — HEPARIN SODIUM,PORCINE 10 UNIT/ML
5-20 VIAL (ML) INTRAVENOUS DAILY PRN
OUTPATIENT
Start: 2025-04-22

## 2025-04-21 RX ORDER — METHYLPREDNISOLONE SODIUM SUCCINATE 40 MG/ML
40 INJECTION INTRAMUSCULAR; INTRAVENOUS
Start: 2025-04-22

## 2025-04-21 RX ORDER — HEPARIN SODIUM (PORCINE) LOCK FLUSH IV SOLN 100 UNIT/ML 100 UNIT/ML
5 SOLUTION INTRAVENOUS
OUTPATIENT
Start: 2025-04-21

## 2025-04-21 RX ORDER — ALBUTEROL SULFATE 90 UG/1
1-2 INHALANT RESPIRATORY (INHALATION)
Start: 2025-04-22

## 2025-04-21 RX ORDER — ALBUTEROL SULFATE 0.83 MG/ML
2.5 SOLUTION RESPIRATORY (INHALATION)
OUTPATIENT
Start: 2025-04-22

## 2025-04-21 RX ORDER — EPINEPHRINE 1 MG/ML
0.3 INJECTION, SOLUTION, CONCENTRATE INTRAVENOUS EVERY 5 MIN PRN
OUTPATIENT
Start: 2025-04-22

## 2025-04-21 RX ORDER — HEPARIN SODIUM (PORCINE) LOCK FLUSH IV SOLN 100 UNIT/ML 100 UNIT/ML
5 SOLUTION INTRAVENOUS
OUTPATIENT
Start: 2025-04-22

## 2025-04-21 RX ADMIN — Medication 250 ML: at 08:20

## 2025-04-21 NOTE — PROGRESS NOTES
Infusion Nursing Note:  Constantino Warren presents today for Venofer.    Patient seen by provider today: No   present during visit today: Not Applicable.    Note: N/A.    Intravenous Access:  Peripheral IV placed.    Treatment Conditions:  Not Applicable.    Post Infusion Assessment:  Patient tolerated infusion without incident.  Patient observed for 15 minutes post Venofer per protocol.  Site patent and intact, free from redness, edema or discomfort.  No evidence of extravasations.  Access discontinued per protocol.     Discharge Plan:   Patient discharged in stable condition accompanied by: self.  Departure Mode: Ambulatory.

## 2025-04-28 ENCOUNTER — INFUSION THERAPY VISIT (OUTPATIENT)
Dept: INFUSION THERAPY | Facility: OTHER | Age: 55
End: 2025-04-28
Attending: FAMILY MEDICINE
Payer: COMMERCIAL

## 2025-04-28 VITALS
OXYGEN SATURATION: 98 % | RESPIRATION RATE: 16 BRPM | SYSTOLIC BLOOD PRESSURE: 103 MMHG | TEMPERATURE: 98.2 F | BODY MASS INDEX: 29.16 KG/M2 | WEIGHT: 172 LBS | HEART RATE: 88 BPM | DIASTOLIC BLOOD PRESSURE: 71 MMHG

## 2025-04-28 DIAGNOSIS — Z98.84 GASTRIC BYPASS STATUS FOR OBESITY: ICD-10-CM

## 2025-04-28 DIAGNOSIS — E61.1 IRON DEFICIENCY: ICD-10-CM

## 2025-04-28 DIAGNOSIS — E53.8 VITAMIN B 12 DEFICIENCY: Primary | ICD-10-CM

## 2025-04-28 PROCEDURE — 96374 THER/PROPH/DIAG INJ IV PUSH: CPT | Performed by: FAMILY MEDICINE

## 2025-04-28 RX ORDER — MEPERIDINE HYDROCHLORIDE 25 MG/ML
25 INJECTION INTRAMUSCULAR; INTRAVENOUS; SUBCUTANEOUS
OUTPATIENT
Start: 2025-04-29

## 2025-04-28 RX ORDER — HEPARIN SODIUM (PORCINE) LOCK FLUSH IV SOLN 100 UNIT/ML 100 UNIT/ML
5 SOLUTION INTRAVENOUS
OUTPATIENT
Start: 2025-04-29

## 2025-04-28 RX ORDER — DIPHENHYDRAMINE HYDROCHLORIDE 50 MG/ML
25 INJECTION, SOLUTION INTRAMUSCULAR; INTRAVENOUS
Start: 2025-04-29

## 2025-04-28 RX ORDER — METHYLPREDNISOLONE SODIUM SUCCINATE 40 MG/ML
40 INJECTION INTRAMUSCULAR; INTRAVENOUS
Start: 2025-04-29

## 2025-04-28 RX ORDER — EPINEPHRINE 1 MG/ML
0.3 INJECTION, SOLUTION, CONCENTRATE INTRAVENOUS EVERY 5 MIN PRN
OUTPATIENT
Start: 2025-04-29

## 2025-04-28 RX ORDER — ALBUTEROL SULFATE 90 UG/1
1-2 INHALANT RESPIRATORY (INHALATION)
Start: 2025-04-29

## 2025-04-28 RX ORDER — ALBUTEROL SULFATE 0.83 MG/ML
2.5 SOLUTION RESPIRATORY (INHALATION)
OUTPATIENT
Start: 2025-04-29

## 2025-04-28 RX ORDER — DIPHENHYDRAMINE HYDROCHLORIDE 50 MG/ML
50 INJECTION, SOLUTION INTRAMUSCULAR; INTRAVENOUS
Start: 2025-04-29

## 2025-04-28 RX ORDER — HEPARIN SODIUM,PORCINE 10 UNIT/ML
5-20 VIAL (ML) INTRAVENOUS DAILY PRN
OUTPATIENT
Start: 2025-04-29

## 2025-04-28 RX ADMIN — Medication 250 ML: at 14:49

## 2025-04-28 NOTE — PROGRESS NOTES
Port plan discontinued as patient does not have port any longer.  Patient has not come to clinic for b12 in over 6 months.

## 2025-04-28 NOTE — PROGRESS NOTES
Infusion Nursing Note:  Constantino Warren presents today for Venofer.    Patient seen by provider today: No   present during visit today: Not Applicable.    Note: N/A.    Intravenous Access:  Peripheral IV placed without difficulty.    Treatment Conditions:  Not Applicable.    Post Infusion Assessment:  Patient tolerated infusion without incident.  Blood return noted pre and post infusion.  Site patent and intact, free from redness, edema or discomfort.  No evidence of extravasations.  Access discontinued per protocol.     Discharge Plan:   Discharge instructions reviewed with: Patient.

## 2025-05-09 NOTE — ED NOTES
Did call with report, which the nurse did take but they are unable to take pt for 30 minutes.  
Patient   
Patient states she was working at the [employer] with her kids and she states that she had an LOC while having the kids clean up. Per pt she did hit the left side of her head. Pt states she's had some SOB the last few days, she describes it as how you feel before you get a cold. She is also complaining of some hip pain on her left side. She does states she has a hx of anemia as well after her gastric bypass. She does endorse a headache that she states she's had for about 3 months as well. She states at best it's a 3 but today is a 6 out of 10  
[Subsequent Evaluation] : a subsequent evaluation for
[Subsequent Evaluation] : a subsequent evaluation for

## 2025-07-12 ENCOUNTER — HEALTH MAINTENANCE LETTER (OUTPATIENT)
Age: 55
End: 2025-07-12

## 2025-07-17 ENCOUNTER — TELEPHONE (OUTPATIENT)
Dept: FAMILY MEDICINE | Facility: OTHER | Age: 55
End: 2025-07-17

## 2025-08-02 ENCOUNTER — HEALTH MAINTENANCE LETTER (OUTPATIENT)
Age: 55
End: 2025-08-02

## (undated) DEVICE — DRSG-SPONGE STERILE 4 X 4

## (undated) DEVICE — Device

## (undated) DEVICE — SU DERMABOND ADVANCED .7ML DNX12

## (undated) DEVICE — LINEAR CUTTER-BLUE 75MM PROXIMATE

## (undated) DEVICE — BETADINE 5% STERILE OPHTHALMIC SOLUTION 1 OZ.

## (undated) DEVICE — CAUTERY-EXTENDED 6.5" BLADE

## (undated) DEVICE — LIMB HOLDER-QUILTED OR

## (undated) DEVICE — MOUTHPIECE W/GUARD FOR ENDOSCOPY

## (undated) DEVICE — TROCAR SLEEVE-KII 5X100MM

## (undated) DEVICE — PACK-LAPAROTOMY-CUSTOM

## (undated) DEVICE — LIGHT HANDLE COVER

## (undated) DEVICE — TUBING-INSUFFLATION/LAPAROFLATOR W/FILTER

## (undated) DEVICE — SUTURE-PROLENE 2-0 SH 8833H

## (undated) DEVICE — ESU GROUND PAD ADULT W/CORD E7507

## (undated) DEVICE — LINEAR CUTTER-45MM ECHELON FLEX ARTICULATING

## (undated) DEVICE — CONNECTOR-ERBEFLO 2 PORT

## (undated) DEVICE — IRRIGATION-H2O 1000ML

## (undated) DEVICE — SUTURE BOOTS-STANDARD

## (undated) DEVICE — PACK BASIN SET UP SUTCNBSBBA

## (undated) DEVICE — GLV-8.0 PROTEXIS PI CLASSIC LF/PF

## (undated) DEVICE — SU MONOCRYL 4-0 PS-2 18" UND Y496G

## (undated) DEVICE — FORCEP-COLON BIOPSY STD W/NEEDLE 160CM

## (undated) DEVICE — GOWN-SURG XL LVL 3 REINFORCED

## (undated) DEVICE — SYRINGE-30CC SLIP TIP

## (undated) DEVICE — CANISTER-SUCTION 2000CC

## (undated) DEVICE — POUCH-INSTRUMENT 3 COMP 9-5/8 X 18 IN

## (undated) DEVICE — IRRIGATION-NACL 1000ML

## (undated) DEVICE — APPLICATOR-CHLORAPREP 26ML TINTED CHG 2%+ 70% IPA-SURGICAL

## (undated) DEVICE — SUTURE-MONOCRYL 4-0 PS-2 Y496G

## (undated) DEVICE — GLV-8.5 ORTHO PROTEXIS PI LF/PF

## (undated) DEVICE — SPONGE-LAPAROTOMY PADS 18 X 18

## (undated) DEVICE — BLADE-FLUSH CUT OVAL BURR AR-8500FOE

## (undated) DEVICE — SUTURE-PDS 1 TP-1 Z880G

## (undated) DEVICE — SENSOR-OXISENSOR II ADULT

## (undated) DEVICE — TUBING-SUCTION 20FT

## (undated) DEVICE — TUBING-ARTHROSCOPY-INFLOW

## (undated) DEVICE — BIN-ROTATOR CUFF (SHOULDER)

## (undated) DEVICE — CAUTERY PAD-POLYHESIVE II ADULT

## (undated) DEVICE — STERI-STRIP-1/2" X 4"

## (undated) DEVICE — CLIP APPLIER-LIGAMAX 5MM MEDIUM/LARGE

## (undated) DEVICE — SUTURE-VICRYL 3-0 SH J416H

## (undated) DEVICE — SOL WATER IRRIG 1000ML BOTTLE 2F7114

## (undated) DEVICE — SCD SLEEVE-KNEE REG.

## (undated) DEVICE — DRSG-MEPILEX BORDER AG 4" X 8" (10CM X 20CM)

## (undated) DEVICE — DRSG TEGADERM 4X4 3/4" 1626

## (undated) DEVICE — TRAY-SKIN PREP POVIDONE/IODINE

## (undated) DEVICE — TROCAR-12X100MM KII FIOS

## (undated) DEVICE — LABEL STERILE PREPRINTED FOR OR FRRH01-2M

## (undated) DEVICE — GELPORT-LAPAROSCOPIC ACCESS SYSTEM

## (undated) DEVICE — SLING-ARM-LARGE

## (undated) DEVICE — CAUTERY PENCIL-SMOKE EVACUATION

## (undated) DEVICE — SUTURE-MONOCRYL 3-0 PS-1 Y936H

## (undated) DEVICE — PACK-SHOULDER ARTHROSCOPY-CUSTOM

## (undated) DEVICE — BLANKET-BAIR UPPER BODY

## (undated) DEVICE — NDL-SCLEROTHERAPY INTERJECT

## (undated) DEVICE — NEEDLE-SCORPION MULTIFIRE

## (undated) DEVICE — DRSG-TEGADERM MEDIUM #1626

## (undated) DEVICE — GLV-6.5 BIOGEL POLYISOPRENE PF

## (undated) DEVICE — SOL NACL 0.9% IRRIG 1000ML BOTTLE 2F7124

## (undated) DEVICE — LINEAR CUTTER-GREEN 100MM PROXIMATE

## (undated) DEVICE — BIN-ARTHROSCOPY CART

## (undated) DEVICE — UNIVERSAL HEAD POSITIONER-BEACH CHAIR

## (undated) DEVICE — PACK LAPAROTOMY CUSTOM SBA32LPMBG

## (undated) DEVICE — CANNULA-TWIST-IN W/NO SQUIRT CAP

## (undated) DEVICE — CLEARIFY VISUALIZATION SYSTEM (SCOPE WARMER)

## (undated) DEVICE — RELOAD-BLUE 45MM ECHELON ENDOPATH

## (undated) DEVICE — CATH TRAY-16FR METER W/STATLOCK LATEX]

## (undated) DEVICE — PACK-BASIN SET-UP

## (undated) DEVICE — DRSG-MEPILEX BORDER AG 3" X 3" (7.5CM X 7.5CM)

## (undated) DEVICE — LUBRICANT JELLY 2OZ. TUBE

## (undated) DEVICE — BLADE-SCALPEL #15

## (undated) DEVICE — BLADE-DISSECTOR AR-8400DS

## (undated) DEVICE — PACK-LAPAROSCOPY-CUSTOM

## (undated) DEVICE — RELOAD-BLUE 75MM PROXIMATE

## (undated) DEVICE — GLOVE 8.5 PROTEXIS PI CLSC PF BD CUF STRL LF 12IN 2D72PL85X

## (undated) DEVICE — TAPE-MEDIPORE 4" X 2YD

## (undated) DEVICE — FORCEP-COLON C NEEDLE  SWING JAW  FB-220UA

## (undated) DEVICE — SUTURE-SILK 3-0 SH POP-OFF C013D

## (undated) DEVICE — GLV-8.0 PROTEXIS PI BLUE W/NEU-THERA LF/PF

## (undated) DEVICE — LIGASURE-5MM BLUNT TIP LAPAROSCOPIC

## (undated) DEVICE — STAPLER-SKIN 35 WIDE STAPLES

## (undated) DEVICE — LABEL-STERILE PREPRINTED FOR OR

## (undated) DEVICE — COVER LT HANDLE 2/PK 5160-2FG

## (undated) DEVICE — IRRIGATION-NACL 3000ML (BAG)

## (undated) DEVICE — DRILL FOR FIBERTAK 1.70MM

## (undated) DEVICE — SUTURE-VICRYL 0 UR-6 J603H

## (undated) DEVICE — SLING-SUPER LARGE

## (undated) DEVICE — BLANKET-BAIR LOWER EXTREMITY

## (undated) DEVICE — SU VICRYL 3-0 SH 27" UND J416H

## (undated) DEVICE — WAND-APOLLO RF 90 MULTIPORT

## (undated) DEVICE — GLV-7.5 PROTEXIS PI CLASSIC LF/PF

## (undated) DEVICE — GLV-7.0 PROTEXIS PI BLUE W/NEU-THERA LF/PF

## (undated) DEVICE — TOPICAL SKIN ADHESIVE EXOFIN

## (undated) DEVICE — CANISTER SUCTION MEDI-VAC GUARDIAN 2000ML 90D 65651-220

## (undated) DEVICE — BAG-DECANTER

## (undated) DEVICE — PREP CHLORAPREP 26ML TINTED HI-LITE ORANGE 930815

## (undated) DEVICE — FORCEP-COLON BIOPSY LARGE W/NEEDLE 240CM

## (undated) DEVICE — WANDS-INSULSCAN

## (undated) DEVICE — TROCAR-5X100MM FIOS BLADELESS

## (undated) DEVICE — GLV-8.5 GAMMEX NEOPRENE LF/PF

## (undated) DEVICE — DRAPE-C-ARM

## (undated) RX ORDER — DEXAMETHASONE SODIUM PHOSPHATE 10 MG/ML
INJECTION, SOLUTION INTRAMUSCULAR; INTRAVENOUS
Status: DISPENSED
Start: 2020-02-28

## (undated) RX ORDER — FENTANYL CITRATE 50 UG/ML
INJECTION, SOLUTION INTRAMUSCULAR; INTRAVENOUS
Status: DISPENSED
Start: 2020-02-24

## (undated) RX ORDER — PROPOFOL 10 MG/ML
INJECTION, EMULSION INTRAVENOUS
Status: DISPENSED
Start: 2020-11-06

## (undated) RX ORDER — NEOSTIGMINE METHYLSULFATE 1 MG/ML
VIAL (ML) INJECTION
Status: DISPENSED
Start: 2020-02-24

## (undated) RX ORDER — FENTANYL CITRATE 50 UG/ML
INJECTION, SOLUTION INTRAMUSCULAR; INTRAVENOUS
Status: DISPENSED
Start: 2020-11-06

## (undated) RX ORDER — PROPOFOL 10 MG/ML
INJECTION, EMULSION INTRAVENOUS
Status: DISPENSED
Start: 2020-02-24

## (undated) RX ORDER — PHENYLEPHRINE HCL IN 0.9% NACL 1 MG/10 ML
SYRINGE (ML) INTRAVENOUS
Status: DISPENSED
Start: 2020-02-28

## (undated) RX ORDER — ONDANSETRON 2 MG/ML
INJECTION INTRAMUSCULAR; INTRAVENOUS
Status: DISPENSED
Start: 2020-11-06

## (undated) RX ORDER — DEXAMETHASONE SODIUM PHOSPHATE 10 MG/ML
INJECTION, SOLUTION INTRAMUSCULAR; INTRAVENOUS
Status: DISPENSED
Start: 2020-02-24

## (undated) RX ORDER — EPHEDRINE SULFATE 50 MG/ML
INJECTION, SOLUTION INTRAVENOUS
Status: DISPENSED
Start: 2020-02-24

## (undated) RX ORDER — FENTANYL CITRATE-0.9 % NACL/PF 10 MCG/ML
PLASTIC BAG, INJECTION (ML) INTRAVENOUS
Status: DISPENSED
Start: 2020-11-06

## (undated) RX ORDER — ONDANSETRON 2 MG/ML
INJECTION INTRAMUSCULAR; INTRAVENOUS
Status: DISPENSED
Start: 2020-02-28

## (undated) RX ORDER — HYDROMORPHONE HYDROCHLORIDE 2 MG/ML
INJECTION, SOLUTION INTRAMUSCULAR; INTRAVENOUS; SUBCUTANEOUS
Status: DISPENSED
Start: 2020-02-24

## (undated) RX ORDER — PROPOFOL 10 MG/ML
INJECTION, EMULSION INTRAVENOUS
Status: DISPENSED
Start: 2018-07-21

## (undated) RX ORDER — LIDOCAINE HYDROCHLORIDE 20 MG/ML
INJECTION, SOLUTION EPIDURAL; INFILTRATION; INTRACAUDAL; PERINEURAL
Status: DISPENSED
Start: 2020-11-06

## (undated) RX ORDER — LIDOCAINE HYDROCHLORIDE 20 MG/ML
INJECTION, SOLUTION EPIDURAL; INFILTRATION; INTRACAUDAL; PERINEURAL
Status: DISPENSED
Start: 2020-02-28

## (undated) RX ORDER — DEXMEDETOMIDINE HYDROCHLORIDE 4 UG/ML
INJECTION, SOLUTION INTRAVENOUS
Status: DISPENSED
Start: 2025-01-20

## (undated) RX ORDER — PROPOFOL 10 MG/ML
INJECTION, EMULSION INTRAVENOUS
Status: DISPENSED
Start: 2020-02-28

## (undated) RX ORDER — ONDANSETRON 2 MG/ML
INJECTION INTRAMUSCULAR; INTRAVENOUS
Status: DISPENSED
Start: 2020-02-24

## (undated) RX ORDER — GLYCOPYRROLATE 0.2 MG/ML
INJECTION, SOLUTION INTRAMUSCULAR; INTRAVENOUS
Status: DISPENSED
Start: 2020-02-24

## (undated) RX ORDER — LIDOCAINE HYDROCHLORIDE 10 MG/ML
INJECTION, SOLUTION EPIDURAL; INFILTRATION; INTRACAUDAL; PERINEURAL
Status: DISPENSED
Start: 2024-08-14

## (undated) RX ORDER — GLYCOPYRROLATE 0.2 MG/ML
INJECTION, SOLUTION INTRAMUSCULAR; INTRAVENOUS
Status: DISPENSED
Start: 2020-11-06

## (undated) RX ORDER — FENTANYL CITRATE 50 UG/ML
INJECTION, SOLUTION INTRAMUSCULAR; INTRAVENOUS
Status: DISPENSED
Start: 2020-02-28

## (undated) RX ORDER — PROPOFOL 10 MG/ML
INJECTION, EMULSION INTRAVENOUS
Status: DISPENSED
Start: 2025-01-20

## (undated) RX ORDER — LIDOCAINE HYDROCHLORIDE 20 MG/ML
INJECTION, SOLUTION EPIDURAL; INFILTRATION; INTRACAUDAL; PERINEURAL
Status: DISPENSED
Start: 2018-07-21

## (undated) RX ORDER — LIDOCAINE HYDROCHLORIDE 20 MG/ML
INJECTION, SOLUTION EPIDURAL; INFILTRATION; INTRACAUDAL; PERINEURAL
Status: DISPENSED
Start: 2020-02-24